# Patient Record
Sex: FEMALE | Race: BLACK OR AFRICAN AMERICAN | NOT HISPANIC OR LATINO | Employment: OTHER | URBAN - METROPOLITAN AREA
[De-identification: names, ages, dates, MRNs, and addresses within clinical notes are randomized per-mention and may not be internally consistent; named-entity substitution may affect disease eponyms.]

---

## 2017-05-01 ENCOUNTER — OFFICE VISIT (OUTPATIENT)
Dept: FAMILY MEDICINE | Facility: CLINIC | Age: 62
End: 2017-05-01
Payer: MEDICAID

## 2017-05-01 ENCOUNTER — LAB VISIT (OUTPATIENT)
Dept: LAB | Facility: HOSPITAL | Age: 62
End: 2017-05-01
Payer: MEDICAID

## 2017-05-01 VITALS
HEIGHT: 64 IN | DIASTOLIC BLOOD PRESSURE: 90 MMHG | TEMPERATURE: 98 F | BODY MASS INDEX: 36.84 KG/M2 | HEART RATE: 61 BPM | SYSTOLIC BLOOD PRESSURE: 130 MMHG | OXYGEN SATURATION: 98 % | WEIGHT: 215.81 LBS

## 2017-05-01 DIAGNOSIS — I10 ESSENTIAL HYPERTENSION: Chronic | ICD-10-CM

## 2017-05-01 DIAGNOSIS — D64.9 ANEMIA, UNSPECIFIED TYPE: Primary | ICD-10-CM

## 2017-05-01 DIAGNOSIS — F33.0 MILD RECURRENT MAJOR DEPRESSION: Chronic | ICD-10-CM

## 2017-05-01 DIAGNOSIS — D64.9 ANEMIA, UNSPECIFIED TYPE: ICD-10-CM

## 2017-05-01 DIAGNOSIS — G89.29 CHRONIC RIGHT SHOULDER PAIN: ICD-10-CM

## 2017-05-01 DIAGNOSIS — M25.511 CHRONIC RIGHT SHOULDER PAIN: ICD-10-CM

## 2017-05-01 DIAGNOSIS — M17.12 OSTEOARTHRITIS OF LEFT KNEE, UNSPECIFIED OSTEOARTHRITIS TYPE: ICD-10-CM

## 2017-05-01 LAB
ALBUMIN SERPL BCP-MCNC: 3.4 G/DL
ALP SERPL-CCNC: 83 U/L
ALT SERPL W/O P-5'-P-CCNC: 12 U/L
ANION GAP SERPL CALC-SCNC: 10 MMOL/L
AST SERPL-CCNC: 19 U/L
BASOPHILS # BLD AUTO: 0.04 K/UL
BASOPHILS NFR BLD: 0.4 %
BILIRUB SERPL-MCNC: 0.2 MG/DL
BUN SERPL-MCNC: 9 MG/DL
CALCIUM SERPL-MCNC: 9.3 MG/DL
CHLORIDE SERPL-SCNC: 108 MMOL/L
CHOLEST/HDLC SERPL: 3.3 {RATIO}
CO2 SERPL-SCNC: 23 MMOL/L
CREAT SERPL-MCNC: 0.7 MG/DL
DIFFERENTIAL METHOD: ABNORMAL
EOSINOPHIL # BLD AUTO: 0.3 K/UL
EOSINOPHIL NFR BLD: 3.6 %
ERYTHROCYTE [DISTWIDTH] IN BLOOD BY AUTOMATED COUNT: 18.9 %
EST. GFR  (AFRICAN AMERICAN): >60 ML/MIN/1.73 M^2
EST. GFR  (NON AFRICAN AMERICAN): >60 ML/MIN/1.73 M^2
GLUCOSE SERPL-MCNC: 89 MG/DL
HCT VFR BLD AUTO: 32 %
HDL/CHOLESTEROL RATIO: 30.7 %
HDLC SERPL-MCNC: 127 MG/DL
HDLC SERPL-MCNC: 39 MG/DL
HGB BLD-MCNC: 9.8 G/DL
LDLC SERPL CALC-MCNC: 73.6 MG/DL
LYMPHOCYTES # BLD AUTO: 3.1 K/UL
LYMPHOCYTES NFR BLD: 33.3 %
MCH RBC QN AUTO: 24.3 PG
MCHC RBC AUTO-ENTMCNC: 30.6 %
MCV RBC AUTO: 79 FL
MONOCYTES # BLD AUTO: 0.6 K/UL
MONOCYTES NFR BLD: 6.3 %
NEUTROPHILS # BLD AUTO: 5.2 K/UL
NEUTROPHILS NFR BLD: 56.4 %
NONHDLC SERPL-MCNC: 88 MG/DL
PLATELET # BLD AUTO: 312 K/UL
PMV BLD AUTO: ABNORMAL FL
POTASSIUM SERPL-SCNC: 3.7 MMOL/L
PROT SERPL-MCNC: 7.9 G/DL
RBC # BLD AUTO: 4.03 M/UL
SODIUM SERPL-SCNC: 141 MMOL/L
TRIGL SERPL-MCNC: 72 MG/DL
TSH SERPL DL<=0.005 MIU/L-ACNC: 0.75 UIU/ML
WBC # BLD AUTO: 9.19 K/UL

## 2017-05-01 PROCEDURE — 99999 PR PBB SHADOW E&M-EST. PATIENT-LVL III: CPT | Mod: PBBFAC,,, | Performed by: NURSE PRACTITIONER

## 2017-05-01 PROCEDURE — 36415 COLL VENOUS BLD VENIPUNCTURE: CPT | Mod: PO

## 2017-05-01 PROCEDURE — 85025 COMPLETE CBC W/AUTO DIFF WBC: CPT

## 2017-05-01 PROCEDURE — 84443 ASSAY THYROID STIM HORMONE: CPT

## 2017-05-01 PROCEDURE — 80061 LIPID PANEL: CPT

## 2017-05-01 PROCEDURE — 99214 OFFICE O/P EST MOD 30 MIN: CPT | Mod: S$PBB,,, | Performed by: NURSE PRACTITIONER

## 2017-05-01 PROCEDURE — 80053 COMPREHEN METABOLIC PANEL: CPT

## 2017-05-01 RX ORDER — LOSARTAN POTASSIUM AND HYDROCHLOROTHIAZIDE 25; 100 MG/1; MG/1
1 TABLET ORAL DAILY
Qty: 90 TABLET | Refills: 1 | Status: SHIPPED | OUTPATIENT
Start: 2017-05-01 | End: 2018-04-02 | Stop reason: SDUPTHER

## 2017-05-01 RX ORDER — METOPROLOL SUCCINATE 100 MG/1
100 TABLET, EXTENDED RELEASE ORAL DAILY
Qty: 90 TABLET | Refills: 1 | Status: SHIPPED | OUTPATIENT
Start: 2017-05-01 | End: 2018-04-02 | Stop reason: SDUPTHER

## 2017-05-01 RX ORDER — CLONIDINE HYDROCHLORIDE 0.1 MG/1
0.2 TABLET ORAL 2 TIMES DAILY
Qty: 360 TABLET | Refills: 1 | Status: SHIPPED | OUTPATIENT
Start: 2017-05-01 | End: 2018-04-02 | Stop reason: SDUPTHER

## 2017-05-01 RX ORDER — FLUOXETINE HYDROCHLORIDE 20 MG/1
20 CAPSULE ORAL EVERY MORNING
Qty: 90 CAPSULE | Refills: 1 | Status: SHIPPED | OUTPATIENT
Start: 2017-05-01 | End: 2017-07-13

## 2017-05-01 RX ORDER — HYDROCODONE BITARTRATE AND ACETAMINOPHEN 5; 325 MG/1; MG/1
1 TABLET ORAL EVERY 6 HOURS PRN
Qty: 30 TABLET | Refills: 0 | Status: CANCELLED | OUTPATIENT
Start: 2017-05-01

## 2017-05-01 RX ORDER — HYDROCODONE BITARTRATE AND ACETAMINOPHEN 5; 325 MG/1; MG/1
1 TABLET ORAL EVERY 6 HOURS PRN
Qty: 30 TABLET | Refills: 0 | Status: SHIPPED | OUTPATIENT
Start: 2017-05-01 | End: 2017-06-13 | Stop reason: ALTCHOICE

## 2017-05-01 RX ORDER — OMEPRAZOLE 40 MG/1
40 CAPSULE, DELAYED RELEASE ORAL 2 TIMES DAILY
Qty: 180 CAPSULE | Refills: 1 | Status: SHIPPED | OUTPATIENT
Start: 2017-05-01 | End: 2018-04-02 | Stop reason: SDUPTHER

## 2017-05-01 NOTE — MR AVS SNAPSHOT
[facility]  [address]  [place] [address]  Phone: [phone]  Fax: [phone]                  Bell Lynn   2017 9:20 AM   Office Visit    Description:  Female : 1955   Provider:  Jose Fernando NP   Department:  [facility] Medicine           Reason for Visit     Leg Pain     Hypertension           Diagnoses this Visit        Comments    Anemia, unspecified type    -  Primary     Essential hypertension         Mild recurrent major depression         Osteoarthritis of left knee, unspecified osteoarthritis type         Chronic right shoulder pain                To Do List           Goals (5 Years of Data)              Today    10/19/16    10/19/16    Blood Pressure < 140/90   130/90  130/90  130/90      Follow-Up and Disposition     Return in about 1 month (around 2017).       These Medications        Disp Refills Start End    losartan-hydrochlorothiazide 100-25 mg (HYZAAR) 100-25 mg per tablet 90 tablet 1 2017 10/28/2017    Take 1 tablet by mouth once daily. - Oral    Pharmacy: [facility] [address] [place] [address] Ph #: [phone]       fluoxetine (PROZAC) 20 MG capsule 90 capsule 1 2017 10/28/2017    Take 1 capsule (20 mg total) by mouth every morning. - Oral    Pharmacy: [address] [place] [address] Ph #: [phone]       metoprolol succinate (TOPROL-XL) 100 MG 24 hr tablet 90 tablet 1 2017 10/28/2017    Take 1 tablet (100 mg total) by mouth once daily. - Oral    Pharmacy: [address] [place] [address] Ph #: [phone]       cloNIDine (CATAPRES) 0.1 MG tablet 360 tablet 1 2017 10/28/2017    Take 2 tablets (0.2 mg total) by mouth 2 (two) times daily. - Oral    Pharmacy: [address] [place] [address] Ph #: [phone]       omeprazole (PRILOSEC) 40 MG capsule 180 capsule 1  5/1/2017 10/28/2017    Take 1 capsule (40 mg total) by mouth 2 (two) times daily. - Oral    Pharmacy: [facility] [place] [place] - [address] [facility] Ph #: [phone]       hydrocodone-acetaminophen 5-325mg (NORCO) 5-325 mg per tablet 30 tablet 0 5/1/2017     Take 1 tablet by mouth every 6 (six) hours as needed for Pain. - Oral    Pharmacy: [facility] - [place] [place] - [address] [facility] Ph #: [phone]         [facility]     [facility] Nurse Care Line - 24/7 Assistance  Unless otherwise directed by your provider, please contact [facility], our nurse care line that is available for 24/7 assistance.     Registered nurses in the [facility] provide: appointment scheduling, clinical advisement, health education, and other advisory services.  Call: [phone] (toll free)               Medications           Message regarding Medications     Verify the changes and/or additions to your medication regime listed below are the same as discussed with your clinician today.  If any of these changes or additions are incorrect, please notify your healthcare provider.             Verify that the below list of medications is an accurate representation of the medications you are currently taking.  If none reported, the list may be blank. If incorrect, please contact your healthcare provider. Carry this list with you in case of emergency.           Current Medications     albuterol 90 mcg/actuation inhaler Inhale 2 puffs into the lungs every 4 (four) hours as needed for Wheezing or Shortness of Breath.    cetirizine (ZYRTEC) 10 MG tablet Take 1 tablet (10 mg total) by mouth once daily.    cloNIDine (CATAPRES) 0.1 MG tablet Take 2 tablets (0.2 mg total) by mouth 2 (two) times daily.    fluoxetine (PROZAC) 20 MG capsule Take 1 capsule (20 mg total) by mouth every morning.    hydrocodone-acetaminophen 5-325mg (NORCO) 5-325 mg per tablet Take 1 tablet by mouth every  "6 (six) hours as needed for Pain.    losartan-hydrochlorothiazide 100-25 mg (HYZAAR) 100-25 mg per tablet Take 1 tablet by mouth once daily.    metoprolol succinate (TOPROL-XL) 100 MG 24 hr tablet Take 1 tablet (100 mg total) by mouth once daily.    omeprazole (PRILOSEC) 40 MG capsule Take 1 capsule (40 mg total) by mouth 2 (two) times daily.           Clinical Reference Information           Your Vitals Were     BP Pulse Temp Height Weight SpO2    130/90 (BP Location: Right arm, Patient Position: Sitting, BP Method: Manual) 61 98.4 °F (36.9 °C) (Oral) 5' 4" (1.626 m) 97.9 kg (215 lb 13.3 oz) 98%    BMI                37.05 kg/m2          Blood Pressure          Most Recent Value    BP  (!)  130/90      Allergies as of 5/1/2017     Nsaids (Non-steroidal Anti-inflammatory Drug)    Penicillins      Immunizations Administered on Date of Encounter - 5/1/2017     None      Orders Placed During Today's Visit     Future Labs/Procedures Expected by Expires    CBC auto differential  5/1/2017 7/30/2017    Comprehensive metabolic panel  5/1/2017 7/30/2017    Lipid panel  5/1/2017 7/30/2017    TSH  5/1/2017 7/30/2017    Urinalysis  5/1/2017 6/30/2018      Instructions    Follow up to establish care with PCP.   Follow up with [facility].  Follow up with [facility] stomach doctor.        Language Assistance Services     ATTENTION: Language assistance services are available, free of charge. Please call [phone].      ATENCIÓN: Si habla javier, tiene a baker disposición servicios gratuitos de asistencia lingüística. Llame al [phone].     CHÚ Ý: N?u b?n nói Ti?ng Vi?t, có các d?ch v? h? tr? ngôn ng? mi?n phí dành cho b?n. G?i s? [phone].         [facility] complies with applicable Federal civil rights laws and does not discriminate on the basis of race, color, national origin, age, disability, or sex.        "

## 2017-05-01 NOTE — PATIENT INSTRUCTIONS
Follow up to establish care with PCP.   Follow up with [facility].  Follow up with [facility] stomach doctor.

## 2017-05-01 NOTE — PROGRESS NOTES
"This dictation has been generated using Dragon Dictation some phonetic errors may occur.     Bell was seen today for leg pain and hypertension.    Diagnoses and all orders for this visit:    Anemia, unspecified type  -     CBC auto differential; Future    Essential hypertension  -     losartan-hydrochlorothiazide 100-25 mg (HYZAAR) 100-25 mg per tablet; Take 1 tablet by mouth once daily.  -     metoprolol succinate (TOPROL-XL) 100 MG 24 hr tablet; Take 1 tablet (100 mg total) by mouth once daily.  -     cloNIDine (CATAPRES) 0.1 MG tablet; Take 2 tablets (0.2 mg total) by mouth 2 (two) times daily.  -     Comprehensive metabolic panel; Future  -     Lipid panel; Future  -     Urinalysis; Future    Mild recurrent major depression  -     fluoxetine (PROZAC) 20 MG capsule; Take 1 capsule (20 mg total) by mouth every morning.  -     TSH; Future    Osteoarthritis of left knee, unspecified osteoarthritis type  -     hydrocodone-acetaminophen 5-325mg (NORCO) 5-325 mg per tablet; Take 1 tablet by mouth every 6 (six) hours as needed for Pain.    Chronic right shoulder pain  -     hydrocodone-acetaminophen 5-325mg (NORCO) 5-325 mg per tablet; Take 1 tablet by mouth every 6 (six) hours as needed for Pain.    Other orders  -     omeprazole (PRILOSEC) 40 MG capsule; Take 1 capsule (40 mg total) by mouth 2 (two) times daily.  -     Cancel: hydrocodone-acetaminophen 5-325mg (NORCO) 5-325 mg per tablet; Take 1 tablet by mouth every 6 (six) hours as needed for Pain.      Anemia. Update cbc. History of suspicious findings on EGD consider Gentile's.  Saw Dr. Ivy and due for appointment. Colonoscopy due.  Ortho in the past [facility] for Tricompartmental arthritic changes of knee.  Refill meds times one.  no recent refill. Needs "new" PCP or Ortho to write in the future.    Depression stable and controlled refill prozac.   HTN stable and controlled. Refill meds. Missed dose this AM.     Return in about 1 month (around " 6/1/2017).    Patient Instructions   Follow up to establish care with PCP.   Follow up with [facility].  Follow up with [facility] stomach doctor.    _______________________________________________________________  ________________________________________________________________    Chief Complaint   Patient presents with    Leg Pain    Hypertension     History of present illness  This 61 y.o. presents today for complaint of follow-up on blood pressure, knee pain, and anemia.  Patient has a history of hypertension.  She monitors her blood pressure at home but did not bring in a record.  She states her blood pressures have been up and down.  Take medication as directed however ran out of med 2 days ago.  Bilateral knee pain.  Symptoms are currently worse on the left than the right.  She has seen orthopedic bone and joint for this issue in the past.  She notes that she did get some shots.  She denies having any other procedures.  We discussed follow-up with orthopedics would be appropriate.  I given her one-time refill of pain medication.  Anemia.  Patient does have a history of GI issues.  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  She states she follows with Dr. Ivy.  She states she's a couple months overdue for follow-up.  She has not had a recent colonoscopy.  She states that that is also overdue.  Review of systems  No fever patient notes chills cold intolerance  Weight is stable.  No chest pain or shortness of breath  Occasional nausea.  No vomiting or diarrhea.  No blood or mucus in the stool.  No melena.  Past medical and social history reviewed.  Retired never smoked.    Past Medical History:   Diagnosis Date    Asthma     Depression     Gastric ulcer with hemorrhage 7/2012    GERD (gastroesophageal reflux disease)     History of blood transfusion     Hypertension     Iron deficiency anemia 3/14/2013    Osteoarthritis of both knees        Past Surgical History:    Procedure Laterality Date    BREAST SURGERY      Benign breast cyst    HYSTERECTOMY      Partial    TONSILLECTOMY         History reviewed. No pertinent family history.    Social History     Social History    Marital status:      Spouse name: N/A    Number of children: N/A    Years of education: N/A     Occupational History     [employer]     Social History Main Topics    Smoking status: Never Smoker    Smokeless tobacco: Never Used    Alcohol use Yes      Comment: ocaasional    Drug use: No    Sexual activity: Yes     Partners: Male     Birth control/ protection: Surgical     Other Topics Concern    None     Social History Narrative       Current Outpatient Prescriptions   Medication Sig Dispense Refill    albuterol 90 mcg/actuation inhaler Inhale 2 puffs into the lungs every 4 (four) hours as needed for Wheezing or Shortness of Breath. 1 each 5    cetirizine (ZYRTEC) 10 MG tablet Take 1 tablet (10 mg total) by mouth once daily. 90 tablet 1    cloNIDine (CATAPRES) 0.1 MG tablet Take 2 tablets (0.2 mg total) by mouth 2 (two) times daily. 360 tablet 1    fluoxetine (PROZAC) 20 MG capsule Take 1 capsule (20 mg total) by mouth every morning. 90 capsule 1    hydrocodone-acetaminophen 5-325mg (NORCO) 5-325 mg per tablet Take 1 tablet by mouth every 6 (six) hours as needed for Pain. 30 tablet 0    losartan-hydrochlorothiazide 100-25 mg (HYZAAR) 100-25 mg per tablet Take 1 tablet by mouth once daily. 90 tablet 1    metoprolol succinate (TOPROL-XL) 100 MG 24 hr tablet Take 1 tablet (100 mg total) by mouth once daily. 90 tablet 1    omeprazole (PRILOSEC) 40 MG capsule Take 1 capsule (40 mg total) by mouth 2 (two) times daily. 180 capsule 1     No current facility-administered medications for this visit.        Review of patient's allergies indicates:   Allergen Reactions    Nsaids (non-steroidal anti-inflammatory drug)      Gi bleed    Penicillins Itching and Swelling        Physical examination  Vitals Reviewed  Gen. Well-dressed well-nourished no apparent distress  Skin warm dry and intact.  No rashes noted.  Neck is supple without adenopathy  Chest.  Respirations are even unlabored.  Lungs are clear to auscultation.  Cardiac regular rate and rhythm.  No chest wall adenopathy noted.  Neuro. Awake alert oriented x4.  Normal judgment and cognition noted.  Extremities no clubbing cyanosis or edema noted.     Call or return to clinic prn if these symptoms worsen or fail to improve as anticipated.

## 2017-06-12 NOTE — PROGRESS NOTES
Assessment & Plan  Chest pain at rest - EKG without acute findings.  Check chest x-ray.  With risk factors, family history, age, hypertension, is at risk for cardiac event, urgent referral to cardiology for evaluation.  -     X-Ray Chest PA And Lateral; Future; Expected date: 06/13/2017  -     EKG 12-lead  -     Ambulatory referral to Cardiology    Blood in stool-last seen about 3 weeks ago per patient recollection.  Has a known history of recurrent peptic ulcer disease felt to be due to chronic NSAID use.  Check CBC.  We did discuss that if she has a significant drop she may need to be evaluated in a more acute setting.  -     CBC auto differential; Future; Expected date: 06/13/2017    Other chronic pain-recalls a history of diagnosis of fibromyalgia years ago.  Certainly has multiple touch points.  Last CBC and CMP were normal as well as TSH.  Drawn not that long ago.  Check B12 and folate.  She's currently on Prozac.  Would consider Cymbalta in the future.  For now trial of gabapentin for overall pain and also for her knee pain.  If her depression isn't controlled at Concerta may be a contributor.  -     Vitamin B12; Future; Expected date: 06/13/2017  -     Folate; Future; Expected date: 06/13/2017  -     gabapentin (NEURONTIN) 100 MG capsule; Take 1 capsule (100 mg total) by mouth every evening. Up to 300 mg at night  Dispense: 30 capsule; Refill: 5    Primary osteoarthritis of both knees  Osteoarthritis of left knee, unspecified osteoarthritis type-discussed with her that narcotics are not a good long-term solution, especially with family members with questionable narcotic dependency or abuse potential.  Patient herself with a history of depression without any known history or admitted history of narcotics abuse.  Refill hydrocodone 20 tablets, trial of gabapentin and referral to orthopedics for evaluation for possible end-stage knee arthritis.  -     Ambulatory referral to Orthopedics  -      hydrocodone-acetaminophen 5-325mg (NORCO) 5-325 mg per tablet; Take 1 tablet by mouth every 6 (six) hours as needed for Pain.  Dispense: 20 tablet; Refill: 0  -     gabapentin (NEURONTIN) 100 MG capsule; Take 1 capsule (100 mg total) by mouth every evening. Up to 300 mg at night  Dispense: 30 capsule; Refill: 5      Medications Discontinued During This Encounter   Medication Reason    hydrocodone-acetaminophen 5-325mg (NORCO) 5-325 mg per tablet Therapy completed       Follow-up: No Follow-up on file. follow-up in about a month.  In the interim, she should see orthopedics about her knee and, with a management plan for her knee; her blood pressure is improved on repeat but not quite to goal, does have room to increase on the Toprol XL for next visit.  Could consider adding on amlodipine as well.  Would see how she is doing on the new start gabapentin and consider changing over to Cymbalta.  She'll need PHQ9 at that time.      =================================================================      Chief Complaint   Patient presents with    Headache    Dizziness    Shortness of Breath       HPI  Bell is a 62 y.o. female, last appointment with this clinic was 5/1/2017.    No LMP recorded. Patient has had a hysterectomy.    Former patient of Dr. Sanchez  Hypertension  Depression, fluoxetine  Asthma  History of iron deficiency anemia  Osteoarthritis, knees; last OV with Javon Fernando - rx for hydrocodone.   Aware - had 2 rx's in the past for hydrocodone from Dr. Sanchez.  History of peptic ulcer disease, has been referred to GI in the past.  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  There was presence of gastric ulcers with clean base, pathology came back benign.  H. pylori was negative.  She's had several EGDs on record, each time with ulcers.  She had repeat EGD done in 12/2016 which again  showed ulcer.  I don't have the path report.    She is here accompanied by family member.  She's got multiple complaints.  Most pressing is chest pain in the past 2 weeks. Will occur at rest.  Sometimes sharp pain in the center of the chest and radiates into the left arm.  Can be severe.  Unrelated to food.  Will get dyspneic.  Frequency - every other day.  Now it's been for the past 2 days.  Thought about going to the ER but didn't want to be admitted.     BP is high she attributes to the pain.  Notes she takes her medicines regularly.  Does not check her blood pressures outside the office setting so unclear if she is under control.    Headache - feels like it's sinus headache in the front but also occipital headache x days.  No vision changes.  Sometimes feels like left arm weak.  Felt like she was weak this morning and caught herself on the sink.      Not using inhaler.  Has it at home but has not taken it.  She'll take if she wheezes.     For her osteoarthritis, Takes daily ASA for her pain.  Tylenol without relief.  Was dx'd a few years ago with fibromyalgia.  Worst pain is the knee. Did not see orthopedics as was mentioned in her last office visit note.  Was not aware that that was the plan.  She was given a prescription for hydrocodone filled at her last visit.  However she notes that soon after she got home it went missing.  She put it on her dresser at home and someone took it.  She lives with her  and her children and things one of her children may have taken it.  It doesn't sound like she knows that they are drug abusers but it's possible that one of her children to use or to sell.  If she were to get another prescriptive for narcotic she would like it up and assures me that it would be safe.  She thinks she saw blood in her stool, last seen about 3 weeks ago.    Initially felt like her depression was well controlled, on further discussion, she feels that her mood for the past several weeks has  "been "so-so".    Patient Care Team:  Cipriano Carrera MD as PCP - General (Internal Medicine)    Patient Active Problem List    Diagnosis Date Noted    Severe obesity (BMI 35.0-39.9) 06/15/2015    AR (allergic rhinitis) 02/19/2014    Iron deficiency anemia 03/14/2013    Asthma, chronic 02/14/2013    Gastric ulcer, chronic 02/14/2013    Menopausal hot flushes 02/14/2013    Mild recurrent major depression 02/14/2013    Osteoarthritis of knee 07/30/2012    Essential hypertension 07/30/2012       PAST MEDICAL HISTORY:  Past Medical History:   Diagnosis Date    Asthma     Depression     Gastric ulcer with hemorrhage 7/2012    GERD (gastroesophageal reflux disease)     History of blood transfusion     Hypertension     Iron deficiency anemia 3/14/2013    Osteoarthritis of both knees        PAST SURGICAL HISTORY:  Past Surgical History:   Procedure Laterality Date    BREAST SURGERY      Benign breast cyst    HYSTERECTOMY      Partial    TONSILLECTOMY         SOCIAL HISTORY:  Social History     Social History    Marital status:      Spouse name: N/A    Number of children: N/A    Years of education: N/A     Occupational History     [employer]     Social History Main Topics    Smoking status: Never Smoker    Smokeless tobacco: Never Used    Alcohol use Yes      Comment: ocaasional    Drug use: No    Sexual activity: Yes     Partners: Male     Birth control/ protection: Surgical     Other Topics Concern    Not on file     Social History Narrative    No narrative on file       ALLERGIES AND MEDICATIONS: updated and reviewed.  Review of patient's allergies indicates:   Allergen Reactions    Nsaids (non-steroidal anti-inflammatory drug)      Gi bleed    Penicillins Itching and Swelling     Current Outpatient Prescriptions   Medication Sig Dispense Refill    cloNIDine (CATAPRES) 0.1 MG tablet Take 2 tablets (0.2 mg total) by mouth 2 (two) times daily. 360 tablet 1    " "fluoxetine (PROZAC) 20 MG capsule Take 1 capsule (20 mg total) by mouth every morning. 90 capsule 1    losartan-hydrochlorothiazide 100-25 mg (HYZAAR) 100-25 mg per tablet Take 1 tablet by mouth once daily. 90 tablet 1    metoprolol succinate (TOPROL-XL) 100 MG 24 hr tablet Take 1 tablet (100 mg total) by mouth once daily. 90 tablet 1    omeprazole (PRILOSEC) 40 MG capsule Take 1 capsule (40 mg total) by mouth 2 (two) times daily. 180 capsule 1    albuterol 90 mcg/actuation inhaler Inhale 2 puffs into the lungs every 4 (four) hours as needed for Wheezing or Shortness of Breath. 1 each 5    cetirizine (ZYRTEC) 10 MG tablet Take 1 tablet (10 mg total) by mouth once daily. 90 tablet 1     No current facility-administered medications for this visit.        Review of Systems   Constitutional: Negative for chills and fever.   Respiratory: Positive for shortness of breath.    Cardiovascular: Positive for chest pain.   Musculoskeletal: Positive for joint pain. Negative for falls.   Neurological: Positive for headaches.   Psychiatric/Behavioral: Positive for depression.       Physical Exam   Vitals:    06/13/17 1350 06/13/17 1713   BP: (!) 160/100 (!) 150/90   BP Location:  Left arm   Patient Position:  Sitting   Pulse: 78    Temp: 98 °F (36.7 °C)    SpO2: 97%    Weight: 97.6 kg (215 lb 2.7 oz)    Height: 5' 4" (1.626 m)     Body mass index is 36.93 kg/m².  Weight: 97.6 kg (215 lb 2.7 oz)   Height: 5' 4" (162.6 cm)     Physical Exam   Constitutional: She is oriented to person, place, and time. She appears well-developed and well-nourished. No distress.   Eyes: EOM are normal.   Cardiovascular: Normal rate, regular rhythm and normal heart sounds.    No murmur heard.  Pulmonary/Chest: Effort normal and breath sounds normal.   Musculoskeletal: Normal range of motion.   She is tender diffusely, tender in the base of the neck posteriorly, tendon the trapezius muscles, tender in the occipital prominences, tender near the " acromioclavicular joints of the shoulders, tender in the epicondyles both lateral and medial, tender on the knee joints and as well as the soft tissue surrounding the knees superiorly in both sides, tender in the lumbar spine, lower lumbar spine and tender in the SI joints.  Tender in the anterior lower tibias   Neurological: She is alert and oriented to person, place, and time. Coordination normal.   Skin: Skin is warm and dry.   Psychiatric: She has a normal mood and affect. Her behavior is normal. Thought content normal.     EKG tracing personally reviewed, sinus rhythm, no acute findings, no ST/T-wave abnormalities.

## 2017-06-13 ENCOUNTER — HOSPITAL ENCOUNTER (OUTPATIENT)
Dept: RADIOLOGY | Facility: HOSPITAL | Age: 62
Discharge: HOME | End: 2017-06-13
Attending: INTERNAL MEDICINE
Payer: MEDICAID

## 2017-06-13 ENCOUNTER — OFFICE VISIT (OUTPATIENT)
Dept: FAMILY MEDICINE | Facility: CLINIC | Age: 62
End: 2017-06-13
Payer: MEDICAID

## 2017-06-13 VITALS
SYSTOLIC BLOOD PRESSURE: 150 MMHG | HEART RATE: 78 BPM | OXYGEN SATURATION: 97 % | WEIGHT: 215.19 LBS | BODY MASS INDEX: 36.74 KG/M2 | HEIGHT: 64 IN | TEMPERATURE: 98 F | DIASTOLIC BLOOD PRESSURE: 90 MMHG

## 2017-06-13 DIAGNOSIS — K92.1 BLOOD IN STOOL: ICD-10-CM

## 2017-06-13 DIAGNOSIS — M17.12 OSTEOARTHRITIS OF LEFT KNEE, UNSPECIFIED OSTEOARTHRITIS TYPE: ICD-10-CM

## 2017-06-13 DIAGNOSIS — G89.29 OTHER CHRONIC PAIN: ICD-10-CM

## 2017-06-13 DIAGNOSIS — G89.29 CHRONIC RIGHT SHOULDER PAIN: ICD-10-CM

## 2017-06-13 DIAGNOSIS — M25.511 CHRONIC RIGHT SHOULDER PAIN: ICD-10-CM

## 2017-06-13 DIAGNOSIS — R07.9 CHEST PAIN AT REST: Primary | ICD-10-CM

## 2017-06-13 DIAGNOSIS — R07.9 CHEST PAIN AT REST: ICD-10-CM

## 2017-06-13 DIAGNOSIS — M17.0 PRIMARY OSTEOARTHRITIS OF BOTH KNEES: ICD-10-CM

## 2017-06-13 PROCEDURE — 93010 ELECTROCARDIOGRAM REPORT: CPT | Mod: ,,, | Performed by: INTERNAL MEDICINE

## 2017-06-13 PROCEDURE — 99999 PR PBB SHADOW E&M-EST. PATIENT-LVL IV: CPT | Mod: PBBFAC,,, | Performed by: INTERNAL MEDICINE

## 2017-06-13 PROCEDURE — 93005 ELECTROCARDIOGRAM TRACING: CPT | Mod: S$GLB,,, | Performed by: INTERNAL MEDICINE

## 2017-06-13 PROCEDURE — 71020 XR CHEST PA AND LATERAL: CPT | Mod: 26,,, | Performed by: RADIOLOGY

## 2017-06-13 PROCEDURE — 99215 OFFICE O/P EST HI 40 MIN: CPT | Mod: S$PBB,,, | Performed by: INTERNAL MEDICINE

## 2017-06-13 PROCEDURE — 71020 XR CHEST PA AND LATERAL: CPT | Mod: TC,PO

## 2017-06-13 RX ORDER — GABAPENTIN 100 MG/1
100 CAPSULE ORAL NIGHTLY
Qty: 30 CAPSULE | Refills: 5 | Status: SHIPPED | OUTPATIENT
Start: 2017-06-13 | End: 2017-07-13 | Stop reason: SDUPTHER

## 2017-06-13 RX ORDER — HYDROCODONE BITARTRATE AND ACETAMINOPHEN 5; 325 MG/1; MG/1
1 TABLET ORAL EVERY 6 HOURS PRN
Qty: 20 TABLET | Refills: 0 | Status: SHIPPED | OUTPATIENT
Start: 2017-06-13 | End: 2017-08-16 | Stop reason: SDUPTHER

## 2017-06-14 ENCOUNTER — INITIAL CONSULT (OUTPATIENT)
Dept: CARDIOLOGY | Facility: CLINIC | Age: 62
End: 2017-06-14
Payer: MEDICAID

## 2017-06-14 VITALS
WEIGHT: 209 LBS | DIASTOLIC BLOOD PRESSURE: 77 MMHG | HEIGHT: 64 IN | OXYGEN SATURATION: 96 % | SYSTOLIC BLOOD PRESSURE: 144 MMHG | HEART RATE: 61 BPM | BODY MASS INDEX: 35.68 KG/M2

## 2017-06-14 DIAGNOSIS — I10 ESSENTIAL HYPERTENSION: ICD-10-CM

## 2017-06-14 DIAGNOSIS — E66.01 SEVERE OBESITY (BMI 35.0-35.9 WITH COMORBIDITY): ICD-10-CM

## 2017-06-14 DIAGNOSIS — F33.0 MILD RECURRENT MAJOR DEPRESSION: ICD-10-CM

## 2017-06-14 DIAGNOSIS — J45.20 ASTHMA, CHRONIC, MILD INTERMITTENT, UNCOMPLICATED: ICD-10-CM

## 2017-06-14 DIAGNOSIS — R07.9 CHEST PAIN, UNSPECIFIED TYPE: Primary | ICD-10-CM

## 2017-06-14 PROCEDURE — 99999 PR PBB SHADOW E&M-EST. PATIENT-LVL III: CPT | Mod: PBBFAC,,, | Performed by: INTERNAL MEDICINE

## 2017-06-14 PROCEDURE — 99213 OFFICE O/P EST LOW 20 MIN: CPT | Mod: PBBFAC,PO | Performed by: INTERNAL MEDICINE

## 2017-06-14 PROCEDURE — 99214 OFFICE O/P EST MOD 30 MIN: CPT | Mod: S$PBB,,, | Performed by: INTERNAL MEDICINE

## 2017-06-14 RX ORDER — NITROGLYCERIN 0.4 MG/1
0.4 TABLET SUBLINGUAL EVERY 5 MIN PRN
Qty: 60 TABLET | Refills: 12 | Status: SHIPPED | OUTPATIENT
Start: 2017-06-14

## 2017-06-14 NOTE — LETTER
June 14, 2017      Cipriano Carrera MD  [address]  [place] [address]           [facility]  [address]  [place] [address]  Phone: [phone]          Patient: Bell Lynn   MR Number: [id number]   YOB: 1955   Date of Visit: 6/14/2017       Dear Dr. Cipriano Carrera:    Thank you for referring Bell Lynn to me for evaluation. Attached you will find relevant portions of my assessment and plan of care.    If you have questions, please do not hesitate to call me. I look forward to following Bell Lynn along with you.    Sincerely,    Abdulaziz Willson MD    Enclosure  CC:  No Recipients    If you would like to receive this communication electronically, please contact [email or web address] or [phone] to request more information on [facility] access.    For providers and/or their staff who would like to refer a patient to [facility], please contact us through our one-stop-shop provider referral line, [facility], at [phone].    If you feel you have received this communication in error or would no longer like to receive these types of communications, please e-mail [email or web address]

## 2017-06-14 NOTE — PROGRESS NOTES
Subjective:    Patient ID:  Bell Lynn is a 62 y.o. female who presents for evaluation of Establish Care      HPI  Patient is here for follow-up of chest pain.  She was remotely seen in 2015 but didn't follow-up.  She did have her testing done at that time which included echocardiogram nuclear stresses which were within normal limits.  She still having substernal chest pressure and previously did take nitroglycerin with some response.  She's also taking Prilosec and doesn't have any significant relief.  She has some associated shortness of breath but no palpitations.  She denies any PND, orthopnea or lower edema.  She's not expressing dizziness, presyncope or syncope.    Review of Systems   Constitution: Negative.   HENT: Negative.    Eyes: Negative.    Cardiovascular: Positive for chest pain and dyspnea on exertion. Negative for irregular heartbeat, leg swelling, near-syncope, orthopnea, palpitations, paroxysmal nocturnal dyspnea and syncope.   Respiratory: Positive for shortness of breath.    Skin: Negative.    Musculoskeletal: Negative.    Gastrointestinal: Negative for abdominal pain, constipation and diarrhea.   Genitourinary: Negative for dysuria.   Neurological: Negative.    Psychiatric/Behavioral: Negative.         Objective:    Physical Exam   Constitutional: She is oriented to person, place, and time. She appears well-developed and well-nourished.   HENT:   Head: Normocephalic and atraumatic.   Eyes: Conjunctivae and EOM are normal. Pupils are equal, round, and reactive to light.   Neck: Normal range of motion. Neck supple. No thyromegaly present.   Cardiovascular: Normal rate and regular rhythm.    No murmur heard.  Pulmonary/Chest: Effort normal and breath sounds normal. No respiratory distress.   Abdominal: Soft. Bowel sounds are normal.   Musculoskeletal: She exhibits no edema.   Neurological: She is alert and oriented to person, place, and time.   Skin: Skin is warm and dry.    Psychiatric: She has a normal mood and affect. Her behavior is normal.       echo and NST within normal limits 2015    EKG normal sinus rhythm nonspecific ST-T changes  Assessment:       1. Chest pain, unspecified type    2. Essential hypertension    3. Asthma, chronic, mild intermittent, uncomplicated    4. Severe obesity (BMI 35.0-35.9 with comorbidity)    5. Mild recurrent major depression         Plan:       -Fairly typical symptoms with several risk factors, repeat baseline testing including echocardiogram and nuclear stress  -When necessary nitroglycerin  -ED for worsening symptoms    Return to clinic in one month with testing ASAP

## 2017-06-15 ENCOUNTER — TELEPHONE (OUTPATIENT)
Dept: FAMILY MEDICINE | Facility: CLINIC | Age: 62
End: 2017-06-15

## 2017-06-15 DIAGNOSIS — D50.9 IRON DEFICIENCY ANEMIA, UNSPECIFIED IRON DEFICIENCY ANEMIA TYPE: Primary | ICD-10-CM

## 2017-06-15 RX ORDER — FERROUS SULFATE 325(65) MG
325 TABLET ORAL 2 TIMES DAILY
Qty: 60 TABLET | Refills: 5 | Status: SHIPPED | OUTPATIENT
Start: 2017-06-15 | End: 2017-07-13 | Stop reason: SDUPTHER

## 2017-06-15 NOTE — TELEPHONE ENCOUNTER
Please call pt - she is low in iron and needs to take an iron supplement - rx sent to pharmacy - take twice a day.  Have her set up follow up in 2 months

## 2017-06-26 ENCOUNTER — HOSPITAL ENCOUNTER (OUTPATIENT)
Dept: RADIOLOGY | Facility: HOSPITAL | Age: 62
Discharge: HOME | End: 2017-06-26
Attending: INTERNAL MEDICINE
Payer: MEDICAID

## 2017-06-26 ENCOUNTER — HOSPITAL ENCOUNTER (OUTPATIENT)
Dept: CARDIOLOGY | Facility: HOSPITAL | Age: 62
Discharge: HOME | End: 2017-06-26
Attending: INTERNAL MEDICINE
Payer: MEDICAID

## 2017-06-26 DIAGNOSIS — R07.9 CHEST PAIN, UNSPECIFIED TYPE: ICD-10-CM

## 2017-06-26 LAB
DIASTOLIC DYSFUNCTION: NO
DIASTOLIC DYSFUNCTION: NO
ESTIMATED PA SYSTOLIC PRESSURE: 17.75
MITRAL VALVE REGURGITATION: NORMAL
RETIRED EF AND QEF - SEE NOTES: 55 (ref 55–65)
TRICUSPID VALVE REGURGITATION: NORMAL

## 2017-06-26 PROCEDURE — 78452 HT MUSCLE IMAGE SPECT MULT: CPT | Mod: TC

## 2017-06-26 PROCEDURE — 78452 HT MUSCLE IMAGE SPECT MULT: CPT | Mod: 26,,, | Performed by: INTERNAL MEDICINE

## 2017-06-26 PROCEDURE — 63600175 PHARM REV CODE 636 W HCPCS

## 2017-06-26 PROCEDURE — 93016 CV STRESS TEST SUPVJ ONLY: CPT | Mod: ,,, | Performed by: INTERNAL MEDICINE

## 2017-06-26 PROCEDURE — 93017 CV STRESS TEST TRACING ONLY: CPT

## 2017-06-26 PROCEDURE — 93306 TTE W/DOPPLER COMPLETE: CPT

## 2017-06-26 PROCEDURE — 93306 TTE W/DOPPLER COMPLETE: CPT | Mod: 26,,, | Performed by: INTERNAL MEDICINE

## 2017-06-26 PROCEDURE — 93018 CV STRESS TEST I&R ONLY: CPT | Mod: ,,, | Performed by: INTERNAL MEDICINE

## 2017-06-26 RX ORDER — REGADENOSON 0.08 MG/ML
INJECTION, SOLUTION INTRAVENOUS
Status: DISPENSED
Start: 2017-06-26 | End: 2017-06-26

## 2017-06-27 ENCOUNTER — TELEPHONE (OUTPATIENT)
Dept: FAMILY MEDICINE | Facility: CLINIC | Age: 62
End: 2017-06-27

## 2017-07-13 ENCOUNTER — OFFICE VISIT (OUTPATIENT)
Dept: FAMILY MEDICINE | Facility: CLINIC | Age: 62
End: 2017-07-13
Payer: MEDICAID

## 2017-07-13 VITALS
OXYGEN SATURATION: 99 % | BODY MASS INDEX: 36.79 KG/M2 | HEIGHT: 64 IN | WEIGHT: 215.5 LBS | DIASTOLIC BLOOD PRESSURE: 78 MMHG | HEART RATE: 70 BPM | SYSTOLIC BLOOD PRESSURE: 120 MMHG | TEMPERATURE: 98 F

## 2017-07-13 DIAGNOSIS — G89.29 OTHER CHRONIC PAIN: ICD-10-CM

## 2017-07-13 DIAGNOSIS — M79.7 FIBROMYALGIA: ICD-10-CM

## 2017-07-13 DIAGNOSIS — M17.12 OSTEOARTHRITIS OF LEFT KNEE, UNSPECIFIED OSTEOARTHRITIS TYPE: ICD-10-CM

## 2017-07-13 DIAGNOSIS — D50.9 IRON DEFICIENCY ANEMIA, UNSPECIFIED IRON DEFICIENCY ANEMIA TYPE: ICD-10-CM

## 2017-07-13 DIAGNOSIS — F33.0 MILD RECURRENT MAJOR DEPRESSION: Primary | Chronic | ICD-10-CM

## 2017-07-13 PROCEDURE — 99214 OFFICE O/P EST MOD 30 MIN: CPT | Mod: S$PBB,,, | Performed by: INTERNAL MEDICINE

## 2017-07-13 PROCEDURE — 99213 OFFICE O/P EST LOW 20 MIN: CPT | Mod: PBBFAC,PO | Performed by: INTERNAL MEDICINE

## 2017-07-13 PROCEDURE — 99999 PR PBB SHADOW E&M-EST. PATIENT-LVL III: CPT | Mod: PBBFAC,,, | Performed by: INTERNAL MEDICINE

## 2017-07-13 RX ORDER — FERROUS SULFATE 325(65) MG
325 TABLET ORAL 2 TIMES DAILY
Qty: 60 TABLET | Refills: 5 | Status: SHIPPED | OUTPATIENT
Start: 2017-07-13 | End: 2018-06-21 | Stop reason: SDUPTHER

## 2017-07-13 RX ORDER — GABAPENTIN 100 MG/1
CAPSULE ORAL
Qty: 90 CAPSULE | Refills: 5 | Status: SHIPPED | OUTPATIENT
Start: 2017-07-13 | End: 2018-04-02 | Stop reason: SDUPTHER

## 2017-07-13 RX ORDER — DULOXETIN HYDROCHLORIDE 20 MG/1
20 CAPSULE, DELAYED RELEASE ORAL DAILY
Qty: 30 CAPSULE | Refills: 11 | Status: SHIPPED | OUTPATIENT
Start: 2017-07-13 | End: 2018-04-02 | Stop reason: SDUPTHER

## 2017-07-13 RX ORDER — DOCUSATE SODIUM 100 MG/1
100 CAPSULE, LIQUID FILLED ORAL 2 TIMES DAILY PRN
Qty: 30 CAPSULE | Refills: 11 | Status: SHIPPED | OUTPATIENT
Start: 2017-07-13 | End: 2019-02-20

## 2017-07-13 NOTE — PROGRESS NOTES
This note was created by combination of typed  and Dragon dictation.  Transcription errors may be present.  If there are any questions, please contact me.    Assessment & Plan  Fibromyalgia  Mild recurrent major depression  Other chronic pain  -No significant improvement on gabapentin.  Will try to increase it to 200 mg at night and 100 in the morning.  Side effect of sedation.  I am going to change her from Prozac to Cymbalta.  20 mg.  If not covered, alternative would be Effexor.  She's been using her narcotics very infrequently and I applauded her efforts.  She is not requesting refill on it at this time.  -     duloxetine (CYMBALTA) 20 MG capsule; Take 1 capsule (20 mg total) by mouth once daily.  Dispense: 30 capsule; Refill: 11  -     gabapentin (NEURONTIN) 100 MG capsule; 2 tablets nighttime 1 tablet in AM  Dispense: 90 capsule; Refill: 5    Osteoarthritis of left knee, unspecified osteoarthritis type-referral has been submitted for [facility] orthopedics and I've given her the contact information for them.  -     gabapentin (NEURONTIN) 100 MG capsule; 2 tablets nighttime 1 tablet in AM  Dispense: 90 capsule; Refill: 5    Iron deficiency anemia, unspecified iron deficiency anemia type-with a known history of peptic ulcer disease from NSAIDs.  She continues to use NSAIDs for pain.  Start iron supplement twice daily, Colace for any ensuing constipation.  -     ferrous sulfate 325 mg (65 mg iron) Tab tablet; Take 1 tablet (325 mg total) by mouth 2 (two) times daily.  Dispense: 60 tablet; Refill: 5  -     docusate sodium (COLACE) 100 MG capsule; Take 1 capsule (100 mg total) by mouth 2 (two) times daily as needed for Constipation.  Dispense: 30 capsule; Refill: 11    There are no discontinued medications.    Follow-up: No Follow-up on file. follow-up 1 month.      =================================================================      Chief Complaint   Patient presents with    1 month followup        CHANDLER Luu is a 62 y.o. female, last appointment with this clinic was 6/13/2017.    No LMP recorded. Patient has had a hysterectomy.    Previously saw her last month  chronoic pain with hx of dx of fibromyalgia.  Consider changing prozac to cymbalta.  Last visit initiated gabapentin  Hypertension ,last OV, elevated BP, can increase toprol XL  Depression, fluoxetine  Asthma  History of iron deficiency anemia  Osteoarthritis, knees;  fam hx of substance dependency.  Referred to ortho  History of peptic ulcer disease, has been referred to GI in the past.  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  There was presence of gastric ulcers with clean base, pathology came back benign.  H. pylori was negative.  She's had several EGDs on record, each time with ulcers.  She had repeat EGD done in 12/2016 which again showed ulcer.  I don't have the path report.  Last visit, chest pain.  6/26/2017 nuclear medicine stress test negative for ischemia.  6/26/2017 echo LVEF 55%.  No diastolic dysfunction.  Last visit, hemoglobin was stable, had complained of blood in stool with a known history of peptic ulcer disease.  She should be on iron.  B12 and folate were normal.    Did not start taking an iron supplement.  She did not get our phone message.   Gabapentin 100 BID without much relief and SE of sedation.  Sleeping more. Taking BC powder still BID.     norco - takes PRN.  Still has some tablets left from her last prescription    She has not been contacted for her orthopedic referral from [facility].    Patient Care Team:  Cipriano Carrera MD as PCP - General (Internal Medicine)    Patient Active Problem List    Diagnosis Date Noted    Severe obesity (BMI 35.0-39.9) 06/15/2015    AR (allergic rhinitis) 02/19/2014    Iron deficiency anemia 03/14/2013    Asthma, chronic 02/14/2013    Gastric ulcer, chronic  02/14/2013    Menopausal hot flushes 02/14/2013    Mild recurrent major depression 02/14/2013    Osteoarthritis of knee 07/30/2012    Essential hypertension 07/30/2012       PAST MEDICAL HISTORY:  Past Medical History:   Diagnosis Date    Asthma     Depression     Gastric ulcer with hemorrhage 7/2012    GERD (gastroesophageal reflux disease)     History of blood transfusion     Hypertension     Iron deficiency anemia 3/14/2013    Osteoarthritis of both knees        PAST SURGICAL HISTORY:  Past Surgical History:   Procedure Laterality Date    BREAST SURGERY      Benign breast cyst    HYSTERECTOMY      Partial    TONSILLECTOMY         SOCIAL HISTORY:  Social History     Social History    Marital status:      Spouse name: N/A    Number of children: N/A    Years of education: N/A     Occupational History     [employer]     Social History Main Topics    Smoking status: Never Smoker    Smokeless tobacco: Never Used    Alcohol use Yes      Comment: ocaasional    Drug use: No    Sexual activity: Yes     Partners: Male     Birth control/ protection: Surgical     Other Topics Concern    Not on file     Social History Narrative    No narrative on file       ALLERGIES AND MEDICATIONS: updated and reviewed.  Review of patient's allergies indicates:   Allergen Reactions    Nsaids (non-steroidal anti-inflammatory drug)      Gi bleed    Penicillins Itching and Swelling     Current Outpatient Prescriptions   Medication Sig Dispense Refill    cloNIDine (CATAPRES) 0.1 MG tablet Take 2 tablets (0.2 mg total) by mouth 2 (two) times daily. 360 tablet 1    ferrous sulfate 325 mg (65 mg iron) Tab tablet Take 1 tablet (325 mg total) by mouth 2 (two) times daily. 60 tablet 5    fluoxetine (PROZAC) 20 MG capsule Take 1 capsule (20 mg total) by mouth every morning. 90 capsule 1    gabapentin (NEURONTIN) 100 MG capsule Take 1 capsule (100 mg total) by mouth every evening. Up to 300 mg  "at night 30 capsule 5    hydrocodone-acetaminophen 5-325mg (NORCO) 5-325 mg per tablet Take 1 tablet by mouth every 6 (six) hours as needed for Pain. 20 tablet 0    losartan-hydrochlorothiazide 100-25 mg (HYZAAR) 100-25 mg per tablet Take 1 tablet by mouth once daily. 90 tablet 1    metoprolol succinate (TOPROL-XL) 100 MG 24 hr tablet Take 1 tablet (100 mg total) by mouth once daily. 90 tablet 1    nitroGLYCERIN (NITROSTAT) 0.4 MG SL tablet Place 1 tablet (0.4 mg total) under the tongue every 5 (five) minutes as needed for Chest pain. 60 tablet 12    omeprazole (PRILOSEC) 40 MG capsule Take 1 capsule (40 mg total) by mouth 2 (two) times daily. 180 capsule 1    albuterol 90 mcg/actuation inhaler Inhale 2 puffs into the lungs every 4 (four) hours as needed for Wheezing or Shortness of Breath. 1 each 5    cetirizine (ZYRTEC) 10 MG tablet Take 1 tablet (10 mg total) by mouth once daily. 90 tablet 1     No current facility-administered medications for this visit.        Review of Systems   Constitutional: Negative for chills and fever.   Gastrointestinal: Negative for blood in stool.   Musculoskeletal: Positive for back pain, joint pain and myalgias.       Physical Exam   Vitals:    07/13/17 1415   BP: 120/78   Pulse: 70   Temp: 98.3 °F (36.8 °C)   SpO2: 99%   Weight: 97.7 kg (215 lb 8 oz)   Height: 5' 4" (1.626 m)    Body mass index is 36.99 kg/m².  Weight: 97.7 kg (215 lb 8 oz)   Height: 5' 4" (162.6 cm)     Physical Exam   Constitutional: She is oriented to person, place, and time. She appears well-developed and well-nourished. No distress.   Eyes: EOM are normal.   Cardiovascular: Normal rate, regular rhythm and normal heart sounds.    No murmur heard.  Pulmonary/Chest: Effort normal and breath sounds normal.   Musculoskeletal: She exhibits no edema.   She is tender at the posterior base of the neck, the trapezius muscles bilaterally, near the SI joints of the lower back, the intersection of the second rib at " the sternum bilaterally, the medial distal thighs   Neurological: She is alert and oriented to person, place, and time. Coordination normal.   Skin: Skin is warm and dry.   Psychiatric: She has a normal mood and affect. Her behavior is normal. Thought content normal.

## 2017-08-16 ENCOUNTER — HOSPITAL ENCOUNTER (OUTPATIENT)
Dept: RADIOLOGY | Facility: HOSPITAL | Age: 62
Discharge: HOME | End: 2017-08-16
Attending: INTERNAL MEDICINE
Payer: MEDICAID

## 2017-08-16 ENCOUNTER — OFFICE VISIT (OUTPATIENT)
Dept: CARDIOLOGY | Facility: CLINIC | Age: 62
End: 2017-08-16
Payer: MEDICAID

## 2017-08-16 ENCOUNTER — OFFICE VISIT (OUTPATIENT)
Dept: FAMILY MEDICINE | Facility: CLINIC | Age: 62
End: 2017-08-16
Payer: MEDICAID

## 2017-08-16 VITALS
WEIGHT: 219 LBS | SYSTOLIC BLOOD PRESSURE: 172 MMHG | HEART RATE: 61 BPM | SYSTOLIC BLOOD PRESSURE: 130 MMHG | WEIGHT: 219 LBS | DIASTOLIC BLOOD PRESSURE: 82 MMHG | DIASTOLIC BLOOD PRESSURE: 84 MMHG | OXYGEN SATURATION: 97 % | BODY MASS INDEX: 37.39 KG/M2 | OXYGEN SATURATION: 94 % | HEIGHT: 64 IN | TEMPERATURE: 98 F | HEIGHT: 64 IN | BODY MASS INDEX: 37.39 KG/M2

## 2017-08-16 DIAGNOSIS — M17.12 OSTEOARTHRITIS OF LEFT KNEE, UNSPECIFIED OSTEOARTHRITIS TYPE: ICD-10-CM

## 2017-08-16 DIAGNOSIS — I10 ESSENTIAL HYPERTENSION: Chronic | ICD-10-CM

## 2017-08-16 DIAGNOSIS — R07.9 CHEST PAIN, UNSPECIFIED TYPE: Primary | ICD-10-CM

## 2017-08-16 DIAGNOSIS — D50.9 IRON DEFICIENCY ANEMIA, UNSPECIFIED IRON DEFICIENCY ANEMIA TYPE: ICD-10-CM

## 2017-08-16 DIAGNOSIS — E66.01 SEVERE OBESITY (BMI 35.0-35.9 WITH COMORBIDITY): ICD-10-CM

## 2017-08-16 DIAGNOSIS — I10 ESSENTIAL HYPERTENSION: ICD-10-CM

## 2017-08-16 DIAGNOSIS — F33.0 MILD RECURRENT MAJOR DEPRESSION: ICD-10-CM

## 2017-08-16 DIAGNOSIS — M17.12 OSTEOARTHRITIS OF LEFT KNEE, UNSPECIFIED OSTEOARTHRITIS TYPE: Primary | ICD-10-CM

## 2017-08-16 DIAGNOSIS — R60.0 PERIPHERAL EDEMA: ICD-10-CM

## 2017-08-16 DIAGNOSIS — J45.20 ASTHMA, CHRONIC, MILD INTERMITTENT, UNCOMPLICATED: ICD-10-CM

## 2017-08-16 PROCEDURE — 3075F SYST BP GE 130 - 139MM HG: CPT | Mod: ,,, | Performed by: INTERNAL MEDICINE

## 2017-08-16 PROCEDURE — 73560 X-RAY EXAM OF KNEE 1 OR 2: CPT | Mod: 26,LT,, | Performed by: RADIOLOGY

## 2017-08-16 PROCEDURE — 3079F DIAST BP 80-89 MM HG: CPT | Mod: ,,, | Performed by: INTERNAL MEDICINE

## 2017-08-16 PROCEDURE — 99999 PR PBB SHADOW E&M-EST. PATIENT-LVL III: CPT | Mod: PBBFAC,,, | Performed by: INTERNAL MEDICINE

## 2017-08-16 PROCEDURE — 73560 X-RAY EXAM OF KNEE 1 OR 2: CPT | Mod: TC,PO,LT

## 2017-08-16 PROCEDURE — 3074F SYST BP LT 130 MM HG: CPT | Mod: ,,, | Performed by: INTERNAL MEDICINE

## 2017-08-16 PROCEDURE — 99214 OFFICE O/P EST MOD 30 MIN: CPT | Mod: S$PBB,,, | Performed by: INTERNAL MEDICINE

## 2017-08-16 PROCEDURE — 3008F BODY MASS INDEX DOCD: CPT | Mod: ,,, | Performed by: INTERNAL MEDICINE

## 2017-08-16 RX ORDER — FUROSEMIDE 20 MG/1
20 TABLET ORAL DAILY PRN
Qty: 30 TABLET | Refills: 2 | Status: SHIPPED | OUTPATIENT
Start: 2017-08-16 | End: 2018-03-12 | Stop reason: SDUPTHER

## 2017-08-16 RX ORDER — HYDROCODONE BITARTRATE AND ACETAMINOPHEN 5; 325 MG/1; MG/1
1 TABLET ORAL EVERY 6 HOURS PRN
Qty: 20 TABLET | Refills: 0 | Status: SHIPPED | OUTPATIENT
Start: 2017-08-16 | End: 2018-05-21 | Stop reason: ALTCHOICE

## 2017-08-16 NOTE — PROGRESS NOTES
This note was created by combination of typed  and Dragon dictation.  Transcription errors may be present.  If there are any questions, please contact me.    Assessment & Plan  Osteoarthritis of left knee, unspecified osteoarthritis type-referral already submitted to [facility].  She has not heard back from them and I have given her their contact information.  In the meantime, I've refilled hydrocodone for her to take sparingly, she is aware of the risks of this, she has made 20 pills last for the past 2 months.  Refilled #20.  Check x-ray of the knee again as it's been a long time and I would order MRI of the knee as well until she is able to see orthopedics for this.  It's a source of constant pain and a source of depressed mood for her.  -     X-Ray Knee 1 or 2 View Left; Future; Expected date: 08/16/2017  -     MRI Lower Extremity Joint WO Cont Left; Future; Expected date: 08/16/2017  -     hydrocodone-acetaminophen 5-325mg (NORCO) 5-325 mg per tablet; Take 1 tablet by mouth every 6 (six) hours as needed for Pain.  Dispense: 20 tablet; Refill: 0    Essential hypertension-better on repeat, stable, no change.  On clonidine, Toprol-XL, losartan HCT    Iron deficiency anemia, unspecified iron deficiency anemia type-has been taking iron, check CBC and ferritin.  Underlying alpha thalassemia.  If ferritin 50 or above I would stop the iron  -     CBC auto differential; Future; Expected date: 08/16/2017  -     Ferritin; Future; Expected date: 08/16/2017    Peripheral edema-causes persistent lower leg pain independent of her knee pain.  She used to take furosemide and I'm giving her prescription to take #30, to take as needed, hopefully not daily.  She already has diuretic on board with losartan HCT  -     furosemide (LASIX) 20 MG tablet; Take 1 tablet (20 mg total) by mouth daily as needed (leg swelling).  Dispense: 30 tablet; Refill: 2    Fibromyalgia/depression-Cymbalta not quite controlling it but she notes the  main factor is her constant knee pain.  She would like to just stay on her current dose of Cymbalta.  Similarly she finds that the gabapentin at her current dose is helping with the overall pain and does not feel it necessary to increase the dose.  She takes one in the morning, 2 at night    Medications Discontinued During This Encounter   Medication Reason    cetirizine (ZYRTEC) 10 MG tablet Therapy completed    hydrocodone-acetaminophen 5-325mg (NORCO) 5-325 mg per tablet Reorder       Follow-up: No Follow-up on file. follow-up 3 months      =================================================================      Chief Complaint   Patient presents with    Knee Pain       CHANDLER Luu is a 62 y.o. female, last appointment with this clinic was 7/13/2017.    No LMP recorded. Patient has had a hysterectomy.    Chronic pain with hx of dx of fibromyalgia.   gabapentin started 6/2017, SE of fatigue, not much relief. Last visit increased dose.  Changed prozac to cymbalta  Hypertension, toprol XL, clonidine 0.2, losartan/HCTZ  Depression, fluoxetine  Asthma  History of iron deficiency anemia  Osteoarthritis, knees;  fam hx of substance dependency. remote hx of injections, found it helpful. Referred to ortho, last visit 7/2017 had not been contacted by [facility]  History of peptic ulcer disease, has been referred to GI in the past.  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  There was presence of gastric ulcers with clean base, pathology came back benign.  H. pylori was negative.  She's had several EGDs on record, each time with ulcers.  She had repeat EGD done in 12/2016 which again showed ulcer.  I don't have the path report.  6/26/2017 nuclear medicine stress test negative for ischemia.  6/26/2017 echo LVEF 55%.  No diastolic dysfunction.  Last visit, hemoglobin was stable, had complained of blood in stool  with a known history of peptic ulcer disease.  She should be on iron.  B12 and folate were normal.    Is taking iron, SE of constipation  Hydrocodone - ran out of the rx on Sunday.  Was taking it every 2-3 days.  20 pills have lasted her 2 months.  fam hx of substance abuse and she is aware of risks of narcotic use.   Fibromyalgia - Reynaldo higher dose is helpful for the pain and not more sedating.  Pain 3-4/10.  Taking reynaldo 1 in AM 2 in PM.  She is finding this level acceptable and does not want to increase the dose.  Changed over to cymbalta- can't tell if it's helping b/c of outside factors.  Her knee and constant pain with that is causing her to be depressed.  BP normal on repeat.  Did not take her meds this AM. Forgot to take it.   Outside BP readings high 130s/80s.  Has not been contacted by orthopedics yet.  Has constant pain in her legs, hard for her to stand and wash dishes.  History of furosemide years ago.    Patient Care Team:  Cipriano Carrera MD as PCP - General (Internal Medicine)    Patient Active Problem List    Diagnosis Date Noted    Fibromyalgia 07/13/2017    Severe obesity (BMI 35.0-39.9) 06/15/2015    AR (allergic rhinitis) 02/19/2014    Iron deficiency anemia 03/14/2013    Asthma, chronic 02/14/2013    Gastric ulcer, chronic 02/14/2013    Menopausal hot flushes 02/14/2013    Mild recurrent major depression 02/14/2013    Osteoarthritis of knee 07/30/2012    Essential hypertension 07/30/2012       PAST MEDICAL HISTORY:  Past Medical History:   Diagnosis Date    Asthma     Depression     Gastric ulcer with hemorrhage 7/2012    GERD (gastroesophageal reflux disease)     History of blood transfusion     Hypertension     Iron deficiency anemia 3/14/2013    Osteoarthritis of both knees        PAST SURGICAL HISTORY:  Past Surgical History:   Procedure Laterality Date    BREAST SURGERY      Benign breast cyst    HYSTERECTOMY      Partial    TONSILLECTOMY         SOCIAL HISTORY:  Social  History     Social History    Marital status:      Spouse name: N/A    Number of children: N/A    Years of education: N/A     Occupational History     [employer]     Social History Main Topics    Smoking status: Never Smoker    Smokeless tobacco: Never Used    Alcohol use Yes      Comment: ocaasional    Drug use: No    Sexual activity: Yes     Partners: Male     Birth control/ protection: Surgical     Other Topics Concern    Not on file     Social History Narrative    No narrative on file       ALLERGIES AND MEDICATIONS: updated and reviewed.  Review of patient's allergies indicates:   Allergen Reactions    Nsaids (non-steroidal anti-inflammatory drug)      Gi bleed    Penicillins Itching and Swelling     Current Outpatient Prescriptions   Medication Sig Dispense Refill    albuterol 90 mcg/actuation inhaler Inhale 2 puffs into the lungs every 4 (four) hours as needed for Wheezing or Shortness of Breath. 1 each 5    cloNIDine (CATAPRES) 0.1 MG tablet Take 2 tablets (0.2 mg total) by mouth 2 (two) times daily. 360 tablet 1    docusate sodium (COLACE) 100 MG capsule Take 1 capsule (100 mg total) by mouth 2 (two) times daily as needed for Constipation. 30 capsule 11    duloxetine (CYMBALTA) 20 MG capsule Take 1 capsule (20 mg total) by mouth once daily. 30 capsule 11    ferrous sulfate 325 mg (65 mg iron) Tab tablet Take 1 tablet (325 mg total) by mouth 2 (two) times daily. 60 tablet 5    gabapentin (NEURONTIN) 100 MG capsule 2 tablets nighttime 1 tablet in AM 90 capsule 5    hydrocodone-acetaminophen 5-325mg (NORCO) 5-325 mg per tablet Take 1 tablet by mouth every 6 (six) hours as needed for Pain. 20 tablet 0    losartan-hydrochlorothiazide 100-25 mg (HYZAAR) 100-25 mg per tablet Take 1 tablet by mouth once daily. 90 tablet 1    metoprolol succinate (TOPROL-XL) 100 MG 24 hr tablet Take 1 tablet (100 mg total) by mouth once daily. 90 tablet 1    nitroGLYCERIN (NITROSTAT)  "0.4 MG SL tablet Place 1 tablet (0.4 mg total) under the tongue every 5 (five) minutes as needed for Chest pain. 60 tablet 12    omeprazole (PRILOSEC) 40 MG capsule Take 1 capsule (40 mg total) by mouth 2 (two) times daily. 180 capsule 1     No current facility-administered medications for this visit.        Review of Systems   Constitutional: Negative for chills and fever.   Respiratory: Negative for shortness of breath.    Cardiovascular: Negative for chest pain.   Musculoskeletal: Positive for joint pain.   Psychiatric/Behavioral: Positive for depression.       Physical Exam   Vitals:    08/16/17 1423 08/16/17 1443   BP: (!) 150/86 130/82   BP Location:  Left arm   Patient Position:  Sitting   BP Method:  Large (Manual)   Temp: 98 °F (36.7 °C)    SpO2: (!) 94%    Weight: 99.3 kg (219 lb)    Height: 5' 4" (1.626 m)     Body mass index is 37.59 kg/m².  Weight: 99.3 kg (219 lb)   Height: 5' 4" (162.6 cm)     Physical Exam   Constitutional: She is oriented to person, place, and time. She appears well-developed and well-nourished. No distress.   Eyes: EOM are normal.   Cardiovascular: Normal rate, regular rhythm and normal heart sounds.    No murmur heard.  Pulmonary/Chest: Effort normal and breath sounds normal.   Musculoskeletal: She exhibits edema and tenderness.   Ambulating with a limp favoring her left knee.  Very tender on moderate palpation and I did not perform a detailed range of motion exam.  She does have some mild pitting edema of her lower legs with significant tenderness on moderate pressure   Neurological: She is alert and oriented to person, place, and time. Coordination normal.   Skin: Skin is warm and dry.   Psychiatric: She has a normal mood and affect. Her behavior is normal. Thought content normal.     "

## 2017-08-16 NOTE — PROGRESS NOTES
Subjective:    Patient ID:  Bell Lynn is a 62 y.o. female who presents for evaluation of Follow-up and Results      HPI   previous history:  Patient is here for follow-up of chest pain.  She was remotely seen in 2015 but didn't follow-up.  She did have her testing done at that time which included echocardiogram nuclear stresses which were within normal limits.  She still having substernal chest pressure and previously did take nitroglycerin with some response.  She's also taking Prilosec and doesn't have any significant relief.  She has some associated shortness of breath but no palpitations.  She denies any PND, orthopnea or lower edema.  She's not expressing dizziness, presyncope or syncope.    Today:  Here for follow-up of chest pain.  She underwent diagnostic testing as below.  This was essentially within normal limits also in comparison to 2015.  She denies any continued complaints.  She's not expressing chest pain or palpitations.  She does get shortness of breath on heavier exertion but relieved with rest.  She's had no PND, orthopnea or lower edema.  She denies any dizziness, presyncope or syncope.    Review of Systems   Constitution: Negative.   HENT: Negative.    Eyes: Negative.    Cardiovascular: Positive for dyspnea on exertion. Negative for chest pain, irregular heartbeat, leg swelling, near-syncope, orthopnea, palpitations, paroxysmal nocturnal dyspnea and syncope.   Respiratory: Positive for shortness of breath.    Skin: Negative.    Musculoskeletal: Negative.    Gastrointestinal: Negative for abdominal pain, constipation and diarrhea.   Genitourinary: Negative for dysuria.   Neurological: Negative.    Psychiatric/Behavioral: Negative.         Objective:    Physical Exam   Constitutional: She is oriented to person, place, and time. She appears well-developed and well-nourished.   HENT:   Head: Normocephalic and atraumatic.   Eyes: Conjunctivae and EOM are normal. Pupils are equal, round,  and reactive to light.   Neck: Normal range of motion. Neck supple. No thyromegaly present.   Cardiovascular: Normal rate and regular rhythm.    No murmur heard.  Pulmonary/Chest: Effort normal and breath sounds normal. No respiratory distress.   Abdominal: Soft. Bowel sounds are normal.   Musculoskeletal: She exhibits no edema.   Neurological: She is alert and oriented to person, place, and time.   Skin: Skin is warm and dry.   Psychiatric: She has a normal mood and affect. Her behavior is normal.       echo and NST within normal limits 2015    Echo:  CONCLUSIONS     1 - Normal left ventricular systolic function (EF 55-60%).     2 - Mild left atrial enlargement.     3 - Trivial mitral regurgitation.     4 - Trivial tricuspid regurgitation.     NST:  Impression: NORMAL MYOCARDIAL PERFUSION  1. The perfusion scan is free of evidence for myocardial ischemia or injury.   2. There is a mild intensity fixed defect in the anterior wall of the left ventricle, secondary to breast attenuation.   3. Resting wall motion is physiologic.   4. Resting LV function is normal.   5. The ventricular volumes are normal at rest and stress.   6. The extracardiac distribution of radioactivity is normal.   7. When compared to the previous study from 07/21/2015, no change    Assessment:       1. Chest pain, unspecified type    2. Essential hypertension    3. Asthma, chronic, mild intermittent, uncomplicated    4. Severe obesity (BMI 35.0-35.9 with comorbidity)    5. Mild recurrent major depression         Plan:       -Mainly reassurance again in light of testing  -Continue risk factor modification i.e. diet and exercise    Return to clinic in one month with testing ASAP

## 2017-08-17 ENCOUNTER — TELEPHONE (OUTPATIENT)
Dept: FAMILY MEDICINE | Facility: CLINIC | Age: 62
End: 2017-08-17

## 2017-08-18 ENCOUNTER — TELEPHONE (OUTPATIENT)
Dept: FAMILY MEDICINE | Facility: CLINIC | Age: 62
End: 2017-08-18

## 2017-08-18 DIAGNOSIS — D50.9 IRON DEFICIENCY ANEMIA, UNSPECIFIED IRON DEFICIENCY ANEMIA TYPE: Primary | ICD-10-CM

## 2017-08-18 NOTE — TELEPHONE ENCOUNTER
Patient made aware of low iron. Instructed patient to continue taking her iron and that we would need to repeat the labs in 3 months. Patient verbalized understanding. No further questions or complaints at this time.

## 2017-08-24 ENCOUNTER — HOSPITAL ENCOUNTER (OUTPATIENT)
Dept: RADIOLOGY | Facility: HOSPITAL | Age: 62
Discharge: HOME | End: 2017-08-24
Attending: INTERNAL MEDICINE
Payer: MEDICAID

## 2017-08-24 ENCOUNTER — TELEPHONE (OUTPATIENT)
Dept: FAMILY MEDICINE | Facility: CLINIC | Age: 62
End: 2017-08-24

## 2017-08-24 DIAGNOSIS — M17.12 OSTEOARTHRITIS OF LEFT KNEE, UNSPECIFIED OSTEOARTHRITIS TYPE: ICD-10-CM

## 2017-08-24 PROCEDURE — 73721 MRI JNT OF LWR EXTRE W/O DYE: CPT | Mod: TC,LT

## 2017-08-24 PROCEDURE — 73721 MRI JNT OF LWR EXTRE W/O DYE: CPT | Mod: 26,LT,, | Performed by: RADIOLOGY

## 2017-08-24 NOTE — PROGRESS NOTES
MRI shows expected tricompartmental degeneration with areas of bone on bone.  Chronic appearing ACL and PCL injury; lateral and medial meniscal degenerative changes.  Results to pt.  Await referral to [facility] ortho

## 2017-10-11 ENCOUNTER — PATIENT MESSAGE (OUTPATIENT)
Dept: FAMILY MEDICINE | Facility: CLINIC | Age: 62
End: 2017-10-11

## 2017-10-11 NOTE — TELEPHONE ENCOUNTER
Itzel, can you please contact this patient and schedule her appt with tthe knee specialist please. Thank you.

## 2018-03-12 ENCOUNTER — PATIENT MESSAGE (OUTPATIENT)
Dept: FAMILY MEDICINE | Facility: CLINIC | Age: 63
End: 2018-03-12

## 2018-03-12 DIAGNOSIS — R60.0 PERIPHERAL EDEMA: ICD-10-CM

## 2018-03-12 RX ORDER — FUROSEMIDE 20 MG/1
20 TABLET ORAL DAILY PRN
Qty: 30 TABLET | Refills: 1 | Status: SHIPPED | OUTPATIENT
Start: 2018-03-12 | End: 2018-04-02 | Stop reason: SDUPTHER

## 2018-04-02 ENCOUNTER — TELEPHONE (OUTPATIENT)
Dept: FAMILY MEDICINE | Facility: CLINIC | Age: 63
End: 2018-04-02

## 2018-04-02 DIAGNOSIS — M17.12 OSTEOARTHRITIS OF LEFT KNEE, UNSPECIFIED OSTEOARTHRITIS TYPE: ICD-10-CM

## 2018-04-02 DIAGNOSIS — J30.1 CHRONIC ALLERGIC RHINITIS DUE TO POLLEN, UNSPECIFIED SEASONALITY: Primary | ICD-10-CM

## 2018-04-02 DIAGNOSIS — I10 ESSENTIAL HYPERTENSION: Chronic | ICD-10-CM

## 2018-04-02 DIAGNOSIS — F33.0 MILD RECURRENT MAJOR DEPRESSION: Chronic | ICD-10-CM

## 2018-04-02 DIAGNOSIS — G89.29 OTHER CHRONIC PAIN: ICD-10-CM

## 2018-04-02 DIAGNOSIS — K25.7 CHRONIC GASTRIC ULCER WITHOUT HEMORRHAGE AND WITHOUT PERFORATION: Primary | ICD-10-CM

## 2018-04-02 DIAGNOSIS — M79.7 FIBROMYALGIA: ICD-10-CM

## 2018-04-02 DIAGNOSIS — J45.909 ASTHMA, CHRONIC, UNSPECIFIED ASTHMA SEVERITY, UNCOMPLICATED: ICD-10-CM

## 2018-04-02 DIAGNOSIS — K25.7 CHRONIC GASTRIC ULCER WITHOUT HEMORRHAGE AND WITHOUT PERFORATION: ICD-10-CM

## 2018-04-02 DIAGNOSIS — R60.0 PERIPHERAL EDEMA: ICD-10-CM

## 2018-04-02 RX ORDER — FUROSEMIDE 20 MG/1
20 TABLET ORAL DAILY PRN
Qty: 30 TABLET | Refills: 1 | Status: SHIPPED | OUTPATIENT
Start: 2018-04-02 | End: 2018-09-11 | Stop reason: SDUPTHER

## 2018-04-02 RX ORDER — DULOXETIN HYDROCHLORIDE 20 MG/1
20 CAPSULE, DELAYED RELEASE ORAL DAILY
Qty: 30 CAPSULE | Refills: 10 | Status: SHIPPED | OUTPATIENT
Start: 2018-04-02 | End: 2018-05-21 | Stop reason: SDUPTHER

## 2018-04-02 RX ORDER — LOSARTAN POTASSIUM AND HYDROCHLOROTHIAZIDE 25; 100 MG/1; MG/1
1 TABLET ORAL DAILY
Qty: 90 TABLET | Refills: 1 | Status: SHIPPED | OUTPATIENT
Start: 2018-04-02 | End: 2018-05-21 | Stop reason: SDUPTHER

## 2018-04-02 RX ORDER — METOPROLOL SUCCINATE 100 MG/1
100 TABLET, EXTENDED RELEASE ORAL DAILY
Qty: 90 TABLET | Refills: 1 | Status: SHIPPED | OUTPATIENT
Start: 2018-04-02 | End: 2018-05-21 | Stop reason: SDUPTHER

## 2018-04-02 RX ORDER — GABAPENTIN 100 MG/1
CAPSULE ORAL
Qty: 90 CAPSULE | Refills: 4 | Status: SHIPPED | OUTPATIENT
Start: 2018-04-02 | End: 2018-05-21 | Stop reason: SDUPTHER

## 2018-04-02 RX ORDER — CLONIDINE HYDROCHLORIDE 0.1 MG/1
0.2 TABLET ORAL 2 TIMES DAILY
Qty: 360 TABLET | Refills: 1 | Status: SHIPPED | OUTPATIENT
Start: 2018-04-02 | End: 2018-05-21 | Stop reason: SDUPTHER

## 2018-04-02 RX ORDER — OMEPRAZOLE 40 MG/1
40 CAPSULE, DELAYED RELEASE ORAL 2 TIMES DAILY
Qty: 180 CAPSULE | Refills: 1 | Status: SHIPPED | OUTPATIENT
Start: 2018-04-02 | End: 2018-04-04 | Stop reason: SDUPTHER

## 2018-04-02 RX ORDER — OMEPRAZOLE 40 MG/1
CAPSULE, DELAYED RELEASE ORAL
Qty: 180 CAPSULE | Refills: 1 | Status: CANCELLED | OUTPATIENT
Start: 2018-04-02

## 2018-04-02 RX ORDER — CETIRIZINE HYDROCHLORIDE 10 MG/1
10 TABLET ORAL DAILY
Refills: 0 | COMMUNITY
Start: 2018-04-02 | End: 2019-02-20

## 2018-04-02 NOTE — TELEPHONE ENCOUNTER
----- Message from Maria Esther Davis sent at 4/2/2018  3:18 PM CDT -----  Contact: self  [phone]  Pt has the next available appt which is on 5-21-18, she needs refills on all her meds. Pls call [facility] [phone], Thanks.....Mary    PLS NOTE :  Pt said refill on all meds.... didn't put which meds because I did not if she needed the ones from [facility] too

## 2018-04-04 ENCOUNTER — PATIENT MESSAGE (OUTPATIENT)
Dept: FAMILY MEDICINE | Facility: CLINIC | Age: 63
End: 2018-04-04

## 2018-04-04 DIAGNOSIS — K25.7 CHRONIC GASTRIC ULCER WITHOUT HEMORRHAGE AND WITHOUT PERFORATION: ICD-10-CM

## 2018-04-04 RX ORDER — OMEPRAZOLE 40 MG/1
40 CAPSULE, DELAYED RELEASE ORAL EVERY MORNING
Qty: 90 CAPSULE | Refills: 1 | Status: SHIPPED | OUTPATIENT
Start: 2018-04-04 | End: 2018-05-21 | Stop reason: ALTCHOICE

## 2018-04-09 NOTE — TELEPHONE ENCOUNTER
Received message from [employer] stating omeprazole is only covered at 20mg dose 1 daily.

## 2018-04-10 RX ORDER — ESOMEPRAZOLE MAGNESIUM 40 MG/1
40 CAPSULE, DELAYED RELEASE ORAL
Qty: 30 CAPSULE | Refills: 11 | Status: SHIPPED | OUTPATIENT
Start: 2018-04-10 | End: 2019-02-11

## 2018-05-18 NOTE — PROGRESS NOTES
This note was created by combination of typed  and Dragon dictation.  Transcription errors may be present.  If there are any questions, please contact me.    Assessment / Plan:   Essential hypertension - borderline control for now no change, stay on clonidine 0.2, losartan HCTZ, metoprolol .  Could change the metoprolol to amlodipine.  She's c/o fatigue but multiple factors that could be causing that including BP meds, gabapentin, depression, occult RAJ.   -     cloNIDine (CATAPRES) 0.1 MG tablet; Take 2 tablets (0.2 mg total) by mouth 2 (two) times daily.  Dispense: 360 tablet; Refill: 1  -     losartan-hydrochlorothiazide 100-25 mg (HYZAAR) 100-25 mg per tablet; Take 1 tablet by mouth once daily.  Dispense: 90 tablet; Refill: 1  -     metoprolol succinate (TOPROL-XL) 100 MG 24 hr tablet; Take 1 tablet (100 mg total) by mouth once daily.  Dispense: 90 tablet; Refill: 1  -     Comprehensive metabolic panel; Future; Expected date: 05/21/2018    Severe obesity (BMI 35.0-39.9)  Snoring  -check sleep study.  She's agreeable to trial of CPAP if indicated.  -needs to really work on diet modification.  Talked about stopping snacking and avoiding sweet drinks.  Maybe improving mood might help with her food choices.  -     Home Sleep Studies; Future    Mild recurrent major depression - she notes not really controlled.  Increase the cymbalta to 40 mg.   -     DULoxetine 40 mg CpDR; Take 40 mg by mouth once daily.  Dispense: 30 capsule; Refill: 10    Other chronic pain  Osteoarthritis of left knee, unspecified osteoarthritis type  Primary osteoarthritis of both knees  Fibromyalgia - increase the cymbalta.  Stay on the reynaldo 1 TID.  She notes there is an orthopedist in [place] who will see her.  I will re-submit the referral for orthopedics, she'll try to get his contact information.  -     DULoxetine 40 mg CpDR; Take 40 mg by mouth once daily.  Dispense: 30 capsule; Refill: 10  -     gabapentin (NEURONTIN) 100  MG capsule; 2 tablets nighttime 1 tablet in AM  Dispense: 90 capsule; Refill: 4  -     Comprehensive metabolic panel; Future; Expected date: 05/21/2018    Iron deficiency anemia, unspecified iron deficiency anemia type  -not taking iron regularly b/c of SE of constipation.  It's not clear to me that she's taking the colace proactively.  This can contribute to her fatigue too.  Check CBC, iron level.  Hx of PUD.  She's been taking BC powder several a day for headaches.  Asked her to stop. Could the BC powder cause a rebound headache? She knows she needs to f/u with [facility] with GI about the PUD.   She couldn't tolerate bowel prep for the colonoscopy in the past.  Asked her to discuss with GI about alternatives.   -     CBC auto differential; Future; Expected date: 05/21/2018  -     Ferritin; Future; Expected date: 05/21/2018  -     Iron and TIBC; Future; Expected date: 05/21/2018    Medications Discontinued During This Encounter   Medication Reason    DULoxetine (CYMBALTA) 20 MG capsule Reorder    omeprazole (PRILOSEC) 40 MG capsule Therapy completed    hydrocodone-acetaminophen 5-325mg (NORCO) 5-325 mg per tablet Therapy completed    cloNIDine (CATAPRES) 0.1 MG tablet Reorder    gabapentin (NEURONTIN) 100 MG capsule Reorder    losartan-hydrochlorothiazide 100-25 mg (HYZAAR) 100-25 mg per tablet Reorder    metoprolol succinate (TOPROL-XL) 100 MG 24 hr tablet Reorder     Modified Medications    Modified Medication Previous Medication    CLONIDINE (CATAPRES) 0.1 MG TABLET cloNIDine (CATAPRES) 0.1 MG tablet       Take 2 tablets (0.2 mg total) by mouth 2 (two) times daily.    Take 2 tablets (0.2 mg total) by mouth 2 (two) times daily.    DULOXETINE 40 MG CPDR DULoxetine (CYMBALTA) 20 MG capsule       Take 40 mg by mouth once daily.    Take 1 capsule (20 mg total) by mouth once daily.    GABAPENTIN (NEURONTIN) 100 MG CAPSULE gabapentin (NEURONTIN) 100 MG capsule       2 tablets nighttime 1 tablet in AM    2 tablets  nighttime 1 tablet in AM    LOSARTAN-HYDROCHLOROTHIAZIDE 100-25 MG (HYZAAR) 100-25 MG PER TABLET losartan-hydrochlorothiazide 100-25 mg (HYZAAR) 100-25 mg per tablet       Take 1 tablet by mouth once daily.    Take 1 tablet by mouth once daily.    METOPROLOL SUCCINATE (TOPROL-XL) 100 MG 24 HR TABLET metoprolol succinate (TOPROL-XL) 100 MG 24 hr tablet       Take 1 tablet (100 mg total) by mouth once daily.    Take 1 tablet (100 mg total) by mouth once daily.     New Prescriptions    No medications on file         Follow Up: No Follow-up on file. Follow up 1 month or so, on higher dose cymbalta.      Subjective:     Chief Complaint   Patient presents with    Medication Refill       HPI  Bell is a 62 y.o. female, last appointment with this clinic was Visit date not found.    No LMP recorded. Patient has had a hysterectomy.    Chronic pain with hx of dx of fibromyalgia.   gabapentin started 6/2017, SE of fatigue, not much relief. Last visit increased dose.  Changed prozac to cymbalta  Hypertension, toprol XL, clonidine 0.2, losartan/HCTZ  6/2017 nuclear stress test negative. No change 7/2015 6/2017 TTE normal LVEF 55-60  Depression, fluoxetine  Asthma  History of iron deficiency anemia  Osteoarthritis, knees;  fam hx of substance dependency. remote hx of injections, found it helpful. Referred to ortho, last visit 7/2017 had not been contacted by [facility]  8/24/2017 MRI L knee: Tricompartmental degenerative changes. Lateral and medial meniscal degenerative tearing/changes. Tricompartmental cartilage loss including areas of full-thickness full-thickness loss. Abnormal appearance of the ACL and PCL suggestive of chronic injury.  History of peptic ulcer disease, has been referred to GI in the past.  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  There was presence of gastric ulcers with clean base, pathology  "came back benign.  H. pylori was negative.  She's had several EGDs on record, each time with ulcers.  She had repeat EGD done in 12/2016 which again showed ulcer.  I don't have the path report.  6/26/2017 nuclear medicine stress test negative for ischemia. 6/26/2017 echo LVEF 55%.  No diastolic dysfunction.    8/2017 last OV - knee pain, had been referred to [facility]; hydrocodone for PRN use.  8/2017 ferritin low stay on iron  Needs colonoscopy.    Notes general fatigue. Snoring, loud; never sleep study.   BP high she notes b/c of BP cuff painful. Has a home cuff, home readings - high 190s.     Did not take meds this AM.  Typically takes them around now.     Taking iron supplement, 2 times a week.  SE of constipation.  Does not take the colace proactively.     Lulu 1 pill TID.  Finds it effective.    Does not think the cymbalta is controlling her symptoms.  Still some depression.     On ROS - daily frontal headaches, tylenol without relief.  BC powder, "too many" - 4 in a day.    Tends to eat one meal a day. Chicken, or pork chop.  snacker - chips, cold drinks.     Was not able to tolerate the colonoscopy prep.  Sees [facility].  Hx of PUD.   Needs to f/u with him. She is aware of this.    Needs a referral to [facility] at [facility].    Diet - poor; eats one main meal a day otherwise snacking on snack foods, sugary foods, sweet drinks.    Physical activity - limited.     Patient Care Team:  Cipriano Carrera MD as PCP - General (Internal Medicine)    Patient Active Problem List    Diagnosis Date Noted    Fibromyalgia 07/13/2017    Severe obesity (BMI 35.0-39.9) 06/15/2015    AR (allergic rhinitis) 02/19/2014    Iron deficiency anemia 03/14/2013    Asthma, chronic 02/14/2013    Gastric ulcer, chronic 02/14/2013    Menopausal hot flushes 02/14/2013    Mild recurrent major depression 02/14/2013    Primary osteoarthritis of left knee 07/30/2012 8/24/2017 MRI L knee: Tricompartmental degenerative changes. Lateral and " medial meniscal degenerative tearing/changes. Tricompartmental cartilage loss including areas of full-thickness full-thickness loss. Abnormal appearance of the ACL and PCL suggestive of chronic injury.      Essential hypertension 07/30/2012       PAST MEDICAL HISTORY:  Past Medical History:   Diagnosis Date    Asthma     Depression     Gastric ulcer with hemorrhage 7/2012    GERD (gastroesophageal reflux disease)     History of blood transfusion     Hypertension     Iron deficiency anemia 3/14/2013    Osteoarthritis of both knees        PAST SURGICAL HISTORY:  Past Surgical History:   Procedure Laterality Date    BREAST SURGERY      Benign breast cyst    HYSTERECTOMY      Partial    TONSILLECTOMY         SOCIAL HISTORY:  Social History     Social History    Marital status:      Spouse name: N/A    Number of children: N/A    Years of education: N/A     Occupational History     [employer]     Social History Main Topics    Smoking status: Never Smoker    Smokeless tobacco: Never Used    Alcohol use Yes      Comment: ocaasional    Drug use: No    Sexual activity: Yes     Partners: Male     Birth control/ protection: Surgical     Other Topics Concern    Not on file     Social History Narrative    No narrative on file       ALLERGIES AND MEDICATIONS: updated and reviewed.  Review of patient's allergies indicates:   Allergen Reactions    Nsaids (non-steroidal anti-inflammatory drug)      Gi bleed    Penicillins Itching and Swelling     Current Outpatient Prescriptions   Medication Sig Dispense Refill    albuterol 90 mcg/actuation inhaler Inhale 2 puffs into the lungs every 4 (four) hours as needed for Wheezing or Shortness of Breath. 1 each 5    cetirizine (ZYRTEC) 10 MG tablet Take 1 tablet (10 mg total) by mouth once daily.  0    cloNIDine (CATAPRES) 0.1 MG tablet Take 2 tablets (0.2 mg total) by mouth 2 (two) times daily. 360 tablet 1    docusate sodium (COLACE)  "100 MG capsule Take 1 capsule (100 mg total) by mouth 2 (two) times daily as needed for Constipation. 30 capsule 11    DULoxetine (CYMBALTA) 20 MG capsule Take 1 capsule (20 mg total) by mouth once daily. 30 capsule 10    esomeprazole (NEXIUM) 40 MG capsule Take 1 capsule (40 mg total) by mouth before breakfast. Instead of omeprazole 30 capsule 11    ferrous sulfate 325 mg (65 mg iron) Tab tablet Take 1 tablet (325 mg total) by mouth 2 (two) times daily. 60 tablet 5    furosemide (LASIX) 20 MG tablet Take 1 tablet (20 mg total) by mouth daily as needed (leg swelling). 30 tablet 1    gabapentin (NEURONTIN) 100 MG capsule 2 tablets nighttime 1 tablet in AM 90 capsule 4    hydrocodone-acetaminophen 5-325mg (NORCO) 5-325 mg per tablet Take 1 tablet by mouth every 6 (six) hours as needed for Pain. 20 tablet 0    losartan-hydrochlorothiazide 100-25 mg (HYZAAR) 100-25 mg per tablet Take 1 tablet by mouth once daily. 90 tablet 1    metoprolol succinate (TOPROL-XL) 100 MG 24 hr tablet Take 1 tablet (100 mg total) by mouth once daily. 90 tablet 1    nitroGLYCERIN (NITROSTAT) 0.4 MG SL tablet Place 1 tablet (0.4 mg total) under the tongue every 5 (five) minutes as needed for Chest pain. 60 tablet 12    omeprazole (PRILOSEC) 40 MG capsule Take 1 capsule (40 mg total) by mouth every morning. With food 90 capsule 1     No current facility-administered medications for this visit.        Review of Systems   All other systems reviewed and are negative.      Objective:   Physical Exam   Vitals:    05/21/18 0909 05/21/18 1025   BP: (!) 174/96 (!) 140/82   BP Location:  Left arm   Patient Position:  Sitting   BP Method:  Large (Manual)   Pulse: 68    Temp: 98.1 °F (36.7 °C)    TempSrc: Oral    SpO2: 98%    Weight: 98.5 kg (217 lb 0.7 oz)    Height: 5' 4" (1.626 m)     Body mass index is 37.26 kg/m².            Physical Exam   Constitutional: She is oriented to person, place, and time. She appears well-developed and " well-nourished. No distress.   Eyes: EOM are normal.   Cardiovascular: Normal rate, regular rhythm and normal heart sounds.    No murmur heard.  Pulmonary/Chest: Effort normal and breath sounds normal.   Musculoskeletal: She exhibits tenderness. She exhibits no edema.   Tender on the right arm without soft tissue deformity; tender in the lower legs without deformity or edema or induration.   Neurological: She is alert and oriented to person, place, and time. Coordination normal.   Skin: Skin is warm and dry.   Psychiatric: She has a normal mood and affect. Her behavior is normal. Thought content normal.

## 2018-05-21 ENCOUNTER — LAB VISIT (OUTPATIENT)
Dept: LAB | Facility: HOSPITAL | Age: 63
End: 2018-05-21
Attending: INTERNAL MEDICINE
Payer: MEDICAID

## 2018-05-21 ENCOUNTER — TELEPHONE (OUTPATIENT)
Dept: FAMILY MEDICINE | Facility: CLINIC | Age: 63
End: 2018-05-21

## 2018-05-21 ENCOUNTER — OFFICE VISIT (OUTPATIENT)
Dept: FAMILY MEDICINE | Facility: CLINIC | Age: 63
End: 2018-05-21
Payer: MEDICAID

## 2018-05-21 VITALS
SYSTOLIC BLOOD PRESSURE: 140 MMHG | BODY MASS INDEX: 37.06 KG/M2 | HEIGHT: 64 IN | TEMPERATURE: 98 F | DIASTOLIC BLOOD PRESSURE: 82 MMHG | HEART RATE: 68 BPM | OXYGEN SATURATION: 98 % | WEIGHT: 217.06 LBS

## 2018-05-21 DIAGNOSIS — I10 ESSENTIAL HYPERTENSION: Chronic | ICD-10-CM

## 2018-05-21 DIAGNOSIS — E66.01 SEVERE OBESITY (BMI 35.0-39.9): ICD-10-CM

## 2018-05-21 DIAGNOSIS — D50.9 IRON DEFICIENCY ANEMIA, UNSPECIFIED IRON DEFICIENCY ANEMIA TYPE: ICD-10-CM

## 2018-05-21 DIAGNOSIS — R06.83 SNORING: ICD-10-CM

## 2018-05-21 DIAGNOSIS — M17.0 PRIMARY OSTEOARTHRITIS OF BOTH KNEES: ICD-10-CM

## 2018-05-21 DIAGNOSIS — M17.12 OSTEOARTHRITIS OF LEFT KNEE, UNSPECIFIED OSTEOARTHRITIS TYPE: ICD-10-CM

## 2018-05-21 DIAGNOSIS — M79.7 FIBROMYALGIA: ICD-10-CM

## 2018-05-21 DIAGNOSIS — F33.0 MILD RECURRENT MAJOR DEPRESSION: Chronic | ICD-10-CM

## 2018-05-21 DIAGNOSIS — G89.29 OTHER CHRONIC PAIN: ICD-10-CM

## 2018-05-21 DIAGNOSIS — I10 ESSENTIAL HYPERTENSION: Primary | Chronic | ICD-10-CM

## 2018-05-21 LAB
ALBUMIN SERPL BCP-MCNC: 3.5 G/DL
ALP SERPL-CCNC: 80 U/L
ALT SERPL W/O P-5'-P-CCNC: 14 U/L
ANION GAP SERPL CALC-SCNC: 8 MMOL/L
AST SERPL-CCNC: 21 U/L
BASOPHILS # BLD AUTO: 0.07 K/UL
BASOPHILS NFR BLD: 0.8 %
BILIRUB SERPL-MCNC: 0.2 MG/DL
BUN SERPL-MCNC: 12 MG/DL
CALCIUM SERPL-MCNC: 9.8 MG/DL
CHLORIDE SERPL-SCNC: 102 MMOL/L
CO2 SERPL-SCNC: 30 MMOL/L
CREAT SERPL-MCNC: 0.8 MG/DL
DIFFERENTIAL METHOD: ABNORMAL
EOSINOPHIL # BLD AUTO: 0.3 K/UL
EOSINOPHIL NFR BLD: 2.8 %
ERYTHROCYTE [DISTWIDTH] IN BLOOD BY AUTOMATED COUNT: 18.5 %
EST. GFR  (AFRICAN AMERICAN): >60 ML/MIN/1.73 M^2
EST. GFR  (NON AFRICAN AMERICAN): >60 ML/MIN/1.73 M^2
FERRITIN SERPL-MCNC: 4 NG/ML
GLUCOSE SERPL-MCNC: 79 MG/DL
HCT VFR BLD AUTO: 37.3 %
HGB BLD-MCNC: 11.2 G/DL
IMM GRANULOCYTES # BLD AUTO: 0.03 K/UL
IMM GRANULOCYTES NFR BLD AUTO: 0.3 %
IRON SERPL-MCNC: 37 UG/DL
LYMPHOCYTES # BLD AUTO: 3.1 K/UL
LYMPHOCYTES NFR BLD: 34.5 %
MCH RBC QN AUTO: 25.1 PG
MCHC RBC AUTO-ENTMCNC: 30 G/DL
MCV RBC AUTO: 83 FL
MONOCYTES # BLD AUTO: 0.7 K/UL
MONOCYTES NFR BLD: 7.8 %
NEUTROPHILS # BLD AUTO: 4.9 K/UL
NEUTROPHILS NFR BLD: 53.8 %
NRBC BLD-RTO: 0 /100 WBC
PLATELET # BLD AUTO: 273 K/UL
PMV BLD AUTO: 13.8 FL
POTASSIUM SERPL-SCNC: 3.5 MMOL/L
PROT SERPL-MCNC: 8 G/DL
RBC # BLD AUTO: 4.47 M/UL
SATURATED IRON: 9 %
SODIUM SERPL-SCNC: 140 MMOL/L
TOTAL IRON BINDING CAPACITY: 407 UG/DL
TRANSFERRIN SERPL-MCNC: 275 MG/DL
WBC # BLD AUTO: 9.07 K/UL

## 2018-05-21 PROCEDURE — 85025 COMPLETE CBC W/AUTO DIFF WBC: CPT

## 2018-05-21 PROCEDURE — 36415 COLL VENOUS BLD VENIPUNCTURE: CPT | Mod: PO

## 2018-05-21 PROCEDURE — 80053 COMPREHEN METABOLIC PANEL: CPT

## 2018-05-21 PROCEDURE — 99214 OFFICE O/P EST MOD 30 MIN: CPT | Mod: S$PBB,,, | Performed by: INTERNAL MEDICINE

## 2018-05-21 PROCEDURE — 99214 OFFICE O/P EST MOD 30 MIN: CPT | Mod: PBBFAC,PO | Performed by: INTERNAL MEDICINE

## 2018-05-21 PROCEDURE — 99999 PR PBB SHADOW E&M-EST. PATIENT-LVL IV: CPT | Mod: PBBFAC,,, | Performed by: INTERNAL MEDICINE

## 2018-05-21 PROCEDURE — 83540 ASSAY OF IRON: CPT

## 2018-05-21 PROCEDURE — 82728 ASSAY OF FERRITIN: CPT

## 2018-05-21 RX ORDER — DULOXETINE 40 MG/1
40 CAPSULE, DELAYED RELEASE ORAL DAILY
Qty: 30 CAPSULE | Refills: 10 | Status: SHIPPED | OUTPATIENT
Start: 2018-05-21 | End: 2018-05-21

## 2018-05-21 RX ORDER — CLONIDINE HYDROCHLORIDE 0.1 MG/1
0.2 TABLET ORAL 2 TIMES DAILY
Qty: 360 TABLET | Refills: 1 | Status: SHIPPED | OUTPATIENT
Start: 2018-05-21 | End: 2019-02-11 | Stop reason: SDUPTHER

## 2018-05-21 RX ORDER — DULOXETIN HYDROCHLORIDE 20 MG/1
40 CAPSULE, DELAYED RELEASE ORAL DAILY
Qty: 60 CAPSULE | Refills: 11 | Status: SHIPPED | OUTPATIENT
Start: 2018-05-21 | End: 2018-06-21 | Stop reason: SDUPTHER

## 2018-05-21 RX ORDER — LOSARTAN POTASSIUM AND HYDROCHLOROTHIAZIDE 25; 100 MG/1; MG/1
1 TABLET ORAL DAILY
Qty: 90 TABLET | Refills: 1 | Status: SHIPPED | OUTPATIENT
Start: 2018-05-21 | End: 2019-02-11 | Stop reason: SDUPTHER

## 2018-05-21 RX ORDER — METOPROLOL SUCCINATE 100 MG/1
100 TABLET, EXTENDED RELEASE ORAL DAILY
Qty: 90 TABLET | Refills: 1 | Status: SHIPPED | OUTPATIENT
Start: 2018-05-21 | End: 2019-02-11 | Stop reason: SDUPTHER

## 2018-05-21 RX ORDER — GABAPENTIN 100 MG/1
CAPSULE ORAL
Qty: 90 CAPSULE | Refills: 4 | Status: SHIPPED | OUTPATIENT
Start: 2018-05-21 | End: 2019-02-11 | Stop reason: SDUPTHER

## 2018-05-21 NOTE — TELEPHONE ENCOUNTER
Spoke w/pharmacist, request new duloxetine rx. States pt insurance will not cover 40 MG but will cover 20 MG, 30 MG and 60 MG. Please advise.

## 2018-05-21 NOTE — TELEPHONE ENCOUNTER
----- Message from Any Florian sent at 5/21/2018 12:13 PM CDT -----  Contact: [phone]/Kelby Baron /Lisa Mancuso script needs to be changed

## 2018-05-22 NOTE — PROGRESS NOTES
CBC Hb/Hct OK, but ferritin very low and iron sat very low  CMP WNL  Had c/o fatigue.  Ordered home sleep study.  F/u 1 month.

## 2018-05-23 ENCOUNTER — TELEPHONE (OUTPATIENT)
Dept: FAMILY MEDICINE | Facility: CLINIC | Age: 63
End: 2018-05-23

## 2018-05-23 ENCOUNTER — TELEPHONE (OUTPATIENT)
Dept: SLEEP MEDICINE | Facility: HOSPITAL | Age: 63
End: 2018-05-23

## 2018-05-24 ENCOUNTER — PATIENT MESSAGE (OUTPATIENT)
Dept: FAMILY MEDICINE | Facility: CLINIC | Age: 63
End: 2018-05-24

## 2018-05-24 NOTE — TELEPHONE ENCOUNTER
I spoke with the patient regarding a referral to Orthopedic and advised her that her information have been faxed to [facility] department @ [phone]. I advised the patient that someone will be contacting to schedule a appointment

## 2018-06-05 ENCOUNTER — PATIENT MESSAGE (OUTPATIENT)
Dept: FAMILY MEDICINE | Facility: CLINIC | Age: 63
End: 2018-06-05

## 2018-06-06 ENCOUNTER — TELEPHONE (OUTPATIENT)
Dept: SLEEP MEDICINE | Facility: HOSPITAL | Age: 63
End: 2018-06-06

## 2018-06-11 NOTE — TELEPHONE ENCOUNTER
Message sent to referral dept to check status of referral and ensure it was sent to [place]  Office as requested

## 2018-06-20 NOTE — PROGRESS NOTES
This note was created by combination of typed  and Dragon dictation.  Transcription errors may be present.  If there are any questions, please contact me.    Assessment / Plan:   Mild recurrent major depression  Fibromyalgia  -notes improvement in her overall mood on the higher dose Cymbalta but her pain remains intense.  It is more intense because she stop taking NSAIDs.  Trial of higher dose Cymbalta.  Could go as high as 120 mg until side effects.  Gabapentin does not seem to be helping or hindering, no change at this point in the gabapentin.  -     DULoxetine (CYMBALTA) 60 MG capsule; Take 1 capsule (60 mg total) by mouth once daily. Increased dose  Dispense: 30 capsule; Refill: 11    TheI mary deficiency anemia, unspecified iron deficiency anemia type - her iron levels are low although her blood count is really not that bad.  However I have encouraged her to take the iron supplement every day along with Dulcolax every day.  It is possible that this may make her feel somewhat better.  -     ferrous sulfate 325 mg (65 mg iron) Tab tablet; Take 1 tablet (325 mg total) by mouth once daily.  Dispense: 30 tablet; Refill: 5    She reports that she has an upcoming EGD and colonoscopy with [facility] next month.  She has been off of NSAIDs.    Osteoarthritis of the knees-she needs a referral faxed over to the satellite clinic rather than the main hospital. Dr. Hill fax     Medications Discontinued During This Encounter   Medication Reason    DULoxetine (CYMBALTA) 20 MG capsule Reorder    ferrous sulfate 325 mg (65 mg iron) Tab tablet Reorder     Modified Medications    Modified Medication Previous Medication    DULOXETINE (CYMBALTA) 60 MG CAPSULE DULoxetine (CYMBALTA) 20 MG capsule       Take 1 capsule (60 mg total) by mouth once daily. Increased dose    Take 2 capsules (40 mg total) by mouth once daily.    FERROUS SULFATE 325 MG (65 MG IRON) TAB TABLET ferrous sulfate 325 mg (65 mg  iron) Tab tablet       Take 1 tablet (325 mg total) by mouth once daily.    Take 1 tablet (325 mg total) by mouth 2 (two) times daily.     New Prescriptions    No medications on file         Follow Up: No Follow-up on file. OV 1 month f/u on higher dose cymbalta 60, iron daily.      Subjective:     Chief Complaint   Patient presents with    Generalized Body Aches       CHANDLER Luu is a 63 y.o. female, last appointment with this clinic was 5/21/2018.    No LMP recorded. Patient has had a hysterectomy.    Chronic pain with hx of dx of fibromyalgia.   gabapentin started 6/2017, SE of fatigue, not much relief. Last visit increased dose.  Changed prozac to cymbalta  Hypertension, toprol XL, clonidine 0.2, losartan/HCTZ  6/2017 nuclear stress test negative. No change 7/2015 6/2017 TTE normal LVEF 55-60  Depression, fluoxetine  Asthma  History of iron deficiency anemia  Osteoarthritis, knees;  fam hx of substance dependency. remote hx of injections, found it helpful. Referred to ortho, last visit 7/2017 had not been contacted by [facility]  8/24/2017 MRI L knee: Tricompartmental degenerative changes. Lateral and medial meniscal degenerative tearing/changes. Tricompartmental cartilage loss including areas of full-thickness full-thickness loss. Abnormal appearance of the ACL and PCL suggestive of chronic injury.  History of peptic ulcer disease, has been referred to GI in the past.  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  There was presence of gastric ulcers with clean base, pathology came back benign.  H. pylori was negative.  She's had several EGDs on record, each time with ulcers.  She had repeat EGD done in 12/2016 which again showed ulcer.  I don't have the path report.  6/26/2017 nuclear medicine stress test negative for ischemia. 6/26/2017 echo LVEF 55%.  No diastolic dysfunction.    Last visit HTN  borderline.   Depression not controlled, increased cymbalta  OA knee.  I referred her to orthopedics - one in [place]  Iron deficiency not taking Fe b/c of constipation.  Needs to take colace regularly. Stop the BC powder.   Headache - rebound from NSAIDS?   Couldn't tolerate colonoscopy prep - I wanted her to inquire with GI about alternative preps.    She notes that since increasing the Cymbalta her mood overall is a bit better.  However her pain remains elevated.  She had previously been taking NSAIDs and she stopped it with the result that her pain is higher.  The gabapentin isn't really helping but not really having any side effects either.  I did discuss with her that I can increase the Cymbalta.  Has never tried anything as an alternative.  She is taking gabapentin but it isn't really helping but she is not having any gross side effects of it.  The pain is in her arms, her back, her thighs as well.  Pain to light touch in the arms, pain to more deep touch on the thighs.    Blood pressure better today.    She continues to take the iron maybe 2 to 3 times a week.  Takes a Dulcolax.  Has not tried challenging herself daily and I have asked her to take it daily Dulcolax as well as daily iron supplement.    She has a known history of osteoarthritis of the knees and she found a clinic in [facility] that would see her, [facility].  We need to fax a referral to them there apparently rather than through the main hospital.    Patient Care Team:  Cipriano Carrera MD as PCP - General (Internal Medicine)  Gonsalo Ivy MD as Consulting Physician (Gastroenterology)  Abdulaziz Willson MD as Consulting Physician (INTERVENTIONAL CARDIOLOGY)    Patient Active Problem List    Diagnosis Date Noted    Fibromyalgia 07/13/2017    Severe obesity (BMI 35.0-39.9) 06/15/2015    AR (allergic rhinitis) 02/19/2014    Iron deficiency anemia 03/14/2013    Asthma, chronic 02/14/2013    Gastric ulcer, chronic 02/14/2013    Menopausal  hot flushes 02/14/2013    Mild recurrent major depression 02/14/2013    Primary osteoarthritis of left knee 07/30/2012 8/24/2017 MRI L knee: Tricompartmental degenerative changes. Lateral and medial meniscal degenerative tearing/changes. Tricompartmental cartilage loss including areas of full-thickness full-thickness loss. Abnormal appearance of the ACL and PCL suggestive of chronic injury.      Essential hypertension 07/30/2012       PAST MEDICAL HISTORY:  Past Medical History:   Diagnosis Date    Asthma     Depression     Gastric ulcer with hemorrhage 7/2012    GERD (gastroesophageal reflux disease)     History of blood transfusion     Hypertension     Iron deficiency anemia 3/14/2013    Osteoarthritis of both knees        PAST SURGICAL HISTORY:  Past Surgical History:   Procedure Laterality Date    BREAST SURGERY      Benign breast cyst    HYSTERECTOMY      Partial    TONSILLECTOMY         SOCIAL HISTORY:  Social History     Social History    Marital status:      Spouse name: N/A    Number of children: N/A    Years of education: N/A     Occupational History     [employer]     Social History Main Topics    Smoking status: Never Smoker    Smokeless tobacco: Never Used    Alcohol use Yes      Comment: ocaasional    Drug use: No    Sexual activity: Yes     Partners: Male     Birth control/ protection: Surgical     Other Topics Concern    Not on file     Social History Narrative    No narrative on file       ALLERGIES AND MEDICATIONS: updated and reviewed.  Review of patient's allergies indicates:   Allergen Reactions    Nsaids (non-steroidal anti-inflammatory drug)      Gi bleed    Penicillins Itching and Swelling     Current Outpatient Prescriptions   Medication Sig Dispense Refill    albuterol 90 mcg/actuation inhaler Inhale 2 puffs into the lungs every 4 (four) hours as needed for Wheezing or Shortness of Breath. 1 each 5    cetirizine (ZYRTEC) 10 MG  "tablet Take 1 tablet (10 mg total) by mouth once daily.  0    cloNIDine (CATAPRES) 0.1 MG tablet Take 2 tablets (0.2 mg total) by mouth 2 (two) times daily. 360 tablet 1    docusate sodium (COLACE) 100 MG capsule Take 1 capsule (100 mg total) by mouth 2 (two) times daily as needed for Constipation. 30 capsule 11    DULoxetine (CYMBALTA) 20 MG capsule Take 2 capsules (40 mg total) by mouth once daily. 60 capsule 11    esomeprazole (NEXIUM) 40 MG capsule Take 1 capsule (40 mg total) by mouth before breakfast. Instead of omeprazole 30 capsule 11    ferrous sulfate 325 mg (65 mg iron) Tab tablet Take 1 tablet (325 mg total) by mouth 2 (two) times daily. 60 tablet 5    furosemide (LASIX) 20 MG tablet Take 1 tablet (20 mg total) by mouth daily as needed (leg swelling). 30 tablet 1    gabapentin (NEURONTIN) 100 MG capsule 2 tablets nighttime 1 tablet in AM 90 capsule 4    losartan-hydrochlorothiazide 100-25 mg (HYZAAR) 100-25 mg per tablet Take 1 tablet by mouth once daily. 90 tablet 1    metoprolol succinate (TOPROL-XL) 100 MG 24 hr tablet Take 1 tablet (100 mg total) by mouth once daily. 90 tablet 1    nitroGLYCERIN (NITROSTAT) 0.4 MG SL tablet Place 1 tablet (0.4 mg total) under the tongue every 5 (five) minutes as needed for Chest pain. 60 tablet 12     No current facility-administered medications for this visit.        Review of Systems   Constitutional: Negative for chills and fever.   Respiratory: Negative for shortness of breath.    Cardiovascular: Negative for chest pain and palpitations.       Objective:   Physical Exam   Vitals:    06/21/18 0936   BP: 118/78   Pulse: 86   Temp: 98.2 °F (36.8 °C)   SpO2: 95%   Weight: 95.5 kg (210 lb 8.6 oz)   Height: 5' 4" (1.626 m)    Body mass index is 36.14 kg/m².  Weight: 95.5 kg (210 lb 8.6 oz)   Height: 5' 4" (162.6 cm)     Physical Exam   Constitutional: She is oriented to person, place, and time. She appears well-developed and well-nourished. No distress. "   Eyes: EOM are normal.   Cardiovascular: Normal rate, regular rhythm and normal heart sounds.    No murmur heard.  Pulmonary/Chest: Effort normal and breath sounds normal.   Musculoskeletal: Normal range of motion. She exhibits no edema.   She notes tenderness to light touch on the arms without induration, edema.  The elbow is unremarkable.  Thighs have some tenderness on moderate pressure palpation.  The back is without gross deformity.   Neurological: She is alert and oriented to person, place, and time. Coordination normal.   Skin: Skin is warm and dry.   Psychiatric: She has a normal mood and affect. Her behavior is normal. Thought content normal.

## 2018-06-21 ENCOUNTER — OFFICE VISIT (OUTPATIENT)
Dept: FAMILY MEDICINE | Facility: CLINIC | Age: 63
End: 2018-06-21
Payer: MEDICAID

## 2018-06-21 VITALS
BODY MASS INDEX: 35.95 KG/M2 | OXYGEN SATURATION: 95 % | DIASTOLIC BLOOD PRESSURE: 78 MMHG | WEIGHT: 210.56 LBS | HEART RATE: 86 BPM | TEMPERATURE: 98 F | SYSTOLIC BLOOD PRESSURE: 118 MMHG | HEIGHT: 64 IN

## 2018-06-21 DIAGNOSIS — M79.7 FIBROMYALGIA: ICD-10-CM

## 2018-06-21 DIAGNOSIS — D50.9 IRON DEFICIENCY ANEMIA, UNSPECIFIED IRON DEFICIENCY ANEMIA TYPE: ICD-10-CM

## 2018-06-21 DIAGNOSIS — F33.0 MILD RECURRENT MAJOR DEPRESSION: Chronic | ICD-10-CM

## 2018-06-21 PROCEDURE — 99214 OFFICE O/P EST MOD 30 MIN: CPT | Mod: S$PBB,,, | Performed by: INTERNAL MEDICINE

## 2018-06-21 PROCEDURE — 99213 OFFICE O/P EST LOW 20 MIN: CPT | Mod: PBBFAC,PO | Performed by: INTERNAL MEDICINE

## 2018-06-21 PROCEDURE — 99999 PR PBB SHADOW E&M-EST. PATIENT-LVL III: CPT | Mod: PBBFAC,,, | Performed by: INTERNAL MEDICINE

## 2018-06-21 RX ORDER — FERROUS SULFATE 325(65) MG
325 TABLET ORAL DAILY
Qty: 30 TABLET | Refills: 5 | Status: SHIPPED | OUTPATIENT
Start: 2018-06-21 | End: 2019-02-20

## 2018-06-21 RX ORDER — DULOXETIN HYDROCHLORIDE 60 MG/1
60 CAPSULE, DELAYED RELEASE ORAL DAILY
Qty: 30 CAPSULE | Refills: 11 | Status: SHIPPED | OUTPATIENT
Start: 2018-06-21 | End: 2019-02-11 | Stop reason: SDUPTHER

## 2018-07-06 ENCOUNTER — TELEPHONE (OUTPATIENT)
Dept: FAMILY MEDICINE | Facility: CLINIC | Age: 63
End: 2018-07-06

## 2018-07-06 NOTE — TELEPHONE ENCOUNTER
----- Message from Cipriano Carrera MD sent at 6/22/2018  7:47 AM CDT -----  Regarding: can we submit referral to [facility] in [place] for this pt?  Good morning,    Saw this pt yesterday, she needs a referral for ortho Dr. Juan J Hill at the [place] location not the [facility].     Fax # is [phone]    Thanks!  Cipriano

## 2018-08-27 ENCOUNTER — TELEPHONE (OUTPATIENT)
Dept: FAMILY MEDICINE | Facility: CLINIC | Age: 63
End: 2018-08-27

## 2018-09-11 DIAGNOSIS — R60.0 PERIPHERAL EDEMA: ICD-10-CM

## 2018-09-11 RX ORDER — FUROSEMIDE 20 MG/1
TABLET ORAL
Qty: 30 TABLET | Refills: 5 | Status: SHIPPED | OUTPATIENT
Start: 2018-09-11 | End: 2019-02-11 | Stop reason: SDUPTHER

## 2018-10-06 ENCOUNTER — TELEPHONE (OUTPATIENT)
Dept: ADMINISTRATIVE | Facility: HOSPITAL | Age: 63
End: 2018-10-06

## 2018-10-07 PROBLEM — K63.5 COLON POLYP: Status: ACTIVE | Noted: 2018-10-07

## 2018-12-04 DIAGNOSIS — K25.7 CHRONIC GASTRIC ULCER WITHOUT HEMORRHAGE AND WITHOUT PERFORATION: ICD-10-CM

## 2018-12-04 RX ORDER — OMEPRAZOLE 40 MG/1
CAPSULE, DELAYED RELEASE ORAL
Qty: 90 CAPSULE | Refills: 1 | Status: SHIPPED | OUTPATIENT
Start: 2018-12-04 | End: 2019-08-20 | Stop reason: SDUPTHER

## 2019-01-30 PROBLEM — Z86.012 HISTORY OF BENIGN CARCINOID TUMOR: Status: ACTIVE | Noted: 2019-01-30

## 2019-02-08 NOTE — PROGRESS NOTES
This note was created by combination of typed  and Dragon dictation.  Transcription errors may be present.  If there are any questions, please contact me.    Assessment / Plan:   Iron deficiency anemia, unspecified iron deficiency anemia type  Chronic gastric ulcer without hemorrhage and without perforation  -reportedly had f/u a month ago with GI, stable, reports no labs or procedures. Last EGD on file 2018 with recurrent ulcer. On PPI. Stay on high dose PPI. Check CBC and iron.  Intolerant of iron po. If still low will refer to heme for consideration of IV iron  She reports modest alcohol use in the past though more of late with the recent deaths in her family.  That may be a contributor to the ulcers.  -     CBC auto differential; Future; Expected date: 02/11/2019  -     Iron and TIBC; Future; Expected date: 02/11/2019  -     Ferritin; Future; Expected date: 02/11/2019    Essential hypertension  -stable refilled rx's. Check labs and lipid. Last lipid was good.  -     Comprehensive metabolic panel; Future; Expected date: 02/11/2019  -     Lipid panel; Future; Expected date: 02/11/2019  -     cloNIDine (CATAPRES) 0.1 MG tablet; Take 2 tablets (0.2 mg total) by mouth 2 (two) times daily.  Dispense: 360 tablet; Refill: 3  -     losartan-hydrochlorothiazide 100-25 mg (HYZAAR) 100-25 mg per tablet; Take 1 tablet by mouth once daily.  Dispense: 90 tablet; Refill: 3  -     metoprolol succinate (TOPROL-XL) 100 MG 24 hr tablet; Take 1 tablet (100 mg total) by mouth once daily.  Dispense: 90 tablet; Refill: 1    Mild recurrent major depression  Fibromyalgia  Other chronic pain  Osteoarthritis of left knee, unspecified osteoarthritis type  -mood not great due to family deaths. Overall though she feels stable. Refilled cymbalta and gabapentin.  -     DULoxetine (CYMBALTA) 60 MG capsule; Take 1 capsule (60 mg total) by mouth once daily. Increased dose  Dispense: 30 capsule; Refill: 11  -     gabapentin (NEURONTIN)  100 MG capsule; 2 tablets nighttime 1 tablet in AM  Dispense: 90 capsule; Refill: 4    Peripheral edema  -she's been taking lasix daily.  Checking CMP  -     furosemide (LASIX) 20 MG tablet; TAKE 1 TABLET BY MOUTH ONCE DAILY AS NEEDED FOR  LEG  SWELLING  Dispense: 30 tablet; Refill: 5    Screening for breast cancer  -     Cancel: Mammo Digital Screening Bilat; Future; Expected date: 02/11/2019    Acute viral sinusitis  -causing headache. Trial atrovent nasal spray.  -     ipratropium (ATROVENT) 42 mcg (0.06 %) nasal spray; 2 sprays by Nasal route 3 (three) times daily. AS NEEDED FOR NASAL CONGESTION AND DRIP for 7 days  Dispense: 30 mL; Refill: 0    Acute right flank pain  -check UA UCx. Some suprapubic discomfort. Low threshold to start abx for UTI pending culture.  -     Urinalysis; Future; Expected date: 02/11/2019  -     Urine culture; Future; Expected date: 02/11/2019    Medications Discontinued During This Encounter   Medication Reason    esomeprazole (NEXIUM) 40 MG capsule Cost of medication    cloNIDine (CATAPRES) 0.1 MG tablet Reorder    losartan-hydrochlorothiazide 100-25 mg (HYZAAR) 100-25 mg per tablet Reorder    metoprolol succinate (TOPROL-XL) 100 MG 24 hr tablet Reorder    DULoxetine (CYMBALTA) 60 MG capsule Reorder    gabapentin (NEURONTIN) 100 MG capsule Reorder    furosemide (LASIX) 20 MG tablet Reorder       meds sent this encounter:  Medications Ordered This Encounter   Medications    cloNIDine (CATAPRES) 0.1 MG tablet     Sig: Take 2 tablets (0.2 mg total) by mouth 2 (two) times daily.     Dispense:  360 tablet     Refill:  3    DULoxetine (CYMBALTA) 60 MG capsule     Sig: Take 1 capsule (60 mg total) by mouth once daily. Increased dose     Dispense:  30 capsule     Refill:  11    furosemide (LASIX) 20 MG tablet     Sig: TAKE 1 TABLET BY MOUTH ONCE DAILY AS NEEDED FOR  LEG  SWELLING     Dispense:  30 tablet     Refill:  5     Please consider 90 day supplies to promote better adherence     gabapentin (NEURONTIN) 100 MG capsule     Si tablets nighttime 1 tablet in AM     Dispense:  90 capsule     Refill:  4    ipratropium (ATROVENT) 42 mcg (0.06 %) nasal spray     Si sprays by Nasal route 3 (three) times daily. AS NEEDED FOR NASAL CONGESTION AND DRIP for 7 days     Dispense:  30 mL     Refill:  0    losartan-hydrochlorothiazide 100-25 mg (HYZAAR) 100-25 mg per tablet     Sig: Take 1 tablet by mouth once daily.     Dispense:  90 tablet     Refill:  3    metoprolol succinate (TOPROL-XL) 100 MG 24 hr tablet     Sig: Take 1 tablet (100 mg total) by mouth once daily.     Dispense:  90 tablet     Refill:  1       Follow Up: No Follow-up on file.      Subjective:   No chief complaint on file.      CHANDLER Luu is a 63 y.o. female, last appointment with this clinic was Visit date not found.    No LMP recorded. Patient has had a hysterectomy.    Last seen 2018.  2018 CBC mild anemia normocytic. Iron low sat with ferritin of 4.  2017 lipid good.  Had C scope 2018 no path report included.    Taking prilosec daily.  Overall controlled, only breakthrough symptoms if she misses a dose. Reports she went to GI a month ago - no changes.  No labs done no procedures.    Niece  of HIV. And cousin of cancer.  Drinking alcohol - knows she needs to cut down some - past 2 weeks - 2 drinks daily. Prior to that - maybe weekends.     Low back pain x 1 week.  Right side. Sharp pain. No radiation. No inciting event. Has been taking tylenol. Hx of low back pain.  And with this suprapubic discomfort.  And dysuria.     Not taking iron b/c of GI SE.    She's been having headaches.  Frontal. X 2 weeks. Subjective chills. Sinus congestion. Sometimes eyes hurt bilaterally. Sometimes ear congestion. Nothing OTC for this.      Patient Care Team:  Cipriano Carrera MD as PCP - General (Internal Medicine)  Gonsalo Ivy MD as Consulting Physician (Gastroenterology)  Abdulaziz Willson MD as Consulting Physician  (INTERVENTIONAL CARDIOLOGY)    Patient Active Problem List    Diagnosis Date Noted    History of benign carcinoid tumor rectum s/p resection per GI note 01/30/2019    Colon polyp 8/2018 no path report included repeat 5 years 10/07/2018    Fibromyalgia; diffuse arm, back pain, thigh pain; 2017 B12, TSH WNL; reynaldo without efficacy; Cymbalta. 07/13/2017    Severe obesity (BMI 35.0-39.9) 06/15/2015    AR (allergic rhinitis) 02/19/2014    Iron deficiency anemia; iron with GI SE 03/14/2013    Asthma, chronic 02/14/2013    Gastric ulcer, chronic on EGD 7/2018 02/14/2013    Menopausal hot flushes 02/14/2013    Mild recurrent major depression 02/14/2013    Primary osteoarthritis of left knee 07/30/2012 8/24/2017 MRI L knee: Tricompartmental degenerative changes. Lateral and medial meniscal degenerative tearing/changes. Tricompartmental cartilage loss including areas of full-thickness full-thickness loss. Abnormal appearance of the ACL and PCL suggestive of chronic injury.      Essential hypertension 6/2017 TTE normal LVEF; stress test neg 07/30/2012 6/2017 nuclear stress test negative. No change 7/2015 6/2017 TTE normal LVEF 55-60         PAST MEDICAL HISTORY:  Past Medical History:   Diagnosis Date    Asthma     Depression     Gastric ulcer with hemorrhage 7/2012    GERD (gastroesophageal reflux disease)     History of blood transfusion     Hypertension     Iron deficiency anemia 3/14/2013    Osteoarthritis of both knees        PAST SURGICAL HISTORY:  Past Surgical History:   Procedure Laterality Date    BREAST SURGERY      Benign breast cyst    EGD (ESOPHAGOGASTRODUODENOSCOPY) Left 12/6/2013    [facility]    EGD (ESOPHAGOGASTRODUODENOSCOPY) N/A 1/24/2013    [facility] ([facility] F[facility]    HYSTERECTOMY      Partial    TONSILLECTOMY         SOCIAL HISTORY:  Social History     Socioeconomic History    Marital status:       Spouse name: Not on file    Number of children: Not on file    Years of education: Not on file    Highest education level: Not on file   Social Needs    Financial resource strain: Not on file    Food insecurity - worry: Not on file    Food insecurity - inability: Not on file    Transportation needs - medical: Not on file    Transportation needs - non-medical: Not on file   Occupational History     Employer: [employer]   Tobacco Use    Smoking status: Never Smoker    Smokeless tobacco: Never Used   Substance and Sexual Activity    Alcohol use: Yes     Comment: ocaasional    Drug use: No    Sexual activity: Yes     Partners: Male     Birth control/protection: Surgical   Other Topics Concern    Not on file   Social History Narrative    Not on file       ALLERGIES AND MEDICATIONS: updated and reviewed.  Review of patient's allergies indicates:   Allergen Reactions    Nsaids (non-steroidal anti-inflammatory drug)      Gi bleed    Penicillins Itching and Swelling     Current Outpatient Medications   Medication Sig Dispense Refill    albuterol 90 mcg/actuation inhaler Inhale 2 puffs into the lungs every 4 (four) hours as needed for Wheezing or Shortness of Breath. 1 each 5    cetirizine (ZYRTEC) 10 MG tablet Take 1 tablet (10 mg total) by mouth once daily.  0    cloNIDine (CATAPRES) 0.1 MG tablet Take 2 tablets (0.2 mg total) by mouth 2 (two) times daily. 360 tablet 1    docusate sodium (COLACE) 100 MG capsule Take 1 capsule (100 mg total) by mouth 2 (two) times daily as needed for Constipation. 30 capsule 11    DULoxetine (CYMBALTA) 60 MG capsule Take 1 capsule (60 mg total) by mouth once daily. Increased dose 30 capsule 11    esomeprazole (NEXIUM) 40 MG capsule Take 1 capsule (40 mg total) by mouth before breakfast. Instead of omeprazole 30 capsule 11    ferrous sulfate 325 mg (65 mg iron) Tab tablet Take 1 tablet (325 mg total) by mouth once daily. 30 tablet 5    furosemide  "(LASIX) 20 MG tablet TAKE 1 TABLET BY MOUTH ONCE DAILY AS NEEDED FOR  LEG  SWELLING 30 tablet 5    gabapentin (NEURONTIN) 100 MG capsule 2 tablets nighttime 1 tablet in AM 90 capsule 4    nitroGLYCERIN (NITROSTAT) 0.4 MG SL tablet Place 1 tablet (0.4 mg total) under the tongue every 5 (five) minutes as needed for Chest pain. 60 tablet 12    omeprazole (PRILOSEC) 40 MG capsule TAKE 1 CAPSULE BY MOUTH IN THE MORNING WITH FOOD 90 capsule 1    losartan-hydrochlorothiazide 100-25 mg (HYZAAR) 100-25 mg per tablet Take 1 tablet by mouth once daily. 90 tablet 1    metoprolol succinate (TOPROL-XL) 100 MG 24 hr tablet Take 1 tablet (100 mg total) by mouth once daily. 90 tablet 1     No current facility-administered medications for this visit.        Review of Systems   All other systems reviewed and are negative.      Objective:   Physical Exam   Vitals:    02/11/19 0934   BP: 138/88   Pulse: 78   Temp: 98.3 °F (36.8 °C)   SpO2: 98%   Weight: 94 kg (207 lb 5.5 oz)   Height: 5' 4" (1.626 m)    Body mass index is 35.59 kg/m².  Weight: 94 kg (207 lb 5.5 oz)   Height: 5' 4" (162.6 cm)     Physical Exam   Constitutional: She is oriented to person, place, and time. She appears well-developed and well-nourished. No distress.   HENT:   Head: Normocephalic and atraumatic.   Right Ear: External ear normal.   Left Ear: External ear normal.   Mouth/Throat: Oropharynx is clear and moist.   Eyes: EOM are normal.   Cardiovascular: Normal rate, regular rhythm and normal heart sounds.   No murmur heard.  Pulmonary/Chest: Effort normal and breath sounds normal.   Abdominal:   Suprapubic discomfort without obvious mass or distention.  Right-sided flank area pain.  No deformity.   Musculoskeletal: Normal range of motion.   Lymphadenopathy:     She has no cervical adenopathy.   Neurological: She is alert and oriented to person, place, and time. Coordination normal.   Skin: Skin is warm and dry.   Psychiatric: She has a normal mood and " affect. Her behavior is normal. Thought content normal.

## 2019-02-11 ENCOUNTER — OFFICE VISIT (OUTPATIENT)
Dept: FAMILY MEDICINE | Facility: CLINIC | Age: 64
End: 2019-02-11
Payer: MEDICAID

## 2019-02-11 ENCOUNTER — HOSPITAL ENCOUNTER (OUTPATIENT)
Dept: RADIOLOGY | Facility: HOSPITAL | Age: 64
Discharge: HOME | End: 2019-02-11
Attending: INTERNAL MEDICINE
Payer: MEDICAID

## 2019-02-11 VITALS — WEIGHT: 207 LBS | BODY MASS INDEX: 35.34 KG/M2 | HEIGHT: 64 IN

## 2019-02-11 VITALS
HEIGHT: 64 IN | TEMPERATURE: 98 F | BODY MASS INDEX: 35.39 KG/M2 | SYSTOLIC BLOOD PRESSURE: 138 MMHG | OXYGEN SATURATION: 98 % | HEART RATE: 78 BPM | DIASTOLIC BLOOD PRESSURE: 88 MMHG | WEIGHT: 207.31 LBS

## 2019-02-11 DIAGNOSIS — R10.9 ACUTE RIGHT FLANK PAIN: ICD-10-CM

## 2019-02-11 DIAGNOSIS — J01.90 ACUTE VIRAL SINUSITIS: ICD-10-CM

## 2019-02-11 DIAGNOSIS — F33.0 MILD RECURRENT MAJOR DEPRESSION: Chronic | ICD-10-CM

## 2019-02-11 DIAGNOSIS — Z12.39 SCREENING FOR BREAST CANCER: ICD-10-CM

## 2019-02-11 DIAGNOSIS — I10 ESSENTIAL HYPERTENSION: Chronic | ICD-10-CM

## 2019-02-11 DIAGNOSIS — R60.0 PERIPHERAL EDEMA: ICD-10-CM

## 2019-02-11 DIAGNOSIS — G89.29 OTHER CHRONIC PAIN: ICD-10-CM

## 2019-02-11 DIAGNOSIS — B97.89 ACUTE VIRAL SINUSITIS: ICD-10-CM

## 2019-02-11 DIAGNOSIS — M79.7 FIBROMYALGIA: ICD-10-CM

## 2019-02-11 DIAGNOSIS — M17.12 OSTEOARTHRITIS OF LEFT KNEE, UNSPECIFIED OSTEOARTHRITIS TYPE: ICD-10-CM

## 2019-02-11 DIAGNOSIS — K25.7 CHRONIC GASTRIC ULCER WITHOUT HEMORRHAGE AND WITHOUT PERFORATION: ICD-10-CM

## 2019-02-11 DIAGNOSIS — D50.9 IRON DEFICIENCY ANEMIA, UNSPECIFIED IRON DEFICIENCY ANEMIA TYPE: Primary | ICD-10-CM

## 2019-02-11 PROCEDURE — 77063 BREAST TOMOSYNTHESIS BI: CPT | Mod: 26,,, | Performed by: RADIOLOGY

## 2019-02-11 PROCEDURE — 77067 MAMMO DIGITAL SCREENING BILAT WITH TOMOSYNTHESIS_CAD: ICD-10-PCS | Mod: 26,,, | Performed by: RADIOLOGY

## 2019-02-11 PROCEDURE — 99999 PR PBB SHADOW E&M-EST. PATIENT-LVL III: ICD-10-PCS | Mod: PBBFAC,,, | Performed by: INTERNAL MEDICINE

## 2019-02-11 PROCEDURE — 77067 SCR MAMMO BI INCL CAD: CPT | Mod: 26,,, | Performed by: RADIOLOGY

## 2019-02-11 PROCEDURE — 99999 PR PBB SHADOW E&M-EST. PATIENT-LVL III: CPT | Mod: PBBFAC,,, | Performed by: INTERNAL MEDICINE

## 2019-02-11 PROCEDURE — 99213 OFFICE O/P EST LOW 20 MIN: CPT | Mod: PBBFAC,PO | Performed by: INTERNAL MEDICINE

## 2019-02-11 PROCEDURE — 77063 MAMMO DIGITAL SCREENING BILAT WITH TOMOSYNTHESIS_CAD: ICD-10-PCS | Mod: 26,,, | Performed by: RADIOLOGY

## 2019-02-11 PROCEDURE — 77067 SCR MAMMO BI INCL CAD: CPT | Mod: TC,PO

## 2019-02-11 PROCEDURE — 99214 PR OFFICE/OUTPT VISIT, EST, LEVL IV, 30-39 MIN: ICD-10-PCS | Mod: S$PBB,,, | Performed by: INTERNAL MEDICINE

## 2019-02-11 PROCEDURE — 99214 OFFICE O/P EST MOD 30 MIN: CPT | Mod: S$PBB,,, | Performed by: INTERNAL MEDICINE

## 2019-02-11 RX ORDER — FUROSEMIDE 20 MG/1
TABLET ORAL
Qty: 30 TABLET | Refills: 5 | Status: SHIPPED | OUTPATIENT
Start: 2019-02-11 | End: 2019-08-20 | Stop reason: SDUPTHER

## 2019-02-11 RX ORDER — DULOXETIN HYDROCHLORIDE 60 MG/1
60 CAPSULE, DELAYED RELEASE ORAL DAILY
Qty: 30 CAPSULE | Refills: 11 | Status: SHIPPED | OUTPATIENT
Start: 2019-02-11 | End: 2019-08-20 | Stop reason: SDUPTHER

## 2019-02-11 RX ORDER — CLONIDINE HYDROCHLORIDE 0.1 MG/1
0.2 TABLET ORAL 2 TIMES DAILY
Qty: 360 TABLET | Refills: 3 | Status: SHIPPED | OUTPATIENT
Start: 2019-02-11 | End: 2019-08-20 | Stop reason: SDUPTHER

## 2019-02-11 RX ORDER — METOPROLOL SUCCINATE 100 MG/1
100 TABLET, EXTENDED RELEASE ORAL DAILY
Qty: 90 TABLET | Refills: 1 | Status: SHIPPED | OUTPATIENT
Start: 2019-02-11 | End: 2019-08-20 | Stop reason: SDUPTHER

## 2019-02-11 RX ORDER — IPRATROPIUM BROMIDE 42 UG/1
2 SPRAY, METERED NASAL 3 TIMES DAILY
Qty: 30 ML | Refills: 0 | Status: SHIPPED | OUTPATIENT
Start: 2019-02-11 | End: 2019-02-18

## 2019-02-11 RX ORDER — LOSARTAN POTASSIUM AND HYDROCHLOROTHIAZIDE 25; 100 MG/1; MG/1
1 TABLET ORAL DAILY
Qty: 90 TABLET | Refills: 3 | Status: SHIPPED | OUTPATIENT
Start: 2019-02-11 | End: 2019-02-24 | Stop reason: ALTCHOICE

## 2019-02-11 RX ORDER — GABAPENTIN 100 MG/1
CAPSULE ORAL
Qty: 90 CAPSULE | Refills: 4 | Status: SHIPPED | OUTPATIENT
Start: 2019-02-11 | End: 2019-08-20 | Stop reason: SDUPTHER

## 2019-02-12 ENCOUNTER — TELEPHONE (OUTPATIENT)
Dept: FAMILY MEDICINE | Facility: CLINIC | Age: 64
End: 2019-02-12

## 2019-02-12 DIAGNOSIS — E61.1 IRON DEFICIENCY: Primary | ICD-10-CM

## 2019-02-12 NOTE — TELEPHONE ENCOUNTER
Patient notified of charted test result with recommendation to referral to Hematology. Patient said that she did received a call this morning to schedule appointment.

## 2019-02-12 NOTE — TELEPHONE ENCOUNTER
----- Message from Cipriano Carrera MD sent at 2/12/2019  7:44 AM CST -----  CBC, Hb normal though the iron is low - ferritin low, iron sat is low. Intolerant of PO iron. Plan at OV was referral to hematology to consider IV iron  Lipid good not on statin LDL < 70, nonHDL < 100. No indication to start.     Please call pt - her blood count was normal but her iron was still low - I will refer her to hematology.  Otherwise labs were normal. Urine was normal so far. I will mail copy of labs.

## 2019-02-20 ENCOUNTER — INITIAL CONSULT (OUTPATIENT)
Dept: HEMATOLOGY/ONCOLOGY | Facility: CLINIC | Age: 64
End: 2019-02-20
Payer: MEDICAID

## 2019-02-20 VITALS
SYSTOLIC BLOOD PRESSURE: 134 MMHG | DIASTOLIC BLOOD PRESSURE: 61 MMHG | HEART RATE: 65 BPM | OXYGEN SATURATION: 94 % | HEIGHT: 64 IN | TEMPERATURE: 97 F | WEIGHT: 198 LBS | BODY MASS INDEX: 33.8 KG/M2

## 2019-02-20 DIAGNOSIS — E61.1 IRON DEFICIENCY: Primary | ICD-10-CM

## 2019-02-20 PROCEDURE — 99213 OFFICE O/P EST LOW 20 MIN: CPT | Mod: PBBFAC | Performed by: INTERNAL MEDICINE

## 2019-02-20 PROCEDURE — 99205 OFFICE O/P NEW HI 60 MIN: CPT | Mod: S$PBB,,, | Performed by: INTERNAL MEDICINE

## 2019-02-20 PROCEDURE — 99205 PR OFFICE/OUTPT VISIT, NEW, LEVL V, 60-74 MIN: ICD-10-PCS | Mod: S$PBB,,, | Performed by: INTERNAL MEDICINE

## 2019-02-20 PROCEDURE — 99999 PR PBB SHADOW E&M-EST. PATIENT-LVL III: CPT | Mod: PBBFAC,,, | Performed by: INTERNAL MEDICINE

## 2019-02-20 PROCEDURE — 99999 PR PBB SHADOW E&M-EST. PATIENT-LVL III: ICD-10-PCS | Mod: PBBFAC,,, | Performed by: INTERNAL MEDICINE

## 2019-02-20 NOTE — LETTER
February 20, 2019      Cipriano Carrera MD  [address]  [place] [address]           [facility]  [address] [facility] [address]  [place] [address]  Phone: [phone]          Patient: Bell Lynn   MR Number: [id number]   YOB: 1955   Date of Visit: 2/20/2019       Dear Dr. Cipriano Carrera:    Thank you for referring Bell Lynn to me for evaluation. Attached you will find relevant portions of my assessment and plan of care.    If you have questions, please do not hesitate to call me. I look forward to following Bell Lynn along with you.    Sincerely,    Facundo Cortez MD    Enclosure  CC:  No Recipients    If you would like to receive this communication electronically, please contact [email or web address] or [phone] to request more information on [employer] Link access.    For providers and/or their staff who would like to refer a patient to [facility], please contact us through our one-stop-shop provider referral line, [facility], at [phone].    If you feel you have received this communication in error or would no longer like to receive these types of communications, please e-mail [email or web address]

## 2019-02-20 NOTE — PROGRESS NOTES
Chief Complaint :   Iron deficiency.    Hx of Present illness :  Patient is a 63 y.o. year old female who presents to the clinic today for   Oncology evaluation.      Allergies :    Review of patient's allergies indicates:   Allergen Reactions    Nsaids (non-steroidal anti-inflammatory drug)      Gi bleed    Penicillins Itching and Swelling       Occupation :  Retired     Transfusion :  About 5 years ago    Menstrual & obstetric Hx :  3; para 3.  Age of menarche:  11  Age of first pregnancy: 18  Lactation history: No  Age of menopause:  33  HRT: No    Present Meds :   Medication List with Changes/Refills   Current Medications    ALBUTEROL 90 MCG/ACTUATION INHALER    Inhale 2 puffs into the lungs every 4 (four) hours as needed for Wheezing or Shortness of Breath.    CETIRIZINE (ZYRTEC) 10 MG TABLET    Take 1 tablet (10 mg total) by mouth once daily.    CLONIDINE (CATAPRES) 0.1 MG TABLET    Take 2 tablets (0.2 mg total) by mouth 2 (two) times daily.    DOCUSATE SODIUM (COLACE) 100 MG CAPSULE    Take 1 capsule (100 mg total) by mouth 2 (two) times daily as needed for Constipation.    DULOXETINE (CYMBALTA) 60 MG CAPSULE    Take 1 capsule (60 mg total) by mouth once daily. Increased dose    FERROUS SULFATE 325 MG (65 MG IRON) TAB TABLET    Take 1 tablet (325 mg total) by mouth once daily.    FUROSEMIDE (LASIX) 20 MG TABLET    TAKE 1 TABLET BY MOUTH ONCE DAILY AS NEEDED FOR  LEG  SWELLING    GABAPENTIN (NEURONTIN) 100 MG CAPSULE    2 tablets nighttime 1 tablet in AM    LOSARTAN-HYDROCHLOROTHIAZIDE 100-25 MG (HYZAAR) 100-25 MG PER TABLET    Take 1 tablet by mouth once daily.    METOPROLOL SUCCINATE (TOPROL-XL) 100 MG 24 HR TABLET    Take 1 tablet (100 mg total) by mouth once daily.    NITROGLYCERIN (NITROSTAT) 0.4 MG SL TABLET    Place 1 tablet (0.4 mg total) under the tongue every 5 (five) minutes as needed for Chest pain.    OMEPRAZOLE (PRILOSEC) 40 MG CAPSULE    TAKE 1 CAPSULE BY MOUTH IN THE  MORNING WITH FOOD       Past Medical Hx :  Hypertension; depression; asthma; DJD. GERD.  No Hx of hepatitis or liver disease. No Hx of DVT or PE    Past Medical Hx :  Past Medical History:   Diagnosis Date    Asthma     Depression     Gastric ulcer with hemorrhage 7/2012    GERD (gastroesophageal reflux disease)     History of blood transfusion     Hypertension     Iron deficiency anemia 3/14/2013    Osteoarthritis of both knees        Travel Hx :   N/A    Immunization :  Immunization History   Administered Date(s) Administered    Tdap 06/16/2016       Family Hx :  Family History   Problem Relation Age of Onset    Heart disease Mother     Hypertension Mother     Heart disease Father     Hypertension Father     Breast cancer Sister     Breast cancer Sister        Social Hx :  Social History     Socioeconomic History    Marital status:      Spouse name: Not on file    Number of children: Not on file    Years of education: Not on file    Highest education level: Not on file   Social Needs    Financial resource strain: Not on file    Food insecurity - worry: Not on file    Food insecurity - inability: Not on file    Transportation needs - medical: Not on file    Transportation needs - non-medical: Not on file   Occupational History     Employer: [employer]   Tobacco Use    Smoking status: Never Smoker    Smokeless tobacco: Never Used   Substance and Sexual Activity    Alcohol use: Yes     Comment: ocaasional    Drug use: No    Sexual activity: Yes     Partners: Male     Birth control/protection: Surgical   Other Topics Concern    Not on file   Social History Narrative    Not on file       Surgery :  Right Breast Bx. Partial hysterectomy. Tubal ligation. Cyst removed from forehead. Colonoscopy 10/18    Symptoms :    Review of Systems   Constitutional: Positive for malaise/fatigue. Negative for chills, fever and weight loss.   HENT: Negative for congestion, hearing  loss, nosebleeds, sore throat and tinnitus.    Eyes: Negative for blurred vision, double vision and photophobia.        Wears reading glasses   Respiratory: Negative for cough, hemoptysis, sputum production and shortness of breath.    Cardiovascular: Positive for palpitations. Negative for chest pain, orthopnea, claudication and leg swelling.   Gastrointestinal: Negative for abdominal pain, blood in stool, constipation, diarrhea, heartburn, nausea and vomiting.   Genitourinary: Negative for dysuria, frequency, hematuria and urgency.   Musculoskeletal: Positive for back pain and joint pain. Negative for falls, myalgias and neck pain.        Hx of fibromyalgia   Skin: Negative for itching and rash.   Neurological: Positive for dizziness and headaches. Negative for tingling, tremors and sensory change.   Endo/Heme/Allergies: Negative for environmental allergies. Does not bruise/bleed easily.   Psychiatric/Behavioral: Positive for depression. The patient has insomnia. The patient is not nervous/anxious.        Physical Exam :   Physical Exam   Constitutional: She is oriented to person, place, and time and well-developed, well-nourished, and in no distress. Vital signs are normal. No distress.   HENT:   Head: Normocephalic and atraumatic.   Right Ear: External ear normal.   Left Ear: External ear normal.   Nose: Nose normal.   Mouth/Throat: Oropharynx is clear and moist. No oropharyngeal exudate.   Eyes: Conjunctivae, EOM and lids are normal. Pupils are equal, round, and reactive to light. Lids are everted and swept, no foreign bodies found. Right eye exhibits no discharge. Left eye exhibits no discharge. No scleral icterus.   Neck: Trachea normal, normal range of motion, full passive range of motion without pain and phonation normal. Neck supple. Normal carotid pulses, no hepatojugular reflux and no JVD present. No tracheal tenderness present. Carotid bruit is not present. No tracheal deviation present. No thyroid mass  and no thyromegaly present.   Cardiovascular: Normal rate, regular rhythm, normal heart sounds and intact distal pulses. PMI is not displaced. Exam reveals no gallop and no friction rub.   No murmur heard.  Pulmonary/Chest: Effort normal and breath sounds normal. No stridor. No apnea. No respiratory distress. She has no wheezes. She has no rales. She exhibits no tenderness.   Abdominal: Soft. Normal appearance, normal aorta and bowel sounds are normal. She exhibits no distension, no ascites and no mass. There is no hepatosplenomegaly. There is no tenderness. There is no rebound, no guarding and no CVA tenderness. No hernia.   Genitourinary:   Genitourinary Comments: Not Examined   Musculoskeletal: Normal range of motion. She exhibits no edema, tenderness or deformity.   Lymphadenopathy:        Head (right side): No submental, no submandibular, no tonsillar, no preauricular, no posterior auricular and no occipital adenopathy present.        Head (left side): No submental, no submandibular, no tonsillar, no preauricular, no posterior auricular and no occipital adenopathy present.     She has no cervical adenopathy.     She has no axillary adenopathy.        Right: No inguinal, no supraclavicular and no epitrochlear adenopathy present.        Left: No inguinal, no supraclavicular and no epitrochlear adenopathy present.   Neurological: She is alert and oriented to person, place, and time. She has normal reflexes. She displays normal reflexes. No cranial nerve deficit. She exhibits normal muscle tone. Gait normal. Coordination normal. GCS score is 15.   Skin: Skin is warm, dry and intact. No rash noted. She is not diaphoretic. No cyanosis or erythema. No pallor. Nails show no clubbing.   Psychiatric: Mood, memory, affect and judgment normal.         Labs & Imaging :  02/11/19 : Serum ferritin 8; Serum iron 32; TIBC 374; Sat 9 %.  NFBS 86; Cr. 0.7 . Ca 9.5 bili 0.3 ALP 55 ;  Hgb 12.1; hct 39.5; MCV 90 Platelets 278,000  ANC 5,900        Dx : Iron deficiency      Assessment & Plan: reviewed with patient. Hgb/Hct normal.  Cannot tolerate Oral iron.  No indication for iron infusion at this time. Monitor labs. Patient understands and verbalised

## 2019-02-24 DIAGNOSIS — I10 ESSENTIAL HYPERTENSION: Primary | Chronic | ICD-10-CM

## 2019-02-24 RX ORDER — HYDROCHLOROTHIAZIDE 25 MG/1
25 TABLET ORAL DAILY
Qty: 90 TABLET | Refills: 3 | Status: SHIPPED | OUTPATIENT
Start: 2019-02-24 | End: 2019-08-20 | Stop reason: SDUPTHER

## 2019-02-24 RX ORDER — LOSARTAN POTASSIUM 100 MG/1
100 TABLET ORAL DAILY
Qty: 90 TABLET | Refills: 3 | Status: SHIPPED | OUTPATIENT
Start: 2019-02-24 | End: 2019-08-20 | Stop reason: SDUPTHER

## 2019-03-20 ENCOUNTER — OFFICE VISIT (OUTPATIENT)
Dept: CARDIOLOGY | Facility: CLINIC | Age: 64
End: 2019-03-20
Payer: MEDICAID

## 2019-03-20 VITALS
DIASTOLIC BLOOD PRESSURE: 76 MMHG | OXYGEN SATURATION: 99 % | BODY MASS INDEX: 35.33 KG/M2 | HEART RATE: 65 BPM | RESPIRATION RATE: 16 BRPM | WEIGHT: 202.63 LBS | SYSTOLIC BLOOD PRESSURE: 122 MMHG

## 2019-03-20 DIAGNOSIS — R06.02 SOB (SHORTNESS OF BREATH): ICD-10-CM

## 2019-03-20 DIAGNOSIS — F33.0 MILD RECURRENT MAJOR DEPRESSION: ICD-10-CM

## 2019-03-20 DIAGNOSIS — R07.9 CHEST PAIN, UNSPECIFIED TYPE: Primary | ICD-10-CM

## 2019-03-20 DIAGNOSIS — E66.01 SEVERE OBESITY (BMI 35.0-35.9 WITH COMORBIDITY): ICD-10-CM

## 2019-03-20 DIAGNOSIS — J45.20 ASTHMA, CHRONIC, MILD INTERMITTENT, UNCOMPLICATED: ICD-10-CM

## 2019-03-20 DIAGNOSIS — I10 ESSENTIAL HYPERTENSION: ICD-10-CM

## 2019-03-20 PROCEDURE — 93005 ELECTROCARDIOGRAM TRACING: CPT | Mod: PBBFAC,PO | Performed by: INTERNAL MEDICINE

## 2019-03-20 PROCEDURE — 93010 EKG 12-LEAD: ICD-10-PCS | Mod: S$PBB,,, | Performed by: INTERNAL MEDICINE

## 2019-03-20 PROCEDURE — 99214 OFFICE O/P EST MOD 30 MIN: CPT | Mod: S$PBB,,, | Performed by: INTERNAL MEDICINE

## 2019-03-20 PROCEDURE — 99213 OFFICE O/P EST LOW 20 MIN: CPT | Mod: PBBFAC,PO | Performed by: INTERNAL MEDICINE

## 2019-03-20 PROCEDURE — 93010 ELECTROCARDIOGRAM REPORT: CPT | Mod: S$PBB,,, | Performed by: INTERNAL MEDICINE

## 2019-03-20 PROCEDURE — 99214 PR OFFICE/OUTPT VISIT, EST, LEVL IV, 30-39 MIN: ICD-10-PCS | Mod: S$PBB,,, | Performed by: INTERNAL MEDICINE

## 2019-03-20 PROCEDURE — 99999 PR PBB SHADOW E&M-EST. PATIENT-LVL III: ICD-10-PCS | Mod: PBBFAC,,, | Performed by: INTERNAL MEDICINE

## 2019-03-20 PROCEDURE — 99999 PR PBB SHADOW E&M-EST. PATIENT-LVL III: CPT | Mod: PBBFAC,,, | Performed by: INTERNAL MEDICINE

## 2019-03-20 NOTE — PROGRESS NOTES
Subjective:    Patient ID:  Bell Lynn is a 63 y.o. female who presents for evaluation of Shortness of Breath and Gastroesophageal Reflux (pains go away after med s)      HPI   previous history:  Here for follow-up of chest pain.  She underwent diagnostic testing as below.  This was essentially within normal limits also in comparison to 2015.  She denies any continued complaints.  She's not expressing chest pain or palpitations.  She does get shortness of breath on heavier exertion but relieved with rest.  She's had no PND, orthopnea or lower edema.  She denies any dizziness, presyncope or syncope.    Today:  Her follow-up chest pain shortness of breath.  Again she has had progressive symptoms where she can't even walk down the hallway without exertional problems better relieved with rest.  She denies any sustained tachycardia or palpitations.  She has experienced no PND, orthopnea or lower extremity edema.  She has skin significant secondhand smoke exposure with her  smoking in the house.  She smells of tobacco on her clothing today.  She denies any dizziness, presyncope or syncope.  She has had multiple family members with deaths from MI in the past year and mainly wants a checkup.    Review of Systems   Constitution: Negative.   HENT: Negative.    Eyes: Negative.    Cardiovascular: Positive for dyspnea on exertion. Negative for chest pain, irregular heartbeat, leg swelling, near-syncope, orthopnea, palpitations, paroxysmal nocturnal dyspnea and syncope.   Respiratory: Positive for shortness of breath.    Skin: Negative.    Musculoskeletal: Negative.    Gastrointestinal: Negative for abdominal pain, constipation and diarrhea.   Genitourinary: Negative for dysuria.   Neurological: Negative.    Psychiatric/Behavioral: Negative.         Objective:    Physical Exam   Constitutional: She is oriented to person, place, and time. She appears well-developed and well-nourished.   HENT:   Head:  Normocephalic and atraumatic.   Eyes: Conjunctivae and EOM are normal. Pupils are equal, round, and reactive to light.   Neck: Normal range of motion. Neck supple. No thyromegaly present.   Cardiovascular: Normal rate and regular rhythm.   No murmur heard.  Pulmonary/Chest: Effort normal and breath sounds normal. No respiratory distress.   Abdominal: Soft. Bowel sounds are normal.   Musculoskeletal: She exhibits no edema.   Neurological: She is alert and oriented to person, place, and time.   Skin: Skin is warm and dry.   Psychiatric: She has a normal mood and affect. Her behavior is normal.           Echo:  6-17  CONCLUSIONS     1 - Normal left ventricular systolic function (EF 55-60%).     2 - Mild left atrial enlargement.     3 - Trivial mitral regurgitation.     4 - Trivial tricuspid regurgitation.     NST:  Impression: NORMAL MYOCARDIAL PERFUSION  1. The perfusion scan is free of evidence for myocardial ischemia or injury.   2. There is a mild intensity fixed defect in the anterior wall of the left ventricle, secondary to breast attenuation.   3. Resting wall motion is physiologic.   4. Resting LV function is normal.   5. The ventricular volumes are normal at rest and stress.   6. The extracardiac distribution of radioactivity is normal.   7. When compared to the previous study from 07/21/2015, no change    Assessment:       1. Chest pain, unspecified type    2. Essential hypertension    3. Asthma, chronic, mild intermittent, uncomplicated    4. Severe obesity (BMI 35.0-35.9 with comorbidity)    5. Mild recurrent major depression         Plan:       -plan for repeat baseline testing with exertional symptoms and risk factors  -Continue risk factor modification i.e. diet and exercise    Return to clinic in one month with testing ASAP

## 2019-04-24 ENCOUNTER — HOSPITAL ENCOUNTER (OUTPATIENT)
Dept: RADIOLOGY | Facility: HOSPITAL | Age: 64
Discharge: HOME | End: 2019-04-24
Attending: INTERNAL MEDICINE
Payer: MEDICAID

## 2019-04-24 ENCOUNTER — HOSPITAL ENCOUNTER (OUTPATIENT)
Dept: CARDIOLOGY | Facility: HOSPITAL | Age: 64
Discharge: HOME | End: 2019-04-24
Attending: INTERNAL MEDICINE
Payer: MEDICAID

## 2019-04-24 DIAGNOSIS — R07.9 CHEST PAIN, UNSPECIFIED TYPE: ICD-10-CM

## 2019-04-24 LAB
AORTIC ROOT ANNULUS: 2.83 CM
AORTIC VALVE CUSP SEPERATION: 2.18 CM
ASCENDING AORTA: 2.25 CM
AV PEAK GRADIENT: 7.08 MMHG
AV VELOCITY RATIO: 0.7
CV ECHO LV RWT: 0.37 CM
CV STRESS BASE HR: 65 BPM
DIASTOLIC BLOOD PRESSURE: 100 MMHG
DOP CALC AO PEAK VEL: 1.33 M/S
DOP CALC LVOT AREA: 3.8 CM2
DOP CALC LVOT DIAMETER: 2.2 CM
DOP CALC LVOT PEAK VEL: 0.93 M/S
DOP CALC LVOT STROKE VOLUME: 87.58 CM3
DOP CALCLVOT PEAK VEL VTI: 23.05 CM
E WAVE DECELERATION TIME: 309.68 MSEC
E/A RATIO: 0.97
E/E' RATIO: 16.55
ECHO LV POSTERIOR WALL: 0.88 CM (ref 0.6–1.1)
FRACTIONAL SHORTENING: 27 % (ref 28–44)
INTERVENTRICULAR SEPTUM: 1 CM (ref 0.6–1.1)
IVRT: 0.12 MSEC
LA MAJOR: 4.12 CM
LA MINOR: 4.61 CM
LA WIDTH: 3.54 CM
LEFT ATRIUM SIZE: 3.55 CM
LEFT ATRIUM VOLUME: 46.48 CM3
LEFT INTERNAL DIMENSION IN SYSTOLE: 3.51 CM (ref 2.1–4)
LEFT VENTRICLE DIASTOLIC VOLUME: 106.9 ML
LEFT VENTRICLE SYSTOLIC VOLUME: 51.16 ML
LEFT VENTRICULAR INTERNAL DIMENSION IN DIASTOLE: 4.79 CM (ref 3.5–6)
LEFT VENTRICULAR MASS: 156.05 G
LV LATERAL E/E' RATIO: 15.17
LV SEPTAL E/E' RATIO: 18.2
MV PEAK A VEL: 0.94 M/S
MV PEAK E VEL: 0.91 M/S
NUC REST EJECTION FRACTION: 61
OHS CV CPX 85 PERCENT MAX PREDICTED HEART RATE MALE: 129
OHS CV CPX MAX PREDICTED HEART RATE: 151
OHS CV CPX PATIENT IS FEMALE: 1
OHS CV CPX PATIENT IS MALE: 0
OHS CV CPX PEAK DIASTOLIC BLOOD PRESSURE: 89 MMHG
OHS CV CPX PEAK HEAR RATE: 92 BPM
OHS CV CPX PEAK RATE PRESSURE PRODUCT: NORMAL
OHS CV CPX PEAK SYSTOLIC BLOOD PRESSURE: 126 MMHG
OHS CV CPX PERCENT MAX PREDICTED HEART RATE ACHIEVED: 61
OHS CV CPX RATE PRESSURE PRODUCT PRESENTING: 9035
PISA TR MAX VEL: 1.99 M/S
PULM VEIN S/D RATIO: 1.39
PV PEAK D VEL: 0.33 M/S
PV PEAK S VEL: 0.46 M/S
PV PEAK VELOCITY: 0.79 CM/S
RA MAJOR: 4 CM
RA PRESSURE: 3 MMHG
RA WIDTH: 3.48 CM
RIGHT VENTRICULAR END-DIASTOLIC DIMENSION: 3.16 CM
SINUS: 2.83 CM
STJ: 2.17 CM
SYSTOLIC BLOOD PRESSURE: 139 MMHG
TDI LATERAL: 0.06
TDI SEPTAL: 0.05
TDI: 0.06
TR MAX PG: 15.84 MMHG
TRICUSPID ANNULAR PLANE SYSTOLIC EXCURSION: 2.29 CM
TV REST PULMONARY ARTERY PRESSURE: 19 MMHG

## 2019-04-24 PROCEDURE — 93306 TTE W/DOPPLER COMPLETE: CPT | Mod: 26,,, | Performed by: INTERNAL MEDICINE

## 2019-04-24 PROCEDURE — 78452 HT MUSCLE IMAGE SPECT MULT: CPT | Mod: 26,,, | Performed by: INTERNAL MEDICINE

## 2019-04-24 PROCEDURE — 93018 CV STRESS TEST I&R ONLY: CPT | Mod: ,,, | Performed by: INTERNAL MEDICINE

## 2019-04-24 PROCEDURE — 93016 STRESS TEST WITH MYOCARDIAL PERFUSION (CUPID ONLY): ICD-10-PCS | Mod: ,,, | Performed by: INTERNAL MEDICINE

## 2019-04-24 PROCEDURE — 78452 STRESS TEST WITH MYOCARDIAL PERFUSION (CUPID ONLY): ICD-10-PCS | Mod: 26,,, | Performed by: INTERNAL MEDICINE

## 2019-04-24 PROCEDURE — 93306 TRANSTHORACIC ECHO (TTE) COMPLETE (CUPID ONLY): ICD-10-PCS | Mod: 26,,, | Performed by: INTERNAL MEDICINE

## 2019-04-24 PROCEDURE — A9502 TC99M TETROFOSMIN: HCPCS

## 2019-04-24 PROCEDURE — 63600175 PHARM REV CODE 636 W HCPCS: Performed by: INTERNAL MEDICINE

## 2019-04-24 PROCEDURE — 93016 CV STRESS TEST SUPVJ ONLY: CPT | Mod: ,,, | Performed by: INTERNAL MEDICINE

## 2019-04-24 PROCEDURE — 93306 TTE W/DOPPLER COMPLETE: CPT

## 2019-04-24 PROCEDURE — 93018 STRESS TEST WITH MYOCARDIAL PERFUSION (CUPID ONLY): ICD-10-PCS | Mod: ,,, | Performed by: INTERNAL MEDICINE

## 2019-04-24 PROCEDURE — 93017 CV STRESS TEST TRACING ONLY: CPT

## 2019-04-24 RX ORDER — REGADENOSON 0.08 MG/ML
0.4 INJECTION, SOLUTION INTRAVENOUS ONCE
Status: COMPLETED | OUTPATIENT
Start: 2019-04-24 | End: 2019-04-24

## 2019-04-24 RX ADMIN — REGADENOSON 0.4 MG: 0.08 INJECTION, SOLUTION INTRAVENOUS at 09:04

## 2019-04-29 ENCOUNTER — OFFICE VISIT (OUTPATIENT)
Dept: CARDIOLOGY | Facility: CLINIC | Age: 64
End: 2019-04-29
Payer: MEDICAID

## 2019-04-29 VITALS
BODY MASS INDEX: 34.98 KG/M2 | RESPIRATION RATE: 16 BRPM | WEIGHT: 200.63 LBS | DIASTOLIC BLOOD PRESSURE: 84 MMHG | OXYGEN SATURATION: 95 % | SYSTOLIC BLOOD PRESSURE: 136 MMHG | HEART RATE: 77 BPM

## 2019-04-29 DIAGNOSIS — E66.01 SEVERE OBESITY (BMI 35.0-35.9 WITH COMORBIDITY): ICD-10-CM

## 2019-04-29 DIAGNOSIS — J45.20 ASTHMA, CHRONIC, MILD INTERMITTENT, UNCOMPLICATED: ICD-10-CM

## 2019-04-29 DIAGNOSIS — F33.0 MILD RECURRENT MAJOR DEPRESSION: ICD-10-CM

## 2019-04-29 DIAGNOSIS — R07.9 CHEST PAIN, UNSPECIFIED TYPE: Primary | ICD-10-CM

## 2019-04-29 DIAGNOSIS — I10 ESSENTIAL HYPERTENSION: ICD-10-CM

## 2019-04-29 PROCEDURE — 99214 OFFICE O/P EST MOD 30 MIN: CPT | Mod: S$PBB,,, | Performed by: INTERNAL MEDICINE

## 2019-04-29 PROCEDURE — 99999 PR PBB SHADOW E&M-EST. PATIENT-LVL III: CPT | Mod: PBBFAC,,, | Performed by: INTERNAL MEDICINE

## 2019-04-29 PROCEDURE — 99999 PR PBB SHADOW E&M-EST. PATIENT-LVL III: ICD-10-PCS | Mod: PBBFAC,,, | Performed by: INTERNAL MEDICINE

## 2019-04-29 PROCEDURE — 99214 PR OFFICE/OUTPT VISIT, EST, LEVL IV, 30-39 MIN: ICD-10-PCS | Mod: S$PBB,,, | Performed by: INTERNAL MEDICINE

## 2019-04-29 PROCEDURE — 99213 OFFICE O/P EST LOW 20 MIN: CPT | Mod: PBBFAC,PO | Performed by: INTERNAL MEDICINE

## 2019-04-29 NOTE — PROGRESS NOTES
Subjective:    Patient ID:  Bell Lynn is a 63 y.o. female who presents for evaluation of Peripheral Arterial Disease      HPI     previous history:  Her follow-up chest pain shortness of breath.  Again she has had progressive symptoms where she can't even walk down the hallway without exertional problems better relieved with rest.  She denies any sustained tachycardia or palpitations.  She has experienced no PND, orthopnea or lower extremity edema.  She has skin significant secondhand smoke exposure with her  smoking in the house.  She smells of tobacco on her clothing today.  She denies any dizziness, presyncope or syncope.  She has had multiple family members with deaths from MI in the past year and mainly wants a checkup.    Today:  Here for follow-up chest pain and shortness of breath.      Review of Systems   Constitution: Negative.   HENT: Negative.    Eyes: Negative.    Cardiovascular: Positive for dyspnea on exertion. Negative for chest pain, irregular heartbeat, leg swelling, near-syncope, orthopnea, palpitations, paroxysmal nocturnal dyspnea and syncope.   Respiratory: Positive for shortness of breath.    Skin: Negative.    Musculoskeletal: Negative.    Gastrointestinal: Negative for abdominal pain, constipation and diarrhea.   Genitourinary: Negative for dysuria.   Neurological: Negative.    Psychiatric/Behavioral: Negative.         Objective:    Physical Exam   Constitutional: She is oriented to person, place, and time. She appears well-developed and well-nourished.   HENT:   Head: Normocephalic and atraumatic.   Eyes: Pupils are equal, round, and reactive to light. Conjunctivae and EOM are normal.   Neck: Normal range of motion. Neck supple. No thyromegaly present.   Cardiovascular: Normal rate and regular rhythm.   No murmur heard.  Pulmonary/Chest: Effort normal and breath sounds normal. No respiratory distress.   Abdominal: Soft. Bowel sounds are normal.   Musculoskeletal: She  exhibits no edema.   Neurological: She is alert and oriented to person, place, and time.   Skin: Skin is warm and dry.   Psychiatric: She has a normal mood and affect. Her behavior is normal.           Echo:  4-19  · . Normal left ventricular systolic function. The estimated ejection fraction is 60%  · Indeterminate left ventricular diastolic function.  Normal right ventricular systolic function.    NST:  · The perfusion scan is free of evidence from myocardial ischemia or injury.  · An ejection fraction of 61 % at rest  · The EKG portion of this study is negative for myocardial ischemia.         Assessment:       1. Chest pain, unspecified type    2. Essential hypertension    3. Asthma, chronic, mild intermittent, uncomplicated    4. Severe obesity (BMI 35.0-35.9 with comorbidity)    5. Mild recurrent major depression         Plan:       -mainly reassurance in light of testing  -continue fall symptoms  -Continue risk factor modification i.e. diet and exercise    Return to clinic in 6 months

## 2019-06-06 DIAGNOSIS — K25.7 CHRONIC GASTRIC ULCER WITHOUT HEMORRHAGE AND WITHOUT PERFORATION: ICD-10-CM

## 2019-06-06 RX ORDER — ESOMEPRAZOLE MAGNESIUM 40 MG/1
CAPSULE, DELAYED RELEASE ORAL
Qty: 90 CAPSULE | Refills: 3 | Status: SHIPPED | OUTPATIENT
Start: 2019-06-06 | End: 2019-09-09 | Stop reason: CLARIF

## 2019-06-06 RX ORDER — OMEPRAZOLE 40 MG/1
CAPSULE, DELAYED RELEASE ORAL
Qty: 180 CAPSULE | Refills: 1 | OUTPATIENT
Start: 2019-06-06

## 2019-08-20 DIAGNOSIS — I10 ESSENTIAL HYPERTENSION: Chronic | ICD-10-CM

## 2019-08-20 DIAGNOSIS — G89.29 OTHER CHRONIC PAIN: ICD-10-CM

## 2019-08-20 DIAGNOSIS — R60.0 PERIPHERAL EDEMA: ICD-10-CM

## 2019-08-20 DIAGNOSIS — K25.7 CHRONIC GASTRIC ULCER WITHOUT HEMORRHAGE AND WITHOUT PERFORATION: ICD-10-CM

## 2019-08-20 DIAGNOSIS — M17.12 OSTEOARTHRITIS OF LEFT KNEE, UNSPECIFIED OSTEOARTHRITIS TYPE: ICD-10-CM

## 2019-08-20 DIAGNOSIS — F33.0 MILD RECURRENT MAJOR DEPRESSION: Chronic | ICD-10-CM

## 2019-08-20 DIAGNOSIS — M79.7 FIBROMYALGIA: ICD-10-CM

## 2019-08-20 RX ORDER — GABAPENTIN 100 MG/1
CAPSULE ORAL
Qty: 90 CAPSULE | Refills: 0 | Status: SHIPPED | OUTPATIENT
Start: 2019-08-20 | End: 2019-09-09 | Stop reason: SDUPTHER

## 2019-08-20 RX ORDER — HYDROCHLOROTHIAZIDE 25 MG/1
25 TABLET ORAL DAILY
Qty: 90 TABLET | Refills: 0 | Status: SHIPPED | OUTPATIENT
Start: 2019-08-20 | End: 2019-09-09 | Stop reason: SDUPTHER

## 2019-08-20 RX ORDER — FUROSEMIDE 20 MG/1
TABLET ORAL
Qty: 30 TABLET | Refills: 0 | Status: SHIPPED | OUTPATIENT
Start: 2019-08-20 | End: 2019-09-09 | Stop reason: SDUPTHER

## 2019-08-20 RX ORDER — DULOXETIN HYDROCHLORIDE 60 MG/1
60 CAPSULE, DELAYED RELEASE ORAL DAILY
Qty: 30 CAPSULE | Refills: 0 | Status: SHIPPED | OUTPATIENT
Start: 2019-08-20 | End: 2019-09-09 | Stop reason: SDUPTHER

## 2019-08-20 RX ORDER — LOSARTAN POTASSIUM 100 MG/1
100 TABLET ORAL DAILY
Qty: 90 TABLET | Refills: 0 | Status: SHIPPED | OUTPATIENT
Start: 2019-08-20 | End: 2019-09-09 | Stop reason: SDUPTHER

## 2019-08-20 RX ORDER — METOPROLOL SUCCINATE 100 MG/1
100 TABLET, EXTENDED RELEASE ORAL DAILY
Qty: 90 TABLET | Refills: 0 | Status: SHIPPED | OUTPATIENT
Start: 2019-08-20 | End: 2019-09-09 | Stop reason: SDUPTHER

## 2019-08-20 RX ORDER — CLONIDINE HYDROCHLORIDE 0.1 MG/1
0.2 TABLET ORAL 2 TIMES DAILY
Qty: 360 TABLET | Refills: 0 | Status: SHIPPED | OUTPATIENT
Start: 2019-08-20 | End: 2019-09-09 | Stop reason: DRUGHIGH

## 2019-08-20 RX ORDER — OMEPRAZOLE 40 MG/1
CAPSULE, DELAYED RELEASE ORAL
Qty: 90 CAPSULE | Refills: 0 | Status: SHIPPED | OUTPATIENT
Start: 2019-08-20 | End: 2019-09-09 | Stop reason: SDUPTHER

## 2019-08-20 NOTE — TELEPHONE ENCOUNTER
----- Message from Amanda Coles sent at 8/20/2019  3:39 PM CDT -----  Contact: Patient   Type: RX Refill Request    Who Called: Patient     Refill or New Rx:Refill     RX Name and Strength: Pt states that she needs all her medications (pt not sure the name)    How is the patient currently taking it? (ex. 1XDay):    Is this a 30 day or 90 day RX:    Preferred Pharmacy with phone number: [facility] [place] ([place], [address] [phone] (Phone)  [phone] (Fax)      Local or Mail Order: Local     Ordering Provider: Dr. Carrera    Would the patient rather a call back or a response via [facility]? Call back     Best Call Back Number: [phone]    Additional Information:

## 2019-09-06 ENCOUNTER — TELEPHONE (OUTPATIENT)
Dept: FAMILY MEDICINE | Facility: CLINIC | Age: 64
End: 2019-09-06

## 2019-09-06 NOTE — TELEPHONE ENCOUNTER
----- Message from Jazmín Chavira sent at 9/6/2019  8:55 AM CDT -----  Contact: Patient ph [phone]  Type:  Sooner Appointment Request    Patient is requesting a sooner appointment.  Patient declined first available appointment listed as well as another facility and provider .  Patient will not accept being placed on the waitlist and is requesting a message be sent to doctor.    Name of Caller: Patient    When is the first available appointment? 10-14-19    Symptoms: medication refill & headaches    Would the patient rather a call back or a response via My [facility]? Call back    Best Call Back Number: [phone]
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
## 2019-09-09 ENCOUNTER — OFFICE VISIT (OUTPATIENT)
Dept: FAMILY MEDICINE | Facility: CLINIC | Age: 64
End: 2019-09-09
Payer: MEDICAID

## 2019-09-09 ENCOUNTER — LAB VISIT (OUTPATIENT)
Dept: LAB | Facility: HOSPITAL | Age: 64
End: 2019-09-09
Attending: INTERNAL MEDICINE
Payer: MEDICAID

## 2019-09-09 VITALS
TEMPERATURE: 97 F | HEART RATE: 77 BPM | WEIGHT: 206 LBS | SYSTOLIC BLOOD PRESSURE: 126 MMHG | DIASTOLIC BLOOD PRESSURE: 88 MMHG | BODY MASS INDEX: 35.17 KG/M2 | HEIGHT: 64 IN | OXYGEN SATURATION: 97 %

## 2019-09-09 DIAGNOSIS — M79.7 FIBROMYALGIA: ICD-10-CM

## 2019-09-09 DIAGNOSIS — R51.9 INTRACTABLE HEADACHE, UNSPECIFIED CHRONICITY PATTERN, UNSPECIFIED HEADACHE TYPE: Primary | ICD-10-CM

## 2019-09-09 DIAGNOSIS — R20.0 NUMBNESS AND TINGLING OF RIGHT FACE: ICD-10-CM

## 2019-09-09 DIAGNOSIS — R60.0 PERIPHERAL EDEMA: ICD-10-CM

## 2019-09-09 DIAGNOSIS — F33.0 MILD RECURRENT MAJOR DEPRESSION: Chronic | ICD-10-CM

## 2019-09-09 DIAGNOSIS — J30.9 ALLERGIC RHINITIS, UNSPECIFIED SEASONALITY, UNSPECIFIED TRIGGER: ICD-10-CM

## 2019-09-09 DIAGNOSIS — E89.3 STATUS POST TRANSSPHENOIDAL PITUITARY RESECTION: ICD-10-CM

## 2019-09-09 DIAGNOSIS — I10 ESSENTIAL HYPERTENSION: Chronic | ICD-10-CM

## 2019-09-09 DIAGNOSIS — K25.7 CHRONIC GASTRIC ULCER WITHOUT HEMORRHAGE AND WITHOUT PERFORATION: ICD-10-CM

## 2019-09-09 DIAGNOSIS — Z23 NEEDS FLU SHOT: ICD-10-CM

## 2019-09-09 DIAGNOSIS — G89.29 OTHER CHRONIC PAIN: ICD-10-CM

## 2019-09-09 DIAGNOSIS — R51.9 INTRACTABLE HEADACHE, UNSPECIFIED CHRONICITY PATTERN, UNSPECIFIED HEADACHE TYPE: ICD-10-CM

## 2019-09-09 DIAGNOSIS — R20.2 NUMBNESS AND TINGLING OF RIGHT FACE: ICD-10-CM

## 2019-09-09 DIAGNOSIS — M17.12 OSTEOARTHRITIS OF LEFT KNEE, UNSPECIFIED OSTEOARTHRITIS TYPE: ICD-10-CM

## 2019-09-09 LAB
ALBUMIN SERPL BCP-MCNC: 3.7 G/DL (ref 3.5–5.2)
ALP SERPL-CCNC: 94 U/L (ref 55–135)
ALT SERPL W/O P-5'-P-CCNC: 18 U/L (ref 10–44)
ANION GAP SERPL CALC-SCNC: 12 MMOL/L (ref 8–16)
AST SERPL-CCNC: 26 U/L (ref 10–40)
BASOPHILS # BLD AUTO: 0.07 K/UL (ref 0–0.2)
BASOPHILS NFR BLD: 0.6 % (ref 0–1.9)
BILIRUB SERPL-MCNC: 0.2 MG/DL (ref 0.1–1)
BUN SERPL-MCNC: 13 MG/DL (ref 8–23)
CALCIUM SERPL-MCNC: 9.7 MG/DL (ref 8.7–10.5)
CHLORIDE SERPL-SCNC: 102 MMOL/L (ref 95–110)
CO2 SERPL-SCNC: 26 MMOL/L (ref 23–29)
CREAT SERPL-MCNC: 0.8 MG/DL (ref 0.5–1.4)
DIFFERENTIAL METHOD: ABNORMAL
EOSINOPHIL # BLD AUTO: 0.3 K/UL (ref 0–0.5)
EOSINOPHIL NFR BLD: 2.8 % (ref 0–8)
ERYTHROCYTE [DISTWIDTH] IN BLOOD BY AUTOMATED COUNT: 15.7 % (ref 11.5–14.5)
EST. GFR  (AFRICAN AMERICAN): >60 ML/MIN/1.73 M^2
EST. GFR  (NON AFRICAN AMERICAN): >60 ML/MIN/1.73 M^2
GLUCOSE SERPL-MCNC: 94 MG/DL (ref 70–110)
HCT VFR BLD AUTO: 41.2 % (ref 37–48.5)
HGB BLD-MCNC: 12.6 G/DL (ref 12–16)
IMM GRANULOCYTES # BLD AUTO: 0.03 K/UL (ref 0–0.04)
IMM GRANULOCYTES NFR BLD AUTO: 0.3 % (ref 0–0.5)
LYMPHOCYTES # BLD AUTO: 4 K/UL (ref 1–4.8)
LYMPHOCYTES NFR BLD: 35.9 % (ref 18–48)
MCH RBC QN AUTO: 27.9 PG (ref 27–31)
MCHC RBC AUTO-ENTMCNC: 30.6 G/DL (ref 32–36)
MCV RBC AUTO: 91 FL (ref 82–98)
MONOCYTES # BLD AUTO: 0.9 K/UL (ref 0.3–1)
MONOCYTES NFR BLD: 7.9 % (ref 4–15)
NEUTROPHILS # BLD AUTO: 5.8 K/UL (ref 1.8–7.7)
NEUTROPHILS NFR BLD: 52.5 % (ref 38–73)
NRBC BLD-RTO: 0 /100 WBC
PLATELET # BLD AUTO: 307 K/UL (ref 150–350)
PMV BLD AUTO: 13 FL (ref 9.2–12.9)
POTASSIUM SERPL-SCNC: 3.3 MMOL/L (ref 3.5–5.1)
PROT SERPL-MCNC: 7.9 G/DL (ref 6–8.4)
RBC # BLD AUTO: 4.52 M/UL (ref 4–5.4)
SODIUM SERPL-SCNC: 140 MMOL/L (ref 136–145)
WBC # BLD AUTO: 11.08 K/UL (ref 3.9–12.7)

## 2019-09-09 PROCEDURE — 36415 COLL VENOUS BLD VENIPUNCTURE: CPT | Mod: PO

## 2019-09-09 PROCEDURE — 99214 OFFICE O/P EST MOD 30 MIN: CPT | Mod: S$PBB,,, | Performed by: INTERNAL MEDICINE

## 2019-09-09 PROCEDURE — 85025 COMPLETE CBC W/AUTO DIFF WBC: CPT

## 2019-09-09 PROCEDURE — 99999 PR PBB SHADOW E&M-EST. PATIENT-LVL IV: ICD-10-PCS | Mod: PBBFAC,,, | Performed by: INTERNAL MEDICINE

## 2019-09-09 PROCEDURE — 99999 PR PBB SHADOW E&M-EST. PATIENT-LVL IV: CPT | Mod: PBBFAC,,, | Performed by: INTERNAL MEDICINE

## 2019-09-09 PROCEDURE — 99214 OFFICE O/P EST MOD 30 MIN: CPT | Mod: PBBFAC,PO | Performed by: INTERNAL MEDICINE

## 2019-09-09 PROCEDURE — 80053 COMPREHEN METABOLIC PANEL: CPT

## 2019-09-09 PROCEDURE — 90686 IIV4 VACC NO PRSV 0.5 ML IM: CPT | Mod: PBBFAC,PO

## 2019-09-09 PROCEDURE — 99214 PR OFFICE/OUTPT VISIT, EST, LEVL IV, 30-39 MIN: ICD-10-PCS | Mod: S$PBB,,, | Performed by: INTERNAL MEDICINE

## 2019-09-09 RX ORDER — CLONIDINE HYDROCHLORIDE 0.2 MG/1
0.2 TABLET ORAL 2 TIMES DAILY
Qty: 180 TABLET | Refills: 3 | Status: SHIPPED | OUTPATIENT
Start: 2019-09-09 | End: 2020-08-28 | Stop reason: ALTCHOICE

## 2019-09-09 RX ORDER — FUROSEMIDE 20 MG/1
TABLET ORAL
Qty: 90 TABLET | Refills: 3 | Status: SHIPPED | OUTPATIENT
Start: 2019-09-09 | End: 2020-08-28 | Stop reason: SDUPTHER

## 2019-09-09 RX ORDER — LOSARTAN POTASSIUM 100 MG/1
100 TABLET ORAL DAILY
Qty: 90 TABLET | Refills: 3 | Status: SHIPPED | OUTPATIENT
Start: 2019-09-09 | End: 2020-08-28 | Stop reason: SDUPTHER

## 2019-09-09 RX ORDER — GABAPENTIN 100 MG/1
CAPSULE ORAL
Qty: 270 CAPSULE | Refills: 3 | Status: SHIPPED | OUTPATIENT
Start: 2019-09-09 | End: 2020-08-28 | Stop reason: SDUPTHER

## 2019-09-09 RX ORDER — HYDROCHLOROTHIAZIDE 25 MG/1
25 TABLET ORAL DAILY
Qty: 90 TABLET | Refills: 3 | Status: SHIPPED | OUTPATIENT
Start: 2019-09-09 | End: 2020-01-28 | Stop reason: ALTCHOICE

## 2019-09-09 RX ORDER — METHYLPREDNISOLONE 4 MG/1
TABLET ORAL
Qty: 1 PACKAGE | Refills: 0 | Status: SHIPPED | OUTPATIENT
Start: 2019-09-09 | End: 2020-01-23 | Stop reason: ALTCHOICE

## 2019-09-09 RX ORDER — OMEPRAZOLE 40 MG/1
CAPSULE, DELAYED RELEASE ORAL
Qty: 90 CAPSULE | Refills: 3 | Status: SHIPPED | OUTPATIENT
Start: 2019-09-09 | End: 2020-08-21

## 2019-09-09 RX ORDER — CETIRIZINE HYDROCHLORIDE 10 MG/1
10 TABLET ORAL DAILY
Qty: 90 TABLET | Refills: 3 | Status: SHIPPED | OUTPATIENT
Start: 2019-09-09 | End: 2020-08-28 | Stop reason: SDUPTHER

## 2019-09-09 RX ORDER — METOPROLOL SUCCINATE 100 MG/1
100 TABLET, EXTENDED RELEASE ORAL DAILY
Qty: 90 TABLET | Refills: 3 | Status: SHIPPED | OUTPATIENT
Start: 2019-09-09 | End: 2020-08-28 | Stop reason: SDUPTHER

## 2019-09-09 RX ORDER — DULOXETIN HYDROCHLORIDE 60 MG/1
60 CAPSULE, DELAYED RELEASE ORAL DAILY
Qty: 90 CAPSULE | Refills: 3 | Status: SHIPPED | OUTPATIENT
Start: 2019-09-09 | End: 2020-08-28 | Stop reason: SDUPTHER

## 2019-09-10 ENCOUNTER — TELEPHONE (OUTPATIENT)
Dept: FAMILY MEDICINE | Facility: CLINIC | Age: 64
End: 2019-09-10

## 2019-09-10 DIAGNOSIS — E87.6 HYPOKALEMIA: Primary | ICD-10-CM

## 2019-09-10 NOTE — PROGRESS NOTES
K low. On HCTZ; lasix PRN.  Otherwise creatinine normal.  CMP WNL    Please call pt - does not check her My [facility] chart - her potassium was a little low - eat a banana every day.  Important.  Because alternative is for me to give her potassium pill.    Await the results of her MRI

## 2019-09-10 NOTE — TELEPHONE ENCOUNTER
----- Message from Cipriano Carrera MD sent at 9/10/2019  9:56 AM CDT -----  K low. On HCTZ; lasix PRN.  Otherwise creatinine normal.  CMP WNL    Please call pt - does not check her [facility] chart - her potassium was a little low - eat a banana every day.  Important.  Because alternative is for me to give her potassium pill.    Await the results of her MRI

## 2019-09-21 ENCOUNTER — TELEPHONE (OUTPATIENT)
Dept: RADIOLOGY | Facility: HOSPITAL | Age: 64
End: 2019-09-21

## 2019-09-23 ENCOUNTER — HOSPITAL ENCOUNTER (OUTPATIENT)
Dept: RADIOLOGY | Facility: HOSPITAL | Age: 64
Discharge: HOME | End: 2019-09-23
Attending: INTERNAL MEDICINE
Payer: MEDICAID

## 2019-09-23 DIAGNOSIS — R20.2 NUMBNESS AND TINGLING OF RIGHT FACE: ICD-10-CM

## 2019-09-23 DIAGNOSIS — R20.0 NUMBNESS AND TINGLING OF RIGHT FACE: ICD-10-CM

## 2019-09-23 DIAGNOSIS — E89.3 STATUS POST TRANSSPHENOIDAL PITUITARY RESECTION: ICD-10-CM

## 2019-09-23 PROCEDURE — 93880 EXTRACRANIAL BILAT STUDY: CPT | Mod: 26,,, | Performed by: RADIOLOGY

## 2019-09-23 PROCEDURE — 70553 MRI BRAIN STEM W/O & W/DYE: CPT | Mod: 26,,, | Performed by: RADIOLOGY

## 2019-09-23 PROCEDURE — 70553 MRI BRAIN STEM W/O & W/DYE: CPT | Mod: TC

## 2019-09-23 PROCEDURE — 25500020 PHARM REV CODE 255: Performed by: INTERNAL MEDICINE

## 2019-09-23 PROCEDURE — A9585 GADOBUTROL INJECTION: HCPCS | Performed by: INTERNAL MEDICINE

## 2019-09-23 PROCEDURE — 93880 EXTRACRANIAL BILAT STUDY: CPT | Mod: TC

## 2019-09-23 PROCEDURE — 70553 MRI PITUITARY W W/O CONTRAST: ICD-10-PCS | Mod: 26,,, | Performed by: RADIOLOGY

## 2019-09-23 PROCEDURE — 93880 US CAROTID BILATERAL: ICD-10-PCS | Mod: 26,,, | Performed by: RADIOLOGY

## 2019-09-23 RX ORDER — GADOBUTROL 604.72 MG/ML
5 INJECTION INTRAVENOUS
Status: COMPLETED | OUTPATIENT
Start: 2019-09-23 | End: 2019-09-23

## 2019-09-23 RX ADMIN — GADOBUTROL 5 ML: 604.72 INJECTION INTRAVENOUS at 01:09

## 2019-09-24 ENCOUNTER — TELEPHONE (OUTPATIENT)
Dept: FAMILY MEDICINE | Facility: CLINIC | Age: 64
End: 2019-09-24

## 2019-09-24 DIAGNOSIS — J01.30 ACUTE NON-RECURRENT SPHENOIDAL SINUSITIS: Primary | ICD-10-CM

## 2019-09-24 PROBLEM — Z86.018 HISTORY OF PITUITARY ADENOMA: Status: ACTIVE | Noted: 2019-09-24

## 2019-09-24 RX ORDER — AZITHROMYCIN 250 MG/1
TABLET, FILM COATED ORAL
Qty: 6 TABLET | Refills: 0 | Status: SHIPPED | OUTPATIENT
Start: 2019-09-24 | End: 2020-01-23 | Stop reason: ALTCHOICE

## 2019-09-24 NOTE — TELEPHONE ENCOUNTER
----- Message from Cipriano Carrera MD sent at 9/24/2019  7:43 AM CDT -----  MRI:1. Fluid level in the sphenoid sinus, likely from sphenoid sinusitis.  2. No significant abnormalities of the sella turcica to suggest any recurrent neoplasms.  3. Periventricular white matter changes likely from chronic microvascular ischemic disease.  4. No acute intracranial processes.  Carotid US: no stenosis    MRI showed sphenoid sinusitis which could explain her headaches    Please call pt - her MRI showed sinus infection but no tumors or growths or stroke.  Her carotid ultrasound was good - no blocks.    I will call in an antibiotic. Let's see if that helps her sinusitis and her headache.

## 2019-09-24 NOTE — TELEPHONE ENCOUNTER
I have attempted without success to contact this patient by phone and LVM for patient to return call.

## 2019-09-24 NOTE — PROGRESS NOTES
MRI:1. Fluid level in the sphenoid sinus, likely from sphenoid sinusitis.  2. No significant abnormalities of the sella turcica to suggest any recurrent neoplasms.  3. Periventricular white matter changes likely from chronic microvascular ischemic disease.  4. No acute intracranial processes.  Carotid US: no stenosis    MRI showed sphenoid sinusitis which could explain her headaches    Please call pt - her MRI showed sinus infection but no tumors or growths or stroke.  Her carotid ultrasound was good - no blocks.    I will call in an antibiotic. Let's see if that helps her sinusitis and her headache.

## 2020-01-24 ENCOUNTER — LAB VISIT (OUTPATIENT)
Dept: LAB | Facility: HOSPITAL | Age: 65
End: 2020-01-24
Attending: INTERNAL MEDICINE
Payer: MEDICAID

## 2020-01-24 ENCOUNTER — OFFICE VISIT (OUTPATIENT)
Dept: FAMILY MEDICINE | Facility: CLINIC | Age: 65
End: 2020-01-24
Payer: MEDICAID

## 2020-01-24 VITALS
SYSTOLIC BLOOD PRESSURE: 126 MMHG | TEMPERATURE: 98 F | WEIGHT: 211 LBS | HEART RATE: 96 BPM | BODY MASS INDEX: 36.02 KG/M2 | DIASTOLIC BLOOD PRESSURE: 94 MMHG | OXYGEN SATURATION: 96 % | HEIGHT: 64 IN

## 2020-01-24 DIAGNOSIS — E66.01 SEVERE OBESITY WITH BODY MASS INDEX (BMI) OF 35.0 TO 39.9 WITH SERIOUS COMORBIDITY: ICD-10-CM

## 2020-01-24 DIAGNOSIS — K59.09 CHRONIC CONSTIPATION: ICD-10-CM

## 2020-01-24 DIAGNOSIS — J45.40 MODERATE PERSISTENT CHRONIC ASTHMA WITHOUT COMPLICATION: ICD-10-CM

## 2020-01-24 DIAGNOSIS — D50.9 IRON DEFICIENCY ANEMIA, UNSPECIFIED IRON DEFICIENCY ANEMIA TYPE: ICD-10-CM

## 2020-01-24 DIAGNOSIS — I10 ESSENTIAL HYPERTENSION: Chronic | ICD-10-CM

## 2020-01-24 DIAGNOSIS — J45.909 ASTHMA, CHRONIC, UNSPECIFIED ASTHMA SEVERITY, UNCOMPLICATED: Chronic | ICD-10-CM

## 2020-01-24 DIAGNOSIS — M79.7 FIBROMYALGIA: ICD-10-CM

## 2020-01-24 DIAGNOSIS — E87.6 HYPOKALEMIA: ICD-10-CM

## 2020-01-24 DIAGNOSIS — F33.0 MILD RECURRENT MAJOR DEPRESSION: Chronic | ICD-10-CM

## 2020-01-24 DIAGNOSIS — K25.7 CHRONIC GASTRIC ULCER WITHOUT HEMORRHAGE AND WITHOUT PERFORATION: ICD-10-CM

## 2020-01-24 DIAGNOSIS — E89.3 STATUS POST TRANSSPHENOIDAL PITUITARY RESECTION: ICD-10-CM

## 2020-01-24 DIAGNOSIS — Z86.018 HISTORY OF PITUITARY ADENOMA: ICD-10-CM

## 2020-01-24 DIAGNOSIS — R30.0 DYSURIA: Primary | ICD-10-CM

## 2020-01-24 LAB
ALBUMIN SERPL BCP-MCNC: 3.6 G/DL (ref 3.5–5.2)
ALP SERPL-CCNC: 96 U/L (ref 55–135)
ALT SERPL W/O P-5'-P-CCNC: 16 U/L (ref 10–44)
ANION GAP SERPL CALC-SCNC: 10 MMOL/L (ref 8–16)
ANION GAP SERPL CALC-SCNC: 10 MMOL/L (ref 8–16)
AST SERPL-CCNC: 21 U/L (ref 10–40)
BASOPHILS # BLD AUTO: 0.05 K/UL (ref 0–0.2)
BASOPHILS NFR BLD: 0.5 % (ref 0–1.9)
BILIRUB SERPL-MCNC: 0.2 MG/DL (ref 0.1–1)
BILIRUB SERPL-MCNC: NORMAL MG/DL
BLOOD URINE, POC: NORMAL
BUN SERPL-MCNC: 9 MG/DL (ref 8–23)
BUN SERPL-MCNC: 9 MG/DL (ref 8–23)
CALCIUM SERPL-MCNC: 9.9 MG/DL (ref 8.7–10.5)
CALCIUM SERPL-MCNC: 9.9 MG/DL (ref 8.7–10.5)
CHLORIDE SERPL-SCNC: 98 MMOL/L (ref 95–110)
CHLORIDE SERPL-SCNC: 98 MMOL/L (ref 95–110)
CO2 SERPL-SCNC: 31 MMOL/L (ref 23–29)
CO2 SERPL-SCNC: 31 MMOL/L (ref 23–29)
COLOR, POC UA: YELLOW
CREAT SERPL-MCNC: 0.8 MG/DL (ref 0.5–1.4)
CREAT SERPL-MCNC: 0.8 MG/DL (ref 0.5–1.4)
DIFFERENTIAL METHOD: ABNORMAL
EOSINOPHIL # BLD AUTO: 0.2 K/UL (ref 0–0.5)
EOSINOPHIL NFR BLD: 2.2 % (ref 0–8)
ERYTHROCYTE [DISTWIDTH] IN BLOOD BY AUTOMATED COUNT: 15.9 % (ref 11.5–14.5)
EST. GFR  (AFRICAN AMERICAN): >60 ML/MIN/1.73 M^2
EST. GFR  (AFRICAN AMERICAN): >60 ML/MIN/1.73 M^2
EST. GFR  (NON AFRICAN AMERICAN): >60 ML/MIN/1.73 M^2
EST. GFR  (NON AFRICAN AMERICAN): >60 ML/MIN/1.73 M^2
FERRITIN SERPL-MCNC: 5 NG/ML (ref 20–300)
GLUCOSE SERPL-MCNC: 87 MG/DL (ref 70–110)
GLUCOSE SERPL-MCNC: 87 MG/DL (ref 70–110)
GLUCOSE UR QL STRIP: NORMAL
HCT VFR BLD AUTO: 39 % (ref 37–48.5)
HGB BLD-MCNC: 12 G/DL (ref 12–16)
IMM GRANULOCYTES # BLD AUTO: 0.05 K/UL (ref 0–0.04)
IMM GRANULOCYTES NFR BLD AUTO: 0.5 % (ref 0–0.5)
IRON SERPL-MCNC: 28 UG/DL (ref 30–160)
KETONES UR QL STRIP: NORMAL
LEUKOCYTE ESTERASE URINE, POC: NORMAL
LYMPHOCYTES # BLD AUTO: 3.9 K/UL (ref 1–4.8)
LYMPHOCYTES NFR BLD: 35.2 % (ref 18–48)
MAGNESIUM SERPL-MCNC: 2.2 MG/DL (ref 1.6–2.6)
MCH RBC QN AUTO: 27.3 PG (ref 27–31)
MCHC RBC AUTO-ENTMCNC: 30.8 G/DL (ref 32–36)
MCV RBC AUTO: 89 FL (ref 82–98)
MONOCYTES # BLD AUTO: 0.8 K/UL (ref 0.3–1)
MONOCYTES NFR BLD: 7.4 % (ref 4–15)
NEUTROPHILS # BLD AUTO: 6 K/UL (ref 1.8–7.7)
NEUTROPHILS NFR BLD: 54.2 % (ref 38–73)
NITRITE, POC UA: NORMAL
NRBC BLD-RTO: 0 /100 WBC
PH, POC UA: 5
PLATELET # BLD AUTO: 332 K/UL (ref 150–350)
PMV BLD AUTO: 12.7 FL (ref 9.2–12.9)
POTASSIUM SERPL-SCNC: 3.2 MMOL/L (ref 3.5–5.1)
POTASSIUM SERPL-SCNC: 3.2 MMOL/L (ref 3.5–5.1)
PROT SERPL-MCNC: 8.1 G/DL (ref 6–8.4)
PROTEIN, POC: NORMAL
RBC # BLD AUTO: 4.39 M/UL (ref 4–5.4)
SATURATED IRON: 7 % (ref 20–50)
SODIUM SERPL-SCNC: 139 MMOL/L (ref 136–145)
SODIUM SERPL-SCNC: 139 MMOL/L (ref 136–145)
SPECIFIC GRAVITY, POC UA: 1.01
TOTAL IRON BINDING CAPACITY: 410 UG/DL (ref 250–450)
TRANSFERRIN SERPL-MCNC: 277 MG/DL (ref 200–375)
UROBILINOGEN, POC UA: NORMAL
WBC # BLD AUTO: 11.01 K/UL (ref 3.9–12.7)

## 2020-01-24 PROCEDURE — 99999 PR PBB SHADOW E&M-EST. PATIENT-LVL IV: ICD-10-PCS | Mod: PBBFAC,,, | Performed by: INTERNAL MEDICINE

## 2020-01-24 PROCEDURE — 80053 COMPREHEN METABOLIC PANEL: CPT

## 2020-01-24 PROCEDURE — 83735 ASSAY OF MAGNESIUM: CPT

## 2020-01-24 PROCEDURE — 99215 PR OFFICE/OUTPT VISIT, EST, LEVL V, 40-54 MIN: ICD-10-PCS | Mod: S$PBB,,, | Performed by: INTERNAL MEDICINE

## 2020-01-24 PROCEDURE — 85025 COMPLETE CBC W/AUTO DIFF WBC: CPT

## 2020-01-24 PROCEDURE — 99214 OFFICE O/P EST MOD 30 MIN: CPT | Mod: PBBFAC,PO | Performed by: INTERNAL MEDICINE

## 2020-01-24 PROCEDURE — 81001 URINALYSIS AUTO W/SCOPE: CPT | Mod: PBBFAC,PO | Performed by: INTERNAL MEDICINE

## 2020-01-24 PROCEDURE — 83540 ASSAY OF IRON: CPT

## 2020-01-24 PROCEDURE — 36415 COLL VENOUS BLD VENIPUNCTURE: CPT | Mod: PO

## 2020-01-24 PROCEDURE — 82728 ASSAY OF FERRITIN: CPT

## 2020-01-24 PROCEDURE — 99999 PR PBB SHADOW E&M-EST. PATIENT-LVL IV: CPT | Mod: PBBFAC,,, | Performed by: INTERNAL MEDICINE

## 2020-01-24 PROCEDURE — 99215 OFFICE O/P EST HI 40 MIN: CPT | Mod: S$PBB,,, | Performed by: INTERNAL MEDICINE

## 2020-01-24 PROCEDURE — 87086 URINE CULTURE/COLONY COUNT: CPT

## 2020-01-24 RX ORDER — NITROFURANTOIN 25; 75 MG/1; MG/1
100 CAPSULE ORAL 2 TIMES DAILY
Qty: 10 CAPSULE | Refills: 0 | Status: SHIPPED | OUTPATIENT
Start: 2020-01-24 | End: 2020-01-29

## 2020-01-24 RX ORDER — BUDESONIDE AND FORMOTEROL FUMARATE DIHYDRATE 80; 4.5 UG/1; UG/1
2 AEROSOL RESPIRATORY (INHALATION) 2 TIMES DAILY
Qty: 10.2 G | Refills: 11 | Status: SHIPPED | OUTPATIENT
Start: 2020-01-24 | End: 2020-08-28

## 2020-01-24 RX ORDER — ALBUTEROL SULFATE 90 UG/1
2 AEROSOL, METERED RESPIRATORY (INHALATION) EVERY 4 HOURS PRN
Qty: 1 EACH | Refills: 5 | Status: SHIPPED | OUTPATIENT
Start: 2020-01-24 | End: 2021-03-19 | Stop reason: SDUPTHER

## 2020-01-24 NOTE — PATIENT INSTRUCTIONS
For asthma - take the symbicort twice a day every day.    For urine - antibiotic sent in    For constipation - I sent in linzess    I am checking for blood count    I put in referral for gastroenterology    Depending on blood count - may not need to follow up with hematology (blood doctor).  If iron is very low may need to see him.

## 2020-01-24 NOTE — PROGRESS NOTES
This note was created by combination of typed  and M-Modal dictation.  Transcription errors may be present.  If there are any questions, please contact me.    Assessment / Plan:   Dysuria  -with associated abdominal pain and back pain.  Urine culture pending.  Empiric treatment with Macrobid.  -     POCT urinalysis, dipstick or tablet reag  -     Urine culture  -     nitrofurantoin, macrocrystal-monohydrate, (MACROBID) 100 MG capsule; Take 1 capsule (100 mg total) by mouth 2 (two) times daily. for 5 days  Dispense: 10 capsule; Refill: 0    Essential hypertension 6/2017 TTE normal LVEF; stress test neg  -BP stable.  Potassium a bit low.  Check labs, if potassium still low, change HCTZ to triamterene/HCTZ.  -     Comprehensive metabolic panel; Future; Expected date: 01/24/2020    History of pituitary adenoma 9/2019 MRI no recurrence  Status post transsphenoidal pituitary resection for tumor, Dr Cardenas, about 1989  -Stable, asymptomatic chronic condition.  Will continue to maximize risk factor reduction and adjust medication as needed.    Fibromyalgia; diffuse arm, back pain, thigh pain; 2017 B12, TSH WNL; reynaldo without efficacy; Cymbalta.  -she feels like overall things are stable with gabapentin and Cymbalta on current dose.    Severe obesity (BMI 35.0-39.9)  -continue to work on therapeutic lifestyle modification.    Mild recurrent major depression  -feels like mood is stable on current dose Cymbalta.    Iron deficiency anemia, unspecified iron deficiency anemia type  Chronic gastric ulcer without hemorrhage and without perforation  -has been followed by GI for this.  But unfortunately it looks like with her insurance, her current gastroenterologist Dr. soto is no longer accepting it.  She is inquiring with her insurance as to participating providers.  I will submit a referral in case she requires a formal referral.  She previously seen Hematology for consideration of IV iron.  Did not feel it was  necessary at that time.  She did have an episode of dark stools maybe a month ago.  She is on high-dose PPI.  Check ferritin and iron and CBC.  If ferritin very low, given that she is intolerant of oral iron, may have her follow up with Hematology.  -     Ferritin; Future; Expected date: 01/24/2020  -     CBC auto differential; Future; Expected date: 01/24/2020  -     Iron and TIBC; Future; Expected date: 01/24/2020  -     Ambulatory referral to Gastroenterology    Moderate persistent chronic asthma without complication  Asthma, chronic, unspecified asthma severity, uncomplicated  -notes recently daily use of albuterol.  I will start her on maintenance medication with Symbicort.  Twice daily.  Albuterol rescue sent to pharmacy.  -     budesonide-formoterol 80-4.5 mcg (SYMBICORT) 80-4.5 mcg/actuation HFAA; Inhale 2 puffs into the lungs 2 (two) times daily. Controller  Dispense: 10.2 g; Refill: 11  -     albuterol (PROVENTIL/VENTOLIN HFA) 90 mcg/actuation inhaler; Inhale 2 puffs into the lungs every 4 (four) hours as needed for Wheezing or Shortness of Breath.  Dispense: 1 each; Refill: 5    Chronic constipation  -she has tried a number of different over-the-counter medications without relief.  Including Colace, Dulcolax, MiraLax.  Previously her GI had sent in Linzess but it was not covered by insurance.  I will send in a prescription of Linzess.  She plans to try and pay for it out of pocket though I did warn her that it may be extremely expensive.  -     linaCLOtide (LINZESS) 145 mcg Cap capsule; Take 1 capsule (145 mcg total) by mouth once daily.  Dispense: 30 capsule; Refill: 3    Headache-had not had headache since her last visit with me in September.  However she recently lost her sister-in-law and thinks that this is all due to stress.  Had previously responded to Medrol Dosepak.  However I would like to limit her exposure to steroids.  I have asked her to just monitor this.  If she continues to have symptoms  she can contact me for Medrol taper.    Medications Discontinued During This Encounter   Medication Reason    azithromycin (ZITHROMAX Z-KRISTIN) 250 MG tablet Therapy completed    methylPREDNISolone (MEDROL DOSEPACK) 4 mg tablet Therapy completed    albuterol 90 mcg/actuation inhaler Reorder       meds sent this encounter:  Medications Ordered This Encounter   Medications    albuterol (PROVENTIL/VENTOLIN HFA) 90 mcg/actuation inhaler     Sig: Inhale 2 puffs into the lungs every 4 (four) hours as needed for Wheezing or Shortness of Breath.     Dispense:  1 each     Refill:  5    budesonide-formoterol 80-4.5 mcg (SYMBICORT) 80-4.5 mcg/actuation HFAA     Sig: Inhale 2 puffs into the lungs 2 (two) times daily. Controller     Dispense:  10.2 g     Refill:  11    linaCLOtide (LINZESS) 145 mcg Cap capsule     Sig: Take 1 capsule (145 mcg total) by mouth once daily.     Dispense:  30 capsule     Refill:  3    nitrofurantoin, macrocrystal-monohydrate, (MACROBID) 100 MG capsule     Sig: Take 1 capsule (100 mg total) by mouth 2 (two) times daily. for 5 days     Dispense:  10 capsule     Refill:  0       Follow Up: No follow-ups on file.  Plan for follow-up 3-4 months.  Follow-up on asthma control.  BP.      Subjective:     Chief Complaint   Patient presents with    Back Pain     lower    Abdominal Pain    Headache     front and back of head. taking tylenol and BC powder    Stool Color Change       HPI  Bell is a 64 y.o. female, last appointment with this clinic was 9/9/2019.    No LMP recorded. Patient has had a hysterectomy.    Last seen in September  Headaches, she was having some right-sided facial numbness.  I checked MRI and MRA.  Negative for anything acute.  Fibromyalgia, Cymbalta.  Gabapentin.    Labs mild low K.  Increase dietary intake.    Never elavil.    C/o low back pain.  And more recent, lower abd pain.  The back pain x 1 month. She's worried it's urinary. Subjective chills. Normally does not have  "chronic back pain in that location. Lower right more on the SI joint area. Nonradiating. Sometimes doing stretching. Without relief. Takes tylenol for this - sometimes helps.   The abd pain - with urinary burning.     She notes that the stool was dark black for a week. Last month. This month it's "normal". Associated sx of fatigue and discomfort  Was seeing Dr. Ivy but no longer accepting her insurance.  She would see him every 3 months. For PUD.  Last EGD she thinks > 1 year.     Eating bananas "like crazy".     On ROS - gets chest pain.  She thinks this is related to reflux.  Occurs with eating. Not every time she eats. No occurrence with physical activity. Feels chronic dyspnea.     Asthma, albuterol, needs RF.  Using it daily. I will need to step up therapy.  Will send in rx for symbicort.    Headache she attributes to stress.  Family member passed.  Her sister-in-law.  Feels like sinus component.  In the maxillary area but also in the posterior/occiput area.  The headaches are not constant.  Last episode was last visit.     Requesting rx for linzess.  Not covered by insurance but she will pay out of pocket for this.  Her GI had sent this in. But no longer accepting her insurance.  It sounds like she has tried pain for this out-of-pocket before and has done so and found it very helpful.  Has tried colace, miralax, dulcolax, correctol without relief.    cymbalta helpful for mood.   cymbalta and reynaldo for fibromyalgia. Does think they are helpful.     Answers for HPI/ROS submitted by the patient on 1/23/2020   Back pain  Chronicity: new  Onset: more than 1 month ago  Frequency: constantly  Progression since onset: gradually worsening  Pain location: sacro-iliac  Pain quality: aching, burning  Pain - numeric: 9/10  Pain is: the same all the time  Aggravated by: lying down, sitting, standing  Stiffness is present: all day  bladder incontinence: Yes  bowel incontinence: Yes  leg pain: Yes  numbness: No  paresis: " No  paresthesias: Yes  pelvic pain: Yes  perianal numbness: No  genital pain: No  Pain severity: severe  Treatments tried: analgesics, bed rest, heat, ice, muscle relaxant  Improvement on treatment: no relief      Patient Care Team:  Cipriano Carrera MD as PCP - General (Internal Medicine)  Gonsalo Ivy MD as Consulting Physician (Gastroenterology)  Abdulaziz Willson MD as Consulting Physician (INTERVENTIONAL CARDIOLOGY)  Yari Diamond MA as Care Coordinator    Patient Active Problem List    Diagnosis Date Noted    History of pituitary adenoma 9/2019 MRI no recurrence 09/24/2019 9/23/2019 MRI brain: 1. Fluid level in the sphenoid sinus, likely from sphenoid sinusitis.  2. No significant abnormalities of the sella turcica to suggest any recurrent neoplasms.  3. Periventricular white matter changes likely from chronic microvascular ischemic disease.  4. No acute intracranial processes.  9/23/2019 carotid US normal      Status post transsphenoidal pituitary resection for tumor, Dr Cardenas, about 1989 09/09/2019    History of benign carcinoid tumor rectum s/p resection per GI note 01/30/2019    Colon polyp 8/2018 no path report included repeat 5 years 10/07/2018    Fibromyalgia; diffuse arm, back pain, thigh pain; 2017 B12, TSH WNL; reynaldo without efficacy; Cymbalta. 07/13/2017    Severe obesity (BMI 35.0-39.9) 06/15/2015     Dx updated per 2019 IMO Load      AR (allergic rhinitis) 02/19/2014    Iron deficiency anemia; iron with GI SE 03/14/2013    Asthma, chronic 02/14/2013    Gastric ulcer, chronic on EGD 7/2018 02/14/2013    Menopausal hot flushes 02/14/2013    Mild recurrent major depression 02/14/2013    Primary osteoarthritis of left knee 07/30/2012 8/24/2017 MRI L knee: Tricompartmental degenerative changes. Lateral and medial meniscal degenerative tearing/changes. Tricompartmental cartilage loss including areas of full-thickness full-thickness loss. Abnormal appearance of the ACL  and PCL suggestive of chronic injury.      Essential hypertension 6/2017 TTE normal LVEF; stress test neg 07/30/2012 6/2017 nuclear stress test negative. No change 7/2015 6/2017 TTE normal LVEF 55-60         PAST MEDICAL HISTORY:  Past Medical History:   Diagnosis Date    Asthma     Depression     Gastric ulcer with hemorrhage 7/2012    GERD (gastroesophageal reflux disease)     History of blood transfusion     Hypertension     Iron deficiency anemia 3/14/2013    Osteoarthritis of both knees        PAST SURGICAL HISTORY:  Past Surgical History:   Procedure Laterality Date    BREAST BIOPSY      BREAST SURGERY      Benign breast cyst    HYSTERECTOMY      Partial    TONSILLECTOMY         SOCIAL HISTORY:  Social History     Socioeconomic History    Marital status:      Spouse name: Not on file    Number of children: Not on file    Years of education: Not on file    Highest education level: Not on file   Occupational History     Employer: [employer]   Social Needs    Financial resource strain: Not hard at all    Food insecurity:     Worry: Never true     Inability: Never true    Transportation needs:     Medical: No     Non-medical: No   Tobacco Use    Smoking status: Never Smoker    Smokeless tobacco: Never Used   Substance and Sexual Activity    Alcohol use: Yes     Frequency: 2-4 times a month     Drinks per session: 1 or 2     Binge frequency: Never     Comment: ocaasional    Drug use: No    Sexual activity: Yes     Partners: Male     Birth control/protection: Surgical   Lifestyle    Physical activity:     Days per week: 0 days     Minutes per session: 0 min    Stress: Only a little   Relationships    Social connections:     Talks on phone: More than three times a week     Gets together: Twice a week     Attends Hindu service: Not on file     Active member of club or organization: No     Attends meetings of clubs or organizations: Never     Relationship  status:    Other Topics Concern    Not on file   Social History Narrative    Not on file        ALLERGIES AND MEDICATIONS: updated and reviewed.  Review of patient's allergies indicates:   Allergen Reactions    Nsaids (non-steroidal anti-inflammatory drug)      Gi bleed    Penicillins Itching and Swelling     Current Outpatient Medications   Medication Sig Dispense Refill    albuterol 90 mcg/actuation inhaler Inhale 2 puffs into the lungs every 4 (four) hours as needed for Wheezing or Shortness of Breath. 1 each 5    cetirizine (ZYRTEC) 10 MG tablet Take 1 tablet (10 mg total) by mouth once daily. 90 tablet 3    cloNIDine (CATAPRES) 0.2 MG tablet Take 1 tablet (0.2 mg total) by mouth 2 (two) times daily. 180 tablet 3    DULoxetine (CYMBALTA) 60 MG capsule Take 1 capsule (60 mg total) by mouth once daily. Increased dose 90 capsule 3    furosemide (LASIX) 20 MG tablet TAKE 1 TABLET BY MOUTH ONCE DAILY AS NEEDED FOR  LEG  SWELLING 90 tablet 3    gabapentin (NEURONTIN) 100 MG capsule 2 tablets nighttime 1 tablet in  capsule 3    hydroCHLOROthiazide (HYDRODIURIL) 25 MG tablet Take 1 tablet (25 mg total) by mouth once daily. 90 tablet 3    lidocaine (LIDODERM) 5 % Place 1 patch onto the skin once daily. Remove & Discard patch within 12 hours or as directed by MD 15 patch 0    losartan (COZAAR) 100 MG tablet Take 1 tablet (100 mg total) by mouth once daily. 90 tablet 3    metoprolol succinate (TOPROL-XL) 100 MG 24 hr tablet Take 1 tablet (100 mg total) by mouth once daily. 90 tablet 3    nitroGLYCERIN (NITROSTAT) 0.4 MG SL tablet Place 1 tablet (0.4 mg total) under the tongue every 5 (five) minutes as needed for Chest pain. 60 tablet 12    omeprazole (PRILOSEC) 40 MG capsule TAKE 1 CAPSULE BY MOUTH IN THE MORNING WITH FOOD 90 capsule 3     No current facility-administered medications for this visit.        Review of Systems   Constitutional: Negative for fever and weight loss.   Cardiovascular:  "Positive for chest pain.   Gastrointestinal: Positive for abdominal pain.   Genitourinary: Positive for dysuria. Negative for hematuria.   Neurological: Positive for tingling, weakness and headaches.       Objective:   Physical Exam   Vitals:    01/24/20 0901   BP: (!) 126/94   BP Location: Right arm   Patient Position: Sitting   BP Method: Large (Manual)   Pulse: 96   Temp: 98 °F (36.7 °C)   TempSrc: Oral   SpO2: 96%   Weight: 95.7 kg (211 lb)   Height: 5' 4" (1.626 m)    Body mass index is 36.22 kg/m².  Weight: 95.7 kg (211 lb)   Height: 5' 4" (162.6 cm)     Physical Exam   Constitutional: She is oriented to person, place, and time. She appears well-developed and well-nourished. No distress.   HENT:   TMs grey/clear bilaterally.  OP no erythema no exudates   Eyes: EOM are normal.   Neck: Neck supple.   Cardiovascular: Normal rate, regular rhythm and normal heart sounds.   No murmur heard.  Pulmonary/Chest: Effort normal and breath sounds normal. She has no wheezes.   Abdominal: Soft.   Notes some mild suprapubic tenderness without mass.  Obese habitus limits sensitivity of examination.  No flank tenderness   Musculoskeletal: Normal range of motion.   Lumbar spine nontender.  SI joints area nontender, has some mild tenderness of the right lateral lumbar musculature without deformity or swelling.  Seated straight leg raise without pain.   Lymphadenopathy:     She has no cervical adenopathy.   Neurological: She is alert and oriented to person, place, and time. Coordination normal.   Skin: Skin is warm and dry.   Psychiatric: She has a normal mood and affect. Her behavior is normal. Thought content normal.     Component      Latest Ref Rng & Units 1/24/2020   Color, UA       yellow   Spec Grav UA       1.010   pH, UA       5   WBC, UA       trace   Nitrite, UA       neg   Protein       trace   Glucose, UA       NL   Ketones, UA       eng   Urobilinogen, UA       NL   Bilirubin       neg   RBC, UA       trace         "

## 2020-01-25 LAB
BACTERIA UR CULT: NORMAL
BACTERIA UR CULT: NORMAL

## 2020-01-26 NOTE — PROGRESS NOTES
Urine culture negative.  Had complained of dysuria, abnormal point of care urinalysis, empiric treatment with Macrobid.  If no improvement, needs re-evaluation.  Results to patient via my [facility].

## 2020-01-28 DIAGNOSIS — D50.9 IRON DEFICIENCY ANEMIA, UNSPECIFIED IRON DEFICIENCY ANEMIA TYPE: Primary | ICD-10-CM

## 2020-01-28 DIAGNOSIS — I10 ESSENTIAL HYPERTENSION: Chronic | ICD-10-CM

## 2020-01-28 RX ORDER — FERROUS SULFATE 325(65) MG
325 TABLET ORAL EVERY OTHER DAY
Qty: 30 TABLET | Refills: 5 | Status: SHIPPED | OUTPATIENT
Start: 2020-01-28 | End: 2020-08-28 | Stop reason: SDUPTHER

## 2020-01-28 RX ORDER — TRIAMTERENE/HYDROCHLOROTHIAZID 37.5-25 MG
1 TABLET ORAL DAILY
Qty: 30 TABLET | Refills: 11 | Status: SHIPPED | OUTPATIENT
Start: 2020-01-28 | End: 2020-08-28 | Stop reason: SDUPTHER

## 2020-01-28 NOTE — PROGRESS NOTES
Ferritin low.  Iron panel low.  Blood count hemoglobin is okay.  Had previously been seen by Hematology with the same numbers and was felt not to be candidate for IV iron.  Potassium low.  I am going to change her HCTZ to triamterene/HCTZ.  Results to patient via my [facility].  I will send in iron.  And triamterene/HCT.  Follow-up 3 months with pre visit labs ordered.

## 2020-02-05 ENCOUNTER — TELEPHONE (OUTPATIENT)
Dept: FAMILY MEDICINE | Facility: CLINIC | Age: 65
End: 2020-02-05

## 2020-02-05 NOTE — LETTER
February 5, 2020    Bell Joaquin Shane  [address] [place] [address]             [facility]  [address] [address]  Phone: [phone]  Fax: [phone] Dear Mrs. Lynn:    Sorry we were unable to contact you to schedule your Gastroenterology appointment. Please give the referral department a call at [phone].        If you have any questions or concerns, please don't hesitate to call.    Sincerely,        Tho Jama MA

## 2020-05-29 ENCOUNTER — PATIENT MESSAGE (OUTPATIENT)
Dept: ADMINISTRATIVE | Facility: HOSPITAL | Age: 65
End: 2020-05-29

## 2020-08-14 ENCOUNTER — PATIENT OUTREACH (OUTPATIENT)
Dept: ADMINISTRATIVE | Facility: HOSPITAL | Age: 65
End: 2020-08-14

## 2020-08-21 DIAGNOSIS — K25.7 CHRONIC GASTRIC ULCER WITHOUT HEMORRHAGE AND WITHOUT PERFORATION: ICD-10-CM

## 2020-08-21 RX ORDER — OMEPRAZOLE 40 MG/1
CAPSULE, DELAYED RELEASE ORAL
Qty: 90 CAPSULE | Refills: 0 | Status: SHIPPED | OUTPATIENT
Start: 2020-08-21 | End: 2020-08-28 | Stop reason: SDUPTHER

## 2020-08-24 ENCOUNTER — LAB VISIT (OUTPATIENT)
Dept: LAB | Facility: HOSPITAL | Age: 65
End: 2020-08-24
Attending: INTERNAL MEDICINE
Payer: MEDICAID

## 2020-08-24 DIAGNOSIS — I10 ESSENTIAL HYPERTENSION: Chronic | ICD-10-CM

## 2020-08-24 DIAGNOSIS — D50.9 IRON DEFICIENCY ANEMIA, UNSPECIFIED IRON DEFICIENCY ANEMIA TYPE: ICD-10-CM

## 2020-08-24 LAB
ANION GAP SERPL CALC-SCNC: 13 MMOL/L (ref 8–16)
BASOPHILS # BLD AUTO: 0.05 K/UL (ref 0–0.2)
BASOPHILS NFR BLD: 0.5 % (ref 0–1.9)
BUN SERPL-MCNC: 11 MG/DL (ref 8–23)
CALCIUM SERPL-MCNC: 9.8 MG/DL (ref 8.7–10.5)
CHLORIDE SERPL-SCNC: 102 MMOL/L (ref 95–110)
CO2 SERPL-SCNC: 23 MMOL/L (ref 23–29)
CREAT SERPL-MCNC: 0.8 MG/DL (ref 0.5–1.4)
DIFFERENTIAL METHOD: ABNORMAL
EOSINOPHIL # BLD AUTO: 0.3 K/UL (ref 0–0.5)
EOSINOPHIL NFR BLD: 2.7 % (ref 0–8)
ERYTHROCYTE [DISTWIDTH] IN BLOOD BY AUTOMATED COUNT: 17 % (ref 11.5–14.5)
EST. GFR  (AFRICAN AMERICAN): >60 ML/MIN/1.73 M^2
EST. GFR  (NON AFRICAN AMERICAN): >60 ML/MIN/1.73 M^2
FERRITIN SERPL-MCNC: 14 NG/ML (ref 20–300)
GLUCOSE SERPL-MCNC: 69 MG/DL (ref 70–110)
HCT VFR BLD AUTO: 41.2 % (ref 37–48.5)
HGB BLD-MCNC: 12.4 G/DL (ref 12–16)
IMM GRANULOCYTES # BLD AUTO: 0.04 K/UL (ref 0–0.04)
IMM GRANULOCYTES NFR BLD AUTO: 0.4 % (ref 0–0.5)
LYMPHOCYTES # BLD AUTO: 3.3 K/UL (ref 1–4.8)
LYMPHOCYTES NFR BLD: 30 % (ref 18–48)
MCH RBC QN AUTO: 26.7 PG (ref 27–31)
MCHC RBC AUTO-ENTMCNC: 30.1 G/DL (ref 32–36)
MCV RBC AUTO: 89 FL (ref 82–98)
MONOCYTES # BLD AUTO: 0.7 K/UL (ref 0.3–1)
MONOCYTES NFR BLD: 6.7 % (ref 4–15)
NEUTROPHILS # BLD AUTO: 6.6 K/UL (ref 1.8–7.7)
NEUTROPHILS NFR BLD: 59.7 % (ref 38–73)
NRBC BLD-RTO: 0 /100 WBC
PLATELET # BLD AUTO: 284 K/UL (ref 150–350)
PMV BLD AUTO: 14.2 FL (ref 9.2–12.9)
POTASSIUM SERPL-SCNC: 4.5 MMOL/L (ref 3.5–5.1)
RBC # BLD AUTO: 4.65 M/UL (ref 4–5.4)
SODIUM SERPL-SCNC: 138 MMOL/L (ref 136–145)
WBC # BLD AUTO: 11.05 K/UL (ref 3.9–12.7)

## 2020-08-24 PROCEDURE — 85025 COMPLETE CBC W/AUTO DIFF WBC: CPT

## 2020-08-24 PROCEDURE — 80048 BASIC METABOLIC PNL TOTAL CA: CPT

## 2020-08-24 PROCEDURE — 82728 ASSAY OF FERRITIN: CPT

## 2020-08-24 PROCEDURE — 36415 COLL VENOUS BLD VENIPUNCTURE: CPT | Mod: PO
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
## 2020-08-28 ENCOUNTER — OFFICE VISIT (OUTPATIENT)
Dept: FAMILY MEDICINE | Facility: CLINIC | Age: 65
End: 2020-08-28
Payer: MEDICAID

## 2020-08-28 VITALS
SYSTOLIC BLOOD PRESSURE: 142 MMHG | TEMPERATURE: 98 F | HEIGHT: 64 IN | BODY MASS INDEX: 36.37 KG/M2 | DIASTOLIC BLOOD PRESSURE: 82 MMHG | OXYGEN SATURATION: 97 % | HEART RATE: 64 BPM | WEIGHT: 213 LBS

## 2020-08-28 DIAGNOSIS — R60.0 PERIPHERAL EDEMA: ICD-10-CM

## 2020-08-28 DIAGNOSIS — J45.909 ASTHMA, CHRONIC, UNSPECIFIED ASTHMA SEVERITY, UNCOMPLICATED: Chronic | ICD-10-CM

## 2020-08-28 DIAGNOSIS — K59.09 CHRONIC CONSTIPATION: ICD-10-CM

## 2020-08-28 DIAGNOSIS — M79.7 FIBROMYALGIA: ICD-10-CM

## 2020-08-28 DIAGNOSIS — J30.9 ALLERGIC RHINITIS, UNSPECIFIED SEASONALITY, UNSPECIFIED TRIGGER: ICD-10-CM

## 2020-08-28 DIAGNOSIS — R42 VERTIGO: ICD-10-CM

## 2020-08-28 DIAGNOSIS — Z23 NEED FOR VACCINATION FOR STREP PNEUMONIAE: ICD-10-CM

## 2020-08-28 DIAGNOSIS — J45.40 MODERATE PERSISTENT CHRONIC ASTHMA WITHOUT COMPLICATION: ICD-10-CM

## 2020-08-28 DIAGNOSIS — D50.9 IRON DEFICIENCY ANEMIA, UNSPECIFIED IRON DEFICIENCY ANEMIA TYPE: ICD-10-CM

## 2020-08-28 DIAGNOSIS — I10 ESSENTIAL HYPERTENSION: Primary | Chronic | ICD-10-CM

## 2020-08-28 DIAGNOSIS — F33.0 MILD RECURRENT MAJOR DEPRESSION: Chronic | ICD-10-CM

## 2020-08-28 DIAGNOSIS — K25.7 CHRONIC GASTRIC ULCER WITHOUT HEMORRHAGE AND WITHOUT PERFORATION: ICD-10-CM

## 2020-08-28 DIAGNOSIS — M17.12 OSTEOARTHRITIS OF LEFT KNEE, UNSPECIFIED OSTEOARTHRITIS TYPE: ICD-10-CM

## 2020-08-28 DIAGNOSIS — Z11.4 ENCOUNTER FOR SCREENING FOR HIV: ICD-10-CM

## 2020-08-28 PROCEDURE — 99214 OFFICE O/P EST MOD 30 MIN: CPT | Mod: PBBFAC,PO,25 | Performed by: INTERNAL MEDICINE

## 2020-08-28 PROCEDURE — 99999 PR PBB SHADOW E&M-EST. PATIENT-LVL IV: CPT | Mod: PBBFAC,,, | Performed by: INTERNAL MEDICINE

## 2020-08-28 PROCEDURE — 99214 OFFICE O/P EST MOD 30 MIN: CPT | Mod: S$PBB,,, | Performed by: INTERNAL MEDICINE

## 2020-08-28 PROCEDURE — 99214 PR OFFICE/OUTPT VISIT, EST, LEVL IV, 30-39 MIN: ICD-10-PCS | Mod: S$PBB,,, | Performed by: INTERNAL MEDICINE

## 2020-08-28 PROCEDURE — 90471 IMMUNIZATION ADMIN: CPT | Mod: PBBFAC,PO

## 2020-08-28 PROCEDURE — 99999 PR PBB SHADOW E&M-EST. PATIENT-LVL IV: ICD-10-PCS | Mod: PBBFAC,,, | Performed by: INTERNAL MEDICINE

## 2020-08-28 RX ORDER — CLONIDINE HYDROCHLORIDE 0.2 MG/1
0.2 TABLET ORAL 2 TIMES DAILY
Qty: 180 TABLET | Refills: 3 | Status: CANCELLED | OUTPATIENT
Start: 2020-08-28 | End: 2021-08-28

## 2020-08-28 RX ORDER — LOSARTAN POTASSIUM 100 MG/1
100 TABLET ORAL DAILY
Qty: 90 TABLET | Refills: 3 | Status: SHIPPED | OUTPATIENT
Start: 2020-08-28 | End: 2021-03-19 | Stop reason: SDUPTHER

## 2020-08-28 RX ORDER — OMEPRAZOLE 40 MG/1
40 CAPSULE, DELAYED RELEASE ORAL EVERY MORNING
Qty: 90 CAPSULE | Refills: 3 | Status: SHIPPED | OUTPATIENT
Start: 2020-08-28 | End: 2020-11-20 | Stop reason: SDUPTHER

## 2020-08-28 RX ORDER — FUROSEMIDE 20 MG/1
TABLET ORAL
Qty: 90 TABLET | Refills: 3 | Status: SHIPPED | OUTPATIENT
Start: 2020-08-28 | End: 2020-11-20 | Stop reason: SDUPTHER

## 2020-08-28 RX ORDER — FERROUS SULFATE 325(65) MG
325 TABLET ORAL EVERY OTHER DAY
Qty: 30 TABLET | Refills: 5 | Status: SHIPPED | OUTPATIENT
Start: 2020-08-28 | End: 2022-04-22 | Stop reason: SINTOL

## 2020-08-28 RX ORDER — METOPROLOL SUCCINATE 100 MG/1
100 TABLET, EXTENDED RELEASE ORAL DAILY
Qty: 90 TABLET | Refills: 3 | Status: SHIPPED | OUTPATIENT
Start: 2020-08-28 | End: 2021-03-19 | Stop reason: SDUPTHER

## 2020-08-28 RX ORDER — DULOXETIN HYDROCHLORIDE 60 MG/1
60 CAPSULE, DELAYED RELEASE ORAL DAILY
Qty: 90 CAPSULE | Refills: 3 | Status: SHIPPED | OUTPATIENT
Start: 2020-08-28 | End: 2021-03-19 | Stop reason: SDUPTHER

## 2020-08-28 RX ORDER — CETIRIZINE HYDROCHLORIDE 10 MG/1
10 TABLET ORAL DAILY
Qty: 90 TABLET | Refills: 3 | Status: SHIPPED | OUTPATIENT
Start: 2020-08-28 | End: 2021-05-26 | Stop reason: SDUPTHER

## 2020-08-28 RX ORDER — TRIAMTERENE/HYDROCHLOROTHIAZID 37.5-25 MG
1 TABLET ORAL DAILY
Qty: 90 TABLET | Refills: 3 | Status: SHIPPED | OUTPATIENT
Start: 2020-08-28 | End: 2021-03-19 | Stop reason: SDUPTHER

## 2020-08-28 RX ORDER — GABAPENTIN 100 MG/1
CAPSULE ORAL
Qty: 270 CAPSULE | Refills: 3 | Status: SHIPPED | OUTPATIENT
Start: 2020-08-28 | End: 2020-11-20 | Stop reason: SDUPTHER

## 2020-09-03 DIAGNOSIS — Z12.11 COLON CANCER SCREENING: ICD-10-CM

## 2020-09-08 ENCOUNTER — OFFICE VISIT (OUTPATIENT)
Dept: GASTROENTEROLOGY | Facility: CLINIC | Age: 65
End: 2020-09-08
Payer: MEDICAID

## 2020-09-08 VITALS
HEIGHT: 64 IN | BODY MASS INDEX: 37.08 KG/M2 | DIASTOLIC BLOOD PRESSURE: 90 MMHG | WEIGHT: 217.19 LBS | RESPIRATION RATE: 16 BRPM | HEART RATE: 88 BPM | SYSTOLIC BLOOD PRESSURE: 134 MMHG

## 2020-09-08 DIAGNOSIS — K21.9 GASTROESOPHAGEAL REFLUX DISEASE, ESOPHAGITIS PRESENCE NOT SPECIFIED: ICD-10-CM

## 2020-09-08 DIAGNOSIS — Z01.818 PREOPERATIVE EXAMINATION: ICD-10-CM

## 2020-09-08 DIAGNOSIS — R10.13 EPIGASTRIC PAIN: Primary | ICD-10-CM

## 2020-09-08 DIAGNOSIS — K25.7 CHRONIC GASTRIC ULCER WITHOUT HEMORRHAGE AND WITHOUT PERFORATION: ICD-10-CM

## 2020-09-08 PROCEDURE — 99203 PR OFFICE/OUTPT VISIT, NEW, LEVL III, 30-44 MIN: ICD-10-PCS | Mod: S$PBB,,, | Performed by: NURSE PRACTITIONER

## 2020-09-08 PROCEDURE — 99203 OFFICE O/P NEW LOW 30 MIN: CPT | Mod: S$PBB,,, | Performed by: NURSE PRACTITIONER

## 2020-09-08 PROCEDURE — 99215 OFFICE O/P EST HI 40 MIN: CPT | Mod: PBBFAC,PO | Performed by: NURSE PRACTITIONER

## 2020-09-08 PROCEDURE — 99999 PR PBB SHADOW E&M-EST. PATIENT-LVL V: ICD-10-PCS | Mod: PBBFAC,,, | Performed by: NURSE PRACTITIONER

## 2020-09-08 PROCEDURE — 99999 PR PBB SHADOW E&M-EST. PATIENT-LVL V: CPT | Mod: PBBFAC,,, | Performed by: NURSE PRACTITIONER

## 2020-09-08 NOTE — PROGRESS NOTES
Subjective:       Patient ID: Bell Lynn is a 65 y.o. female.    Chief Complaint: Peptic Ulcer Disease    64 y/o female with hypertension and hx of peptic ulcer disease and colon polyps referred by PCP for abdominal pain. Previous GI provider, Dr. Ivy no longer accepts patient's insurance. Per patient last EGD/colonoscopy in 2018 revealed non-bleeding gastric ulcer and colon polyps. States heartburn and reflux progressively worsening over past 2-3 months. States she does not take any NSAIDs, only Tylenol as needed for arthritis pain. She is taking omeprazole 40 mg daily and Mylanta prn with only minimal symptom relief. States symptoms do not seem related to any particular food. She has occasional nausea without vomiting. Patient also has chronic constipation controlled with current dose of Linzess. Reports normal BM daily. Denies dysphagia, hematemesis, blood in stool, or melena.       Past Medical History:   Diagnosis Date    Asthma     Depression     Gastric ulcer with hemorrhage 7/2012    GERD (gastroesophageal reflux disease)     History of blood transfusion     Hypertension     Iron deficiency anemia 3/14/2013    Osteoarthritis of both knees        Past Surgical History:   Procedure Laterality Date    BREAST BIOPSY      BREAST SURGERY      Benign breast cyst    HYSTERECTOMY      Partial    TONSILLECTOMY         Family History   Problem Relation Age of Onset    Heart disease Mother     Hypertension Mother     Heart disease Father     Hypertension Father     Breast cancer Sister     Breast cancer Sister        Social History     Socioeconomic History    Marital status:      Spouse name: Not on file    Number of children: Not on file    Years of education: Not on file    Highest education level: Not on file   Occupational History     Employer: [employer]   Social Needs    Financial resource strain: Not hard at all    Food insecurity     Worry:  "Never true     Inability: Never true    Transportation needs     Medical: No     Non-medical: No   Tobacco Use    Smoking status: Never Smoker    Smokeless tobacco: Never Used   Substance and Sexual Activity    Alcohol use: Yes     Frequency: 2-4 times a month     Drinks per session: 1 or 2     Binge frequency: Never     Comment: ocaasional    Drug use: No    Sexual activity: Yes     Partners: Male     Birth control/protection: Surgical   Lifestyle    Physical activity     Days per week: 0 days     Minutes per session: 0 min    Stress: Only a little   Relationships    Social connections     Talks on phone: More than three times a week     Gets together: Twice a week     Attends Zoroastrian service: Not on file     Active member of club or organization: No     Attends meetings of clubs or organizations: Never     Relationship status:    Other Topics Concern    Not on file   Social History Narrative    Not on file       Review of Systems   Constitutional: Negative for fatigue, fever and unexpected weight change.   HENT: Negative for sore throat and trouble swallowing.    Respiratory: Negative for cough and shortness of breath.    Cardiovascular: Negative for chest pain.   Gastrointestinal: Positive for abdominal pain.   Musculoskeletal: Negative for myalgias.   Neurological: Negative for dizziness and weakness.   Hematological: Negative for adenopathy.   Psychiatric/Behavioral: Negative for dysphoric mood.         Objective:     Vitals:    09/08/20 1400   BP: (!) 134/90   BP Location: Right arm   Patient Position: Sitting   BP Method: Large (Automatic)   Pulse: 88   Resp: 16   Weight: 98.5 kg (217 lb 3.2 oz)   Height: 5' 4" (1.626 m)          Physical Exam  Constitutional:       Appearance: She is obese.   HENT:      Head: Normocephalic.   Eyes:      Conjunctiva/sclera: Conjunctivae normal.      Pupils: Pupils are equal, round, and reactive to light.   Neck:      Musculoskeletal: Normal range of " motion.   Cardiovascular:      Rate and Rhythm: Normal rate and regular rhythm.   Pulmonary:      Effort: Pulmonary effort is normal.      Breath sounds: Normal breath sounds.   Abdominal:      General: Bowel sounds are normal.      Palpations: Abdomen is soft.      Tenderness: There is no abdominal tenderness.   Musculoskeletal: Normal range of motion.   Skin:     General: Skin is warm and dry.   Neurological:      Mental Status: She is alert. She is disoriented.   Psychiatric:         Mood and Affect: Mood normal.         Thought Content: Thought content normal.       Lab Results   Component Value Date    WBC 11.05 08/24/2020    HGB 12.4 08/24/2020    HCT 41.2 08/24/2020    MCV 89 08/24/2020     08/24/2020     BMP  Lab Results   Component Value Date     08/24/2020    K 4.5 08/24/2020     08/24/2020    CO2 23 08/24/2020    BUN 11 08/24/2020    CREATININE 0.8 08/24/2020    CALCIUM 9.8 08/24/2020    ANIONGAP 13 08/24/2020    ESTGFRAFRICA >60.0 08/24/2020    EGFRNONAA >60.0 08/24/2020     Old records from Dr. Ivy and chart reviewed and summarized, significant for:   - 8/13/2018 colonoscopy with Dr. Ivy: mild diverticulosis sigmoid colon; grade I internal hemorrhoids, and 4 mm polyp to sigmoid colon resected (pathology not available); repeat year 2023  - 7/20/2018 EGD with Dr. Ivy: normal esophagus, small hiatal hernia, and ulcer in pre-pyloric region and antrum; pathology benign ulcer and negative H pylori.        Assessment:         ICD-10-CM ICD-9-CM   1. Epigastric pain  R10.13 789.06   2. Chronic gastric ulcer without hemorrhage and without perforation  K25.7 531.70   3. Gastroesophageal reflux disease, esophagitis presence not specified  K21.9 530.81   4. Preoperative examination  Z01.818 V72.84       Plan:       Epigastric pain; Chronic gastric ulcer without hemorrhage and without perforation; Gastroesophageal reflux disease, esophagitis presence not specified  -     Continue  current dose omeprazole  -     Case request GI: EGD (ESOPHAGOGASTRODUODENOSCOPY)    Preoperative examination  -     COVID-19 Routine Screening; Future; Expected date: 09/08/2020      Follow up if symptoms worsen or fail to improve.     Patient's Medications   New Prescriptions    No medications on file   Previous Medications    ALBUTEROL (PROVENTIL/VENTOLIN HFA) 90 MCG/ACTUATION INHALER    Inhale 2 puffs into the lungs every 4 (four) hours as needed for Wheezing or Shortness of Breath.    CETIRIZINE (ZYRTEC) 10 MG TABLET    Take 1 tablet (10 mg total) by mouth once daily.    DULOXETINE (CYMBALTA) 60 MG CAPSULE    Take 1 capsule (60 mg total) by mouth once daily. Increased dose    FERROUS SULFATE (FEOSOL) 325 MG (65 MG IRON) TAB TABLET    Take 1 tablet (325 mg total) by mouth every other day.    FUROSEMIDE (LASIX) 20 MG TABLET    TAKE 1 TABLET BY MOUTH ONCE DAILY AS NEEDED FOR  LEG  SWELLING    GABAPENTIN (NEURONTIN) 100 MG CAPSULE    2 tablets nighttime 1 tablet in AM    LIDOCAINE (LIDODERM) 5 %    Place 1 patch onto the skin once daily. Remove & Discard patch within 12 hours or as directed by MD    LINACLOTIDE (LINZESS) 145 MCG CAP CAPSULE    Take 1 capsule (145 mcg total) by mouth once daily.    LOSARTAN (COZAAR) 100 MG TABLET    Take 1 tablet (100 mg total) by mouth once daily.    METOPROLOL SUCCINATE (TOPROL-XL) 100 MG 24 HR TABLET    Take 1 tablet (100 mg total) by mouth once daily.    NITROGLYCERIN (NITROSTAT) 0.4 MG SL TABLET    Place 1 tablet (0.4 mg total) under the tongue every 5 (five) minutes as needed for Chest pain.    OMEPRAZOLE (PRILOSEC) 40 MG CAPSULE    Take 1 capsule (40 mg total) by mouth every morning.    TRIAMTERENE-HYDROCHLOROTHIAZIDE 37.5-25 MG (MAXZIDE-25) 37.5-25 MG PER TABLET    Take 1 tablet by mouth once daily.   Modified Medications    No medications on file   Discontinued Medications    No medications on file

## 2020-09-08 NOTE — PATIENT INSTRUCTIONS
EGD Prep Instructions    [facility]  [address] in [facility] Office [phone]  Endoscopy Lab [phone]    You are scheduled for an EGD with Dr. Parish on 10/9/20 at [facility].  You will enter through the [facility] Entrance and check in at Same Day Surgery.    Nothing to eat or drink after midnight before the procedure.  You MAY brush your teeth.    You MAY take your blood pressure, heart, and seizure medication on the morning of the procedure, with a SIP of water.  Hold ALL other medications until after the procedure.    If you are on blood thinners THAT YOU HAVE BEEN INSTRUCTED TO HOLD BY YOUR DOCTOR FOR THIS PROCEDURE, then do NOT take this the morning of your EGD.  Do NOT stop these medications on your own, they must be approved to be held by your doctor.  Your EGD can NOT be done if you are on these medications.  Examples of blood thinners include: Coumadin, Aggrenox, Plavix, Pradaxa, Reapro, Pletal, Xarelto, Ticagrelor, Brilinta, Eliquis, and high dose aspirin (325 mg).  You do not have to stop baby aspirin 81 mg.    You will receive a call 2-3 days before your EGD to tell you the time to arrive.  If you have not received a call by the day before your procedure, call the Endoscopy Lab at [phone].

## 2020-09-08 NOTE — LETTER
September 8, 2020      Cipriano Carrera MD  [address]  [place] [address]           [facility]  [address] [place]   [place] [address]  Phone: [phone]  Fax: [phone]          Patient: Bell yLnn   MR Number: [id number]   YOB: 1955   Date of Visit: 9/8/2020       Dear Dr. Cipriano Carrera:    Thank you for referring Bell Lynn to me for evaluation. Attached you will find relevant portions of my assessment and plan of care.    If you have questions, please do not hesitate to call me. I look forward to following Bell Lynn along with you.    Sincerely,    EUGENIE Chou    Enclosure  CC:  No Recipients    If you would like to receive this communication electronically, please contact [email or web address] or [phone] to request more information on [employer] Link access.    For providers and/or their staff who would like to refer a patient to [facility], please contact us through our one-stop-shop provider referral line, [facility], at [phone].    If you feel you have received this communication in error or would no longer like to receive these types of communications, please e-mail [email or web address]

## 2020-10-06 ENCOUNTER — LAB VISIT (OUTPATIENT)
Dept: SURGERY | Facility: CLINIC | Age: 65
End: 2020-10-06
Payer: MEDICAID

## 2020-10-06 DIAGNOSIS — Z01.818 PREOPERATIVE EXAMINATION: ICD-10-CM

## 2020-10-06 LAB — SARS-COV-2 RNA RESP QL NAA+PROBE: NOT DETECTED

## 2020-10-06 PROCEDURE — U0003 INFECTIOUS AGENT DETECTION BY NUCLEIC ACID (DNA OR RNA); SEVERE ACUTE RESPIRATORY SYNDROME CORONAVIRUS 2 (SARS-COV-2) (CORONAVIRUS DISEASE [COVID-19]), AMPLIFIED PROBE TECHNIQUE, MAKING USE OF HIGH THROUGHPUT TECHNOLOGIES AS DESCRIBED BY CMS-2020-01-R: HCPCS

## 2020-10-08 ENCOUNTER — TELEPHONE (OUTPATIENT)
Dept: SURGERY | Facility: CLINIC | Age: 65
End: 2020-10-08

## 2020-10-08 DIAGNOSIS — Z12.31 BREAST CANCER SCREENING BY MAMMOGRAM: Primary | ICD-10-CM

## 2020-10-08 NOTE — TELEPHONE ENCOUNTER
----- Message from Maura Carson sent at 10/8/2020  1:49 PM CDT -----  Patient has referral to see Dr. Jean for Gastroesophageal reflux disease. Patient has been referred by Dr. Parish. Please call patient to schedule. Thanks

## 2020-10-08 NOTE — TELEPHONE ENCOUNTER
Returned call to the patient regarding referral. She declined appointment due to location of our office. She stated It would be a long drive for her. I suggested she reach out to her PCP for providers nearby. All questions answered and patient voiced understanding.

## 2020-10-09 ENCOUNTER — TELEPHONE (OUTPATIENT)
Dept: GASTROENTEROLOGY | Facility: CLINIC | Age: 65
End: 2020-10-09

## 2020-10-09 ENCOUNTER — TELEPHONE (OUTPATIENT)
Dept: FAMILY MEDICINE | Facility: CLINIC | Age: 65
End: 2020-10-09

## 2020-10-09 NOTE — TELEPHONE ENCOUNTER
----- Message from Nell Parish MD sent at 10/9/2020  2:52 PM CDT -----  Biopsies negative for malignancy. This is highly likely an NSAID induced ulcer. Stop NSAIDS, cont PPI, and repeat 3-6 months to check for healing.

## 2020-10-22 ENCOUNTER — HOSPITAL ENCOUNTER (OUTPATIENT)
Dept: RADIOLOGY | Facility: HOSPITAL | Age: 65
Discharge: HOME | End: 2020-10-22
Attending: INTERNAL MEDICINE
Payer: MEDICAID

## 2020-10-22 ENCOUNTER — IMMUNIZATION (OUTPATIENT)
Dept: FAMILY MEDICINE | Facility: CLINIC | Age: 65
End: 2020-10-22
Payer: MEDICAID

## 2020-10-22 DIAGNOSIS — Z12.31 BREAST CANCER SCREENING BY MAMMOGRAM: ICD-10-CM

## 2020-10-22 DIAGNOSIS — Z23 NEED FOR INFLUENZA VACCINATION: Primary | ICD-10-CM

## 2020-10-22 PROCEDURE — 90471 IMMUNIZATION ADMIN: CPT | Mod: PBBFAC,PO | Performed by: INTERNAL MEDICINE

## 2020-10-22 PROCEDURE — 77067 SCR MAMMO BI INCL CAD: CPT | Mod: 26,,, | Performed by: RADIOLOGY

## 2020-10-22 PROCEDURE — 99499 NO LOS: ICD-10-PCS | Mod: S$PBB,,, | Performed by: INTERNAL MEDICINE

## 2020-10-22 PROCEDURE — 77063 MAMMO DIGITAL SCREENING BILAT WITH TOMO: ICD-10-PCS | Mod: 26,,, | Performed by: RADIOLOGY

## 2020-10-22 PROCEDURE — 77067 MAMMO DIGITAL SCREENING BILAT WITH TOMO: ICD-10-PCS | Mod: 26,,, | Performed by: RADIOLOGY

## 2020-10-22 PROCEDURE — 99499 UNLISTED E&M SERVICE: CPT | Mod: S$PBB,,, | Performed by: INTERNAL MEDICINE

## 2020-10-22 PROCEDURE — 77067 SCR MAMMO BI INCL CAD: CPT | Mod: TC,PO

## 2020-10-22 PROCEDURE — 77063 BREAST TOMOSYNTHESIS BI: CPT | Mod: 26,,, | Performed by: RADIOLOGY

## 2020-10-26 ENCOUNTER — TELEPHONE (OUTPATIENT)
Dept: ORTHOPEDICS | Facility: CLINIC | Age: 65
End: 2020-10-26

## 2020-10-26 DIAGNOSIS — M25.561 RIGHT KNEE PAIN, UNSPECIFIED CHRONICITY: Primary | ICD-10-CM

## 2020-10-27 ENCOUNTER — OFFICE VISIT (OUTPATIENT)
Dept: ORTHOPEDICS | Facility: CLINIC | Age: 65
End: 2020-10-27
Payer: MEDICAID

## 2020-10-27 VITALS — WEIGHT: 212.06 LBS | HEIGHT: 64 IN | BODY MASS INDEX: 36.2 KG/M2

## 2020-10-27 DIAGNOSIS — M17.0 PRIMARY OSTEOARTHRITIS OF BOTH KNEES: Primary | ICD-10-CM

## 2020-10-27 DIAGNOSIS — M25.561 CHRONIC PAIN OF RIGHT KNEE: ICD-10-CM

## 2020-10-27 DIAGNOSIS — G89.29 CHRONIC PAIN OF RIGHT KNEE: ICD-10-CM

## 2020-10-27 PROCEDURE — 99999 PR PBB SHADOW E&M-EST. PATIENT-LVL IV: ICD-10-PCS | Mod: PBBFAC,,, | Performed by: ORTHOPAEDIC SURGERY

## 2020-10-27 PROCEDURE — 99202 OFFICE O/P NEW SF 15 MIN: CPT | Mod: S$PBB,,, | Performed by: ORTHOPAEDIC SURGERY

## 2020-10-27 PROCEDURE — 99999 PR PBB SHADOW E&M-EST. PATIENT-LVL IV: CPT | Mod: PBBFAC,,, | Performed by: ORTHOPAEDIC SURGERY

## 2020-10-27 PROCEDURE — 99202 PR OFFICE/OUTPT VISIT, NEW, LEVL II, 15-29 MIN: ICD-10-PCS | Mod: S$PBB,,, | Performed by: ORTHOPAEDIC SURGERY

## 2020-10-27 PROCEDURE — 99214 OFFICE O/P EST MOD 30 MIN: CPT | Mod: PBBFAC,PN | Performed by: ORTHOPAEDIC SURGERY

## 2020-10-27 NOTE — LETTER
October 27, 2020      Nell Parish MD  [address]  [address]   [facility] [address]           [facility]  [address] [place] [address]  [facility] [address]  Phone: [phone]  Fax: [phone]          Patient: Bell Lynn   MR Number: [id number]   YOB: 1955   Date of Visit: 10/27/2020       Dear Dr. Nell Parish:    Thank you for referring Bell Lynn to me for evaluation. Attached you will find relevant portions of my assessment and plan of care.    If you have questions, please do not hesitate to call me. I look forward to following Bell Lynn along with you.    Sincerely,    Jonnathan Tavera MD    Enclosure  CC:  No Recipients    If you would like to receive this communication electronically, please contact [email or web address] or [phone] to request more information on [employer] Link access.    For providers and/or their staff who would like to refer a patient to [facility], please contact us through our one-stop-shop provider referral line, [facility], at [phone].    If you feel you have received this communication in error or would no longer like to receive these types of communications, please e-mail [email or web address]

## 2020-10-27 NOTE — PROGRESS NOTES
Subjective:      Patient ID: Bell Lynn is a 65 y.o. female.    Chief Complaint: Consult and Knee Pain (left )    HPI     They have experienced problems with their left knee over the past Many years.  The patient complains of recent acute increasing medial pain without any specific injury.  Pain is located medially Associated symptoms include giving way, pseudolocking and gelling.  Symptoms are aggravated by walking. They have been treated with previous injection with some benefit.   Symptoms have recently stayed the same. Ambulation reportedly has been impaired. Self care ADLs are painful.     Review of Systems   Constitution: Negative for fever and weight loss.   HENT: Negative for congestion.    Eyes: Negative for visual disturbance.   Cardiovascular: Negative for chest pain.   Respiratory: Negative for shortness of breath.    Hematologic/Lymphatic: Negative for bleeding problem. Does not bruise/bleed easily.   Skin: Negative for poor wound healing.   Musculoskeletal: Positive for joint pain.   Gastrointestinal: Negative for abdominal pain.   Genitourinary: Negative for dysuria.   Neurological: Negative for seizures.   Psychiatric/Behavioral: Negative for altered mental status.   Allergic/Immunologic: Negative for persistent infections.         Objective:      Ortho/SPM Exam      Left knee    [unfilled]    The patient is not in acute distress.   Sclerae normal  Body habitus is overweight.  Respiratory distress:  none   The patient walks with a limp.  Hip irritability  negative.   The skin over the knee is intact.  Knee effusion trace  Tendernes is located medially  Range of motion- Flexion 120 deg, Extension 5 deg,   Ligament laxity exam:   MCL 2+   Lachman 0   Post sag  0    LCL 0  Patellar apprehension negative.  Popliteal cyst positive  Patellar crepitation present.  Meniscal irritability not applicable  Pulses DP present, PT present.  Motor normal 5/5 strength in all tested muscle groups.   Sensory  normal.    The right knee is identical      I reviewed the relevant imaging for the patient's condition:  Both knees have very advanced medial narrowing with osteophytes and severe patellofemoral DJD        Assessment:       Encounter Diagnoses   Name Primary?    Chronic pain of right knee     Primary osteoarthritis of both knees Yes        The condition is structurally advanced in both knees.  The left knee has a recent, acute flare which is the principal reason for this visit.      Plan:       Bell was seen today for consult and knee pain.    Diagnoses and all orders for this visit:    Primary osteoarthritis of both knees    Chronic pain of right knee  -     Ambulatory referral/consult to Orthopedics     I explained my diagnostic impression and the reasoning behind it in detail, using layman's terms.  Models and/or pictures were used to help in the explanation.    Hinged knee brace applied to the left knee with usage instructions    I explained the potential role of surgery in the treatment of this condition to the patient.  They understand that if nonsurgical measures do not adequately control symptoms, surgery will be considered in the future.    I explained the general process total knee replacement to the patient she will consider this early next year

## 2021-01-14 ENCOUNTER — OFFICE VISIT (OUTPATIENT)
Dept: CARDIOLOGY | Facility: CLINIC | Age: 66
End: 2021-01-14
Payer: MEDICAID

## 2021-01-14 VITALS
HEART RATE: 81 BPM | OXYGEN SATURATION: 98 % | SYSTOLIC BLOOD PRESSURE: 130 MMHG | WEIGHT: 216.94 LBS | BODY MASS INDEX: 37.04 KG/M2 | DIASTOLIC BLOOD PRESSURE: 80 MMHG | HEIGHT: 64 IN

## 2021-01-14 DIAGNOSIS — R06.09 DOE (DYSPNEA ON EXERTION): ICD-10-CM

## 2021-01-14 DIAGNOSIS — E66.01 SEVERE OBESITY WITH BODY MASS INDEX (BMI) OF 35.0 TO 39.9 WITH SERIOUS COMORBIDITY: ICD-10-CM

## 2021-01-14 DIAGNOSIS — R60.0 PERIPHERAL EDEMA: ICD-10-CM

## 2021-01-14 DIAGNOSIS — R07.89 CHEST PAIN, ATYPICAL: ICD-10-CM

## 2021-01-14 DIAGNOSIS — I10 ESSENTIAL HYPERTENSION: ICD-10-CM

## 2021-01-14 DIAGNOSIS — R00.2 PALPITATIONS: ICD-10-CM

## 2021-01-14 DIAGNOSIS — I10 ESSENTIAL HYPERTENSION: Primary | Chronic | ICD-10-CM

## 2021-01-14 DIAGNOSIS — R06.02 SOB (SHORTNESS OF BREATH): ICD-10-CM

## 2021-01-14 PROCEDURE — 93010 EKG 12-LEAD: ICD-10-PCS | Mod: S$PBB,,, | Performed by: INTERNAL MEDICINE

## 2021-01-14 PROCEDURE — 99214 OFFICE O/P EST MOD 30 MIN: CPT | Mod: S$PBB,,, | Performed by: INTERNAL MEDICINE

## 2021-01-14 PROCEDURE — 99214 PR OFFICE/OUTPT VISIT, EST, LEVL IV, 30-39 MIN: ICD-10-PCS | Mod: S$PBB,,, | Performed by: INTERNAL MEDICINE

## 2021-01-14 PROCEDURE — 93005 ELECTROCARDIOGRAM TRACING: CPT | Mod: PBBFAC | Performed by: INTERNAL MEDICINE

## 2021-01-14 PROCEDURE — 99999 PR PBB SHADOW E&M-EST. PATIENT-LVL IV: CPT | Mod: PBBFAC,,, | Performed by: INTERNAL MEDICINE

## 2021-01-14 PROCEDURE — 99214 OFFICE O/P EST MOD 30 MIN: CPT | Mod: PBBFAC | Performed by: INTERNAL MEDICINE

## 2021-01-14 PROCEDURE — 93010 ELECTROCARDIOGRAM REPORT: CPT | Mod: S$PBB,,, | Performed by: INTERNAL MEDICINE

## 2021-01-14 PROCEDURE — 99999 PR PBB SHADOW E&M-EST. PATIENT-LVL IV: ICD-10-PCS | Mod: PBBFAC,,, | Performed by: INTERNAL MEDICINE

## 2021-01-26 ENCOUNTER — HOSPITAL ENCOUNTER (OUTPATIENT)
Dept: CARDIOLOGY | Facility: HOSPITAL | Age: 66
Discharge: HOME | End: 2021-01-26
Attending: INTERNAL MEDICINE
Payer: MEDICAID

## 2021-01-26 ENCOUNTER — HOSPITAL ENCOUNTER (OUTPATIENT)
Dept: RADIOLOGY | Facility: HOSPITAL | Age: 66
Discharge: HOME | End: 2021-01-26
Attending: INTERNAL MEDICINE
Payer: MEDICAID

## 2021-01-26 DIAGNOSIS — R00.2 PALPITATIONS: ICD-10-CM

## 2021-01-26 DIAGNOSIS — R60.0 PERIPHERAL EDEMA: ICD-10-CM

## 2021-01-26 DIAGNOSIS — R07.89 CHEST PAIN, ATYPICAL: ICD-10-CM

## 2021-01-26 DIAGNOSIS — I10 ESSENTIAL HYPERTENSION: Chronic | ICD-10-CM

## 2021-01-26 DIAGNOSIS — E66.01 SEVERE OBESITY WITH BODY MASS INDEX (BMI) OF 35.0 TO 39.9 WITH SERIOUS COMORBIDITY: ICD-10-CM

## 2021-01-26 DIAGNOSIS — I10 ESSENTIAL HYPERTENSION: ICD-10-CM

## 2021-01-26 DIAGNOSIS — R06.09 DOE (DYSPNEA ON EXERTION): ICD-10-CM

## 2021-01-26 LAB
AORTIC ROOT ANNULUS: 3.2 CM
AORTIC VALVE CUSP SEPERATION: 2.05 CM
ASCENDING AORTA: 2.66 CM
AV INDEX (PROSTH): 0.8
AV MEAN GRADIENT: 3 MMHG
AV PEAK GRADIENT: 6 MMHG
AV VALVE AREA: 2.74 CM2
AV VELOCITY RATIO: 0.79
CV ECHO LV RWT: 0.42 CM
CV STRESS BASE HR: 77 BPM
DOP CALC AO PEAK VEL: 1.2 M/S
DOP CALC AO VTI: 23.15 CM
DOP CALC LVOT AREA: 3.4 CM2
DOP CALC LVOT DIAMETER: 2.09 CM
DOP CALC LVOT PEAK VEL: 0.95 M/S
DOP CALC LVOT STROKE VOLUME: 63.4 CM3
DOP CALCLVOT PEAK VEL VTI: 18.49 CM
E WAVE DECELERATION TIME: 177.84 MSEC
E/A RATIO: 0.84
ECHO LV POSTERIOR WALL: 1.01 CM (ref 0.6–1.1)
FRACTIONAL SHORTENING: 41 % (ref 28–44)
INTERVENTRICULAR SEPTUM: 1.01 CM (ref 0.6–1.1)
IVRT: 96.89 MSEC
LA MAJOR: 4.66 CM
LA MINOR: 4.68 CM
LA WIDTH: 4.68 CM
LEFT ATRIUM SIZE: 4.14 CM
LEFT ATRIUM VOLUME: 76.91 CM3
LEFT INTERNAL DIMENSION IN SYSTOLE: 2.84 CM (ref 2.1–4)
LEFT VENTRICLE DIASTOLIC VOLUME: 109.22 ML
LEFT VENTRICLE SYSTOLIC VOLUME: 30.56 ML
LEFT VENTRICULAR INTERNAL DIMENSION IN DIASTOLE: 4.83 CM (ref 3.5–6)
LEFT VENTRICULAR MASS: 174.27 G
MV PEAK A VEL: 0.91 M/S
MV PEAK E VEL: 0.76 M/S
NUC STRESS EJECTION FRACTION: 54 %
OHS CV CPX 85 PERCENT MAX PREDICTED HEART RATE MALE: 127
OHS CV CPX MAX PREDICTED HEART RATE: 150
OHS CV CPX PATIENT IS FEMALE: 1
OHS CV CPX PATIENT IS MALE: 0
OHS CV CPX PEAK HEAR RATE: 94 BPM
OHS CV CPX PERCENT MAX PREDICTED HEART RATE ACHIEVED: 63
PISA TR MAX VEL: 0.78 M/S
PV PEAK VELOCITY: 0.92 CM/S
RA MAJOR: 5.08 CM
RA PRESSURE: 3 MMHG
RA WIDTH: 3.35 CM
RIGHT VENTRICULAR END-DIASTOLIC DIMENSION: 3.59 CM
RV TISSUE DOPPLER FREE WALL SYSTOLIC VELOCITY 1 (APICAL 4 CHAMBER VIEW): 16.7 CM/S
SINUS: 2.67 CM
STJ: 2.33 CM
TR MAX PG: 2 MMHG
TRICUSPID ANNULAR PLANE SYSTOLIC EXCURSION: 2.34 CM
TV REST PULMONARY ARTERY PRESSURE: 5 MMHG

## 2021-01-26 PROCEDURE — 63600175 PHARM REV CODE 636 W HCPCS: Performed by: INTERNAL MEDICINE

## 2021-01-26 PROCEDURE — 78452 HT MUSCLE IMAGE SPECT MULT: CPT | Mod: 26,,, | Performed by: INTERNAL MEDICINE

## 2021-01-26 PROCEDURE — 93016 STRESS TEST WITH MYOCARDIAL PERFUSION (CUPID ONLY): ICD-10-PCS | Mod: ,,, | Performed by: INTERNAL MEDICINE

## 2021-01-26 PROCEDURE — 78452 STRESS TEST WITH MYOCARDIAL PERFUSION (CUPID ONLY): ICD-10-PCS | Mod: 26,,, | Performed by: INTERNAL MEDICINE

## 2021-01-26 PROCEDURE — 93227 HOLTER MONITOR - 24 HOUR (CUPID ONLY): ICD-10-PCS | Mod: ,,, | Performed by: INTERNAL MEDICINE

## 2021-01-26 PROCEDURE — 93227 XTRNL ECG REC<48 HR R&I: CPT | Mod: ,,, | Performed by: INTERNAL MEDICINE

## 2021-01-26 PROCEDURE — 93306 ECHO (CUPID ONLY): ICD-10-PCS | Mod: 26,,, | Performed by: INTERNAL MEDICINE

## 2021-01-26 PROCEDURE — 93306 TTE W/DOPPLER COMPLETE: CPT | Mod: 26,,, | Performed by: INTERNAL MEDICINE

## 2021-01-26 PROCEDURE — 93018 STRESS TEST WITH MYOCARDIAL PERFUSION (CUPID ONLY): ICD-10-PCS | Mod: ,,, | Performed by: INTERNAL MEDICINE

## 2021-01-26 PROCEDURE — 93225 XTRNL ECG REC<48 HRS REC: CPT

## 2021-01-26 PROCEDURE — 93016 CV STRESS TEST SUPVJ ONLY: CPT | Mod: ,,, | Performed by: INTERNAL MEDICINE

## 2021-01-26 PROCEDURE — A9502 TC99M TETROFOSMIN: HCPCS

## 2021-01-26 PROCEDURE — 93017 CV STRESS TEST TRACING ONLY: CPT

## 2021-01-26 PROCEDURE — 93018 CV STRESS TEST I&R ONLY: CPT | Mod: ,,, | Performed by: INTERNAL MEDICINE

## 2021-01-26 PROCEDURE — 78452 HT MUSCLE IMAGE SPECT MULT: CPT

## 2021-01-26 PROCEDURE — 93306 TTE W/DOPPLER COMPLETE: CPT

## 2021-01-26 RX ORDER — REGADENOSON 0.08 MG/ML
0.4 INJECTION, SOLUTION INTRAVENOUS ONCE
Status: COMPLETED | OUTPATIENT
Start: 2021-01-26 | End: 2021-01-26

## 2021-01-26 RX ADMIN — REGADENOSON 0.4 MG: 0.08 INJECTION, SOLUTION INTRAVENOUS at 08:01

## 2021-01-27 LAB
OHS CV EVENT MONITOR DAY: 0
OHS CV HOLTER LENGTH DECIMAL HOURS: 23.98
OHS CV HOLTER LENGTH HOURS: 23
OHS CV HOLTER LENGTH MINUTES: 59

## 2021-02-23 ENCOUNTER — TELEPHONE (OUTPATIENT)
Dept: FAMILY MEDICINE | Facility: CLINIC | Age: 66
End: 2021-02-23

## 2021-02-26 NOTE — TELEPHONE ENCOUNTER
Pt scheduled for labs and appt next week  
Rx refilled x 1 due for NON-fasting OV please have pt set up before this refill runs out.  I will order pre-visit labs. If there are any open labs ordered by other specialists, please also link those to lab visit.  
Price (Do Not Change): 0.00
Detail Level: Simple
Instructions: This plan will send the code FBSE to the PM system.  DO NOT or CHANGE the price.

## 2021-03-18 PROBLEM — R06.09 DOE (DYSPNEA ON EXERTION): Status: RESOLVED | Noted: 2021-01-14 | Resolved: 2021-03-18

## 2021-03-18 PROBLEM — R07.89 CHEST PAIN, ATYPICAL: Status: RESOLVED | Noted: 2021-01-14 | Resolved: 2021-03-18

## 2021-03-18 PROBLEM — R10.13 EPIGASTRIC PAIN: Status: RESOLVED | Noted: 2020-09-08 | Resolved: 2021-03-18

## 2021-03-19 ENCOUNTER — LAB VISIT (OUTPATIENT)
Dept: LAB | Facility: HOSPITAL | Age: 66
End: 2021-03-19
Attending: INTERNAL MEDICINE
Payer: MEDICAID

## 2021-03-19 ENCOUNTER — OFFICE VISIT (OUTPATIENT)
Dept: FAMILY MEDICINE | Facility: CLINIC | Age: 66
End: 2021-03-19
Payer: MEDICAID

## 2021-03-19 VITALS
OXYGEN SATURATION: 96 % | HEIGHT: 64 IN | SYSTOLIC BLOOD PRESSURE: 136 MMHG | HEART RATE: 80 BPM | WEIGHT: 217.06 LBS | TEMPERATURE: 99 F | BODY MASS INDEX: 37.06 KG/M2 | DIASTOLIC BLOOD PRESSURE: 86 MMHG

## 2021-03-19 DIAGNOSIS — R60.0 PERIPHERAL EDEMA: ICD-10-CM

## 2021-03-19 DIAGNOSIS — K25.7 CHRONIC GASTRIC ULCER WITHOUT HEMORRHAGE AND WITHOUT PERFORATION: ICD-10-CM

## 2021-03-19 DIAGNOSIS — F33.0 MILD RECURRENT MAJOR DEPRESSION: Chronic | ICD-10-CM

## 2021-03-19 DIAGNOSIS — M79.7 FIBROMYALGIA: ICD-10-CM

## 2021-03-19 DIAGNOSIS — D50.9 IRON DEFICIENCY ANEMIA, UNSPECIFIED IRON DEFICIENCY ANEMIA TYPE: ICD-10-CM

## 2021-03-19 DIAGNOSIS — M17.12 OSTEOARTHRITIS OF LEFT KNEE, UNSPECIFIED OSTEOARTHRITIS TYPE: ICD-10-CM

## 2021-03-19 DIAGNOSIS — K59.09 CHRONIC CONSTIPATION: ICD-10-CM

## 2021-03-19 DIAGNOSIS — I10 ESSENTIAL HYPERTENSION: Chronic | ICD-10-CM

## 2021-03-19 DIAGNOSIS — S39.012A STRAIN OF LUMBAR REGION, INITIAL ENCOUNTER: Primary | ICD-10-CM

## 2021-03-19 DIAGNOSIS — K63.5 POLYP OF COLON, UNSPECIFIED PART OF COLON, UNSPECIFIED TYPE: ICD-10-CM

## 2021-03-19 DIAGNOSIS — J45.40 MODERATE PERSISTENT CHRONIC ASTHMA WITHOUT COMPLICATION: ICD-10-CM

## 2021-03-19 DIAGNOSIS — K25.9 NSAID-INDUCED GASTRIC ULCER: ICD-10-CM

## 2021-03-19 DIAGNOSIS — T39.395A NSAID-INDUCED GASTRIC ULCER: ICD-10-CM

## 2021-03-19 DIAGNOSIS — Z11.4 ENCOUNTER FOR SCREENING FOR HIV: ICD-10-CM

## 2021-03-19 LAB
ALBUMIN SERPL BCP-MCNC: 3.6 G/DL (ref 3.5–5.2)
ALP SERPL-CCNC: 106 U/L (ref 55–135)
ALT SERPL W/O P-5'-P-CCNC: 17 U/L (ref 10–44)
ANION GAP SERPL CALC-SCNC: 13 MMOL/L (ref 8–16)
AST SERPL-CCNC: 23 U/L (ref 10–40)
BASOPHILS # BLD AUTO: 0.06 K/UL (ref 0–0.2)
BASOPHILS NFR BLD: 0.5 % (ref 0–1.9)
BILIRUB SERPL-MCNC: 0.2 MG/DL (ref 0.1–1)
BUN SERPL-MCNC: 8 MG/DL (ref 8–23)
CALCIUM SERPL-MCNC: 9.8 MG/DL (ref 8.7–10.5)
CHLORIDE SERPL-SCNC: 102 MMOL/L (ref 95–110)
CO2 SERPL-SCNC: 26 MMOL/L (ref 23–29)
CREAT SERPL-MCNC: 0.8 MG/DL (ref 0.5–1.4)
DIFFERENTIAL METHOD: ABNORMAL
EOSINOPHIL # BLD AUTO: 0.4 K/UL (ref 0–0.5)
EOSINOPHIL NFR BLD: 3.5 % (ref 0–8)
ERYTHROCYTE [DISTWIDTH] IN BLOOD BY AUTOMATED COUNT: 16.2 % (ref 11.5–14.5)
EST. GFR  (AFRICAN AMERICAN): >60 ML/MIN/1.73 M^2
EST. GFR  (NON AFRICAN AMERICAN): >60 ML/MIN/1.73 M^2
FERRITIN SERPL-MCNC: 13 NG/ML (ref 20–300)
GLUCOSE SERPL-MCNC: 88 MG/DL (ref 70–110)
HCT VFR BLD AUTO: 41.1 % (ref 37–48.5)
HGB BLD-MCNC: 12.7 G/DL (ref 12–16)
IMM GRANULOCYTES # BLD AUTO: 0.04 K/UL (ref 0–0.04)
IMM GRANULOCYTES NFR BLD AUTO: 0.4 % (ref 0–0.5)
IRON SERPL-MCNC: 40 UG/DL (ref 30–160)
LYMPHOCYTES # BLD AUTO: 3.7 K/UL (ref 1–4.8)
LYMPHOCYTES NFR BLD: 33.6 % (ref 18–48)
MCH RBC QN AUTO: 27.3 PG (ref 27–31)
MCHC RBC AUTO-ENTMCNC: 30.9 G/DL (ref 32–36)
MCV RBC AUTO: 88 FL (ref 82–98)
MONOCYTES # BLD AUTO: 0.7 K/UL (ref 0.3–1)
MONOCYTES NFR BLD: 6.7 % (ref 4–15)
NEUTROPHILS # BLD AUTO: 6.1 K/UL (ref 1.8–7.7)
NEUTROPHILS NFR BLD: 55.3 % (ref 38–73)
NRBC BLD-RTO: 0 /100 WBC
PLATELET # BLD AUTO: 276 K/UL (ref 150–350)
PMV BLD AUTO: 13.6 FL (ref 9.2–12.9)
POTASSIUM SERPL-SCNC: 3.6 MMOL/L (ref 3.5–5.1)
PROT SERPL-MCNC: 8.2 G/DL (ref 6–8.4)
RBC # BLD AUTO: 4.66 M/UL (ref 4–5.4)
SATURATED IRON: 11 % (ref 20–50)
SODIUM SERPL-SCNC: 141 MMOL/L (ref 136–145)
TOTAL IRON BINDING CAPACITY: 367 UG/DL (ref 250–450)
TRANSFERRIN SERPL-MCNC: 248 MG/DL (ref 200–375)
WBC # BLD AUTO: 11.01 K/UL (ref 3.9–12.7)

## 2021-03-19 PROCEDURE — 80053 COMPREHEN METABOLIC PANEL: CPT | Performed by: INTERNAL MEDICINE

## 2021-03-19 PROCEDURE — 82728 ASSAY OF FERRITIN: CPT | Performed by: INTERNAL MEDICINE

## 2021-03-19 PROCEDURE — 99999 PR PBB SHADOW E&M-EST. PATIENT-LVL IV: ICD-10-PCS | Mod: PBBFAC,,, | Performed by: INTERNAL MEDICINE

## 2021-03-19 PROCEDURE — 99999 PR PBB SHADOW E&M-EST. PATIENT-LVL IV: CPT | Mod: PBBFAC,,, | Performed by: INTERNAL MEDICINE

## 2021-03-19 PROCEDURE — 99214 OFFICE O/P EST MOD 30 MIN: CPT | Mod: S$PBB,,, | Performed by: INTERNAL MEDICINE

## 2021-03-19 PROCEDURE — 85025 COMPLETE CBC W/AUTO DIFF WBC: CPT | Performed by: INTERNAL MEDICINE

## 2021-03-19 PROCEDURE — 83540 ASSAY OF IRON: CPT | Performed by: INTERNAL MEDICINE

## 2021-03-19 PROCEDURE — 99214 PR OFFICE/OUTPT VISIT, EST, LEVL IV, 30-39 MIN: ICD-10-PCS | Mod: S$PBB,,, | Performed by: INTERNAL MEDICINE

## 2021-03-19 PROCEDURE — 99214 OFFICE O/P EST MOD 30 MIN: CPT | Mod: PBBFAC,PO | Performed by: INTERNAL MEDICINE

## 2021-03-19 PROCEDURE — 86703 HIV-1/HIV-2 1 RESULT ANTBDY: CPT | Performed by: INTERNAL MEDICINE

## 2021-03-19 PROCEDURE — 36415 COLL VENOUS BLD VENIPUNCTURE: CPT | Mod: PO | Performed by: INTERNAL MEDICINE

## 2021-03-19 RX ORDER — METOPROLOL SUCCINATE 100 MG/1
100 TABLET, EXTENDED RELEASE ORAL DAILY
Qty: 90 TABLET | Refills: 3 | Status: SHIPPED | OUTPATIENT
Start: 2021-03-19 | End: 2021-05-26 | Stop reason: SDUPTHER

## 2021-03-19 RX ORDER — CYCLOBENZAPRINE HCL 10 MG
10 TABLET ORAL NIGHTLY
Qty: 30 TABLET | Refills: 0 | Status: SHIPPED | OUTPATIENT
Start: 2021-03-19 | End: 2021-08-17 | Stop reason: SDUPTHER

## 2021-03-19 RX ORDER — ALBUTEROL SULFATE 90 UG/1
2 AEROSOL, METERED RESPIRATORY (INHALATION) EVERY 4 HOURS PRN
Qty: 18 G | Refills: 2 | Status: SHIPPED | OUTPATIENT
Start: 2021-03-19 | End: 2021-05-26 | Stop reason: SDUPTHER

## 2021-03-19 RX ORDER — FUROSEMIDE 20 MG/1
TABLET ORAL
Qty: 90 TABLET | Refills: 3 | Status: SHIPPED | OUTPATIENT
Start: 2021-03-19 | End: 2021-05-26 | Stop reason: SDUPTHER

## 2021-03-19 RX ORDER — TRIAMTERENE/HYDROCHLOROTHIAZID 37.5-25 MG
1 TABLET ORAL DAILY
Qty: 90 TABLET | Refills: 3 | Status: SHIPPED | OUTPATIENT
Start: 2021-03-19 | End: 2021-05-26 | Stop reason: SDUPTHER

## 2021-03-19 RX ORDER — LOSARTAN POTASSIUM 100 MG/1
100 TABLET ORAL DAILY
Qty: 90 TABLET | Refills: 3 | Status: SHIPPED | OUTPATIENT
Start: 2021-03-19 | End: 2021-05-26 | Stop reason: SDUPTHER

## 2021-03-19 RX ORDER — DULOXETIN HYDROCHLORIDE 60 MG/1
60 CAPSULE, DELAYED RELEASE ORAL DAILY
Qty: 90 CAPSULE | Refills: 3 | Status: SHIPPED | OUTPATIENT
Start: 2021-03-19 | End: 2021-05-26 | Stop reason: SDUPTHER

## 2021-03-19 RX ORDER — GABAPENTIN 100 MG/1
CAPSULE ORAL
Qty: 270 CAPSULE | Refills: 3 | Status: SHIPPED | OUTPATIENT
Start: 2021-03-19 | End: 2021-05-26 | Stop reason: SDUPTHER

## 2021-03-22 LAB — HIV 1+2 AB+HIV1 P24 AG SERPL QL IA: NEGATIVE

## 2021-06-11 ENCOUNTER — PATIENT MESSAGE (OUTPATIENT)
Dept: GASTROENTEROLOGY | Facility: CLINIC | Age: 66
End: 2021-06-11

## 2021-06-11 ENCOUNTER — TELEPHONE (OUTPATIENT)
Dept: GASTROENTEROLOGY | Facility: CLINIC | Age: 66
End: 2021-06-11

## 2021-06-30 PROBLEM — K25.9 GASTRIC ULCER: Status: ACTIVE | Noted: 2021-06-30

## 2021-07-12 ENCOUNTER — TELEPHONE (OUTPATIENT)
Dept: GASTROENTEROLOGY | Facility: CLINIC | Age: 66
End: 2021-07-12

## 2021-07-13 ENCOUNTER — OFFICE VISIT (OUTPATIENT)
Dept: ORTHOPEDICS | Facility: CLINIC | Age: 66
End: 2021-07-13
Payer: COMMERCIAL

## 2021-07-13 ENCOUNTER — PATIENT OUTREACH (OUTPATIENT)
Dept: ADMINISTRATIVE | Facility: OTHER | Age: 66
End: 2021-07-13

## 2021-07-13 VITALS — BODY MASS INDEX: 36.56 KG/M2 | WEIGHT: 213 LBS | RESPIRATION RATE: 18 BRPM

## 2021-07-13 DIAGNOSIS — M17.0 PRIMARY OSTEOARTHRITIS OF BOTH KNEES: Primary | ICD-10-CM

## 2021-07-13 PROCEDURE — 1101F PR PT FALLS ASSESS DOC 0-1 FALLS W/OUT INJ PAST YR: ICD-10-PCS | Mod: CPTII,S$GLB,, | Performed by: ORTHOPAEDIC SURGERY

## 2021-07-13 PROCEDURE — 3008F PR BODY MASS INDEX (BMI) DOCUMENTED: ICD-10-PCS | Mod: CPTII,S$GLB,, | Performed by: ORTHOPAEDIC SURGERY

## 2021-07-13 PROCEDURE — 3008F BODY MASS INDEX DOCD: CPT | Mod: CPTII,S$GLB,, | Performed by: ORTHOPAEDIC SURGERY

## 2021-07-13 PROCEDURE — 1101F PT FALLS ASSESS-DOCD LE1/YR: CPT | Mod: CPTII,S$GLB,, | Performed by: ORTHOPAEDIC SURGERY

## 2021-07-13 PROCEDURE — 1125F AMNT PAIN NOTED PAIN PRSNT: CPT | Mod: S$GLB,,, | Performed by: ORTHOPAEDIC SURGERY

## 2021-07-13 PROCEDURE — 3288F FALL RISK ASSESSMENT DOCD: CPT | Mod: CPTII,S$GLB,, | Performed by: ORTHOPAEDIC SURGERY

## 2021-07-13 PROCEDURE — 99999 PR PBB SHADOW E&M-EST. PATIENT-LVL III: CPT | Mod: PBBFAC,,, | Performed by: ORTHOPAEDIC SURGERY

## 2021-07-13 PROCEDURE — 99499 UNLISTED E&M SERVICE: CPT | Mod: S$GLB,,, | Performed by: ORTHOPAEDIC SURGERY

## 2021-07-13 PROCEDURE — 99499 NO LOS: ICD-10-PCS | Mod: S$GLB,,, | Performed by: ORTHOPAEDIC SURGERY

## 2021-07-13 PROCEDURE — 20610 DRAIN/INJ JOINT/BURSA W/O US: CPT | Mod: 50,S$GLB,, | Performed by: ORTHOPAEDIC SURGERY

## 2021-07-13 PROCEDURE — 3288F PR FALLS RISK ASSESSMENT DOCUMENTED: ICD-10-PCS | Mod: CPTII,S$GLB,, | Performed by: ORTHOPAEDIC SURGERY

## 2021-07-13 PROCEDURE — 99999 PR PBB SHADOW E&M-EST. PATIENT-LVL III: ICD-10-PCS | Mod: PBBFAC,,, | Performed by: ORTHOPAEDIC SURGERY

## 2021-07-13 PROCEDURE — 1125F PR PAIN SEVERITY QUANTIFIED, PAIN PRESENT: ICD-10-PCS | Mod: S$GLB,,, | Performed by: ORTHOPAEDIC SURGERY

## 2021-07-13 PROCEDURE — 20610 PR DRAIN/INJECT LARGE JOINT/BURSA: ICD-10-PCS | Mod: 50,S$GLB,, | Performed by: ORTHOPAEDIC SURGERY

## 2021-07-13 RX ORDER — TRIAMCINOLONE ACETONIDE 40 MG/ML
80 INJECTION, SUSPENSION INTRA-ARTICULAR; INTRAMUSCULAR
Status: DISCONTINUED | OUTPATIENT
Start: 2021-07-13 | End: 2021-07-13 | Stop reason: HOSPADM

## 2021-07-13 RX ADMIN — TRIAMCINOLONE ACETONIDE 80 MG: 40 INJECTION, SUSPENSION INTRA-ARTICULAR; INTRAMUSCULAR at 10:07

## 2021-07-16 ENCOUNTER — TELEPHONE (OUTPATIENT)
Dept: GASTROENTEROLOGY | Facility: CLINIC | Age: 66
End: 2021-07-16

## 2021-08-17 ENCOUNTER — OFFICE VISIT (OUTPATIENT)
Dept: FAMILY MEDICINE | Facility: CLINIC | Age: 66
End: 2021-08-17
Payer: MEDICARE

## 2021-08-17 VITALS
SYSTOLIC BLOOD PRESSURE: 134 MMHG | BODY MASS INDEX: 35.53 KG/M2 | DIASTOLIC BLOOD PRESSURE: 84 MMHG | TEMPERATURE: 98 F | OXYGEN SATURATION: 95 % | WEIGHT: 208.13 LBS | HEIGHT: 64 IN | HEART RATE: 66 BPM

## 2021-08-17 DIAGNOSIS — M54.50 CHRONIC MIDLINE LOW BACK PAIN WITHOUT SCIATICA: ICD-10-CM

## 2021-08-17 DIAGNOSIS — I10 ESSENTIAL HYPERTENSION: Chronic | ICD-10-CM

## 2021-08-17 DIAGNOSIS — G89.29 CHRONIC MIDLINE LOW BACK PAIN WITHOUT SCIATICA: ICD-10-CM

## 2021-08-17 DIAGNOSIS — S39.012A STRAIN OF LUMBAR REGION, INITIAL ENCOUNTER: ICD-10-CM

## 2021-08-17 DIAGNOSIS — K59.09 CHRONIC CONSTIPATION: ICD-10-CM

## 2021-08-17 DIAGNOSIS — M79.7 FIBROMYALGIA: ICD-10-CM

## 2021-08-17 DIAGNOSIS — H81.13 BENIGN PAROXYSMAL POSITIONAL VERTIGO DUE TO BILATERAL VESTIBULAR DISORDER: Primary | ICD-10-CM

## 2021-08-17 DIAGNOSIS — R60.0 PERIPHERAL EDEMA: ICD-10-CM

## 2021-08-17 DIAGNOSIS — D50.9 IRON DEFICIENCY ANEMIA, UNSPECIFIED IRON DEFICIENCY ANEMIA TYPE: ICD-10-CM

## 2021-08-17 DIAGNOSIS — J45.40 MODERATE PERSISTENT CHRONIC ASTHMA WITHOUT COMPLICATION: ICD-10-CM

## 2021-08-17 DIAGNOSIS — M17.12 OSTEOARTHRITIS OF LEFT KNEE, UNSPECIFIED OSTEOARTHRITIS TYPE: ICD-10-CM

## 2021-08-17 PROCEDURE — 3008F PR BODY MASS INDEX (BMI) DOCUMENTED: ICD-10-PCS | Mod: CPTII,S$GLB,, | Performed by: FAMILY MEDICINE

## 2021-08-17 PROCEDURE — 99999 PR PBB SHADOW E&M-EST. PATIENT-LVL IV: CPT | Mod: PBBFAC,,, | Performed by: FAMILY MEDICINE

## 2021-08-17 PROCEDURE — 3288F FALL RISK ASSESSMENT DOCD: CPT | Mod: CPTII,S$GLB,, | Performed by: FAMILY MEDICINE

## 2021-08-17 PROCEDURE — 99999 PR PBB SHADOW E&M-EST. PATIENT-LVL IV: ICD-10-PCS | Mod: PBBFAC,,, | Performed by: FAMILY MEDICINE

## 2021-08-17 PROCEDURE — 3079F DIAST BP 80-89 MM HG: CPT | Mod: CPTII,S$GLB,, | Performed by: FAMILY MEDICINE

## 2021-08-17 PROCEDURE — 3079F PR MOST RECENT DIASTOLIC BLOOD PRESSURE 80-89 MM HG: ICD-10-PCS | Mod: CPTII,S$GLB,, | Performed by: FAMILY MEDICINE

## 2021-08-17 PROCEDURE — 1160F RVW MEDS BY RX/DR IN RCRD: CPT | Mod: CPTII,S$GLB,, | Performed by: FAMILY MEDICINE

## 2021-08-17 PROCEDURE — 3008F BODY MASS INDEX DOCD: CPT | Mod: CPTII,S$GLB,, | Performed by: FAMILY MEDICINE

## 2021-08-17 PROCEDURE — 3075F SYST BP GE 130 - 139MM HG: CPT | Mod: CPTII,S$GLB,, | Performed by: FAMILY MEDICINE

## 2021-08-17 PROCEDURE — 99214 PR OFFICE/OUTPT VISIT, EST, LEVL IV, 30-39 MIN: ICD-10-PCS | Mod: S$GLB,,, | Performed by: FAMILY MEDICINE

## 2021-08-17 PROCEDURE — 1159F MED LIST DOCD IN RCRD: CPT | Mod: CPTII,S$GLB,, | Performed by: FAMILY MEDICINE

## 2021-08-17 PROCEDURE — 1159F PR MEDICATION LIST DOCUMENTED IN MEDICAL RECORD: ICD-10-PCS | Mod: CPTII,S$GLB,, | Performed by: FAMILY MEDICINE

## 2021-08-17 PROCEDURE — 1101F PT FALLS ASSESS-DOCD LE1/YR: CPT | Mod: CPTII,S$GLB,, | Performed by: FAMILY MEDICINE

## 2021-08-17 PROCEDURE — 1125F AMNT PAIN NOTED PAIN PRSNT: CPT | Mod: CPTII,S$GLB,, | Performed by: FAMILY MEDICINE

## 2021-08-17 PROCEDURE — 1101F PR PT FALLS ASSESS DOC 0-1 FALLS W/OUT INJ PAST YR: ICD-10-PCS | Mod: CPTII,S$GLB,, | Performed by: FAMILY MEDICINE

## 2021-08-17 PROCEDURE — 1160F PR REVIEW ALL MEDS BY PRESCRIBER/CLIN PHARMACIST DOCUMENTED: ICD-10-PCS | Mod: CPTII,S$GLB,, | Performed by: FAMILY MEDICINE

## 2021-08-17 PROCEDURE — 99214 OFFICE O/P EST MOD 30 MIN: CPT | Mod: S$GLB,,, | Performed by: FAMILY MEDICINE

## 2021-08-17 PROCEDURE — 3288F PR FALLS RISK ASSESSMENT DOCUMENTED: ICD-10-PCS | Mod: CPTII,S$GLB,, | Performed by: FAMILY MEDICINE

## 2021-08-17 PROCEDURE — 1125F PR PAIN SEVERITY QUANTIFIED, PAIN PRESENT: ICD-10-PCS | Mod: CPTII,S$GLB,, | Performed by: FAMILY MEDICINE

## 2021-08-17 PROCEDURE — 3075F PR MOST RECENT SYSTOLIC BLOOD PRESS GE 130-139MM HG: ICD-10-PCS | Mod: CPTII,S$GLB,, | Performed by: FAMILY MEDICINE

## 2021-08-17 RX ORDER — LIDOCAINE 50 MG/G
1 PATCH TOPICAL DAILY
Qty: 30 PATCH | Refills: 5 | Status: SHIPPED | OUTPATIENT
Start: 2021-08-17 | End: 2022-01-15 | Stop reason: SDUPTHER

## 2021-08-17 RX ORDER — FERROUS SULFATE 325(65) MG
325 TABLET ORAL EVERY OTHER DAY
Qty: 30 TABLET | Refills: 5 | Status: CANCELLED | OUTPATIENT
Start: 2021-08-17

## 2021-08-17 RX ORDER — CYCLOBENZAPRINE HCL 10 MG
10 TABLET ORAL NIGHTLY
Qty: 30 TABLET | Refills: 3 | Status: SHIPPED | OUTPATIENT
Start: 2021-08-17 | End: 2021-10-20

## 2021-08-17 RX ORDER — FUROSEMIDE 20 MG/1
TABLET ORAL
Qty: 90 TABLET | Refills: 3 | Status: CANCELLED | OUTPATIENT
Start: 2021-08-17

## 2021-08-17 RX ORDER — ALBUTEROL SULFATE 90 UG/1
2 AEROSOL, METERED RESPIRATORY (INHALATION) EVERY 4 HOURS PRN
Qty: 18 G | Refills: 2 | Status: CANCELLED | OUTPATIENT
Start: 2021-08-17 | End: 2022-02-13

## 2021-08-17 RX ORDER — LOSARTAN POTASSIUM 100 MG/1
100 TABLET ORAL DAILY
Qty: 90 TABLET | Refills: 3 | Status: CANCELLED | OUTPATIENT
Start: 2021-08-17 | End: 2022-08-17

## 2021-08-17 RX ORDER — METOPROLOL SUCCINATE 100 MG/1
100 TABLET, EXTENDED RELEASE ORAL DAILY
Qty: 90 TABLET | Refills: 3 | Status: CANCELLED | OUTPATIENT
Start: 2021-08-17 | End: 2022-02-13

## 2021-08-17 RX ORDER — GABAPENTIN 100 MG/1
CAPSULE ORAL
Qty: 270 CAPSULE | Refills: 3 | Status: CANCELLED | OUTPATIENT
Start: 2021-08-17

## 2021-08-17 RX ORDER — TRIAMTERENE/HYDROCHLOROTHIAZID 37.5-25 MG
1 TABLET ORAL DAILY
Qty: 90 TABLET | Refills: 3 | Status: CANCELLED | OUTPATIENT
Start: 2021-08-17 | End: 2022-08-17

## 2021-08-20 ENCOUNTER — PATIENT OUTREACH (OUTPATIENT)
Dept: ADMINISTRATIVE | Facility: OTHER | Age: 66
End: 2021-08-20

## 2021-08-24 ENCOUNTER — OFFICE VISIT (OUTPATIENT)
Dept: ORTHOPEDICS | Facility: CLINIC | Age: 66
End: 2021-08-24
Payer: MEDICARE

## 2021-08-24 ENCOUNTER — PATIENT MESSAGE (OUTPATIENT)
Dept: ORTHOPEDICS | Facility: CLINIC | Age: 66
End: 2021-08-24

## 2021-08-24 ENCOUNTER — TELEPHONE (OUTPATIENT)
Dept: ORTHOPEDICS | Facility: CLINIC | Age: 66
End: 2021-08-24

## 2021-08-24 VITALS — BODY MASS INDEX: 35.53 KG/M2 | HEIGHT: 64 IN | WEIGHT: 208.13 LBS

## 2021-08-24 DIAGNOSIS — M17.0 PRIMARY OSTEOARTHRITIS OF BOTH KNEES: Primary | ICD-10-CM

## 2021-08-24 DIAGNOSIS — Z41.9 SURGERY, ELECTIVE: ICD-10-CM

## 2021-08-24 DIAGNOSIS — Z96.652 S/P TOTAL KNEE ARTHROPLASTY, LEFT: Primary | ICD-10-CM

## 2021-08-24 PROCEDURE — 3008F BODY MASS INDEX DOCD: CPT | Mod: CPTII,S$GLB,, | Performed by: ORTHOPAEDIC SURGERY

## 2021-08-24 PROCEDURE — 1160F RVW MEDS BY RX/DR IN RCRD: CPT | Mod: CPTII,S$GLB,, | Performed by: ORTHOPAEDIC SURGERY

## 2021-08-24 PROCEDURE — 1159F MED LIST DOCD IN RCRD: CPT | Mod: CPTII,S$GLB,, | Performed by: ORTHOPAEDIC SURGERY

## 2021-08-24 PROCEDURE — 3008F PR BODY MASS INDEX (BMI) DOCUMENTED: ICD-10-PCS | Mod: CPTII,S$GLB,, | Performed by: ORTHOPAEDIC SURGERY

## 2021-08-24 PROCEDURE — 1126F AMNT PAIN NOTED NONE PRSNT: CPT | Mod: CPTII,S$GLB,, | Performed by: ORTHOPAEDIC SURGERY

## 2021-08-24 PROCEDURE — 1101F PT FALLS ASSESS-DOCD LE1/YR: CPT | Mod: CPTII,S$GLB,, | Performed by: ORTHOPAEDIC SURGERY

## 2021-08-24 PROCEDURE — 99999 PR PBB SHADOW E&M-EST. PATIENT-LVL III: CPT | Mod: PBBFAC,,, | Performed by: ORTHOPAEDIC SURGERY

## 2021-08-24 PROCEDURE — 1159F PR MEDICATION LIST DOCUMENTED IN MEDICAL RECORD: ICD-10-PCS | Mod: CPTII,S$GLB,, | Performed by: ORTHOPAEDIC SURGERY

## 2021-08-24 PROCEDURE — 1101F PR PT FALLS ASSESS DOC 0-1 FALLS W/OUT INJ PAST YR: ICD-10-PCS | Mod: CPTII,S$GLB,, | Performed by: ORTHOPAEDIC SURGERY

## 2021-08-24 PROCEDURE — 99214 PR OFFICE/OUTPT VISIT, EST, LEVL IV, 30-39 MIN: ICD-10-PCS | Mod: S$GLB,,, | Performed by: ORTHOPAEDIC SURGERY

## 2021-08-24 PROCEDURE — 99999 PR PBB SHADOW E&M-EST. PATIENT-LVL III: ICD-10-PCS | Mod: PBBFAC,,, | Performed by: ORTHOPAEDIC SURGERY

## 2021-08-24 PROCEDURE — 1126F PR PAIN SEVERITY QUANTIFIED, NO PAIN PRESENT: ICD-10-PCS | Mod: CPTII,S$GLB,, | Performed by: ORTHOPAEDIC SURGERY

## 2021-08-24 PROCEDURE — 3288F FALL RISK ASSESSMENT DOCD: CPT | Mod: CPTII,S$GLB,, | Performed by: ORTHOPAEDIC SURGERY

## 2021-08-24 PROCEDURE — 99214 OFFICE O/P EST MOD 30 MIN: CPT | Mod: S$GLB,,, | Performed by: ORTHOPAEDIC SURGERY

## 2021-08-24 PROCEDURE — 1160F PR REVIEW ALL MEDS BY PRESCRIBER/CLIN PHARMACIST DOCUMENTED: ICD-10-PCS | Mod: CPTII,S$GLB,, | Performed by: ORTHOPAEDIC SURGERY

## 2021-08-24 PROCEDURE — 3288F PR FALLS RISK ASSESSMENT DOCUMENTED: ICD-10-PCS | Mod: CPTII,S$GLB,, | Performed by: ORTHOPAEDIC SURGERY

## 2021-08-25 ENCOUNTER — TELEPHONE (OUTPATIENT)
Dept: ORTHOPEDICS | Facility: CLINIC | Age: 66
End: 2021-08-25

## 2021-08-26 DIAGNOSIS — M17.12 PRIMARY OSTEOARTHRITIS OF LEFT KNEE: Primary | ICD-10-CM

## 2021-08-26 RX ORDER — MUPIROCIN 20 MG/G
OINTMENT TOPICAL
Status: CANCELLED | OUTPATIENT
Start: 2021-08-26

## 2021-08-26 RX ORDER — SODIUM CHLORIDE 0.9 % (FLUSH) 0.9 %
3 SYRINGE (ML) INJECTION EVERY 8 HOURS
Status: CANCELLED | OUTPATIENT
Start: 2021-08-26

## 2021-08-26 RX ORDER — ACETAMINOPHEN 325 MG/1
1000 TABLET ORAL
Status: CANCELLED | OUTPATIENT
Start: 2021-08-26 | End: 2021-08-26

## 2021-08-26 RX ORDER — PREGABALIN 50 MG/1
150 CAPSULE ORAL
Status: CANCELLED | OUTPATIENT
Start: 2021-08-26 | End: 2021-08-26

## 2021-10-14 PROBLEM — K25.9 GASTRIC ULCER: Status: RESOLVED | Noted: 2021-06-30 | Resolved: 2021-10-14

## 2021-10-15 ENCOUNTER — OFFICE VISIT (OUTPATIENT)
Dept: FAMILY MEDICINE | Facility: CLINIC | Age: 66
End: 2021-10-15
Payer: MEDICARE

## 2021-10-15 ENCOUNTER — LAB VISIT (OUTPATIENT)
Dept: LAB | Facility: HOSPITAL | Age: 66
End: 2021-10-15
Attending: INTERNAL MEDICINE
Payer: MEDICARE

## 2021-10-15 VITALS
TEMPERATURE: 97 F | WEIGHT: 210.88 LBS | BODY MASS INDEX: 36 KG/M2 | DIASTOLIC BLOOD PRESSURE: 86 MMHG | HEART RATE: 79 BPM | HEIGHT: 64 IN | OXYGEN SATURATION: 96 % | SYSTOLIC BLOOD PRESSURE: 124 MMHG

## 2021-10-15 DIAGNOSIS — K25.9 NSAID-INDUCED GASTRIC ULCER: ICD-10-CM

## 2021-10-15 DIAGNOSIS — I10 ESSENTIAL HYPERTENSION: Chronic | ICD-10-CM

## 2021-10-15 DIAGNOSIS — E66.01 SEVERE OBESITY WITH BODY MASS INDEX (BMI) OF 35.0 TO 39.9 WITH SERIOUS COMORBIDITY: ICD-10-CM

## 2021-10-15 DIAGNOSIS — M17.12 OSTEOARTHRITIS OF LEFT KNEE, UNSPECIFIED OSTEOARTHRITIS TYPE: ICD-10-CM

## 2021-10-15 DIAGNOSIS — R60.0 PERIPHERAL EDEMA: ICD-10-CM

## 2021-10-15 DIAGNOSIS — Z23 NEEDS FLU SHOT: ICD-10-CM

## 2021-10-15 DIAGNOSIS — D50.9 IRON DEFICIENCY ANEMIA, UNSPECIFIED IRON DEFICIENCY ANEMIA TYPE: ICD-10-CM

## 2021-10-15 DIAGNOSIS — M79.7 FIBROMYALGIA: Primary | ICD-10-CM

## 2021-10-15 DIAGNOSIS — T39.395A NSAID-INDUCED GASTRIC ULCER: ICD-10-CM

## 2021-10-15 DIAGNOSIS — F33.0 MILD RECURRENT MAJOR DEPRESSION: Chronic | ICD-10-CM

## 2021-10-15 LAB
ALBUMIN SERPL BCP-MCNC: 3.5 G/DL (ref 3.5–5.2)
ALP SERPL-CCNC: 104 U/L (ref 55–135)
ALT SERPL W/O P-5'-P-CCNC: 15 U/L (ref 10–44)
ANION GAP SERPL CALC-SCNC: 15 MMOL/L (ref 8–16)
AST SERPL-CCNC: 20 U/L (ref 10–40)
BASOPHILS # BLD AUTO: 0.05 K/UL (ref 0–0.2)
BASOPHILS NFR BLD: 0.4 % (ref 0–1.9)
BILIRUB SERPL-MCNC: 0.3 MG/DL (ref 0.1–1)
BUN SERPL-MCNC: 11 MG/DL (ref 8–23)
CALCIUM SERPL-MCNC: 9.8 MG/DL (ref 8.7–10.5)
CHLORIDE SERPL-SCNC: 100 MMOL/L (ref 95–110)
CHOLEST SERPL-MCNC: 137 MG/DL (ref 120–199)
CHOLEST/HDLC SERPL: 3.1 {RATIO} (ref 2–5)
CO2 SERPL-SCNC: 23 MMOL/L (ref 23–29)
CREAT SERPL-MCNC: 0.8 MG/DL (ref 0.5–1.4)
DIFFERENTIAL METHOD: ABNORMAL
EOSINOPHIL # BLD AUTO: 0.3 K/UL (ref 0–0.5)
EOSINOPHIL NFR BLD: 2.4 % (ref 0–8)
ERYTHROCYTE [DISTWIDTH] IN BLOOD BY AUTOMATED COUNT: 15.4 % (ref 11.5–14.5)
EST. GFR  (AFRICAN AMERICAN): >60 ML/MIN/1.73 M^2
EST. GFR  (NON AFRICAN AMERICAN): >60 ML/MIN/1.73 M^2
FERRITIN SERPL-MCNC: 12 NG/ML (ref 20–300)
GLUCOSE SERPL-MCNC: 85 MG/DL (ref 70–110)
HCT VFR BLD AUTO: 40.3 % (ref 37–48.5)
HDLC SERPL-MCNC: 44 MG/DL (ref 40–75)
HDLC SERPL: 32.1 % (ref 20–50)
HGB BLD-MCNC: 12.6 G/DL (ref 12–16)
IMM GRANULOCYTES # BLD AUTO: 0.06 K/UL (ref 0–0.04)
IMM GRANULOCYTES NFR BLD AUTO: 0.5 % (ref 0–0.5)
LDLC SERPL CALC-MCNC: 73.8 MG/DL (ref 63–159)
LYMPHOCYTES # BLD AUTO: 3.7 K/UL (ref 1–4.8)
LYMPHOCYTES NFR BLD: 33.3 % (ref 18–48)
MCH RBC QN AUTO: 28 PG (ref 27–31)
MCHC RBC AUTO-ENTMCNC: 31.3 G/DL (ref 32–36)
MCV RBC AUTO: 90 FL (ref 82–98)
MONOCYTES # BLD AUTO: 0.8 K/UL (ref 0.3–1)
MONOCYTES NFR BLD: 7.4 % (ref 4–15)
NEUTROPHILS # BLD AUTO: 6.3 K/UL (ref 1.8–7.7)
NEUTROPHILS NFR BLD: 56 % (ref 38–73)
NONHDLC SERPL-MCNC: 93 MG/DL
NRBC BLD-RTO: 0 /100 WBC
PLATELET # BLD AUTO: 360 K/UL (ref 150–450)
PMV BLD AUTO: 12.2 FL (ref 9.2–12.9)
POTASSIUM SERPL-SCNC: 3.7 MMOL/L (ref 3.5–5.1)
PROT SERPL-MCNC: 7.6 G/DL (ref 6–8.4)
RBC # BLD AUTO: 4.5 M/UL (ref 4–5.4)
SODIUM SERPL-SCNC: 138 MMOL/L (ref 136–145)
TRIGL SERPL-MCNC: 96 MG/DL (ref 30–150)
WBC # BLD AUTO: 11.24 K/UL (ref 3.9–12.7)

## 2021-10-15 PROCEDURE — 1159F MED LIST DOCD IN RCRD: CPT | Mod: HCNC,CPTII,S$GLB, | Performed by: INTERNAL MEDICINE

## 2021-10-15 PROCEDURE — 1101F PT FALLS ASSESS-DOCD LE1/YR: CPT | Mod: HCNC,CPTII,S$GLB, | Performed by: INTERNAL MEDICINE

## 2021-10-15 PROCEDURE — 99214 PR OFFICE/OUTPT VISIT, EST, LEVL IV, 30-39 MIN: ICD-10-PCS | Mod: HCNC,S$GLB,, | Performed by: INTERNAL MEDICINE

## 2021-10-15 PROCEDURE — 99214 OFFICE O/P EST MOD 30 MIN: CPT | Mod: HCNC,S$GLB,, | Performed by: INTERNAL MEDICINE

## 2021-10-15 PROCEDURE — 3074F SYST BP LT 130 MM HG: CPT | Mod: HCNC,CPTII,S$GLB, | Performed by: INTERNAL MEDICINE

## 2021-10-15 PROCEDURE — G0008 ADMIN INFLUENZA VIRUS VAC: HCPCS | Mod: HCNC,S$GLB,, | Performed by: INTERNAL MEDICINE

## 2021-10-15 PROCEDURE — G0008 FLU VACCINE - QUADRIVALENT - ADJUVANTED: ICD-10-PCS | Mod: HCNC,S$GLB,, | Performed by: INTERNAL MEDICINE

## 2021-10-15 PROCEDURE — 99999 PR PBB SHADOW E&M-EST. PATIENT-LVL V: ICD-10-PCS | Mod: PBBFAC,HCNC,, | Performed by: INTERNAL MEDICINE

## 2021-10-15 PROCEDURE — 36415 COLL VENOUS BLD VENIPUNCTURE: CPT | Mod: HCNC,PO | Performed by: INTERNAL MEDICINE

## 2021-10-15 PROCEDURE — 85025 COMPLETE CBC W/AUTO DIFF WBC: CPT | Mod: HCNC | Performed by: INTERNAL MEDICINE

## 2021-10-15 PROCEDURE — 1159F PR MEDICATION LIST DOCUMENTED IN MEDICAL RECORD: ICD-10-PCS | Mod: HCNC,CPTII,S$GLB, | Performed by: INTERNAL MEDICINE

## 2021-10-15 PROCEDURE — 80061 LIPID PANEL: CPT | Mod: HCNC | Performed by: INTERNAL MEDICINE

## 2021-10-15 PROCEDURE — 4010F ACE/ARB THERAPY RXD/TAKEN: CPT | Mod: HCNC,CPTII,S$GLB, | Performed by: INTERNAL MEDICINE

## 2021-10-15 PROCEDURE — 90694 FLU VACCINE - QUADRIVALENT - ADJUVANTED: ICD-10-PCS | Mod: HCNC,S$GLB,, | Performed by: INTERNAL MEDICINE

## 2021-10-15 PROCEDURE — 3008F PR BODY MASS INDEX (BMI) DOCUMENTED: ICD-10-PCS | Mod: HCNC,CPTII,S$GLB, | Performed by: INTERNAL MEDICINE

## 2021-10-15 PROCEDURE — 1160F RVW MEDS BY RX/DR IN RCRD: CPT | Mod: HCNC,CPTII,S$GLB, | Performed by: INTERNAL MEDICINE

## 2021-10-15 PROCEDURE — 1160F PR REVIEW ALL MEDS BY PRESCRIBER/CLIN PHARMACIST DOCUMENTED: ICD-10-PCS | Mod: HCNC,CPTII,S$GLB, | Performed by: INTERNAL MEDICINE

## 2021-10-15 PROCEDURE — 3079F PR MOST RECENT DIASTOLIC BLOOD PRESSURE 80-89 MM HG: ICD-10-PCS | Mod: HCNC,CPTII,S$GLB, | Performed by: INTERNAL MEDICINE

## 2021-10-15 PROCEDURE — 99499 UNLISTED E&M SERVICE: CPT | Mod: HCNC,S$GLB,, | Performed by: INTERNAL MEDICINE

## 2021-10-15 PROCEDURE — 4010F PR ACE/ARB THEARPY RXD/TAKEN: ICD-10-PCS | Mod: HCNC,CPTII,S$GLB, | Performed by: INTERNAL MEDICINE

## 2021-10-15 PROCEDURE — 3079F DIAST BP 80-89 MM HG: CPT | Mod: HCNC,CPTII,S$GLB, | Performed by: INTERNAL MEDICINE

## 2021-10-15 PROCEDURE — 90694 VACC AIIV4 NO PRSRV 0.5ML IM: CPT | Mod: HCNC,S$GLB,, | Performed by: INTERNAL MEDICINE

## 2021-10-15 PROCEDURE — 82728 ASSAY OF FERRITIN: CPT | Mod: HCNC | Performed by: INTERNAL MEDICINE

## 2021-10-15 PROCEDURE — 3288F PR FALLS RISK ASSESSMENT DOCUMENTED: ICD-10-PCS | Mod: HCNC,CPTII,S$GLB, | Performed by: INTERNAL MEDICINE

## 2021-10-15 PROCEDURE — 3074F PR MOST RECENT SYSTOLIC BLOOD PRESSURE < 130 MM HG: ICD-10-PCS | Mod: HCNC,CPTII,S$GLB, | Performed by: INTERNAL MEDICINE

## 2021-10-15 PROCEDURE — 99499 RISK ADDL DX/OHS AUDIT: ICD-10-PCS | Mod: HCNC,S$GLB,, | Performed by: INTERNAL MEDICINE

## 2021-10-15 PROCEDURE — 99999 PR PBB SHADOW E&M-EST. PATIENT-LVL V: CPT | Mod: PBBFAC,HCNC,, | Performed by: INTERNAL MEDICINE

## 2021-10-15 PROCEDURE — 1101F PR PT FALLS ASSESS DOC 0-1 FALLS W/OUT INJ PAST YR: ICD-10-PCS | Mod: HCNC,CPTII,S$GLB, | Performed by: INTERNAL MEDICINE

## 2021-10-15 PROCEDURE — 3288F FALL RISK ASSESSMENT DOCD: CPT | Mod: HCNC,CPTII,S$GLB, | Performed by: INTERNAL MEDICINE

## 2021-10-15 PROCEDURE — 80053 COMPREHEN METABOLIC PANEL: CPT | Mod: HCNC | Performed by: INTERNAL MEDICINE

## 2021-10-15 PROCEDURE — 3008F BODY MASS INDEX DOCD: CPT | Mod: HCNC,CPTII,S$GLB, | Performed by: INTERNAL MEDICINE

## 2021-10-15 RX ORDER — GABAPENTIN 100 MG/1
CAPSULE ORAL
Qty: 270 CAPSULE | Refills: 3 | Status: SHIPPED | OUTPATIENT
Start: 2021-10-15 | End: 2022-01-15 | Stop reason: SDUPTHER

## 2021-12-04 ENCOUNTER — PATIENT MESSAGE (OUTPATIENT)
Dept: ADMINISTRATIVE | Facility: OTHER | Age: 66
End: 2021-12-04
Payer: MEDICARE

## 2021-12-17 ENCOUNTER — PATIENT OUTREACH (OUTPATIENT)
Dept: ADMINISTRATIVE | Facility: OTHER | Age: 66
End: 2021-12-17
Payer: MEDICARE

## 2021-12-17 DIAGNOSIS — Z01.818 PREOP TESTING: ICD-10-CM

## 2021-12-17 DIAGNOSIS — M17.12 PRIMARY OSTEOARTHRITIS OF LEFT KNEE: Primary | ICD-10-CM

## 2021-12-20 ENCOUNTER — OFFICE VISIT (OUTPATIENT)
Dept: ORTHOPEDICS | Facility: CLINIC | Age: 66
End: 2021-12-20
Payer: MEDICARE

## 2021-12-20 VITALS
OXYGEN SATURATION: 98 % | HEIGHT: 64 IN | HEART RATE: 84 BPM | SYSTOLIC BLOOD PRESSURE: 150 MMHG | WEIGHT: 209 LBS | DIASTOLIC BLOOD PRESSURE: 96 MMHG | BODY MASS INDEX: 35.68 KG/M2

## 2021-12-20 DIAGNOSIS — M17.12 PRIMARY OSTEOARTHRITIS OF LEFT KNEE: Primary | ICD-10-CM

## 2021-12-20 PROCEDURE — 4010F PR ACE/ARB THEARPY RXD/TAKEN: ICD-10-PCS | Mod: HCNC,CPTII,S$GLB, | Performed by: PHYSICIAN ASSISTANT

## 2021-12-20 PROCEDURE — 99999 PR PBB SHADOW E&M-EST. PATIENT-LVL IV: CPT | Mod: PBBFAC,HCNC,, | Performed by: PHYSICIAN ASSISTANT

## 2021-12-20 PROCEDURE — 99999 PR PBB SHADOW E&M-EST. PATIENT-LVL IV: ICD-10-PCS | Mod: PBBFAC,HCNC,, | Performed by: PHYSICIAN ASSISTANT

## 2021-12-20 PROCEDURE — 99499 UNLISTED E&M SERVICE: CPT | Mod: HCNC,S$GLB,, | Performed by: PHYSICIAN ASSISTANT

## 2021-12-20 PROCEDURE — 99499 NO LOS: ICD-10-PCS | Mod: HCNC,S$GLB,, | Performed by: PHYSICIAN ASSISTANT

## 2021-12-20 PROCEDURE — 4010F ACE/ARB THERAPY RXD/TAKEN: CPT | Mod: HCNC,CPTII,S$GLB, | Performed by: PHYSICIAN ASSISTANT

## 2021-12-28 ENCOUNTER — PATIENT MESSAGE (OUTPATIENT)
Dept: ADMINISTRATIVE | Facility: OTHER | Age: 66
End: 2021-12-28
Payer: MEDICARE

## 2021-12-28 DIAGNOSIS — K25.7 CHRONIC GASTRIC ULCER WITHOUT HEMORRHAGE AND WITHOUT PERFORATION: ICD-10-CM

## 2021-12-28 RX ORDER — OMEPRAZOLE 40 MG/1
40 CAPSULE, DELAYED RELEASE ORAL EVERY MORNING
Qty: 90 CAPSULE | Refills: 3 | Status: SHIPPED | OUTPATIENT
Start: 2021-12-28 | End: 2022-02-10 | Stop reason: SDUPTHER

## 2022-01-07 ENCOUNTER — LAB VISIT (OUTPATIENT)
Dept: PRIMARY CARE CLINIC | Facility: CLINIC | Age: 67
End: 2022-01-07
Payer: MEDICARE

## 2022-01-07 DIAGNOSIS — Z41.9 SURGERY, ELECTIVE: ICD-10-CM

## 2022-01-07 PROCEDURE — U0005 INFEC AGEN DETEC AMPLI PROBE: HCPCS | Performed by: ORTHOPAEDIC SURGERY

## 2022-01-07 PROCEDURE — U0003 INFECTIOUS AGENT DETECTION BY NUCLEIC ACID (DNA OR RNA); SEVERE ACUTE RESPIRATORY SYNDROME CORONAVIRUS 2 (SARS-COV-2) (CORONAVIRUS DISEASE [COVID-19]), AMPLIFIED PROBE TECHNIQUE, MAKING USE OF HIGH THROUGHPUT TECHNOLOGIES AS DESCRIBED BY CMS-2020-01-R: HCPCS | Performed by: ORTHOPAEDIC SURGERY

## 2022-01-08 LAB — SARS-COV-2 RNA RESP QL NAA+PROBE: NOT DETECTED

## 2022-01-10 DIAGNOSIS — Z96.652 S/P TOTAL KNEE ARTHROPLASTY, LEFT: Primary | ICD-10-CM

## 2022-01-10 DIAGNOSIS — M17.12 PRIMARY OSTEOARTHRITIS OF LEFT KNEE: ICD-10-CM

## 2022-01-11 ENCOUNTER — TELEPHONE (OUTPATIENT)
Dept: FAMILY MEDICINE | Facility: CLINIC | Age: 67
End: 2022-01-11
Payer: MEDICARE

## 2022-01-11 PROCEDURE — G0180 MD CERTIFICATION HHA PATIENT: HCPCS | Mod: ,,, | Performed by: ORTHOPAEDIC SURGERY

## 2022-01-11 PROCEDURE — G0180 PR HOME HEALTH MD CERTIFICATION: ICD-10-PCS | Mod: ,,, | Performed by: ORTHOPAEDIC SURGERY

## 2022-01-11 NOTE — TELEPHONE ENCOUNTER
Ok, unless she is having chest pain, or short of breath, or terrible headache, ok to monitor as long as she continues to improve after surgery

## 2022-01-11 NOTE — TELEPHONE ENCOUNTER
----- Message from Monika Morrison sent at 1/11/2022  3:45 PM CST -----  Type: Patient Call Back    Who called: Daniel from [facility]    What is the request in detail: Elevated blood pressure due to knee pain.    Can the clinic reply by [facility]? no    Would the patient rather a call back or a response via [facility]? Call back    Best call back number: [phone]

## 2022-01-11 NOTE — TELEPHONE ENCOUNTER
Spoke with Daniel from home health he states that pt had knee surgery on yesterday and is complaining of knee pain (10). He states that pt is in bed right now elevated and her bp was elevated (180/110) from the pain. He states that pt is asymptotic is moving around just fine. MARIAMA.

## 2022-01-12 ENCOUNTER — TELEPHONE (OUTPATIENT)
Dept: ORTHOPEDICS | Facility: CLINIC | Age: 67
End: 2022-01-12
Payer: MEDICARE

## 2022-01-12 NOTE — TELEPHONE ENCOUNTER
----- Message from Yolande Hi sent at 1/12/2022  2:31 PM CST -----  Needs advice from nurse:      Who Called:pt  Regarding:pain meds are not working and patient is nauseous  Would the patient rather a call back or VIA [facility]?  Best Call Back number:[phone]  Additional Info:[facility] [address] [place], [place] [address], [place] - [address] [place] [address] (Ph: [phone])

## 2022-01-13 ENCOUNTER — TELEPHONE (OUTPATIENT)
Dept: ORTHOPEDICS | Facility: CLINIC | Age: 67
End: 2022-01-13
Payer: MEDICARE

## 2022-01-13 NOTE — TELEPHONE ENCOUNTER
----- Message from Jackie Salazar sent at 1/13/2022 11:33 AM CST -----  Type:  Needs Medical Advice    Who Called: self  Reason:returning call  Would the patient rather a call back or a response via [facility]? #call  Best Call Back Number [phone]  Additional Information: none

## 2022-01-13 NOTE — TELEPHONE ENCOUNTER
Pt said she originally called to request a stronger dosage of pain medication, but she thinks her pain was due to fluid on her knee. She took a fluid pill, and the swelling has went down some, so she doesn't think she needs a stronger medication now.

## 2022-01-19 ENCOUNTER — TELEPHONE (OUTPATIENT)
Dept: FAMILY MEDICINE | Facility: CLINIC | Age: 67
End: 2022-01-19
Payer: MEDICARE

## 2022-01-19 ENCOUNTER — TELEPHONE (OUTPATIENT)
Dept: ORTHOPEDICS | Facility: CLINIC | Age: 67
End: 2022-01-19
Payer: MEDICARE

## 2022-01-19 NOTE — TELEPHONE ENCOUNTER
----- Message from Kelly Hodges sent at 1/19/2022  3:16 PM CST -----  Type: Patient Call Back       What is the request in detail:  mac calling to leaving pt bp reading 176-96 and pt is not having any symptoms and taking her meds      Can the clinic reply by [facility]? No       Would the patient rather a call back or a response via [facility]? Call back       Best call back number: [phone]      Thank you.

## 2022-01-19 NOTE — TELEPHONE ENCOUNTER
----- Message from Juana Murrieta MA sent at 1/19/2022  2:41 PM CST -----  Type:  Needs Medical Advice    Who Called: pt  Regarding: pt needing to speak with nurse  Would the patient rather a call back or a response via [facility]? Call back  Best Call Back Number: [phone]  Additional Information: n/a

## 2022-01-20 ENCOUNTER — PATIENT MESSAGE (OUTPATIENT)
Dept: ORTHOPEDICS | Facility: CLINIC | Age: 67
End: 2022-01-20
Payer: MEDICARE

## 2022-01-20 RX ORDER — OXYCODONE AND ACETAMINOPHEN 7.5; 325 MG/1; MG/1
1 TABLET ORAL EVERY 6 HOURS PRN
Qty: 28 TABLET | Refills: 0 | Status: SHIPPED | OUTPATIENT
Start: 2022-01-20 | End: 2022-01-27

## 2022-01-20 RX ORDER — ONDANSETRON 4 MG/1
4 TABLET, ORALLY DISINTEGRATING ORAL EVERY 6 HOURS PRN
Qty: 10 TABLET | Refills: 0 | Status: SHIPPED | OUTPATIENT
Start: 2022-01-20 | End: 2022-10-21

## 2022-01-20 NOTE — TELEPHONE ENCOUNTER
Spoke with Mrs Shane- pt informed of new medications and is aware the Aquacel needs to stay in place til p/o appointment on 01/25.

## 2022-01-25 ENCOUNTER — OFFICE VISIT (OUTPATIENT)
Dept: ORTHOPEDICS | Facility: CLINIC | Age: 67
End: 2022-01-25
Payer: MEDICARE

## 2022-01-25 VITALS — BODY MASS INDEX: 34.67 KG/M2 | HEIGHT: 64 IN | WEIGHT: 203.06 LBS

## 2022-01-25 DIAGNOSIS — M17.12 PRIMARY OSTEOARTHRITIS OF LEFT KNEE: Primary | ICD-10-CM

## 2022-01-25 DIAGNOSIS — Z96.652 S/P TOTAL KNEE ARTHROPLASTY, LEFT: ICD-10-CM

## 2022-01-25 PROCEDURE — 99999 PR PBB SHADOW E&M-EST. PATIENT-LVL III: CPT | Mod: PBBFAC,HCNC,, | Performed by: ORTHOPAEDIC SURGERY

## 2022-01-25 PROCEDURE — 4010F PR ACE/ARB THEARPY RXD/TAKEN: ICD-10-PCS | Mod: HCNC,CPTII,S$GLB, | Performed by: ORTHOPAEDIC SURGERY

## 2022-01-25 PROCEDURE — 3008F BODY MASS INDEX DOCD: CPT | Mod: HCNC,CPTII,S$GLB, | Performed by: ORTHOPAEDIC SURGERY

## 2022-01-25 PROCEDURE — 99999 PR PBB SHADOW E&M-EST. PATIENT-LVL III: ICD-10-PCS | Mod: PBBFAC,HCNC,, | Performed by: ORTHOPAEDIC SURGERY

## 2022-01-25 PROCEDURE — 1160F PR REVIEW ALL MEDS BY PRESCRIBER/CLIN PHARMACIST DOCUMENTED: ICD-10-PCS | Mod: HCNC,CPTII,S$GLB, | Performed by: ORTHOPAEDIC SURGERY

## 2022-01-25 PROCEDURE — 1101F PT FALLS ASSESS-DOCD LE1/YR: CPT | Mod: HCNC,CPTII,S$GLB, | Performed by: ORTHOPAEDIC SURGERY

## 2022-01-25 PROCEDURE — 1160F RVW MEDS BY RX/DR IN RCRD: CPT | Mod: HCNC,CPTII,S$GLB, | Performed by: ORTHOPAEDIC SURGERY

## 2022-01-25 PROCEDURE — 4010F ACE/ARB THERAPY RXD/TAKEN: CPT | Mod: HCNC,CPTII,S$GLB, | Performed by: ORTHOPAEDIC SURGERY

## 2022-01-25 PROCEDURE — 1101F PR PT FALLS ASSESS DOC 0-1 FALLS W/OUT INJ PAST YR: ICD-10-PCS | Mod: HCNC,CPTII,S$GLB, | Performed by: ORTHOPAEDIC SURGERY

## 2022-01-25 PROCEDURE — 3288F PR FALLS RISK ASSESSMENT DOCUMENTED: ICD-10-PCS | Mod: HCNC,CPTII,S$GLB, | Performed by: ORTHOPAEDIC SURGERY

## 2022-01-25 PROCEDURE — 1125F PR PAIN SEVERITY QUANTIFIED, PAIN PRESENT: ICD-10-PCS | Mod: HCNC,CPTII,S$GLB, | Performed by: ORTHOPAEDIC SURGERY

## 2022-01-25 PROCEDURE — 1159F MED LIST DOCD IN RCRD: CPT | Mod: HCNC,CPTII,S$GLB, | Performed by: ORTHOPAEDIC SURGERY

## 2022-01-25 PROCEDURE — 3288F FALL RISK ASSESSMENT DOCD: CPT | Mod: HCNC,CPTII,S$GLB, | Performed by: ORTHOPAEDIC SURGERY

## 2022-01-25 PROCEDURE — 3008F PR BODY MASS INDEX (BMI) DOCUMENTED: ICD-10-PCS | Mod: HCNC,CPTII,S$GLB, | Performed by: ORTHOPAEDIC SURGERY

## 2022-01-25 PROCEDURE — 1159F PR MEDICATION LIST DOCUMENTED IN MEDICAL RECORD: ICD-10-PCS | Mod: HCNC,CPTII,S$GLB, | Performed by: ORTHOPAEDIC SURGERY

## 2022-01-25 PROCEDURE — 1125F AMNT PAIN NOTED PAIN PRSNT: CPT | Mod: HCNC,CPTII,S$GLB, | Performed by: ORTHOPAEDIC SURGERY

## 2022-01-25 PROCEDURE — 99024 POSTOP FOLLOW-UP VISIT: CPT | Mod: HCNC,S$GLB,, | Performed by: ORTHOPAEDIC SURGERY

## 2022-01-25 PROCEDURE — 99024 PR POST-OP FOLLOW-UP VISIT: ICD-10-PCS | Mod: HCNC,S$GLB,, | Performed by: ORTHOPAEDIC SURGERY

## 2022-01-25 RX ORDER — INFLUENZA VACCINE, ADJUVANTED 15; 15; 15; 15 UG/.5ML; UG/.5ML; UG/.5ML; UG/.5ML
INJECTION, SUSPENSION INTRAMUSCULAR
COMMUNITY
Start: 2021-10-15 | End: 2022-01-25 | Stop reason: ALTCHOICE

## 2022-01-25 NOTE — PROGRESS NOTES
Subjective:      Patient ID: Bell Lynn is a 66 y.o. female.    Chief Complaint: Post-op Evaluation (2 wk p/o left TKA )      HPI:  Two weeks postop  The patient is seen for postop follow-up of left  TKA.  Pain control has been satisfactory  They feel that they are ambulating easily  Postoperative complaints include:  None at this time      Current Outpatient Medications:     cetirizine (ZYRTEC) 10 MG tablet, Take 1 tablet (10 mg total) by mouth once daily., Disp: 30 tablet, Rfl: 0    cyclobenzaprine (FLEXERIL) 10 MG tablet, Take 1 tablet (10 mg total) by mouth every evening., Disp: 30 tablet, Rfl: 0    DULoxetine (CYMBALTA) 60 MG capsule, Take 1 capsule (60 mg total) by mouth once daily. Increased dose, Disp: 90 capsule, Rfl: 3    ferrous sulfate (FEOSOL) 325 mg (65 mg iron) Tab tablet, Take 1 tablet (325 mg total) by mouth every other day., Disp: 30 tablet, Rfl: 5    furosemide (LASIX) 20 MG tablet, TAKE 1 TABLET BY MOUTH ONCE DAILY AS NEEDED FOR  LEG  SWELLING, Disp: 90 tablet, Rfl: 3    gabapentin (NEURONTIN) 100 MG capsule, 3 tablets nighttime 1 tablet in AM; increased dose, Disp: 270 capsule, Rfl: 3    linaCLOtide (LINZESS) 145 mcg Cap capsule, Take 1 capsule (145 mcg total) by mouth once daily., Disp: 90 capsule, Rfl: 3    losartan (COZAAR) 100 MG tablet, Take 1 tablet (100 mg total) by mouth once daily., Disp: 90 tablet, Rfl: 3    metoprolol succinate (TOPROL-XL) 100 MG 24 hr tablet, Take 1 tablet (100 mg total) by mouth once daily., Disp: 90 tablet, Rfl: 0    omeprazole (PRILOSEC) 40 MG capsule, Take 1 capsule (40 mg total) by mouth every morning., Disp: 90 capsule, Rfl: 3    triamterene-hydrochlorothiazide 37.5-25 mg (MAXZIDE-25) 37.5-25 mg per tablet, Take 1 tablet by mouth once daily., Disp: 90 tablet, Rfl: 3    albuterol (PROVENTIL/VENTOLIN HFA) 90 mcg/actuation inhaler, Inhale 2 puffs into the lungs every 4 (four) hours as needed for Wheezing or Shortness of Breath. (Patient  "not taking: Reported on 1/25/2022), Disp: 18 g, Rfl: 2    FLUAD QUAD 2021-22,65Y UP,,PF, 60 mcg (15 mcg x 4)/0.5 mL Syrg, , Disp: , Rfl:     LIDOcaine (LIDODERM) 5 %, Place 1 patch onto the skin once daily. Remove & Discard patch within 12 hours or as directed by MD (Patient not taking: Reported on 1/25/2022), Disp: 30 patch, Rfl: 5    nitroGLYCERIN (NITROSTAT) 0.4 MG SL tablet, Place 1 tablet (0.4 mg total) under the tongue every 5 (five) minutes as needed for Chest pain. (Patient not taking: Reported on 1/25/2022), Disp: 60 tablet, Rfl: 12    ondansetron (ZOFRAN-ODT) 4 MG TbDL, Take 1 tablet (4 mg total) by mouth every 6 (six) hours as needed (nausea). (Patient not taking: Reported on 1/25/2022), Disp: 10 tablet, Rfl: 0    oxyCODONE-acetaminophen (PERCOCET) 7.5-325 mg per tablet, Take 1 tablet by mouth every 6 (six) hours as needed for Pain. (Patient not taking: Reported on 1/25/2022), Disp: 28 tablet, Rfl: 0  Review of patient's allergies indicates:   Allergen Reactions    Nsaids (non-steroidal anti-inflammatory drug)      Gi bleed    Penicillins Itching and Swelling    Penicillin     Symbicort [budesonide-formoterol]      Recurrent oral ulcers       Ht 5' 4" (1.626 m)   Wt 92.1 kg (203 lb 0.7 oz)   BMI 34.85 kg/m²     ROS        Objective:    Ortho Exam          Alert, oriented, no acute distress  Sclera-Normal  Respiratory distress-none  Gait mild limp  Incision:  Healing cleanly  Range of motion:  5-90  Valgus/varus stability- stable  Swelling-mild  Distal perfusion-intact  Distal neurologic-intact    Imaging:  Postop radiographs show well-positioned left total knee replaced without complicating process    Assessment:             1. Primary osteoarthritis of left knee    2. S/P total knee arthroplasty, left        Patient is progressing normally a for the procedure.  There is no evidence of complications the surgical site.        Plan:          No follow-ups on file.    Explained my assessment and " reviewed the natural history of recovery from the procedure    Appropriate progression of therapy and activity discussed    We also discussed the option of continued outpatient physical therapy.  The patient and I agree that she will do her home program for the next few weeks and that we can refer her to further outpatient therapy if clinically indicated at the next visit.

## 2022-01-28 ENCOUNTER — PATIENT MESSAGE (OUTPATIENT)
Dept: ORTHOPEDICS | Facility: CLINIC | Age: 67
End: 2022-01-28
Payer: MEDICARE

## 2022-01-28 DIAGNOSIS — M17.12 PRIMARY OSTEOARTHRITIS OF LEFT KNEE: Primary | ICD-10-CM

## 2022-01-28 DIAGNOSIS — Z96.652 S/P TOTAL KNEE ARTHROPLASTY, LEFT: ICD-10-CM

## 2022-02-01 ENCOUNTER — PATIENT MESSAGE (OUTPATIENT)
Dept: ORTHOPEDICS | Facility: CLINIC | Age: 67
End: 2022-02-01
Payer: MEDICARE

## 2022-02-01 DIAGNOSIS — M17.12 PRIMARY OSTEOARTHRITIS OF LEFT KNEE: ICD-10-CM

## 2022-02-01 DIAGNOSIS — Z96.652 S/P TOTAL KNEE ARTHROPLASTY, LEFT: Primary | ICD-10-CM

## 2022-02-03 ENCOUNTER — EXTERNAL HOME HEALTH (OUTPATIENT)
Dept: HOME HEALTH SERVICES | Facility: HOSPITAL | Age: 67
End: 2022-02-03
Payer: MEDICARE

## 2022-02-03 ENCOUNTER — CLINICAL SUPPORT (OUTPATIENT)
Dept: REHABILITATION | Facility: HOSPITAL | Age: 67
End: 2022-02-03
Payer: MEDICARE

## 2022-02-03 DIAGNOSIS — Z96.652 S/P TOTAL KNEE ARTHROPLASTY, LEFT: ICD-10-CM

## 2022-02-03 DIAGNOSIS — M17.12 PRIMARY OSTEOARTHRITIS OF LEFT KNEE: ICD-10-CM

## 2022-02-03 DIAGNOSIS — R29.898 DECREASED STRENGTH OF LOWER EXTREMITY: ICD-10-CM

## 2022-02-03 DIAGNOSIS — M25.662 DECREASED RANGE OF MOTION OF LEFT KNEE: ICD-10-CM

## 2022-02-03 DIAGNOSIS — R26.9 GAIT ABNORMALITY: ICD-10-CM

## 2022-02-03 PROCEDURE — 97161 PT EVAL LOW COMPLEX 20 MIN: CPT

## 2022-02-03 PROCEDURE — 97140 MANUAL THERAPY 1/> REGIONS: CPT

## 2022-02-03 PROCEDURE — 97110 THERAPEUTIC EXERCISES: CPT

## 2022-02-04 ENCOUNTER — PATIENT MESSAGE (OUTPATIENT)
Dept: ORTHOPEDICS | Facility: CLINIC | Age: 67
End: 2022-02-04
Payer: MEDICARE

## 2022-02-04 DIAGNOSIS — M17.12 PRIMARY OSTEOARTHRITIS OF LEFT KNEE: ICD-10-CM

## 2022-02-04 DIAGNOSIS — Z96.652 S/P TOTAL KNEE ARTHROPLASTY, LEFT: Primary | ICD-10-CM

## 2022-02-04 RX ORDER — OXYCODONE AND ACETAMINOPHEN 7.5; 325 MG/1; MG/1
1 TABLET ORAL EVERY 8 HOURS PRN
Qty: 21 TABLET | Refills: 0 | Status: SHIPPED | OUTPATIENT
Start: 2022-02-04 | End: 2022-02-11

## 2022-02-04 NOTE — PROGRESS NOTES
"[facility]   Physical Therapy Treatment Note     Name: Bell Lynn  Clinic Number: [id number]    Therapy Diagnosis:   Encounter Diagnoses   Name Primary?    Gait abnormality     Decreased strength of lower extremity     Decreased range of motion of left knee      Physician: Patricia Andrews PA-C    Visit Date: 2022    Evaluation Date: 2/3/2022  Authorization Period Expiration: 2022  Plan of Care Expiration: 2022  Visit # / Visits authorized:  + eval    PTA Visit #:      Time In: 957  Time Out: 1000  Total Treatment Time: 53 minutes + 10 minutes of ice (unsupervised)  Total Billable Time: 30 minutes (1 TA, 1 TE)    SUBJECTIVE     Patient reports: "I feel ok today." She reports taking a pain pill prior to therapy session.  She was compliant with home exercise program. Performs 1x/day.  Response to previous treatment: good, no adverse reaction; appropriate muscle fatigue  Functional change: ambulated into clinic with RW; needs to purchase a cane    Pain: 4/10, currently  Location: Left knee      OBJECTIVE     Objective Measures updated at progress report unless specified.     DOS 1/10/2022  6 week follow up 2022 Lacking 2 degrees of extension at rest; 0 degrees of active extension with heel prop   30 second STS: 7x with no UE assist   TU seconds with no AD    Treatment     Bell received the treatments listed below:      THERAPEUTIC EXERCISES to develop strength, endurance, ROM, flexibility, posture and core stabilization for 38 minutes including:     +Aerobic Activity: NuStep x 5 minutes, Level 3 - seat 17 for AAROM and cardiovascular endurance    Heel Prop; 4 min w/ strap around toes  +Short arc quads with medium bolster: 2 x 10, 5 second hold; 3lb cuff weight  +Short arc quads with small bolster: 2 x 10, 5 second hold; 3lb cuff weight  +Long arc quads: 2 x 10, 5 second hold; 3lb cuff weight    Clamshells; 3 x 10 w/ 5" " "hold  SLR flexion: 5 sets of 5 reps w/ cues for total relax each rep     MANUAL THERAPY TECHNIQUES including Joint mobilizations and Soft tissue Mobilization were applied to Left knee for 5 minutes.   - Patellar mobs Grade 3-4     NEUROMUSCULAR RE-EDUCATION ACTIVITIES to improve Balance, Coordination, Kinesthetic, Sense, Proprioception and Posture for 10 minutes.  The following were included:   - Quad set; 45 deg, 20 deg, and 0 deg flex; 20x w/ 5" hold each  cold pack for 10 minutes to Left knee post session.    Patient Education and Home Exercises     Home Exercises Provided and Patient Education Provided     Education provided:   - Importance of compliance with her HEP; emphasis on quad sets and heel cord stretch    Written Home Exercises Provided: Patient instructed to cont prior HEP. Exercises were reviewed and Bell was able to demonstrate them prior to the end of the session.  Bell demonstrated good  understanding of the education provided. See EMR under Patient Instructions for exercises provided during therapy sessions    ASSESSMENT     Ms. Luu reports to clinic for her first follow up visit after initial evaluation. She demonstrates fair quadriceps activation requiring verbal cueing for recruitment and neuromuscular control. Increased fatigue reported with straight leg raises; cues required for emphasis on quad set prior to lifting.   Bell Is progressing well towards her goals.   Pt prognosis is Good.     Pt will continue to benefit from skilled outpatient physical therapy to address the deficits listed in the problem list box on initial evaluation, provide pt/family education and to maximize pt's level of independence in the home and community environment.     Pt's spiritual, cultural and educational needs considered and pt agreeable to plan of care and goals.     Anticipated barriers to physical therapy: none    Goals:   Short Term Goals:  1. Pt will be independent with HEP supplement PT in " improving functional mobility.  2. Pt will improve L LE strength to at least 75% of R LE strength as measured via MicroFet handheld dynamometer in order to improve functional mobility  3. Pt will improve L knee AROM to at least 0-110 in order to improve gait     Long Term Goals:  1. Pt will be independent with updated HEP supplement PT in improving functional mobility.  2. Pt will improve L LE strength to at least 90% of R LE strength as measured via MicroFet handheld dynamometer in order to improve functional mobility  3. Pt will improve L knee AROM to at least 0-115 in order to improve gait and ability to perform ADLs  4. Pt will improve FOTO knee survey score to </= 27% limited in order to demo improved functional mobility  5. Pt will perform TUG in < 10 seconds without AD in order to demo improved gait speed  6. Pt will perform at least 15 sit to stands without UE support on 30 second sit to stand test in order to demo improved ability to perform transfers    PLAN     Emphasize quad strength.    Yolande Salgado, PTA

## 2022-02-07 ENCOUNTER — CLINICAL SUPPORT (OUTPATIENT)
Dept: REHABILITATION | Facility: HOSPITAL | Age: 67
End: 2022-02-07
Payer: MEDICARE

## 2022-02-07 DIAGNOSIS — R29.898 DECREASED STRENGTH OF LOWER EXTREMITY: ICD-10-CM

## 2022-02-07 DIAGNOSIS — R26.9 GAIT ABNORMALITY: ICD-10-CM

## 2022-02-07 DIAGNOSIS — M25.662 DECREASED RANGE OF MOTION OF LEFT KNEE: ICD-10-CM

## 2022-02-07 PROCEDURE — 97530 THERAPEUTIC ACTIVITIES: CPT | Mod: CQ

## 2022-02-07 PROCEDURE — 97110 THERAPEUTIC EXERCISES: CPT | Mod: CQ

## 2022-02-07 NOTE — PLAN OF CARE
"[facility]  Physical Therapy Initial Evaluation    Name: Bell Lynn  Clinic Number: [id number]    Therapy Diagnosis:   Encounter Diagnoses   Name Primary?    Primary osteoarthritis of left knee     S/P total knee arthroplasty, left     Gait abnormality     Decreased strength of lower extremity     Decreased range of motion of left knee      Physician: Patricia Andrews PA-C    Physician Orders: PT Eval and Treat   Medical Diagnosis from Referral:   M17.12 (ICD-10-CM) - Primary osteoarthritis of left knee   Z96.652 (ICD-10-CM) - S/P total knee arthroplasty, left     Evaluation Date: 2/3/2022  Authorization Period Expiration: 1/31/2023  Plan of Care Expiration: 03/17/2022  Visit # / Visits authorized: 1/ 1  FOTO: 1/10    Time In: 0200 pm  Time Out: 0300 pm  Total Billable Time: 60 minutes (1 LCE, 1 TE, 1 MT)    Precautions: Standard, L TKA DOS: 01/10/2022    Subjective     Date of onset: DOS: 1/10/2022    History of current condition - Bell reports: she had a left TKA on 01/10/2022, after which she had HH PT. She reports moderate pain since surgery but states that overall it's "manageablec" compared to pre-operative pain. She states that her HH PT focused on range of motion, sit to stands, walking, and UE exercises. She reports that the incision is healing well with no issues of infecton or excess swelling. She reports that preoperatively she had difficutly with weight bearing activities, but by the end had pain with non weight bearing as well.        Past Medical History:   Diagnosis Date    Asthma     Depression     Gastric ulcer with hemorrhage 7/2012    GERD (gastroesophageal reflux disease)     History of blood transfusion     Hypertension     Iron deficiency anemia 3/14/2013    Osteoarthritis of both knees      Bell Lynn  has a past surgical history that includes Hysterectomy; Tonsillectomy; Breast surgery; Breast biopsy; " "Esophagogastroduodenoscopy (N/A, 10/7/2020); Colonoscopy; Esophagogastroduodenoscopy (N/A, 6/30/2021); and Knee Arthroplasty (Left, 1/10/2022).    Bell has a current medication list which includes the following prescription(s): albuterol, cetirizine, cyclobenzaprine, duloxetine, ferrous sulfate, furosemide, gabapentin, lidocaine, linaclotide, losartan, metoprolol succinate, nitroglycerin, omeprazole, ondansetron, and triamterene-hydrochlorothiazide 37.5-25 mg.    Review of patient's allergies indicates:   Allergen Reactions    Nsaids (non-steroidal anti-inflammatory drug)      Gi bleed    Penicillins Itching and Swelling    Penicillin     Symbicort [budesonide-formoterol]      Recurrent oral ulcers        Imaging, X-Ray: "There is a knee prosthesis in place with no evidence of hardware failure.  There is postop change within the overlying soft tissues."    Prior Therapy: none  Social History:  lives with their spouse, and grandchildren. Single story home with no steps to enter  Occupation: spends most of the day sitting  Prior Level of Function: increased pain with all ADLs  Current Level of Function: able to perform all ADLs independently, just post-op soreness    Pain:  Current 5/10, worst 10/10, best 3/10   Location: left knee   Description: Aching, Burning and Sharp  Aggravating Factors: Walking  Easing Factors: pain medication and ice    Pts goals: to walk independently    Objective     Posture: slumped posture, B rounded shoulder, moderate FHP  Palpation: TTP at anterior tibia, most focal at tibial tuberosity, adductor insertion TTP    Range of Motion:   Knee Right Active Right Passive Left Active Left Passive   Flexion 100 110 90 96   Extension -2 from 0 0 -3 from 0 0       L/E Strength w/ MicroFET Muscle Andra Dynamometer Right Left Pain/Dysfunction with Movement   (approx 4 sec hold w/ max contraction)   Hip Flexion 10.6 kg  8.7 kg     Hip Abduction 7.2 kg  4.5 kg     Quadriceps 21.7 kg  11.2 kg   " "  Hamstrings 11.9 kg  7.6 kg       TU seconds (w/ RW)    30 second sit-to-stand test (without U/E support): 0 (8 w/ UE support)    Gait Analysis: with RW: forward trunk lean 2/2 RW, flat foot contact, decreased hip extension    Limitation/Impairment for FOTO Knee Survey    Therapist reviewed FOTO scores for Bell Lynn on 2/3/2022.   FOTO documents entered into EPIC - see Media section    Limitation Score: 35%  Predicted Score: 27%       TREATMENT     Treatment Time In: 0225 pm  Treatment Time Out: 0300 pm  Total Treatment time separate from Evaluation: 35 minutes    Bell received the treatments listed below:      THERAPEUTIC EXERCISES to develop strength, endurance, ROM, flexibility, posture and core stabilization for 25 minutes including:  - Heel Prop; 4 min w/ strap around toes  - Clamshells; 3 x 10 w/ 5" hold  - SLR flex 5 x 5 w/ cues for total relax each rep    MANUAL THERAPY TECHNIQUES including Joint mobilizations and Soft tissue Mobilization were applied to L knee for 10 minutes.  - patellar mobs Gr 3-4, all directions at 0, 20 and 60 deg of flex  - fat pad mobs at 0, 20 and 60 deg of flex  - knee ext mobs tibia on femur    NEUROMUSCULAR RE-EDUCATION ACTIVITIES to improve Balance, Coordination, Kinesthetic, Sense, Proprioception and Posture for 00 minutes.  The following were included:  - Quad set; 45 deg, 20 deg, and 0 deg flex; 20x w/ 5" hold ea- Next session    Home Exercises and Patient Education Provided    Education provided:   - gait training w/ RW  - importance of quad activation and HEP    Written Home Exercises Provided: yes.  Exercises were reviewed and Bell was able to demonstrate them prior to the end of the session.  Bell demonstrated good  understanding of the education provided.     See EMR under Media for exercises provided 2/3/2022.    Assessment     Bell is a 66 y.o. female referred to outpatient Physical Therapy with a medical diagnosis of primary " osteoarthritis . Pt presents to PT with pain, decreased left  knee ROM, decreased strength, poor posture, impaired balance and gait, and functional deficits with walking. Pt would benefit from skilled PT consisting of manual therapy including STM/MFR left  knee, patellar mobs, knee flex/ext manual therapy interventions; therapeutic exercise including LE strengthening/stretching, postural education, balance and gait training, and modalities prn to address limitations and increase functional mobility.    Pt prognosis is Good.   Pt will benefit from skilled outpatient Physical Therapy to address the deficits stated above and in the chart below, provide pt/family education, and to maximize pt's level of independence.     Plan of care discussed with patient: Yes  Pt's spiritual, cultural and educational needs considered and patient is agreeable to the plan of care and goals as stated below:     Anticipated Barriers for therapy: none    Medical Necessity is demonstrated by the following  History  Co-morbidities and personal factors that may impact the plan of care Co-morbidities:   depression and HTN    Personal Factors:   no deficits     low   Examination  Body Structures and Functions, activity limitations and participation restrictions that may impact the plan of care Body Regions:   lower extremities    Body Systems:    gross symmetry  ROM  strength  gross coordinated movement  balance  gait  transfers  transitions  motor control  motor learning    Participation Restrictions:   Decreased ability to perform community ambulation, and navigate stairs    Activity limitations:   Learning and applying knowledge  no deficits    General Tasks and Commands  no deficits    Communication  no deficits    Mobility  lifting and carrying objects  walking  driving (bike, car, motorcycle)    Self care  no deficits    Domestic Life  shopping  cooking  doing house work (cleaning house, washing dishes,  laundry)    Interactions/Relationships  no deficits    Life Areas  no deficits    Community and Social Life  no deficits         high   Clinical Presentation stable and uncomplicated low   Decision Making/ Complexity Score: low     Goals:  Short Term Goals:  1. Pt will be independent with HEP supplement PT in improving functional mobility.  2. Pt will improve L LE strength to at least 75% of R LE strength as measured via MicroFet handheld dynamometer in order to improve functional mobility  3. Pt will improve L knee AROM to at least 0-110 in order to improve gait    Long Term Goals:  1. Pt will be independent with updated HEP supplement PT in improving functional mobility.  2. Pt will improve L LE strength to at least 90% of R LE strength as measured via MicroFet handheld dynamometer in order to improve functional mobility  3. Pt will improve L knee AROM to at least 0-115 in order to improve gait and ability to perform ADLs  4. Pt will improve FOTO knee survey score to </= 27% limited in order to demo improved functional mobility  5. Pt will perform TUG in < 10 seconds without AD in order to demo improved gait speed  6. Pt will perform at least 15 sit to stands without UE support on 30 second sit to stand test in order to demo improved ability to perform transfers      Plan     Plan of care Certification: 2/3/2022 to 03/17/2022.    Outpatient Physical Therapy 4 times weekly for 6 weeks to include the following interventions: Gait Training, Manual Therapy, Moist Heat/ Ice, Neuromuscular Re-ed, Orthotic Management and Training, Patient Education, Therapeutic Activites and Therapeutic Exercise, ASTYM, Kinesiotape    Alma Delia Hagen, PT, DPT

## 2022-02-08 ENCOUNTER — PATIENT OUTREACH (OUTPATIENT)
Dept: ADMINISTRATIVE | Facility: OTHER | Age: 67
End: 2022-02-08
Payer: MEDICARE

## 2022-02-08 NOTE — PROGRESS NOTES
Health Maintenance Due   Topic Date Due    DEXA SCAN  Never done    Shingles Vaccine (1 of 2) Never done     Updates were requested from care everywhere.  Chart was reviewed for overdue Proactive [facility] Encounters ([facility]) topics (CRS, Breast Cancer Screening, Eye exam)  Health Maintenance has been updated.  LINKS immunization registry triggered.  Immunizations were reconciled.

## 2022-02-09 ENCOUNTER — DOCUMENT SCAN (OUTPATIENT)
Dept: HOME HEALTH SERVICES | Facility: HOSPITAL | Age: 67
End: 2022-02-09
Payer: MEDICARE

## 2022-02-09 ENCOUNTER — CLINICAL SUPPORT (OUTPATIENT)
Dept: REHABILITATION | Facility: HOSPITAL | Age: 67
End: 2022-02-09
Payer: MEDICARE

## 2022-02-09 DIAGNOSIS — R26.9 GAIT ABNORMALITY: ICD-10-CM

## 2022-02-09 DIAGNOSIS — R29.898 DECREASED STRENGTH OF LOWER EXTREMITY: ICD-10-CM

## 2022-02-09 DIAGNOSIS — M25.662 DECREASED RANGE OF MOTION OF LEFT KNEE: ICD-10-CM

## 2022-02-09 PROCEDURE — 97530 THERAPEUTIC ACTIVITIES: CPT

## 2022-02-09 PROCEDURE — 97110 THERAPEUTIC EXERCISES: CPT

## 2022-02-09 NOTE — PROGRESS NOTES
[facility]   Physical Therapy Treatment Note     Name: Bell Lynn  Clinic Number: [id number]    Therapy Diagnosis:   Encounter Diagnoses   Name Primary?    Gait abnormality     Decreased strength of lower extremity     Decreased range of motion of left knee      Physician: Patricia Andrews PA-C    Visit Date: 2022  Physician Orders: PT Eval and Treat   Medical Diagnosis from Referral:   M17.12 (ICD-10-CM) - Primary osteoarthritis of left knee   Z96.652 (ICD-10-CM) - S/P total knee arthroplasty, left   Evaluation Date: 2/3/2022  Authorization Period Expiration: 2022  Plan of Care Expiration: 2022  Visit # / Visits authorized:  (+eval)  PTA: 0/5     Time In: 1155 am  Time Out: 1250 pm  Total Treatment Time: 55 minutes   Total Billable Time: 55 minutes (2 TE)    Precautions: Standard, L TKA DOS: 01/10/2022  SUBJECTIVE     Patient reports: she feels stiff today but the pain isn't too bad  She was compliant with home exercise program. Performs 1x/day.  Response to previous treatment: good, no adverse reaction; appropriate muscle fatigue  Functional change: ambulated into clinic with RW; needs to purchase a cane    Pain: 3/10, currently  Location: Left knee      OBJECTIVE     Objective Measures updated at progress report unless specified.     DOS 1/10/2022  6 week follow up 2022 Lacking 2 degrees of extension at rest; 0 degrees of active extension with heel prop   30 second STS: 7x with no UE assist   TU seconds with no AD    Treatment     Bell received the treatments listed below:      THERAPEUTIC EXERCISES to develop strength, endurance, ROM, flexibility, posture and core stabilization for 25 minutes of 1:1 including:     +Aerobic Activity: NuStep x 6 minutes, Level 3 - seat 17 for AAROM and cardiovascular endurance    Heel Prop; 5 min w/ strap around toes  Short arc quads with medium bolster: 20x, 5 second hold; 3lb cuff weight (no  "weight today)  Short arc quads with small bolster: 20x, 5 second hold; 3lb cuff weight (no weight today)  Long arc quads: 20x, 5 second hold; 4lb cuff weight    Clamshells; 3 x 10 w/ 5" hold  SLR flexion: 5 sets of 8 reps w/ cues for total relax each rep     MANUAL THERAPY TECHNIQUES including Joint mobilizations and Soft tissue Mobilization were applied to Left knee for 5 minutes of 1:1 treatment.   - Patellar mobs Grade 3-4   - Tibial Internal Rotation mobs w/ knee ext    NEUROMUSCULAR RE-EDUCATION ACTIVITIES to improve Balance, Coordination, Kinesthetic, Sense, Proprioception and Posture for 15 minutes under supervision.  The following were included:   - Quad set; 45 deg, 20 deg, and 0 deg flex; 20x w/ 5" hold each  cold pack for 00 minutes to Left knee post session.- not today    Patient Education and Home Exercises     Home Exercises Provided and Patient Education Provided     Education provided:   - Importance of compliance with her HEP; emphasis on quad sets and heel cord stretch    Written Home Exercises Provided: Patient instructed to cont prior HEP. Exercises were reviewed and Bell was able to demonstrate them prior to the end of the session.  Bell demonstrated good  understanding of the education provided. See EMR under Patient Instructions for exercises provided during therapy sessions    ASSESSMENT   Bell presents 4 wk s/p L TKA. She responded well to quad control exercises but did show decreased tolerance to higher repetition without break indicating poor local endurance.She will benefit increased strengthening and increased endurance training for knee extensor mechanism. She has a difficult time avoiding ER of L LE when resting and requires cues to avoid this in exercise. She will benefit increased load in most exercises and addition of closed chain exercises next session.      Bell Is progressing well towards her goals.   Pt prognosis is Good.     Pt will continue to benefit from skilled " outpatient physical therapy to address the deficits listed in the problem list box on initial evaluation, provide pt/family education and to maximize pt's level of independence in the home and community environment.     Pt's spiritual, cultural and educational needs considered and pt agreeable to plan of care and goals.     Anticipated barriers to physical therapy: none    Goals:   Short Term Goals:  1. Pt will be independent with HEP supplement PT in improving functional mobility.  2. Pt will improve L LE strength to at least 75% of R LE strength as measured via MicroFet handheld dynamometer in order to improve functional mobility  3. Pt will improve L knee AROM to at least 0-110 in order to improve gait     Long Term Goals:  1. Pt will be independent with updated HEP supplement PT in improving functional mobility.  2. Pt will improve L LE strength to at least 90% of R LE strength as measured via MicroFet handheld dynamometer in order to improve functional mobility  3. Pt will improve L knee AROM to at least 0-115 in order to improve gait and ability to perform ADLs  4. Pt will improve FOTO knee survey score to </= 27% limited in order to demo improved functional mobility  5. Pt will perform TUG in < 10 seconds without AD in order to demo improved gait speed  6. Pt will perform at least 15 sit to stands without UE support on 30 second sit to stand test in order to demo improved ability to perform transfers    PLAN     Emphasize quad strength. Add closed chain exercises next session.     Alma Delia Hagen, PT, DPT

## 2022-02-10 DIAGNOSIS — R60.0 PERIPHERAL EDEMA: ICD-10-CM

## 2022-02-10 DIAGNOSIS — K25.7 CHRONIC GASTRIC ULCER WITHOUT HEMORRHAGE AND WITHOUT PERFORATION: ICD-10-CM

## 2022-02-10 DIAGNOSIS — F33.0 MILD RECURRENT MAJOR DEPRESSION: Chronic | ICD-10-CM

## 2022-02-10 DIAGNOSIS — K59.09 CHRONIC CONSTIPATION: ICD-10-CM

## 2022-02-10 DIAGNOSIS — M79.7 FIBROMYALGIA: ICD-10-CM

## 2022-02-10 DIAGNOSIS — S39.012A STRAIN OF LUMBAR REGION, INITIAL ENCOUNTER: ICD-10-CM

## 2022-02-10 DIAGNOSIS — J30.9 ALLERGIC RHINITIS, UNSPECIFIED SEASONALITY, UNSPECIFIED TRIGGER: ICD-10-CM

## 2022-02-10 DIAGNOSIS — M17.12 OSTEOARTHRITIS OF LEFT KNEE, UNSPECIFIED OSTEOARTHRITIS TYPE: ICD-10-CM

## 2022-02-10 DIAGNOSIS — I10 ESSENTIAL HYPERTENSION: Chronic | ICD-10-CM

## 2022-02-10 RX ORDER — DULOXETIN HYDROCHLORIDE 60 MG/1
60 CAPSULE, DELAYED RELEASE ORAL DAILY
Qty: 90 CAPSULE | Refills: 1 | Status: SHIPPED | OUTPATIENT
Start: 2022-02-10 | End: 2022-09-14 | Stop reason: SDUPTHER

## 2022-02-10 RX ORDER — TRIAMTERENE/HYDROCHLOROTHIAZID 37.5-25 MG
1 TABLET ORAL DAILY
Qty: 90 TABLET | Refills: 1 | Status: SHIPPED | OUTPATIENT
Start: 2022-02-10 | End: 2022-04-22 | Stop reason: ALTCHOICE

## 2022-02-10 RX ORDER — CYCLOBENZAPRINE HCL 10 MG
10 TABLET ORAL NIGHTLY
Qty: 30 TABLET | Refills: 0 | Status: SHIPPED | OUTPATIENT
Start: 2022-02-10 | End: 2022-03-09

## 2022-02-10 RX ORDER — CETIRIZINE HYDROCHLORIDE 10 MG/1
10 TABLET ORAL DAILY
Qty: 90 TABLET | Refills: 1 | Status: SHIPPED | OUTPATIENT
Start: 2022-02-10 | End: 2022-09-19

## 2022-02-10 RX ORDER — GABAPENTIN 100 MG/1
CAPSULE ORAL
Qty: 270 CAPSULE | Refills: 1 | Status: SHIPPED | OUTPATIENT
Start: 2022-02-10 | End: 2022-12-28 | Stop reason: SDUPTHER

## 2022-02-10 RX ORDER — LOSARTAN POTASSIUM 100 MG/1
100 TABLET ORAL DAILY
Qty: 90 TABLET | Refills: 1 | Status: SHIPPED | OUTPATIENT
Start: 2022-02-10 | End: 2022-06-24

## 2022-02-10 RX ORDER — METOPROLOL SUCCINATE 100 MG/1
100 TABLET, EXTENDED RELEASE ORAL DAILY
Qty: 90 TABLET | Refills: 1 | Status: SHIPPED | OUTPATIENT
Start: 2022-02-10 | End: 2022-09-19 | Stop reason: SDUPTHER

## 2022-02-10 RX ORDER — FUROSEMIDE 20 MG/1
TABLET ORAL
Qty: 90 TABLET | Refills: 1 | Status: SHIPPED | OUTPATIENT
Start: 2022-02-10 | End: 2022-09-14 | Stop reason: SDUPTHER

## 2022-02-10 RX ORDER — OMEPRAZOLE 40 MG/1
40 CAPSULE, DELAYED RELEASE ORAL EVERY MORNING
Qty: 90 CAPSULE | Refills: 1 | Status: SHIPPED | OUTPATIENT
Start: 2022-02-10 | End: 2023-05-16 | Stop reason: SDUPTHER

## 2022-02-10 NOTE — TELEPHONE ENCOUNTER
No new care gaps identified.  Powered by [employer]. Reference number: [id number].   2/10/2022 2:28:07 PM CST

## 2022-02-14 ENCOUNTER — CLINICAL SUPPORT (OUTPATIENT)
Dept: REHABILITATION | Facility: HOSPITAL | Age: 67
End: 2022-02-14
Payer: MEDICARE

## 2022-02-14 DIAGNOSIS — R29.898 DECREASED STRENGTH OF LOWER EXTREMITY: ICD-10-CM

## 2022-02-14 DIAGNOSIS — G89.29 CHRONIC MIDLINE LOW BACK PAIN WITHOUT SCIATICA: ICD-10-CM

## 2022-02-14 DIAGNOSIS — R26.9 GAIT ABNORMALITY: ICD-10-CM

## 2022-02-14 DIAGNOSIS — M54.50 CHRONIC MIDLINE LOW BACK PAIN WITHOUT SCIATICA: ICD-10-CM

## 2022-02-14 DIAGNOSIS — M25.662 DECREASED RANGE OF MOTION OF LEFT KNEE: ICD-10-CM

## 2022-02-14 DIAGNOSIS — J45.40 MODERATE PERSISTENT CHRONIC ASTHMA WITHOUT COMPLICATION: ICD-10-CM

## 2022-02-14 PROCEDURE — 97110 THERAPEUTIC EXERCISES: CPT | Mod: CQ

## 2022-02-14 PROCEDURE — 97112 NEUROMUSCULAR REEDUCATION: CPT | Mod: CQ

## 2022-02-14 RX ORDER — LIDOCAINE 50 MG/G
1 PATCH TOPICAL DAILY
Qty: 30 PATCH | Refills: 5 | Status: CANCELLED | OUTPATIENT
Start: 2022-02-14

## 2022-02-14 NOTE — PROGRESS NOTES
"  [facility]   Physical Therapy Treatment Note     Name: Bell Lynn  Clinic Number: [id number]    Therapy Diagnosis:   Encounter Diagnoses   Name Primary?    Gait abnormality     Decreased strength of lower extremity     Decreased range of motion of left knee      Physician: Patricia Andrews PA-C    Visit Date: 2022  Physician Orders: PT Eval and Treat   Medical Diagnosis from Referral:   M17.12 (ICD-10-CM) - Primary osteoarthritis of left knee   Z96.652 (ICD-10-CM) - S/P total knee arthroplasty, left     Evaluation Date: 2/3/2022  Authorization Period Expiration: 2022  Plan of Care Expiration: 2022  Visit # / Visits authorized: 3 / 20 + eval    PTA Visit #:       Time In: 0953  Time Out: 1100  Total Treatment Time: 67 minutes   Total Billable Time: 67 minutes (3 TE, 1 NMR)    Precautions: Standard, L TKA DOS: 01/10/2022    SUBJECTIVE     Patient reports: "I was not feeling well this weekend." She reports missing Friday's appointment secondary to feeling unwell.  She was compliant with home exercise program. Performs 1x/day.  Response to previous treatment: good, no adverse reaction; appropriate muscle fatigue  Functional change: ambulated into clinic with RW; needs to purchase a cane    Pain: 3/10, currently  Location: Left knee      OBJECTIVE     Objective Measures updated at progress report unless specified.     DOS 1/10/2022  6 week follow up 2022 Lacking 2 degrees of extension at rest; 0 degrees of active extension with heel prop   30 second STS: 7x with no UE assist   TU seconds with no AD  2022 101 degrees   30 second STS: 9x with no UE assist   TUG: 15 seconds with no AD    Treatment     Bell received the treatments listed below:      THERAPEUTIC EXERCISES to develop strength, endurance, ROM, flexibility, posture and core stabilization for 57 minutes including:     Aerobic Activity: NuStep x 8 minutes, Level 3 - seat " 16 for AAROM and cardiovascular endurance    Heel Prop on small bolster x 5 minutes with gait belt around toes  Short arc quads with medium bolster: 30x, 5 second hold; 3lb cuff wteight   Short arc quads with small bolster: 20x, 5 second hold; 3lb cuff weight   Long arc quads: 30x, 5 second hold; 4lb cuff weight  +Quad set with heel prop: 10 second hold, 10x  +Heel slides with strap and board: 10 second hold, 10x    Clamshells; 3 x 10 with 5 second hold  SLR flexion: 5 sets of 8 reps with cues for total relax each rep  +Standing heel raises: 2 x 10  +Side steps along half wall: 3 laps  +Standing hip abduction: 2 x 10 each LE  +Step ups on 6-inch step: 2 x 10 - leading with LLE    MANUAL THERAPY TECHNIQUES including Joint mobilizations and Soft tissue Mobilization were applied to Left knee for 00 minutes.   - Patellar mobs Grade 3-4   - Tibial Internal Rotation mobs w/ knee ext    NEUROMUSCULAR RE-EDUCATION ACTIVITIES to improve Balance, Coordination, Kinesthetic, Sense, Proprioception and Posture for 10 minutes. The following were included:   - Quad set; 45 deg, 20 deg, and 0 deg flex; 20x with 5 second hold each  cold pack for 00 minutes to Left knee post session. - not today; patient declined. States she will ice at home.     Patient Education and Home Exercises     Home Exercises Provided and Patient Education Provided     Education provided:   - Importance of compliance with her HEP; emphasis on quad sets and heel cord stretch    Written Home Exercises Provided: Patient instructed to cont prior HEP. Exercises were reviewed and Bell was able to demonstrate them prior to the end of the session.  Bell demonstrated good  understanding of the education provided. See EMR under Patient Instructions for exercises provided during therapy sessions    ASSESSMENT     Good tolerance to closed chain exercises performed this session reporting appropriate muscle response. She demonstrates improvement in quadriceps  control, however requires verbal cueing to maintain hold time of contraction.   Bell Is progressing well towards her goals.   Pt prognosis is Good.     Pt will continue to benefit from skilled outpatient physical therapy to address the deficits listed in the problem list box on initial evaluation, provide pt/family education and to maximize pt's level of independence in the home and community environment.     Pt's spiritual, cultural and educational needs considered and pt agreeable to plan of care and goals.     Anticipated barriers to physical therapy: none    Goals:   Short Term Goals:  1. Pt will be independent with HEP supplement PT in improving functional mobility.  2. Pt will improve L LE strength to at least 75% of R LE strength as measured via MicroFet handheld dynamometer in order to improve functional mobility  3. Pt will improve L knee AROM to at least 0-110 in order to improve gait     Long Term Goals:  1. Pt will be independent with updated HEP supplement PT in improving functional mobility.  2. Pt will improve L LE strength to at least 90% of R LE strength as measured via MicroFet handheld dynamometer in order to improve functional mobility  3. Pt will improve L knee AROM to at least 0-115 in order to improve gait and ability to perform ADLs  4. Pt will improve FOTO knee survey score to </= 27% limited in order to demo improved functional mobility  5. Pt will perform TUG in < 10 seconds without AD in order to demo improved gait speed  6. Pt will perform at least 15 sit to stands without UE support on 30 second sit to stand test in order to demo improved ability to perform transfers    PLAN     Emphasize quad strength.     Yolande Salgado, PTA

## 2022-02-16 ENCOUNTER — CLINICAL SUPPORT (OUTPATIENT)
Dept: REHABILITATION | Facility: HOSPITAL | Age: 67
End: 2022-02-16
Payer: MEDICARE

## 2022-02-16 DIAGNOSIS — M25.662 DECREASED RANGE OF MOTION OF LEFT KNEE: ICD-10-CM

## 2022-02-16 DIAGNOSIS — R26.9 GAIT ABNORMALITY: ICD-10-CM

## 2022-02-16 DIAGNOSIS — R29.898 DECREASED STRENGTH OF LOWER EXTREMITY: ICD-10-CM

## 2022-02-16 PROCEDURE — 97110 THERAPEUTIC EXERCISES: CPT

## 2022-02-16 PROCEDURE — 97140 MANUAL THERAPY 1/> REGIONS: CPT

## 2022-02-16 NOTE — PROGRESS NOTES
[facility]   Physical Therapy Treatment Note     Name: Bell Lynn  Clinic Number: [id number]    Therapy Diagnosis:   Encounter Diagnoses   Name Primary?    Gait abnormality     Decreased strength of lower extremity     Decreased range of motion of left knee      Physician: Patricia Andrews PA-C    Visit Date: 2022  Physician Orders: PT Eval and Treat   Medical Diagnosis from Referral:   M17.12 (ICD-10-CM) - Primary osteoarthritis of left knee   Z96.652 (ICD-10-CM) - S/P total knee arthroplasty, left     Evaluation Date: 2/3/2022  Authorization Period Expiration: 2022  Plan of Care Expiration: 2022  Visit # / Visits authorized:  + eval    PTA Visit #: 0 / 5      Time In: 1000 am  Time Out: 1100 am  Total Treatment Time: 60 minutes   Total Billable Time: 30 minutes (1 MT, 1 TE)    Precautions: Standard, L TKA DOS: 01/10/2022    SUBJECTIVE     Patient reports: she feels like she's doing well, but does have some achyness in her knee today  She was compliant with home exercise program. Performs 1x/day.  Response to previous treatment: good, no adverse reaction; appropriate muscle fatigue  Functional change: ambulated into clinic with RW; needs to purchase a cane    Pain: 5/10, currently  Location: Left knee      OBJECTIVE     Objective Measures updated at progress report unless specified.     DOS 1/10/2022  6 week follow up 2022 Lacking 2 degrees of extension at rest; 0 degrees of active extension with heel prop   30 second STS: 7x with no UE assist   TU seconds with no AD  2022 101 degrees   30 second STS: 9x with no UE assist   TUG: 15 seconds with no AD  2022  Knee L pre-treatment L post-treatment   Flexion 94 106   Extension -5 from 0 0        Treatment     Bell received the treatments listed below:      THERAPEUTIC EXERCISES to develop strength, endurance, ROM, flexibility, posture and core stabilization for 15  minutes of 1:1 and 30 min under supervision including:     Aerobic Activity: NuStep x 8 minutes, Level 4.0 - seat 15 for AAROM and cardiovascular endurance    Heel Prop on small bolster x 6 minutes with gait belt around toes and 4 lbs at knee  Short arc quads with medium bolster: 30x, 5 second hold; 3lb cuff wteight   Long arc quads: 30x, 5 second hold; 5lb cuff weight  Heel slides with strap and board: 15 second hold, 10x    Clamshells; 3 x 10 with 5 second hold  SLR flexion: 4 sets of 8 reps with cues for total relax each rep      Not Today:   Standing heel raises: 2 x 10  Side steps along half wall: 3 laps  Standing hip abduction: 2 x 10 each LE  Step ups on 6-inch step: 2 x 10 - leading with LLE      MANUAL THERAPY TECHNIQUES including Joint mobilizations and Soft tissue Mobilization were applied to Left knee for 15 minutes of 1:1.   - Patellar mobs Grade 3-4   - Patellar fat pad mobs Gr 3-4  - Tibial Internal Rotation mobs w/ knee ext  - Seated tibial distraction and rotation    NEUROMUSCULAR RE-EDUCATION ACTIVITIES to improve Balance, Coordination, Kinesthetic, Sense, Proprioception and Posture for 00 minutes. The following were included:   - Quad set; 45 deg, 20 deg, and 0 deg flex; 20x with 5 second hold each  cold pack for 00 minutes to Left knee post session. - not today; patient declined. States she will ice at home.     Patient Education and Home Exercises     Home Exercises Provided and Patient Education Provided     Education provided:   - Importance of compliance with her HEP; emphasis on quad sets and heel cord stretch    Written Home Exercises Provided: Patient instructed to cont prior HEP. Exercises were reviewed and Bell was able to demonstrate them prior to the end of the session.  Bell demonstrated good  understanding of the education provided. See EMR under Patient Instructions for exercises provided during therapy sessions    ASSESSMENT   Bell reports 5 wks s/p L TKA. She demonstrated  moderate quad control that improved greatly after manual interventions. She is progressing well but will benefit increased focus on range of motion interventions. She is doing well in quad activation but does fatigue quickly with isometrics or resistance is added. She will benefit increased quad strength and endurance as well as continued progression of range of motion.      Bell Is progressing well towards her goals.   Pt prognosis is Good.     Pt will continue to benefit from skilled outpatient physical therapy to address the deficits listed in the problem list box on initial evaluation, provide pt/family education and to maximize pt's level of independence in the home and community environment.     Pt's spiritual, cultural and educational needs considered and pt agreeable to plan of care and goals.     Anticipated barriers to physical therapy: none    Goals:   Short Term Goals:  1. Pt will be independent with HEP supplement PT in improving functional mobility. (Progressing, not met)  2. Pt will improve L LE strength to at least 75% of R LE strength as measured via MicroFet handheld dynamometer in order to improve functional mobility (Progressing, not met)  3. Pt will improve L knee AROM to at least 0-110 in order to improve gait (Progressing, not met)     Long Term Goals:  1. Pt will be independent with updated HEP supplement PT in improving functional mobility. (Progressing, not met)  2. Pt will improve L LE strength to at least 90% of R LE strength as measured via MicroFet handheld dynamometer in order to improve functional mobility (Progressing, not met)  3. Pt will improve L knee AROM to at least 0-115 in order to improve gait and ability to perform ADLs (Progressing, not met)  4. Pt will improve FOTO knee survey score to </= 27% limited in order to demo improved functional mobility (Progressing, not met)  5. Pt will perform TUG in < 10 seconds without AD in order to demo improved gait speed (Progressing,  not met)  6. Pt will perform at least 15 sit to stands without UE support on 30 second sit to stand test in order to demo improved ability to perform transfers (Progressing, not met)    PLAN     Maintain full extension, progress flexion, improve quad strength/endurance    Alma Delia Hagen, PT, DPT

## 2022-02-18 ENCOUNTER — DOCUMENTATION ONLY (OUTPATIENT)
Dept: REHABILITATION | Facility: HOSPITAL | Age: 67
End: 2022-02-18

## 2022-02-18 ENCOUNTER — CLINICAL SUPPORT (OUTPATIENT)
Dept: REHABILITATION | Facility: HOSPITAL | Age: 67
End: 2022-02-18
Payer: MEDICARE

## 2022-02-18 DIAGNOSIS — M54.50 CHRONIC MIDLINE LOW BACK PAIN WITHOUT SCIATICA: ICD-10-CM

## 2022-02-18 DIAGNOSIS — R26.9 GAIT ABNORMALITY: ICD-10-CM

## 2022-02-18 DIAGNOSIS — M25.662 DECREASED RANGE OF MOTION OF LEFT KNEE: ICD-10-CM

## 2022-02-18 DIAGNOSIS — R29.898 DECREASED STRENGTH OF LOWER EXTREMITY: ICD-10-CM

## 2022-02-18 DIAGNOSIS — G89.29 CHRONIC MIDLINE LOW BACK PAIN WITHOUT SCIATICA: ICD-10-CM

## 2022-02-18 PROCEDURE — 97530 THERAPEUTIC ACTIVITIES: CPT | Mod: CQ

## 2022-02-18 PROCEDURE — 97110 THERAPEUTIC EXERCISES: CPT | Mod: CQ

## 2022-02-18 RX ORDER — LIDOCAINE 50 MG/G
1 PATCH TOPICAL DAILY
Qty: 30 PATCH | Refills: 5 | Status: CANCELLED | OUTPATIENT
Start: 2022-02-18

## 2022-02-18 NOTE — PROGRESS NOTES
[facility]   Physical Therapy Treatment Note     Name: Bell Lynn  Clinic Number: [id number]    Therapy Diagnosis:   Encounter Diagnoses   Name Primary?    Gait abnormality     Decreased strength of lower extremity     Decreased range of motion of left knee      Physician: Patricia Andrews PA-C    Visit Date: 2022  Physician Orders: PT Eval and Treat   Medical Diagnosis from Referral:   M17.12 (ICD-10-CM) - Primary osteoarthritis of left knee   Z96.652 (ICD-10-CM) - S/P total knee arthroplasty, left     Evaluation Date: 2/3/2022  Authorization Period Expiration: 2022  Plan of Care Expiration: 2022  Visit # / Visits authorized:  + eval    PTA Visit #:       Time In: 948  Time Out:   Total Treatment Time: 57 minutes   Total Billable Time: 30 minutes (1 TE, 1 NMR)    Precautions: Standard, L TKA DOS: 01/10/2022    SUBJECTIVE     Patient reports: pain along the front of her Left leg, she describes it as a shooting pain into her ankle.  She was compliant with home exercise program. Performs 1x/day.  Response to previous treatment: good, no adverse reaction; appropriate muscle fatigue  Functional change: ambulated into clinic with SPC    Pain: 6/10, currently  Location: Left knee      OBJECTIVE     Objective Measures updated at progress report unless specified.     DOS 1/10/2022  6 week follow up 2022 Lacking 2 degrees of extension at rest; 0 degrees of active extension with heel prop   30 second STS: 7x with no UE assist   TU seconds with no AD  2022 101 degrees   30 second STS: 9x with no UE assist   TUG: 15 seconds with no AD  2022  Knee L pre-treatment L post-treatment   Flexion 94 106   Extension -5 from 0 0      2022: ROM lacking 2-3 degrees at rest; 0 degrees of active extension    Treatment     Bell received the treatments listed below:      THERAPEUTIC EXERCISES to develop strength, endurance, ROM,  "flexibility, posture and core stabilization for 49 minutes including:     Aerobic Activity: NuStep x 8 minutes, Level 4.0 - seat 15 for AAROM and cardiovascular endurance     Short arc quads with medium bolster: 30x, 5 second hold; deferred weight today secondary to patient presentation   Long arc quads: 30x, 5 second hold; deferred weight secondary to patient presentation  Heel slides with strap and board: 10 second hold, 10x  +Tibial nerve glides in long sitting: 2 x 10; short arc quad with DF, medium bolster under knee     Clamshells; 3 x 10 with 5 second hold  SLR flexion: 4 sets of 8 reps with cues for total relax each rep    MANUAL THERAPY TECHNIQUES including Joint mobilizations and Soft tissue Mobilization were applied to Left knee for 00 minutes.   - Patellar mobs Grade 3-4   - Patellar fat pad mobs Gr 3-4  - Tibial Internal Rotation mobs w/ knee ext    NEUROMUSCULAR RE-EDUCATION ACTIVITIES to improve Balance, Coordination, Kinesthetic, Sense, Proprioception and Posture for 8 minutes. The following were included:   - Quad set; 45 deg, 20 deg, and 0 deg flex; 20x with 5 second hold each  cold pack for 00 minutes to Left knee post session. - not today; patient declined. States she will ice at home.     Patient Education and Home Exercises     Home Exercises Provided and Patient Education Provided     Education provided:   - Importance of compliance with her HEP; emphasis on quad sets and heel cord stretch    Written Home Exercises Provided: Patient instructed to cont prior HEP. Exercises were reviewed and Bell was able to demonstrate them prior to the end of the session.  Bell demonstrated good  understanding of the education provided. See EMR under Patient Instructions for exercises provided during therapy sessions    ASSESSMENT     Patient presented to clinic reporting increased Left knee and anterior lower leg pain. Pain was described as "shooting from knee to ankle." Patient reported that it " improved with exercises. Deferred use of weights this session secondary to patient presentation and to avoid exacerbation of pain. She demonstrates increased difficulty with maintaining quad contraction with SLR requiring cueing to avoid extensor lag with exercise.   Bell Is progressing well towards her goals.   Pt prognosis is Good.     Pt will continue to benefit from skilled outpatient physical therapy to address the deficits listed in the problem list box on initial evaluation, provide pt/family education and to maximize pt's level of independence in the home and community environment.     Pt's spiritual, cultural and educational needs considered and pt agreeable to plan of care and goals.     Anticipated barriers to physical therapy: none    Goals:   Short Term Goals:  1. Pt will be independent with HEP supplement PT in improving functional mobility. (Progressing, not met)  2. Pt will improve L LE strength to at least 75% of R LE strength as measured via MicroFet handheld dynamometer in order to improve functional mobility (Progressing, not met)  3. Pt will improve L knee AROM to at least 0-110 in order to improve gait (Progressing, not met)     Long Term Goals:  1. Pt will be independent with updated HEP supplement PT in improving functional mobility. (Progressing, not met)  2. Pt will improve L LE strength to at least 90% of R LE strength as measured via MicroFet handheld dynamometer in order to improve functional mobility (Progressing, not met)  3. Pt will improve L knee AROM to at least 0-115 in order to improve gait and ability to perform ADLs (Progressing, not met)  4. Pt will improve FOTO knee survey score to </= 27% limited in order to demo improved functional mobility (Progressing, not met)  5. Pt will perform TUG in < 10 seconds without AD in order to demo improved gait speed (Progressing, not met)  6. Pt will perform at least 15 sit to stands without UE support on 30 second sit to stand test in  order to demo improved ability to perform transfers (Progressing, not met)    PLAN     Emphasize quad strength.     Yolande Salgado, PTA

## 2022-02-21 ENCOUNTER — CLINICAL SUPPORT (OUTPATIENT)
Dept: REHABILITATION | Facility: HOSPITAL | Age: 67
End: 2022-02-21
Payer: MEDICARE

## 2022-02-21 DIAGNOSIS — R26.9 GAIT ABNORMALITY: Primary | ICD-10-CM

## 2022-02-21 DIAGNOSIS — M25.662 DECREASED RANGE OF MOTION OF LEFT KNEE: ICD-10-CM

## 2022-02-21 DIAGNOSIS — R29.898 DECREASED STRENGTH OF LOWER EXTREMITY: ICD-10-CM

## 2022-02-21 PROCEDURE — 97110 THERAPEUTIC EXERCISES: CPT

## 2022-02-21 PROCEDURE — 97112 NEUROMUSCULAR REEDUCATION: CPT

## 2022-02-21 NOTE — PROGRESS NOTES
" [facility]   Physical Therapy Treatment Note     Name: Bell Lynn  Clinic Number: [id number]    Therapy Diagnosis:   Encounter Diagnoses   Name Primary?    Gait abnormality Yes    Decreased strength of lower extremity     Decreased range of motion of left knee      Physician: Patricia Andrews PA-C    Visit Date: 2022  Physician Orders: PT Eval and Treat   Medical Diagnosis from Referral:   M17.12 (ICD-10-CM) - Primary osteoarthritis of left knee   Z96.652 (ICD-10-CM) - S/P total knee arthroplasty, left     Evaluation Date: 2/3/2022  Authorization Period Expiration: 2022  Plan of Care Expiration: 2022  Visit # / Visits authorized:  + eval    PTA Visit #: 0/5      Time In: 1100 am  Time Out: 1158 am  Total Treatment Time: 58 minutes   Total Billable Time: 58 minutes (3 TE, 1 NMR)    Precautions: Standard, L TKA DOS: 01/10/2022    SUBJECTIVE     Patient reports: she is still experiencing a "stinging pain" from her anterior knee to her medial ankle  She was compliant with home exercise program. Performs 1x/day.  Response to previous treatment: good, no adverse reaction; appropriate muscle fatigue  Functional change: ambulated into clinic with SPC    Pain: 3/10, currently  Location: Left knee      OBJECTIVE     Objective Measures updated at progress report unless specified.     DOS 1/10/2022  6 week follow up 2022 Lacking 2 degrees of extension at rest; 0 degrees of active extension with heel prop   30 second STS: 7x with no UE assist   TU seconds with no AD  2022 101 degrees   30 second STS: 9x with no UE assist   TUG: 15 seconds with no AD  2022  Knee L pre-treatment L post-treatment   Flexion 94 106   Extension -5 from 0 0      2022: ROM lacking 2-3 degrees at rest; 0 degrees of active extension    2022  Knee L post-treatment   Flexion 109   Extension 0 (increased neural tension causing pain)    30" STS: 8 reps " "without UE support   TU.5 sec w/ AD      Treatment     Bell received the treatments listed below:      THERAPEUTIC EXERCISES to develop strength, endurance, ROM, flexibility, posture and core stabilization for 47 minutes including updated measures and     Aerobic Activity: recumbent bike; seat 8; Lv 1.0 for 7 min for AAROM and cardiovascular endurance     Long arc quads: 20x, 5" hold; 3# --> increase resistance next session  Short arc quads with medium bolster: 3x10, 5" hold; 5#  Heel slides with strap and board: 10 second hold, 10x     Clamshells; 3 x 10 with 5 second hold  SLR flexion: 3 sets of 10 reps with cues for total relax each rep    Step ups; 2 x 10 w/ 6in step and 1 UE support    MANUAL THERAPY TECHNIQUES including Joint mobilizations and Soft tissue Mobilization were applied to Left knee for 00 minutes.   - Patellar mobs Grade 3-4   - Patellar fat pad mobs Gr 3-4  - Tibial Internal Rotation mobs w/ knee ext    NEUROMUSCULAR RE-EDUCATION ACTIVITIES to improve Balance, Coordination, Kinesthetic, Sense, Proprioception and Posture for 11 minutes. The following were included:   - Quad set; w/ 1/2 foam under knee; 20x with 5 second hold each  Slump nerve slides; DF w/ cervical motion; 20x x 2       Patient Education and Home Exercises     Home Exercises Provided and Patient Education Provided     Education provided:   - updated HEP to add slump nerve slides    Written Home Exercises Provided: Patient instructed to cont prior HEP. Exercises were reviewed and Bell was able to demonstrate them prior to the end of the session.  Bell demonstrated good  understanding of the education provided. See EMR under Patient Instructions for exercises provided during therapy sessions; Updated 22    ASSESSMENT   Bell presents 6 wks s/p L TKA. She is improving in her ROM but is still lacking notable flexion at this point. She came in today without full knee extension 2/2 increased neural tension in " posterior knee. This was improved with seated nerve slides, but was still sensitive. She as advised to relax ankle and neck during SLR and was able to achieve good form on this. She will benefit continued progression per TKA strengthening/range of motion protocol.     Bell Is progressing well towards her goals.   Pt prognosis is Good.     Pt will continue to benefit from skilled outpatient physical therapy to address the deficits listed in the problem list box on initial evaluation, provide pt/family education and to maximize pt's level of independence in the home and community environment.     Pt's spiritual, cultural and educational needs considered and pt agreeable to plan of care and goals.     Anticipated barriers to physical therapy: none    Goals:   Short Term Goals:  1. Pt will be independent with HEP supplement PT in improving functional mobility. (Progressing, not met)  2. Pt will improve L LE strength to at least 75% of R LE strength as measured via MicroFet handheld dynamometer in order to improve functional mobility (Progressing, not met)  3. Pt will improve L knee AROM to at least 0-110 in order to improve gait (Progressing, not met)     Long Term Goals:  1. Pt will be independent with updated HEP supplement PT in improving functional mobility. (Progressing, not met)  2. Pt will improve L LE strength to at least 90% of R LE strength as measured via MicroFet handheld dynamometer in order to improve functional mobility (Progressing, not met)  3. Pt will improve L knee AROM to at least 0-115 in order to improve gait and ability to perform ADLs (Progressing, not met)  4. Pt will improve FOTO knee survey score to </= 27% limited in order to demo improved functional mobility (Progressing, not met)  5. Pt will perform TUG in < 10 seconds without AD in order to demo improved gait speed (Progressing, not met)  6. Pt will perform at least 15 sit to stands without UE support on 30 second sit to stand test in  order to demo improved ability to perform transfers (Progressing, not met)    PLAN     Progress per protocol as able without increasing neural symptoms.    Alma Delia Hagen, PT, DPT

## 2022-02-22 NOTE — PROGRESS NOTES
POSTOP VISIT FOR LEFT TKA:  (Liang)    Bell Lynn is here today for a 6 week follow up of left TKA by Dr. Tavera on 1/10/22.  She is taking OTC tylenol for expected postop pain. Patient ambulates with a cane when outside the house. Patient is starting to return to regular activities.     She is in PT for her knee.     EXAM:  A well developed female in no distress.  Alert and oriented. Breathing in unlabored. Mood and affect are appropriate.     Incision:  Normally healed  Range of motion: extension- 0 degrees; flexion- 100 degrees  Valgus/varus stability- stable  Swelling- mild  Distal perfusion-intact  Distal neurologic-intact  Calf is supple and non tender.     Impression: doing well 6 weeks s/p TKA    Plan:  - Continue with exercises/PT.   - Can return back to regular activities as tolerated.   - Continue prn OTC tylenol as directed on bottle. Take with food.     F/u with Dr. Tavera at 3 months postop and prn.

## 2022-02-23 ENCOUNTER — CLINICAL SUPPORT (OUTPATIENT)
Dept: REHABILITATION | Facility: HOSPITAL | Age: 67
End: 2022-02-23
Payer: MEDICARE

## 2022-02-23 ENCOUNTER — OFFICE VISIT (OUTPATIENT)
Dept: ORTHOPEDICS | Facility: CLINIC | Age: 67
End: 2022-02-23
Payer: MEDICARE

## 2022-02-23 VITALS
BODY MASS INDEX: 35.08 KG/M2 | SYSTOLIC BLOOD PRESSURE: 124 MMHG | DIASTOLIC BLOOD PRESSURE: 76 MMHG | HEIGHT: 64 IN | WEIGHT: 205.5 LBS

## 2022-02-23 DIAGNOSIS — R26.9 GAIT ABNORMALITY: Primary | ICD-10-CM

## 2022-02-23 DIAGNOSIS — Z96.652 S/P TOTAL KNEE ARTHROPLASTY, LEFT: Primary | ICD-10-CM

## 2022-02-23 DIAGNOSIS — M17.12 PRIMARY OSTEOARTHRITIS OF LEFT KNEE: ICD-10-CM

## 2022-02-23 DIAGNOSIS — R29.898 DECREASED STRENGTH OF LOWER EXTREMITY: ICD-10-CM

## 2022-02-23 DIAGNOSIS — M25.662 DECREASED RANGE OF MOTION OF LEFT KNEE: ICD-10-CM

## 2022-02-23 PROCEDURE — 3008F BODY MASS INDEX DOCD: CPT | Mod: CPTII,S$GLB,, | Performed by: PHYSICIAN ASSISTANT

## 2022-02-23 PROCEDURE — 99024 POSTOP FOLLOW-UP VISIT: CPT | Mod: S$GLB,,, | Performed by: PHYSICIAN ASSISTANT

## 2022-02-23 PROCEDURE — 97140 MANUAL THERAPY 1/> REGIONS: CPT

## 2022-02-23 PROCEDURE — 4010F PR ACE/ARB THEARPY RXD/TAKEN: ICD-10-PCS | Mod: CPTII,S$GLB,, | Performed by: PHYSICIAN ASSISTANT

## 2022-02-23 PROCEDURE — 1101F PT FALLS ASSESS-DOCD LE1/YR: CPT | Mod: CPTII,S$GLB,, | Performed by: PHYSICIAN ASSISTANT

## 2022-02-23 PROCEDURE — 1101F PR PT FALLS ASSESS DOC 0-1 FALLS W/OUT INJ PAST YR: ICD-10-PCS | Mod: CPTII,S$GLB,, | Performed by: PHYSICIAN ASSISTANT

## 2022-02-23 PROCEDURE — 3074F PR MOST RECENT SYSTOLIC BLOOD PRESSURE < 130 MM HG: ICD-10-PCS | Mod: CPTII,S$GLB,, | Performed by: PHYSICIAN ASSISTANT

## 2022-02-23 PROCEDURE — 1159F PR MEDICATION LIST DOCUMENTED IN MEDICAL RECORD: ICD-10-PCS | Mod: CPTII,S$GLB,, | Performed by: PHYSICIAN ASSISTANT

## 2022-02-23 PROCEDURE — 4010F ACE/ARB THERAPY RXD/TAKEN: CPT | Mod: CPTII,S$GLB,, | Performed by: PHYSICIAN ASSISTANT

## 2022-02-23 PROCEDURE — 3288F FALL RISK ASSESSMENT DOCD: CPT | Mod: CPTII,S$GLB,, | Performed by: PHYSICIAN ASSISTANT

## 2022-02-23 PROCEDURE — 3074F SYST BP LT 130 MM HG: CPT | Mod: CPTII,S$GLB,, | Performed by: PHYSICIAN ASSISTANT

## 2022-02-23 PROCEDURE — 3078F DIAST BP <80 MM HG: CPT | Mod: CPTII,S$GLB,, | Performed by: PHYSICIAN ASSISTANT

## 2022-02-23 PROCEDURE — 1126F PR PAIN SEVERITY QUANTIFIED, NO PAIN PRESENT: ICD-10-PCS | Mod: CPTII,S$GLB,, | Performed by: PHYSICIAN ASSISTANT

## 2022-02-23 PROCEDURE — 3288F PR FALLS RISK ASSESSMENT DOCUMENTED: ICD-10-PCS | Mod: CPTII,S$GLB,, | Performed by: PHYSICIAN ASSISTANT

## 2022-02-23 PROCEDURE — 1160F PR REVIEW ALL MEDS BY PRESCRIBER/CLIN PHARMACIST DOCUMENTED: ICD-10-PCS | Mod: CPTII,S$GLB,, | Performed by: PHYSICIAN ASSISTANT

## 2022-02-23 PROCEDURE — 97110 THERAPEUTIC EXERCISES: CPT

## 2022-02-23 PROCEDURE — 3078F PR MOST RECENT DIASTOLIC BLOOD PRESSURE < 80 MM HG: ICD-10-PCS | Mod: CPTII,S$GLB,, | Performed by: PHYSICIAN ASSISTANT

## 2022-02-23 PROCEDURE — 1160F RVW MEDS BY RX/DR IN RCRD: CPT | Mod: CPTII,S$GLB,, | Performed by: PHYSICIAN ASSISTANT

## 2022-02-23 PROCEDURE — 99024 PR POST-OP FOLLOW-UP VISIT: ICD-10-PCS | Mod: S$GLB,,, | Performed by: PHYSICIAN ASSISTANT

## 2022-02-23 PROCEDURE — 1126F AMNT PAIN NOTED NONE PRSNT: CPT | Mod: CPTII,S$GLB,, | Performed by: PHYSICIAN ASSISTANT

## 2022-02-23 PROCEDURE — 99999 PR PBB SHADOW E&M-EST. PATIENT-LVL IV: ICD-10-PCS | Mod: PBBFAC,,, | Performed by: PHYSICIAN ASSISTANT

## 2022-02-23 PROCEDURE — 1159F MED LIST DOCD IN RCRD: CPT | Mod: CPTII,S$GLB,, | Performed by: PHYSICIAN ASSISTANT

## 2022-02-23 PROCEDURE — 99999 PR PBB SHADOW E&M-EST. PATIENT-LVL IV: CPT | Mod: PBBFAC,,, | Performed by: PHYSICIAN ASSISTANT

## 2022-02-23 PROCEDURE — 3008F PR BODY MASS INDEX (BMI) DOCUMENTED: ICD-10-PCS | Mod: CPTII,S$GLB,, | Performed by: PHYSICIAN ASSISTANT

## 2022-02-23 NOTE — PROGRESS NOTES
" [facility]   Physical Therapy Treatment Note     Name: Bell Lynn  Clinic Number: [id number]    Therapy Diagnosis:   Encounter Diagnoses   Name Primary?    Gait abnormality Yes    Decreased strength of lower extremity     Decreased range of motion of left knee      Physician: Patricia Andrews PA-C    Visit Date: 2022  Physician Orders: PT Eval and Treat   Medical Diagnosis from Referral:   M17.12 (ICD-10-CM) - Primary osteoarthritis of left knee   Z96.652 (ICD-10-CM) - S/P total knee arthroplasty, left     Evaluation Date: 2/3/2022  Authorization Period Expiration: 2022  Plan of Care Expiration: 2022  Visit # / Visits authorized:  + eval    PTA Visit #: 0/5      Time In: 1100 am  Time Out: 1158 am  Total Treatment Time: 58 minutes   Total Billable Time: 30 minutes (1 TE, 1 MT)    Precautions: Standard, L TKA DOS: 01/10/2022    SUBJECTIVE     Patient reports: saw MD today and said "everything is exactly as it should be at this point"  She was compliant with home exercise program. Performs 1x/day.  Response to previous treatment: good, no adverse reaction; appropriate muscle fatigue  Functional change: ambulated into clinic with SPC    Pain: 3/10, currently  Location: Left knee      OBJECTIVE     Objective Measures updated at progress report unless specified.     DOS 1/10/2022  6 week follow up 2022 Lacking 2 degrees of extension at rest; 0 degrees of active extension with heel prop   30 second STS: 7x with no UE assist   TU seconds with no AD  2022 101 degrees   30 second STS: 9x with no UE assist   TUG: 15 seconds with no AD  2022  Knee L pre-treatment L post-treatment   Flexion 94 106   Extension -5 from 0 0      2022: ROM lacking 2-3 degrees at rest; 0 degrees of active extension    2022  Knee L post-treatment   Flexion 109   Extension 0     30" STS: 8 reps without UE support   TU.5 sec w/ " "AD      Treatment     Bell received the treatments listed below:      THERAPEUTIC EXERCISES to develop strength, endurance, ROM, flexibility, posture and core stabilization for 18 minutes of 1:1 and 30 minutes under supervisoon including:    Aerobic Activity: recumbent bike; seat 8; Lv 3. for 6 min for AAROM and cardiovascular endurance  Heel prop w/ strap; 5# above and below; 5 min   Long arc quads: 2x 15, 5" hold; 4#     Fwd/bkwd walking in //; 8 laps w/ cues for quad set during SLS  STS w/ staggered stance; 3 x10  Step ups; 2 x 10 w/ 6in step and 1 UE support    Not today:  Short arc quads with medium bolster: 3x10, 5" hold; 5#  Heel slides with strap and board: 10 second hold, 10x  Clamshells; 3 x 10 with 5 second hold  SLR flexion: 3 sets of 10 reps with cues for total relax each rep      MANUAL THERAPY TECHNIQUES including Joint mobilizations and Soft tissue Mobilization were applied to Left knee for 12 minutes of 1:1.   - Patellar mobs Grade 3-4   - Patellar fat pad mobs Gr 3-4  - patellar fat pad stretch  - Tibial Internal Rotation mobs w/ knee ext      NEUROMUSCULAR RE-EDUCATION ACTIVITIES to improve Balance, Coordination, Kinesthetic, Sense, Proprioception and Posture for 10 minutes. The following were included:   - Quad set; w/ 1/2 foam under knee; 20x with 5" hold each  - Quad set w/ heel prop; 20x w/ 5" hold  Not today:  Slump nerve slides; DF w/ cervical motion; 20x x 2       Patient Education and Home Exercises     Home Exercises Provided and Patient Education Provided     Education provided:   - updated HEP to add slump nerve slides    Written Home Exercises Provided: Patient instructed to cont prior HEP. Exercises were reviewed and Bell was able to demonstrate them prior to the end of the session.  Bell demonstrated good  understanding of the education provided. See EMR under Patient Instructions for exercises provided during therapy sessions; Updated 2/21/22    ASSESSMENT   Bell " presents 6 wks s/p L TKA. She reports improvement in burning pain previously limiting her 2/2 compliance with nerve slides added to HEP last session. She was hesitant with exercise today citing fatigue and generalized soreness of LE. She showed improved glute activation in step ups and will benefit continued progression of all exercises as per protocol.     Bell Is progressing well towards her goals.   Pt prognosis is Good.     Pt will continue to benefit from skilled outpatient physical therapy to address the deficits listed in the problem list box on initial evaluation, provide pt/family education and to maximize pt's level of independence in the home and community environment.     Pt's spiritual, cultural and educational needs considered and pt agreeable to plan of care and goals.     Anticipated barriers to physical therapy: none    Goals:   Short Term Goals:  1. Pt will be independent with HEP supplement PT in improving functional mobility. (Progressing, not met)  2. Pt will improve L LE strength to at least 75% of R LE strength as measured via MicroFet handheld dynamometer in order to improve functional mobility (Progressing, not met)  3. Pt will improve L knee AROM to at least 0-110 in order to improve gait (Progressing, not met)     Long Term Goals:  1. Pt will be independent with updated HEP supplement PT in improving functional mobility. (Progressing, not met)  2. Pt will improve L LE strength to at least 90% of R LE strength as measured via MicroFet handheld dynamometer in order to improve functional mobility (Progressing, not met)  3. Pt will improve L knee AROM to at least 0-115 in order to improve gait and ability to perform ADLs (Progressing, not met)  4. Pt will improve FOTO knee survey score to </= 27% limited in order to demo improved functional mobility (Progressing, not met)  5. Pt will perform TUG in < 10 seconds without AD in order to demo improved gait speed (Progressing, not met)  6. Pt  will perform at least 15 sit to stands without UE support on 30 second sit to stand test in order to demo improved ability to perform transfers (Progressing, not met)    PLAN     Progress per protocol as able without increasing neural symptoms.    Alma Delia Hagen, PT, DPT

## 2022-02-24 DIAGNOSIS — G89.29 CHRONIC MIDLINE LOW BACK PAIN WITHOUT SCIATICA: ICD-10-CM

## 2022-02-24 DIAGNOSIS — M54.50 CHRONIC MIDLINE LOW BACK PAIN WITHOUT SCIATICA: ICD-10-CM

## 2022-02-24 RX ORDER — LIDOCAINE 50 MG/G
1 PATCH TOPICAL DAILY
Qty: 30 PATCH | Refills: 5 | Status: SHIPPED | OUTPATIENT
Start: 2022-02-24 | End: 2022-07-21

## 2022-02-25 ENCOUNTER — CLINICAL SUPPORT (OUTPATIENT)
Dept: REHABILITATION | Facility: HOSPITAL | Age: 67
End: 2022-02-25
Payer: MEDICARE

## 2022-02-25 DIAGNOSIS — R29.898 DECREASED STRENGTH OF LOWER EXTREMITY: ICD-10-CM

## 2022-02-25 DIAGNOSIS — R26.9 GAIT ABNORMALITY: Primary | ICD-10-CM

## 2022-02-25 DIAGNOSIS — M25.662 DECREASED RANGE OF MOTION OF LEFT KNEE: ICD-10-CM

## 2022-02-25 PROCEDURE — 97110 THERAPEUTIC EXERCISES: CPT | Mod: CQ

## 2022-02-25 PROCEDURE — 97112 NEUROMUSCULAR REEDUCATION: CPT | Mod: CQ

## 2022-02-25 NOTE — PROGRESS NOTES
"  [facility]   Physical Therapy Treatment Note     Name: Bell Lynn  Clinic Number: [id number]    Therapy Diagnosis:   Encounter Diagnoses   Name Primary?    Gait abnormality Yes    Decreased strength of lower extremity     Decreased range of motion of left knee      Physician: Patricia Andrews PA-C    Visit Date: 2022  Physician Orders: PT Eval and Treat   Medical Diagnosis from Referral:   M17.12 (ICD-10-CM) - Primary osteoarthritis of left knee   Z96.652 (ICD-10-CM) - S/P total knee arthroplasty, left     Evaluation Date: 2/3/2022  Authorization Period Expiration: 2022  Plan of Care Expiration: 2022  Visit # / Visits authorized:  + eval    PTA Visit #:       Time In: 1000  Time Out: 1100  Total Treatment Time: 60 minutes   Total Billable Time: 30 minutes (1 TE, 1 NMR)    Precautions: Standard, L TKA DOS: 01/10/2022    SUBJECTIVE     Patient reports: no new complaints today. She presents to therapy with no AD stating that she forgot it at home.  She was compliant with home exercise program. Performs 1x/day.  Response to previous treatment: good, no adverse reaction; appropriate muscle fatigue  Functional change: ambulated into clinic with no AD    Pain: 0/10, currently  Location: Left knee      OBJECTIVE     Objective Measures updated at progress report unless specified.     DOS 1/10/2022    2022 Lacking 2 degrees of extension at rest; 0 degrees of active extension with heel prop   30 second STS: 7x with no UE assist   TU seconds with no AD  2022 101 degrees   30 second STS: 9x with no UE assist   TUG: 15 seconds with no AD  2022  Knee L pre-treatment L post-treatment   Flexion 94 106   Extension -5 from 0 0      2022: ROM lacking 2-3 degrees at rest; 0 degrees of active extension    2022  Knee L post-treatment   Flexion 109   Extension 0     30" STS: 8 reps without UE support   TU.5 sec w/ AD    Treatment " "    Bell received the treatments listed below:      NEUROMUSCULAR RE-EDUCATION ACTIVITIES to improve Balance, Coordination, Kinesthetic, Sense, Proprioception and Posture for 10 minutes. The following were included:   - Quad set; with 1/2 foam under knee; 20x with 5 second hold each LE  - Quad set with heel prop; 20x with 10 second hold  THERAPEUTIC EXERCISES to develop strength, endurance, ROM, flexibility, posture and core stabilization for 50 minutes including:    Aerobic Activity: recumbent bike; seat 8; Lv 3. for 6 min for AAROM and cardiovascular endurance  Heel prop wwith strap; 5# above and below; 5 min   Long arc quads: 2x 15, 5" hold; 4#      Fwd/bkwd walking in //; 8 laps w/ cues for quad set during SLS  Sit to stands with staggered stance in standard chair; 3 x10  Step ups; 2 x 10 w/ 6in step and 1 UE support    Short arc quads with medium bolster: 3 x 10, 5" hold; 5#  Heel slides with strap and board: 10 second hold, 10x  Clamshells; 2 x 10 with 5 second hold with [place] TB  SLR flexion: 3 sets of 10 reps with cues for total relax each rep    MANUAL THERAPY TECHNIQUES including Joint mobilizations and Soft tissue Mobilization were applied to Left knee for 00 minutes.  - Patellar mobs Grade 3-4   - Patellar fat pad mobs Gr 3-4  - patellar fat pad stretch  - Tibial Internal Rotation mobs w/ knee ext    Patient Education and Home Exercises     Home Exercises Provided and Patient Education Provided     Education provided:   - updated HEP to add slump nerve slides    Written Home Exercises Provided: Patient instructed to cont prior HEP. Exercises were reviewed and Bell was able to demonstrate them prior to the end of the session.  Bell demonstrated good  understanding of the education provided. See EMR under Patient Instructions for exercises provided during therapy sessions; Updated 2/21/22    ASSESSMENT     Good tolerance to progression of 10 second hold with quadriceps contraction during quad " sets. She demonstrates appropriate muscle response and fatigue post session. No exacerbation of neural tension pain throughout.   Bell Is progressing well towards her goals.   Pt prognosis is Good.     Pt will continue to benefit from skilled outpatient physical therapy to address the deficits listed in the problem list box on initial evaluation, provide pt/family education and to maximize pt's level of independence in the home and community environment.     Pt's spiritual, cultural and educational needs considered and pt agreeable to plan of care and goals.     Anticipated barriers to physical therapy: none    Goals:   Short Term Goals:  1. Pt will be independent with HEP supplement PT in improving functional mobility. (Progressing, not met)  2. Pt will improve L LE strength to at least 75% of R LE strength as measured via MicroFet handheld dynamometer in order to improve functional mobility (Progressing, not met)  3. Pt will improve L knee AROM to at least 0-110 in order to improve gait (Progressing, not met)     Long Term Goals:  1. Pt will be independent with updated HEP supplement PT in improving functional mobility. (Progressing, not met)  2. Pt will improve L LE strength to at least 90% of R LE strength as measured via MicroFet handheld dynamometer in order to improve functional mobility (Progressing, not met)  3. Pt will improve L knee AROM to at least 0-115 in order to improve gait and ability to perform ADLs (Progressing, not met)  4. Pt will improve FOTO knee survey score to </= 27% limited in order to demo improved functional mobility (Progressing, not met)  5. Pt will perform TUG in < 10 seconds without AD in order to demo improved gait speed (Progressing, not met)  6. Pt will perform at least 15 sit to stands without UE support on 30 second sit to stand test in order to demo improved ability to perform transfers (Progressing, not met)    PLAN     Progress per protocol as able without increasing  neural symptoms.    Yolande Salgado, PTA

## 2022-02-28 ENCOUNTER — CLINICAL SUPPORT (OUTPATIENT)
Dept: REHABILITATION | Facility: HOSPITAL | Age: 67
End: 2022-02-28
Payer: MEDICARE

## 2022-02-28 DIAGNOSIS — M25.662 DECREASED RANGE OF MOTION OF LEFT KNEE: ICD-10-CM

## 2022-02-28 DIAGNOSIS — R26.9 GAIT ABNORMALITY: Primary | ICD-10-CM

## 2022-02-28 DIAGNOSIS — R29.898 DECREASED STRENGTH OF LOWER EXTREMITY: ICD-10-CM

## 2022-02-28 PROCEDURE — 97112 NEUROMUSCULAR REEDUCATION: CPT | Mod: CQ

## 2022-02-28 PROCEDURE — 97110 THERAPEUTIC EXERCISES: CPT | Mod: CQ

## 2022-02-28 NOTE — PROGRESS NOTES
" [facility]   Physical Therapy Treatment Note     Name: Bell Lynn  Clinic Number: [id number]    Therapy Diagnosis:   Encounter Diagnoses   Name Primary?    Gait abnormality Yes    Decreased strength of lower extremity     Decreased range of motion of left knee      Physician: Patricia Andrews PA-C    Visit Date: 2022  Physician Orders: PT Eval and Treat   Medical Diagnosis from Referral:   M17.12 (ICD-10-CM) - Primary osteoarthritis of left knee   Z96.652 (ICD-10-CM) - S/P total knee arthroplasty, left     Evaluation Date: 2/3/2022  Authorization Period Expiration: 2022  Plan of Care Expiration: 2022  Visit # / Visits authorized:  + eval    PTA Visit #:       Time In: 10:00 am  Time Out: 11:00 am  Total Treatment Time: 60 minutes   Total Billable Time:  minutes (1 TE, 1 NMR)    Precautions: Standard, L TKA DOS: 01/10/2022    SUBJECTIVE     Patient reports: doing well and feels her knee is doing better. Pt stated she feels a little nauseated , thinks its from the medicine.   She was compliant with home exercise program. Performs 1x/day.  Response to previous treatment: good, no adverse reaction; appropriate muscle fatigue  Functional change: ambulated into clinic with no AD    Pain: 0/10, currently  Location: Left knee      OBJECTIVE     Objective Measures updated at progress report unless specified.     DOS 1/10/2022    2022 Lacking 2 degrees of extension at rest; 0 degrees of active extension with heel prop   30 second STS: 7x with no UE assist   TU seconds with no AD  2022 101 degrees   30 second STS: 9x with no UE assist   TUG: 15 seconds with no AD  2022  Knee L pre-treatment L post-treatment   Flexion 94 106   Extension -5 from 0 0      2022: ROM lacking 2-3 degrees at rest; 0 degrees of active extension    2022  Knee L post-treatment   Flexion 109   Extension 0     30" STS: 8 reps without UE support   TU.5 " "sec w/ AD    2/28/2022:   Knee L post-treatment   Flexion 110   Extension 0                          30'' STS: 9 c/ no UEsupport                TUG : 13'' c/ no AD     Treatment     Bell received the treatments listed below:      NEUROMUSCULAR RE-EDUCATION ACTIVITIES to improve Balance, Coordination, Kinesthetic, Sense, Proprioception and Posture for 10 minutes. The following were included:   - Quad set; with 1/2 foam under knee; 20x with 5 second hold each LE  - Quad set with heel prop; 20x with 10 second hold  THERAPEUTIC EXERCISES to develop strength, endurance, ROM, flexibility, posture and core stabilization for 50 minutes including:    Aerobic Activity: recumbent bike; seat 8; Lv 3. for 6 min for AAROM and cardiovascular endurance  Heel prop wwith strap; 5# above and below; 5 min   Long arc quads: 2x 15, 5" hold; 4#      Fwd/bkwd walking in //; 8 laps w/ cues for quad set during SLS  Sit to stands with staggered stance in standard chair; 3 x10  Step ups; 2 x 10 w/ 6in step and 1 UE support    Short arc quads with medium bolster: 3 x 10, 5" hold; 5#  Heel slides with strap and board: 10 second hold, 10x  Clamshells; 2 x 10 with 5 second hold with [place] TB  SLR flexion: 3 sets of 10 reps with cues for total relax each rep    MANUAL THERAPY TECHNIQUES including Joint mobilizations and Soft tissue Mobilization were applied to Left knee for 00 minutes.  - Patellar mobs Grade 3-4   - Patellar fat pad mobs Gr 3-4  - patellar fat pad stretch  - Tibial Internal Rotation mobs w/ knee ext    Patient Education and Home Exercises     Home Exercises Provided and Patient Education Provided     Education provided:   - updated HEP to add slump nerve slides    Written Home Exercises Provided: Patient instructed to cont prior HEP. Exercises were reviewed and Bell was able to demonstrate them prior to the end of the session.  Bell demonstrated good  understanding of the education provided. See EMR under Patient " Instructions for exercises provided during therapy sessions; Updated 2/21/22    ASSESSMENT     Pt exhibits discomfort with end range extension stretching although is able to achieve 0 degrees of TKE. Pt remains limited with flexion ROM unable to achieve more then 110 degrees today due to pain. She exhibits good quadriceps activation and with muscular fatigue reported post session.  No exacerbation of neural tension pain throughout.     Bell Is progressing well towards her goals.   Pt prognosis is Good.     Pt will continue to benefit from skilled outpatient physical therapy to address the deficits listed in the problem list box on initial evaluation, provide pt/family education and to maximize pt's level of independence in the home and community environment.     Pt's spiritual, cultural and educational needs considered and pt agreeable to plan of care and goals.     Anticipated barriers to physical therapy: none    Goals:   Short Term Goals:  1. Pt will be independent with HEP supplement PT in improving functional mobility. (Progressing, not met)  2. Pt will improve L LE strength to at least 75% of R LE strength as measured via MicroFet handheld dynamometer in order to improve functional mobility (Progressing, not met)  3. Pt will improve L knee AROM to at least 0-110 in order to improve gait (Progressing, not met)     Long Term Goals:  1. Pt will be independent with updated HEP supplement PT in improving functional mobility. (Progressing, not met)  2. Pt will improve L LE strength to at least 90% of R LE strength as measured via MicroFet handheld dynamometer in order to improve functional mobility (Progressing, not met)  3. Pt will improve L knee AROM to at least 0-115 in order to improve gait and ability to perform ADLs (Progressing, not met)  4. Pt will improve FOTO knee survey score to </= 27% limited in order to demo improved functional mobility (Progressing, not met)  5. Pt will perform TUG in < 10 seconds  without AD in order to demo improved gait speed (Progressing, not met)  6. Pt will perform at least 15 sit to stands without UE support on 30 second sit to stand test in order to demo improved ability to perform transfers (Progressing, not met)    PLAN     Progress per protocol as able without increasing neural symptoms.    Amy Lanier, PTA

## 2022-03-02 ENCOUNTER — CLINICAL SUPPORT (OUTPATIENT)
Dept: REHABILITATION | Facility: HOSPITAL | Age: 67
End: 2022-03-02
Payer: MEDICARE

## 2022-03-02 DIAGNOSIS — M25.662 DECREASED RANGE OF MOTION OF LEFT KNEE: ICD-10-CM

## 2022-03-02 DIAGNOSIS — R29.898 DECREASED STRENGTH OF LOWER EXTREMITY: ICD-10-CM

## 2022-03-02 DIAGNOSIS — R26.9 GAIT ABNORMALITY: Primary | ICD-10-CM

## 2022-03-02 PROCEDURE — 97112 NEUROMUSCULAR REEDUCATION: CPT

## 2022-03-02 PROCEDURE — 97140 MANUAL THERAPY 1/> REGIONS: CPT

## 2022-03-02 PROCEDURE — 97110 THERAPEUTIC EXERCISES: CPT

## 2022-03-02 NOTE — PROGRESS NOTES
[facility]   Physical Therapy Treatment Note     Name: Bell Lynn  Clinic Number: [id number]    Therapy Diagnosis:   Encounter Diagnoses   Name Primary?    Gait abnormality Yes    Decreased strength of lower extremity     Decreased range of motion of left knee      Physician: Patricia Andrews PA-C    Visit Date: 3/2/2022  Physician Orders: PT Eval and Treat   Medical Diagnosis from Referral:   M17.12 (ICD-10-CM) - Primary osteoarthritis of left knee   Z96.652 (ICD-10-CM) - S/P total knee arthroplasty, left     Evaluation Date: 2/3/2022  Authorization Period Expiration: 2022  Plan of Care Expiration: 2022  Visit # / Visits authorized: 10 / 20 + eval    PTA Visit #: 0 / 5      Time In: 1100 am  Time Out: 1205 pm  Total Treatment Time: 65 minutes   Total Billable Time: 60 minutes     Precautions: Standard, L TKA DOS: 01/10/2022    SUBJECTIVE     Patient reports: states she is feeling okay, nausea has improved since she ate before taking her medication today. Pt states her knee was hurting yesterday which prevented her from performing her HEP. Pt states she thinks the fluid in her knee has decreased today.  She was compliant with home exercise program day before yesterday.   Response to previous treatment: good, no adverse reaction; appropriate muscle fatigue  Functional change: ambulated into clinic with no AD    Pain: 3/10, currently (mostly posterior knee)  Location: Left knee      OBJECTIVE     Objective Measures updated at progress report unless specified.     DOS 1/10/2022    2022 Lacking 2 degrees of extension at rest; 0 degrees of active extension with heel prop   30 second STS: 7x with no UE assist   TU seconds with no AD  2022 101 degrees   30 second STS: 9x with no UE assist   TUG: 15 seconds with no AD  2022  Knee L pre-treatment L post-treatment   Flexion 94 106   Extension -5 from 0 0      2022: ROM lacking 2-3 degrees at rest; 0  "degrees of active extension    2022  Knee L post-treatment   Flexion 109   Extension 0     30" STS: 8 reps without UE support   TU.5 sec w/ AD    2022:   Knee L post-treatment   Flexion 110   Extension 0                          30'' STS: 9 c/ no UEsupport                TUG : 13'' c/ no AD   3/2/2022  Knee L post-treatment   Flexion 111   Extension 0                          Treatment     Bell received the treatments listed below:      NEUROMUSCULAR RE-EDUCATION ACTIVITIES to improve Balance, Coordination, Kinesthetic, Sense, Proprioception and Posture for 13 minutes. The following were included:   Quad set w/ flat towel under knee; 5" x 30x  SL hip abduction 1 x 15 reps - emphasis on anterior hip rotation and quad set before hip abduction  Standing hamstring curl 2 x 10 reps      THERAPEUTIC EXERCISES to develop strength, endurance, ROM, flexibility, posture and core stabilization for 34 minutes including:    Aerobic Activity: recumbent bike; seat 8; Lv 3. for 6 min for AAROM and cardiovascular endurance  Seated Hamstring w/ rolled towel under ankle; 30" x 3x  Standing gastroc stretch (against mat) 30" x 3x  Long arc quads: 2 x 15, 5" hold; 5#      Fwd/bkwd walking in //; 8 laps w/ cues for quad set during SLS - NP  Mini-squats; 2 x10 - emphasis on hip hinge     Short arc quads with medium bolster: 3 x 10, 5" hold; 5#  Heel slides with strap and board: 10 second hold, 10x - NP  Clamshells; 2 x 10 with 5 second hold with [place] TB - NP  SLR flexion: 3 sets of 10 reps with cues for total relax each rep    MANUAL THERAPY TECHNIQUES including Joint mobilizations and Soft tissue Mobilization were applied to Left knee for 18 minutes.  - Patellar mobs Grade 3-4   - Posterior tibial mobs Grade 2-3   - Passive flexion with patient laying inclined     Patient Education and Home Exercises     Home Exercises Provided and Patient Education Provided     Education provided:   - updated HEP to add slump nerve " slides    Written Home Exercises Provided: Patient instructed to cont prior HEP. Exercises were reviewed and Blel was able to demonstrate them prior to the end of the session.  Bell demonstrated good  understanding of the education provided. See EMR under Patient Instructions for exercises provided during therapy sessions; Updated 2/21/22    ASSESSMENT     Pt exhibits discomfort with end range extension stretching although is able to achieve 0 degrees of TKE. Pt remains limited with flexion ROM unable to achieve more then 111 degrees today due to pain with overpressure. She exhibits good quadriceps activation and with muscular fatigue reported post session. Pt requiring PT assisted passive range of motion in between exercises due to increased muscle soreness. Pt also requiring B UE's when performing mini-squats due to inability to bear at least 50% of weight on affected LE. Pt will continue POC with emphasis on functional strengthening and quad strengthening.     Bell Is progressing well towards her goals.   Pt prognosis is Good.     Pt will continue to benefit from skilled outpatient physical therapy to address the deficits listed in the problem list box on initial evaluation, provide pt/family education and to maximize pt's level of independence in the home and community environment.     Pt's spiritual, cultural and educational needs considered and pt agreeable to plan of care and goals.     Anticipated barriers to physical therapy: none    Goals:   Short Term Goals:  1. Pt will be independent with HEP supplement PT in improving functional mobility. (Progressing, not met)  2. Pt will improve L LE strength to at least 75% of R LE strength as measured via MicroFet handheld dynamometer in order to improve functional mobility (Progressing, not met)  3. Pt will improve L knee AROM to at least 0-110 in order to improve gait (Progressing, not met)     Long Term Goals:  1. Pt will be independent with updated HEP  supplement PT in improving functional mobility. (Progressing, not met)  2. Pt will improve L LE strength to at least 90% of R LE strength as measured via MicroFet handheld dynamometer in order to improve functional mobility (Progressing, not met)  3. Pt will improve L knee AROM to at least 0-115 in order to improve gait and ability to perform ADLs (Progressing, not met)  4. Pt will improve FOTO knee survey score to </= 27% limited in order to demo improved functional mobility (Progressing, not met)  5. Pt will perform TUG in < 10 seconds without AD in order to demo improved gait speed (Progressing, not met)  6. Pt will perform at least 15 sit to stands without UE support on 30 second sit to stand test in order to demo improved ability to perform transfers (Progressing, not met)    PLAN     Progress per protocol as able without increasing neural symptoms.    Deana Wilkes, PT, DPT

## 2022-03-02 NOTE — PROGRESS NOTES
" [facility]   Physical Therapy Treatment Note     Name: Bell Lynn  Clinic Number: [id number]    Therapy Diagnosis:   No diagnosis found.  Physician: Patricia Andrews PA-C    Visit Date: 3/2/2022  Physician Orders: PT Eval and Treat   Medical Diagnosis from Referral:   M17.12 (ICD-10-CM) - Primary osteoarthritis of left knee   Z96.652 (ICD-10-CM) - S/P total knee arthroplasty, left     Evaluation Date: 2/3/2022  Authorization Period Expiration: 2022  Plan of Care Expiration: 2022  Visit # / Visits authorized: 10 / 20 + eval    PTA Visit #: 0 / 5      Time In: 1100 am  Time Out: 1200 pm  Total Treatment Time: 60 minutes   Total Billable Time: *** minutes (***)    Precautions: Standard, L TKA DOS: 01/10/2022    SUBJECTIVE     Patient reports: doing well and feels her knee is doing better. Pt stated she feels a little nauseated , thinks its from the medicine.   She was compliant with home exercise program. Performs 1x/day.  Response to previous treatment: good, no adverse reaction; appropriate muscle fatigue  Functional change: ambulated into clinic with no AD    Pain: 0/10, currently  Location: Left knee      OBJECTIVE     Objective Measures updated at progress report unless specified.     DOS 1/10/2022    2022 Lacking 2 degrees of extension at rest; 0 degrees of active extension with heel prop   30 second STS: 7x with no UE assist   TU seconds with no AD  2022 101 degrees   30 second STS: 9x with no UE assist   TUG: 15 seconds with no AD  2022  Knee L pre-treatment L post-treatment   Flexion 94 106   Extension -5 from 0 0      2022: ROM lacking 2-3 degrees at rest; 0 degrees of active extension    2022  Knee L post-treatment   Flexion 109   Extension 0     30" STS: 8 reps without UE support   TU.5 sec w/ AD    2022:   Knee L post-treatment   Flexion 110   Extension 0                          30'' STS: 9 c/ no UEsupport            " "    TUG : 13'' c/ no AD     Treatment     Bell received the treatments listed below:      NEUROMUSCULAR RE-EDUCATION ACTIVITIES to improve Balance, Coordination, Kinesthetic, Sense, Proprioception and Posture for 10 minutes. The following were included:   - Quad set; with 1/2 foam under knee; 20x with 5 second hold each LE  - Quad set with heel prop; 20x with 10 second hold  THERAPEUTIC EXERCISES to develop strength, endurance, ROM, flexibility, posture and core stabilization for 50 minutes including:    Aerobic Activity: recumbent bike; seat 8; Lv 3. for 6 min for AAROM and cardiovascular endurance  Heel prop wwith strap; 5# above and below; 5 min   Long arc quads: 2x 15, 5" hold; 4#      Fwd/bkwd walking in //; 8 laps w/ cues for quad set during SLS  Sit to stands with staggered stance in standard chair; 3 x10  Step ups; 2 x 10 w/ 6in step and 1 UE support    Short arc quads with medium bolster: 3 x 10, 5" hold; 5#  Heel slides with strap and board: 10 second hold, 10x  Clamshells; 2 x 10 with 5 second hold with [place] TB  SLR flexion: 3 sets of 10 reps with cues for total relax each rep    MANUAL THERAPY TECHNIQUES including Joint mobilizations and Soft tissue Mobilization were applied to Left knee for 00 minutes.  - Patellar mobs Grade 3-4   - Patellar fat pad mobs Gr 3-4  - patellar fat pad stretch  - Tibial Internal Rotation mobs w/ knee ext    Patient Education and Home Exercises     Home Exercises Provided and Patient Education Provided     Education provided:   - updated HEP to add slump nerve slides    Written Home Exercises Provided: Patient instructed to cont prior HEP. Exercises were reviewed and Bell was able to demonstrate them prior to the end of the session.  Bell demonstrated good  understanding of the education provided. See EMR under Patient Instructions for exercises provided during therapy sessions; Updated 2/21/22    ASSESSMENT     Pt exhibits discomfort with end range extension " stretching although is able to achieve 0 degrees of TKE. Pt remains limited with flexion ROM unable to achieve more then 110 degrees today due to pain. She exhibits good quadriceps activation and with muscular fatigue reported post session.  No exacerbation of neural tension pain throughout.     Bell Is progressing well towards her goals.   Pt prognosis is Good.     Pt will continue to benefit from skilled outpatient physical therapy to address the deficits listed in the problem list box on initial evaluation, provide pt/family education and to maximize pt's level of independence in the home and community environment.     Pt's spiritual, cultural and educational needs considered and pt agreeable to plan of care and goals.     Anticipated barriers to physical therapy: none    Goals:   Short Term Goals:  1. Pt will be independent with HEP supplement PT in improving functional mobility. (Progressing, not met)  2. Pt will improve L LE strength to at least 75% of R LE strength as measured via MicroFet handheld dynamometer in order to improve functional mobility (Progressing, not met)  3. Pt will improve L knee AROM to at least 0-110 in order to improve gait (Progressing, not met)     Long Term Goals:  1. Pt will be independent with updated HEP supplement PT in improving functional mobility. (Progressing, not met)  2. Pt will improve L LE strength to at least 90% of R LE strength as measured via MicroFet handheld dynamometer in order to improve functional mobility (Progressing, not met)  3. Pt will improve L knee AROM to at least 0-115 in order to improve gait and ability to perform ADLs (Progressing, not met)  4. Pt will improve FOTO knee survey score to </= 27% limited in order to demo improved functional mobility (Progressing, not met)  5. Pt will perform TUG in < 10 seconds without AD in order to demo improved gait speed (Progressing, not met)  6. Pt will perform at least 15 sit to stands without UE support on 30  second sit to stand test in order to demo improved ability to perform transfers (Progressing, not met)    PLAN     Progress per protocol as able without increasing neural symptoms.    Alma Delia Hagen, PT, DPT

## 2022-03-07 ENCOUNTER — CLINICAL SUPPORT (OUTPATIENT)
Dept: REHABILITATION | Facility: HOSPITAL | Age: 67
End: 2022-03-07
Payer: MEDICARE

## 2022-03-07 DIAGNOSIS — M25.662 DECREASED RANGE OF MOTION OF LEFT KNEE: ICD-10-CM

## 2022-03-07 DIAGNOSIS — R26.9 GAIT ABNORMALITY: Primary | ICD-10-CM

## 2022-03-07 DIAGNOSIS — R29.898 DECREASED STRENGTH OF LOWER EXTREMITY: ICD-10-CM

## 2022-03-07 PROCEDURE — 97140 MANUAL THERAPY 1/> REGIONS: CPT

## 2022-03-07 PROCEDURE — 97110 THERAPEUTIC EXERCISES: CPT

## 2022-03-09 ENCOUNTER — CLINICAL SUPPORT (OUTPATIENT)
Dept: REHABILITATION | Facility: HOSPITAL | Age: 67
End: 2022-03-09
Payer: MEDICARE

## 2022-03-09 DIAGNOSIS — R26.9 GAIT ABNORMALITY: Primary | ICD-10-CM

## 2022-03-09 DIAGNOSIS — R29.898 DECREASED STRENGTH OF LOWER EXTREMITY: ICD-10-CM

## 2022-03-09 DIAGNOSIS — M25.662 DECREASED RANGE OF MOTION OF LEFT KNEE: ICD-10-CM

## 2022-03-09 PROCEDURE — 97140 MANUAL THERAPY 1/> REGIONS: CPT

## 2022-03-09 PROCEDURE — 97110 THERAPEUTIC EXERCISES: CPT

## 2022-03-09 NOTE — PROGRESS NOTES
[facility]   Physical Therapy Treatment Note     Name: Bell Lynn  Clinic Number: [id number]    Therapy Diagnosis:   Encounter Diagnoses   Name Primary?    Gait abnormality Yes    Decreased strength of lower extremity     Decreased range of motion of left knee      Physician: Patricia Andrews PA-C    Visit Date: 3/9/2022  Physician Orders: PT Eval and Treat   Medical Diagnosis from Referral:   M17.12 (ICD-10-CM) - Primary osteoarthritis of left knee   Z96.652 (ICD-10-CM) - S/P total knee arthroplasty, left     Evaluation Date: 2/3/2022  Authorization Period Expiration: 2022  Plan of Care Expiration: 2022  Visit # / Visits authorized:  + eval    PTA Visit #: 0 / 5      Time In: 1000 am  Time Out: 1058 pm  Total Treatment Time: 58 minutes   Total Billable Time: 58 minutes     Precautions: Standard, L TKA DOS: 01/10/2022    SUBJECTIVE     Patient reports: Pt states she had a bad night last night and was unable to get comfortable. Pt states she is experiencing moderate soreness today. Pt states she experiences most of her pain in her lower back that does not resolve until she able to take her medication.   She was compliant with home exercise program day before yesterday.   Response to previous treatment: good, no adverse reaction; appropriate muscle fatigue  Functional change: ambulated into clinic with no AD    Pain: 4/10  Location: Left knee      OBJECTIVE     Objective Measures updated at progress report unless specified.     DOS 1/10/2022    2022 Lacking 2 degrees of extension at rest; 0 degrees of active extension with heel prop   30 second STS: 7x with no UE assist   TU seconds with no AD  2022 101 degrees   30 second STS: 9x with no UE assist   TUG: 15 seconds with no AD  2022: 0-5-95 pre treatment    0-0- 106 post- treatment    2022: ROM lacking 2-3 degrees at rest; 0 degrees of active extension    2022: PROM :  "0-0-109   30" STS: 8 reps without UE support   TU.5 sec w/ AD    2022: PROM: 0-0-110 (post-treatment)   30'' STS: 9 c/ no UEsupport                TUG : 13'' c/ no AD     3/2/2022: L Passive Range of Motion 0-0-111 (post treatment)      3/7/2022 PROM: 0-0-113(post-treatment)   30'' STS: 10 c/ no UEsupport    TUG : 10'' c/ no AD                Treatment     Bell received the treatments listed below:      NEUROMUSCULAR RE-EDUCATION ACTIVITIES to improve Balance, Coordination, Kinesthetic, Sense, Proprioception and Posture for 00 minutes. The following were included:   - Quad set w/ 1/2 foam under knee; 5" hold; 30x  - SL hip Abduction; 2 x 15- emphasis on anterior hip rotation and quad set at start  - Standing HS curl; 2 x 10    THERAPEUTIC EXERCISES to develop strength, endurance, ROM, flexibility, posture and core stabilization for 43 minutes including:  - Aerobic activity: Nustep; seat 6; Lv 4.0; 8 min for increased tissue extensibility and CV endurance  - Standing gastroc stretch; 3 x 30"  - Long arc quads: 3 x 15, 5" hold; 7.5#   - Standing knee flexion at stairs; 10 x 10"     - Fwd/bkwd walking in //; 8 laps w/ cues for quad set during SLS - not today   - Front step up onto 1st stair w/ B UE; 3 x 10  - Mini-squats at chair with Airex; 2 x10 - emphasis on hip hinge   - SLR flexion: 3 sets of 10 reps with cues for total relax each rep      Not today:   - Aerobic Activity: recumbent bike; seat 8; Lv 3. for 6 min for AAROM and cardiovascular endurance  - Seated Hamstring w/ rolled towel under ankle; 30" x 3x  - Heel slides with strap and board: 10 second hold, 10x   - Clamshells; 2 x 10 with 5 second hold with [place] TB  - Short arc quads with medium bolster: 3 x 10, 5" hold; 5#    MANUAL THERAPY TECHNIQUES including Joint mobilizations and Soft tissue Mobilization were applied to Left knee for 15 minutes.  - Patellar mobs Grade 3-4   - Patellar fat pad mobs/ stretch (pre and post treatment)  - " Posterior tibial mobs Grade 2-3   - Passive flexion with patient laying inclined     Patient Education and Home Exercises     Home Exercises Provided and Patient Education Provided     Education provided:   - updated HEP to add slump nerve slides    Written Home Exercises Provided: Patient instructed to cont prior HEP. Exercises were reviewed and Blel was able to demonstrate them prior to the end of the session.  Bell demonstrated good  understanding of the education provided. See EMR under Patient Instructions for exercises provided during therapy sessions; Updated 2/21/22    ASSESSMENT   Bell presents 7 wks s/p L TKA. She is continuing to improve in range of motion and functional mobility. Today she was able to begin step-ups onto 1st stair with use of B UE's. Pt limited in performing more exercises due to increased soreness and lack of sleep the night before. Pt's ROM progressing well w/ no pain complaints after manual therapy + hot pack for 10 minutes.     Bell Is progressing well towards her goals.   Pt prognosis is Good.     Pt will continue to benefit from skilled outpatient physical therapy to address the deficits listed in the problem list box on initial evaluation, provide pt/family education and to maximize pt's level of independence in the home and community environment.     Pt's spiritual, cultural and educational needs considered and pt agreeable to plan of care and goals.     Anticipated barriers to physical therapy: none    Goals:   Short Term Goals:  1. Pt will be independent with HEP supplement PT in improving functional mobility. MET 3/7/22  2. Pt will improve L LE strength to at least 75% of R LE strength as measured via MicroFet handheld dynamometer in order to improve functional mobility (Progressing, not met)  3. Pt will improve L knee AROM to at least 0-110 in order to improve gait MET 3/7/22     Long Term Goals:  1. Pt will be independent with updated HEP supplement PT in  improving functional mobility. (MET 3/7/22  2. Pt will improve L LE strength to at least 90% of R LE strength as measured via MicroFet handheld dynamometer in order to improve functional mobility (Progressing, not met)  3. Pt will improve L knee AROM to at least 0-115 in order to improve gait and ability to perform ADLs (Progressing, not met)  4. Pt will improve FOTO knee survey score to </= 27% limited in order to demo improved functional mobility (Progressing, not met)  5. Pt will perform TUG in < 10 seconds without AD in order to demo improved gait speed (Progressing, not met)  6. Pt will perform at least 15 sit to stands without UE support on 30 second sit to stand test in order to demo improved ability to perform transfers (Progressing, not met)    PLAN     Progress per protocol as able without increasing neural symptoms.    Deana Wilkes, PT, DPT

## 2022-03-13 ENCOUNTER — PATIENT MESSAGE (OUTPATIENT)
Dept: FAMILY MEDICINE | Facility: CLINIC | Age: 67
End: 2022-03-13
Payer: MEDICARE

## 2022-03-14 ENCOUNTER — CLINICAL SUPPORT (OUTPATIENT)
Dept: REHABILITATION | Facility: HOSPITAL | Age: 67
End: 2022-03-14
Payer: MEDICARE

## 2022-03-14 DIAGNOSIS — R26.9 GAIT ABNORMALITY: Primary | ICD-10-CM

## 2022-03-14 DIAGNOSIS — R29.898 DECREASED STRENGTH OF LOWER EXTREMITY: ICD-10-CM

## 2022-03-14 DIAGNOSIS — M25.662 DECREASED RANGE OF MOTION OF LEFT KNEE: ICD-10-CM

## 2022-03-14 PROCEDURE — 97110 THERAPEUTIC EXERCISES: CPT | Mod: CQ

## 2022-03-14 PROCEDURE — 97140 MANUAL THERAPY 1/> REGIONS: CPT | Mod: CQ

## 2022-03-14 NOTE — PROGRESS NOTES
"  [facility]   Physical Therapy Treatment Note     Name: Bell Lynn  Clinic Number: [id number]    Therapy Diagnosis:   Encounter Diagnoses   Name Primary?    Gait abnormality Yes    Decreased strength of lower extremity     Decreased range of motion of left knee      Physician: Patricia Andrews PA-C    Visit Date: 3/14/2022  Physician Orders: PT Eval and Treat   Medical Diagnosis from Referral:   M17.12 (ICD-10-CM) - Primary osteoarthritis of left knee   Z96.652 (ICD-10-CM) - S/P total knee arthroplasty, left     Evaluation Date: 2/3/2022  Authorization Period Expiration: 2022  Plan of Care Expiration: 2022  Visit # / Visits authorized:  + eval    PTA Visit #:       Time In: 9:53 am  Time Out: 10:50 am  Total Treatment Time: 57 minutes   Total Billable Time: 57 minutes     Precautions: Standard, L TKA DOS: 01/10/2022    SUBJECTIVE     Patient reports: doing well today and her pain isnt to bad this morning.   She was compliant with home exercise program day before yesterday.   Response to previous treatment: good, no adverse reaction; appropriate muscle fatigue  Functional change: ambulated into clinic with no AD    Pain: 2/10  Location: Left knee      OBJECTIVE     Objective Measures updated at progress report unless specified.     DOS 1/10/2022    2022 Lacking 2 degrees of extension at rest; 0 degrees of active extension with heel prop   30 second STS: 7x with no UE assist   TU seconds with no AD  2022 101 degrees   30 second STS: 9x with no UE assist   TUG: 15 seconds with no AD  2022: 0-5-95 pre treatment    0-0- 106 post- treatment    2022: ROM lacking 2-3 degrees at rest; 0 degrees of active extension    2022: PROM : 0-0-109   30" STS: 8 reps without UE support   TU.5 sec w/ AD    2022: PROM: 0-0-110 (post-treatment)   30'' STS: 9 c/ no UEsupport                TUG : 13'' c/ no AD     3/2/2022: L Passive Range " "of Motion 0-0-111 (post treatment)      3/7/2022 PROM: 0-0-113(post-treatment)   30'' STS: 10 c/ no UEsupport    TUG : 10'' c/ no AD     3/14/2022: TU'' no AD                     STS: 10 c/ no UE support                     ROM: 0 - 113 AAROM                Treatment     Bell received the treatments listed below:      NEUROMUSCULAR RE-EDUCATION ACTIVITIES to improve Balance, Coordination, Kinesthetic, Sense, Proprioception and Posture for 00 minutes. The following were included:   - Quad set w/ 1/2 foam under knee; 5" hold; 30x  - SL hip Abduction; 2 x 15- emphasis on anterior hip rotation and quad set at start      THERAPEUTIC EXERCISES to develop strength, endurance, ROM, flexibility, posture and core stabilization for 43 minutes including:  - Aerobic activity: Nustep; seat 6; Lv 4.0; 8 min for increased tissue extensibility and CV endurance  - Standing gastroc stretch; 3 x 30"  - Long arc quads: 3 x 15, 5" hold; 7.5#   - Standing knee flexion at stairs; 10 x 10"  - Standing HS curl; 2 x 10     - Fwd/bkwd walking in //; 8 laps w/ cues for quad set during SLS - not today   - Front step up onto 1st stair w/ B UE; 3 x 10  - Mini-squats at chair with Airex; 2 x10 - emphasis on hip hinge   - SLR flexion: 3 sets of 10 reps with cues for total relax each rep      Not today:   - Aerobic Activity: recumbent bike; seat 8; Lv 3. for 6 min for AAROM and cardiovascular endurance  - Seated Hamstring w/ rolled towel under ankle; 30" x 3x  - Heel slides with strap and board: 10 second hold, 10x   - Clamshells; 2 x 10 with 5 second hold with [place]  - Short arc quads with medium bolster: 3 x 10, 5" hold; 5#    MANUAL THERAPY TECHNIQUES including Joint mobilizations and Soft tissue Mobilization were applied to Left knee for 14 minutes.  - Patellar mobs Grade 3-4   - Patellar fat pad mobs/ stretch (pre and post treatment)  - Posterior tibial mobs Grade 2-3   - Passive flexion with patient laying inclined     Patient " Education and Home Exercises     Home Exercises Provided and Patient Education Provided     Education provided:   - updated HEP to add slump nerve slides    Written Home Exercises Provided: Patient instructed to cont prior HEP. Exercises were reviewed and Bell was able to demonstrate them prior to the end of the session.  Bell demonstrated good  understanding of the education provided. See EMR under Patient Instructions for exercises provided during therapy sessions; Updated 2/21/22    ASSESSMENT     Pt presents with improved pain levels today and a good tolerance to there-ex . She continues to exhibit improving strength and ROM measurements. She exhibits pain with end range flexion stretching although no pain noted with any exercises today.  Will continue to progress next .      Bell Is progressing well towards her goals.   Pt prognosis is Good.     Pt will continue to benefit from skilled outpatient physical therapy to address the deficits listed in the problem list box on initial evaluation, provide pt/family education and to maximize pt's level of independence in the home and community environment.   Pt's spiritual, cultural and educational needs considered and pt agreeable to plan of care and goals.     Anticipated barriers to physical therapy: none    Goals:   Short Term Goals:  1. Pt will be independent with HEP supplement PT in improving functional mobility. MET 3/7/22  2. Pt will improve L LE strength to at least 75% of R LE strength as measured via MicroFet handheld dynamometer in order to improve functional mobility (Progressing, not met)  3. Pt will improve L knee AROM to at least 0-110 in order to improve gait MET 3/7/22     Long Term Goals:  1. Pt will be independent with updated HEP supplement PT in improving functional mobility. (MET 3/7/22  2. Pt will improve L LE strength to at least 90% of R LE strength as measured via MicroFet handheld dynamometer in order to improve functional mobility  (Progressing, not met)  3. Pt will improve L knee AROM to at least 0-115 in order to improve gait and ability to perform ADLs (Progressing, not met)  4. Pt will improve FOTO knee survey score to </= 27% limited in order to demo improved functional mobility (Progressing, not met)  5. Pt will perform TUG in < 10 seconds without AD in order to demo improved gait speed (Progressing, not met)  6. Pt will perform at least 15 sit to stands without UE support on 30 second sit to stand test in order to demo improved ability to perform transfers (Progressing, not met)    PLAN     Progress per protocol as able without increasing neural symptoms.    Amy Lanier, PTA

## 2022-03-15 NOTE — PROGRESS NOTES
"  [facility]   Physical Therapy Treatment Note     Name: Bell Lynn  Clinic Number: [id number]    Therapy Diagnosis:   Encounter Diagnoses   Name Primary?    Gait abnormality Yes    Decreased strength of lower extremity     Decreased range of motion of left knee      Physician: Patricia Andrews PA-C    Visit Date: 3/16/2022  Physician Orders: PT Eval and Treat   Medical Diagnosis from Referral:   M17.12 (ICD-10-CM) - Primary osteoarthritis of left knee   Z96.652 (ICD-10-CM) - S/P total knee arthroplasty, left     Evaluation Date: 2/3/2022  Authorization Period Expiration: 2022  Plan of Care Expiration: 2022  Visit # / Visits authorized:  + eval    PTA Visit #:       Time In: 1100  Time Out: 1154  Total Treatment Time: 54 minutes   Total Billable Time: 54 minutes (3 TE, 1 NMR)    Precautions: Standard, L TKA DOS: 01/10/2022    SUBJECTIVE     Patient reports: "I feel better than I did yesterday."  She was compliant with home exercise program day before yesterday.   Response to previous treatment: good, no adverse reaction; appropriate muscle fatigue  Functional change: ambulated into clinic with no AD    Pain: 2/10, currently   Location: Left knee      OBJECTIVE     Objective Measures updated at progress report unless specified.     DOS 1/10/2022    2022 Lacking 2 degrees of extension at rest; 0 degrees of active extension with heel prop   30 second STS: 7x with no UE assist   TU seconds with no AD  2022 101 degrees   30 second STS: 9x with no UE assist   TUG: 15 seconds with no AD  2022: 0-5-95 pre treatment    0-0- 106 post- treatment  2022: ROM lacking 2-3 degrees at rest; 0 degrees of active extension  2022: PROM : 0-0-109   30" STS: 8 reps without UE support   TU.5 sec w/ AD  2022: PROM: 0-0-110 (post-treatment)   30'' STS: 9 c/ no UEsupport                TUG : 13'' c/ no AD   3/2/2022: L Passive Range of " "Motion 0-0-111 (post treatment)  3/7/2022 PROM: 0-0-113(post-treatment)   30'' STS: 10 c/ no UEsupport    TUG : 10'' c/ no AD   3/14/2022: TU'' no AD                     STS: 10 c/ no UE support                     ROM: 0 - 113 AAROM                Treatment     Bell received the treatments listed below:      NEUROMUSCULAR RE-EDUCATION ACTIVITIES to improve Balance, Coordination, Kinesthetic, Sense, Proprioception and Posture for 10 minutes. The following were included:   - Quad set w/ 1/2 foam under knee; 5" hold; 30 reps  - SL hip Abduction; 2 x 15 - emphasis on anterior hip rotation and quad set at start    THERAPEUTIC EXERCISES to develop strength, endurance, ROM, flexibility, posture and core stabilization for 39 minutes including:  - Aerobic activity: Nustep; seat 6; Lv 4.0; 8 min for increased tissue extensibility and CV endurance  - Standing gastroc stretch on wedge; 3 x 30 seconds  - Long arc quads: 3 x 15, 5" hold; 7.5#   - Standing knee flexion at stairs; 10 x 10 second  - Standing HS curl; 2 x 10     - Front step up onto 1st stair w/ B UE; 3 x 10  - Mini-squats at chair with Airex; 2 x10 - emphasis on hip hinge   - SLR flexion: 3 sets of 10 reps with cues for total relax each rep    MANUAL THERAPY TECHNIQUES including Joint mobilizations and Soft tissue Mobilization were applied to Left knee for 5 minutes.  - Patellar mobs Grade 3-4   - Patellar fat pad mobs/stretch    Patient Education and Home Exercises     Home Exercises Provided and Patient Education Provided     Education provided:   - updated HEP to add slump nerve slides    Written Home Exercises Provided: Patient instructed to cont prior HEP. Exercises were reviewed and Bell was able to demonstrate them prior to the end of the session.  Bell demonstrated good  understanding of the education provided. See EMR under Patient Instructions for exercises provided during therapy sessions; Updated 22    ASSESSMENT     Increased muscle " fatigue reported throughout session. Verbal cueing required to maintain proper technique and muscle activation with straight leg raises and side lying hip abduction.   Bell Is progressing well towards her goals.   Pt prognosis is Good.     Pt will continue to benefit from skilled outpatient physical therapy to address the deficits listed in the problem list box on initial evaluation, provide pt/family education and to maximize pt's level of independence in the home and community environment.   Pt's spiritual, cultural and educational needs considered and pt agreeable to plan of care and goals.     Anticipated barriers to physical therapy: none    Goals:   Short Term Goals:  1. Pt will be independent with HEP supplement PT in improving functional mobility. MET 3/7/22  2. Pt will improve L LE strength to at least 75% of R LE strength as measured via MicroFet handheld dynamometer in order to improve functional mobility (Progressing, not met)  3. Pt will improve L knee AROM to at least 0-110 in order to improve gait MET 3/7/22     Long Term Goals:  1. Pt will be independent with updated HEP supplement PT in improving functional mobility. (MET 3/7/22  2. Pt will improve L LE strength to at least 90% of R LE strength as measured via MicroFet handheld dynamometer in order to improve functional mobility (Progressing, not met)  3. Pt will improve L knee AROM to at least 0-115 in order to improve gait and ability to perform ADLs (Progressing, not met)  4. Pt will improve FOTO knee survey score to </= 27% limited in order to demo improved functional mobility (Progressing, not met)  5. Pt will perform TUG in < 10 seconds without AD in order to demo improved gait speed (Progressing, not met)  6. Pt will perform at least 15 sit to stands without UE support on 30 second sit to stand test in order to demo improved ability to perform transfers (Progressing, not met)    PLAN     Progress per protocol as able without increasing  neural symptoms.    Yolande Salgado, PTA

## 2022-03-16 ENCOUNTER — CLINICAL SUPPORT (OUTPATIENT)
Dept: REHABILITATION | Facility: HOSPITAL | Age: 67
End: 2022-03-16
Payer: MEDICARE

## 2022-03-16 DIAGNOSIS — R26.9 GAIT ABNORMALITY: Primary | ICD-10-CM

## 2022-03-16 DIAGNOSIS — R29.898 DECREASED STRENGTH OF LOWER EXTREMITY: ICD-10-CM

## 2022-03-16 DIAGNOSIS — M25.662 DECREASED RANGE OF MOTION OF LEFT KNEE: ICD-10-CM

## 2022-03-16 PROCEDURE — 97110 THERAPEUTIC EXERCISES: CPT | Mod: CQ

## 2022-03-16 PROCEDURE — 97112 NEUROMUSCULAR REEDUCATION: CPT | Mod: CQ

## 2022-03-17 NOTE — PROGRESS NOTES
"  Outpatient Therapy Updated Plan of Care       Name: Bell Lynn  Clinic Number: [id number]    Therapy Diagnosis:   No diagnosis found.  Physician: Patricia Andrews PA-C    Visit Date: 3/21/2022  Physician Orders: PT Eval and Treat   Medical Diagnosis from Referral:   M17.12 (ICD-10-CM) - Primary osteoarthritis of left knee   Z96.652 (ICD-10-CM) - S/P total knee arthroplasty, left     Evaluation Date: 2/3/2022  Authorization Period Expiration: 2022  Plan of Care Expiration: 2022  Visit # / Visits authorized: 15 / 20 + eval    PTA Visit #: 0 / 5      Time In: 0900  Time Out: 1000  Total Treatment Time: 54 minutes   Total Billable Time: 54 minutes (3 TE, 1 NMR)    Precautions: Standard, L TKA DOS: 01/10/2022    SUBJECTIVE   UPDATE:   Patient reports: "I feel better than I did yesterday."  She was compliant with home exercise program day before yesterday.   Response to previous treatment: good, no adverse reaction; appropriate muscle fatigue  Functional change: ambulated into clinic with no AD    Pain: 2/10, currently   Location: Left knee      OBJECTIVE     Objective Measures updated at progress report unless specified.     DOS 1/10/2022    2022 Lacking 2 degrees of extension at rest; 0 degrees of active extension with heel prop   30 second STS: 7x with no UE assist   TU seconds with no AD  2022 101 degrees   30 second STS: 9x with no UE assist   TUG: 15 seconds with no AD  2022: 0-5-95 pre treatment    0-0- 106 post- treatment  2022: ROM lacking 2-3 degrees at rest; 0 degrees of active extension  2022: PROM : 0-0-109   30" STS: 8 reps without UE support   TU.5 sec w/ AD  2022: PROM: 0-0-110 (post-treatment)   30'' STS: 9 c/ no UEsupport                TUG : 13'' c/ no AD   3/2/2022: L Passive Range of Motion 0-0-111 (post treatment)  3/7/2022 PROM: 0-0-113(post-treatment)   30'' STS: 10 c/ no UEsupport    TUG : 10'' c/ no AD   3/14/2022: TU'' no AD       " "              STS: 10 c/ no UE support                     ROM: 0 - 113 AAROM   UPDATE POC:   3/17/2022  TUG:   STS:   range of motion:   Mmt; KNEE FLEXION, EXTENSION, HIP ABD, HIP FLEXION               Treatment     Bell received the treatments listed below:      NEUROMUSCULAR RE-EDUCATION ACTIVITIES to improve Balance, Coordination, Kinesthetic, Sense, Proprioception and Posture for 10 minutes. The following were included:   - Quad set w/ 1/2 foam under knee; 5" hold; 30 reps  - SL hip Abduction; 2 x 15 - emphasis on anterior hip rotation and quad set at start    THERAPEUTIC EXERCISES to develop strength, endurance, ROM, flexibility, posture and core stabilization for 39 minutes including:  - Aerobic activity: Nustep; seat 6; Lv 4.0; 8 min for increased tissue extensibility and CV endurance  - Standing gastroc stretch on wedge; 3 x 30 seconds  - Long arc quads: 3 x 15, 5" hold; 7.5#   - Standing knee flexion at stairs; 10 x 10 second  - Standing HS curl; 2 x 10     - Front step up onto 1st stair w/ B UE; 3 x 10  - Mini-squats at chair with Airex; 2 x10 - emphasis on hip hinge   - SLR flexion: 3 sets of 10 reps with cues for total relax each rep    MANUAL THERAPY TECHNIQUES including Joint mobilizations and Soft tissue Mobilization were applied to Left knee for 5 minutes.  - Patellar mobs Grade 3-4   - Patellar fat pad mobs/stretch    Patient Education and Home Exercises     Home Exercises Provided and Patient Education Provided     Education provided:   - updated HEP to add slump nerve slides    Written Home Exercises Provided: Patient instructed to cont prior HEP. Exercises were reviewed and Bell was able to demonstrate them prior to the end of the session.  Bell demonstrated good  understanding of the education provided. See EMR under Patient Instructions for exercises provided during therapy sessions; Updated 2/21/22    ASSESSMENT   UPDATE:  Increased muscle fatigue reported throughout session. Verbal " cueing required to maintain proper technique and muscle activation with straight leg raises and side lying hip abduction.   Bell Is progressing well towards her goals.   Pt prognosis is Good.     Pt will continue to benefit from skilled outpatient physical therapy to address the deficits listed in the problem list box on initial evaluation, provide pt/family education and to maximize pt's level of independence in the home and community environment.   Pt's spiritual, cultural and educational needs considered and pt agreeable to plan of care and goals.     Anticipated barriers to physical therapy: none      Previous Short Term Goals Status:   ***  New Short Term Goals Status:   ***  Long Term Goal Status:   {DESC LONG TERM GOAL:24850}  Reasons for Recertification of Therapy:   ***  Goals:   Short Term Goals:  1. Pt will be independent with HEP supplement PT in improving functional mobility. MET 3/7/22  2. Pt will improve L LE strength to at least 75% of R LE strength as measured via MicroFet handheld dynamometer in order to improve functional mobility (Progressing, not met)  3. Pt will improve L knee AROM to at least 0-110 in order to improve gait MET 3/7/22     Long Term Goals:  1. Pt will be independent with updated HEP supplement PT in improving functional mobility. (MET 3/7/22  2. Pt will improve L LE strength to at least 90% of R LE strength as measured via MicroFet handheld dynamometer in order to improve functional mobility (Progressing, not met)  3. Pt will improve L knee AROM to at least 0-115 in order to improve gait and ability to perform ADLs (Progressing, not met)  4. Pt will improve FOTO knee survey score to </= 27% limited in order to demo improved functional mobility (Progressing, not met)  5. Pt will perform TUG in < 10 seconds without AD in order to demo improved gait speed (Progressing, not met)  6. Pt will perform at least 15 sit to stands without UE support on 30 second sit to stand test in  order to demo improved ability to perform transfers (Progressing, not met)    PLAN     Updated Certification Period: 3/21/2022 to ***  Recommended Treatment Plan: *** times per week for *** weeks: {[place] PLAN:21911}  Other Recommendations: ***    Deana Wilkes, PT  3/21/2022      I CERTIFY THE NEED FOR THESE SERVICES FURNISHED UNDER THIS PLAN OF TREATMENT AND WHILE UNDER MY CARE    Physician's comments:        Physician's Signature: ___________________________________________________

## 2022-03-21 ENCOUNTER — CLINICAL SUPPORT (OUTPATIENT)
Dept: REHABILITATION | Facility: HOSPITAL | Age: 67
End: 2022-03-21
Payer: MEDICARE

## 2022-03-21 DIAGNOSIS — R26.9 GAIT ABNORMALITY: Primary | ICD-10-CM

## 2022-03-21 DIAGNOSIS — M25.662 DECREASED RANGE OF MOTION OF LEFT KNEE: ICD-10-CM

## 2022-03-21 DIAGNOSIS — R29.898 DECREASED STRENGTH OF LOWER EXTREMITY: ICD-10-CM

## 2022-03-21 PROCEDURE — 97110 THERAPEUTIC EXERCISES: CPT

## 2022-03-21 NOTE — PROGRESS NOTES
" [facility]   Physical Therapy Treatment Note/ Discharge Summary    Name: Bell Lynn  Clinic Number: [id number]    Therapy Diagnosis:   Encounter Diagnoses   Name Primary?    Gait abnormality Yes    Decreased strength of lower extremity     Decreased range of motion of left knee      Physician: Patricia Andrews PA-C    Visit Date: 3/21/2022  Physician Orders: PT Eval and Treat   Medical Diagnosis from Referral:   M17.12 (ICD-10-CM) - Primary osteoarthritis of left knee   Z96.652 (ICD-10-CM) - S/P total knee arthroplasty, left     Evaluation Date: 2/3/2022  Authorization Period Expiration: 2022  Plan of Care Expiration: 2022  Visit # / Visits authorized: 15 / 20 + eval  Date of Last visit: 15  Total Visits Received: 3/21/2022      PTA Visit #: 0 / 5      Time In: 9:00 am  Time Out: 9:40 am  Total Treatment Time: 40 minutes   Total Billable Time: 30 minutes     Precautions: Standard, L TKA DOS: 01/10/2022    SUBJECTIVE     Patient reports:she feels confident and ready for discharge.  She was compliant with home exercise program day before yesterday.   Response to previous treatment: good, no adverse reaction; appropriate muscle fatigue  Functional change: ambulated into clinic with no AD    Pain: 210, currently   Location: Left knee      OBJECTIVE     Objective Measures updated at progress report unless specified.     DOS 1/10/2022    2022 Lacking 2 degrees of extension at rest; 0 degrees of active extension with heel prop   30 second STS: 7x with no UE assist   TU seconds with no AD  2022 101 degrees   30 second STS: 9x with no UE assist   TUG: 15 seconds with no AD  2022: 0-5-95 pre treatment    0-0- 106 post- treatment  2022: ROM lacking 2-3 degrees at rest; 0 degrees of active extension  2022: PROM : 0-0-109   30" STS: 8 reps without UE support   TU.5 sec w/ AD  2022: PROM: 0-0-110 (post-treatment)   30'' STS: 9 c/ no " "UEsupport                TUG : 13'' c/ no AD   3/2/2022: L Passive Range of Motion 0-0-111 (post treatment)  3/7/2022 PROM: 0-0-113(post-treatment)   30'' STS: 10 c/ no UEsupport    TUG : 10'' c/ no AD   3/14/2022: TU'' no AD                     STS: 10 c/ no UE support                     ROM: 0 - 113 AAROM   3/21/2022: TU seconds    30 second STS: 12x    0-117 AROM                 Treatment     Bell received the treatments listed below:      THERAPEUTIC EXERCISES to develop strength, endurance, ROM, flexibility, posture and core stabilization for 40 minutes including: reassessment    - Aerobic activity: Recumbent Bike; seat 6; Lv 4.0; 8 min for increased tissue extensibility and CV endurance  - Standing gastroc stretch on wedge; 3 x 30 seconds  - Long arc quads: 3 x 15, 5" hold; 7.5#   - Standing knee flexion at stairs; 10 x 10 second  - Standing HS curl; 2 x 10  - Front step up onto 1st stair w/ B UE; 3 x 10  - Mini-squats at chair with Airex; 2 x10 - emphasis on hip hinge   - SLR flexion: 3 sets of 10 reps with cues for total relax each rep    Patient Education and Home Exercises     Home Exercises Provided and Patient Education Provided     Education provided:   - updated HEP to add slump nerve slides    Written Home Exercises Provided: Patient instructed to cont prior HEP. Exercises were reviewed and Bell was able to demonstrate them prior to the end of the session.  Bell demonstrated good  understanding of the education provided. See EMR under Patient Instructions for exercises provided during therapy sessions; Updated 22    ASSESSMENT     Patient is 10 weeks status post L total knee replacement. Patient has met all short term and long term physical therapy goals at this time. Patient demonstrates 0-117 degrees of active L knee range of motion. Patient has returned to full independence with all household and personal ADL's at this time. Patient will be discharged with an updated HEP. "     Discharge reason: Patient has reached the maximum rehab potential for the present time      Bell Is progressing well towards her goals.   Pt prognosis is Good.     Pt will continue to benefit from skilled outpatient physical therapy to address the deficits listed in the problem list box on initial evaluation, provide pt/family education and to maximize pt's level of independence in the home and community environment.   Pt's spiritual, cultural and educational needs considered and pt agreeable to plan of care and goals.     Anticipated barriers to physical therapy: none    Goals:   Short Term Goals:  1. Pt will be independent with HEP supplement PT in improving functional mobility. MET 3/7/22  2. Pt will improve L LE strength to at least 75% of R LE strength as measured via MicroFet handheld dynamometer in order to improve functional mobility (Progressing, not met)  3. Pt will improve L knee AROM to at least 0-110 in order to improve gait MET 3/7/22     Long Term Goals:  1. Pt will be independent with updated HEP supplement PT in improving functional mobility. (MET 3/7/22  2. Pt will improve L LE strength to at least 90% of R LE strength as measured via MicroFet handheld dynamometer in order to improve functional mobility (Progressing, not met)  3. Pt will improve L knee AROM to at least 0-115 in order to improve gait and ability to perform ADLs MET  4. Pt will improve FOTO knee survey score to </= 27% limited in order to demo improved functional mobility NA  5. Pt will perform TUG in < 10 seconds without AD in order to demo improved gait speed MET  6. Pt will perform at least 15 sit to stands without UE support on 30 second sit to stand test in order to demo improved ability to perform transfers NOT met    PLAN     This patient is discharged from Physical Therapy    [place], PT

## 2022-03-28 DIAGNOSIS — J45.40 MODERATE PERSISTENT CHRONIC ASTHMA WITHOUT COMPLICATION: ICD-10-CM

## 2022-03-28 NOTE — TELEPHONE ENCOUNTER
No new care gaps identified.  Powered by [employer]. Reference number: [id number].   3/28/2022 5:32:51 AM CDT

## 2022-03-29 RX ORDER — ALBUTEROL SULFATE 90 UG/1
AEROSOL, METERED RESPIRATORY (INHALATION)
Qty: 18 G | Refills: 5 | Status: SHIPPED | OUTPATIENT
Start: 2022-03-29 | End: 2023-05-16 | Stop reason: SDUPTHER

## 2022-03-29 NOTE — TELEPHONE ENCOUNTER
This Rx Request does not qualify for assessment with the ORC   Please review protocol details and the Care Due Message for extra clinical information    Reasons Rx Request may be deferred:   Patient has been seen in the ED/Hospital since the last PCP visit    Note composed:11:58 AM 03/29/2022

## 2022-03-30 DIAGNOSIS — S39.012A STRAIN OF LUMBAR REGION, INITIAL ENCOUNTER: ICD-10-CM

## 2022-03-30 RX ORDER — CYCLOBENZAPRINE HCL 10 MG
TABLET ORAL
Qty: 30 TABLET | Refills: 2 | Status: SHIPPED | OUTPATIENT
Start: 2022-03-30 | End: 2022-06-13

## 2022-03-30 NOTE — TELEPHONE ENCOUNTER
No new care gaps identified.  Powered by [employer] by [email or web address]. Reference number: [id number].   3/30/2022 7:45:11 AM CDT

## 2022-04-03 ENCOUNTER — PATIENT MESSAGE (OUTPATIENT)
Dept: ADMINISTRATIVE | Facility: OTHER | Age: 67
End: 2022-04-03
Payer: MEDICARE

## 2022-04-05 ENCOUNTER — OFFICE VISIT (OUTPATIENT)
Dept: ORTHOPEDICS | Facility: CLINIC | Age: 67
End: 2022-04-05
Payer: MEDICARE

## 2022-04-05 VITALS — HEIGHT: 64 IN | WEIGHT: 205.5 LBS | BODY MASS INDEX: 35.08 KG/M2

## 2022-04-05 DIAGNOSIS — Z96.652 S/P TOTAL KNEE ARTHROPLASTY, LEFT: ICD-10-CM

## 2022-04-05 DIAGNOSIS — M17.0 PRIMARY OSTEOARTHRITIS OF BOTH KNEES: Primary | ICD-10-CM

## 2022-04-05 PROCEDURE — 4010F PR ACE/ARB THEARPY RXD/TAKEN: ICD-10-PCS | Mod: CPTII,S$GLB,, | Performed by: ORTHOPAEDIC SURGERY

## 2022-04-05 PROCEDURE — 99999 PR PBB SHADOW E&M-EST. PATIENT-LVL III: ICD-10-PCS | Mod: PBBFAC,,, | Performed by: ORTHOPAEDIC SURGERY

## 2022-04-05 PROCEDURE — 1160F PR REVIEW ALL MEDS BY PRESCRIBER/CLIN PHARMACIST DOCUMENTED: ICD-10-PCS | Mod: CPTII,S$GLB,, | Performed by: ORTHOPAEDIC SURGERY

## 2022-04-05 PROCEDURE — 1125F PR PAIN SEVERITY QUANTIFIED, PAIN PRESENT: ICD-10-PCS | Mod: CPTII,S$GLB,, | Performed by: ORTHOPAEDIC SURGERY

## 2022-04-05 PROCEDURE — 1125F AMNT PAIN NOTED PAIN PRSNT: CPT | Mod: CPTII,S$GLB,, | Performed by: ORTHOPAEDIC SURGERY

## 2022-04-05 PROCEDURE — 99024 PR POST-OP FOLLOW-UP VISIT: ICD-10-PCS | Mod: S$GLB,,, | Performed by: ORTHOPAEDIC SURGERY

## 2022-04-05 PROCEDURE — 4010F ACE/ARB THERAPY RXD/TAKEN: CPT | Mod: CPTII,S$GLB,, | Performed by: ORTHOPAEDIC SURGERY

## 2022-04-05 PROCEDURE — 3008F PR BODY MASS INDEX (BMI) DOCUMENTED: ICD-10-PCS | Mod: CPTII,S$GLB,, | Performed by: ORTHOPAEDIC SURGERY

## 2022-04-05 PROCEDURE — 1101F PT FALLS ASSESS-DOCD LE1/YR: CPT | Mod: CPTII,S$GLB,, | Performed by: ORTHOPAEDIC SURGERY

## 2022-04-05 PROCEDURE — 3008F BODY MASS INDEX DOCD: CPT | Mod: CPTII,S$GLB,, | Performed by: ORTHOPAEDIC SURGERY

## 2022-04-05 PROCEDURE — 1160F RVW MEDS BY RX/DR IN RCRD: CPT | Mod: CPTII,S$GLB,, | Performed by: ORTHOPAEDIC SURGERY

## 2022-04-05 PROCEDURE — 3288F PR FALLS RISK ASSESSMENT DOCUMENTED: ICD-10-PCS | Mod: CPTII,S$GLB,, | Performed by: ORTHOPAEDIC SURGERY

## 2022-04-05 PROCEDURE — 1101F PR PT FALLS ASSESS DOC 0-1 FALLS W/OUT INJ PAST YR: ICD-10-PCS | Mod: CPTII,S$GLB,, | Performed by: ORTHOPAEDIC SURGERY

## 2022-04-05 PROCEDURE — 99999 PR PBB SHADOW E&M-EST. PATIENT-LVL III: CPT | Mod: PBBFAC,,, | Performed by: ORTHOPAEDIC SURGERY

## 2022-04-05 PROCEDURE — 1159F MED LIST DOCD IN RCRD: CPT | Mod: CPTII,S$GLB,, | Performed by: ORTHOPAEDIC SURGERY

## 2022-04-05 PROCEDURE — 1159F PR MEDICATION LIST DOCUMENTED IN MEDICAL RECORD: ICD-10-PCS | Mod: CPTII,S$GLB,, | Performed by: ORTHOPAEDIC SURGERY

## 2022-04-05 PROCEDURE — 3288F FALL RISK ASSESSMENT DOCD: CPT | Mod: CPTII,S$GLB,, | Performed by: ORTHOPAEDIC SURGERY

## 2022-04-05 PROCEDURE — 99024 POSTOP FOLLOW-UP VISIT: CPT | Mod: S$GLB,,, | Performed by: ORTHOPAEDIC SURGERY

## 2022-04-05 NOTE — PROGRESS NOTES
Subjective:      Patient ID: Bell Lynn is a 66 y.o. female.    Chief Complaint: Post-op Evaluation (3 mth p/o- left TKA )      HPI:  Three months postop  The patient is seen for postop follow-up of left  TKA.  Pain control has been satisfactory  They feel that they are ambulating easily  Postoperative complaints include:  None at this time      Current Outpatient Medications:     cetirizine (ZYRTEC) 10 MG tablet, Take 1 tablet (10 mg total) by mouth once daily., Disp: 90 tablet, Rfl: 1    cyclobenzaprine (FLEXERIL) 10 MG tablet, TAKE 1 TABLET EVERY EVENING, Disp: 30 tablet, Rfl: 2    DULoxetine (CYMBALTA) 60 MG capsule, Take 1 capsule (60 mg total) by mouth once daily. Increased dose, Disp: 90 capsule, Rfl: 1    ferrous sulfate (FEOSOL) 325 mg (65 mg iron) Tab tablet, Take 1 tablet (325 mg total) by mouth every other day., Disp: 30 tablet, Rfl: 5    furosemide (LASIX) 20 MG tablet, TAKE 1 TABLET BY MOUTH ONCE DAILY AS NEEDED FOR  LEG  SWELLING, Disp: 90 tablet, Rfl: 1    gabapentin (NEURONTIN) 100 MG capsule, 3 tablets nighttime 1 tablet in AM; increased dose, Disp: 270 capsule, Rfl: 1    LIDOcaine (LIDODERM) 5 %, Place 1 patch onto the skin once daily. Remove & Discard patch within 12 hours or as directed by MD, Disp: 30 patch, Rfl: 5    linaCLOtide (LINZESS) 145 mcg Cap capsule, Take 1 capsule (145 mcg total) by mouth once daily., Disp: 90 capsule, Rfl: 1    losartan (COZAAR) 100 MG tablet, Take 1 tablet (100 mg total) by mouth once daily., Disp: 90 tablet, Rfl: 1    metoprolol succinate (TOPROL-XL) 100 MG 24 hr tablet, Take 1 tablet (100 mg total) by mouth once daily., Disp: 90 tablet, Rfl: 1    omeprazole (PRILOSEC) 40 MG capsule, Take 1 capsule (40 mg total) by mouth every morning., Disp: 90 capsule, Rfl: 1    triamterene-hydrochlorothiazide 37.5-25 mg (MAXZIDE-25) 37.5-25 mg per tablet, Take 1 tablet by mouth once daily., Disp: 90 tablet, Rfl: 1    albuterol (PROVENTIL/VENTOLIN HFA)  "90 mcg/actuation inhaler, INHALE 2 PUFFS EVERY 4 HOURS AS NEEDED FOR WHEEZING (Patient not taking: Reported on 4/5/2022), Disp: 18 g, Rfl: 5    nitroGLYCERIN (NITROSTAT) 0.4 MG SL tablet, Place 1 tablet (0.4 mg total) under the tongue every 5 (five) minutes as needed for Chest pain. (Patient not taking: Reported on 4/5/2022), Disp: 60 tablet, Rfl: 12    ondansetron (ZOFRAN-ODT) 4 MG TbDL, Take 1 tablet (4 mg total) by mouth every 6 (six) hours as needed (nausea). (Patient not taking: Reported on 4/5/2022), Disp: 10 tablet, Rfl: 0  Review of patient's allergies indicates:   Allergen Reactions    Nsaids (non-steroidal anti-inflammatory drug)      Gi bleed    Penicillins Itching and Swelling    Penicillin     Symbicort [budesonide-formoterol]      Recurrent oral ulcers       Ht 5' 4" (1.626 m)   Wt 93.2 kg (205 lb 7.5 oz)   BMI 35.27 kg/m²     Review of Systems   Constitutional: Negative for fever and weight loss.   HENT: Negative for congestion.    Eyes: Negative for visual disturbance.   Cardiovascular: Negative for chest pain.   Respiratory: Negative for shortness of breath.    Hematologic/Lymphatic: Negative for bleeding problem. Does not bruise/bleed easily.   Skin: Negative for poor wound healing.   Gastrointestinal: Negative for abdominal pain.   Genitourinary: Negative for dysuria.   Neurological: Negative for seizures.   Psychiatric/Behavioral: Negative for altered mental status.   Allergic/Immunologic: Negative for persistent infections.           Objective:    Ortho Exam          Alert, oriented, no acute distress  Sclera-Normal  Respiratory distress-none  Gait no definite limp  Incision:  Normally healed  Range of motion:  0-140  Valgus/varus stability- stable  Swelling-none  Distal perfusion-intact  Distal neurologic-mild numbness lateral to incision    Imaging:  None today    Assessment:             1. Primary osteoarthritis of both knees    2. S/P total knee arthroplasty, left        Patient is " recovering normally from the procedure.  There is no evidence of complication at the surgical site.  The numbness near the incision Is typical for this procedure.      Plan:          Follow up in about 3 months (around 7/5/2022).    Explained my assessment and reviewed the natural history of recovery from the procedure.    Consideration of right knee replacement made be reasonable in the future    X-ray next visit

## 2022-04-19 ENCOUNTER — LAB VISIT (OUTPATIENT)
Dept: LAB | Facility: HOSPITAL | Age: 67
End: 2022-04-19
Attending: INTERNAL MEDICINE
Payer: MEDICARE

## 2022-04-19 DIAGNOSIS — D50.9 IRON DEFICIENCY ANEMIA, UNSPECIFIED IRON DEFICIENCY ANEMIA TYPE: ICD-10-CM

## 2022-04-19 DIAGNOSIS — I10 ESSENTIAL HYPERTENSION: Chronic | ICD-10-CM

## 2022-04-19 LAB
ALBUMIN SERPL BCP-MCNC: 3.4 G/DL (ref 3.5–5.2)
ALP SERPL-CCNC: 92 U/L (ref 55–135)
ALT SERPL W/O P-5'-P-CCNC: 18 U/L (ref 10–44)
ANION GAP SERPL CALC-SCNC: 11 MMOL/L (ref 8–16)
AST SERPL-CCNC: 21 U/L (ref 10–40)
BASOPHILS # BLD AUTO: 0.06 K/UL (ref 0–0.2)
BASOPHILS NFR BLD: 0.6 % (ref 0–1.9)
BILIRUB SERPL-MCNC: 0.3 MG/DL (ref 0.1–1)
BUN SERPL-MCNC: 10 MG/DL (ref 8–23)
CALCIUM SERPL-MCNC: 9.7 MG/DL (ref 8.7–10.5)
CHLORIDE SERPL-SCNC: 102 MMOL/L (ref 95–110)
CO2 SERPL-SCNC: 25 MMOL/L (ref 23–29)
CREAT SERPL-MCNC: 0.8 MG/DL (ref 0.5–1.4)
DIFFERENTIAL METHOD: ABNORMAL
EOSINOPHIL # BLD AUTO: 0.3 K/UL (ref 0–0.5)
EOSINOPHIL NFR BLD: 3.1 % (ref 0–8)
ERYTHROCYTE [DISTWIDTH] IN BLOOD BY AUTOMATED COUNT: 16.2 % (ref 11.5–14.5)
EST. GFR  (AFRICAN AMERICAN): >60 ML/MIN/1.73 M^2
EST. GFR  (NON AFRICAN AMERICAN): >60 ML/MIN/1.73 M^2
FERRITIN SERPL-MCNC: 19 NG/ML (ref 20–300)
GLUCOSE SERPL-MCNC: 84 MG/DL (ref 70–110)
HCT VFR BLD AUTO: 41.1 % (ref 37–48.5)
HGB BLD-MCNC: 12.4 G/DL (ref 12–16)
IMM GRANULOCYTES # BLD AUTO: 0.04 K/UL (ref 0–0.04)
IMM GRANULOCYTES NFR BLD AUTO: 0.4 % (ref 0–0.5)
LYMPHOCYTES # BLD AUTO: 3.3 K/UL (ref 1–4.8)
LYMPHOCYTES NFR BLD: 35.3 % (ref 18–48)
MCH RBC QN AUTO: 26.8 PG (ref 27–31)
MCHC RBC AUTO-ENTMCNC: 30.2 G/DL (ref 32–36)
MCV RBC AUTO: 89 FL (ref 82–98)
MONOCYTES # BLD AUTO: 0.8 K/UL (ref 0.3–1)
MONOCYTES NFR BLD: 8.3 % (ref 4–15)
NEUTROPHILS # BLD AUTO: 4.9 K/UL (ref 1.8–7.7)
NEUTROPHILS NFR BLD: 52.3 % (ref 38–73)
NRBC BLD-RTO: 0 /100 WBC
PLATELET # BLD AUTO: 374 K/UL (ref 150–450)
PMV BLD AUTO: 12.7 FL (ref 9.2–12.9)
POTASSIUM SERPL-SCNC: 3.3 MMOL/L (ref 3.5–5.1)
PROT SERPL-MCNC: 8.1 G/DL (ref 6–8.4)
RBC # BLD AUTO: 4.62 M/UL (ref 4–5.4)
SODIUM SERPL-SCNC: 138 MMOL/L (ref 136–145)
WBC # BLD AUTO: 9.44 K/UL (ref 3.9–12.7)

## 2022-04-19 PROCEDURE — 85025 COMPLETE CBC W/AUTO DIFF WBC: CPT | Performed by: INTERNAL MEDICINE

## 2022-04-19 PROCEDURE — 36415 COLL VENOUS BLD VENIPUNCTURE: CPT | Mod: PO | Performed by: INTERNAL MEDICINE

## 2022-04-19 PROCEDURE — 80053 COMPREHEN METABOLIC PANEL: CPT | Performed by: INTERNAL MEDICINE

## 2022-04-19 PROCEDURE — 82728 ASSAY OF FERRITIN: CPT | Performed by: INTERNAL MEDICINE

## 2022-04-21 NOTE — PROGRESS NOTES
This note was created by combination of typed  and M-Modal dictation.  Transcription errors may be present.  If there are any questions, please contact me.    Assessment and Plan:   Normal physical exam  Encounter for screening mammogram for malignant neoplasm of breast  Polyp of colon, unspecified part of colon, unspecified type  Asymptomatic menopausal state  Severe obesity (BMI 35.0-39.9)  -colonoscopy 2023  mammo  DEXA  Working on TLC with diet modification  Pre visit labs reviewed  -     Mammo Digital Screening Bilat; Future; Expected date: 04/22/2022  -     DXA Bone Density Spine And Hip; Future; Expected date: 04/22/2022    Essential hypertension 1/2021 TTE normal, stress test negative  Peripheral edema  -pre visit labs hypoK.  Eats potassium rich foods (bananas).  Takes dyazide daily, lasix 3 times a week for knee swelling; finds this effective for the knee swelling. Does not get ankle edema.  Change dyazide to amlodipine. SE profile discussed  Nurse visit 2 weeks for BP check and repeat BMP  Enroll in digital monitoring  -     Hypertension Digital Medicine (HDMP) Enrollment Order  -     Hypertension Digital Medicine (HDMP): Assign Onboarding Questionnaires  -     amLODIPine (NORVASC) 5 MG tablet; Take 1 tablet (5 mg total) by mouth once daily.  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  Mild recurrent major depression  -stable on cymbalta    Status post transsphenoidal pituitary resection for tumor, Dr Cardenas, about 1989  -stable.    Medications Discontinued During This Encounter   Medication Reason    ferrous sulfate (FEOSOL) 325 mg (65 mg iron) Tab tablet Side effects    triamterene-hydrochlorothiazide 37.5-25 mg (MAXZIDE-25) 37.5-25 mg per tablet Alternate therapy       meds sent this encounter:  Medications Ordered This Encounter   Medications    amLODIPine (NORVASC) 5 MG tablet     Sig: Take 1 tablet (5 mg total) by mouth once daily.     Dispense:  30 tablet     Refill:  11     Stop the triamterene hctz       Follow Up: No follow-ups on file. nurse visit 2 weeks for BP check and labs  If she enrolls in monitoring, then PEx 1 year; else 6 months.    Subjective:     Chief Complaint   Patient presents with    Hypertension     6 months follow up       CHANDLER Luu is a 66 y.o. female, last appointment with this clinic was Visit date not found.  Social History     Tobacco Use    Smoking status: Never Smoker    Smokeless tobacco: Never Used   Substance Use Topics    Alcohol use: Yes     Comment: ocaasional      Social History     Occupational History     Employer: [employer]      Social History     Social History Narrative    Not on file       No LMP recorded. Patient has had a hysterectomy.    Last visit me in October  Fibromyalgia.  Do not think this is polymyalgia rheumatica.  On Cymbalta.  Increased gabapentin.  Check iron.  MDD, Cymbalta  History of NSAID induced PUD.  Iron deficiency anemia.  Most recent EGD 06/2021 showing ulcer, biopsy negative.  Intolerant of ferrous sulfate more than a few times a week.  Recommended trial of over-the-counter ferrous gluconate.  Followed by GI.  On PPI.  Hypertension, peripheral  edema, Lasix  01/2021 echo LVEF 60%.  Normal diastolic function.  Stress test negative  Osteoarthritis of the knee.  Severe obesity.  Referred to bariatrics.    1/10/22 L TKR    Pre visit labs  CBC normal  Ferritin 19 from previous of 12  CMP hypokalemia    Home readings are good.  Is agreeable for enrollment in digital monitoring.      Knee replacement.  Finished the PT.  Doing well.    no abd pain no obvious GI bleeding.    Not taking iron. GI upset.  Previously evaluated by heme, did not require IV iron at the time.  No cold intolarance.    No pedal edema  Lasix not daily.  Takes 3 times a week. When knees get swollen and tight.  The lasix helps that. No ankle swelling.     Flexeril - uses that prn.  Back flares.  1-2 times a week she estimates.      Modifying diet.  Weight remains stable recently      Patient Care Team:  Cipriano Carrera MD as PCP - General (Internal Medicine)  Gonsalo Ivy MD as Consulting Physician (Gastroenterology)  Yari Diamond MA as Care Coordinator    Patient Active Problem List    Diagnosis Date Noted    Gait abnormality 02/03/2022    Decreased strength of lower extremity 02/03/2022    Decreased range of motion of left knee 02/03/2022    History of left knee replacement 01/10/2022    Palpitations 01/14/2021    GERD (gastroesophageal reflux disease) 09/08/2020    Peripheral edema 08/28/2020    Chronic constipation 08/28/2020    History of pituitary adenoma 9/2019 MRI no recurrence 09/24/2019 9/23/2019 MRI brain: 1. Fluid level in the sphenoid sinus, likely from sphenoid sinusitis.  2. No significant abnormalities of the sella turcica to suggest any recurrent neoplasms.  3. Periventricular white matter changes likely from chronic microvascular ischemic disease.  4. No acute intracranial processes.  9/23/2019 carotid US normal      Status post transsphenoidal pituitary resection for tumor, Dr Cardenas, about 1989 09/09/2019    History of benign carcinoid tumor  rectum s/p resection per GI note 01/30/2019    Colon polyp 8/2018 no path report included repeat 5 years 10/07/2018    Fibromyalgia; diffuse arm, back pain, thigh pain; 2017 B12, TSH WNL; reynaldo without efficacy; Cymbalta. 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 - Small hiatal hernia. - Non-bleeding gastric ulcers with no stigmata of bleeding. Biopsied. This has been present for more than 2 years according to previous EGD report 7/2018. - Normal examined duodenum. bx negative; neg for H pylori. Felt to be NSAID induced  06/30/2021 EGD normal esophagus.  Gastric ulcer with no stigmata of bleeding.  Improved from previous but not gone.  bx reactive/chemical gastropathy; H pylori neg.  Normal duodenum.      Menopausal hot flushes 02/14/2013    Mild recurrent major depression 02/14/2013    Primary osteoarthritis of left knee 07/30/2012 8/24/2017 MRI L knee: Tricompartmental degenerative changes. Lateral and medial meniscal degenerative tearing/changes. Tricompartmental cartilage loss including areas of full-thickness full-thickness loss. Abnormal appearance of the ACL and PCL suggestive of chronic injury.      Essential hypertension 1/2021 TTE normal, stress test negative 07/30/2012 6/2017 nuclear stress test negative. No change 7/2015 6/2017 TTE normal LVEF 55-60  01/26/2021 nuclear medicine stress test normal perfusion scan no evidence of ischemia.  01/26/2021 TTE LV normal size with concentric LVH and normal systolic function LVEF 60%.  Normal diastolic function.  Normal RV size and systolic function.  CVP 3.  PA pressure 5.         PAST  MEDICAL PROBLEMS, PAST SURGICAL HISTORY: please see relevant portions of the electronic medical record    ALLERGIES AND MEDICATIONS: updated and reviewed.  Review of patient's allergies indicates:   Allergen Reactions    Nsaids (non-steroidal anti-inflammatory drug)      Gi bleed    Penicillins Itching and Swelling    Penicillin     Symbicort [budesonide-formoterol]      Recurrent oral ulcers       Medication List with Changes/Refills   Current Medications    ALBUTEROL (PROVENTIL/VENTOLIN HFA) 90 MCG/ACTUATION INHALER    INHALE 2 PUFFS EVERY 4 HOURS AS NEEDED FOR WHEEZING    CETIRIZINE (ZYRTEC) 10 MG TABLET    Take 1 tablet (10 mg total) by mouth once daily.    CYCLOBENZAPRINE (FLEXERIL) 10 MG TABLET    TAKE 1 TABLET EVERY EVENING    DULOXETINE (CYMBALTA) 60 MG CAPSULE    Take 1 capsule (60 mg total) by mouth once daily. Increased dose    FERROUS SULFATE (FEOSOL) 325 MG (65 MG IRON) TAB TABLET    Take 1 tablet (325 mg total) by mouth every other day.    FUROSEMIDE (LASIX) 20 MG TABLET    TAKE 1 TABLET BY MOUTH ONCE DAILY AS NEEDED FOR  LEG  SWELLING    GABAPENTIN (NEURONTIN) 100 MG CAPSULE    3 tablets nighttime 1 tablet in AM; increased dose    LIDOCAINE (LIDODERM) 5 %    Place 1 patch onto the skin once daily. Remove & Discard patch within 12 hours or as directed by MD    LINACLOTIDE (LINZESS) 145 MCG CAP CAPSULE    Take 1 capsule (145 mcg total) by mouth once daily.    LOSARTAN (COZAAR) 100 MG TABLET    Take 1 tablet (100 mg total) by mouth once daily.    METOPROLOL SUCCINATE (TOPROL-XL) 100 MG 24 HR TABLET    Take 1 tablet (100 mg total) by mouth once daily.    NITROGLYCERIN (NITROSTAT) 0.4 MG SL TABLET    Place 1 tablet (0.4 mg total) under the tongue every 5 (five) minutes as needed for Chest pain.    OMEPRAZOLE (PRILOSEC) 40 MG CAPSULE    Take 1 capsule (40 mg total) by mouth every morning.    ONDANSETRON (ZOFRAN-ODT) 4 MG TBDL    Take 1 tablet (4 mg total) by mouth every 6 (six) hours as needed (nausea).  "   TRIAMTERENE-HYDROCHLOROTHIAZIDE 37.5-25 MG (MAXZIDE-25) 37.5-25 MG PER TABLET    Take 1 tablet by mouth once daily.        Objective:   Physical Exam   Vitals:    04/22/22 0959   BP: 134/86   BP Location: Right arm   Patient Position: Sitting   BP Method: Large (Automatic)   Pulse: 71   Temp: 98.5 °F (36.9 °C)   TempSrc: Oral   SpO2: 96%   Weight: 92.7 kg (204 lb 5.9 oz)   Height: 5' 4" (1.626 m)    Body mass index is 35.08 kg/m².  Weight: 92.7 kg (204 lb 5.9 oz)   Height: 5' 4" (162.6 cm)     Physical Exam  Constitutional:       General: She is not in acute distress.     Appearance: She is well-developed.   Cardiovascular:      Rate and Rhythm: Normal rate and regular rhythm.      Heart sounds: Normal heart sounds. No murmur heard.  Pulmonary:      Effort: Pulmonary effort is normal.      Breath sounds: Normal breath sounds.   Musculoskeletal:         General: Normal range of motion.      Right lower leg: No edema.      Left lower leg: No edema.   Skin:     General: Skin is warm and dry.   Neurological:      Mental Status: She is alert and oriented to person, place, and time.      Coordination: Coordination normal.   Psychiatric:         Behavior: Behavior normal.         Thought Content: Thought content normal.         Component      Latest Ref Rng & Units 4/19/2022 10/15/2021   WBC      3.90 - 12.70 K/uL 9.44 11.24   RBC      4.00 - 5.40 M/uL 4.62 4.50   Hemoglobin      12.0 - 16.0 g/dL 12.4 12.6   Hematocrit      37.0 - 48.5 % 41.1 40.3   MCV      82 - 98 fL 89 90   MCH      27.0 - 31.0 pg 26.8 (L) 28.0   MCHC      32.0 - 36.0 g/dL 30.2 (L) 31.3 (L)   RDW      11.5 - 14.5 % 16.2 (H) 15.4 (H)   Platelets      150 - 450 K/uL 374 360   MPV      9.2 - 12.9 fL 12.7 12.2   Immature Granulocytes      0.0 - 0.5 % 0.4 0.5   Gran # (ANC)      1.8 - 7.7 K/uL 4.9 6.3   Immature Grans (Abs)      0.00 - 0.04 K/uL 0.04 0.06 (H)   Lymph #      1.0 - 4.8 K/uL 3.3 3.7   Mono #      0.3 - 1.0 K/uL 0.8 0.8   Eos #      0.0 - 0.5 " K/uL 0.3 0.3   Baso #      0.00 - 0.20 K/uL 0.06 0.05   nRBC      0 /100 WBC 0 0   Gran %      38.0 - 73.0 % 52.3 56.0   Lymph %      18.0 - 48.0 % 35.3 33.3   Mono %      4.0 - 15.0 % 8.3 7.4   Eosinophil %      0.0 - 8.0 % 3.1 2.4   Basophil %      0.0 - 1.9 % 0.6 0.4   Differential Method       Automated Automated   Sodium      136 - 145 mmol/L 138 138   Potassium      3.5 - 5.1 mmol/L 3.3 (L) 3.7   Chloride      95 - 110 mmol/L 102 100   CO2      23 - 29 mmol/L 25 23   Glucose      70 - 110 mg/dL 84 85   BUN      8 - 23 mg/dL 10 11   Creatinine      0.5 - 1.4 mg/dL 0.8 0.8   Calcium      8.7 - 10.5 mg/dL 9.7 9.8   PROTEIN TOTAL      6.0 - 8.4 g/dL 8.1 7.6   Albumin      3.5 - 5.2 g/dL 3.4 (L) 3.5   BILIRUBIN TOTAL      0.1 - 1.0 mg/dL 0.3 0.3   Alkaline Phosphatase      55 - 135 U/L 92 104   AST      10 - 40 U/L 21 20   ALT      10 - 44 U/L 18 15   Anion Gap      8 - 16 mmol/L 11 15   eGFR if African American      >60 mL/min/1.73 m:2 >60.0 >60.0   eGFR if non African American      >60 mL/min/1.73 m:2 >60.0 >60.0   Cholesterol      120 - 199 mg/dL  137   Triglycerides      30 - 150 mg/dL  96   HDL      40 - 75 mg/dL  44   LDL Cholesterol External      63.0 - 159.0 mg/dL  73.8   HDL/Cholesterol Ratio      20.0 - 50.0 %  32.1   Total Cholesterol/HDL Ratio      2.0 - 5.0  3.1   Non-HDL Cholesterol      mg/dL  93   Ferritin      20.0 - 300.0 ng/mL 19 (L) 12 (L)

## 2022-04-22 ENCOUNTER — OFFICE VISIT (OUTPATIENT)
Dept: FAMILY MEDICINE | Facility: CLINIC | Age: 67
End: 2022-04-22
Payer: MEDICARE

## 2022-04-22 ENCOUNTER — PATIENT MESSAGE (OUTPATIENT)
Dept: ADMINISTRATIVE | Facility: OTHER | Age: 67
End: 2022-04-22
Payer: MEDICARE

## 2022-04-22 VITALS
SYSTOLIC BLOOD PRESSURE: 134 MMHG | TEMPERATURE: 99 F | WEIGHT: 204.38 LBS | HEIGHT: 64 IN | OXYGEN SATURATION: 96 % | HEART RATE: 71 BPM | BODY MASS INDEX: 34.89 KG/M2 | DIASTOLIC BLOOD PRESSURE: 86 MMHG

## 2022-04-22 DIAGNOSIS — Z00.00 NORMAL PHYSICAL EXAM: Primary | ICD-10-CM

## 2022-04-22 DIAGNOSIS — E66.01 SEVERE OBESITY WITH BODY MASS INDEX (BMI) OF 35.0 TO 39.9 WITH SERIOUS COMORBIDITY: ICD-10-CM

## 2022-04-22 DIAGNOSIS — R60.0 PERIPHERAL EDEMA: ICD-10-CM

## 2022-04-22 DIAGNOSIS — D50.9 IRON DEFICIENCY ANEMIA, UNSPECIFIED IRON DEFICIENCY ANEMIA TYPE: ICD-10-CM

## 2022-04-22 DIAGNOSIS — E89.3 STATUS POST TRANSSPHENOIDAL PITUITARY RESECTION: ICD-10-CM

## 2022-04-22 DIAGNOSIS — K63.5 POLYP OF COLON, UNSPECIFIED PART OF COLON, UNSPECIFIED TYPE: ICD-10-CM

## 2022-04-22 DIAGNOSIS — M79.7 FIBROMYALGIA: ICD-10-CM

## 2022-04-22 DIAGNOSIS — K25.9 NSAID-INDUCED GASTRIC ULCER: ICD-10-CM

## 2022-04-22 DIAGNOSIS — I10 ESSENTIAL HYPERTENSION: ICD-10-CM

## 2022-04-22 DIAGNOSIS — Z12.31 ENCOUNTER FOR SCREENING MAMMOGRAM FOR MALIGNANT NEOPLASM OF BREAST: ICD-10-CM

## 2022-04-22 DIAGNOSIS — T39.395A NSAID-INDUCED GASTRIC ULCER: ICD-10-CM

## 2022-04-22 DIAGNOSIS — F33.0 MILD RECURRENT MAJOR DEPRESSION: ICD-10-CM

## 2022-04-22 DIAGNOSIS — Z78.0 ASYMPTOMATIC MENOPAUSAL STATE: ICD-10-CM

## 2022-04-22 PROCEDURE — 99499 UNLISTED E&M SERVICE: CPT | Mod: HCNC,S$GLB,, | Performed by: INTERNAL MEDICINE

## 2022-04-22 PROCEDURE — 99214 OFFICE O/P EST MOD 30 MIN: CPT | Mod: S$GLB,,, | Performed by: INTERNAL MEDICINE

## 2022-04-22 PROCEDURE — 99999 PR PBB SHADOW E&M-EST. PATIENT-LVL V: CPT | Mod: PBBFAC,,, | Performed by: INTERNAL MEDICINE

## 2022-04-22 PROCEDURE — 99999 PR PBB SHADOW E&M-EST. PATIENT-LVL V: ICD-10-PCS | Mod: PBBFAC,,, | Performed by: INTERNAL MEDICINE

## 2022-04-22 PROCEDURE — 99499 RISK ADDL DX/OHS AUDIT: ICD-10-PCS | Mod: HCNC,S$GLB,, | Performed by: INTERNAL MEDICINE

## 2022-04-22 PROCEDURE — 99214 PR OFFICE/OUTPT VISIT, EST, LEVL IV, 30-39 MIN: ICD-10-PCS | Mod: S$GLB,,, | Performed by: INTERNAL MEDICINE

## 2022-04-22 RX ORDER — AMLODIPINE BESYLATE 5 MG/1
5 TABLET ORAL DAILY
Qty: 30 TABLET | Refills: 11 | Status: SHIPPED | OUTPATIENT
Start: 2022-04-22 | End: 2022-09-13 | Stop reason: SDUPTHER

## 2022-04-27 ENCOUNTER — PATIENT MESSAGE (OUTPATIENT)
Dept: ADMINISTRATIVE | Facility: OTHER | Age: 67
End: 2022-04-27
Payer: MEDICARE

## 2022-05-05 DIAGNOSIS — K25.7 CHRONIC GASTRIC ULCER WITHOUT HEMORRHAGE AND WITHOUT PERFORATION: ICD-10-CM

## 2022-05-05 NOTE — TELEPHONE ENCOUNTER
No new care gaps identified.  [facility] Embedded Care Gaps. Reference number: [id number]. 5/05/2022   2:48:29 PM CDT

## 2022-05-06 RX ORDER — OMEPRAZOLE 40 MG/1
40 CAPSULE, DELAYED RELEASE ORAL EVERY MORNING
Qty: 90 CAPSULE | Refills: 3 | Status: SHIPPED | OUTPATIENT
Start: 2022-05-06 | End: 2022-10-21 | Stop reason: SDUPTHER

## 2022-05-09 ENCOUNTER — OFFICE VISIT (OUTPATIENT)
Dept: OPTOMETRY | Facility: CLINIC | Age: 67
End: 2022-05-09
Payer: COMMERCIAL

## 2022-05-09 DIAGNOSIS — H47.393 OPTIC NERVE CUPPING OF BOTH EYES: ICD-10-CM

## 2022-05-09 DIAGNOSIS — H52.4 PRESBYOPIA: ICD-10-CM

## 2022-05-09 DIAGNOSIS — H25.13 NS (NUCLEAR SCLEROSIS), BILATERAL: Primary | ICD-10-CM

## 2022-05-09 DIAGNOSIS — Z86.018 HISTORY OF PITUITARY ADENOMA: ICD-10-CM

## 2022-05-09 DIAGNOSIS — H25.013 CORTICAL AGE-RELATED CATARACT OF BOTH EYES: ICD-10-CM

## 2022-05-09 PROCEDURE — 1126F AMNT PAIN NOTED NONE PRSNT: CPT | Mod: HCNC,CPTII,S$GLB, | Performed by: OPTOMETRIST

## 2022-05-09 PROCEDURE — 1159F MED LIST DOCD IN RCRD: CPT | Mod: HCNC,CPTII,S$GLB, | Performed by: OPTOMETRIST

## 2022-05-09 PROCEDURE — 99999 PR PBB SHADOW E&M-EST. PATIENT-LVL III: CPT | Mod: PBBFAC,HCNC,, | Performed by: OPTOMETRIST

## 2022-05-09 PROCEDURE — 1101F PT FALLS ASSESS-DOCD LE1/YR: CPT | Mod: HCNC,CPTII,S$GLB, | Performed by: OPTOMETRIST

## 2022-05-09 PROCEDURE — 1101F PR PT FALLS ASSESS DOC 0-1 FALLS W/OUT INJ PAST YR: ICD-10-PCS | Mod: HCNC,CPTII,S$GLB, | Performed by: OPTOMETRIST

## 2022-05-09 PROCEDURE — 4010F ACE/ARB THERAPY RXD/TAKEN: CPT | Mod: HCNC,CPTII,S$GLB, | Performed by: OPTOMETRIST

## 2022-05-09 PROCEDURE — 1126F PR PAIN SEVERITY QUANTIFIED, NO PAIN PRESENT: ICD-10-PCS | Mod: HCNC,CPTII,S$GLB, | Performed by: OPTOMETRIST

## 2022-05-09 PROCEDURE — 3288F FALL RISK ASSESSMENT DOCD: CPT | Mod: HCNC,CPTII,S$GLB, | Performed by: OPTOMETRIST

## 2022-05-09 PROCEDURE — 99999 PR PBB SHADOW E&M-EST. PATIENT-LVL III: ICD-10-PCS | Mod: PBBFAC,HCNC,, | Performed by: OPTOMETRIST

## 2022-05-09 PROCEDURE — 92015 DETERMINE REFRACTIVE STATE: CPT | Mod: HCNC,S$GLB,, | Performed by: OPTOMETRIST

## 2022-05-09 PROCEDURE — 3288F PR FALLS RISK ASSESSMENT DOCUMENTED: ICD-10-PCS | Mod: HCNC,CPTII,S$GLB, | Performed by: OPTOMETRIST

## 2022-05-09 PROCEDURE — 92250 COLOR FUNDUS PHOTOGRAPHY - OU - BOTH EYES: ICD-10-PCS | Mod: HCNC,S$GLB,, | Performed by: OPTOMETRIST

## 2022-05-09 PROCEDURE — 1159F PR MEDICATION LIST DOCUMENTED IN MEDICAL RECORD: ICD-10-PCS | Mod: HCNC,CPTII,S$GLB, | Performed by: OPTOMETRIST

## 2022-05-09 PROCEDURE — 92004 COMPRE OPH EXAM NEW PT 1/>: CPT | Mod: HCNC,S$GLB,, | Performed by: OPTOMETRIST

## 2022-05-09 PROCEDURE — 92004 PR EYE EXAM, NEW PATIENT,COMPREHESV: ICD-10-PCS | Mod: HCNC,S$GLB,, | Performed by: OPTOMETRIST

## 2022-05-09 PROCEDURE — 4010F PR ACE/ARB THEARPY RXD/TAKEN: ICD-10-PCS | Mod: HCNC,CPTII,S$GLB, | Performed by: OPTOMETRIST

## 2022-05-09 PROCEDURE — 92250 FUNDUS PHOTOGRAPHY W/I&R: CPT | Mod: HCNC,S$GLB,, | Performed by: OPTOMETRIST

## 2022-05-09 PROCEDURE — 92015 PR REFRACTION: ICD-10-PCS | Mod: HCNC,S$GLB,, | Performed by: OPTOMETRIST

## 2022-05-09 NOTE — PROGRESS NOTES
HPI     DLE: 3 years ago with Dr Dowell on [facility]    No eye surgery   No eyedrops    Pt here for check of ocular health.  Pt states she forgot her eyeglasses   today. Pt states occasional blurry VA OU. Pt states occasional floaters.    Pt states headaches from sinus pressure. Pt states itching and burning OU.   Pt denies flashes or eye pain OU.  Pt denies tearing OU.      Last edited by January Dailey MA on 5/9/2022  1:53 PM. (History)              Assessment /Plan     For exam results, see Encounter Report.    NS (nuclear sclerosis), bilateral  Cortical age-related cataract of both eyes   Borderline, monitor    Optic nerve cupping of both eyes   Disc photos today   RTC for HVF OCT gonio pachy IOP    Presbyopia   Rx specs    History of pituitary adenoma 9/2019 MRI no recurrence      RTC 1 year, sooner PRN

## 2022-05-13 ENCOUNTER — TELEPHONE (OUTPATIENT)
Dept: OPHTHALMOLOGY | Facility: CLINIC | Age: 67
End: 2022-05-13
Payer: MEDICARE

## 2022-05-13 NOTE — TELEPHONE ENCOUNTER
Glaucoma follow up appt scheduled 06/16/2022 with Dr. Hernandez. Glaucoma testing scheduled at 9:45 am at [facility], followed by appt with Dr. Hernandez at 11:00 am at [facility] optical shop. Appt confirmed with pt.

## 2022-05-29 ENCOUNTER — PATIENT MESSAGE (OUTPATIENT)
Dept: ORTHOPEDICS | Facility: CLINIC | Age: 67
End: 2022-05-29
Payer: MEDICARE

## 2022-05-30 ENCOUNTER — PATIENT MESSAGE (OUTPATIENT)
Dept: ORTHOPEDICS | Facility: CLINIC | Age: 67
End: 2022-05-30
Payer: MEDICARE

## 2022-06-01 ENCOUNTER — HOSPITAL ENCOUNTER (OUTPATIENT)
Dept: RADIOLOGY | Facility: CLINIC | Age: 67
Discharge: HOME | End: 2022-06-01
Attending: INTERNAL MEDICINE
Payer: COMMERCIAL

## 2022-06-01 DIAGNOSIS — Z78.0 ASYMPTOMATIC MENOPAUSAL STATE: ICD-10-CM

## 2022-06-01 PROCEDURE — 77080 DEXA BONE DENSITY SPINE HIP: ICD-10-PCS | Mod: 26,HCNC,, | Performed by: INTERNAL MEDICINE

## 2022-06-01 PROCEDURE — 77080 DXA BONE DENSITY AXIAL: CPT | Mod: 26,HCNC,, | Performed by: INTERNAL MEDICINE

## 2022-06-01 PROCEDURE — 77080 DXA BONE DENSITY AXIAL: CPT | Mod: TC,HCNC,PO

## 2022-06-13 DIAGNOSIS — S39.012A STRAIN OF LUMBAR REGION, INITIAL ENCOUNTER: ICD-10-CM

## 2022-06-13 RX ORDER — CYCLOBENZAPRINE HCL 10 MG
TABLET ORAL
Qty: 90 TABLET | Refills: 1 | Status: SHIPPED | OUTPATIENT
Start: 2022-06-13 | End: 2023-01-15

## 2022-06-13 NOTE — TELEPHONE ENCOUNTER
No new care gaps identified.  [facility] Embedded Care Gaps. Reference number: [id number]. 6/13/2022   2:11:47 AM NERIST

## 2022-06-15 ENCOUNTER — OFFICE VISIT (OUTPATIENT)
Dept: ORTHOPEDICS | Facility: CLINIC | Age: 67
End: 2022-06-15
Payer: MEDICARE

## 2022-06-15 VITALS — BODY MASS INDEX: 35.03 KG/M2 | HEIGHT: 64 IN | WEIGHT: 205.19 LBS
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
## 2022-06-15 DIAGNOSIS — M25.562 LEFT KNEE PAIN, UNSPECIFIED CHRONICITY: Primary | ICD-10-CM

## 2022-06-15 DIAGNOSIS — Z96.652 S/P TOTAL KNEE ARTHROPLASTY, LEFT: ICD-10-CM

## 2022-06-15 DIAGNOSIS — M17.0 PRIMARY OSTEOARTHRITIS OF BOTH KNEES: Primary | ICD-10-CM

## 2022-06-15 PROCEDURE — 4010F ACE/ARB THERAPY RXD/TAKEN: CPT | Mod: CPTII,S$GLB,, | Performed by: PHYSICIAN ASSISTANT

## 2022-06-15 PROCEDURE — 1160F PR REVIEW ALL MEDS BY PRESCRIBER/CLIN PHARMACIST DOCUMENTED: ICD-10-PCS | Mod: CPTII,S$GLB,, | Performed by: PHYSICIAN ASSISTANT

## 2022-06-15 PROCEDURE — 3288F FALL RISK ASSESSMENT DOCD: CPT | Mod: CPTII,S$GLB,, | Performed by: PHYSICIAN ASSISTANT

## 2022-06-15 PROCEDURE — 1100F PR PT FALLS ASSESS DOC 2+ FALLS/FALL W/INJURY/YR: ICD-10-PCS | Mod: CPTII,S$GLB,, | Performed by: PHYSICIAN ASSISTANT

## 2022-06-15 PROCEDURE — 1100F PTFALLS ASSESS-DOCD GE2>/YR: CPT | Mod: CPTII,S$GLB,, | Performed by: PHYSICIAN ASSISTANT

## 2022-06-15 PROCEDURE — 1125F AMNT PAIN NOTED PAIN PRSNT: CPT | Mod: CPTII,S$GLB,, | Performed by: PHYSICIAN ASSISTANT

## 2022-06-15 PROCEDURE — 1125F PR PAIN SEVERITY QUANTIFIED, PAIN PRESENT: ICD-10-PCS | Mod: CPTII,S$GLB,, | Performed by: PHYSICIAN ASSISTANT

## 2022-06-15 PROCEDURE — 99999 PR PBB SHADOW E&M-EST. PATIENT-LVL IV: CPT | Mod: PBBFAC,,, | Performed by: PHYSICIAN ASSISTANT

## 2022-06-15 PROCEDURE — 1159F MED LIST DOCD IN RCRD: CPT | Mod: CPTII,S$GLB,, | Performed by: PHYSICIAN ASSISTANT

## 2022-06-15 PROCEDURE — 3008F BODY MASS INDEX DOCD: CPT | Mod: CPTII,S$GLB,, | Performed by: PHYSICIAN ASSISTANT

## 2022-06-15 PROCEDURE — 1160F RVW MEDS BY RX/DR IN RCRD: CPT | Mod: CPTII,S$GLB,, | Performed by: PHYSICIAN ASSISTANT

## 2022-06-15 PROCEDURE — 99999 PR PBB SHADOW E&M-EST. PATIENT-LVL IV: ICD-10-PCS | Mod: PBBFAC,,, | Performed by: PHYSICIAN ASSISTANT

## 2022-06-15 PROCEDURE — 1159F PR MEDICATION LIST DOCUMENTED IN MEDICAL RECORD: ICD-10-PCS | Mod: CPTII,S$GLB,, | Performed by: PHYSICIAN ASSISTANT

## 2022-06-15 PROCEDURE — 99213 OFFICE O/P EST LOW 20 MIN: CPT | Mod: S$GLB,,, | Performed by: PHYSICIAN ASSISTANT

## 2022-06-15 PROCEDURE — 3288F PR FALLS RISK ASSESSMENT DOCUMENTED: ICD-10-PCS | Mod: CPTII,S$GLB,, | Performed by: PHYSICIAN ASSISTANT

## 2022-06-15 PROCEDURE — 3008F PR BODY MASS INDEX (BMI) DOCUMENTED: ICD-10-PCS | Mod: CPTII,S$GLB,, | Performed by: PHYSICIAN ASSISTANT

## 2022-06-15 PROCEDURE — 4010F PR ACE/ARB THEARPY RXD/TAKEN: ICD-10-PCS | Mod: CPTII,S$GLB,, | Performed by: PHYSICIAN ASSISTANT

## 2022-06-15 PROCEDURE — 99213 PR OFFICE/OUTPT VISIT, EST, LEVL III, 20-29 MIN: ICD-10-PCS | Mod: S$GLB,,, | Performed by: PHYSICIAN ASSISTANT

## 2022-06-15 NOTE — PATIENT INSTRUCTIONS
It is always good to see you!    Knee xrays show replacement looks good.     Wear knee brace as needed for prolonged standing/walking.     Continue over the counter tylenol as directed on the bottle.     I will email you in 6 weeks to check on you.     Call or message me if you need anything.     Patricia  [phone]

## 2022-06-15 NOTE — PROGRESS NOTES
Subjective:      Patient ID: Bell Lynn is a 67 y.o. female.    Chief Complaint: Pain and Injury of the Left Knee      HPI  (Liang)    Last seen by Dr. Tavera on 4/5/22. She is s/p left TKA by Dr. Tavera on 1/10/22. She was doing well at last visit. Known advanced degenerative changes in right knee.     She had a fall on both knees on 5/27/22 and is here with increased pain in left knee. No right knee pain. Left knee pain is worse with prolonged standing/walking. She rates her pain as an 8 on a scale of 1-10. Pain is aching in nature. History of GI bleed- she cannot take NSAIDs. She is taking prn tylenol and using OTC muscle rub.       Past Medical History:   Diagnosis Date    Asthma     Depression     Gastric ulcer with hemorrhage 7/2012    GERD (gastroesophageal reflux disease)     History of blood transfusion     Hypertension     Iron deficiency anemia 3/14/2013    Osteoarthritis of both knees          Current Outpatient Medications:     albuterol (PROVENTIL/VENTOLIN HFA) 90 mcg/actuation inhaler, INHALE 2 PUFFS EVERY 4 HOURS AS NEEDED FOR WHEEZING, Disp: 18 g, Rfl: 5    amLODIPine (NORVASC) 5 MG tablet, Take 1 tablet (5 mg total) by mouth once daily., Disp: 30 tablet, Rfl: 11    cetirizine (ZYRTEC) 10 MG tablet, Take 1 tablet (10 mg total) by mouth once daily., Disp: 90 tablet, Rfl: 1    cyclobenzaprine (FLEXERIL) 10 MG tablet, TAKE 1 TABLET EVERY EVENING, Disp: 90 tablet, Rfl: 1    DULoxetine (CYMBALTA) 60 MG capsule, Take 1 capsule (60 mg total) by mouth once daily. Increased dose, Disp: 90 capsule, Rfl: 1    furosemide (LASIX) 20 MG tablet, TAKE 1 TABLET BY MOUTH ONCE DAILY AS NEEDED FOR  LEG  SWELLING, Disp: 90 tablet, Rfl: 1    gabapentin (NEURONTIN) 100 MG capsule, 3 tablets nighttime 1 tablet in AM; increased dose, Disp: 270 capsule, Rfl: 1    LIDOcaine (LIDODERM) 5 %, Place 1 patch onto the skin once daily. Remove & Discard patch within 12 hours or as directed by MD, Disp:  "30 patch, Rfl: 5    linaCLOtide (LINZESS) 145 mcg Cap capsule, Take 1 capsule (145 mcg total) by mouth once daily., Disp: 90 capsule, Rfl: 1    losartan (COZAAR) 100 MG tablet, Take 1 tablet (100 mg total) by mouth once daily., Disp: 90 tablet, Rfl: 1    metoprolol succinate (TOPROL-XL) 100 MG 24 hr tablet, Take 1 tablet (100 mg total) by mouth once daily., Disp: 90 tablet, Rfl: 1    nitroGLYCERIN (NITROSTAT) 0.4 MG SL tablet, Place 1 tablet (0.4 mg total) under the tongue every 5 (five) minutes as needed for Chest pain., Disp: 60 tablet, Rfl: 12    omeprazole (PRILOSEC) 40 MG capsule, Take 1 capsule (40 mg total) by mouth every morning., Disp: 90 capsule, Rfl: 1    omeprazole (PRILOSEC) 40 MG capsule, Take 1 capsule (40 mg total) by mouth every morning., Disp: 90 capsule, Rfl: 3    ondansetron (ZOFRAN-ODT) 4 MG TbDL, Take 1 tablet (4 mg total) by mouth every 6 (six) hours as needed (nausea). (Patient not taking: Reported on 6/15/2022), Disp: 10 tablet, Rfl: 0    Review of patient's allergies indicates:   Allergen Reactions    Nsaids (non-steroidal anti-inflammatory drug)      Gi bleed    Penicillins Itching and Swelling    Penicillin     Symbicort [budesonide-formoterol]      Recurrent oral ulcers       Review of Systems   Constitutional: Negative for chills, fever, night sweats and weight gain.   Gastrointestinal: Negative for bowel incontinence, nausea and vomiting.   Genitourinary: Negative for bladder incontinence.   Neurological: Negative for disturbances in coordination and loss of balance.         Objective:        Ht 5' 4" (1.626 m)   Wt 93.1 kg (205 lb 3.2 oz)   BMI 35.22 kg/m²     Ortho/SPM Exam    Body habitus is normal.   The patient walks without a limp.      RIGHT KNEE EXAM:  Hip irritability  negative.   The skin over the knee is intact.  Knee effusion trace  No tenderness.   Range of motion- Flexion 120 deg, Extension 5 deg,     Ligament laxity exam:   MCL 2+   Lachman 0   Post sag "  0    LCL 0    Patellar apprehension negative.  Popliteal cyst positive  Patellar crepitation present.  Meniscal irritability not applicable    Motor normal 5/5 strength in all tested muscle groups.   Sensory normal.      LEFT KNEE EXAM:  Incision:  Normally healed  Range of motion:  0-130  Valgus/varus stability- stable  Swelling-none  Distal perfusion-intact  She has mild medial joint line tenderness.     She is NVI distally with supple calf.       XRAY INTERPRETATION:  X-rays of bilateral knees dated 6/15/22 are personally reviewed and show severe medial joint space narrowing right knee with advanced degenerative changes. Left total knee prosthesis in good position.        Assessment:       Encounter Diagnoses   Name Primary?    Left knee pain, unspecified chronicity Yes    S/P total knee arthroplasty, left     Fall, initial encounter           Plan:       Bell was seen today for pain and injury.    Diagnoses and all orders for this visit:    Left knee pain, unspecified chronicity    S/P total knee arthroplasty, left    Fall, initial encounter      She is s/p left TKA by Dr. Tavera on 1/10/22.     She had a fall on both knees on 5/27/22 and is here with increased pain in left knee that is worse with prolonged standing/walking.    XRs show left knee prosthesis looks good with severe medial joint space narrowing right knee with advanced degenerative changes.     Treatment options reviewed with patient along with above bilateral knee xrays. Following plan made:     - Left knee pain should continue to improve. Continue to ice and elevate.   - Medications limited due to history of GI bleed. Continue OTC tylenol as directed on the bottle.   - Given hinged knee brace for left knee to use prn. .   - Will email her in 6 weeks to check on her progress.     Follow up if symptoms worsen or fail to improve.

## 2022-06-16 ENCOUNTER — TELEPHONE (OUTPATIENT)
Dept: OPTOMETRY | Facility: CLINIC | Age: 67
End: 2022-06-16
Payer: MEDICARE

## 2022-06-16 ENCOUNTER — OFFICE VISIT (OUTPATIENT)
Dept: OPTOMETRY | Facility: CLINIC | Age: 67
End: 2022-06-16
Payer: COMMERCIAL

## 2022-06-16 ENCOUNTER — CLINICAL SUPPORT (OUTPATIENT)
Dept: OPHTHALMOLOGY | Facility: CLINIC | Age: 67
End: 2022-06-16
Payer: COMMERCIAL

## 2022-06-16 DIAGNOSIS — H47.393 OPTIC NERVE CUPPING OF BOTH EYES: ICD-10-CM

## 2022-06-16 DIAGNOSIS — Z83.511 FAMILY HISTORY OF GLAUCOMA IN SISTER: ICD-10-CM

## 2022-06-16 DIAGNOSIS — H47.393 OPTIC NERVE CUPPING OF BOTH EYES: Primary | ICD-10-CM

## 2022-06-16 DIAGNOSIS — Z86.018 HISTORY OF PITUITARY ADENOMA: ICD-10-CM

## 2022-06-16 DIAGNOSIS — E89.3 STATUS POST TRANSSPHENOIDAL PITUITARY RESECTION: Primary | ICD-10-CM

## 2022-06-16 PROCEDURE — 92083 HUMPHREY VISUAL FIELD - OU - BOTH EYES: ICD-10-PCS | Mod: S$GLB,,, | Performed by: OPTOMETRIST

## 2022-06-16 PROCEDURE — 92020 GONIOSCOPY: CPT | Mod: HCNC,S$GLB,, | Performed by: OPTOMETRIST

## 2022-06-16 PROCEDURE — 1126F PR PAIN SEVERITY QUANTIFIED, NO PAIN PRESENT: ICD-10-PCS | Mod: HCNC,CPTII,S$GLB, | Performed by: OPTOMETRIST

## 2022-06-16 PROCEDURE — 99999 PR PBB SHADOW E&M-EST. PATIENT-LVL I: CPT | Mod: PBBFAC,HCNC,, | Performed by: OPTOMETRIST

## 2022-06-16 PROCEDURE — 92012 INTRM OPH EXAM EST PATIENT: CPT | Mod: HCNC,S$GLB,, | Performed by: OPTOMETRIST

## 2022-06-16 PROCEDURE — 1126F AMNT PAIN NOTED NONE PRSNT: CPT | Mod: HCNC,CPTII,S$GLB, | Performed by: OPTOMETRIST

## 2022-06-16 PROCEDURE — 4010F PR ACE/ARB THEARPY RXD/TAKEN: ICD-10-PCS | Mod: HCNC,CPTII,S$GLB, | Performed by: OPTOMETRIST

## 2022-06-16 PROCEDURE — 1101F PR PT FALLS ASSESS DOC 0-1 FALLS W/OUT INJ PAST YR: ICD-10-PCS | Mod: HCNC,CPTII,S$GLB, | Performed by: OPTOMETRIST

## 2022-06-16 PROCEDURE — 92133 POSTERIOR SEGMENT OCT OPTIC NERVE(OCULAR COHERENCE TOMOGRAPHY) - OU - BOTH EYES: ICD-10-PCS | Mod: S$GLB,,, | Performed by: OPTOMETRIST

## 2022-06-16 PROCEDURE — 1101F PT FALLS ASSESS-DOCD LE1/YR: CPT | Mod: HCNC,CPTII,S$GLB, | Performed by: OPTOMETRIST

## 2022-06-16 PROCEDURE — 76514 ECHO EXAM OF EYE THICKNESS: CPT | Mod: HCNC,S$GLB,, | Performed by: OPTOMETRIST

## 2022-06-16 PROCEDURE — 92133 CPTRZD OPH DX IMG PST SGM ON: CPT | Mod: S$GLB,,, | Performed by: OPTOMETRIST

## 2022-06-16 PROCEDURE — 3288F PR FALLS RISK ASSESSMENT DOCUMENTED: ICD-10-PCS | Mod: HCNC,CPTII,S$GLB, | Performed by: OPTOMETRIST

## 2022-06-16 PROCEDURE — 76514 PR  US, EYE, FOR CORNEAL THICKNESS: ICD-10-PCS | Mod: HCNC,S$GLB,, | Performed by: OPTOMETRIST

## 2022-06-16 PROCEDURE — 3288F FALL RISK ASSESSMENT DOCD: CPT | Mod: HCNC,CPTII,S$GLB, | Performed by: OPTOMETRIST

## 2022-06-16 PROCEDURE — 4010F ACE/ARB THERAPY RXD/TAKEN: CPT | Mod: HCNC,CPTII,S$GLB, | Performed by: OPTOMETRIST

## 2022-06-16 PROCEDURE — 92012 PR EYE EXAM, EST PATIENT,INTERMED: ICD-10-PCS | Mod: HCNC,S$GLB,, | Performed by: OPTOMETRIST

## 2022-06-16 PROCEDURE — 99999 PR PBB SHADOW E&M-EST. PATIENT-LVL I: ICD-10-PCS | Mod: PBBFAC,HCNC,, | Performed by: OPTOMETRIST

## 2022-06-16 PROCEDURE — 92020 PR SPECIAL EYE EVAL,GONIOSCOPY: ICD-10-PCS | Mod: HCNC,S$GLB,, | Performed by: OPTOMETRIST

## 2022-06-16 PROCEDURE — 92083 EXTENDED VISUAL FIELD XM: CPT | Mod: S$GLB,,, | Performed by: OPTOMETRIST

## 2022-06-16 RX ORDER — LATANOPROST 50 UG/ML
1 SOLUTION/ DROPS OPHTHALMIC NIGHTLY
Qty: 7.5 ML | Refills: 4 | Status: SHIPPED | OUTPATIENT
Start: 2022-06-16 | End: 2023-06-27

## 2022-06-16 NOTE — PROGRESS NOTES
Visual field test done.  Patient stated no latex allergies used coverlet      Glasses Prescription     Sphere Cylinder Axis Dist VA Add   Right +0.75 +0.75 100 20/25 +2.50   Left +0.50 +1.00 090 20/30 +2.50   Expiration Date: 5/9/2023

## 2022-06-16 NOTE — TELEPHONE ENCOUNTER
----- Message from Florinda Mortensen sent at 6/16/2022  7:06 AM CDT -----  Need orders for patient visual field and oct         Thank you.

## 2022-06-16 NOTE — PROGRESS NOTES
HPI     DLE: No eye surgery   No eyedrops    Pt here for 1 month fu   Pt states no changes     Last edited by Renata Hernandez, OD on 6/16/2022 11:21 AM. (History)            Assessment /Plan     For exam results, see Encounter Report.    Status post transsphenoidal pituitary resection for tumor, Dr Cardenas, about 1989  History of pituitary adenoma 9/2019 MRI no recurrence    NS (nuclear sclerosis), bilateral  Cortical age-related cataract of both eyes              Borderline, monitor     Optic nerve cupping of both eyes             May 2022 Disc photos today         Today HVF OD superior and inferior defects, OS WNL   OCT OD thin central N and T    gonio Open OU    pachy 524/539   IOP 16/18   (+) sister with glaucoma: on drops     Start today  -     latanoprost 0.005 % ophthalmic solution; Place 1 drop into the right eye every evening.  Dispense: 7.5 mL; Refill: 4    RTC 1 month for IOP check

## 2022-06-22 DIAGNOSIS — I10 ESSENTIAL HYPERTENSION: ICD-10-CM

## 2022-06-22 DIAGNOSIS — M81.0 AGE-RELATED OSTEOPOROSIS WITHOUT CURRENT PATHOLOGICAL FRACTURE: ICD-10-CM

## 2022-06-22 DIAGNOSIS — Z00.00 NORMAL PHYSICAL EXAM: Primary | ICD-10-CM

## 2022-06-22 DIAGNOSIS — M81.0 AGE-RELATED OSTEOPOROSIS WITHOUT CURRENT PATHOLOGICAL FRACTURE: Primary | ICD-10-CM

## 2022-06-22 RX ORDER — ALENDRONATE SODIUM 70 MG/1
70 TABLET ORAL
Qty: 4 TABLET | Refills: 11 | Status: SHIPPED | OUTPATIENT
Start: 2022-06-22 | End: 2023-03-26

## 2022-06-22 NOTE — PROGRESS NOTES
06/21/2022 DEXA L-spine -1.9, total hip-2.8, femoral neck-3.5.  FRAX score 2.7/8.3%.  Osteoporosis.  Results to patient via [facility].  Start weekly alendronate.  Supplement calcium vitamin-D over-the-counter.  Repeat 2 years.

## 2022-07-20 ENCOUNTER — PATIENT MESSAGE (OUTPATIENT)
Dept: GASTROENTEROLOGY | Facility: CLINIC | Age: 67
End: 2022-07-20
Payer: MEDICARE

## 2022-07-21 DIAGNOSIS — G89.29 CHRONIC MIDLINE LOW BACK PAIN WITHOUT SCIATICA: ICD-10-CM

## 2022-07-21 DIAGNOSIS — M54.50 CHRONIC MIDLINE LOW BACK PAIN WITHOUT SCIATICA: ICD-10-CM

## 2022-07-21 RX ORDER — LIDOCAINE 50 MG/G
PATCH TOPICAL
Qty: 90 PATCH | Refills: 0 | Status: SHIPPED | OUTPATIENT
Start: 2022-07-21 | End: 2022-12-14

## 2022-07-25 ENCOUNTER — OFFICE VISIT (OUTPATIENT)
Dept: GASTROENTEROLOGY | Facility: CLINIC | Age: 67
End: 2022-07-25
Payer: MEDICARE

## 2022-07-25 VITALS
WEIGHT: 205.69 LBS | HEART RATE: 96 BPM | DIASTOLIC BLOOD PRESSURE: 97 MMHG | SYSTOLIC BLOOD PRESSURE: 159 MMHG | BODY MASS INDEX: 35.12 KG/M2 | HEIGHT: 64 IN

## 2022-07-25 DIAGNOSIS — K21.9 GASTROESOPHAGEAL REFLUX DISEASE, UNSPECIFIED WHETHER ESOPHAGITIS PRESENT: ICD-10-CM

## 2022-07-25 DIAGNOSIS — R10.13 EPIGASTRIC PAIN: Primary | ICD-10-CM

## 2022-07-25 PROCEDURE — 3008F BODY MASS INDEX DOCD: CPT | Mod: CPTII,S$GLB,, | Performed by: NURSE PRACTITIONER

## 2022-07-25 PROCEDURE — 4010F ACE/ARB THERAPY RXD/TAKEN: CPT | Mod: CPTII,S$GLB,, | Performed by: NURSE PRACTITIONER

## 2022-07-25 PROCEDURE — 99214 OFFICE O/P EST MOD 30 MIN: CPT | Mod: S$GLB,,, | Performed by: NURSE PRACTITIONER

## 2022-07-25 PROCEDURE — 99214 PR OFFICE/OUTPT VISIT, EST, LEVL IV, 30-39 MIN: ICD-10-PCS | Mod: S$GLB,,, | Performed by: NURSE PRACTITIONER

## 2022-07-25 PROCEDURE — 1126F AMNT PAIN NOTED NONE PRSNT: CPT | Mod: CPTII,S$GLB,, | Performed by: NURSE PRACTITIONER

## 2022-07-25 PROCEDURE — 3288F PR FALLS RISK ASSESSMENT DOCUMENTED: ICD-10-PCS | Mod: CPTII,S$GLB,, | Performed by: NURSE PRACTITIONER

## 2022-07-25 PROCEDURE — 1159F PR MEDICATION LIST DOCUMENTED IN MEDICAL RECORD: ICD-10-PCS | Mod: CPTII,S$GLB,, | Performed by: NURSE PRACTITIONER

## 2022-07-25 PROCEDURE — 1160F PR REVIEW ALL MEDS BY PRESCRIBER/CLIN PHARMACIST DOCUMENTED: ICD-10-PCS | Mod: CPTII,S$GLB,, | Performed by: NURSE PRACTITIONER

## 2022-07-25 PROCEDURE — 1126F PR PAIN SEVERITY QUANTIFIED, NO PAIN PRESENT: ICD-10-PCS | Mod: CPTII,S$GLB,, | Performed by: NURSE PRACTITIONER

## 2022-07-25 PROCEDURE — 99999 PR PBB SHADOW E&M-EST. PATIENT-LVL V: CPT | Mod: PBBFAC,,, | Performed by: NURSE PRACTITIONER

## 2022-07-25 PROCEDURE — 3008F PR BODY MASS INDEX (BMI) DOCUMENTED: ICD-10-PCS | Mod: CPTII,S$GLB,, | Performed by: NURSE PRACTITIONER

## 2022-07-25 PROCEDURE — 1159F MED LIST DOCD IN RCRD: CPT | Mod: CPTII,S$GLB,, | Performed by: NURSE PRACTITIONER

## 2022-07-25 PROCEDURE — 3077F SYST BP >= 140 MM HG: CPT | Mod: CPTII,S$GLB,, | Performed by: NURSE PRACTITIONER

## 2022-07-25 PROCEDURE — 3077F PR MOST RECENT SYSTOLIC BLOOD PRESSURE >= 140 MM HG: ICD-10-PCS | Mod: CPTII,S$GLB,, | Performed by: NURSE PRACTITIONER

## 2022-07-25 PROCEDURE — 1160F RVW MEDS BY RX/DR IN RCRD: CPT | Mod: CPTII,S$GLB,, | Performed by: NURSE PRACTITIONER

## 2022-07-25 PROCEDURE — 3080F DIAST BP >= 90 MM HG: CPT | Mod: CPTII,S$GLB,, | Performed by: NURSE PRACTITIONER

## 2022-07-25 PROCEDURE — 3080F PR MOST RECENT DIASTOLIC BLOOD PRESSURE >= 90 MM HG: ICD-10-PCS | Mod: CPTII,S$GLB,, | Performed by: NURSE PRACTITIONER

## 2022-07-25 PROCEDURE — 99999 PR PBB SHADOW E&M-EST. PATIENT-LVL V: ICD-10-PCS | Mod: PBBFAC,,, | Performed by: NURSE PRACTITIONER

## 2022-07-25 PROCEDURE — 1101F PR PT FALLS ASSESS DOC 0-1 FALLS W/OUT INJ PAST YR: ICD-10-PCS | Mod: CPTII,S$GLB,, | Performed by: NURSE PRACTITIONER

## 2022-07-25 PROCEDURE — 3288F FALL RISK ASSESSMENT DOCD: CPT | Mod: CPTII,S$GLB,, | Performed by: NURSE PRACTITIONER

## 2022-07-25 PROCEDURE — 1101F PT FALLS ASSESS-DOCD LE1/YR: CPT | Mod: CPTII,S$GLB,, | Performed by: NURSE PRACTITIONER

## 2022-07-25 PROCEDURE — 4010F PR ACE/ARB THEARPY RXD/TAKEN: ICD-10-PCS | Mod: CPTII,S$GLB,, | Performed by: NURSE PRACTITIONER

## 2022-07-25 RX ORDER — TRIAMTERENE/HYDROCHLOROTHIAZID 37.5-25 MG
TABLET ORAL
COMMUNITY
Start: 2022-06-09 | End: 2022-09-13

## 2022-07-25 RX ORDER — SUCRALFATE 1 G/10ML
1 SUSPENSION ORAL
Qty: 600 ML | Refills: 0 | Status: SHIPPED | OUTPATIENT
Start: 2022-07-25 | End: 2022-08-08

## 2022-07-25 NOTE — PROGRESS NOTES
Subjective:       Patient ID: Bell Lynn is a 67 y.o. female.    Chief Complaint: Abdominal Pain    68 y/o female with hypertension and hx of peptic ulcer disease and colon polyps presents to clinic with c/o abdominal pain. Symptoms started after missing daily omeprazole for several days. Slight decrease in abdominal burning pain since resuming medication. Denies chronic NSAID use or alcohol abuse. EGD 6/30/2021 showed normal esophagus; gastric ulcer with no stigmata of bleeding; and normal examined duodenum.        Past Medical History:   Diagnosis Date    Asthma     Depression     Gastric ulcer with hemorrhage 7/2012    GERD (gastroesophageal reflux disease)     History of blood transfusion     Hypertension     Iron deficiency anemia 3/14/2013    Osteoarthritis of both knees        Past Surgical History:   Procedure Laterality Date    BREAST BIOPSY      BREAST SURGERY      Benign breast cyst    COLONOSCOPY      ESOPHAGOGASTRODUODENOSCOPY N/A 10/7/2020    Procedure: EGD (ESOPHAGOGASTRODUODENOSCOPY);  Surgeon: Nell Parish MD;  Location: [facility];  Service: Endoscopy;  Laterality: N/A;    ESOPHAGOGASTRODUODENOSCOPY N/A 6/30/2021    Procedure: EGD (ESOPHAGOGASTRODUODENOSCOPY);  Surgeon: Nell Parish MD;  Location: [facility];  Service: Endoscopy;  Laterality: N/A;    HYSTERECTOMY      Partial    KNEE ARTHROPLASTY Left 1/10/2022    Procedure: ARTHROPLASTY, KNEE;  Surgeon: Jonnathan Tavera MD;  Location: [facility];  Service: Orthopedics;  Laterality: Left;    TONSILLECTOMY         Family History   Problem Relation Age of Onset    Heart disease Mother     Hypertension Mother     Heart disease Father     Hypertension Father     Breast cancer Sister     Breast cancer Sister        Social History     Socioeconomic History    Marital status:    Occupational History     Employer: [employer]   Tobacco Use    Smoking status: Never Smoker    Smokeless  "tobacco: Never Used   Substance and Sexual Activity    Alcohol use: Yes     Comment: ocaasional    Drug use: No    Sexual activity: Not Currently     Partners: Male     Birth control/protection: Surgical     Social Determinants of Health     Financial Resource Strain: Low Risk     Difficulty of Paying Living Expenses: Not hard at all   Food Insecurity: No Food Insecurity    Worried About Running Out of Food in the Last Year: Never true    Ran Out of Food in the Last Year: Never true   Transportation Needs: No Transportation Needs    Lack of Transportation (Medical): No    Lack of Transportation (Non-Medical): No   Physical Activity: Inactive    Days of Exercise per Week: 0 days    Minutes of Exercise per Session: 0 min   Stress: No Stress Concern Present    Feeling of Stress : Only a little   Social Connections: Unknown    Frequency of Communication with Friends and Family: More than three times a week    Frequency of Social Gatherings with Friends and Family: Once a week    Active Member of Clubs or Organizations: No    Attends Club or Organization Meetings: Never    Marital Status:    Housing Stability: Low Risk     Unable to Pay for Housing in the Last Year: No    Number of Places Lived in the Last Year: 1    Unstable Housing in the Last Year: No       Review of Systems   Constitutional: Negative for fatigue, fever and unexpected weight change.   HENT: Negative for sore throat and trouble swallowing.    Respiratory: Negative for cough and shortness of breath.    Cardiovascular: Negative for chest pain.   Gastrointestinal: Positive for abdominal pain.   Musculoskeletal: Negative for myalgias.   Neurological: Negative for dizziness and weakness.   Hematological: Negative for adenopathy.   Psychiatric/Behavioral: Negative for dysphoric mood.         Objective:     Vitals:    07/25/22 1119   BP: (!) 159/97   BP Location: Left arm   Pulse: 96   Weight: 93.3 kg (205 lb 11.2 oz)   Height: 5' 4" " (1.626 m)          Physical Exam  Constitutional:       General: She is not in acute distress.     Appearance: Normal appearance. She is well-developed. She is not ill-appearing.   HENT:      Head: Normocephalic.   Eyes:      Conjunctiva/sclera: Conjunctivae normal.      Pupils: Pupils are equal, round, and reactive to light.   Pulmonary:      Effort: Pulmonary effort is normal. No respiratory distress.   Abdominal:      General: Bowel sounds are normal.      Palpations: Abdomen is soft.      Tenderness: There is no abdominal tenderness.   Musculoskeletal:         General: Normal range of motion.      Cervical back: Normal range of motion.   Skin:     General: Skin is warm and dry.   Neurological:      Mental Status: She is alert and oriented to person, place, and time.               Assessment:         ICD-10-CM ICD-9-CM   1. Epigastric pain  R10.13 789.06   2. Gastroesophageal reflux disease, unspecified whether esophagitis present  K21.9 530.81       Plan:       Epigastric pain  -     sucralfate (CARAFATE) 100 mg/mL suspension; Take 10 mLs (1 g total) by mouth 4 (four) times daily with meals and nightly. for 14 days  Dispense: 600 mL; Refill: 0    Gastroesophageal reflux disease, unspecified whether esophagitis present    - Short term course of sucralfate. Resume PPI daily.   Follow up if symptoms worsen or fail to improve.     Patient's Medications   New Prescriptions    SUCRALFATE (CARAFATE) 100 MG/ML SUSPENSION    Take 10 mLs (1 g total) by mouth 4 (four) times daily with meals and nightly. for 14 days   Previous Medications    ALBUTEROL (PROVENTIL/VENTOLIN HFA) 90 MCG/ACTUATION INHALER    INHALE 2 PUFFS EVERY 4 HOURS AS NEEDED FOR WHEEZING    ALENDRONATE (FOSAMAX) 70 MG TABLET    Take 1 tablet (70 mg total) by mouth every 7 days.    AMLODIPINE (NORVASC) 5 MG TABLET    Take 1 tablet (5 mg total) by mouth once daily.    CETIRIZINE (ZYRTEC) 10 MG TABLET    Take 1 tablet (10 mg total) by mouth once daily.     CYCLOBENZAPRINE (FLEXERIL) 10 MG TABLET    TAKE 1 TABLET EVERY EVENING    DULOXETINE (CYMBALTA) 60 MG CAPSULE    Take 1 capsule (60 mg total) by mouth once daily. Increased dose    FUROSEMIDE (LASIX) 20 MG TABLET    TAKE 1 TABLET BY MOUTH ONCE DAILY AS NEEDED FOR  LEG  SWELLING    GABAPENTIN (NEURONTIN) 100 MG CAPSULE    3 tablets nighttime 1 tablet in AM; increased dose    LATANOPROST 0.005 % OPHTHALMIC SOLUTION    Place 1 drop into the right eye every evening.    LIDOCAINE (LIDODERM) 5 %    PLACE 1 PATCH ONTO THE SKIN ONCE DAILY. REMOVE AND DISCARD PATCH WITHIN 12 HOURS OR AS DIRECTED BY MD    LINACLOTIDE (LINZESS) 145 MCG CAP CAPSULE    Take 1 capsule (145 mcg total) by mouth once daily.    METOPROLOL SUCCINATE (TOPROL-XL) 100 MG 24 HR TABLET    Take 1 tablet (100 mg total) by mouth once daily.    NITROGLYCERIN (NITROSTAT) 0.4 MG SL TABLET    Place 1 tablet (0.4 mg total) under the tongue every 5 (five) minutes as needed for Chest pain.    OMEPRAZOLE (PRILOSEC) 40 MG CAPSULE    Take 1 capsule (40 mg total) by mouth every morning.    OMEPRAZOLE (PRILOSEC) 40 MG CAPSULE    Take 1 capsule (40 mg total) by mouth every morning.    ONDANSETRON (ZOFRAN-ODT) 4 MG TBDL    Take 1 tablet (4 mg total) by mouth every 6 (six) hours as needed (nausea).    TRIAMTERENE-HYDROCHLOROTHIAZIDE 37.5-25 MG (MAXZIDE-25) 37.5-25 MG PER TABLET        VALSARTAN (DIOVAN) 320 MG TABLET    Take 1 tablet (320 mg total) by mouth once daily. Stop losartan   Modified Medications    No medications on file   Discontinued Medications    No medications on file

## 2022-07-25 NOTE — PATIENT INSTRUCTIONS
- Do not take sucralfate within 60 minutes of any other medication as it could potentially block the absorption of other medication.

## 2022-07-30 NOTE — TELEPHONE ENCOUNTER
Refill Routing Note   Medication(s) are not appropriate for processing by [facility] for the following reason(s):      - Drug-Disease Interaction (Osteoporosis)    [facility] action(s):  Defer Medication-related problems identified: Drug-disease interaction        Medication reconciliation completed: No     Appointments  past 12m or future 3m with PCP    Date Provider   Last Visit   4/22/2022 Cipriano Carrera MD   Next Visit   Visit date not found Cipriano Carrera MD   ED visits in past 90 days: 0        Note composed:1:52 PM 07/30/2022

## 2022-07-30 NOTE — TELEPHONE ENCOUNTER
No new care gaps identified.  [facility] Embedded Care Gaps. Reference number: [id number]. 7/30/2022   2:16:17 AM CDT

## 2022-07-31 RX ORDER — OMEPRAZOLE 40 MG/1
CAPSULE, DELAYED RELEASE ORAL
Qty: 90 CAPSULE | Refills: 3 | Status: SHIPPED | OUTPATIENT
Start: 2022-07-31 | End: 2022-10-21 | Stop reason: SDUPTHER

## 2022-09-14 DIAGNOSIS — M79.7 FIBROMYALGIA: ICD-10-CM

## 2022-09-14 DIAGNOSIS — R60.0 PERIPHERAL EDEMA: ICD-10-CM

## 2022-09-14 DIAGNOSIS — F33.0 MILD RECURRENT MAJOR DEPRESSION: Chronic | ICD-10-CM

## 2022-09-14 DIAGNOSIS — K59.09 CHRONIC CONSTIPATION: ICD-10-CM

## 2022-09-14 RX ORDER — DULOXETIN HYDROCHLORIDE 60 MG/1
60 CAPSULE, DELAYED RELEASE ORAL DAILY
Qty: 90 CAPSULE | Refills: 1 | Status: SHIPPED | OUTPATIENT
Start: 2022-09-14 | End: 2023-02-02

## 2022-09-14 RX ORDER — FUROSEMIDE 20 MG/1
TABLET ORAL
Qty: 90 TABLET | Refills: 1 | Status: SHIPPED | OUTPATIENT
Start: 2022-09-14 | End: 2023-02-02

## 2022-09-14 NOTE — TELEPHONE ENCOUNTER
No new care gaps identified.  [facility] Embedded Care Gaps. Reference number: [id number]. 9/14/2022   3:12:43 PM CDT

## 2022-10-04 ENCOUNTER — HOSPITAL ENCOUNTER (EMERGENCY)
Facility: HOSPITAL | Age: 67
Discharge: HOME | End: 2022-10-05
Attending: EMERGENCY MEDICINE
Payer: MEDICARE

## 2022-10-04 VITALS
HEIGHT: 64 IN | RESPIRATION RATE: 16 BRPM | SYSTOLIC BLOOD PRESSURE: 150 MMHG | HEART RATE: 74 BPM | TEMPERATURE: 98 F | OXYGEN SATURATION: 98 % | WEIGHT: 205 LBS | BODY MASS INDEX: 35 KG/M2 | DIASTOLIC BLOOD PRESSURE: 73 MMHG

## 2022-10-04 DIAGNOSIS — R68.89 MULTIPLE COMPLAINTS: ICD-10-CM

## 2022-10-04 DIAGNOSIS — R06.02 SOB (SHORTNESS OF BREATH): ICD-10-CM

## 2022-10-04 DIAGNOSIS — G44.86 CERVICOGENIC HEADACHE: Primary | ICD-10-CM

## 2022-10-04 LAB
ALBUMIN SERPL BCP-MCNC: 3.6 G/DL (ref 3.5–5.2)
ALP SERPL-CCNC: 105 U/L (ref 55–135)
ALT SERPL W/O P-5'-P-CCNC: 14 U/L (ref 10–44)
ANION GAP SERPL CALC-SCNC: 12 MMOL/L (ref 8–16)
AST SERPL-CCNC: 19 U/L (ref 10–40)
BASOPHILS # BLD AUTO: 0.07 K/UL (ref 0–0.2)
BASOPHILS NFR BLD: 0.6 % (ref 0–1.9)
BILIRUB SERPL-MCNC: 0.3 MG/DL (ref 0.1–1)
BNP SERPL-MCNC: <10 PG/ML (ref 0–99)
BUN SERPL-MCNC: 7 MG/DL (ref 8–23)
CALCIUM SERPL-MCNC: 9.4 MG/DL (ref 8.7–10.5)
CHLORIDE SERPL-SCNC: 101 MMOL/L (ref 95–110)
CO2 SERPL-SCNC: 23 MMOL/L (ref 23–29)
CREAT SERPL-MCNC: 0.7 MG/DL (ref 0.5–1.4)
DIFFERENTIAL METHOD: ABNORMAL
EOSINOPHIL # BLD AUTO: 0.4 K/UL (ref 0–0.5)
EOSINOPHIL NFR BLD: 3.5 % (ref 0–8)
ERYTHROCYTE [DISTWIDTH] IN BLOOD BY AUTOMATED COUNT: 15.9 % (ref 11.5–14.5)
EST. GFR  (NO RACE VARIABLE): >60 ML/MIN/1.73 M^2
GLUCOSE SERPL-MCNC: 100 MG/DL (ref 70–110)
HCT VFR BLD AUTO: 41.3 % (ref 37–48.5)
HCV AB SERPL QL IA: NORMAL
HGB BLD-MCNC: 13.4 G/DL (ref 12–16)
IMM GRANULOCYTES # BLD AUTO: 0.02 K/UL (ref 0–0.04)
IMM GRANULOCYTES NFR BLD AUTO: 0.2 % (ref 0–0.5)
INFLUENZA A, MOLECULAR: NEGATIVE
INFLUENZA B, MOLECULAR: NEGATIVE
LYMPHOCYTES # BLD AUTO: 3.8 K/UL (ref 1–4.8)
LYMPHOCYTES NFR BLD: 31.9 % (ref 18–48)
MCH RBC QN AUTO: 27.9 PG (ref 27–31)
MCHC RBC AUTO-ENTMCNC: 32.4 G/DL (ref 32–36)
MCV RBC AUTO: 86 FL (ref 82–98)
MONOCYTES # BLD AUTO: 0.8 K/UL (ref 0.3–1)
MONOCYTES NFR BLD: 6.7 % (ref 4–15)
NEUTROPHILS # BLD AUTO: 6.7 K/UL (ref 1.8–7.7)
NEUTROPHILS NFR BLD: 57.1 % (ref 38–73)
NRBC BLD-RTO: 0 /100 WBC
PLATELET # BLD AUTO: 324 K/UL (ref 150–450)
PMV BLD AUTO: 11.2 FL (ref 9.2–12.9)
POTASSIUM SERPL-SCNC: 3.1 MMOL/L (ref 3.5–5.1)
PROT SERPL-MCNC: 8.3 G/DL (ref 6–8.4)
RBC # BLD AUTO: 4.81 M/UL (ref 4–5.4)
SARS-COV-2 RDRP RESP QL NAA+PROBE: NEGATIVE
SODIUM SERPL-SCNC: 136 MMOL/L (ref 136–145)
SPECIMEN SOURCE: NORMAL
TROPONIN I SERPL DL<=0.01 NG/ML-MCNC: <0.006 NG/ML (ref 0–0.03)
WBC # BLD AUTO: 11.76 K/UL (ref 3.9–12.7)

## 2022-10-04 PROCEDURE — 99285 EMERGENCY DEPT VISIT HI MDM: CPT | Mod: 25

## 2022-10-04 PROCEDURE — 99285 PR EMERGENCY DEPT VISIT,LEVEL V: ICD-10-PCS | Mod: CS,,, | Performed by: PHYSICIAN ASSISTANT

## 2022-10-04 PROCEDURE — 99285 EMERGENCY DEPT VISIT HI MDM: CPT | Mod: CS,,, | Performed by: PHYSICIAN ASSISTANT

## 2022-10-04 PROCEDURE — 83880 ASSAY OF NATRIURETIC PEPTIDE: CPT | Performed by: EMERGENCY MEDICINE

## 2022-10-04 PROCEDURE — 85025 COMPLETE CBC W/AUTO DIFF WBC: CPT | Performed by: EMERGENCY MEDICINE

## 2022-10-04 PROCEDURE — 87389 HIV-1 AG W/HIV-1&-2 AB AG IA: CPT | Performed by: PHYSICIAN ASSISTANT

## 2022-10-04 PROCEDURE — 93010 EKG 12-LEAD: ICD-10-PCS | Mod: ,,, | Performed by: INTERNAL MEDICINE

## 2022-10-04 PROCEDURE — 80053 COMPREHEN METABOLIC PANEL: CPT | Performed by: EMERGENCY MEDICINE

## 2022-10-04 PROCEDURE — 84484 ASSAY OF TROPONIN QUANT: CPT | Performed by: EMERGENCY MEDICINE

## 2022-10-04 PROCEDURE — 86803 HEPATITIS C AB TEST: CPT | Performed by: PHYSICIAN ASSISTANT

## 2022-10-04 PROCEDURE — 96361 HYDRATE IV INFUSION ADD-ON: CPT

## 2022-10-04 PROCEDURE — 87502 INFLUENZA DNA AMP PROBE: CPT | Performed by: PHYSICIAN ASSISTANT

## 2022-10-04 PROCEDURE — 93005 ELECTROCARDIOGRAM TRACING: CPT

## 2022-10-04 PROCEDURE — 96374 THER/PROPH/DIAG INJ IV PUSH: CPT

## 2022-10-04 PROCEDURE — U0002 COVID-19 LAB TEST NON-CDC: HCPCS | Performed by: PHYSICIAN ASSISTANT

## 2022-10-04 PROCEDURE — 63600175 PHARM REV CODE 636 W HCPCS: Performed by: PHYSICIAN ASSISTANT

## 2022-10-04 PROCEDURE — 93010 ELECTROCARDIOGRAM REPORT: CPT | Mod: ,,, | Performed by: INTERNAL MEDICINE

## 2022-10-04 PROCEDURE — 25000003 PHARM REV CODE 250: Performed by: PHYSICIAN ASSISTANT

## 2022-10-04 RX ORDER — DIPHENHYDRAMINE HCL 50 MG
50 CAPSULE ORAL
Status: COMPLETED | OUTPATIENT
Start: 2022-10-04 | End: 2022-10-04

## 2022-10-04 RX ORDER — ACETAMINOPHEN 325 MG/1
650 TABLET ORAL
Status: COMPLETED | OUTPATIENT
Start: 2022-10-04 | End: 2022-10-04

## 2022-10-04 RX ORDER — BUTALBITAL, ACETAMINOPHEN AND CAFFEINE 50; 325; 40 MG/1; MG/1; MG/1
1 TABLET ORAL EVERY 8 HOURS PRN
Qty: 8 TABLET | Refills: 0 | Status: SHIPPED | OUTPATIENT
Start: 2022-10-04 | End: 2022-10-21

## 2022-10-04 RX ORDER — PROCHLORPERAZINE EDISYLATE 5 MG/ML
10 INJECTION INTRAMUSCULAR; INTRAVENOUS
Status: COMPLETED | OUTPATIENT
Start: 2022-10-04 | End: 2022-10-04

## 2022-10-04 RX ADMIN — PROCHLORPERAZINE EDISYLATE 10 MG: 5 INJECTION INTRAMUSCULAR; INTRAVENOUS at 09:10

## 2022-10-04 RX ADMIN — SODIUM CHLORIDE 1000 ML: 0.9 INJECTION, SOLUTION INTRAVENOUS at 10:10

## 2022-10-04 RX ADMIN — DIPHENHYDRAMINE HYDROCHLORIDE 50 MG: 50 CAPSULE ORAL at 09:10

## 2022-10-04 RX ADMIN — ACETAMINOPHEN 650 MG: 325 TABLET ORAL at 09:10

## 2022-10-05 LAB — HIV 1+2 AB+HIV1 P24 AG SERPL QL IA: NORMAL

## 2022-10-05 NOTE — PROVIDER PROGRESS NOTES - EMERGENCY DEPT.
Encounter Date: 10/4/2022    ED Physician Progress Notes          ED Physician Hand-off Note:    ED Course: I assumed care of patient from off-going ED physician team. Briefly, Patient is a 68 yo F presenting with HA and nasal congestion. Endorses shortness of breath on exertion that began today.     At the time of signout plan was pending labs and swabs.    Medications given in the ED:    Medications   sodium chloride 0.9% bolus 1,000 mL (0 mLs Intravenous Stopped 10/5/22 0017)   acetaminophen tablet 650 mg (650 mg Oral Given 10/4/22 2123)   prochlorperazine injection Soln 10 mg (10 mg Intravenous Given 10/4/22 2124)   diphenhydrAMINE capsule 50 mg (50 mg Oral Given 10/4/22 2123)       Imaging Results              X-Ray Chest PA And Lateral (Final result)  Result time 10/04/22 22:03:55      Final result by Kyler Chadwick MD (10/04/22 22:03:55)                   Impression:      No acute cardiopulmonary process.      Electronically signed by: Kyler Chadwick MD  Date:    10/04/2022  Time:    22:03               Narrative:    EXAMINATION:  XR CHEST PA AND LATERAL    CLINICAL HISTORY:  Shortness of breath    TECHNIQUE:  PA and lateral views of the chest were performed.    COMPARISON:  06/13/2017.    FINDINGS:  There is no consolidation, effusion, or pneumothorax.    Cardiomediastinal silhouette is unremarkable.    Regional osseous structures are unremarkable.                                      Disposition:  Discharged    No significant metabolic or hematologic derangement.  Troponin and BNP negative.  COVID influenza test negative.  Chest x-ray without acute abnormality.  Patient reports complete resolution of her symptoms.  Prescription for Fioricet written by previous provider.  Recommend close outpatient follow-up.  All questions answered.  The patient was instructed to follow up with a primary care provider or to return to the emergency department for worsening symptoms. The treatment plan was discussed with  the patient who demonstrated understanding and comfort with plan.     Impression:     Final diagnoses:  [R68.89] Multiple complaints  [R06.02] SOB (shortness of breath)  [G44.86] Cervicogenic headache (Primary)

## 2022-10-05 NOTE — ED TRIAGE NOTES
Bell Lynn, a 67 y.o. female presents to the ED w/ complaint of HTN. Pt reports HA for a week. Denies SOB and CP.     Triage note:  Chief Complaint   Patient presents with    Multiple complaints     Headache for week, feeling sob, sinus     Review of patient's allergies indicates:   Allergen Reactions    Nsaids (non-steroidal anti-inflammatory drug)      Gi bleed    Penicillins Itching and Swelling    Penicillin     Symbicort [budesonide-formoterol]      Recurrent oral ulcers     Past Medical History:   Diagnosis Date    Asthma     Depression     Gastric ulcer with hemorrhage 7/2012    GERD (gastroesophageal reflux disease)     History of blood transfusion     Hypertension     Iron deficiency anemia 3/14/2013    Osteoarthritis of both knees      Adult Physical Assessment  LOC: Bell Lynn, 67 y.o. female verified via two identifiers.  The patient is awake, alert, oriented and speaking appropriately at this time.  APPEARANCE: Patient resting comfortably and appears to be in no acute distress at this time. Patient is clean and well groomed, patient's clothing is properly fastened.  SKIN:The skin is warm and dry, color consistent with ethnicity, patient has normal skin turgor and moist mucus membranes, skin intact, no breakdown or brusing noted.  MUSCULOSKELETAL: Patient moving all extremities well, no obvious swelling or deformities noted. Patient c/o headache.  RESPIRATORY: Airway is open and patent, respirations are spontaneous, patient has a normal effort and rate, no accessory muscle use noted.  CARDIAC: Patient has a normal rate and rhythm, no periphreal edema noted in any extremity, capillary refill < 3 seconds in all extremities  ABDOMEN: Soft and non tender to palpation, no abdominal distention noted. Bowel sounds present in all four quadrants.  NEUROLOGIC: Eyes open spontaneously, behavior appropriate to situation, follows commands, facial expression symmetrical, bilateral hand grasp  equal and even, purposeful motor response noted, normal sensation in all extremities when touched with a finger.

## 2022-10-05 NOTE — ED PROVIDER NOTES
"Encounter Date: 10/4/2022       History     Chief Complaint   Patient presents with    Multiple complaints     Headache for week, feeling sob, sinus     8:28 PM  Patient is a 67-year-old female with a history of HTN, GERD, gastric ulcer, osteoarthritis, anemia, who [place] the [facility] [place] with neck pain, headache, congestion for 7 days and shortness of breath onset today.    Patient states that 7 days ago her pain came on "slow".  States that she was doing nothing.  States that she feels most pain to her posterior neck and frontal head.  Describes as pounding.  Has been constant for 7 days.  She has noted congestion for the past few days.  Denies any eye pain, blurred vision, diplopia, sore throat, cough.  She states sometimes she has light sensitivity, but none now.  No fever or chills.  She has had no difficulty ambulating or talking.  She took extra-strength Tylenol at noon today and BC Powder without relief.  In addition, she has had onset of shortness of breath today with exertion.  She has had inhalers in the past for this but did not use any today.  She does not feel short of breath right now.  She denies any chest pain or chest congestion.    Review of patient's allergies indicates:   Allergen Reactions    Nsaids (non-steroidal anti-inflammatory drug)      Gi bleed    Penicillins Itching and Swelling    Penicillin     Symbicort [budesonide-formoterol]      Recurrent oral ulcers     Past Medical History:   Diagnosis Date    Asthma     Depression     Gastric ulcer with hemorrhage 7/2012    GERD (gastroesophageal reflux disease)     History of blood transfusion     Hypertension     Iron deficiency anemia 3/14/2013    Osteoarthritis of both knees      Past Surgical History:   Procedure Laterality Date    BREAST BIOPSY      BREAST SURGERY      Benign breast cyst    COLONOSCOPY      ESOPHAGOGASTRODUODENOSCOPY N/A 10/7/2020    Procedure: EGD (ESOPHAGOGASTRODUODENOSCOPY);  Surgeon: Nell Parish MD;  Location: " [facility] END[facility];  Service: Endoscopy;  Laterality: N/A;    ESOPHAGOGASTRODUODENOSCOPY N/A 6/30/2021    Procedure: EGD (ESOPHAGOGASTRODUODENOSCOPY);  Surgeon: Nell Parish MD;  Location: [facility] END[facility];  Service: Endoscopy;  Laterality: N/A;    HYSTERECTOMY      Partial    KNEE ARTHROPLASTY Left 1/10/2022    Procedure: ARTHROPLASTY, KNEE;  Surgeon: Jonnathan Tavera MD;  Location: [facility] OR;  Service: Orthopedics;  Laterality: Left;    TONSILLECTOMY       Family History   Problem Relation Age of Onset    Heart disease Mother     Hypertension Mother     Heart disease Father     Hypertension Father     Breast cancer Sister     Breast cancer Sister      Social History     Tobacco Use    Smoking status: Never    Smokeless tobacco: Never   Substance Use Topics    Alcohol use: Yes     Comment: ocaasional    Drug use: No     Review of Systems   Constitutional:  Negative for chills, diaphoresis and fever.   HENT:  Positive for congestion. Negative for rhinorrhea, sinus pain and sore throat.    Eyes:  Negative for photophobia (none now).   Respiratory:  Negative for shortness of breath.    Cardiovascular:  Negative for chest pain.   Gastrointestinal:  Negative for abdominal pain, diarrhea, nausea and vomiting.   Genitourinary:  Negative for dysuria and hematuria.   Musculoskeletal:  Positive for neck pain. Negative for back pain.   Skin:  Negative for rash and wound.   Neurological:  Positive for headaches. Negative for weakness and numbness.   Hematological:  Does not bruise/bleed easily.     Physical Exam     Initial Vitals [10/04/22 1829]   BP Pulse Resp Temp SpO2   133/86 77 20 97.9 °F (36.6 °C) 98 %      MAP       --         Physical Exam    Vitals reviewed.  Constitutional: She appears well-developed and well-nourished. She is not diaphoretic. She is cooperative.  Non-toxic appearance. She does not have a sickly appearance. She does not appear ill. No distress. Face mask in place.   HENT:   Head: Normocephalic and atraumatic.    Right Ear: Hearing, tympanic membrane, external ear and ear canal normal.   Left Ear: Hearing, tympanic membrane, external ear and ear canal normal.   Nose: Nose normal.   Mouth/Throat: Oropharynx is clear and moist. No trismus in the jaw. No oropharyngeal exudate, posterior oropharyngeal edema, posterior oropharyngeal erythema or tonsillar abscesses.   Eyes: EOM are normal. Pupils are equal, round, and reactive to light. Right conjunctiva is not injected. Left conjunctiva is not injected. No scleral icterus.   Neck:       Normal range of motion.   Full passive range of motion without pain.     Pulmonary/Chest: No accessory muscle usage. No tachypnea. No respiratory distress.   Abdominal: She exhibits no distension.   Musculoskeletal:         General: Normal range of motion.      Cervical back: Full passive range of motion without pain and normal range of motion. No rigidity. Muscular tenderness present. No spinous process tenderness. Normal range of motion.     Neurological: She is alert. She has normal strength. Coordination and gait normal.   No facial asymmetry.  Clear speech.  Normal finger-to-nose and rapid alternating hand movements.  No difficulty bearing weight or ambulating.   Skin: Skin is warm and dry. No erythema. No pallor.       ED Course   Procedures  Labs Reviewed   COMPREHENSIVE METABOLIC PANEL - Abnormal; Notable for the following components:       Result Value    Potassium 3.1 (*)     BUN 7 (*)     All other components within normal limits   CBC W/ AUTO DIFFERENTIAL - Abnormal; Notable for the following components:    RDW 15.9 (*)     All other components within normal limits   INFLUENZA A & B BY MOLECULAR   SARS-COV-2 RNA AMPLIFICATION, QUAL   TROPONIN I   B-TYPE NATRIURETIC PEPTIDE   HIV 1 / 2 ANTIBODY   HEPATITIS C ANTIBODY          Imaging Results              X-Ray Chest PA And Lateral (Final result)  Result time 10/04/22 22:03:55      Final result by Kyler Chadwick MD (10/04/22  22:03:55)                   Impression:      No acute cardiopulmonary process.      Electronically signed by: Kyler Chadwick MD  Date:    10/04/2022  Time:    22:03               Narrative:    EXAMINATION:  XR CHEST PA AND LATERAL    CLINICAL HISTORY:  Shortness of breath    TECHNIQUE:  PA and lateral views of the chest were performed.    COMPARISON:  06/13/2017.    FINDINGS:  There is no consolidation, effusion, or pneumothorax.    Cardiomediastinal silhouette is unremarkable.    Regional osseous structures are unremarkable.                                       Medications   sodium chloride 0.9% bolus 1,000 mL (1,000 mLs Intravenous New Bag 10/4/22 2212)   acetaminophen tablet 650 mg (650 mg Oral Given 10/4/22 2123)   prochlorperazine injection Soln 10 mg (10 mg Intravenous Given 10/4/22 2124)   diphenhydrAMINE capsule 50 mg (50 mg Oral Given 10/4/22 2123)     Medical Decision Making:   History:   Old Medical Records: I decided to obtain old medical records.  Initial Assessment:   Patient is a 67-year-old female with a history of HTN, GERD, gastric ulcer, osteoarthritis, anemia, who presents the [facility] [place] ED with neck pain, headache, congestion for 7 days and shortness of breath onset today.  Differential Diagnosis:   Includes but is not limited to cervicogenic headache, tension headache, migraine headache, cluster headache, DDD, DJD, covid, influeza, other viral syndrome, less likely CHF given no signs of hypervolemia or ACS. Vitals stable. Doesn't sound like PE. No nuchal rigidity. Afebile. None toxic appearing. Doubt meningitis.   Independently Interpreted Test(s):   I have ordered and independently interpreted EKG Reading(s) - see summary below  Clinical Tests:   Lab Tests: Reviewed and Ordered  Radiological Study: Ordered and Reviewed  Medical Tests: Reviewed and Ordered           ED Course as of 10/04/22 2341   Tue Oct 04, 2022   2024 BP: 133/86 [CL]   2024 Temp: 97.9 °F (36.6 °C) [CL]   2024 Pulse: 77 [CL]    2024 Resp: 20 [CL]   2024 SpO2: 98 % [CL]   2038 EKG on my independent interpretation shows NSR at 73 beats per minute.  No ischemic changes.  Normal intervals.  No STEMI. [CL]   2225 WBC: 11.76 [JM]   2225 Hemoglobin: 13.4 [JM]   2225 Hematocrit: 41.3 [JM]   2225 Platelets: 324 [JM]      ED Course User Index  [CL] Lanie Hinson PA-C  [JM] Bebe Villanueva PA-C          10:00 PM  1st set of labs hemolyzed. Will need to send new set. Patient made aware. She is still willing to stay. Her pain decrease from 9 to 6/10. Will sign patient out to remaining ED team pending labs, CXR, and clinical improvement. Refer to their progress note.          Clinical Impression:   Final diagnoses:  [R68.89] Multiple complaints  [R06.02] SOB (shortness of breath)  [G44.86] Cervicogenic headache (Primary)         Future Appointments   Date Time Provider Department Center   10/21/2022  3:00 PM Cipriano Carrera MD [facility]   11/9/2022  9:20 AM Renata Hernandez, OD [facility]             Lanie Hinson PA-C  10/04/22 2224

## 2022-10-05 NOTE — DISCHARGE INSTRUCTIONS
Your labs do not show any acute process this.  Chest x-ray is clear.  You do not have COVID or influenza.  You can take acetaminophen/tylenol 650 mg every 6 hours or 1000 mg every 8 hours for added relief.  If Tylenol does not help, he can take Fioricet every 8 hours.  Please be aware that Fioricet has acetaminophen and you should only take 3000 mg of acetaminophen in a 24 hour period.  Apply ice to the area for 10-20 minutes every 4 hours. You can apply heat 2 days after for the same duration and frequency.  Follow up with PCP if your symptoms do not improve.   Return to the ER for new or worsening symptoms.  Future Appointments   Date Time Provider Department Center   10/21/2022  3:00 PM Cipriano Carrera MD [facility]   11/9/2022  9:20 AM Renata Hernandez OD [facility]avon Solitario

## 2022-10-20 NOTE — PROGRESS NOTES
This note was created by combination of typed  and M-Modal dictation.  Transcription errors may be present.  If there are any questions, please contact me.    Assessment and Plan:   Right flank pain  -I suspect this is more musculoskeletal as it has been present for couple of weeks and seems have gotten better spontaneously with increased hydration.  Denies dysuria.  Check urinalysis though.  If normal, no further testing.  -     Urinalysis, Reflex to Urine Culture Urine, Clean Catch; Future; Expected date: 10/21/2022    Essential hypertension 1/2021 TTE normal, stress test negative  Peripheral edema  Diuretic-induced hypokalemia  -home BP readings are always high but BP readings in the ER and in the office today are good.  She thinks that the home cuff is over reading.  But we have never verified its accuracy.  Home cuff overread vs masked hypertension.  Potassium low in the spring and low at the ED. On Lasix.  Takes it maybe every other day by her estimation.  Off of Dyazide.  But start potassium replacement therapy.  Potassium 20 twice daily.  Will check future labs and magnesium  -     Lipid Panel; Future; Expected date: 11/04/2022  -     Comprehensive Metabolic Panel; Future; Expected date: 11/04/2022  -     potassium chloride SA (K-DUR,KLOR-CON) 20 MEQ tablet; Take 1 tablet (20 mEq total) by mouth 2 (two) times daily.  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  -stable, Cymbalta and gabapentin    Age-related osteoporosis without current pathological fracture; 6/2022 alendronate  -tolerating Fosamax without issues.  Check future vitamin-D.  Not taking vitamin-D or calcium supplement  -     Vitamin D; Future; Expected date: 11/04/2022    Needs flu shot  -     Influenza (FLUAD) - Quadrivalent (Adjuvanted) *Preferred* (65+) (PF)    Need for vaccination for Strep pneumoniae  -     (In Office Administered) Pneumococcal Conjugate Vaccine (20 Valent) (IM)        Medications Discontinued During This Encounter   Medication Reason    omeprazole (PRILOSEC) 40 MG capsule Duplicate Order    omeprazole (PRILOSEC) 40 MG capsule Duplicate Order    butalbital-acetaminophen-caffeine -40 mg (FIORICET, ESGIC) -40 mg per tablet     ondansetron (ZOFRAN-ODT) 4 MG TbDL        meds sent this encounter:  Medications Ordered This Encounter   Medications    potassium chloride SA (K-DUR,KLOR-CON) 20 MEQ tablet     Sig: Take 1 tablet (20 mEq total) by mouth 2 (two) times daily.     Dispense:  180 tablet     Refill:  1       Follow Up: No follow-ups on file.  Labs in 2 weeks on new start potassium.  Otherwise follow-up 6 months.    Subjective:     Chief Complaint   Patient presents with    Follow-up       HPI  Bell is a 67 y.o. female, last appointment with this clinic was Visit date not found.  Social History     Tobacco Use    Smoking status: Never    Smokeless tobacco: Never   Substance Use Topics    Alcohol use: Yes     Comment: ocaasional      Social History     Occupational History     Employer: [employer]      Social History     Social History Narrative    Not on file       No LMP recorded. Patient has had a hysterectomy.    Last visit with me in April for a physical  hypertension, peripheral edema.  Hypokalemia.  Eat potassium rich foods.  Takes Dyazide daily and Lasix 3 times a week for knee swelling. changed Dyazide to amlodipine.  01/2021 echo LVEF  60%.  Normal diastolic function.  Stress test negative  Iron deficiency anemia due for follow-up with GI.  History of NSAID induced peptic ulcer disease.   Most recent EGD 06/2021 showing ulcer, biopsy negative.  Intolerant of ferrous sulfate more than a few times a week.  On PPI.  Previous evaluation by Hematology no IV iron indicated.    Saw GI in follow-up late July.  Abdominal pain.  Had missed her daily PPI for several days.  Short-term sucralfate.  Restart PPI daily.  fibromyalgia, depression, stable on Cymbalta  history of transsphenoidal pituitary resection stable  06/21/2022 DEXA L-spine -1.9, total hip-2.8, femoral neck-3.5.  FRAX score 2.7/8.3%.  Osteoporosis.  Results to patient via [employer].  Start weekly alendronate.  Supplement calcium vitamin-D over-the-counter.  Repeat 2 years.    ED 10/4 with headache, neck pain, congestion and dyspnea.  Flu and COVID negative.  Labs hypokalemia.  Chest x-ray normal.    10/04/2022 CBC normal  CMP potassium 3.1 creatinine 0.7    04/19/2022 ferritin 19    10/21 lipid profile good    Start potassium   check lipid profile    2 weeks of R lower back pain.  After this started up she started drinking more water.  Better compared to inception.  No pain or burning with urination. No fevers.  BMs are normal. With linzess.  Hx of LBP.  That is usually more midline, same level as the current pain  Nonradiating.      Home BP cuff reads high.    BP good today on intake.  Was good in the ER.    She notes that in the past when she gets high blood pressure her head hurts.  And she has not been getting headaches.  Discussed that it could either be that her home cuff is undersized or she has masked hypertension.    Recent Readings 10/19/2022 10/14/2022 10/14/2022 10/12/2022   SBP (mmHg) 162 159 162 160   DBP (mmHg) 81 95 110 75   Pulse 76 77 82 74     Recent Readings 10/12/2022 10/8/2022 10/6/2022 10/4/2022 10/4/2022   SBP (mmHg) 157 150 159 181 164   DBP (mmHg) 94 84 90 108 101    Pulse 82 77 79 85 77     Recent Readings 10/4/2022 10/4/2022 10/2/2022 10/1/2022   SBP (mmHg) 164 86 136 160   DBP (mmHg) 98 76 91 96   Pulse 76 72 75 74       High stress - nephew very ill from sickle cell crisis.    Answers submitted by the patient for this visit:  High Blood Pressure Questionnaire (Submitted on 10/19/2022)  Chief Complaint: Hypertension  Chronicity: recurrent  Onset: more than 1 year ago  Progression since onset: waxing and waning  Condition status: resistant  anxiety: Yes  blurred vision: No  chest pain: No  headaches: Yes  malaise/fatigue: Yes  neck pain: Yes  orthopnea: No  peripheral edema: No  PND: No  shortness of breath: Yes  sweats: Yes  Agents associated with hypertension: NSAIDs  CAD risks: family history  Compliance problems: diet, exercise  Past treatments: diuretics  Improvement on treatment: mild      Patient Care Team:  Cipriano Carrera MD as PCP - General (Internal Medicine)  Gonsalo Ivy MD as Consulting Physician (Gastroenterology)  Yari Diamond MA as Care Coordinator  Alma Delia Morales as Digital Medicine Health   Yvonne Mota PharmD as Hypertension Digital Medicine Clinician  Cipriano Carrera MD as Hypertension Digital Medicine Responsible Provider (Internal Medicine)  [employer] as Hypertension Digital Medicine Contract    Patient Active Problem List    Diagnosis Date Noted    Age-related osteoporosis without current pathological fracture; 6/2022 alendronate 06/22/2022 06/21/2022 DEXA L-spine -1.9, total hip-2.8, femoral neck-3.5.  FRAX score 2.7/8.3%.  Osteoporosis.      Gait abnormality 02/03/2022    Decreased strength of lower extremity 02/03/2022    Decreased range of motion of left knee 02/03/2022    History of left knee replacement 01/10/2022    Palpitations 01/14/2021    GERD (gastroesophageal reflux disease) 09/08/2020    Peripheral edema 08/28/2020    Chronic constipation 08/28/2020    History of pituitary adenoma 9/2019 MRI no  recurrence 09/24/2019 9/23/2019 MRI brain: 1. Fluid level in the sphenoid sinus, likely from sphenoid sinusitis.  2. No significant abnormalities of the sella turcica to suggest any recurrent neoplasms.  3. Periventricular white matter changes likely from chronic microvascular ischemic disease.  4. No acute intracranial processes.  9/23/2019 carotid US normal      Status post transsphenoidal pituitary resection for tumor, Dr Cardenas, about 1989 09/09/2019    History of benign carcinoid tumor rectum s/p resection per GI note 01/30/2019    Colon polyp 8/2018 no path report included repeat 5 years 10/07/2018    Fibromyalgia; diffuse arm, back pain, thigh pain; 2017 B12, TSH WNL; reynaldo without efficacy; Cymbalta. 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 - Small hiatal hernia. - Non-bleeding gastric ulcers with no stigmata of bleeding. Biopsied. This has been present for more than 2 years according to previous EGD report 7/2018. - Normal examined duodenum. bx negative; neg for H pylori. Felt to be NSAID induced  06/30/2021 EGD normal esophagus.  Gastric ulcer with no stigmata of bleeding.  Improved from previous but not gone.  bx reactive/chemical gastropathy; H pylori neg.  Normal duodenum.      Menopausal hot flushes 02/14/2013    Mild recurrent major depression 02/14/2013    Primary osteoarthritis of left knee 07/30/2012 8/24/2017 MRI L knee: Tricompartmental degenerative changes. Lateral and medial meniscal degenerative tearing/changes. Tricompartmental cartilage loss including areas of full-thickness full-thickness loss. Abnormal appearance of the ACL  and PCL suggestive of chronic injury.      Essential hypertension 1/2021 TTE normal, stress test negative 07/30/2012 6/2017 nuclear stress test negative. No change 7/2015 6/2017 TTE normal LVEF 55-60    01/26/2021 nuclear medicine stress test normal perfusion scan no evidence of ischemia.  01/26/2021 TTE LV normal size with concentric LVH and normal systolic function LVEF 60%.  Normal diastolic function.  Normal RV size and systolic function.  CVP 3.  PA pressure 5.         PAST MEDICAL PROBLEMS, PAST SURGICAL HISTORY: please see relevant portions of the electronic medical record    ALLERGIES AND MEDICATIONS: updated and reviewed.  Review of patient's allergies indicates:   Allergen Reactions    Nsaids (non-steroidal anti-inflammatory drug)      Gi bleed    Penicillins Itching and Swelling    Penicillin     Symbicort [budesonide-formoterol]      Recurrent oral ulcers       Medication List with Changes/Refills   Current Medications    ALBUTEROL (PROVENTIL/VENTOLIN HFA) 90 MCG/ACTUATION INHALER    INHALE 2 PUFFS EVERY 4 HOURS AS NEEDED FOR WHEEZING    ALENDRONATE (FOSAMAX) 70 MG TABLET    Take 1 tablet (70 mg total) by mouth every 7 days.    AMLODIPINE (NORVASC) 10 MG TABLET    Take 1 tablet (10 mg total) by mouth once daily.    BUTALBITAL-ACETAMINOPHEN-CAFFEINE -40 MG (FIORICET, ESGIC) -40 MG PER TABLET    Take 1 tablet by mouth every 8 (eight) hours as needed for Headaches.    CETIRIZINE (ZYRTEC) 10 MG TABLET    TAKE 1 TABLET EVERY DAY    CYCLOBENZAPRINE (FLEXERIL) 10 MG TABLET    TAKE 1 TABLET EVERY EVENING    DULOXETINE (CYMBALTA) 60 MG CAPSULE    Take 1 capsule (60 mg total) by mouth once daily. Increased dose    FUROSEMIDE (LASIX) 20 MG TABLET    TAKE 1 TABLET BY MOUTH ONCE DAILY AS NEEDED FOR  LEG  SWELLING    GABAPENTIN (NEURONTIN) 100 MG CAPSULE    3 tablets nighttime 1 tablet in AM; increased dose    LATANOPROST 0.005 % OPHTHALMIC SOLUTION    Place 1 drop into the right eye every evening.     "LIDOCAINE (LIDODERM) 5 %    PLACE 1 PATCH ONTO THE SKIN ONCE DAILY. REMOVE AND DISCARD PATCH WITHIN 12 HOURS OR AS DIRECTED BY MD    LINACLOTIDE (LINZESS) 145 MCG CAP CAPSULE    Take 1 capsule (145 mcg total) by mouth once daily.    METOPROLOL SUCCINATE (TOPROL-XL) 100 MG 24 HR TABLET    Take 1 tablet (100 mg total) by mouth once daily.    NITROGLYCERIN (NITROSTAT) 0.4 MG SL TABLET    Place 1 tablet (0.4 mg total) under the tongue every 5 (five) minutes as needed for Chest pain.    OMEPRAZOLE (PRILOSEC) 40 MG CAPSULE    Take 1 capsule (40 mg total) by mouth every morning.    OMEPRAZOLE (PRILOSEC) 40 MG CAPSULE    Take 1 capsule (40 mg total) by mouth every morning.    OMEPRAZOLE (PRILOSEC) 40 MG CAPSULE    TAKE 1 CAPSULE EVERY MORNING    ONDANSETRON (ZOFRAN-ODT) 4 MG TBDL    Take 1 tablet (4 mg total) by mouth every 6 (six) hours as needed (nausea).    VALSARTAN (DIOVAN) 320 MG TABLET    Take 1 tablet (320 mg total) by mouth once daily. Stop losartan      Review of Systems   Constitutional:  Positive for malaise/fatigue.   Eyes:  Negative for blurred vision.   Respiratory:  Positive for shortness of breath.    Cardiovascular:  Negative for chest pain, orthopnea and PND.   Musculoskeletal:  Positive for neck pain.   Neurological:  Positive for headaches.     Objective:   Physical Exam   Vitals:    10/21/22 1454   BP: 132/80   BP Location: Right arm   Patient Position: Sitting   BP Method: Large (Manual)   Pulse: 82   Temp: 98.6 °F (37 °C)   TempSrc: Oral   SpO2: 96%   Weight: 91.7 kg (202 lb 4.4 oz)   Height: 5' 4" (1.626 m)    Body mass index is 34.72 kg/m².  Weight: 91.7 kg (202 lb 4.4 oz)   Height: 5' 4" (162.6 cm)     Physical Exam  Constitutional:       General: She is not in acute distress.     Appearance: She is well-developed.   Eyes:      Extraocular Movements: Extraocular movements intact.   Cardiovascular:      Rate and Rhythm: Normal rate and regular rhythm.      Heart sounds: Normal heart sounds. No " murmur heard.  Pulmonary:      Effort: Pulmonary effort is normal.      Breath sounds: Normal breath sounds.   Musculoskeletal:         General: Normal range of motion.        Back:       Comments: Tender on palpation of the right paraspinal lumbar muscles, inferior to the classic flank area   Skin:     General: Skin is warm and dry.   Neurological:      Mental Status: She is alert and oriented to person, place, and time.      Coordination: Coordination normal.   Psychiatric:         Behavior: Behavior normal.         Thought Content: Thought content normal.

## 2022-10-21 ENCOUNTER — LAB VISIT (OUTPATIENT)
Dept: LAB | Facility: HOSPITAL | Age: 67
End: 2022-10-21
Attending: INTERNAL MEDICINE
Payer: MEDICARE

## 2022-10-21 ENCOUNTER — OFFICE VISIT (OUTPATIENT)
Dept: FAMILY MEDICINE | Facility: CLINIC | Age: 67
End: 2022-10-21
Payer: MEDICARE

## 2022-10-21 VITALS
TEMPERATURE: 99 F | OXYGEN SATURATION: 96 % | SYSTOLIC BLOOD PRESSURE: 132 MMHG | HEART RATE: 82 BPM | HEIGHT: 64 IN | DIASTOLIC BLOOD PRESSURE: 80 MMHG | BODY MASS INDEX: 34.53 KG/M2 | WEIGHT: 202.25 LBS

## 2022-10-21 DIAGNOSIS — D50.9 IRON DEFICIENCY ANEMIA, UNSPECIFIED IRON DEFICIENCY ANEMIA TYPE: ICD-10-CM

## 2022-10-21 DIAGNOSIS — R60.0 PERIPHERAL EDEMA: ICD-10-CM

## 2022-10-21 DIAGNOSIS — M81.0 AGE-RELATED OSTEOPOROSIS WITHOUT CURRENT PATHOLOGICAL FRACTURE: ICD-10-CM

## 2022-10-21 DIAGNOSIS — M79.7 FIBROMYALGIA: ICD-10-CM

## 2022-10-21 DIAGNOSIS — I10 ESSENTIAL HYPERTENSION: Chronic | ICD-10-CM

## 2022-10-21 DIAGNOSIS — K25.9 NSAID-INDUCED GASTRIC ULCER: ICD-10-CM

## 2022-10-21 DIAGNOSIS — Z23 NEED FOR VACCINATION FOR STREP PNEUMONIAE: ICD-10-CM

## 2022-10-21 DIAGNOSIS — E87.6 DIURETIC-INDUCED HYPOKALEMIA: ICD-10-CM

## 2022-10-21 DIAGNOSIS — T39.395A NSAID-INDUCED GASTRIC ULCER: ICD-10-CM

## 2022-10-21 DIAGNOSIS — T50.2X5A DIURETIC-INDUCED HYPOKALEMIA: ICD-10-CM

## 2022-10-21 DIAGNOSIS — K21.9 GASTROESOPHAGEAL REFLUX DISEASE, UNSPECIFIED WHETHER ESOPHAGITIS PRESENT: ICD-10-CM

## 2022-10-21 DIAGNOSIS — Z86.018 HISTORY OF PITUITARY ADENOMA: ICD-10-CM

## 2022-10-21 DIAGNOSIS — R10.9 RIGHT FLANK PAIN: Primary | ICD-10-CM

## 2022-10-21 DIAGNOSIS — Z23 NEEDS FLU SHOT: ICD-10-CM

## 2022-10-21 DIAGNOSIS — E89.3 STATUS POST TRANSSPHENOIDAL PITUITARY RESECTION: ICD-10-CM

## 2022-10-21 LAB
25(OH)D3+25(OH)D2 SERPL-MCNC: 6 NG/ML (ref 30–96)
ALBUMIN SERPL BCP-MCNC: 3.7 G/DL (ref 3.5–5.2)
ALP SERPL-CCNC: 105 U/L (ref 55–135)
ALT SERPL W/O P-5'-P-CCNC: 24 U/L (ref 10–44)
ANION GAP SERPL CALC-SCNC: 12 MMOL/L (ref 8–16)
AST SERPL-CCNC: 25 U/L (ref 10–40)
BILIRUB SERPL-MCNC: 0.5 MG/DL (ref 0.1–1)
BILIRUB UR QL STRIP: NEGATIVE
BUN SERPL-MCNC: 6 MG/DL (ref 8–23)
CALCIUM SERPL-MCNC: 9.8 MG/DL (ref 8.7–10.5)
CHLORIDE SERPL-SCNC: 103 MMOL/L (ref 95–110)
CHOLEST SERPL-MCNC: 126 MG/DL (ref 120–199)
CHOLEST/HDLC SERPL: 3.1 {RATIO} (ref 2–5)
CLARITY UR REFRACT.AUTO: CLEAR
CO2 SERPL-SCNC: 23 MMOL/L (ref 23–29)
COLOR UR AUTO: YELLOW
CREAT SERPL-MCNC: 0.7 MG/DL (ref 0.5–1.4)
EST. GFR  (NO RACE VARIABLE): >60 ML/MIN/1.73 M^2
GLUCOSE SERPL-MCNC: 95 MG/DL (ref 70–110)
GLUCOSE UR QL STRIP: NEGATIVE
HDLC SERPL-MCNC: 41 MG/DL (ref 40–75)
HDLC SERPL: 32.5 % (ref 20–50)
HGB UR QL STRIP: ABNORMAL
KETONES UR QL STRIP: NEGATIVE
LDLC SERPL CALC-MCNC: 60.6 MG/DL (ref 63–159)
LEUKOCYTE ESTERASE UR QL STRIP: NEGATIVE
MAGNESIUM SERPL-MCNC: 2.1 MG/DL (ref 1.6–2.6)
MICROSCOPIC COMMENT: NORMAL
NITRITE UR QL STRIP: NEGATIVE
NONHDLC SERPL-MCNC: 85 MG/DL
PH UR STRIP: 5 [PH] (ref 5–8)
POTASSIUM SERPL-SCNC: 3.3 MMOL/L (ref 3.5–5.1)
PROT SERPL-MCNC: 8.6 G/DL (ref 6–8.4)
PROT UR QL STRIP: NEGATIVE
RBC #/AREA URNS AUTO: 3 /HPF (ref 0–4)
SODIUM SERPL-SCNC: 138 MMOL/L (ref 136–145)
SP GR UR STRIP: 1.02 (ref 1–1.03)
SQUAMOUS #/AREA URNS AUTO: 0 /HPF
TRIGL SERPL-MCNC: 122 MG/DL (ref 30–150)
URN SPEC COLLECT METH UR: ABNORMAL
WBC #/AREA URNS AUTO: 3 /HPF (ref 0–5)

## 2022-10-21 PROCEDURE — 99999 PR PBB SHADOW E&M-EST. PATIENT-LVL IV: CPT | Mod: PBBFAC,,, | Performed by: INTERNAL MEDICINE

## 2022-10-21 PROCEDURE — G0009 PNEUMOCOCCAL CONJUGATE VACCINE 20-VALENT: ICD-10-PCS | Mod: S$GLB,,, | Performed by: INTERNAL MEDICINE

## 2022-10-21 PROCEDURE — 36415 COLL VENOUS BLD VENIPUNCTURE: CPT | Mod: PO | Performed by: INTERNAL MEDICINE

## 2022-10-21 PROCEDURE — 80061 LIPID PANEL: CPT | Performed by: INTERNAL MEDICINE

## 2022-10-21 PROCEDURE — 4010F ACE/ARB THERAPY RXD/TAKEN: CPT | Mod: CPTII,S$GLB,, | Performed by: INTERNAL MEDICINE

## 2022-10-21 PROCEDURE — 4010F PR ACE/ARB THEARPY RXD/TAKEN: ICD-10-PCS | Mod: CPTII,S$GLB,, | Performed by: INTERNAL MEDICINE

## 2022-10-21 PROCEDURE — 1159F PR MEDICATION LIST DOCUMENTED IN MEDICAL RECORD: ICD-10-PCS | Mod: CPTII,S$GLB,, | Performed by: INTERNAL MEDICINE

## 2022-10-21 PROCEDURE — 99214 PR OFFICE/OUTPT VISIT, EST, LEVL IV, 30-39 MIN: ICD-10-PCS | Mod: S$GLB,,, | Performed by: INTERNAL MEDICINE

## 2022-10-21 PROCEDURE — 99999 PR PBB SHADOW E&M-EST. PATIENT-LVL IV: ICD-10-PCS | Mod: PBBFAC,,, | Performed by: INTERNAL MEDICINE

## 2022-10-21 PROCEDURE — 90694 FLU VACCINE - QUADRIVALENT - ADJUVANTED: ICD-10-PCS | Mod: S$GLB,,, | Performed by: INTERNAL MEDICINE

## 2022-10-21 PROCEDURE — G0008 FLU VACCINE - QUADRIVALENT - ADJUVANTED: ICD-10-PCS | Mod: S$GLB,,, | Performed by: INTERNAL MEDICINE

## 2022-10-21 PROCEDURE — 1160F PR REVIEW ALL MEDS BY PRESCRIBER/CLIN PHARMACIST DOCUMENTED: ICD-10-PCS | Mod: CPTII,S$GLB,, | Performed by: INTERNAL MEDICINE

## 2022-10-21 PROCEDURE — 3075F SYST BP GE 130 - 139MM HG: CPT | Mod: CPTII,S$GLB,, | Performed by: INTERNAL MEDICINE

## 2022-10-21 PROCEDURE — 1101F PR PT FALLS ASSESS DOC 0-1 FALLS W/OUT INJ PAST YR: ICD-10-PCS | Mod: CPTII,S$GLB,, | Performed by: INTERNAL MEDICINE

## 2022-10-21 PROCEDURE — 3288F PR FALLS RISK ASSESSMENT DOCUMENTED: ICD-10-PCS | Mod: CPTII,S$GLB,, | Performed by: INTERNAL MEDICINE

## 2022-10-21 PROCEDURE — 3079F DIAST BP 80-89 MM HG: CPT | Mod: CPTII,S$GLB,, | Performed by: INTERNAL MEDICINE

## 2022-10-21 PROCEDURE — 1125F AMNT PAIN NOTED PAIN PRSNT: CPT | Mod: CPTII,S$GLB,, | Performed by: INTERNAL MEDICINE

## 2022-10-21 PROCEDURE — 99214 OFFICE O/P EST MOD 30 MIN: CPT | Mod: S$GLB,,, | Performed by: INTERNAL MEDICINE

## 2022-10-21 PROCEDURE — G0009 ADMIN PNEUMOCOCCAL VACCINE: HCPCS | Mod: S$GLB,,, | Performed by: INTERNAL MEDICINE

## 2022-10-21 PROCEDURE — 81001 URINALYSIS AUTO W/SCOPE: CPT | Performed by: INTERNAL MEDICINE

## 2022-10-21 PROCEDURE — 80053 COMPREHEN METABOLIC PANEL: CPT | Performed by: INTERNAL MEDICINE

## 2022-10-21 PROCEDURE — 3075F PR MOST RECENT SYSTOLIC BLOOD PRESS GE 130-139MM HG: ICD-10-PCS | Mod: CPTII,S$GLB,, | Performed by: INTERNAL MEDICINE

## 2022-10-21 PROCEDURE — 82306 VITAMIN D 25 HYDROXY: CPT | Performed by: INTERNAL MEDICINE

## 2022-10-21 PROCEDURE — 3288F FALL RISK ASSESSMENT DOCD: CPT | Mod: CPTII,S$GLB,, | Performed by: INTERNAL MEDICINE

## 2022-10-21 PROCEDURE — 1125F PR PAIN SEVERITY QUANTIFIED, PAIN PRESENT: ICD-10-PCS | Mod: CPTII,S$GLB,, | Performed by: INTERNAL MEDICINE

## 2022-10-21 PROCEDURE — 90677 PNEUMOCOCCAL CONJUGATE VACCINE 20-VALENT: ICD-10-PCS | Mod: S$GLB,,, | Performed by: INTERNAL MEDICINE

## 2022-10-21 PROCEDURE — 83735 ASSAY OF MAGNESIUM: CPT | Performed by: INTERNAL MEDICINE

## 2022-10-21 PROCEDURE — 1160F RVW MEDS BY RX/DR IN RCRD: CPT | Mod: CPTII,S$GLB,, | Performed by: INTERNAL MEDICINE

## 2022-10-21 PROCEDURE — G0008 ADMIN INFLUENZA VIRUS VAC: HCPCS | Mod: S$GLB,,, | Performed by: INTERNAL MEDICINE

## 2022-10-21 PROCEDURE — 1159F MED LIST DOCD IN RCRD: CPT | Mod: CPTII,S$GLB,, | Performed by: INTERNAL MEDICINE

## 2022-10-21 PROCEDURE — 90677 PCV20 VACCINE IM: CPT | Mod: S$GLB,,, | Performed by: INTERNAL MEDICINE

## 2022-10-21 PROCEDURE — 1101F PT FALLS ASSESS-DOCD LE1/YR: CPT | Mod: CPTII,S$GLB,, | Performed by: INTERNAL MEDICINE

## 2022-10-21 PROCEDURE — 3079F PR MOST RECENT DIASTOLIC BLOOD PRESSURE 80-89 MM HG: ICD-10-PCS | Mod: CPTII,S$GLB,, | Performed by: INTERNAL MEDICINE

## 2022-10-21 PROCEDURE — 90694 VACC AIIV4 NO PRSRV 0.5ML IM: CPT | Mod: S$GLB,,, | Performed by: INTERNAL MEDICINE

## 2022-10-21 RX ORDER — POTASSIUM CHLORIDE 20 MEQ/1
20 TABLET, EXTENDED RELEASE ORAL 2 TIMES DAILY
Qty: 180 TABLET | Refills: 1 | Status: SHIPPED | OUTPATIENT
Start: 2022-10-21 | End: 2023-11-17 | Stop reason: ALTCHOICE

## 2022-10-24 NOTE — PROGRESS NOTES
UA normal done for flank pain, exam c/w musculoskeletal.  Occult blood pos, but micro RBC 3; no further testing.  Results to pt via "[employer]t.

## 2022-10-26 ENCOUNTER — PATIENT MESSAGE (OUTPATIENT)
Dept: ADMINISTRATIVE | Facility: OTHER | Age: 67
End: 2022-10-26
Payer: MEDICARE

## 2022-10-26 ENCOUNTER — PATIENT MESSAGE (OUTPATIENT)
Dept: FAMILY MEDICINE | Facility: CLINIC | Age: 67
End: 2022-10-26
Payer: MEDICARE

## 2022-10-26 PROBLEM — E55.9 VITAMIN D DEFICIENCY: Status: ACTIVE | Noted: 2022-10-26

## 2022-10-26 NOTE — PROGRESS NOTES
Vit D very low  K low.  Mg WNL  Lipid WNL  Started on K Dur at OV  Results to pt.  Schedule nonfasting labs 2 weeks on new K Dur  Vit D start OTC 2000 IU daily  OV 6 months with labs.

## 2023-01-09 ENCOUNTER — PATIENT MESSAGE (OUTPATIENT)
Dept: ADMINISTRATIVE | Facility: OTHER | Age: 68
End: 2023-01-09
Payer: MEDICARE

## 2023-01-10 ENCOUNTER — TELEPHONE (OUTPATIENT)
Dept: FAMILY MEDICINE | Facility: CLINIC | Age: 68
End: 2023-01-10
Payer: MEDICARE

## 2023-01-10 NOTE — TELEPHONE ENCOUNTER
----- Message from Yvonne Mota PharmD sent at 1/10/2023 10:20 AM CST -----  Regarding: Nurse visit BP check  [place]ullen    Ms. Lynn is a mutual patient in HTN digital medicine    Could you please contact her to schedule a nurse visit/BP check? I want to be sure her iHealth cuff is reading accurately. She is aware to bring her device with her    Thank you!  Yvonne Mota PharmD

## 2023-01-12 ENCOUNTER — PATIENT MESSAGE (OUTPATIENT)
Dept: ADMINISTRATIVE | Facility: OTHER | Age: 68
End: 2023-01-12
Payer: MEDICARE

## 2023-02-07 DIAGNOSIS — Z00.00 ENCOUNTER FOR MEDICARE ANNUAL WELLNESS EXAM: ICD-10-CM

## 2023-02-08 ENCOUNTER — PATIENT OUTREACH (OUTPATIENT)
Dept: ADMINISTRATIVE | Facility: HOSPITAL | Age: 68
End: 2023-02-08
Payer: MEDICARE

## 2023-02-08 ENCOUNTER — PATIENT MESSAGE (OUTPATIENT)
Dept: ADMINISTRATIVE | Facility: OTHER | Age: 68
End: 2023-02-08
Payer: MEDICARE

## 2023-02-08 ENCOUNTER — PATIENT MESSAGE (OUTPATIENT)
Dept: ADMINISTRATIVE | Facility: HOSPITAL | Age: 68
End: 2023-02-08
Payer: MEDICARE

## 2023-02-09 DIAGNOSIS — Z00.00 ENCOUNTER FOR MEDICARE ANNUAL WELLNESS EXAM: ICD-10-CM

## 2023-02-23 ENCOUNTER — TELEPHONE (OUTPATIENT)
Dept: FAMILY MEDICINE | Facility: CLINIC | Age: 68
End: 2023-02-23
Payer: MEDICARE

## 2023-02-23 NOTE — TELEPHONE ENCOUNTER
----- Message from Yvonne Mota PharmD sent at 2/21/2023  8:36 AM CST -----  Regarding: BP check  Cullen,    Ms. Lynn is a mutual patient in digital medicine. I am concerned her ihealth cuff is not reading correctly. Could you please contact her to schedule a nurse visit for BP check to compare the readings? She will need to bring her home device with her to the appointment     Thank you!  Yvonne Mota PharmD

## 2023-02-23 NOTE — TELEPHONE ENCOUNTER
Spoke with patient   Scheduled BP check with the nurse and informed patient to bring machine with her to the appointment  Verbal understanding

## 2023-02-27 ENCOUNTER — CLINICAL SUPPORT (OUTPATIENT)
Dept: FAMILY MEDICINE | Facility: CLINIC | Age: 68
End: 2023-02-27
Payer: MEDICARE

## 2023-02-27 VITALS — OXYGEN SATURATION: 97 % | SYSTOLIC BLOOD PRESSURE: 146 MMHG | DIASTOLIC BLOOD PRESSURE: 84 MMHG | HEART RATE: 64 BPM

## 2023-02-27 DIAGNOSIS — I10 ESSENTIAL HYPERTENSION: Primary | ICD-10-CM

## 2023-02-27 PROCEDURE — 99999 PR PBB SHADOW E&M-EST. PATIENT-LVL III: ICD-10-PCS | Mod: PBBFAC,HCNC,,

## 2023-02-27 PROCEDURE — 99999 PR PBB SHADOW E&M-EST. PATIENT-LVL III: CPT | Mod: PBBFAC,HCNC,,

## 2023-02-27 RX ORDER — SPIRONOLACTONE 25 MG/1
25 TABLET ORAL DAILY
Qty: 30 TABLET | Refills: 1 | Status: SHIPPED | OUTPATIENT
Start: 2023-02-27 | End: 2023-04-24

## 2023-02-27 NOTE — PROGRESS NOTES
Bell Lynn 67 y.o. female is here today for Blood Pressure check.   History of HTN yes.    Review of patient's allergies indicates:   Allergen Reactions    Nsaids (non-steroidal anti-inflammatory drug)      Gi bleed    Penicillins Itching and Swelling    Penicillin     Symbicort [budesonide-formoterol]      Recurrent oral ulcers     Creatinine   Date Value Ref Range Status   10/21/2022 0.7 0.5 - 1.4 mg/dL Final     Sodium   Date Value Ref Range Status   10/21/2022 138 136 - 145 mmol/L Final     Potassium   Date Value Ref Range Status   10/21/2022 3.3 (L) 3.5 - 5.1 mmol/L Final   ]  Patient verifies taking blood pressure medications on a regular basis at the same time of the day.     Current Outpatient Medications:     valsartan (DIOVAN) 320 MG tablet, TAKE 1 TABLET (320 MG TOTAL) BY MOUTH ONCE DAILY. STOP LOSARTAN, Disp: 90 tablet, Rfl: 1    albuterol (PROVENTIL/VENTOLIN HFA) 90 mcg/actuation inhaler, INHALE 2 PUFFS EVERY 4 HOURS AS NEEDED FOR WHEEZING, Disp: 18 g, Rfl: 5    alendronate (FOSAMAX) 70 MG tablet, Take 1 tablet (70 mg total) by mouth every 7 days., Disp: 4 tablet, Rfl: 11    amLODIPine (NORVASC) 10 MG tablet, TAKE 1 TABLET ONE TIME DAILY (DOSE INCREASE), Disp: 90 tablet, Rfl: 1    cetirizine (ZYRTEC) 10 MG tablet, TAKE 1 TABLET EVERY DAY, Disp: 90 tablet, Rfl: 1    cyclobenzaprine (FLEXERIL) 10 MG tablet, TAKE 1 TABLET EVERY EVENING, Disp: 90 tablet, Rfl: 1    DULoxetine (CYMBALTA) 60 MG capsule, TAKE 1 CAPSULE ONE TIME DAILY (INCREASED DOSE), Disp: 90 capsule, Rfl: 1    furosemide (LASIX) 20 MG tablet, TAKE 1 TABLET ONE TIME DAILY FOR LEG SWELLING AS NEEDED, Disp: 90 tablet, Rfl: 1    gabapentin (NEURONTIN) 100 MG capsule, 3 tablets nighttime 1 tablet in AM; increased dose, Disp: 270 capsule, Rfl: 1    latanoprost 0.005 % ophthalmic solution, Place 1 drop into the right eye every evening., Disp: 7.5 mL, Rfl: 4    LIDOcaine (LIDODERM) 5 %, PLACE 1 PATCH ONTO THE SKIN ONCE DAILY. REMOVE AND  DISCARD PATCH WITHIN 12 HOURS OR AS DIRECTED BY MD, Disp: 90 patch, Rfl: 2    LINZESS 145 mcg Cap capsule, TAKE 1 CAPSULE ONE TIME DAILY, Disp: 90 capsule, Rfl: 1    metoprolol succinate (TOPROL-XL) 100 MG 24 hr tablet, TAKE 1 TABLET ONE TIME DAILY, Disp: 90 tablet, Rfl: 3    nitroGLYCERIN (NITROSTAT) 0.4 MG SL tablet, Place 1 tablet (0.4 mg total) under the tongue every 5 (five) minutes as needed for Chest pain., Disp: 60 tablet, Rfl: 12    omeprazole (PRILOSEC) 40 MG capsule, Take 1 capsule (40 mg total) by mouth every morning., Disp: 90 capsule, Rfl: 1    potassium chloride SA (K-DUR,KLOR-CON) 20 MEQ tablet, Take 1 tablet (20 mEq total) by mouth 2 (two) times daily., Disp: 180 tablet, Rfl: 1    spironolactone (ALDACTONE) 25 MG tablet, Take 1 tablet (25 mg total) by mouth once daily., Disp: 30 tablet, Rfl: 1  Does patient have record of home blood pressure readings no. Patient is on digital medicine   Last dose of blood pressure medication was taken at 7am  Patient is asymptomatic. Dr HAILEY Tse notified  of blood pressure reading new med added spironolactone 25 mg daily 2 week blood pressure check scheduled understanding verbalized  Complains of back pain Patient came in with blood pressure machine to check accuracy first check 155/86  P72   second check 155/90 P74    Vitals:    02/27/23 1322 02/27/23 1337   BP: 138/88 (!) 146/84   BP Location: Left arm Left arm   Patient Position: Sitting Sitting   BP Method: Large (Manual) Thigh Cuff (Manual)   Pulse: 74 64   SpO2: 98% 97%         Dr. Carrera informed of nurse visit.

## 2023-03-14 ENCOUNTER — CLINICAL SUPPORT (OUTPATIENT)
Dept: FAMILY MEDICINE | Facility: CLINIC | Age: 68
End: 2023-03-14
Payer: MEDICARE

## 2023-03-14 VITALS — HEART RATE: 65 BPM | DIASTOLIC BLOOD PRESSURE: 82 MMHG | SYSTOLIC BLOOD PRESSURE: 136 MMHG

## 2023-03-14 DIAGNOSIS — I10 ESSENTIAL HYPERTENSION: Primary | ICD-10-CM

## 2023-03-14 NOTE — PROGRESS NOTES
Bell Lynn 67 y.o. female is here today for Blood Pressure check.   History of HTN yes.    Review of patient's allergies indicates:   Allergen Reactions    Nsaids (non-steroidal anti-inflammatory drug)      Gi bleed    Penicillins Itching and Swelling    Penicillin     Symbicort [budesonide-formoterol]      Recurrent oral ulcers     Creatinine   Date Value Ref Range Status   10/21/2022 0.7 0.5 - 1.4 mg/dL Final     Sodium   Date Value Ref Range Status   10/21/2022 138 136 - 145 mmol/L Final     Potassium   Date Value Ref Range Status   10/21/2022 3.3 (L) 3.5 - 5.1 mmol/L Final   ]  Patient verifies taking blood pressure medications on a regular basis at the same time of the day.     Current Outpatient Medications:     valsartan (DIOVAN) 320 MG tablet, TAKE 1 TABLET (320 MG TOTAL) BY MOUTH ONCE DAILY. STOP LOSARTAN, Disp: 90 tablet, Rfl: 1    albuterol (PROVENTIL/VENTOLIN HFA) 90 mcg/actuation inhaler, INHALE 2 PUFFS EVERY 4 HOURS AS NEEDED FOR WHEEZING, Disp: 18 g, Rfl: 5    alendronate (FOSAMAX) 70 MG tablet, Take 1 tablet (70 mg total) by mouth every 7 days., Disp: 4 tablet, Rfl: 11    amLODIPine (NORVASC) 10 MG tablet, TAKE 1 TABLET ONE TIME DAILY (DOSE INCREASE), Disp: 90 tablet, Rfl: 1    cetirizine (ZYRTEC) 10 MG tablet, TAKE 1 TABLET EVERY DAY, Disp: 90 tablet, Rfl: 1    cyclobenzaprine (FLEXERIL) 10 MG tablet, TAKE 1 TABLET EVERY EVENING, Disp: 90 tablet, Rfl: 1    DULoxetine (CYMBALTA) 60 MG capsule, TAKE 1 CAPSULE ONE TIME DAILY (INCREASED DOSE), Disp: 90 capsule, Rfl: 1    furosemide (LASIX) 20 MG tablet, TAKE 1 TABLET ONE TIME DAILY FOR LEG SWELLING AS NEEDED, Disp: 90 tablet, Rfl: 1    gabapentin (NEURONTIN) 100 MG capsule, 3 tablets nighttime 1 tablet in AM; increased dose, Disp: 270 capsule, Rfl: 1    latanoprost 0.005 % ophthalmic solution, Place 1 drop into the right eye every evening., Disp: 7.5 mL, Rfl: 4    LIDOcaine (LIDODERM) 5 %, PLACE 1 PATCH ONTO THE SKIN ONCE DAILY. REMOVE AND  DISCARD PATCH WITHIN 12 HOURS OR AS DIRECTED BY MD, Disp: 90 patch, Rfl: 2    LINZESS 145 mcg Cap capsule, TAKE 1 CAPSULE ONE TIME DAILY, Disp: 90 capsule, Rfl: 1    metoprolol succinate (TOPROL-XL) 100 MG 24 hr tablet, TAKE 1 TABLET ONE TIME DAILY, Disp: 90 tablet, Rfl: 3    nitroGLYCERIN (NITROSTAT) 0.4 MG SL tablet, Place 1 tablet (0.4 mg total) under the tongue every 5 (five) minutes as needed for Chest pain., Disp: 60 tablet, Rfl: 12    omeprazole (PRILOSEC) 40 MG capsule, Take 1 capsule (40 mg total) by mouth every morning., Disp: 90 capsule, Rfl: 1    potassium chloride SA (K-DUR,KLOR-CON) 20 MEQ tablet, Take 1 tablet (20 mEq total) by mouth 2 (two) times daily., Disp: 180 tablet, Rfl: 1    spironolactone (ALDACTONE) 25 MG tablet, Take 1 tablet (25 mg total) by mouth once daily., Disp: 30 tablet, Rfl: 1  Does patient have record of home blood pressure readings yes. Readings have been averaging: today 161/96, 74; 3/13 138/94, 71; 3/8 142/84, 71; 3/6 160/83, 75.   Last dose of blood pressure medication was taken at 0745 on 3/13/2023.  Patient is asymptomatic.   Complains of nothing.        136/82, 65  o2 sat 97%..    Dr. Carrera notified.

## 2023-03-30 ENCOUNTER — HOSPITAL ENCOUNTER (EMERGENCY)
Facility: HOSPITAL | Age: 68
Discharge: HOME | End: 2023-03-30
Attending: EMERGENCY MEDICINE
Payer: MEDICARE

## 2023-03-30 VITALS
SYSTOLIC BLOOD PRESSURE: 169 MMHG | DIASTOLIC BLOOD PRESSURE: 90 MMHG | TEMPERATURE: 98 F | HEART RATE: 67 BPM | RESPIRATION RATE: 14 BRPM | OXYGEN SATURATION: 96 %

## 2023-03-30 DIAGNOSIS — M62.838 MUSCLE SPASM: Primary | ICD-10-CM

## 2023-03-30 PROCEDURE — 99283 PR EMERGENCY DEPT VISIT,LEVEL III: ICD-10-PCS | Mod: HCNC,,, | Performed by: PHYSICIAN ASSISTANT

## 2023-03-30 PROCEDURE — 99282 EMERGENCY DEPT VISIT SF MDM: CPT | Mod: HCNC

## 2023-03-30 PROCEDURE — 99283 EMERGENCY DEPT VISIT LOW MDM: CPT | Mod: HCNC,,, | Performed by: PHYSICIAN ASSISTANT

## 2023-03-31 NOTE — ED TRIAGE NOTES
Bell Lynn, an 67 y.o. female presents to the ED for intermittent L sided headaches and photophobia that began yesterday. Hx of migraines.       Review of patient's allergies indicates:   Allergen Reactions    Nsaids (non-steroidal anti-inflammatory drug)      Gi bleed    Penicillins Itching and Swelling    Penicillin     Symbicort [budesonide-formoterol]      Recurrent oral ulcers     Chief Complaint   Patient presents with    Headache     Pt arrives c/o intermittent migraines on left side of head accompanied by light sensitivity that began yesterday.     Past Medical History:   Diagnosis Date    Asthma     Depression     Gastric ulcer with hemorrhage 7/2012    GERD (gastroesophageal reflux disease)     History of blood transfusion     Hypertension     Iron deficiency anemia 3/14/2013    Osteoarthritis of both knees

## 2023-03-31 NOTE — ED PROVIDER NOTES
Encounter Date: 3/30/2023       History     Chief Complaint   Patient presents with    Headache     Pt arrives c/o intermittent migraines on left side of head accompanied by light sensitivity that began yesterday.     67-year-old female with past medical history of GERD, depression, gastritis, asthma, hypertension, iron deficiency anemia and migraines presents emergency department for headache.  Patient reports sharp sudden pain behind her left ear which last for a few seconds.  States during the episodes she has some sensitivity to light and in between episodes has no pain.  Denies any aggravating or alleviating factors.  States episode happened about 3 times since yesterday.  Denies any blurred vision, nausea vomiting, fevers/chills, neck pain, neck stiffness.  She has been taking Goody's intermittently for this.    The history is provided by the patient.   Review of patient's allergies indicates:   Allergen Reactions    Nsaids (non-steroidal anti-inflammatory drug)      Gi bleed    Penicillins Itching and Swelling    Penicillin     Symbicort [budesonide-formoterol]      Recurrent oral ulcers     Past Medical History:   Diagnosis Date    Asthma     Depression     Gastric ulcer with hemorrhage 7/2012    GERD (gastroesophageal reflux disease)     History of blood transfusion     Hypertension     Iron deficiency anemia 3/14/2013    Osteoarthritis of both knees      Past Surgical History:   Procedure Laterality Date    BREAST BIOPSY      BREAST SURGERY      Benign breast cyst    COLONOSCOPY      ESOPHAGOGASTRODUODENOSCOPY N/A 10/7/2020    Procedure: EGD (ESOPHAGOGASTRODUODENOSCOPY);  Surgeon: Nell Parish MD;  Location: [facility];  Service: Endoscopy;  Laterality: N/A;    ESOPHAGOGASTRODUODENOSCOPY N/A 6/30/2021    Procedure: EGD (ESOPHAGOGASTRODUODENOSCOPY);  Surgeon: Nell Parish MD;  Location: [facility];  Service: Endoscopy;  Laterality: N/A;    HYSTERECTOMY      Partial    KNEE ARTHROPLASTY Left  1/10/2022    Procedure: ARTHROPLASTY, KNEE;  Surgeon: Jonnathan Tavera MD;  Location: [facility];  Service: Orthopedics;  Laterality: Left;    TONSILLECTOMY       Family History   Problem Relation Age of Onset    Heart disease Mother     Hypertension Mother     Heart disease Father     Hypertension Father     Breast cancer Sister     Breast cancer Sister      Social History     Tobacco Use    Smoking status: Never    Smokeless tobacco: Never   Substance Use Topics    Alcohol use: Yes     Comment: ocaasional    Drug use: No     Review of Systems   Constitutional:  Negative for chills and fever.   HENT:  Negative for sore throat.    Eyes:  Positive for photophobia.   Respiratory:  Negative for shortness of breath.    Cardiovascular:  Negative for chest pain.   Gastrointestinal:  Negative for abdominal pain.   Genitourinary:  Negative for difficulty urinating and dysuria.   Musculoskeletal: Negative.    Neurological:  Positive for headaches.   Psychiatric/Behavioral:  Negative for confusion.      Physical Exam     Initial Vitals [03/30/23 2132]   BP Pulse Resp Temp SpO2   (!) 169/90 67 14 97.8 °F (36.6 °C) 96 %      MAP       --         Physical Exam    Nursing note and vitals reviewed.  Constitutional: She appears well-developed and well-nourished.   HENT:   Head: Normocephalic and atraumatic.   Tenderness to palpation behind the left ear on her scalp.  No palpable posterior chain lymphadenopathy.  No overlying skin changes.  No tenderness over the mastoid process.  TM within normal limits bilaterally   Eyes: Conjunctivae are normal. Pupils are equal, round, and reactive to light.   Neck: Neck supple.   Normal range of motion.  Cardiovascular:  Normal rate, regular rhythm, normal heart sounds and intact distal pulses.     Exam reveals no gallop and no friction rub.       No murmur heard.  Pulmonary/Chest: Breath sounds normal.   Abdominal: Abdomen is soft. Bowel sounds are normal.   Musculoskeletal:         General:  Normal range of motion.      Cervical back: Normal range of motion and neck supple.     Neurological: She is alert and oriented to person, place, and time. GCS score is 15. GCS eye subscore is 4. GCS verbal subscore is 5. GCS motor subscore is 6.   Skin: Skin is warm and dry. Capillary refill takes less than 2 seconds.   Psychiatric: She has a normal mood and affect.       ED Course   Procedures  Labs Reviewed - No data to display       Imaging Results    None          Medications - No data to display  Medical Decision Making:   History:   Old Medical Records: I decided to obtain old medical records.  Initial Assessment:   67-year-old female with intermittent episodes of sharp pain behind her left here since yesterday.  States these episodes are brief lasting a few seconds.  Differential Diagnosis:   CVA, lymphadenopathy, cellulitis, mastoiditis, muscle spasm  ED Management:  No focal neurological deficits on exam.  Low suspicion for CVA after clinical exam.  Area of tenderness with no erythema, warmth or fluctuance doubt infectious process.  Potential lymphadenopathy versus trigger point muscle spasms.  She is a history of NSAID induced gastritis will avoid NSAIDs and cautioned on goodies powder.  Discussed conservative management with heating pads, OTC Tylenol and follow-up with PCP as needed.  Discussed return to ED precautions for any new or worsening symptoms.                        Clinical Impression:   Final diagnoses:  [M62.838] Muscle spasm (Primary)        ED Disposition Condition    Discharge Stable          ED Prescriptions    None       Follow-up Information       Follow up With Specialties Details Why Contact Info    Cipriano Carrera MD Internal Medicine Schedule an appointment as soon as possible for a visit   [address] [facility]  [place] [id number]  [phone]               Adiel Briones PA-C  03/30/23 2231

## 2023-03-31 NOTE — FIRST PROVIDER EVALUATION
Medical screening examination initiated.  I have conducted a focused provider triage encounter, findings are as follows:    Brief history of present illness:  66yo F with Hx HTN, fibromyalgia, pituitary adenoma with no recurrence on MRI 2019 presenting with intermittent pain on L side of head since yesterday, +photophobia.     Vitals:    03/30/23 2132   BP: (!) 169/90   Pulse: 67   Resp: 14   Temp: 97.8 °F (36.6 °C)   TempSrc: Oral   SpO2: 96%       Pertinent physical exam:  gait steady, well appearing, no meningismus     Preliminary workup initiated; this workup will be continued and followed by the physician or advanced practice provider that is assigned to the patient when roomed.

## 2023-03-31 NOTE — DISCHARGE INSTRUCTIONS
Were seen in the emergency department for pain to the left side of her head.  Your symptoms are not consistent with migraines or acute stroke.  I suspect muscle spasm.  Please use OTC Tylenol for pain relief, rest, hydration and heating pads as needed.  If continued to have symptoms please follow-up with your PCP.  Return to the ED sooner if you develop any new or worsening symptoms.

## 2023-03-31 NOTE — ED NOTES
Adult Physical Assessment  LOC: Bell Lynn, 67 y.o. female verified via two identifiers.  The patient is awake, alert, oriented and speaking appropriately at this time.  APPEARANCE: Patient resting comfortably and appears to be in no acute distress at this time. Patient is clean and well groomed, patient's clothing is properly fastened.  SKIN:The skin is warm and dry, color consistent with ethnicity, patient has normal skin turgor and moist mucus membranes, skin intact, no breakdown or brusing noted.  MUSCULOSKELETAL: Patient moving all extremities well, no obvious swelling or deformities noted.  RESPIRATORY: Airway is open and patent, respirations are spontaneous, patient has a normal effort and rate, no accessory muscle use noted.  CARDIAC: Patient has a normal rate and rhythm, no periphreal edema noted in any extremity, capillary refill < 3 seconds in all extremities  ABDOMEN: Soft and non tender to palpation, no abdominal distention noted. Bowel sounds present in all four quadrants.  NEUROLOGIC: Eyes open spontaneously, behavior appropriate to situation, follows commands, facial expression symmetrical, bilateral hand grasp equal and even, purposeful motor response noted, normal sensation in all extremities when touched with a finger.   L sided migraines and photophobia noted.

## 2023-04-26 ENCOUNTER — PES CALL (OUTPATIENT)
Dept: ADMINISTRATIVE | Facility: CLINIC | Age: 68
End: 2023-04-26
Payer: MEDICARE

## 2023-05-02 ENCOUNTER — PATIENT MESSAGE (OUTPATIENT)
Dept: ADMINISTRATIVE | Facility: HOSPITAL | Age: 68
End: 2023-05-02
Payer: MEDICARE

## 2023-05-03 ENCOUNTER — TELEPHONE (OUTPATIENT)
Dept: FAMILY MEDICINE | Facility: CLINIC | Age: 68
End: 2023-05-03
Payer: MEDICARE

## 2023-05-03 NOTE — TELEPHONE ENCOUNTER
----- Message from Rosio Mahoney sent at 5/3/2023  9:44 AM CDT -----  Regarding: Pharmacy Call Back  Type:  Pharmacy Calling to Clarify an RX    Name of Caller: Yo     Pharmacy Name: [facility]     Prescription Name: spironolactone (ALDACTONE) 25 MG tablet    What do they need to clarify? Has a question on directions. It states unknown.     Can you be contacted via [facility]? Call     Best Call Back Number:  [phone]

## 2023-05-09 ENCOUNTER — PATIENT OUTREACH (OUTPATIENT)
Dept: ADMINISTRATIVE | Facility: HOSPITAL | Age: 68
End: 2023-05-09
Payer: MEDICARE

## 2023-05-09 DIAGNOSIS — R73.03 PREDIABETES: Primary | ICD-10-CM

## 2023-05-09 DIAGNOSIS — Z12.31 SCREENING MAMMOGRAM, ENCOUNTER FOR: ICD-10-CM

## 2023-05-10 ENCOUNTER — LAB VISIT (OUTPATIENT)
Dept: LAB | Facility: HOSPITAL | Age: 68
End: 2023-05-10
Attending: INTERNAL MEDICINE
Payer: MEDICARE

## 2023-05-10 DIAGNOSIS — M81.0 AGE-RELATED OSTEOPOROSIS WITHOUT CURRENT PATHOLOGICAL FRACTURE: ICD-10-CM

## 2023-05-10 DIAGNOSIS — D50.9 IRON DEFICIENCY ANEMIA, UNSPECIFIED IRON DEFICIENCY ANEMIA TYPE: ICD-10-CM

## 2023-05-10 DIAGNOSIS — I10 ESSENTIAL HYPERTENSION: ICD-10-CM

## 2023-05-10 DIAGNOSIS — R73.03 PREDIABETES: ICD-10-CM

## 2023-05-10 DIAGNOSIS — Z00.00 NORMAL PHYSICAL EXAM: ICD-10-CM

## 2023-05-10 LAB
25(OH)D3+25(OH)D2 SERPL-MCNC: 19 NG/ML (ref 30–96)
ALBUMIN SERPL BCP-MCNC: 3.5 G/DL (ref 3.5–5.2)
ALP SERPL-CCNC: 89 U/L (ref 55–135)
ALT SERPL W/O P-5'-P-CCNC: 18 U/L (ref 10–44)
ANION GAP SERPL CALC-SCNC: 9 MMOL/L (ref 8–16)
ANION GAP SERPL CALC-SCNC: 9 MMOL/L (ref 8–16)
AST SERPL-CCNC: 23 U/L (ref 10–40)
BASOPHILS # BLD AUTO: 0.05 K/UL (ref 0–0.2)
BASOPHILS NFR BLD: 0.5 % (ref 0–1.9)
BILIRUB SERPL-MCNC: 0.2 MG/DL (ref 0.1–1)
BUN SERPL-MCNC: 10 MG/DL (ref 8–23)
BUN SERPL-MCNC: 10 MG/DL (ref 8–23)
CALCIUM SERPL-MCNC: 10.6 MG/DL (ref 8.7–10.5)
CALCIUM SERPL-MCNC: 10.6 MG/DL (ref 8.7–10.5)
CHLORIDE SERPL-SCNC: 103 MMOL/L (ref 95–110)
CHLORIDE SERPL-SCNC: 103 MMOL/L (ref 95–110)
CHOLEST SERPL-MCNC: 134 MG/DL (ref 120–199)
CHOLEST/HDLC SERPL: 3.5 {RATIO} (ref 2–5)
CO2 SERPL-SCNC: 26 MMOL/L (ref 23–29)
CO2 SERPL-SCNC: 26 MMOL/L (ref 23–29)
CREAT SERPL-MCNC: 0.8 MG/DL (ref 0.5–1.4)
CREAT SERPL-MCNC: 0.8 MG/DL (ref 0.5–1.4)
DIFFERENTIAL METHOD: ABNORMAL
EOSINOPHIL # BLD AUTO: 0.2 K/UL (ref 0–0.5)
EOSINOPHIL NFR BLD: 2.3 % (ref 0–8)
ERYTHROCYTE [DISTWIDTH] IN BLOOD BY AUTOMATED COUNT: 15.9 % (ref 11.5–14.5)
EST. GFR  (NO RACE VARIABLE): >60 ML/MIN/1.73 M^2
EST. GFR  (NO RACE VARIABLE): >60 ML/MIN/1.73 M^2
ESTIMATED AVG GLUCOSE: 114 MG/DL (ref 68–131)
FERRITIN SERPL-MCNC: 22 NG/ML (ref 20–300)
GLUCOSE SERPL-MCNC: 107 MG/DL (ref 70–110)
GLUCOSE SERPL-MCNC: 107 MG/DL (ref 70–110)
HBA1C MFR BLD: 5.6 % (ref 4–5.6)
HCT VFR BLD AUTO: 42.9 % (ref 37–48.5)
HDLC SERPL-MCNC: 38 MG/DL (ref 40–75)
HDLC SERPL: 28.4 % (ref 20–50)
HGB BLD-MCNC: 13.7 G/DL (ref 12–16)
IMM GRANULOCYTES # BLD AUTO: 0.04 K/UL (ref 0–0.04)
IMM GRANULOCYTES NFR BLD AUTO: 0.4 % (ref 0–0.5)
LDLC SERPL CALC-MCNC: 77 MG/DL (ref 63–159)
LYMPHOCYTES # BLD AUTO: 3 K/UL (ref 1–4.8)
LYMPHOCYTES NFR BLD: 29.8 % (ref 18–48)
MCH RBC QN AUTO: 28.7 PG (ref 27–31)
MCHC RBC AUTO-ENTMCNC: 31.9 G/DL (ref 32–36)
MCV RBC AUTO: 90 FL (ref 82–98)
MONOCYTES # BLD AUTO: 0.8 K/UL (ref 0.3–1)
MONOCYTES NFR BLD: 8.3 % (ref 4–15)
NEUTROPHILS # BLD AUTO: 5.8 K/UL (ref 1.8–7.7)
NEUTROPHILS NFR BLD: 58.7 % (ref 38–73)
NONHDLC SERPL-MCNC: 96 MG/DL
NRBC BLD-RTO: 0 /100 WBC
PLATELET # BLD AUTO: 351 K/UL (ref 150–450)
PMV BLD AUTO: 12.1 FL (ref 9.2–12.9)
POTASSIUM SERPL-SCNC: 3.5 MMOL/L (ref 3.5–5.1)
POTASSIUM SERPL-SCNC: 3.5 MMOL/L (ref 3.5–5.1)
PROT SERPL-MCNC: 8 G/DL (ref 6–8.4)
RBC # BLD AUTO: 4.78 M/UL (ref 4–5.4)
SODIUM SERPL-SCNC: 138 MMOL/L (ref 136–145)
SODIUM SERPL-SCNC: 138 MMOL/L (ref 136–145)
TRIGL SERPL-MCNC: 95 MG/DL (ref 30–150)
WBC # BLD AUTO: 9.9 K/UL (ref 3.9–12.7)

## 2023-05-10 PROCEDURE — 82306 VITAMIN D 25 HYDROXY: CPT | Performed by: INTERNAL MEDICINE

## 2023-05-10 PROCEDURE — 36415 COLL VENOUS BLD VENIPUNCTURE: CPT | Mod: PO | Performed by: INTERNAL MEDICINE

## 2023-05-10 PROCEDURE — 80061 LIPID PANEL: CPT | Performed by: INTERNAL MEDICINE

## 2023-05-10 PROCEDURE — 80053 COMPREHEN METABOLIC PANEL: CPT | Performed by: INTERNAL MEDICINE

## 2023-05-10 PROCEDURE — 83036 HEMOGLOBIN GLYCOSYLATED A1C: CPT | Performed by: INTERNAL MEDICINE

## 2023-05-10 PROCEDURE — 85025 COMPLETE CBC W/AUTO DIFF WBC: CPT | Performed by: INTERNAL MEDICINE

## 2023-05-10 PROCEDURE — 82728 ASSAY OF FERRITIN: CPT | Performed by: INTERNAL MEDICINE

## 2023-05-11 ENCOUNTER — HOSPITAL ENCOUNTER (OUTPATIENT)
Dept: RADIOLOGY | Facility: HOSPITAL | Age: 68
Discharge: HOME | End: 2023-05-11
Attending: INTERNAL MEDICINE
Payer: MEDICARE

## 2023-05-11 DIAGNOSIS — Z12.31 SCREENING MAMMOGRAM, ENCOUNTER FOR: ICD-10-CM

## 2023-05-11 PROCEDURE — 77067 SCR MAMMO BI INCL CAD: CPT | Mod: TC,PO

## 2023-05-11 PROCEDURE — 77067 SCR MAMMO BI INCL CAD: CPT | Mod: 26,,, | Performed by: RADIOLOGY

## 2023-05-11 PROCEDURE — 77063 BREAST TOMOSYNTHESIS BI: CPT | Mod: 26,,, | Performed by: RADIOLOGY

## 2023-05-11 PROCEDURE — 77067 MAMMO DIGITAL SCREENING BILAT WITH TOMO: ICD-10-PCS | Mod: 26,,, | Performed by: RADIOLOGY

## 2023-05-11 PROCEDURE — 77063 MAMMO DIGITAL SCREENING BILAT WITH TOMO: ICD-10-PCS | Mod: 26,,, | Performed by: RADIOLOGY
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
## 2023-05-16 ENCOUNTER — OFFICE VISIT (OUTPATIENT)
Dept: FAMILY MEDICINE | Facility: CLINIC | Age: 68
End: 2023-05-16
Payer: MEDICARE

## 2023-05-16 VITALS
OXYGEN SATURATION: 95 % | WEIGHT: 200.63 LBS | TEMPERATURE: 98 F | HEIGHT: 64 IN | BODY MASS INDEX: 34.25 KG/M2 | SYSTOLIC BLOOD PRESSURE: 150 MMHG | DIASTOLIC BLOOD PRESSURE: 86 MMHG | HEART RATE: 75 BPM

## 2023-05-16 DIAGNOSIS — M54.50 CHRONIC RIGHT-SIDED LOW BACK PAIN WITHOUT SCIATICA: ICD-10-CM

## 2023-05-16 DIAGNOSIS — D50.9 IRON DEFICIENCY ANEMIA, UNSPECIFIED IRON DEFICIENCY ANEMIA TYPE: ICD-10-CM

## 2023-05-16 DIAGNOSIS — J30.9 ALLERGIC RHINITIS, UNSPECIFIED SEASONALITY, UNSPECIFIED TRIGGER: ICD-10-CM

## 2023-05-16 DIAGNOSIS — K59.09 CHRONIC CONSTIPATION: ICD-10-CM

## 2023-05-16 DIAGNOSIS — K25.7 CHRONIC GASTRIC ULCER WITHOUT HEMORRHAGE AND WITHOUT PERFORATION: ICD-10-CM

## 2023-05-16 DIAGNOSIS — R19.00 ABDOMINAL MASS, UNSPECIFIED ABDOMINAL LOCATION: ICD-10-CM

## 2023-05-16 DIAGNOSIS — R19.00 INTRA-ABDOMINAL AND PELVIC SWELLING, MASS AND LUMP, UNSPECIFIED SITE: ICD-10-CM

## 2023-05-16 DIAGNOSIS — J45.40 MODERATE PERSISTENT CHRONIC ASTHMA WITHOUT COMPLICATION: ICD-10-CM

## 2023-05-16 DIAGNOSIS — M81.0 AGE-RELATED OSTEOPOROSIS WITHOUT CURRENT PATHOLOGICAL FRACTURE: ICD-10-CM

## 2023-05-16 DIAGNOSIS — M79.7 FIBROMYALGIA: ICD-10-CM

## 2023-05-16 DIAGNOSIS — R60.0 PERIPHERAL EDEMA: ICD-10-CM

## 2023-05-16 DIAGNOSIS — Z00.00 NORMAL PHYSICAL EXAM: Primary | ICD-10-CM

## 2023-05-16 DIAGNOSIS — G89.29 CHRONIC RIGHT-SIDED LOW BACK PAIN WITHOUT SCIATICA: ICD-10-CM

## 2023-05-16 DIAGNOSIS — E89.3 STATUS POST TRANSSPHENOIDAL PITUITARY RESECTION: ICD-10-CM

## 2023-05-16 DIAGNOSIS — I10 ESSENTIAL HYPERTENSION: Chronic | ICD-10-CM

## 2023-05-16 DIAGNOSIS — E55.9 VITAMIN D DEFICIENCY: ICD-10-CM

## 2023-05-16 DIAGNOSIS — M17.12 OSTEOARTHRITIS OF LEFT KNEE, UNSPECIFIED OSTEOARTHRITIS TYPE: ICD-10-CM

## 2023-05-16 DIAGNOSIS — F33.0 MILD RECURRENT MAJOR DEPRESSION: ICD-10-CM

## 2023-05-16 DIAGNOSIS — E66.01 SEVERE OBESITY WITH BODY MASS INDEX (BMI) OF 35.0 TO 39.9 WITH SERIOUS COMORBIDITY: ICD-10-CM

## 2023-05-16 PROCEDURE — 3079F DIAST BP 80-89 MM HG: CPT | Mod: CPTII,,, | Performed by: INTERNAL MEDICINE

## 2023-05-16 PROCEDURE — 1159F PR MEDICATION LIST DOCUMENTED IN MEDICAL RECORD: ICD-10-PCS | Mod: CPTII,,, | Performed by: INTERNAL MEDICINE

## 2023-05-16 PROCEDURE — 4010F ACE/ARB THERAPY RXD/TAKEN: CPT | Mod: CPTII,,, | Performed by: INTERNAL MEDICINE

## 2023-05-16 PROCEDURE — 3079F PR MOST RECENT DIASTOLIC BLOOD PRESSURE 80-89 MM HG: ICD-10-PCS | Mod: CPTII,,, | Performed by: INTERNAL MEDICINE

## 2023-05-16 PROCEDURE — 99999 PR PBB SHADOW E&M-EST. PATIENT-LVL IV: CPT | Mod: PBBFAC,,, | Performed by: INTERNAL MEDICINE

## 2023-05-16 PROCEDURE — 3288F PR FALLS RISK ASSESSMENT DOCUMENTED: ICD-10-PCS | Mod: CPTII,,, | Performed by: INTERNAL MEDICINE

## 2023-05-16 PROCEDURE — 3288F FALL RISK ASSESSMENT DOCD: CPT | Mod: CPTII,,, | Performed by: INTERNAL MEDICINE

## 2023-05-16 PROCEDURE — 1125F PR PAIN SEVERITY QUANTIFIED, PAIN PRESENT: ICD-10-PCS | Mod: CPTII,,, | Performed by: INTERNAL MEDICINE

## 2023-05-16 PROCEDURE — 3044F HG A1C LEVEL LT 7.0%: CPT | Mod: CPTII,,, | Performed by: INTERNAL MEDICINE

## 2023-05-16 PROCEDURE — 1101F PR PT FALLS ASSESS DOC 0-1 FALLS W/OUT INJ PAST YR: ICD-10-PCS | Mod: CPTII,,, | Performed by: INTERNAL MEDICINE

## 2023-05-16 PROCEDURE — 1159F MED LIST DOCD IN RCRD: CPT | Mod: CPTII,,, | Performed by: INTERNAL MEDICINE

## 2023-05-16 PROCEDURE — 3077F SYST BP >= 140 MM HG: CPT | Mod: CPTII,,, | Performed by: INTERNAL MEDICINE

## 2023-05-16 PROCEDURE — 1160F PR REVIEW ALL MEDS BY PRESCRIBER/CLIN PHARMACIST DOCUMENTED: ICD-10-PCS | Mod: CPTII,,, | Performed by: INTERNAL MEDICINE

## 2023-05-16 PROCEDURE — 3044F PR MOST RECENT HEMOGLOBIN A1C LEVEL <7.0%: ICD-10-PCS | Mod: CPTII,,, | Performed by: INTERNAL MEDICINE

## 2023-05-16 PROCEDURE — 99999 PR PBB SHADOW E&M-EST. PATIENT-LVL IV: ICD-10-PCS | Mod: PBBFAC,,, | Performed by: INTERNAL MEDICINE

## 2023-05-16 PROCEDURE — 3077F PR MOST RECENT SYSTOLIC BLOOD PRESSURE >= 140 MM HG: ICD-10-PCS | Mod: CPTII,,, | Performed by: INTERNAL MEDICINE

## 2023-05-16 PROCEDURE — 99397 PER PM REEVAL EST PAT 65+ YR: CPT | Mod: ,,, | Performed by: INTERNAL MEDICINE

## 2023-05-16 PROCEDURE — 3008F PR BODY MASS INDEX (BMI) DOCUMENTED: ICD-10-PCS | Mod: CPTII,,, | Performed by: INTERNAL MEDICINE

## 2023-05-16 PROCEDURE — 99397 PR PREVENTIVE VISIT,EST,65 & OVER: ICD-10-PCS | Mod: ,,, | Performed by: INTERNAL MEDICINE

## 2023-05-16 PROCEDURE — 3008F BODY MASS INDEX DOCD: CPT | Mod: CPTII,,, | Performed by: INTERNAL MEDICINE

## 2023-05-16 PROCEDURE — 4010F PR ACE/ARB THEARPY RXD/TAKEN: ICD-10-PCS | Mod: CPTII,,, | Performed by: INTERNAL MEDICINE

## 2023-05-16 PROCEDURE — 1160F RVW MEDS BY RX/DR IN RCRD: CPT | Mod: CPTII,,, | Performed by: INTERNAL MEDICINE

## 2023-05-16 PROCEDURE — 1125F AMNT PAIN NOTED PAIN PRSNT: CPT | Mod: CPTII,,, | Performed by: INTERNAL MEDICINE

## 2023-05-16 PROCEDURE — 1101F PT FALLS ASSESS-DOCD LE1/YR: CPT | Mod: CPTII,,, | Performed by: INTERNAL MEDICINE

## 2023-05-16 RX ORDER — SPIRONOLACTONE 50 MG/1
50 TABLET, FILM COATED ORAL DAILY
Qty: 90 TABLET | Refills: 3 | Status: SHIPPED | OUTPATIENT
Start: 2023-05-16 | End: 2024-02-28

## 2023-05-16 RX ORDER — VALSARTAN 320 MG/1
320 TABLET ORAL DAILY
Qty: 90 TABLET | Refills: 1 | Status: SHIPPED | OUTPATIENT
Start: 2023-05-16 | End: 2023-06-28

## 2023-05-16 RX ORDER — AMLODIPINE BESYLATE 10 MG/1
TABLET ORAL
Qty: 90 TABLET | Refills: 3 | Status: SHIPPED | OUTPATIENT
Start: 2023-05-16

## 2023-05-16 RX ORDER — ALBUTEROL SULFATE 90 UG/1
2 AEROSOL, METERED RESPIRATORY (INHALATION) EVERY 6 HOURS PRN
Qty: 18 G | Refills: 5 | Status: SHIPPED | OUTPATIENT
Start: 2023-05-16

## 2023-05-16 RX ORDER — OMEPRAZOLE 40 MG/1
40 CAPSULE, DELAYED RELEASE ORAL EVERY MORNING
Qty: 90 CAPSULE | Refills: 1 | Status: ON HOLD | OUTPATIENT
Start: 2023-05-16 | End: 2023-06-27

## 2023-05-16 RX ORDER — CETIRIZINE HYDROCHLORIDE 10 MG/1
10 TABLET ORAL DAILY
Qty: 90 TABLET | Refills: 3 | Status: SHIPPED | OUTPATIENT
Start: 2023-05-16

## 2023-05-16 RX ORDER — GABAPENTIN 100 MG/1
CAPSULE ORAL
Qty: 270 CAPSULE | Refills: 3 | Status: SHIPPED | OUTPATIENT
Start: 2023-05-16

## 2023-05-16 RX ORDER — DULOXETIN HYDROCHLORIDE 60 MG/1
CAPSULE, DELAYED RELEASE ORAL
Qty: 90 CAPSULE | Refills: 3 | Status: SHIPPED | OUTPATIENT
Start: 2023-05-16

## 2023-05-16 RX ORDER — METOPROLOL SUCCINATE 100 MG/1
100 TABLET, EXTENDED RELEASE ORAL DAILY
Qty: 90 TABLET | Refills: 3 | Status: SHIPPED | OUTPATIENT
Start: 2023-05-16

## 2023-05-16 RX ORDER — ALENDRONATE SODIUM 70 MG/1
TABLET ORAL
Qty: 12 TABLET | Refills: 3 | Status: SHIPPED | OUTPATIENT
Start: 2023-05-16 | End: 2024-02-26

## 2023-05-18 ENCOUNTER — PATIENT MESSAGE (OUTPATIENT)
Dept: ADMINISTRATIVE | Facility: HOSPITAL | Age: 68
End: 2023-05-18
Payer: MEDICARE

## 2023-05-22 ENCOUNTER — OFFICE VISIT (OUTPATIENT)
Dept: OPTOMETRY | Facility: CLINIC | Age: 68
End: 2023-05-22
Payer: COMMERCIAL

## 2023-05-22 DIAGNOSIS — E89.3 STATUS POST TRANSSPHENOIDAL PITUITARY RESECTION: ICD-10-CM

## 2023-05-22 DIAGNOSIS — H25.13 NS (NUCLEAR SCLEROSIS), BILATERAL: ICD-10-CM

## 2023-05-22 DIAGNOSIS — Z83.511 FAMILY HISTORY OF GLAUCOMA IN SISTER: ICD-10-CM

## 2023-05-22 DIAGNOSIS — Z86.018 HISTORY OF PITUITARY ADENOMA: ICD-10-CM

## 2023-05-22 DIAGNOSIS — H47.393 OPTIC NERVE CUPPING OF BOTH EYES: ICD-10-CM

## 2023-05-22 DIAGNOSIS — H52.4 PRESBYOPIA: Primary | ICD-10-CM

## 2023-05-22 DIAGNOSIS — H25.013 CORTICAL AGE-RELATED CATARACT OF BOTH EYES: ICD-10-CM

## 2023-05-22 PROCEDURE — 99999 PR PBB SHADOW E&M-EST. PATIENT-LVL III: CPT | Mod: PBBFAC,,, | Performed by: OPTOMETRIST

## 2023-05-22 PROCEDURE — 92015 PR REFRACTION: ICD-10-PCS | Mod: S$GLB,,, | Performed by: OPTOMETRIST

## 2023-05-22 PROCEDURE — 99999 PR PBB SHADOW E&M-EST. PATIENT-LVL III: ICD-10-PCS | Mod: PBBFAC,,, | Performed by: OPTOMETRIST

## 2023-05-22 PROCEDURE — 92014 PR EYE EXAM, EST PATIENT,COMPREHESV: ICD-10-PCS | Mod: S$GLB,,, | Performed by: OPTOMETRIST

## 2023-05-22 PROCEDURE — 92015 DETERMINE REFRACTIVE STATE: CPT | Mod: S$GLB,,, | Performed by: OPTOMETRIST

## 2023-05-22 PROCEDURE — 92014 COMPRE OPH EXAM EST PT 1/>: CPT | Mod: S$GLB,,, | Performed by: OPTOMETRIST

## 2023-05-22 NOTE — PROGRESS NOTES
HPI    Last eye exam was 6/16/22 with Dr. Hernandez.  Patient states decrease in near vision since last exam. Doesn't use drops   every night-forgets to put them in.   Patient denies diplopia, headaches, flashes/floaters, and pain.    Latanoprost QHS OD (2-3 times a week)  Last edited by Florinda Montoya MA on 5/22/2023 10:44 AM.            Assessment /Plan     For exam results, see Encounter Report.       Presbyopia              Rx specs    NS (nuclear sclerosis), bilateral  Cortical age-related cataract of both eyes              Borderline, monitor       Status post transsphenoidal pituitary resection for tumor, Dr Cardenas, about 1989  History of pituitary adenoma 9/2019 MRI no recurrence     NS (nuclear sclerosis), bilateral  Cortical age-related cataract of both eyes              Borderline, monitor     Optic nerve cupping of both eyes              2022 HVF OD superior and inferior defects, OS WNL, stable 2023 2022 OCT OD thin central N and T, OS WNL, stable 2023               gonio Open OU               pachy 524/539              IOP 16/18   (+) sister with glaucoma: on drops  Family history of glaucoma in sister              2022 Disc photos                 Continue with  -     latanoprost 0.005 % ophthalmic solution; Place 1 drop into the right eye every evening.  Dispense: 7.5 mL; Refill: 4        RTC 6 mo IOP check

## 2023-05-29 ENCOUNTER — PATIENT MESSAGE (OUTPATIENT)
Dept: ADMINISTRATIVE | Facility: HOSPITAL | Age: 68
End: 2023-05-29
Payer: MEDICARE

## 2023-05-31 ENCOUNTER — CLINICAL SUPPORT (OUTPATIENT)
Dept: OPHTHALMOLOGY | Facility: CLINIC | Age: 68
End: 2023-05-31
Payer: MEDICARE

## 2023-05-31 DIAGNOSIS — H47.393 OPTIC NERVE CUPPING OF BOTH EYES: ICD-10-CM

## 2023-05-31 NOTE — PROGRESS NOTES
Visual field test done.  Patient stated no latex allergies used coverlet      Glasses Prescription     Sphere Cylinder Axis Dist [facility] Add   Right +0.75 +0.75 090 20/20-2 +2.75   Left +1.50 +0.50 015 20/20 +2.75   Expiration Date: 11/22/2023

## 2023-06-05 ENCOUNTER — TELEPHONE (OUTPATIENT)
Dept: FAMILY MEDICINE | Facility: CLINIC | Age: 68
End: 2023-06-05

## 2023-06-05 ENCOUNTER — OFFICE VISIT (OUTPATIENT)
Dept: FAMILY MEDICINE | Facility: CLINIC | Age: 68
End: 2023-06-05
Payer: MEDICARE

## 2023-06-05 VITALS
OXYGEN SATURATION: 98 % | WEIGHT: 197.06 LBS | DIASTOLIC BLOOD PRESSURE: 72 MMHG | SYSTOLIC BLOOD PRESSURE: 138 MMHG | HEIGHT: 64 IN | HEART RATE: 72 BPM | BODY MASS INDEX: 33.64 KG/M2 | TEMPERATURE: 98 F

## 2023-06-05 DIAGNOSIS — I10 ESSENTIAL HYPERTENSION: Chronic | ICD-10-CM

## 2023-06-05 DIAGNOSIS — Z86.012 HISTORY OF BENIGN CARCINOID TUMOR: ICD-10-CM

## 2023-06-05 DIAGNOSIS — F33.0 MILD RECURRENT MAJOR DEPRESSION: Chronic | ICD-10-CM

## 2023-06-05 DIAGNOSIS — M81.0 AGE-RELATED OSTEOPOROSIS WITHOUT CURRENT PATHOLOGICAL FRACTURE: ICD-10-CM

## 2023-06-05 DIAGNOSIS — E89.3 STATUS POST TRANSSPHENOIDAL PITUITARY RESECTION: ICD-10-CM

## 2023-06-05 DIAGNOSIS — R19.00 ABDOMINAL MASS, UNSPECIFIED ABDOMINAL LOCATION: Primary | ICD-10-CM

## 2023-06-05 DIAGNOSIS — M79.7 FIBROMYALGIA: ICD-10-CM

## 2023-06-05 DIAGNOSIS — E55.9 VITAMIN D DEFICIENCY: ICD-10-CM

## 2023-06-05 DIAGNOSIS — Z00.00 ENCOUNTER FOR PREVENTIVE HEALTH EXAMINATION: Primary | ICD-10-CM

## 2023-06-05 DIAGNOSIS — E66.09 CLASS 1 OBESITY DUE TO EXCESS CALORIES WITH SERIOUS COMORBIDITY AND BODY MASS INDEX (BMI) OF 33.0 TO 33.9 IN ADULT: ICD-10-CM

## 2023-06-05 DIAGNOSIS — Z86.018 HISTORY OF PITUITARY ADENOMA: ICD-10-CM

## 2023-06-05 DIAGNOSIS — K21.9 GASTROESOPHAGEAL REFLUX DISEASE, UNSPECIFIED WHETHER ESOPHAGITIS PRESENT: ICD-10-CM

## 2023-06-05 DIAGNOSIS — J45.909 CHRONIC ASTHMA WITHOUT COMPLICATION, UNSPECIFIED ASTHMA SEVERITY, UNSPECIFIED WHETHER PERSISTENT: ICD-10-CM

## 2023-06-05 PROCEDURE — 1159F PR MEDICATION LIST DOCUMENTED IN MEDICAL RECORD: ICD-10-PCS | Mod: CPTII,S$GLB,, | Performed by: NURSE PRACTITIONER

## 2023-06-05 PROCEDURE — 3288F PR FALLS RISK ASSESSMENT DOCUMENTED: ICD-10-PCS | Mod: CPTII,S$GLB,, | Performed by: NURSE PRACTITIONER

## 2023-06-05 PROCEDURE — 99999 PR PBB SHADOW E&M-EST. PATIENT-LVL V: ICD-10-PCS | Mod: PBBFAC,,, | Performed by: NURSE PRACTITIONER

## 2023-06-05 PROCEDURE — G9919 PR SCREENING AND POSITIVE: ICD-10-PCS | Mod: CPTII,S$GLB,, | Performed by: NURSE PRACTITIONER

## 2023-06-05 PROCEDURE — 3078F DIAST BP <80 MM HG: CPT | Mod: CPTII,S$GLB,, | Performed by: NURSE PRACTITIONER

## 2023-06-05 PROCEDURE — 1101F PR PT FALLS ASSESS DOC 0-1 FALLS W/OUT INJ PAST YR: ICD-10-PCS | Mod: CPTII,S$GLB,, | Performed by: NURSE PRACTITIONER

## 2023-06-05 PROCEDURE — 3078F PR MOST RECENT DIASTOLIC BLOOD PRESSURE < 80 MM HG: ICD-10-PCS | Mod: CPTII,S$GLB,, | Performed by: NURSE PRACTITIONER

## 2023-06-05 PROCEDURE — 1160F PR REVIEW ALL MEDS BY PRESCRIBER/CLIN PHARMACIST DOCUMENTED: ICD-10-PCS | Mod: CPTII,S$GLB,, | Performed by: NURSE PRACTITIONER

## 2023-06-05 PROCEDURE — 3008F BODY MASS INDEX DOCD: CPT | Mod: CPTII,S$GLB,, | Performed by: NURSE PRACTITIONER

## 2023-06-05 PROCEDURE — 3044F HG A1C LEVEL LT 7.0%: CPT | Mod: CPTII,S$GLB,, | Performed by: NURSE PRACTITIONER

## 2023-06-05 PROCEDURE — 4010F ACE/ARB THERAPY RXD/TAKEN: CPT | Mod: CPTII,S$GLB,, | Performed by: NURSE PRACTITIONER

## 2023-06-05 PROCEDURE — 3075F PR MOST RECENT SYSTOLIC BLOOD PRESS GE 130-139MM HG: ICD-10-PCS | Mod: CPTII,S$GLB,, | Performed by: NURSE PRACTITIONER

## 2023-06-05 PROCEDURE — 4010F PR ACE/ARB THEARPY RXD/TAKEN: ICD-10-PCS | Mod: CPTII,S$GLB,, | Performed by: NURSE PRACTITIONER

## 2023-06-05 PROCEDURE — G9919 SCRN ND POS ND PROV OF REC: HCPCS | Mod: CPTII,S$GLB,, | Performed by: NURSE PRACTITIONER

## 2023-06-05 PROCEDURE — 99397 PER PM REEVAL EST PAT 65+ YR: CPT | Mod: S$GLB,,, | Performed by: NURSE PRACTITIONER

## 2023-06-05 PROCEDURE — 3075F SYST BP GE 130 - 139MM HG: CPT | Mod: CPTII,S$GLB,, | Performed by: NURSE PRACTITIONER

## 2023-06-05 PROCEDURE — 99999 PR PBB SHADOW E&M-EST. PATIENT-LVL V: CPT | Mod: PBBFAC,,, | Performed by: NURSE PRACTITIONER

## 2023-06-05 PROCEDURE — 1160F RVW MEDS BY RX/DR IN RCRD: CPT | Mod: CPTII,S$GLB,, | Performed by: NURSE PRACTITIONER

## 2023-06-05 PROCEDURE — 1170F PR FUNCTIONAL STATUS ASSESSED: ICD-10-PCS | Mod: CPTII,S$GLB,, | Performed by: NURSE PRACTITIONER

## 2023-06-05 PROCEDURE — 1159F MED LIST DOCD IN RCRD: CPT | Mod: CPTII,S$GLB,, | Performed by: NURSE PRACTITIONER

## 2023-06-05 PROCEDURE — 3044F PR MOST RECENT HEMOGLOBIN A1C LEVEL <7.0%: ICD-10-PCS | Mod: CPTII,S$GLB,, | Performed by: NURSE PRACTITIONER

## 2023-06-05 PROCEDURE — 1170F FXNL STATUS ASSESSED: CPT | Mod: CPTII,S$GLB,, | Performed by: NURSE PRACTITIONER

## 2023-06-05 PROCEDURE — 3288F FALL RISK ASSESSMENT DOCD: CPT | Mod: CPTII,S$GLB,, | Performed by: NURSE PRACTITIONER

## 2023-06-05 PROCEDURE — 99397 PR PREVENTIVE VISIT,EST,65 & OVER: ICD-10-PCS | Mod: S$GLB,,, | Performed by: NURSE PRACTITIONER

## 2023-06-05 PROCEDURE — 3008F PR BODY MASS INDEX (BMI) DOCUMENTED: ICD-10-PCS | Mod: CPTII,S$GLB,, | Performed by: NURSE PRACTITIONER

## 2023-06-05 PROCEDURE — 1101F PT FALLS ASSESS-DOCD LE1/YR: CPT | Mod: CPTII,S$GLB,, | Performed by: NURSE PRACTITIONER

## 2023-06-05 RX ORDER — CHOLECALCIFEROL (VITAMIN D3) 25 MCG
1000 TABLET ORAL DAILY
COMMUNITY

## 2023-06-05 NOTE — PATIENT INSTRUCTIONS
Counseling and Referral of Other Preventative  (Italic type indicates deductible and co-insurance are waived)    Patient Name: Bell Lynn  Today's Date: 6/5/2023    Health Maintenance       Date Due Completion Date    Shingles Vaccine (1 of 2) Never done ---    COVID-19 Vaccine (4 - Moderna series) 01/25/2022 11/30/2021    Colorectal Cancer Screening 08/13/2023 8/13/2018    Hemoglobin A1c (Prediabetes) 05/10/2024 5/10/2023    DEXA Scan 06/01/2024 6/1/2022    Mammogram 05/11/2025 5/11/2023    TETANUS VACCINE 06/16/2026 6/16/2016    Lipid Panel 05/10/2028 5/10/2023        No orders of the defined types were placed in this encounter.    The following information is provided to all patients.  This information is to help you find resources for any of the problems found today that may be affecting your health:                Living healthy guide: www.[facility].[place].[facility]      Understanding Diabetes: www.diabetes.org      Eating healthy: www.cdc.gov/healthyweight      CDC home safety checklist: www.cdc.gov/steadi/patient.html      Agency on Aging: www.goea.[place].gov      Alcoholics anonymous (AA): www.aa.org      Physical Activity: www.ta.nih.gov/dy3just      Tobacco use: www.quitwithusla.org

## 2023-06-05 NOTE — TELEPHONE ENCOUNTER
----- Message from Soumya Torres sent at 6/5/2023  2:50 PM CDT -----  Regarding: labs for CT   Good day,      Patient will need a CMP lab for the CT , please have provider add to system

## 2023-06-05 NOTE — PROGRESS NOTES
"  Bell Lynn presented for a  Medicare AWV and comprehensive Health Risk Assessment today. The following components were reviewed and updated:    Medical history  Family History  Social history  Allergies and Current Medications  Health Risk Assessment  Health Maintenance  Care Team     Patient screened moderate and/or high risk for one or more social determinants of health (SDOH). Patient connected to community resources through the ED Navigator.      ** See Completed Assessments for Annual Wellness Visit within the encounter summary.**       The following assessments were completed:  Living Situation  CAGE  Depression Screening  Timed Get Up and Go  Whisper Test  Cognitive Function Screening  Nutrition Screening  ADL Screening  PAQ Screening          Vitals:    06/05/23 0737   BP: 138/72   BP Location: Left arm   Patient Position: Sitting   BP Method: Large (Manual)   Pulse: 72   Temp: 98.3 °F (36.8 °C)   TempSrc: Oral   SpO2: 98%   Weight: 89.4 kg (197 lb 1.5 oz)   Height: 5' 4" (1.626 m)     Body mass index is 33.83 kg/m².  Physical Exam  Vitals and nursing note reviewed.   Constitutional:       Appearance: Normal appearance. She is obese.   Cardiovascular:      Rate and Rhythm: Normal rate.      Pulses: Normal pulses.      Heart sounds: Normal heart sounds.   Pulmonary:      Effort: Pulmonary effort is normal.      Breath sounds: Normal breath sounds.   Musculoskeletal:         General: Normal range of motion.   Neurological:      Mental Status: She is alert and oriented to person, place, and time.   Psychiatric:         Mood and Affect: Mood normal.         Behavior: Behavior normal.             Diagnoses and health risks identified today and associated recommendations/orders:    1. Encounter for preventive health examination  Pt was seen today for an Annual Wellness visit. Healthcare maintenance and screening recommendations were discussed and updated as indicated. Return in one year for AWV.    Review " current opioid prescriptions:n/a  Screen for potential Substance Use Disorders:n/a    2. Mild recurrent major depression  The current medical regimen is effective;  continue present plan and medications.    3. Class 1 obesity due to excess calories with serious comorbidity and body mass index (BMI) of 33.0 to 33.9 in adult  The patient is asked to make an attempt to improve diet and exercise patterns to aid in medical management of this problem.    4. Essential hypertension 1/2021 TTE normal, stress test negative  The current medical regimen is effective;  continue present plan and medications.    5. Vitamin D deficiency  The current medical regimen is effective;  continue present plan and medications.    6. Gastroesophageal reflux disease, unspecified whether esophagitis present  The current medical regimen is effective;  continue present plan and medications.    7. Chronic asthma without complication, unspecified asthma severity, unspecified whether persistent  The current medical regimen is effective;  continue present plan and medications.    8. Fibromyalgia; diffuse arm, back pain, thigh pain; 2017 B12, TSH WNL; reynaldo without efficacy; Cymbalta.  The current medical regimen is effective;  continue present plan and medications.    9. Age-related osteoporosis without current pathological fracture; 6/2022 alendronate  The current medical regimen is effective;  continue present plan and medications.    10. History of benign carcinoid tumor rectum s/p resection per GI note  The current medical regimen is effective;  continue present plan and medications.    11. History of pituitary adenoma 9/2019 MRI no recurrence  The current medical regimen is effective;  continue present plan and medications.    12. Status post transsphenoidal pituitary resection for tumor, Dr Cardenas, about 1989  The current medical regimen is effective;  continue present plan and medications.        Provided Bell with a 5-10 year written  screening schedule and personal prevention plan. Recommendations were developed using the USPSTF age appropriate recommendations. Education, counseling, and referrals were provided as needed. After Visit Summary printed and given to patient which includes a list of additional screenings\tests needed.    Follow up in about 1 year (around 6/5/2024).    EUGENIE Alexis  I offered to discuss advanced care planning, including how to pick a person who would make decisions for you if you were unable to make them for yourself, called a health care power of , and what kind of decisions you might make such as use of life sustaining treatments such as ventilators and tube feeding when faced with a life limiting illness recorded on a living will that they will need to know. (How you want to be cared for as you near the end of your natural life)     X Patient is interested in learning more about how to make advanced directives.  I provided them paperwork and offered to discuss this with them.

## 2023-06-05 NOTE — TELEPHONE ENCOUNTER
----- Message from Deb Salazar sent at 6/5/2023  8:38 AM CDT -----  Regarding: Lab order   Good morning,       This patient is scheduled for a ct scan on 05/12/23. The patient will need a cmp, bmp, or creatinine lab prior to receiving contrast. Please put in the lab order as soon as possible to avoid any delay in patient care.       Thank you,  Deb Salazar

## 2023-06-07 ENCOUNTER — OFFICE VISIT (OUTPATIENT)
Dept: CARDIOLOGY | Facility: CLINIC | Age: 68
End: 2023-06-07
Payer: MEDICARE

## 2023-06-07 VITALS
BODY MASS INDEX: 33.76 KG/M2 | HEART RATE: 80 BPM | OXYGEN SATURATION: 97 % | WEIGHT: 197.75 LBS | SYSTOLIC BLOOD PRESSURE: 130 MMHG | RESPIRATION RATE: 15 BRPM | HEIGHT: 64 IN | DIASTOLIC BLOOD PRESSURE: 80 MMHG

## 2023-06-07 DIAGNOSIS — R00.2 PALPITATIONS: ICD-10-CM

## 2023-06-07 DIAGNOSIS — R07.89 CHEST PAIN, ATYPICAL: ICD-10-CM

## 2023-06-07 DIAGNOSIS — R06.09 DOE (DYSPNEA ON EXERTION): ICD-10-CM

## 2023-06-07 DIAGNOSIS — I10 ESSENTIAL HYPERTENSION: Primary | Chronic | ICD-10-CM

## 2023-06-07 PROCEDURE — 3288F FALL RISK ASSESSMENT DOCD: CPT | Mod: CPTII,S$GLB,, | Performed by: INTERNAL MEDICINE

## 2023-06-07 PROCEDURE — 93000 EKG 12-LEAD: ICD-10-PCS | Mod: S$GLB,,, | Performed by: INTERNAL MEDICINE

## 2023-06-07 PROCEDURE — 93000 ELECTROCARDIOGRAM COMPLETE: CPT | Mod: S$GLB,,, | Performed by: INTERNAL MEDICINE

## 2023-06-07 PROCEDURE — 1159F PR MEDICATION LIST DOCUMENTED IN MEDICAL RECORD: ICD-10-PCS | Mod: CPTII,S$GLB,, | Performed by: INTERNAL MEDICINE

## 2023-06-07 PROCEDURE — 4010F ACE/ARB THERAPY RXD/TAKEN: CPT | Mod: CPTII,S$GLB,, | Performed by: INTERNAL MEDICINE

## 2023-06-07 PROCEDURE — 4010F PR ACE/ARB THEARPY RXD/TAKEN: ICD-10-PCS | Mod: CPTII,S$GLB,, | Performed by: INTERNAL MEDICINE

## 2023-06-07 PROCEDURE — 99999 PR PBB SHADOW E&M-EST. PATIENT-LVL IV: CPT | Mod: PBBFAC,,, | Performed by: INTERNAL MEDICINE

## 2023-06-07 PROCEDURE — 99214 OFFICE O/P EST MOD 30 MIN: CPT | Mod: S$GLB,,, | Performed by: INTERNAL MEDICINE

## 2023-06-07 PROCEDURE — 3008F BODY MASS INDEX DOCD: CPT | Mod: CPTII,S$GLB,, | Performed by: INTERNAL MEDICINE

## 2023-06-07 PROCEDURE — 1101F PR PT FALLS ASSESS DOC 0-1 FALLS W/OUT INJ PAST YR: ICD-10-PCS | Mod: CPTII,S$GLB,, | Performed by: INTERNAL MEDICINE

## 2023-06-07 PROCEDURE — 1126F PR PAIN SEVERITY QUANTIFIED, NO PAIN PRESENT: ICD-10-PCS | Mod: CPTII,S$GLB,, | Performed by: INTERNAL MEDICINE

## 2023-06-07 PROCEDURE — 99214 PR OFFICE/OUTPT VISIT, EST, LEVL IV, 30-39 MIN: ICD-10-PCS | Mod: S$GLB,,, | Performed by: INTERNAL MEDICINE

## 2023-06-07 PROCEDURE — 3288F PR FALLS RISK ASSESSMENT DOCUMENTED: ICD-10-PCS | Mod: CPTII,S$GLB,, | Performed by: INTERNAL MEDICINE

## 2023-06-07 PROCEDURE — 1126F AMNT PAIN NOTED NONE PRSNT: CPT | Mod: CPTII,S$GLB,, | Performed by: INTERNAL MEDICINE

## 2023-06-07 PROCEDURE — 3079F PR MOST RECENT DIASTOLIC BLOOD PRESSURE 80-89 MM HG: ICD-10-PCS | Mod: CPTII,S$GLB,, | Performed by: INTERNAL MEDICINE

## 2023-06-07 PROCEDURE — 3044F HG A1C LEVEL LT 7.0%: CPT | Mod: CPTII,S$GLB,, | Performed by: INTERNAL MEDICINE

## 2023-06-07 PROCEDURE — 3008F PR BODY MASS INDEX (BMI) DOCUMENTED: ICD-10-PCS | Mod: CPTII,S$GLB,, | Performed by: INTERNAL MEDICINE

## 2023-06-07 PROCEDURE — 3079F DIAST BP 80-89 MM HG: CPT | Mod: CPTII,S$GLB,, | Performed by: INTERNAL MEDICINE

## 2023-06-07 PROCEDURE — 1101F PT FALLS ASSESS-DOCD LE1/YR: CPT | Mod: CPTII,S$GLB,, | Performed by: INTERNAL MEDICINE

## 2023-06-07 PROCEDURE — 3075F PR MOST RECENT SYSTOLIC BLOOD PRESS GE 130-139MM HG: ICD-10-PCS | Mod: CPTII,S$GLB,, | Performed by: INTERNAL MEDICINE

## 2023-06-07 PROCEDURE — 3075F SYST BP GE 130 - 139MM HG: CPT | Mod: CPTII,S$GLB,, | Performed by: INTERNAL MEDICINE

## 2023-06-07 PROCEDURE — 99999 PR PBB SHADOW E&M-EST. PATIENT-LVL IV: ICD-10-PCS | Mod: PBBFAC,,, | Performed by: INTERNAL MEDICINE

## 2023-06-07 PROCEDURE — 3044F PR MOST RECENT HEMOGLOBIN A1C LEVEL <7.0%: ICD-10-PCS | Mod: CPTII,S$GLB,, | Performed by: INTERNAL MEDICINE

## 2023-06-07 PROCEDURE — 1159F MED LIST DOCD IN RCRD: CPT | Mod: CPTII,S$GLB,, | Performed by: INTERNAL MEDICINE

## 2023-06-07 NOTE — PROGRESS NOTES
Subjective:   Patient ID:  Bell Lynn is a 68 y.o. female who presents for follow-up of No chief complaint on file.      HPI    HTN, LANGE, obesity     Previously saw Dr Willson  Her follow-up chest pain shortness of breath.  Again she has had progressive symptoms where she can't even walk down the hallway without exertional problems better relieved with rest.  She denies any sustained tachycardia or palpitations.  She has experienced no PND, orthopnea or lower extremity edema.  She has skin significant secondhand smoke exposure with her  smoking in the house.  She smells of tobacco on her clothing today.  She denies any dizziness, presyncope or syncope.  She has had multiple family members with deaths from MI in the past year and mainly wants a checkup.     Echo 1/26/21  The left ventricle is normal in size with concentric hypertrophy and normal systolic function. The estimated ejection fraction is 60%  Normal left ventricular diastolic function.  Normal right ventricular size with normal right ventricular systolic function.  Normal central venous pressure (3 mmHg).  The estimated PA systolic pressure is 5 mmHg.     Stress test 1/26/21    Normal myocardial perfusion scan. There is no evidence of myocardial ischemia or infarction.    The gated perfusion images showed an ejection fraction of 54% post stress. .    The EKG portion of this study is negative for ischemia.    The patient reported no chest pain during the stress test.    There were no arrhythmias during stress     Holter 1/26/21  Sinus rhythm with heart rates varying between 58 and 118 bpm with an average of 81 bpm.  There were very rare PVCs totalling 19 and averaging 0.79 per hour.  There were very rare PACs totalling 2 and averaging 0.08 per hour.    1/14/21 for the last 2 weeks reports episodic CP - tightness with radiation to left arm both with exertion and at rest - can last several hours. Occasional palpitations with dizziness.  Worsening LANGE  EKG NSR - ok   Echo and lexiscan myoview for CP and SOB - says she cannot walk treadmill  Holter for palpitations    6/7/23 Recently having issues with palpitations and sticking left sided CP. Since daughter graduated High School all of the symptoms and BP have improved  EKG NSR NSSTT changes      Review of Systems   Constitutional: Negative for decreased appetite.   HENT:  Negative for ear discharge.    Eyes:  Negative for blurred vision.   Respiratory:  Negative for hemoptysis.    Endocrine: Negative for polyphagia.   Hematologic/Lymphatic: Negative for adenopathy.   Skin:  Negative for color change.   Musculoskeletal:  Negative for joint swelling.   Genitourinary:  Negative for bladder incontinence.   Neurological:  Negative for brief paralysis.   Psychiatric/Behavioral:  Negative for hallucinations.    Allergic/Immunologic: Negative for hives.     Objective:   Physical Exam  Constitutional:       Appearance: She is well-developed.   HENT:      Head: Normocephalic and atraumatic.   Eyes:      Conjunctiva/sclera: Conjunctivae normal.      Pupils: Pupils are equal, round, and reactive to light.   Cardiovascular:      Rate and Rhythm: Normal rate.      Pulses: Intact distal pulses.      Heart sounds: Normal heart sounds.   Pulmonary:      Effort: Pulmonary effort is normal.      Breath sounds: Normal breath sounds.   Abdominal:      General: Bowel sounds are normal.      Palpations: Abdomen is soft.   Musculoskeletal:         General: Normal range of motion.      Cervical back: Normal range of motion and neck supple.   Skin:     General: Skin is warm and dry.   Neurological:      Mental Status: She is alert and oriented to person, place, and time.       Assessment:      1. Essential hypertension 1/2021 TTE normal, stress test negative    2. Palpitations    3. LANGE (dyspnea on exertion)    4. Chest pain, atypical        Plan:     Given improvement in symptoms after stress level improved we are both in  agreement to observe CP and palpitations  Continue Rx for HTN  Needs more diet and exercise  OV 6 months

## 2023-06-12 ENCOUNTER — HOSPITAL ENCOUNTER (OUTPATIENT)
Dept: RADIOLOGY | Facility: HOSPITAL | Age: 68
Discharge: HOME | End: 2023-06-12
Attending: INTERNAL MEDICINE
Payer: MEDICARE

## 2023-06-12 DIAGNOSIS — R19.00 INTRA-ABDOMINAL AND PELVIC SWELLING, MASS AND LUMP, UNSPECIFIED SITE: ICD-10-CM

## 2023-06-12 PROCEDURE — 74177 CT ABD & PELVIS W/CONTRAST: CPT | Mod: 26,,, | Performed by: RADIOLOGY

## 2023-06-12 PROCEDURE — 25500020 PHARM REV CODE 255: Performed by: INTERNAL MEDICINE

## 2023-06-12 PROCEDURE — 74177 CT ABDOMEN PELVIS WITH CONTRAST: ICD-10-PCS | Mod: 26,,, | Performed by: RADIOLOGY

## 2023-06-12 PROCEDURE — 74177 CT ABD & PELVIS W/CONTRAST: CPT | Mod: TC

## 2023-06-12 RX ADMIN — IOHEXOL 100 ML: 350 INJECTION, SOLUTION INTRAVENOUS at 12:06

## 2023-06-12 RX ADMIN — IOHEXOL 15 ML: 300 INJECTION, SOLUTION INTRAVENOUS at 12:06

## 2023-06-13 ENCOUNTER — PATIENT MESSAGE (OUTPATIENT)
Dept: FAMILY MEDICINE | Facility: CLINIC | Age: 68
End: 2023-06-13
Payer: MEDICARE

## 2023-06-14 ENCOUNTER — TELEPHONE (OUTPATIENT)
Dept: ENDOSCOPY | Facility: HOSPITAL | Age: 68
End: 2023-06-14
Payer: MEDICARE

## 2023-06-15 NOTE — TELEPHONE ENCOUNTER
----- Message from Giovani Abernathy MD sent at 6/13/2023  8:03 AM CDT -----  Regarding: RE: abd mass - can this be evaluated by EUS?  Mumtaz Cota- Yes, this is something that should be accessible via EUS guided biopsy. I'll arrange for this soon.    Sheryl- Please arrange for EUS with biopsy of mesenteric mass within a week. Can be at [facility] or [place]. She is not on any blood thinners, so this should be easy to arrange. 45min case.       ----- Message -----  From: Cipriano Carrera MD  Sent: 6/13/2023   7:17 AM CDT  To: Giovani Abernathy MD  Subject: abd mass - can this be evaluated by EUS?         Hello,    Really sorry to bother you, but i'm trying to figure out who to refer this pt to for further workup for GI mass seen on CT.  Is this someone you can see, or is this more appropriate for surgery?  Wasn't sure if she should see GI and if this spot was amenable to EUS with biopsy vs surgery.     Report states: Below the pancreas there is a soft tissue mass measuring 6.9 cm.  This appears to be small bowel origin.  Adjacent to this mass are multiple mesenteric lymph nodes.    Thanks,  Cipriano

## 2023-06-16 ENCOUNTER — PATIENT MESSAGE (OUTPATIENT)
Dept: PODIATRY | Facility: CLINIC | Age: 68
End: 2023-06-16
Payer: MEDICARE

## 2023-06-17 DIAGNOSIS — R93.89 ABNORMAL FINDING ON IMAGING: Primary | ICD-10-CM

## 2023-06-20 ENCOUNTER — PATIENT MESSAGE (OUTPATIENT)
Dept: ENDOSCOPY | Facility: HOSPITAL | Age: 68
End: 2023-06-20
Payer: MEDICARE

## 2023-06-20 ENCOUNTER — OFFICE VISIT (OUTPATIENT)
Dept: GASTROENTEROLOGY | Facility: CLINIC | Age: 68
End: 2023-06-20
Payer: MEDICARE

## 2023-06-20 ENCOUNTER — TELEPHONE (OUTPATIENT)
Dept: ENDOSCOPY | Facility: HOSPITAL | Age: 68
End: 2023-06-20
Payer: MEDICARE

## 2023-06-20 VITALS
DIASTOLIC BLOOD PRESSURE: 88 MMHG | BODY MASS INDEX: 33.75 KG/M2 | HEART RATE: 92 BPM | HEIGHT: 64 IN | OXYGEN SATURATION: 99 % | WEIGHT: 197.69 LBS | SYSTOLIC BLOOD PRESSURE: 150 MMHG

## 2023-06-20 DIAGNOSIS — R10.30 LOWER ABDOMINAL PAIN: Primary | ICD-10-CM

## 2023-06-20 DIAGNOSIS — R93.3 ABNORMAL FINDING ON GI TRACT IMAGING: ICD-10-CM

## 2023-06-20 PROCEDURE — 99214 PR OFFICE/OUTPT VISIT, EST, LEVL IV, 30-39 MIN: ICD-10-PCS | Mod: S$GLB,,, | Performed by: NURSE PRACTITIONER

## 2023-06-20 PROCEDURE — 4010F ACE/ARB THERAPY RXD/TAKEN: CPT | Mod: CPTII,S$GLB,, | Performed by: NURSE PRACTITIONER

## 2023-06-20 PROCEDURE — 3044F HG A1C LEVEL LT 7.0%: CPT | Mod: CPTII,S$GLB,, | Performed by: NURSE PRACTITIONER

## 2023-06-20 PROCEDURE — 3077F PR MOST RECENT SYSTOLIC BLOOD PRESSURE >= 140 MM HG: ICD-10-PCS | Mod: CPTII,S$GLB,, | Performed by: NURSE PRACTITIONER

## 2023-06-20 PROCEDURE — 1101F PR PT FALLS ASSESS DOC 0-1 FALLS W/OUT INJ PAST YR: ICD-10-PCS | Mod: CPTII,S$GLB,, | Performed by: NURSE PRACTITIONER

## 2023-06-20 PROCEDURE — 3008F PR BODY MASS INDEX (BMI) DOCUMENTED: ICD-10-PCS | Mod: CPTII,S$GLB,, | Performed by: NURSE PRACTITIONER

## 2023-06-20 PROCEDURE — 3288F FALL RISK ASSESSMENT DOCD: CPT | Mod: CPTII,S$GLB,, | Performed by: NURSE PRACTITIONER

## 2023-06-20 PROCEDURE — 1159F MED LIST DOCD IN RCRD: CPT | Mod: CPTII,S$GLB,, | Performed by: NURSE PRACTITIONER

## 2023-06-20 PROCEDURE — 3079F DIAST BP 80-89 MM HG: CPT | Mod: CPTII,S$GLB,, | Performed by: NURSE PRACTITIONER

## 2023-06-20 PROCEDURE — 1101F PT FALLS ASSESS-DOCD LE1/YR: CPT | Mod: CPTII,S$GLB,, | Performed by: NURSE PRACTITIONER

## 2023-06-20 PROCEDURE — 3008F BODY MASS INDEX DOCD: CPT | Mod: CPTII,S$GLB,, | Performed by: NURSE PRACTITIONER

## 2023-06-20 PROCEDURE — 1125F AMNT PAIN NOTED PAIN PRSNT: CPT | Mod: CPTII,S$GLB,, | Performed by: NURSE PRACTITIONER

## 2023-06-20 PROCEDURE — 3044F PR MOST RECENT HEMOGLOBIN A1C LEVEL <7.0%: ICD-10-PCS | Mod: CPTII,S$GLB,, | Performed by: NURSE PRACTITIONER

## 2023-06-20 PROCEDURE — 1125F PR PAIN SEVERITY QUANTIFIED, PAIN PRESENT: ICD-10-PCS | Mod: CPTII,S$GLB,, | Performed by: NURSE PRACTITIONER

## 2023-06-20 PROCEDURE — 1159F PR MEDICATION LIST DOCUMENTED IN MEDICAL RECORD: ICD-10-PCS | Mod: CPTII,S$GLB,, | Performed by: NURSE PRACTITIONER

## 2023-06-20 PROCEDURE — 1160F PR REVIEW ALL MEDS BY PRESCRIBER/CLIN PHARMACIST DOCUMENTED: ICD-10-PCS | Mod: CPTII,S$GLB,, | Performed by: NURSE PRACTITIONER

## 2023-06-20 PROCEDURE — 4010F PR ACE/ARB THEARPY RXD/TAKEN: ICD-10-PCS | Mod: CPTII,S$GLB,, | Performed by: NURSE PRACTITIONER

## 2023-06-20 PROCEDURE — 99214 OFFICE O/P EST MOD 30 MIN: CPT | Mod: S$GLB,,, | Performed by: NURSE PRACTITIONER

## 2023-06-20 PROCEDURE — 3288F PR FALLS RISK ASSESSMENT DOCUMENTED: ICD-10-PCS | Mod: CPTII,S$GLB,, | Performed by: NURSE PRACTITIONER

## 2023-06-20 PROCEDURE — 3079F PR MOST RECENT DIASTOLIC BLOOD PRESSURE 80-89 MM HG: ICD-10-PCS | Mod: CPTII,S$GLB,, | Performed by: NURSE PRACTITIONER

## 2023-06-20 PROCEDURE — 99999 PR PBB SHADOW E&M-EST. PATIENT-LVL V: CPT | Mod: PBBFAC,,, | Performed by: NURSE PRACTITIONER

## 2023-06-20 PROCEDURE — 1160F RVW MEDS BY RX/DR IN RCRD: CPT | Mod: CPTII,S$GLB,, | Performed by: NURSE PRACTITIONER

## 2023-06-20 PROCEDURE — 99999 PR PBB SHADOW E&M-EST. PATIENT-LVL V: ICD-10-PCS | Mod: PBBFAC,,, | Performed by: NURSE PRACTITIONER

## 2023-06-20 PROCEDURE — 3077F SYST BP >= 140 MM HG: CPT | Mod: CPTII,S$GLB,, | Performed by: NURSE PRACTITIONER

## 2023-06-20 RX ORDER — DICYCLOMINE HYDROCHLORIDE 20 MG/1
20 TABLET ORAL 3 TIMES DAILY PRN
Qty: 60 TABLET | Refills: 2 | Status: SHIPPED | OUTPATIENT
Start: 2023-06-20

## 2023-06-20 NOTE — PROGRESS NOTES
Subjective:       Patient ID: Bell Lynn is a 68 y.o. female.    Chief Complaint: Abdominal Pain (Lower)    69 y/o female with pmhx of hypertension, chronic back pain, PUD, and constipation presents to clinic for follow up with c/o lower abdominal pain. Per chart review abdominal mass incidentally found on MRI of L spine. Follow up CT of abd revealed a soft tissue mass within the abdomen below the pancreas with small adjacent lymph nodes; concerns for carcinoid neuroendocrine tumor or lymphoma.  Today she reports intermittent lower abdominal cramping pain often relieved by BM. She is taking Linzess daily and having BMs daily or EOD. No hematochezia or melena. Denies any nausea, vomiting, fever, night sweats or unintentional weight loss.     Endoscopy history  - 8/13/2018 Colonoscopy: Mild diverticulosis of the sigmoid colon; Grade 1 internal hemorrhoids; Polyp ( 4 mm ) in the sigmoid colon; External hemorrhoids   -10/07/2020 EGD: Normal esophagus. Small hiatal hernia. Non-bleeding gastric ulcers with no stigmata of   bleeding. Biopsied. This has been present for more than 2 years according to previous EGD report 7/2018. Normal examined duodenum. Tonsils are touching.   - 6/30/2021 EGD: Normal esophagus. Gastric ulcer with no stigmata of bleeding. Biopsied. This is improved from previous but not gone. Normal examined duodenum.     Past Medical History:   Diagnosis Date    Asthma     Depression     Gastric ulcer with hemorrhage 7/2012    GERD (gastroesophageal reflux disease)     History of blood transfusion     Hypertension     Iron deficiency anemia 3/14/2013    Osteoarthritis of both knees        Past Surgical History:   Procedure Laterality Date    BREAST BIOPSY      BREAST SURGERY      Benign breast cyst    COLONOSCOPY      ESOPHAGOGASTRODUODENOSCOPY N/A 10/7/2020    Procedure: EGD (ESOPHAGOGASTRODUODENOSCOPY);  Surgeon: Nell Parish MD;  Location: [facility];  Service: Endoscopy;  Laterality:  N/A;    ESOPHAGOGASTRODUODENOSCOPY N/A 6/30/2021    Procedure: EGD (ESOPHAGOGASTRODUODENOSCOPY);  Surgeon: Nell Parish MD;  [facility];  Service: Endoscopy;  Laterality: N/A;    HYSTERECTOMY      Partial    KNEE ARTHROPLASTY Left 1/10/2022    Procedure: ARTHROPLASTY, KNEE;  Surgeon: Jonnathan Tavera MD;  [facility];  Service: Orthopedics;  Laterality: Left;    TONSILLECTOMY         Family History   Problem Relation Age of Onset    Heart disease Mother     Hypertension Mother     Heart disease Father     Hypertension Father     Breast cancer Sister     Breast cancer Sister        Social History     Socioeconomic History    Marital status:    Occupational History     Employer: [employer]   Tobacco Use    Smoking status: Never     Passive exposure: Never    Smokeless tobacco: Never   Substance and Sexual Activity    Alcohol use: Yes     Alcohol/week: 1.0 standard drink     Types: 1 Cans of beer per week     Comment: on ocassion    Drug use: No    Sexual activity: Not Currently     Partners: Male     Birth control/protection: Surgical     Social Determinants of Health     Financial Resource Strain: Low Risk     Difficulty of Paying Living Expenses: Not hard at all   Food Insecurity: No Food Insecurity    Worried About Running Out of Food in the Last Year: Never true    Ran Out of Food in the Last Year: Never true   Transportation Needs: No Transportation Needs    Lack of Transportation (Medical): No    Lack of Transportation (Non-Medical): No   Physical Activity: Inactive    Days of Exercise per Week: 0 days    Minutes of Exercise per Session: 0 min   Stress: No Stress Concern Present    Feeling of Stress : Only a little   Social Connections: Unknown    Frequency of Communication with Friends and Family: More than three times a week    Frequency of Social Gatherings with Friends and Family: Once a week    Active Member of Clubs or Organizations: No    Attends Club or  "Organization Meetings: Never    Marital Status:    Housing Stability: Low Risk     Unable to Pay for Housing in the Last Year: No    Number of Places Lived in the Last Year: 1    Unstable Housing in the Last Year: No       Review of Systems   Constitutional:  Negative for appetite change.   HENT:  Negative for trouble swallowing.    Respiratory:  Negative for shortness of breath.    Cardiovascular:  Negative for chest pain.   Gastrointestinal:  Positive for abdominal pain.   Genitourinary:  Negative for dysuria.   Psychiatric/Behavioral:  Negative for dysphoric mood.        Objective:     Vitals:    06/20/23 1119   BP: (!) 150/88   BP Location: Right arm   Patient Position: Sitting   BP Method: Large (Manual)   Pulse: 92   SpO2: 99%   Weight: 89.7 kg (197 lb 11.2 oz)   Height: 5' 4" (1.626 m)          Physical Exam  Constitutional:       General: She is not in acute distress.  HENT:      Head: Normocephalic.   Eyes:      Conjunctiva/sclera: Conjunctivae normal.   Pulmonary:      Effort: Pulmonary effort is normal. No respiratory distress.   Musculoskeletal:         General: Normal range of motion.      Cervical back: Normal range of motion.   Skin:     General: Skin is warm and dry.   Neurological:      Mental Status: She is alert and oriented to person, place, and time.   Psychiatric:         Mood and Affect: Mood normal.         Behavior: Behavior normal.             Assessment:         ICD-10-CM ICD-9-CM   1. Lower abdominal pain  R10.30 789.09   2. Abnormal finding on GI tract imaging  R93.3 793.4       Plan:       Lower abdominal pain  -     dicyclomine (BENTYL) 20 mg tablet; Take 1 tablet (20 mg total) by mouth 3 (three) times daily as needed (abdominal pain).  Dispense: 60 tablet; Refill: 2    Abnormal finding on GI tract imaging        -    Will contact [facility] staff to facilitate scheduling EUS.    Follow up if symptoms worsen or fail to improve.     Patient's Medications   New Prescriptions    " DICYCLOMINE (BENTYL) 20 MG TABLET    Take 1 tablet (20 mg total) by mouth 3 (three) times daily as needed (abdominal pain).   Previous Medications    ALBUTEROL (PROVENTIL/VENTOLIN HFA) 90 MCG/ACTUATION INHALER    Inhale 2 puffs into the lungs every 6 (six) hours as needed for Wheezing. Rescue    ALENDRONATE (FOSAMAX) 70 MG TABLET    TAKE 1 TABLET EVERY 7 DAYS    AMLODIPINE (NORVASC) 10 MG TABLET    TAKE 1 TABLET ONE TIME DAILY (DOSE INCREASE)    CETIRIZINE (ZYRTEC) 10 MG TABLET    Take 1 tablet (10 mg total) by mouth once daily.    CYCLOBENZAPRINE (FLEXERIL) 10 MG TABLET    TAKE 1 TABLET EVERY EVENING    DULOXETINE (CYMBALTA) 60 MG CAPSULE    TAKE 1 CAPSULE ONE TIME DAILY (INCREASED DOSE)    FUROSEMIDE (LASIX) 20 MG TABLET    TAKE 1 TABLET ONE TIME DAILY FOR LEG SWELLING AS NEEDED    GABAPENTIN (NEURONTIN) 100 MG CAPSULE    3 tablets nighttime 1 tablet in AM; increased dose    LATANOPROST 0.005 % OPHTHALMIC SOLUTION    Place 1 drop into the right eye every evening.    LIDOCAINE (LIDODERM) 5 %    PLACE 1 PATCH ONTO THE SKIN ONCE DAILY. REMOVE AND DISCARD PATCH WITHIN 12 HOURS OR AS DIRECTED BY MD    LINACLOTIDE (LINZESS) 145 MCG CAP CAPSULE    Take 1 capsule (145 mcg total) by mouth once daily.    METOPROLOL SUCCINATE (TOPROL-XL) 100 MG 24 HR TABLET    Take 1 tablet (100 mg total) by mouth once daily.    NITROGLYCERIN (NITROSTAT) 0.4 MG SL TABLET    Place 1 tablet (0.4 mg total) under the tongue every 5 (five) minutes as needed for Chest pain.    OMEPRAZOLE (PRILOSEC) 40 MG CAPSULE    Take 1 capsule (40 mg total) by mouth every morning.    POTASSIUM CHLORIDE SA (K-DUR,KLOR-CON) 20 MEQ TABLET    Take 1 tablet (20 mEq total) by mouth 2 (two) times daily.    SPIRONOLACTONE (ALDACTONE) 50 MG TABLET    Take 1 tablet (50 mg total) by mouth once daily.    VALSARTAN (DIOVAN) 320 MG TABLET    Take 1 tablet (320 mg total) by mouth once daily. Stop losartan    VITAMIN D (VITAMIN D3) 1000 UNITS TAB    Take 1,000 Units by mouth  once daily.   Modified Medications    No medications on file   Discontinued Medications    No medications on file

## 2023-06-20 NOTE — TELEPHONE ENCOUNTER
----- Message from Sujata Ferreira sent at 6/20/2023  2:02 PM CDT -----  Contact: self  Type:  Needs Medical Advice    Who Called: self    Would the patient rather a call back or a response via [facility]? call  Best Call Back Number: [phone] (home)     Additional Information: pt wants to have an US of her hand because if her hand swelling the same day as her mammogram. Pt sts she send a message to the portal about it to. Please advise and thank you.         10/9/2020 Spoke with patient, informed her of EGD Bx. And Dr. Parish report. Repeat EGD 3-6 months to recheck for healing. Vf/ma

## 2023-06-21 NOTE — TELEPHONE ENCOUNTER
----- Message from Giovani Abernathy MD sent at 6/13/2023  8:03 AM CDT -----  Regarding: RE: abd mass - can this be evaluated by EUS?  Mumtaz Cota- Yes, this is something that should be accessible via EUS guided biopsy. I'll arrange for this soon.    Sheryl- Please arrange for EUS with biopsy of mesenteric mass within a week. Can be at [facility] [place] or [place]. She is not on any blood thinners, so this should be easy to arrange. 45min case.       ----- Message -----  From: Cipriano Carrera MD  Sent: 6/13/2023   7:17 AM CDT  To: Giovani Abernathy MD  Subject: abd mass - can this be evaluated by EUS?         Hello,    Really sorry to bother you, but i'm trying to figure out who to refer this pt to for further workup for GI mass seen on CT.  Is this someone you can see, or is this more appropriate for surgery?  Wasn't sure if she should see GI and if this spot was amenable to EUS with biopsy vs surgery.     Report states: Below the pancreas there is a soft tissue mass measuring 6.9 cm.  This appears to be small bowel origin.  Adjacent to this mass are multiple mesenteric lymph nodes.    Thanks,  Cipriano

## 2023-06-22 ENCOUNTER — TELEPHONE (OUTPATIENT)
Dept: ENDOSCOPY | Facility: HOSPITAL | Age: 68
End: 2023-06-22
Payer: MEDICARE

## 2023-06-23 ENCOUNTER — TELEPHONE (OUTPATIENT)
Dept: ENDOSCOPY | Facility: HOSPITAL | Age: 68
End: 2023-06-23
Payer: MEDICARE

## 2023-06-23 NOTE — TELEPHONE ENCOUNTER
Spoke with patient about arrival time @ 0930.   Upper EUS     NPO status reviewed:  Day Before Procedure 6/26/2023      You may have a light evening meal.   No solid food after 7:00 pm.   Continue drinking clear liquids.        Day of the Procedure 6/27/2023      You may have water/clear liquids until 4 hours before your procedure or as directed by the scheduling nurse 6:00 AM.   See below for list.     What You CANNOT do:   Do not drink milk or anything colored red.  Do not drink alcohol.  No gum chewing or candy morning of procedure     Liquids That Are OK to Drink:   Water  Sports drinks (Gatorade, Power-Aid)  Coffee or tea (no cream or nondairy creamer)  Clear juices without pulp (apple, white grape)  Gelatin desserts (no fruit or toppings)  Clear soda (sprite, coke, ginger ale)  Chicken broth (until 12 midnight the night before procedure)          Medications: Do not take Insulin or oral diabetic medications the day of the procedure.    Take as prescribed: heart, seizure and blood pressure medication in the morning with a sip of water (less than an ounce).  Take any breathing medications and bring inhalers to hospital with you.     Leave all valuables and jewelry at home. Wear comfortable clothes to procedure to change into hospital gown.   You cannot drive for 24 hours after your procedure because you will receive sedation for your procedure to make you comfortable.    A ride must be provided at discharge.

## 2023-06-25 ENCOUNTER — PATIENT MESSAGE (OUTPATIENT)
Dept: ENDOSCOPY | Facility: HOSPITAL | Age: 68
End: 2023-06-25
Payer: MEDICARE

## 2023-06-25 ENCOUNTER — PATIENT MESSAGE (OUTPATIENT)
Dept: ADMINISTRATIVE | Facility: OTHER | Age: 68
End: 2023-06-25
Payer: MEDICARE

## 2023-06-27 ENCOUNTER — HOSPITAL ENCOUNTER (OUTPATIENT)
Facility: HOSPITAL | Age: 68
Discharge: HOME | End: 2023-06-27
Attending: INTERNAL MEDICINE | Admitting: INTERNAL MEDICINE
Payer: MEDICARE

## 2023-06-27 ENCOUNTER — ANESTHESIA EVENT (OUTPATIENT)
Dept: ENDOSCOPY | Facility: HOSPITAL | Age: 68
End: 2023-06-27
Payer: MEDICARE

## 2023-06-27 ENCOUNTER — ANESTHESIA (OUTPATIENT)
Dept: ENDOSCOPY | Facility: HOSPITAL | Age: 68
End: 2023-06-27
Payer: MEDICARE

## 2023-06-27 VITALS
HEART RATE: 76 BPM | RESPIRATION RATE: 17 BRPM | BODY MASS INDEX: 33.63 KG/M2 | WEIGHT: 197 LBS | OXYGEN SATURATION: 100 % | TEMPERATURE: 98 F | SYSTOLIC BLOOD PRESSURE: 127 MMHG | HEIGHT: 64 IN | DIASTOLIC BLOOD PRESSURE: 81 MMHG

## 2023-06-27 DIAGNOSIS — R19.00 ABDOMINAL MASS: ICD-10-CM

## 2023-06-27 PROCEDURE — 43242 EGD US FINE NEEDLE BX/ASPIR: CPT | Mod: ,,, | Performed by: INTERNAL MEDICINE

## 2023-06-27 PROCEDURE — 43242 PR UPGI ENDOSCOPY,FN NEEDLE BX,GUIDED: ICD-10-PCS | Mod: ,,, | Performed by: INTERNAL MEDICINE

## 2023-06-27 PROCEDURE — 27201012 HC FORCEPS, HOT/COLD, DISP: Performed by: INTERNAL MEDICINE

## 2023-06-27 PROCEDURE — 37000008 HC ANESTHESIA 1ST 15 MINUTES: Performed by: INTERNAL MEDICINE

## 2023-06-27 PROCEDURE — 27201238 HC BALLOON, OVERTUBE (ANY): Performed by: INTERNAL MEDICINE

## 2023-06-27 PROCEDURE — 43239 EGD BIOPSY SINGLE/MULTIPLE: CPT | Mod: 59 | Performed by: INTERNAL MEDICINE

## 2023-06-27 PROCEDURE — 43239 PR EGD, FLEX, W/BIOPSY, SGL/MULTI: ICD-10-PCS | Mod: 59,,, | Performed by: INTERNAL MEDICINE

## 2023-06-27 PROCEDURE — D9220A PRA ANESTHESIA: Mod: ANES,,, | Performed by: ANESTHESIOLOGY

## 2023-06-27 PROCEDURE — 37000009 HC ANESTHESIA EA ADD 15 MINS: Performed by: INTERNAL MEDICINE

## 2023-06-27 PROCEDURE — 25000003 PHARM REV CODE 250: Performed by: NURSE ANESTHETIST, CERTIFIED REGISTERED

## 2023-06-27 PROCEDURE — D9220A PRA ANESTHESIA: ICD-10-PCS | Mod: CRNA,,, | Performed by: NURSE ANESTHETIST, CERTIFIED REGISTERED

## 2023-06-27 PROCEDURE — 25000003 PHARM REV CODE 250: Performed by: INTERNAL MEDICINE

## 2023-06-27 PROCEDURE — D9220A PRA ANESTHESIA: Mod: CRNA,,, | Performed by: NURSE ANESTHETIST, CERTIFIED REGISTERED

## 2023-06-27 PROCEDURE — 43242 EGD US FINE NEEDLE BX/ASPIR: CPT | Performed by: INTERNAL MEDICINE

## 2023-06-27 PROCEDURE — 27202131 HC NEEDLE, FNB SINGLE (ANY): Performed by: INTERNAL MEDICINE

## 2023-06-27 PROCEDURE — D9220A PRA ANESTHESIA: ICD-10-PCS | Mod: ANES,,, | Performed by: ANESTHESIOLOGY

## 2023-06-27 PROCEDURE — 43239 EGD BIOPSY SINGLE/MULTIPLE: CPT | Mod: 59,,, | Performed by: INTERNAL MEDICINE

## 2023-06-27 PROCEDURE — 63600175 PHARM REV CODE 636 W HCPCS: Performed by: NURSE ANESTHETIST, CERTIFIED REGISTERED

## 2023-06-27 RX ORDER — TRAMADOL HYDROCHLORIDE 50 MG/1
50 TABLET ORAL EVERY 6 HOURS PRN
Qty: 28 TABLET | Refills: 0 | Status: SHIPPED | OUTPATIENT
Start: 2023-06-27 | End: 2023-06-27 | Stop reason: SDUPTHER

## 2023-06-27 RX ORDER — TRAMADOL HYDROCHLORIDE 50 MG/1
50 TABLET ORAL EVERY 6 HOURS PRN
Qty: 28 TABLET | Refills: 0 | Status: SHIPPED | OUTPATIENT
Start: 2023-06-27 | End: 2023-07-04

## 2023-06-27 RX ORDER — PROPOFOL 10 MG/ML
VIAL (ML) INTRAVENOUS CONTINUOUS PRN
Status: DISCONTINUED | OUTPATIENT
Start: 2023-06-27 | End: 2023-06-27

## 2023-06-27 RX ORDER — PROPOFOL 10 MG/ML
VIAL (ML) INTRAVENOUS
Status: DISCONTINUED | OUTPATIENT
Start: 2023-06-27 | End: 2023-06-27

## 2023-06-27 RX ORDER — PANTOPRAZOLE SODIUM 40 MG/1
40 TABLET, DELAYED RELEASE ORAL
Qty: 60 TABLET | Refills: 11 | Status: SHIPPED | OUTPATIENT
Start: 2023-06-27 | End: 2023-09-12

## 2023-06-27 RX ORDER — CIPROFLOXACIN 500 MG/1
500 TABLET ORAL EVERY 12 HOURS
Qty: 6 TABLET | Refills: 0 | Status: SHIPPED | OUTPATIENT
Start: 2023-06-27 | End: 2023-06-30

## 2023-06-27 RX ORDER — LIDOCAINE HYDROCHLORIDE 20 MG/ML
INJECTION, SOLUTION EPIDURAL; INFILTRATION; INTRACAUDAL; PERINEURAL
Status: DISCONTINUED | OUTPATIENT
Start: 2023-06-27 | End: 2023-06-27

## 2023-06-27 RX ORDER — SODIUM CHLORIDE 9 MG/ML
INJECTION, SOLUTION INTRAVENOUS CONTINUOUS
Status: DISCONTINUED | OUTPATIENT
Start: 2023-06-27 | End: 2023-06-27 | Stop reason: HOSPADM

## 2023-06-27 RX ORDER — SODIUM CHLORIDE 0.9 % (FLUSH) 0.9 %
10 SYRINGE (ML) INJECTION
Status: DISCONTINUED | OUTPATIENT
Start: 2023-06-27 | End: 2023-06-27 | Stop reason: HOSPADM

## 2023-06-27 RX ADMIN — GLYCOPYRROLATE 0.2 MG: 0.2 INJECTION, SOLUTION INTRAMUSCULAR; INTRAVITREAL at 10:06

## 2023-06-27 RX ADMIN — SODIUM CHLORIDE: 9 INJECTION, SOLUTION INTRAVENOUS at 09:06

## 2023-06-27 RX ADMIN — SODIUM CHLORIDE: 0.9 INJECTION, SOLUTION INTRAVENOUS at 10:06

## 2023-06-27 RX ADMIN — PROPOFOL 125 MCG/KG/MIN: 10 INJECTION, EMULSION INTRAVENOUS at 11:06

## 2023-06-27 RX ADMIN — PROPOFOL 80 MG: 10 INJECTION, EMULSION INTRAVENOUS at 11:06

## 2023-06-27 RX ADMIN — LIDOCAINE HYDROCHLORIDE 60 MG: 20 INJECTION, SOLUTION EPIDURAL; INFILTRATION; INTRACAUDAL; PERINEURAL at 11:06

## 2023-06-27 NOTE — PLAN OF CARE
Patient experiencing ongoing same abdo pain that she has been experiencing since before procedure. Given large mass sampled with likely pain related to this, will prescribe a 7 day course of Tramadol Q6h as needed. Patient needs to stay on bowel regimen with daily miralax during this. Also to suspend her Flexeril while taking this due to drug interaction. Will update her PCP and GI provider accordingly. She will need very close follow up as outlined in procedure note from today.

## 2023-06-27 NOTE — ANESTHESIA POSTPROCEDURE EVALUATION
Anesthesia Post Evaluation    Patient: Bell Lynn    Procedure(s) Performed: Procedure(s) (LRB):  ULTRASOUND, UPPER GI TRACT, ENDOSCOPIC (N/A)    Final Anesthesia Type: general      Patient location during evaluation: PACU  Patient participation: Yes- Able to Participate  Level of consciousness: awake and alert  Post-procedure vital signs: reviewed and stable  Pain management: adequate  Airway patency: patent    PONV status at discharge: No PONV  Anesthetic complications: no      Cardiovascular status: stable  Respiratory status: spontaneous ventilation  Hydration status: euvolemic  Follow-up not needed.          Vitals Value Taken Time   /78 06/27/23 1218   Temp ** 06/27/23 1219   Pulse 75 06/27/23 1218   Resp 12 06/27/23 1218   SpO2 97 % 06/27/23 1218         No case tracking events are documented in the log.      Pain/Rylee Score: Rylee Score: 10 (6/27/2023 12:18 PM)

## 2023-06-27 NOTE — ANESTHESIA PREPROCEDURE EVALUATION
06/27/2023  Bell Lynn is a 68 y.o., female.      Pre-op Assessment    I have reviewed the Patient Summary Reports.     I have reviewed the Nursing Notes.    I have reviewed the Medications.     Review of Systems  Anesthesia Hx:  Denies Family Hx of Anesthesia complications.   Denies Personal Hx of Anesthesia complications.        Patient Active Problem List   Diagnosis    Primary osteoarthritis of left knee    Essential hypertension 1/2021 TTE normal, stress test negative    Chronic asthma without complication intolerant of symbicort - recurrent mouth sores    NSAID-induced gastric ulcer chronic on EGD 7/2018 and 10/2020 and 6/2021    Menopausal hot flushes    Mild recurrent major depression    Iron deficiency anemia; iron with GI SE    AR (allergic rhinitis)    Class 1 obesity due to excess calories with serious comorbidity in adult    Fibromyalgia; diffuse arm, back pain, thigh pain; 2017 B12, TSH WNL; reynaldo without efficacy; Cymbalta.    Colon polyp 8/2018 no path report included repeat 5 years    History of benign carcinoid tumor rectum s/p resection per GI note    Status post transsphenoidal pituitary resection for tumor, Dr Cardenas, about 1989    History of pituitary adenoma 9/2019 MRI no recurrence    Peripheral edema    Chronic constipation    GERD (gastroesophageal reflux disease)    Chest pain, atypical    Palpitations    LANGE (dyspnea on exertion)    History of left knee replacement    Gait abnormality    Decreased strength of lower extremity    Decreased range of motion of left knee    Age-related osteoporosis without current pathological fracture; 6/2022 alendronate    Vitamin D deficiency    Abdominal mass    Chronic right-sided low back pain without sciatica       Past Medical History:   Diagnosis Date    Asthma     Depression     Gastric ulcer with  hemorrhage 7/2012    GERD (gastroesophageal reflux disease)     History of blood transfusion     Hypertension     Iron deficiency anemia 3/14/2013    Osteoarthritis of both knees        ECHO: Results for orders placed in visit on 01/14/21    Echo Color Flow Doppler? Yes    Interpretation Summary  · The left ventricle is normal in size with concentric hypertrophy and normal systolic function. The estimated ejection fraction is 60%  · Normal left ventricular diastolic function.  · Normal right ventricular size with normal right ventricular systolic function.  · Normal central venous pressure (3 mmHg).  · The estimated PA systolic pressure is 5 mmHg.      There is no height or weight on file to calculate BMI.    Tobacco Use: Low Risk     Smoking Tobacco Use: Never    Smokeless Tobacco Use: Never    Passive Exposure: Never       Social History     Substance and Sexual Activity   Drug Use No        Alcohol Use: Not At Risk    Frequency of Alcohol Consumption: 2-4 times a month    Average Number of Drinks: 1 or 2    Frequency of Binge Drinking: Never       Review of patient's allergies indicates:   Allergen Reactions    Nsaids (non-steroidal anti-inflammatory drug)      Gi bleed    Penicillins Itching and Swelling    Penicillin     Symbicort [budesonide-formoterol]      Recurrent oral ulcers         Airway:  No value filed.     Physical Exam    Airway:  Mouth Opening: Normal  TM Distance: Normal          Anesthesia Plan  Type of Anesthesia, risks & benefits discussed:    Anesthesia Type: Gen Natural Airway  Intra-op Monitoring Plan: Standard ASA Monitors  Post Op Pain Control Plan: multimodal analgesia  Induction:  IV  Informed Consent: Informed consent signed with the Patient and all parties understand the risks and agree with anesthesia plan.  All questions answered.   ASA Score: 3  Day of Surgery Review of History & Physical: H&P Update referred to the surgeon/provider.    Ready For Surgery From  Anesthesia Perspective.     .

## 2023-06-27 NOTE — [employer] PATIENT INSTRUCTIONS
Discharge Summary/Instructions after an Endoscopic Procedure  Patient Name: Bell Lynn  Patient MRN: [id number]  Patient YOB: 1955 Tuesday, June 27, 2023  Johnathan Cantu MD  Dear patient,  As a result of recent federal legislation (The Federal Cures Act), you may   receive lab or pathology results from your procedure in your [facility]   account before your physician is able to contact you. Your physician or   their representative will relay the results to you with their   recommendations at their soonest availability.  Thank you,  Your health is very important to us during the Covid Crisis. Following your   procedure today, you will receive a daily text for 2 weeks asking about   signs or symptoms of Covid 19.  Please respond to this text when you   receive it so we can follow up and keep you as safe as possible.   RESTRICTIONS:  During your procedure today, you received medications for sedation.  These   medications may affect your judgment, balance and coordination.  Therefore,   for 24 hours, you have the following restrictions:   - DO NOT drive a car, operate machinery, make legal/financial decisions,   sign important papers or drink alcohol.    ACTIVITY:  Today: no heavy lifting, straining or running due to procedural   sedation/anesthesia.  The following day: return to full activity including work.  DIET:  Eat and drink normally unless instructed otherwise.     TREATMENT FOR COMMON SIDE EFFECTS:  - Mild abdominal pain, nausea, belching, bloating or excessive gas:  rest,   eat lightly and use a heating pad.  - Sore Throat: treat with throat lozenges and/or gargle with warm salt   water.  - Because air was used during the procedure, expelling large amounts of air   from your rectum or belching is normal.  - If a bowel prep was taken, you may not have a bowel movement for 1-3 days.    This is normal.  SYMPTOMS TO WATCH FOR AND REPORT TO YOUR PHYSICIAN:  1. Abdominal pain or bloating, other than gas  cramps.  2. Chest pain.  3. Back pain.  4. Signs of infection such as: chills or fever occurring within 24 hours   after the procedure.  5. Rectal bleeding, which would show as bright red, maroon, or black stools.   (A tablespoon of blood from the rectum is not serious, especially if   hemorrhoids are present.)  6. Vomiting.  7. Weakness or dizziness.  GO DIRECTLY TO THE NEAREST EMERGENCY ROOM IF YOU HAVE ANY OF THE FOLLOWING:      Difficulty breathing              Chills and/or fever over 101 F   Persistent vomiting and/or vomiting blood   Severe abdominal pain   Severe chest pain   Black, tarry stools   Bleeding- more than one tablespoon   Any other symptom or condition that you feel may need urgent attention  Your doctor recommends these additional instructions:  If any biopsies were taken, your doctors clinic will contact you in 1 to 2   weeks with any results.  - Discharge patient to home (ambulatory).   - Patient has a contact number available for emergencies.  The signs and   symptoms of potential delayed complications were discussed with the   patient.  Return to normal activities tomorrow.  Written discharge   instructions were provided to the patient.   - Resume previous diet.   - Cipro (ciprofloxacin) 500 mg PO BID for 3 days.   - Await path results.   - Return to referring physician.   - Refer to Oncology and Surgical Oncology.  - Recommend updated contrast enhanced cross sectional imaging given findings   in left lobe of liver and to better highlight extent of disease process.  - Use Protonix (pantoprazole) 40 mg PO BID for 3 months taken 45min before   breakfast and dinner.   - May require repeat EGD in 3 months to confirm healing of gastric ulcer.  For questions, problems or results please call your physician - Johnathan Cantu MD.  EMERGENCY PHONE NUMBER: [phone],  LAB RESULTS: [phone]  IF A COMPLICATION OR EMERGENCY SITUATION ARISES AND YOU ARE UNABLE TO REACH   YOUR PHYSICIAN - GO  DIRECTLY TO THE EMERGENCY ROOM.  Johnathan Cantu MD  6/27/2023 12:28:11 PM  This report has been verified and signed electronically.  Dear patient,  As a result of recent federal legislation (The Federal Cures Act), you may   receive lab or pathology results from your procedure in your [facility]   account before your physician is able to contact you. Your physician or   their representative will relay the results to you with their   recommendations at their soonest availability.  Thank you,  [employer]ATION

## 2023-06-27 NOTE — TRANSFER OF CARE
"Anesthesia Transfer of Care Note    Patient: Bell Lynn    Procedure(s) Performed: Procedure(s) (LRB):  ULTRASOUND, UPPER GI TRACT, ENDOSCOPIC (N/A)    Patient location: GI    Anesthesia Type: general    Transport from OR: Transported from OR on room air with adequate spontaneous ventilation    Post pain: adequate analgesia    Post assessment: no apparent anesthetic complications    Post vital signs: stable    Level of consciousness: awake and alert    Nausea/Vomiting: no nausea/vomiting    Complications: none    Transfer of care protocol was followed      Last vitals:   Visit Vitals  BP (!) 141/87 (BP Location: Left arm, Patient Position: Lying)   Pulse 79   Temp 36.4 °C (97.5 °F) (Temporal)   Resp 18   Ht 5' 4" (1.626 m)   Wt 89.4 kg (197 lb)   SpO2 98%   Breastfeeding No   BMI 33.81 kg/m²     "

## 2023-06-27 NOTE — H&P
Short Stay Endoscopy History and Physical    PCP - Cipriano Carrera MD  Referring Physician - Cipriano Carrera MD  [address] [place] [address]    Procedure - EUS  ASA - per anesthesia  Mallampati - per anesthesia  History of Anesthesia problems - per anesthesia  Family history Anesthesia problems -  per anesthesia   Plan of anesthesia - per anesthesia    HPI:  This is a 68 y.o. female here for evaluation of: EUS for abdominal mass possible mesenteric mass seen on recent imaging with some associated abdo pains and weight loss; probable FNB if able to locate    Reflux - no  Dysphagia - no  Abdominal pain - yes  Diarrhea - no    ROS:  Constitutional: No fevers, chills, No weight loss  CV: No chest pain  Pulm: No cough, No shortness of breath  Ophtho: No vision changes  GI: see HPI  Derm: No rash    Medical History:  has a past medical history of Asthma, Depression, Gastric ulcer with hemorrhage (7/2012), GERD (gastroesophageal reflux disease), History of blood transfusion, Hypertension, Iron deficiency anemia (3/14/2013), and Osteoarthritis of both knees.    Surgical History:  has a past surgical history that includes Hysterectomy; Tonsillectomy; Breast surgery; Breast biopsy; Esophagogastroduodenoscopy (N/A, 10/7/2020); Colonoscopy; Esophagogastroduodenoscopy (N/A, 6/30/2021); and Knee Arthroplasty (Left, 1/10/2022).    Family History: family history includes Breast cancer in her sister and sister; Heart disease in her father and mother; Hypertension in her father and mother..    Social History:  reports that she has never smoked. She has never been exposed to tobacco smoke. She has never used smokeless tobacco. She reports current alcohol use of about 1.0 standard drink per week. She reports that she does not use drugs.    Review of patient's allergies indicates:   Allergen Reactions    Nsaids (non-steroidal anti-inflammatory drug)      Gi bleed    Penicillins Itching and Swelling    Penicillin      Symbicort [budesonide-formoterol]      Recurrent oral ulcers       Medications:   Medications Prior to Admission   Medication Sig Dispense Refill Last Dose    albuterol (PROVENTIL/VENTOLIN HFA) 90 mcg/actuation inhaler Inhale 2 puffs into the lungs every 6 (six) hours as needed for Wheezing. Rescue 18 g 5 Past Week    alendronate (FOSAMAX) 70 MG tablet TAKE 1 TABLET EVERY 7 DAYS 12 tablet 3 6/26/2023    amLODIPine (NORVASC) 10 MG tablet TAKE 1 TABLET ONE TIME DAILY (DOSE INCREASE) 90 tablet 3 6/26/2023    cetirizine (ZYRTEC) 10 MG tablet Take 1 tablet (10 mg total) by mouth once daily. 90 tablet 3 6/26/2023    cyclobenzaprine (FLEXERIL) 10 MG tablet TAKE 1 TABLET EVERY EVENING 90 tablet 1 Past Week    dicyclomine (BENTYL) 20 mg tablet Take 1 tablet (20 mg total) by mouth 3 (three) times daily as needed (abdominal pain). 60 tablet 2 6/26/2023    DULoxetine (CYMBALTA) 60 MG capsule TAKE 1 CAPSULE ONE TIME DAILY (INCREASED DOSE) 90 capsule 3 6/26/2023    furosemide (LASIX) 20 MG tablet TAKE 1 TABLET ONE TIME DAILY FOR LEG SWELLING AS NEEDED 90 tablet 1 6/26/2023    gabapentin (NEURONTIN) 100 MG capsule 3 tablets nighttime 1 tablet in AM; increased dose 270 capsule 3 6/26/2023    latanoprost 0.005 % ophthalmic solution Place 1 drop into the right eye every evening. 7.5 mL 4 6/26/2023    LIDOcaine (LIDODERM) 5 % PLACE 1 PATCH ONTO THE SKIN ONCE DAILY. REMOVE AND DISCARD PATCH WITHIN 12 HOURS OR AS DIRECTED BY MD Johnson patch 2 Past Week    linaCLOtide (LINZESS) 145 mcg Cap capsule Take 1 capsule (145 mcg total) by mouth once daily. 90 capsule 3 6/26/2023    metoprolol succinate (TOPROL-XL) 100 MG 24 hr tablet Take 1 tablet (100 mg total) by mouth once daily. 90 tablet 3 6/26/2023    nitroGLYCERIN (NITROSTAT) 0.4 MG SL tablet Place 1 tablet (0.4 mg total) under the tongue every 5 (five) minutes as needed for Chest pain. 60 tablet 12 6/26/2023    omeprazole (PRILOSEC) 40 MG capsule Take 1 capsule (40 mg total) by mouth every  morning. 90 capsule 1 6/26/2023    potassium chloride SA (K-DUR,KLOR-CON) 20 MEQ tablet Take 1 tablet (20 mEq total) by mouth 2 (two) times daily. 180 tablet 1 6/26/2023    spironolactone (ALDACTONE) 50 MG tablet Take 1 tablet (50 mg total) by mouth once daily. 90 tablet 3 6/26/2023    valsartan (DIOVAN) 320 MG tablet Take 1 tablet (320 mg total) by mouth once daily. Stop losartan 90 tablet 1 6/26/2023    vitamin D (VITAMIN D3) 1000 units Tab Take 1,000 Units by mouth once daily.   Past Week       Physical Exam:    Vital Signs:   Vitals:    06/27/23 0938   BP: (!) 141/87   Pulse: 79   Resp: 18   Temp: 97.5 °F (36.4 °C)       General Appearance: Well appearing in no acute distress    Labs:  Lab Results   Component Value Date    WBC 9.90 05/10/2023    HGB 13.7 05/10/2023    HCT 42.9 05/10/2023     05/10/2023    CHOL 134 05/10/2023    TRIG 95 05/10/2023    HDL 38 (L) 05/10/2023    ALT 18 05/10/2023    AST 23 05/10/2023     06/12/2023    K 3.9 06/12/2023     06/12/2023    CREATININE 0.8 06/12/2023    BUN 9 06/12/2023    CO2 22 (L) 06/12/2023    TSH 0.750 05/01/2017    INR 1.0 12/05/2013    HGBA1C 5.6 05/10/2023       I have explained the risks and benefits of this endoscopic procedure to the patient including but not limited to bleeding, inflammation, infection, perforation, missing a lesion and death.      Johnathan Cantu MD

## 2023-06-28 DIAGNOSIS — I10 ESSENTIAL HYPERTENSION: Chronic | ICD-10-CM

## 2023-06-28 RX ORDER — VALSARTAN 320 MG/1
320 TABLET ORAL DAILY
Qty: 90 TABLET | Refills: 3 | Status: SHIPPED | OUTPATIENT
Start: 2023-06-28

## 2023-06-28 NOTE — TELEPHONE ENCOUNTER
No care due was identified.  [facility] Embedded Care Due Messages. Reference number: [id number].   6/28/2023 3:20:25 AM CDT

## 2023-06-28 NOTE — TELEPHONE ENCOUNTER
Refill Decision Note   Bell Lynn  is requesting a refill authorization.  Brief Assessment and Rationale for Refill:  Approve     Medication Therapy Plan:         Comments:     Note composed:11:08 AM 06/28/2023

## 2023-06-30 ENCOUNTER — TELEPHONE (OUTPATIENT)
Dept: SURGERY | Facility: CLINIC | Age: 68
End: 2023-06-30
Payer: MEDICARE

## 2023-06-30 ENCOUNTER — PATIENT MESSAGE (OUTPATIENT)
Dept: HEMATOLOGY/ONCOLOGY | Facility: CLINIC | Age: 68
End: 2023-06-30
Payer: MEDICARE

## 2023-06-30 DIAGNOSIS — R19.00 ABDOMINAL MASS, UNSPECIFIED ABDOMINAL LOCATION: Primary | ICD-10-CM

## 2023-07-07 LAB
ADEQUACY: ABNORMAL
COMMENT: ABNORMAL
FINAL PATHOLOGIC DIAGNOSIS: ABNORMAL
Lab: ABNORMAL
SUPPLEMENTAL DIAGNOSIS: ABNORMAL

## 2023-07-10 ENCOUNTER — TELEPHONE (OUTPATIENT)
Dept: HEMATOLOGY/ONCOLOGY | Facility: CLINIC | Age: 68
End: 2023-07-10
Payer: MEDICARE

## 2023-07-10 DIAGNOSIS — C7A.8 NEUROENDOCRINE CARCINOMA OF SMALL BOWEL: Primary | ICD-10-CM

## 2023-07-11 ENCOUNTER — LAB VISIT (OUTPATIENT)
Dept: LAB | Facility: HOSPITAL | Age: 68
End: 2023-07-11
Attending: SURGERY
Payer: MEDICARE

## 2023-07-11 DIAGNOSIS — C7A.8 NEUROENDOCRINE CARCINOMA OF SMALL BOWEL: ICD-10-CM

## 2023-07-11 DIAGNOSIS — R19.00 ABDOMINAL MASS, UNSPECIFIED ABDOMINAL LOCATION: ICD-10-CM

## 2023-07-11 LAB
ALBUMIN SERPL BCP-MCNC: 3.5 G/DL (ref 3.5–5.2)
ALP SERPL-CCNC: 93 U/L (ref 55–135)
ALT SERPL W/O P-5'-P-CCNC: 16 U/L (ref 10–44)
ANION GAP SERPL CALC-SCNC: 9 MMOL/L (ref 8–16)
AST SERPL-CCNC: 19 U/L (ref 10–40)
BASOPHILS # BLD AUTO: 0.07 K/UL (ref 0–0.2)
BASOPHILS NFR BLD: 0.8 % (ref 0–1.9)
BILIRUB SERPL-MCNC: 0.3 MG/DL (ref 0.1–1)
BUN SERPL-MCNC: 6 MG/DL (ref 8–23)
CALCIUM SERPL-MCNC: 9.5 MG/DL (ref 8.7–10.5)
CHLORIDE SERPL-SCNC: 105 MMOL/L (ref 95–110)
CO2 SERPL-SCNC: 23 MMOL/L (ref 23–29)
CREAT SERPL-MCNC: 0.8 MG/DL (ref 0.5–1.4)
DIFFERENTIAL METHOD: ABNORMAL
EOSINOPHIL # BLD AUTO: 0.3 K/UL (ref 0–0.5)
EOSINOPHIL NFR BLD: 2.7 % (ref 0–8)
ERYTHROCYTE [DISTWIDTH] IN BLOOD BY AUTOMATED COUNT: 14.6 % (ref 11.5–14.5)
EST. GFR  (NO RACE VARIABLE): >60 ML/MIN/1.73 M^2
GLUCOSE SERPL-MCNC: 114 MG/DL (ref 70–110)
HCT VFR BLD AUTO: 42.8 % (ref 37–48.5)
HGB BLD-MCNC: 13.6 G/DL (ref 12–16)
IMM GRANULOCYTES # BLD AUTO: 0.03 K/UL (ref 0–0.04)
IMM GRANULOCYTES NFR BLD AUTO: 0.3 % (ref 0–0.5)
LYMPHOCYTES # BLD AUTO: 3.4 K/UL (ref 1–4.8)
LYMPHOCYTES NFR BLD: 36.2 % (ref 18–48)
MCH RBC QN AUTO: 28.3 PG (ref 27–31)
MCHC RBC AUTO-ENTMCNC: 31.8 G/DL (ref 32–36)
MCV RBC AUTO: 89 FL (ref 82–98)
MONOCYTES # BLD AUTO: 0.6 K/UL (ref 0.3–1)
MONOCYTES NFR BLD: 6.1 % (ref 4–15)
NEUTROPHILS # BLD AUTO: 5 K/UL (ref 1.8–7.7)
NEUTROPHILS NFR BLD: 53.9 % (ref 38–73)
NRBC BLD-RTO: 0 /100 WBC
PLATELET # BLD AUTO: 323 K/UL (ref 150–450)
PMV BLD AUTO: 12.1 FL (ref 9.2–12.9)
POTASSIUM SERPL-SCNC: 3.7 MMOL/L (ref 3.5–5.1)
PROT SERPL-MCNC: 8 G/DL (ref 6–8.4)
RBC # BLD AUTO: 4.8 M/UL (ref 4–5.4)
SODIUM SERPL-SCNC: 137 MMOL/L (ref 136–145)
WBC # BLD AUTO: 9.33 K/UL (ref 3.9–12.7)

## 2023-07-11 PROCEDURE — 83497 ASSAY OF 5-HIAA: CPT | Performed by: INTERNAL MEDICINE

## 2023-07-11 PROCEDURE — 80053 COMPREHEN METABOLIC PANEL: CPT | Performed by: SURGERY

## 2023-07-11 PROCEDURE — 83519 RIA NONANTIBODY: CPT | Performed by: INTERNAL MEDICINE

## 2023-07-11 PROCEDURE — 85025 COMPLETE CBC W/AUTO DIFF WBC: CPT | Performed by: SURGERY

## 2023-07-11 PROCEDURE — 84260 ASSAY OF SEROTONIN: CPT | Performed by: INTERNAL MEDICINE
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
## 2023-07-14 LAB — 5OH-INDOLEACETATE SERPL-MCNC: 54 NG/ML

## 2023-07-17 ENCOUNTER — HOSPITAL ENCOUNTER (OUTPATIENT)
Dept: RADIOLOGY | Facility: HOSPITAL | Age: 68
Discharge: HOME | End: 2023-07-17
Attending: INTERNAL MEDICINE
Payer: MEDICARE

## 2023-07-17 DIAGNOSIS — C7A.8 NEUROENDOCRINE CARCINOMA OF SMALL BOWEL: ICD-10-CM

## 2023-07-17 PROCEDURE — 78815 NM PET CU64 DOTATATE, SKULL TO MID THIGH: ICD-10-PCS | Mod: 26,PI,, | Performed by: STUDENT IN AN ORGANIZED HEALTH CARE EDUCATION/TRAINING PROGRAM

## 2023-07-17 PROCEDURE — 78815 PET IMAGE W/CT SKULL-THIGH: CPT | Mod: TC,PI

## 2023-07-17 PROCEDURE — 78815 PET IMAGE W/CT SKULL-THIGH: CPT | Mod: 26,PI,, | Performed by: STUDENT IN AN ORGANIZED HEALTH CARE EDUCATION/TRAINING PROGRAM

## 2023-07-18 ENCOUNTER — OFFICE VISIT (OUTPATIENT)
Dept: HEMATOLOGY/ONCOLOGY | Facility: CLINIC | Age: 68
End: 2023-07-18
Payer: MEDICARE

## 2023-07-18 VITALS
BODY MASS INDEX: 33.81 KG/M2 | OXYGEN SATURATION: 97 % | RESPIRATION RATE: 18 BRPM | SYSTOLIC BLOOD PRESSURE: 138 MMHG | DIASTOLIC BLOOD PRESSURE: 74 MMHG | HEART RATE: 84 BPM | HEIGHT: 64 IN | WEIGHT: 198.06 LBS | TEMPERATURE: 99 F

## 2023-07-18 DIAGNOSIS — I10 ESSENTIAL HYPERTENSION: ICD-10-CM

## 2023-07-18 DIAGNOSIS — C7A.8 PRIMARY MALIGNANT NEUROENDOCRINE TUMOR OF SMALL INTESTINE: Primary | ICD-10-CM

## 2023-07-18 DIAGNOSIS — Z80.9 FAMILY HISTORY OF CANCER: ICD-10-CM

## 2023-07-18 DIAGNOSIS — D49.9 IMMUNODEFICIENCY SECONDARY TO NEOPLASM: ICD-10-CM

## 2023-07-18 DIAGNOSIS — C78.7 SECONDARY MALIGNANT NEOPLASM OF LIVER: ICD-10-CM

## 2023-07-18 DIAGNOSIS — D84.81 IMMUNODEFICIENCY SECONDARY TO NEOPLASM: ICD-10-CM

## 2023-07-18 DIAGNOSIS — C77.8 SECONDARY MALIGNANT NEOPLASM OF LYMPH NODES OF MULTIPLE SITES: ICD-10-CM

## 2023-07-18 LAB — SEROTONIN: 285 NG/ML

## 2023-07-18 PROCEDURE — 1125F AMNT PAIN NOTED PAIN PRSNT: CPT | Mod: CPTII,S$GLB,, | Performed by: INTERNAL MEDICINE

## 2023-07-18 PROCEDURE — 3044F HG A1C LEVEL LT 7.0%: CPT | Mod: CPTII,S$GLB,, | Performed by: INTERNAL MEDICINE

## 2023-07-18 PROCEDURE — 3008F PR BODY MASS INDEX (BMI) DOCUMENTED: ICD-10-PCS | Mod: CPTII,S$GLB,, | Performed by: INTERNAL MEDICINE

## 2023-07-18 PROCEDURE — 1160F PR REVIEW ALL MEDS BY PRESCRIBER/CLIN PHARMACIST DOCUMENTED: ICD-10-PCS | Mod: CPTII,S$GLB,, | Performed by: INTERNAL MEDICINE

## 2023-07-18 PROCEDURE — 1159F MED LIST DOCD IN RCRD: CPT | Mod: CPTII,S$GLB,, | Performed by: INTERNAL MEDICINE

## 2023-07-18 PROCEDURE — 4010F PR ACE/ARB THEARPY RXD/TAKEN: ICD-10-PCS | Mod: CPTII,S$GLB,, | Performed by: INTERNAL MEDICINE

## 2023-07-18 PROCEDURE — 99205 OFFICE O/P NEW HI 60 MIN: CPT | Mod: S$GLB,,, | Performed by: INTERNAL MEDICINE

## 2023-07-18 PROCEDURE — 3008F BODY MASS INDEX DOCD: CPT | Mod: CPTII,S$GLB,, | Performed by: INTERNAL MEDICINE

## 2023-07-18 PROCEDURE — 1159F PR MEDICATION LIST DOCUMENTED IN MEDICAL RECORD: ICD-10-PCS | Mod: CPTII,S$GLB,, | Performed by: INTERNAL MEDICINE

## 2023-07-18 PROCEDURE — 3078F PR MOST RECENT DIASTOLIC BLOOD PRESSURE < 80 MM HG: ICD-10-PCS | Mod: CPTII,S$GLB,, | Performed by: INTERNAL MEDICINE

## 2023-07-18 PROCEDURE — 99999 PR PBB SHADOW E&M-EST. PATIENT-LVL V: CPT | Mod: PBBFAC,,, | Performed by: INTERNAL MEDICINE

## 2023-07-18 PROCEDURE — 99999 PR PBB SHADOW E&M-EST. PATIENT-LVL V: ICD-10-PCS | Mod: PBBFAC,,, | Performed by: INTERNAL MEDICINE

## 2023-07-18 PROCEDURE — 1125F PR PAIN SEVERITY QUANTIFIED, PAIN PRESENT: ICD-10-PCS | Mod: CPTII,S$GLB,, | Performed by: INTERNAL MEDICINE

## 2023-07-18 PROCEDURE — 3075F PR MOST RECENT SYSTOLIC BLOOD PRESS GE 130-139MM HG: ICD-10-PCS | Mod: CPTII,S$GLB,, | Performed by: INTERNAL MEDICINE

## 2023-07-18 PROCEDURE — 1101F PR PT FALLS ASSESS DOC 0-1 FALLS W/OUT INJ PAST YR: ICD-10-PCS | Mod: CPTII,S$GLB,, | Performed by: INTERNAL MEDICINE

## 2023-07-18 PROCEDURE — 1101F PT FALLS ASSESS-DOCD LE1/YR: CPT | Mod: CPTII,S$GLB,, | Performed by: INTERNAL MEDICINE

## 2023-07-18 PROCEDURE — 99205 PR OFFICE/OUTPT VISIT, NEW, LEVL V, 60-74 MIN: ICD-10-PCS | Mod: S$GLB,,, | Performed by: INTERNAL MEDICINE

## 2023-07-18 PROCEDURE — 3044F PR MOST RECENT HEMOGLOBIN A1C LEVEL <7.0%: ICD-10-PCS | Mod: CPTII,S$GLB,, | Performed by: INTERNAL MEDICINE

## 2023-07-18 PROCEDURE — 4010F ACE/ARB THERAPY RXD/TAKEN: CPT | Mod: CPTII,S$GLB,, | Performed by: INTERNAL MEDICINE

## 2023-07-18 PROCEDURE — 3075F SYST BP GE 130 - 139MM HG: CPT | Mod: CPTII,S$GLB,, | Performed by: INTERNAL MEDICINE

## 2023-07-18 PROCEDURE — 3288F FALL RISK ASSESSMENT DOCD: CPT | Mod: CPTII,S$GLB,, | Performed by: INTERNAL MEDICINE

## 2023-07-18 PROCEDURE — 3078F DIAST BP <80 MM HG: CPT | Mod: CPTII,S$GLB,, | Performed by: INTERNAL MEDICINE

## 2023-07-18 PROCEDURE — 1160F RVW MEDS BY RX/DR IN RCRD: CPT | Mod: CPTII,S$GLB,, | Performed by: INTERNAL MEDICINE

## 2023-07-18 PROCEDURE — 3288F PR FALLS RISK ASSESSMENT DOCUMENTED: ICD-10-PCS | Mod: CPTII,S$GLB,, | Performed by: INTERNAL MEDICINE

## 2023-07-18 RX ORDER — LANREOTIDE ACETATE 120 MG/.5ML
120 INJECTION SUBCUTANEOUS ONCE
Status: CANCELLED | OUTPATIENT
Start: 2023-08-01 | End: 2023-08-01
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
## 2023-07-19 ENCOUNTER — PATIENT MESSAGE (OUTPATIENT)
Dept: HEMATOLOGY/ONCOLOGY | Facility: CLINIC | Age: 68
End: 2023-07-19
Payer: MEDICARE

## 2023-07-19 ENCOUNTER — PATIENT MESSAGE (OUTPATIENT)
Dept: ADMINISTRATIVE | Facility: HOSPITAL | Age: 68
End: 2023-07-19
Payer: MEDICARE

## 2023-07-19 ENCOUNTER — PATIENT OUTREACH (OUTPATIENT)
Dept: ADMINISTRATIVE | Facility: HOSPITAL | Age: 68
End: 2023-07-19
Payer: MEDICARE

## 2023-07-20 ENCOUNTER — OFFICE VISIT (OUTPATIENT)
Dept: SURGERY | Facility: CLINIC | Age: 68
End: 2023-07-20
Payer: MEDICARE

## 2023-07-20 ENCOUNTER — PATIENT OUTREACH (OUTPATIENT)
Dept: ADMINISTRATIVE | Facility: HOSPITAL | Age: 68
End: 2023-07-20
Payer: MEDICARE

## 2023-07-20 VITALS
DIASTOLIC BLOOD PRESSURE: 85 MMHG | WEIGHT: 197.63 LBS | SYSTOLIC BLOOD PRESSURE: 150 MMHG | HEART RATE: 75 BPM | HEIGHT: 64 IN | OXYGEN SATURATION: 96 % | BODY MASS INDEX: 33.74 KG/M2

## 2023-07-20 DIAGNOSIS — C7B.8 METASTATIC MALIGNANT NEUROENDOCRINE TUMOR TO LYMPH NODE: Primary | ICD-10-CM

## 2023-07-20 LAB — PANCREASTATIN SERPL-MCNC: 404 PG/ML (ref 10–135)

## 2023-07-20 PROCEDURE — 3044F HG A1C LEVEL LT 7.0%: CPT | Mod: CPTII,S$GLB,, | Performed by: SURGERY

## 2023-07-20 PROCEDURE — 99204 OFFICE O/P NEW MOD 45 MIN: CPT | Mod: S$GLB,,, | Performed by: SURGERY

## 2023-07-20 PROCEDURE — 99204 PR OFFICE/OUTPT VISIT, NEW, LEVL IV, 45-59 MIN: ICD-10-PCS | Mod: S$GLB,,, | Performed by: SURGERY

## 2023-07-20 PROCEDURE — 1101F PR PT FALLS ASSESS DOC 0-1 FALLS W/OUT INJ PAST YR: ICD-10-PCS | Mod: CPTII,S$GLB,, | Performed by: SURGERY

## 2023-07-20 PROCEDURE — 4010F PR ACE/ARB THEARPY RXD/TAKEN: ICD-10-PCS | Mod: CPTII,S$GLB,, | Performed by: SURGERY

## 2023-07-20 PROCEDURE — 3079F DIAST BP 80-89 MM HG: CPT | Mod: CPTII,S$GLB,, | Performed by: SURGERY

## 2023-07-20 PROCEDURE — 1126F AMNT PAIN NOTED NONE PRSNT: CPT | Mod: CPTII,S$GLB,, | Performed by: SURGERY

## 2023-07-20 PROCEDURE — 3044F PR MOST RECENT HEMOGLOBIN A1C LEVEL <7.0%: ICD-10-PCS | Mod: CPTII,S$GLB,, | Performed by: SURGERY

## 2023-07-20 PROCEDURE — 3008F PR BODY MASS INDEX (BMI) DOCUMENTED: ICD-10-PCS | Mod: CPTII,S$GLB,, | Performed by: SURGERY

## 2023-07-20 PROCEDURE — 1159F MED LIST DOCD IN RCRD: CPT | Mod: CPTII,S$GLB,, | Performed by: SURGERY

## 2023-07-20 PROCEDURE — 1101F PT FALLS ASSESS-DOCD LE1/YR: CPT | Mod: CPTII,S$GLB,, | Performed by: SURGERY

## 2023-07-20 PROCEDURE — 3008F BODY MASS INDEX DOCD: CPT | Mod: CPTII,S$GLB,, | Performed by: SURGERY

## 2023-07-20 PROCEDURE — 3077F PR MOST RECENT SYSTOLIC BLOOD PRESSURE >= 140 MM HG: ICD-10-PCS | Mod: CPTII,S$GLB,, | Performed by: SURGERY

## 2023-07-20 PROCEDURE — 99999 PR PBB SHADOW E&M-EST. PATIENT-LVL III: ICD-10-PCS | Mod: PBBFAC,,, | Performed by: SURGERY

## 2023-07-20 PROCEDURE — 3288F FALL RISK ASSESSMENT DOCD: CPT | Mod: CPTII,S$GLB,, | Performed by: SURGERY

## 2023-07-20 PROCEDURE — 3079F PR MOST RECENT DIASTOLIC BLOOD PRESSURE 80-89 MM HG: ICD-10-PCS | Mod: CPTII,S$GLB,, | Performed by: SURGERY

## 2023-07-20 PROCEDURE — 3077F SYST BP >= 140 MM HG: CPT | Mod: CPTII,S$GLB,, | Performed by: SURGERY

## 2023-07-20 PROCEDURE — 1159F PR MEDICATION LIST DOCUMENTED IN MEDICAL RECORD: ICD-10-PCS | Mod: CPTII,S$GLB,, | Performed by: SURGERY

## 2023-07-20 PROCEDURE — 3288F PR FALLS RISK ASSESSMENT DOCUMENTED: ICD-10-PCS | Mod: CPTII,S$GLB,, | Performed by: SURGERY

## 2023-07-20 PROCEDURE — 4010F ACE/ARB THERAPY RXD/TAKEN: CPT | Mod: CPTII,S$GLB,, | Performed by: SURGERY

## 2023-07-20 PROCEDURE — 99999 PR PBB SHADOW E&M-EST. PATIENT-LVL III: CPT | Mod: PBBFAC,,, | Performed by: SURGERY

## 2023-07-20 PROCEDURE — 1126F PR PAIN SEVERITY QUANTIFIED, NO PAIN PRESENT: ICD-10-PCS | Mod: CPTII,S$GLB,, | Performed by: SURGERY

## 2023-07-20 NOTE — PROGRESS NOTES
Health Maintenance and immunizations reviewed/ updated.  CALLED PT PERTAINING TO [facility] REFERRAL. (LVM) [facility] LETTER MAILED OUT.  Health Maintenance Due   Topic Date Due    Shingles Vaccine (1 of 2) Never done    COVID-19 Vaccine (4 - Moderna series) 01/25/2022    Colorectal Cancer Screening  08/13/2023
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
## 2023-07-20 NOTE — PROGRESS NOTES
[facility]      NEUROENDOCRINE MULTIDISCIPLINARY TUMOR BOARD  _____________________________________________________________________    PRESENTER:   Aaron Keita MD    REASON FOR PRESENTATION:  Scan Review    ATTENDEES:   Gastroenterology - Not Present  Interventional Radiology - Sathya Nieves MD and MELO Nino  Nuclear Medicine - Lauro Dailey MD  Nursing - [facility] Nursing: Lyudmila Yuen, RN, Tamar Carreno, RN, and Jud Elder, RN, Armida Fernandez, RN, Brigette Landeros, RN  Oncology - Aaron Keita MD and Isma Jama MD, Lilian Stevens MD, Ashkan Gomez MD, Fidencio Gilman MD  Palliative Medicine - Not Present  Pathology -  Maricruz Teran MD and Js Miller MD  Research - Not Present  Surgery - MD Eliu Chaves MD    PATIENT STATUS:  Established Patient    PATIENT SUMMARY:  Past Medical History:   Diagnosis Date    Asthma     Depression     Gastric ulcer with hemorrhage 7/2012    GERD (gastroesophageal reflux disease)     History of blood transfusion     Hypertension     Iron deficiency anemia 3/14/2013    Osteoarthritis of both knees        Past Surgical History:   Procedure Laterality Date    BREAST BIOPSY      BREAST SURGERY      Benign breast cyst    COLONOSCOPY      ENDOSCOPIC ULTRASOUND OF UPPER GASTROINTESTINAL TRACT N/A 6/27/2023    Procedure: ULTRASOUND, UPPER GI TRACT, ENDOSCOPIC;  Surgeon: Johnathan Cantu MD;  Location: [facility];  Service: Endoscopy;  Laterality: N/A;    ESOPHAGOGASTRODUODENOSCOPY N/A 10/7/2020    Procedure: EGD (ESOPHAGOGASTRODUODENOSCOPY);  Surgeon: Nell Parish MD;  Location: [facility];  Service: Endoscopy;  Laterality: N/A;    ESOPHAGOGASTRODUODENOSCOPY N/A 6/30/2021    Procedure: EGD (ESOPHAGOGASTRODUODENOSCOPY);  Surgeon: Nell Parish MD;  Location: [facility];  Service: Endoscopy;  Laterality: N/A;    HYSTERECTOMY      Partial    KNEE ARTHROPLASTY Left 1/10/2022    Procedure: ARTHROPLASTY, KNEE;  Surgeon: Jonnathan Tavera  MD;  Location: [facility] OR;  Service: Orthopedics;  Laterality: Left;    TONSILLECTOMY         TUMOR MARKERS:  5-HIAA, Plasma Ref Range: up to 22 ng/mL  Recent Labs   Lab 07/11/23 0928   5-HIAA, Plasma (Neuroend) 54 H     Chromogranin A Ref Range: <93 ng/mL      Gastrin Ref Range: <100 pg/mL      Neurokinin A Ref Range: 0 - 40 pg/mL      Pancreastatin Ref Range: 10 - 135 pg/mL  Recent Labs   Lab 07/11/23 0928   Pancreastatin 404     Pancreatic Polypeptide Ref Range: >314 pg/mL      Serotonin Ref Range: <=230 ng/mL  Recent Labs   Lab 07/11/23 0928   Serotonin 285 H       Neuroendocrine Labs Latest Ref Rng & Units 7/20/2023 7/18/2023 7/11/2023   5 HIAA BLOOD <=30 ng/mL - - 54(H)   SEROTONIN <=230 ng/mL - - 285(H)   PANCREASTATIN 10 - 135 pg/mL - - 404   FOLATE 4.0 - 24.0 ng/mL - - -   VITAMIN B12 210 - 950 pg/mL - - -   TSH 0.400 - 4.000 uIU/mL - - -   WBC 3.90 - 12.70 K/uL - - 9.33   RBC 4.00 - 5.40 M/uL - - 4.80   HGB 12.0 - 16.0 g/dL - - 13.6   HCT 37.0 - 48.5 % - - 42.8   MCV 82 - 98 fL - - 89   MCH 27.0 - 31.0 pg - - 28.3   MCHC 32.0 - 36.0 g/dL - - 31.8(L)   RDW 11.5 - 14.5 % - - 14.6(H)   PLATLETS 150 - 450 K/uL - - 323   MPV 9.2 - 12.9 fL - - 12.1   GRAN # 1.8 - 7.7 K/uL - - 5.0   LYMPH # 1.0 - 4.8 K/uL - - 3.4   MONO # 0.3 - 1.0 K/uL - - 0.6   EOS # 0.0 - 0.5 K/uL - - 0.3   BASO # 0.00 - 0.20 K/uL - - 0.07   GRAN % 38.0 - 73.0 % - - 53.9   LYMPH % 18.0 - 48.0 % - - 36.2   MONO % 4.0 - 15.0 % - - 6.1   EOS % 0.0 - 8.0 % - - 2.7   BASO % 0.0 - 1.9 % - - 0.8   DIFF METHOD - - - Automated   PT 9.0 - 12.5 sec - - -   PTT 21.0 - 32.0 sec - - -   INR 0.8 - 1.2 - - -   GLUCOSE 70 - 110 mg/dL - - 114(H)   BUN 8 - 23 mg/dL - - 6(L)   CREATININE 0.5 - 1.4 mg/dL - - 0.8    - 145 mmol/L - - 137   K 3.5 - 5.1 mmol/L - - 3.7   CHLORIDE 95 - 110 mmol/L - - 105   CO2 23 - 29 mmol/L - - 23   CALCIUM 8.7 - 10.5 mg/dL - - 9.5   PROTEIN, TOTAL 6.0 - 8.4 g/dL - - 8.0   ALBUMIN 3.5 - 5.2 g/dL - - 3.5   TOTAL BILIRUBIN 0.1 -  1.0 mg/dL - - 0.3   ALK PHOSPHATASE 55 - 135 U/L - - 93   SGOT (AST) 10 - 40 U/L - - 19   SGPT (ALT) 10 - 44 U/L - - 16   LIPASE 4 - 60 U/L - - -   SERUM AMYLASE 20 - 110 U/L - - -   TRIGLYCERIDES 30 - 150 mg/dL - - -   CHOLERSTEROL 120 - 199 mg/dL - - -   HDL 40 - 75 mg/dL - - -   LDL 63.0 - 159.0 mg/dL - - -   MG 1.6 - 2.6 mg/dL - - -   HEMOGLOBIN A1C 4.0 - 5.6 % - - -   Weight - 197 lbs 10 oz 198 lbs 1 oz -   Sedimentation Rate, Manual 0 - 20 mm/hr - - -     ____________________________________________________________________    DISCUSSION: 69 yo woman with newly diagnosed well differentiated low grade NET, 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 Ki 67 2%. Copper PET showed liver, lymph nodes, small bowel lesions. Review PET and plan of care.    INT RAD: Defer to Dr. Dailey.    NUC MED: Tracer avid disease including mesenteric mass, small bowel lesions, liver lesions, and abdominal lymph nodes compatible with NET/mets.    BOARD RECOMMENDATIONS: start lanreotide, restage, surgery not recommended

## 2023-07-21 ENCOUNTER — TELEPHONE (OUTPATIENT)
Dept: HEMATOLOGY/ONCOLOGY | Facility: CLINIC | Age: 68
End: 2023-07-21
Payer: MEDICARE

## 2023-07-21 NOTE — TELEPHONE ENCOUNTER
Called and scheduled consult for Tuesday 7/25 for 12pm with Pattie Brooks PA-C. She voiced thanks and understanding. No questions at this time.    ----- Message from Pattie Brooks PA-C sent at 7/18/2023  1:58 PM CDT -----  Regarding: Needs urgent consult  Please schedule for an urgent consult with one of us. Thanks!      ----- Message -----  From: Aaron Keita MD  Sent: 7/18/2023   1:17 PM CDT  To: Andres Escudero DNP, Pattie Brooks PA-C    HI Andres and pattie,     This patient has stage IV NET, but very strong family history of cancer. Can you see her? thanks

## 2023-07-25 ENCOUNTER — LAB VISIT (OUTPATIENT)
Dept: LAB | Facility: HOSPITAL | Age: 68
End: 2023-07-25
Payer: MEDICARE

## 2023-07-25 ENCOUNTER — OFFICE VISIT (OUTPATIENT)
Dept: HEMATOLOGY/ONCOLOGY | Facility: CLINIC | Age: 68
End: 2023-07-25
Payer: MEDICARE

## 2023-07-25 DIAGNOSIS — C7A.8 PRIMARY MALIGNANT NEUROENDOCRINE TUMOR OF SMALL INTESTINE: ICD-10-CM

## 2023-07-25 DIAGNOSIS — D35.2 PITUITARY ADENOMA: ICD-10-CM

## 2023-07-25 DIAGNOSIS — C7A.8 PRIMARY MALIGNANT NEUROENDOCRINE TUMOR OF SMALL INTESTINE: Primary | ICD-10-CM

## 2023-07-25 DIAGNOSIS — Z71.83 ENCOUNTER FOR NONPROCREATIVE GENETIC COUNSELING: ICD-10-CM

## 2023-07-25 PROCEDURE — 99999 PR PBB SHADOW E&M-EST. PATIENT-LVL II: ICD-10-PCS | Mod: PBBFAC,,, | Performed by: PHYSICIAN ASSISTANT

## 2023-07-25 PROCEDURE — 36415 COLL VENOUS BLD VENIPUNCTURE: CPT | Performed by: PHYSICIAN ASSISTANT

## 2023-07-25 PROCEDURE — 1101F PT FALLS ASSESS-DOCD LE1/YR: CPT | Mod: CPTII,S$GLB,, | Performed by: PHYSICIAN ASSISTANT

## 2023-07-25 PROCEDURE — 1125F AMNT PAIN NOTED PAIN PRSNT: CPT | Mod: CPTII,S$GLB,, | Performed by: PHYSICIAN ASSISTANT

## 2023-07-25 PROCEDURE — 3288F PR FALLS RISK ASSESSMENT DOCUMENTED: ICD-10-PCS | Mod: CPTII,S$GLB,, | Performed by: PHYSICIAN ASSISTANT

## 2023-07-25 PROCEDURE — 99999 PR PBB SHADOW E&M-EST. PATIENT-LVL II: CPT | Mod: PBBFAC,,, | Performed by: PHYSICIAN ASSISTANT

## 2023-07-25 PROCEDURE — 4010F ACE/ARB THERAPY RXD/TAKEN: CPT | Mod: CPTII,S$GLB,, | Performed by: PHYSICIAN ASSISTANT

## 2023-07-25 PROCEDURE — 99215 OFFICE O/P EST HI 40 MIN: CPT | Mod: S$GLB,,, | Performed by: PHYSICIAN ASSISTANT

## 2023-07-25 PROCEDURE — 3288F FALL RISK ASSESSMENT DOCD: CPT | Mod: CPTII,S$GLB,, | Performed by: PHYSICIAN ASSISTANT

## 2023-07-25 PROCEDURE — 99215 PR OFFICE/OUTPT VISIT, EST, LEVL V, 40-54 MIN: ICD-10-PCS | Mod: S$GLB,,, | Performed by: PHYSICIAN ASSISTANT

## 2023-07-25 PROCEDURE — 1101F PR PT FALLS ASSESS DOC 0-1 FALLS W/OUT INJ PAST YR: ICD-10-PCS | Mod: CPTII,S$GLB,, | Performed by: PHYSICIAN ASSISTANT

## 2023-07-25 PROCEDURE — 4010F PR ACE/ARB THEARPY RXD/TAKEN: ICD-10-PCS | Mod: CPTII,S$GLB,, | Performed by: PHYSICIAN ASSISTANT

## 2023-07-25 PROCEDURE — 3044F HG A1C LEVEL LT 7.0%: CPT | Mod: CPTII,S$GLB,, | Performed by: PHYSICIAN ASSISTANT

## 2023-07-25 PROCEDURE — 1125F PR PAIN SEVERITY QUANTIFIED, PAIN PRESENT: ICD-10-PCS | Mod: CPTII,S$GLB,, | Performed by: PHYSICIAN ASSISTANT

## 2023-07-25 PROCEDURE — 3044F PR MOST RECENT HEMOGLOBIN A1C LEVEL <7.0%: ICD-10-PCS | Mod: CPTII,S$GLB,, | Performed by: PHYSICIAN ASSISTANT

## 2023-07-25 NOTE — PROGRESS NOTES
"Cancer Genetics  Hereditary/High Risk Clinic  Department of Hematology and Oncology  [facility]    Initial Consult    Date of Service:  23  Visit Provider:  Lynette Brooks PA-C  Collaborating Physician:  Nery Looney MD    Patient:  Bell Lynn  :  1955  MRN:   [id number]     Referring Provider    Aaron Keita MD  [address] [place] [address]    SUBJECTIVE   Chief Complaint: Genetic evaluation  History of Present Illness (HPI):  Bell Lynn ("Bell"), 68 y.o., assigned female sex at birth, is established with the [facility] Department of Hematology and Oncology but new to me.  She presents for genetic cancer risk assessment given her recent diagnosis of a NET thought to have originated in the small intestine and history of a pituitary adenoma, which raises concern for a hereditary syndrome. She also has a possible history of a carcinoid tumor of the rectum, which has not been confirmed.     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   Uterine fibroids  Colon polyps: Yes  Pancreatitis:  no  Chemoprevention: Never used  Uterus and ovaries intact:  no, s/p hysterectomy due to fibroids    Past Medical History:   Diagnosis Date    Asthma     Depression     Gastric ulcer with hemorrhage 2012    GERD (gastroesophageal reflux disease)     History of blood transfusion     Hypertension     Iron deficiency anemia 2013    Osteoarthritis of both knees     Pituitary adenoma     s/p transphenoidal resection    Primary malignant neuroendocrine tumor of small intestine 2023     Patient Active Problem List    " Diagnosis Date Noted    Primary malignant neuroendocrine tumor of small intestine 07/18/2023    Secondary malignant neoplasm of liver 07/18/2023    Abdominal mass 05/16/2023    Chronic right-sided low back pain without sciatica 05/16/2023    Vitamin D deficiency 10/26/2022    Age-related osteoporosis without current pathological fracture; 6/2022 alendronate 06/22/2022    Gait abnormality 02/03/2022    Decreased strength of lower extremity 02/03/2022    Decreased range of motion of left knee 02/03/2022    History of left knee replacement 01/10/2022    Chest pain, atypical 01/14/2021    Palpitations 01/14/2021    LANGE (dyspnea on exertion) 01/14/2021    GERD (gastroesophageal reflux disease) 09/08/2020    Peripheral edema 08/28/2020    Chronic constipation 08/28/2020    History of pituitary adenoma 9/2019 MRI no recurrence 09/24/2019    Status post transsphenoidal pituitary resection for tumor, Dr Cardenas, about 1989 09/09/2019    History of benign carcinoid tumor rectum s/p resection per GI note 01/30/2019    Colon polyp 8/2018 no path report included repeat 5 years 10/07/2018    Fibromyalgia; diffuse arm, back pain, thigh pain; 2017 B12, TSH WNL; reynaldo without efficacy; Cymbalta. 07/13/2017    Class 1 obesity due to excess calories with serious comorbidity in adult 06/15/2015    AR (allergic rhinitis) 02/19/2014    Iron deficiency anemia; iron with GI SE 03/14/2013    Chronic asthma without complication intolerant of symbicort - recurrent mouth sores 02/14/2013    NSAID-induced gastric ulcer chronic on EGD 7/2018 and 10/2020 and 6/2021 02/14/2013    Menopausal hot flushes 02/14/2013    Mild recurrent major depression 02/14/2013    Primary osteoarthritis of left knee 07/30/2012    Essential hypertension 1/2021 TTE normal, stress test negative 07/30/2012      Family History  Germline cancer-genetic testing in blood relatives:  No  Ashkenazi Roman Catholic ancestry: No  Consanguinity in ancestors:  No  No known history of  "cancer of colorectal polyps in extended family other than noted below.     ** If the pedigree is small/illegible, expand this note window horizontally to view the pedigree in a larger format. **      Family History   Problem Relation Age of Onset    Cancer Father         unknown type, between his kidneys and liver, cause of death    Heart disease Father     Hypertension Father     Breast cancer Other 48    Breast cancer Other 45    Uterine cancer Other     Uterine cancer Other 45    Rectal cancer Maternal Aunt       Review of Systems  See HPI.    Pain 4/10  Recent falls: None   Patient's Distress Score today was 2/10 (with 10 being the worst).      OBJECTIVE   Physical Exam  Very pleasant patient.  Unaccompanied  Vitals signs:  There were no vitals filed for this visit.   Constitutional: No apparent distress.   Pulmonary: Normal effort  Neurological: Alert and oriented. No obvious neurological deficits.   Psychiatric: Normal mood, affect, thought content, speech, behavior, judgment.  Genetics-specific: It is my assessment that the patient is ready to proceed with cancer-genetic testing from a psychosocial perspective.  COUNSELING     What is cancer?  Cancer cells have gene mutations that turned the cell from normal to abnormal. Normal cells grow and divide in an orderly fashion, work in harmony with other cells in the body, and die at a specific time. Abnormal cells grow and divide uncontrollably, invade nearby tissues, interfere with the function of normal cells, and trick the immune system so they won't be killed.     How does this happen?  Your DNA is like a book of instructions that directs what traits you have and how your body functions. Each gene contains DNA "letters" that spell out the instructions to make a specific protein. A mutation can insert, delete, rearrange, or substitute letters, which alters the instructions. In some cases, the mutation has no effect and the gene still produces the correct " protein. In other cases, the gene produces a dysfunctional protein or no protein at all, both of which can lead to a cascade of problems.     Tumor suppressor genes:   Specialized genes called tumor suppressor genes control how cells grow and divide. They act like the brakes in a car, slowing or stopping the cell cycle when needed. If tumor suppressor genes are inactivated by mutations, the brakes are disabled, resulting in uncontrolled growth (cancer). Because genes come in pairs (one inherited from each parent), inactivation of one copy will not lead to cancer because the other normal copy is still functional. But if the second copy undergoes mutation, the person may then develop cancer because there is no longer a functional copy of the gene.     How are genes inactivated?  Inherited mutations are passed directly from parent to child. These mutations are found in every cell of the body, including the individual's blood and germ cells (sperm and eggs).  Acquired mutations occur during a person's lifetime. They are only present in the cancer cells and cannot be passed to the individual's children.   Epigenetic silencing: A rare event in which the gene structure is completely normal, but something in another part of the system inactivates the gene.     Sporadic Cancer:   Approximately 80% of all cancers are sporadic or random. These cancers usually occur after age 60 and are caused by an accumulation of genetic mutations acquired over the course of an individual's lifetime. The risk for acquired mutations increases with age and can be due to environmental factors (chemical and radiation exposures), lifestyle factors (smoking, alcohol, obesity, lack of exercise, poor diet), and certain medical conditions (diabetes, liver disease, viral infections like HPV). Sporadic cancer can also occur as a result of random DNA errors that occur when cells are dividing. This is suspected in young individuals who have no risk  factors for cancer, no family history of cancer, and no inherited genetic mutations.     Hereditary cancer  Approximately 5-10% of all cancers are hereditary. These cancers are caused by a mutation the individual inherited from a parent. In some cases, that inherited mutation is enough to cause cancer. In most cases, however, cancer occurs when the individual has the inherited mutation plus multiple acquired mutations. Families with a germline mutation often have multiple members with early-onset cancer (usually before age 50), rare cancers (such as male breast cancer), and/or multiple blood relatives in successive generations with the same or related cancers (cancers associated with the mutated gene).      Familial cancer  Familial cancer refers to a clustering of a particular cancer in a family that is more than you would expect to see by chance, but it is not a hereditary cancer. While hereditary cancer is caused by an inherited mutation in a single gene, familial cancers are caused by a combination of multiple factors, some of which may be inherited (like diabetes or fatty liver disease) and some related to shared lifestyle and environmental factors. Like sporadic cancer, familial cancers are usually diagnosed at older ages and don't follow the same inheritance patterns seen in hereditary cancers.     Germline testing:   Germline testing analyzes the genes in a blood, saliva, or skin specimen to detected inherited mutations. Possible results are:   Positive: A mutation is present that is known to result in an increased risk for cancer or other disease.      Negative: No cancer-causing mutations are present.   Uncertain: A mutation is present that is of unknown significance. Researchers have been unable to determine if it results in an increased risk for cancer.     Genetic testing logistics:  Standard method: A blood sample is collected at an [facility] lab and sent to an outside genetics lab for testing. Blood  specimens can be utilized for DNA and RNA analysis.   Alternate method: A saliva sample is collected by the patient at home and mailed to the genetics lab. Saliva specimens can only be used for DNA analysis.   Alternate method: A skin punch biopsy is collected at [facility] and sent to an outside lab that extracts DNA from the cultured skin fibroblasts that is sent for genetic testing.   Why is this done? Individuals with certain active hematological conditions may have circulating leukocytes containing abnormal somatic variants. The presence of these somatic variants can result in a complete test failure, partial test failure, false negatives, or false positives. Since leukocytes are found in the blood, saliva and buccal specimens, a skin punch is the only way to ensure a specimen that is free of abnormal somatic variants.   Additionally, individuals who are status post allogenic bone marrow transplant, stem cell transplant, or had a bood transfusion <2 weeks prior must also be tested using a skin specimen.     Cost of testing:   Genetic testing is expensive, but is covered by most health insurance policies with a low out of pocket cost. Additionally, some genetics labs offer a reduced cost for individuals who do not have health insurance or have policies that do not cover genetic testing.     Genetic information discrimination:  Genetic information discrimination occurs when an employer, insurance company, or other entity uses genetic information to make decisions or otherwise discriminate against an individual. Examples would include an insurance refusing to issue a policy because the individual has a genetic mutation or an employer refusing to hire or promote someone because they discovered they have a mutation or family history of cancer. A federal law called LAWRENCE provide some protections for health insurance and employment. Other laws and policies offer additional protections against genetic  discrimination.    The Genetic Information Nondiscrimination Act of 2008 (LAWRENCE) is a federal law that expands the protections included in the Health Insurance Portability and Accountability Act of 1996 (HIPAA).  Under Title I of [place], group health plans cannot base premiums for a plan or a group of similarly situated individuals on genetic information. LAWRENCE generally prohibits plans from requesting or requiring an individual to undergo genetic tests, and prohibits a plan from collecting genetic information (including family medical history) prior to or in connection with enrollment, or for underwriting purposes.  This rule does not apply to individuals who receive their insurance through the federal government or . Those entities have their own set of rules regarding genetic information.   This rule only applies to health insurance. Other types of insurance (life, disability, long-term care, cancer, etc) are not protected. An individual can be denied coverage or charged a higher premium based on their genetic information.   Under Title II of LAWRENCE, it is illegal to discriminate against employees or applicants because of genetic information. Title II of [place]WRENCE prohibits the use of genetic information in making employment decisions, restricts employers and other entities covered by Title II (employment agencies, labor organizations and joint labor-management training and apprenticeship  LA[place]ENCE has a small business exemption for employers with less than 15 employees.    ASSESSMENT/PLAN   A cancer-genetic evaluation and pre-test genetic counseling were conducted. Based on the information provided by Bell, her personal and/or family history is suggestive of a potential hereditary predisposition to cancer. The recommendation is to proceed with germline testing to include the genes commonly associated with hereditary neuroendocrine tumors (MEN1, MEN4/CDKN1B, NF1, MEN2/RET, TSC1, TSC2, VHL)..     Bell was given the  option of proceeding with testing now, deferring testing to a later date, or declining testing and opted to proceed.     Genetics lab: [facility]   Genetic test: [facility] BRCA1/2 core panel + Multi-Cancer +RNA, 84 genes (402650.1)   Verbal informed consent: Obtained   Written informed consent: Obtained   Specimen type: Blood   Specimen collection by: [facility]    Specimen collection date: 7/25/23   Results expected by: Approximately 2-3 weeks after the genetic testing lab receives the specimen   Results disclosure plan: Notification by genetics team and virtual post-test        1. Encounter for nonprocreative genetic counseling    2. Primary malignant neuroendocrine tumor of small intestine  - Genetic Misc Sendout Test, Blood; Future    3. Pituitary adenoma  - Genetic Misc Sendout Test, Blood; Future      Follow-up:  Follow up for results review.     Questions were encouraged and answered to the patient's satisfaction, and she verbalized understanding of information and agreement with the plan.       Approximately 45 minutes were spent face-to-face with the patient.  Approximately 65 minutes in total were spent on this encounter, which includes face-to-face time and non-face-to-face time preparing to see the patient (e.g., review of tests), obtaining and/or reviewing separately obtained history, documenting clinical information in the electronic or other health record, independently interpreting results (not separately reported) and communicating results to the patient/family/caregiver, or care coordination (not separately reported).     Lynette Brooks PA-C, MPAS, PA-C  Physician Assistant, Hereditary/High Risk Clinic  Hematology/Oncology, [facility]

## 2023-07-27 ENCOUNTER — TUMOR BOARD CONFERENCE (OUTPATIENT)
Dept: UROLOGY | Facility: CLINIC | Age: 68
End: 2023-07-27
Payer: MEDICARE

## 2023-07-28 DIAGNOSIS — G89.29 CHRONIC MIDLINE LOW BACK PAIN WITHOUT SCIATICA: ICD-10-CM

## 2023-07-28 DIAGNOSIS — M54.50 CHRONIC MIDLINE LOW BACK PAIN WITHOUT SCIATICA: ICD-10-CM

## 2023-07-28 PROBLEM — C7A.8 PRIMARY MALIGNANT NEUROENDOCRINE TUMOR OF SMALL INTESTINE: Chronic | Status: ACTIVE | Noted: 2023-07-18

## 2023-07-28 PROBLEM — Z86.018 HISTORY OF PITUITARY ADENOMA: Chronic | Status: ACTIVE | Noted: 2019-09-24

## 2023-07-28 PROBLEM — C78.7 SECONDARY MALIGNANT NEOPLASM OF LIVER: Chronic | Status: ACTIVE | Noted: 2023-07-18

## 2023-07-28 RX ORDER — OMEPRAZOLE 40 MG/1
CAPSULE, DELAYED RELEASE ORAL
Qty: 90 CAPSULE | OUTPATIENT
Start: 2023-07-28

## 2023-07-28 RX ORDER — LIDOCAINE 50 MG/G
PATCH TOPICAL
Qty: 90 PATCH | Refills: 2 | Status: SHIPPED | OUTPATIENT
Start: 2023-07-28

## 2023-07-28 NOTE — TELEPHONE ENCOUNTER
No care due was identified.  [facility] Embedded Care Due Messages. Reference number: [id number].   7/28/2023 7:02:07 AM CDT

## 2023-07-28 NOTE — TELEPHONE ENCOUNTER
Refill Routing Note   Medication(s) are not appropriate for processing by [facility] for the following reason(s):      Medication outside of protocol    [facility] action(s):  Route  Quick Discontinue Care Due:  None identified     Medication Therapy Plan: PATEINT WAS SWITCHED TO PROTONIX 40MG ON 06/27/23      Appointments  past 12m or future 3m with PCP    Date Provider   Last Visit   5/16/2023 Cipriano Carrera MD   Next Visit   11/17/2023 Cipriano Carrera MD   ED visits in past 90 days: 0        Note composed:7:59 AM 07/28/2023

## 2023-07-31 ENCOUNTER — OFFICE VISIT (OUTPATIENT)
Dept: HEMATOLOGY/ONCOLOGY | Facility: CLINIC | Age: 68
End: 2023-07-31
Payer: MEDICARE

## 2023-07-31 ENCOUNTER — INFUSION (OUTPATIENT)
Dept: INFUSION THERAPY | Facility: HOSPITAL | Age: 68
End: 2023-07-31
Payer: MEDICARE

## 2023-07-31 VITALS
RESPIRATION RATE: 18 BRPM | DIASTOLIC BLOOD PRESSURE: 78 MMHG | WEIGHT: 197.19 LBS | TEMPERATURE: 98 F | SYSTOLIC BLOOD PRESSURE: 137 MMHG | HEIGHT: 64 IN | HEART RATE: 90 BPM | BODY MASS INDEX: 33.66 KG/M2 | OXYGEN SATURATION: 99 %

## 2023-07-31 DIAGNOSIS — Z80.9 FAMILY HISTORY OF CANCER: ICD-10-CM

## 2023-07-31 DIAGNOSIS — I10 ESSENTIAL HYPERTENSION: ICD-10-CM

## 2023-07-31 DIAGNOSIS — D49.9 IMMUNODEFICIENCY SECONDARY TO NEOPLASM: ICD-10-CM

## 2023-07-31 DIAGNOSIS — C78.7 SECONDARY MALIGNANT NEOPLASM OF LIVER: ICD-10-CM

## 2023-07-31 DIAGNOSIS — D84.821 IMMUNODEFICIENCY DUE TO DRUGS: ICD-10-CM

## 2023-07-31 DIAGNOSIS — Z79.899 IMMUNODEFICIENCY DUE TO DRUGS: ICD-10-CM

## 2023-07-31 DIAGNOSIS — C7A.8 PRIMARY MALIGNANT NEUROENDOCRINE TUMOR OF SMALL INTESTINE: Primary | ICD-10-CM

## 2023-07-31 DIAGNOSIS — C77.8 SECONDARY MALIGNANT NEOPLASM OF LYMPH NODES OF MULTIPLE SITES: ICD-10-CM

## 2023-07-31 DIAGNOSIS — D84.81 IMMUNODEFICIENCY SECONDARY TO NEOPLASM: ICD-10-CM

## 2023-07-31 PROCEDURE — 3078F PR MOST RECENT DIASTOLIC BLOOD PRESSURE < 80 MM HG: ICD-10-PCS | Mod: CPTII,S$GLB,, | Performed by: INTERNAL MEDICINE

## 2023-07-31 PROCEDURE — 3078F DIAST BP <80 MM HG: CPT | Mod: CPTII,S$GLB,, | Performed by: INTERNAL MEDICINE

## 2023-07-31 PROCEDURE — 4010F PR ACE/ARB THEARPY RXD/TAKEN: ICD-10-PCS | Mod: CPTII,S$GLB,, | Performed by: INTERNAL MEDICINE

## 2023-07-31 PROCEDURE — 3075F PR MOST RECENT SYSTOLIC BLOOD PRESS GE 130-139MM HG: ICD-10-PCS | Mod: CPTII,S$GLB,, | Performed by: INTERNAL MEDICINE

## 2023-07-31 PROCEDURE — 3288F FALL RISK ASSESSMENT DOCD: CPT | Mod: CPTII,S$GLB,, | Performed by: INTERNAL MEDICINE

## 2023-07-31 PROCEDURE — 1125F PR PAIN SEVERITY QUANTIFIED, PAIN PRESENT: ICD-10-PCS | Mod: CPTII,S$GLB,, | Performed by: INTERNAL MEDICINE

## 2023-07-31 PROCEDURE — 3008F PR BODY MASS INDEX (BMI) DOCUMENTED: ICD-10-PCS | Mod: CPTII,S$GLB,, | Performed by: INTERNAL MEDICINE

## 2023-07-31 PROCEDURE — 3288F PR FALLS RISK ASSESSMENT DOCUMENTED: ICD-10-PCS | Mod: CPTII,S$GLB,, | Performed by: INTERNAL MEDICINE

## 2023-07-31 PROCEDURE — 3044F PR MOST RECENT HEMOGLOBIN A1C LEVEL <7.0%: ICD-10-PCS | Mod: CPTII,S$GLB,, | Performed by: INTERNAL MEDICINE

## 2023-07-31 PROCEDURE — 96372 THER/PROPH/DIAG INJ SC/IM: CPT

## 2023-07-31 PROCEDURE — 1159F PR MEDICATION LIST DOCUMENTED IN MEDICAL RECORD: ICD-10-PCS | Mod: CPTII,S$GLB,, | Performed by: INTERNAL MEDICINE

## 2023-07-31 PROCEDURE — 99215 PR OFFICE/OUTPT VISIT, EST, LEVL V, 40-54 MIN: ICD-10-PCS | Mod: S$GLB,,, | Performed by: INTERNAL MEDICINE

## 2023-07-31 PROCEDURE — 99999 PR PBB SHADOW E&M-EST. PATIENT-LVL IV: CPT | Mod: PBBFAC,,, | Performed by: INTERNAL MEDICINE

## 2023-07-31 PROCEDURE — 3044F HG A1C LEVEL LT 7.0%: CPT | Mod: CPTII,S$GLB,, | Performed by: INTERNAL MEDICINE

## 2023-07-31 PROCEDURE — 3075F SYST BP GE 130 - 139MM HG: CPT | Mod: CPTII,S$GLB,, | Performed by: INTERNAL MEDICINE

## 2023-07-31 PROCEDURE — 1125F AMNT PAIN NOTED PAIN PRSNT: CPT | Mod: CPTII,S$GLB,, | Performed by: INTERNAL MEDICINE

## 2023-07-31 PROCEDURE — 99215 OFFICE O/P EST HI 40 MIN: CPT | Mod: S$GLB,,, | Performed by: INTERNAL MEDICINE

## 2023-07-31 PROCEDURE — 1160F RVW MEDS BY RX/DR IN RCRD: CPT | Mod: CPTII,S$GLB,, | Performed by: INTERNAL MEDICINE

## 2023-07-31 PROCEDURE — 1160F PR REVIEW ALL MEDS BY PRESCRIBER/CLIN PHARMACIST DOCUMENTED: ICD-10-PCS | Mod: CPTII,S$GLB,, | Performed by: INTERNAL MEDICINE

## 2023-07-31 PROCEDURE — 99999 PR PBB SHADOW E&M-EST. PATIENT-LVL IV: ICD-10-PCS | Mod: PBBFAC,,, | Performed by: INTERNAL MEDICINE

## 2023-07-31 PROCEDURE — 3008F BODY MASS INDEX DOCD: CPT | Mod: CPTII,S$GLB,, | Performed by: INTERNAL MEDICINE

## 2023-07-31 PROCEDURE — 63600175 PHARM REV CODE 636 W HCPCS: Mod: JZ,JG | Performed by: INTERNAL MEDICINE

## 2023-07-31 PROCEDURE — 1101F PR PT FALLS ASSESS DOC 0-1 FALLS W/OUT INJ PAST YR: ICD-10-PCS | Mod: CPTII,S$GLB,, | Performed by: INTERNAL MEDICINE

## 2023-07-31 PROCEDURE — 1159F MED LIST DOCD IN RCRD: CPT | Mod: CPTII,S$GLB,, | Performed by: INTERNAL MEDICINE

## 2023-07-31 PROCEDURE — 1101F PT FALLS ASSESS-DOCD LE1/YR: CPT | Mod: CPTII,S$GLB,, | Performed by: INTERNAL MEDICINE

## 2023-07-31 PROCEDURE — 4010F ACE/ARB THERAPY RXD/TAKEN: CPT | Mod: CPTII,S$GLB,, | Performed by: INTERNAL MEDICINE

## 2023-07-31 RX ORDER — LANREOTIDE ACETATE 120 MG/.5ML
120 INJECTION SUBCUTANEOUS ONCE
Status: CANCELLED | OUTPATIENT
Start: 2023-07-31 | End: 2023-07-31

## 2023-07-31 RX ORDER — LANREOTIDE ACETATE 120 MG/.5ML
120 INJECTION SUBCUTANEOUS ONCE
Status: COMPLETED | OUTPATIENT
Start: 2023-07-31 | End: 2023-07-31

## 2023-07-31 RX ADMIN — LANREOTIDE ACETATE 120 MG: 120 INJECTION SUBCUTANEOUS at 02:07
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
## 2023-08-08 LAB
GENETIC COUNSELING?: YES
GENSO SPECIMEN TYPE: NORMAL
MISCELLANEOUS GENETIC TEST NAME: NORMAL
PARTENTAL OR SIBLING TESTING?: NO
REFERENCE LAB: NORMAL
TEST RESULT: NORMAL

## 2023-08-19 NOTE — TELEPHONE ENCOUNTER
No care due was identified.  [facility] Embedded Care Due Messages. Reference number: [id number].   8/19/2023 8:23:34 AM CDT

## 2023-08-21 NOTE — PROGRESS NOTES
Cancer Genetics  Hereditary/High Risk Clinic  Department of Hematology and Oncology  [facility]    Results Disclosure & Post-Test Counseling    Date of Service:  23  Visit Provider:  Lynette Brooks PA-C  Collaborating Physician:  Nery Looney MD    Patient:  Bell Lynn  :  1955  MRN:  [id number]     Televisit Information  Patient location: [place], [place].    Visit type:  audiovisual  Face-to-face time with patient: approximately 10 minutes.  Approximately 30 minutes in total were spent on this encounter, which includes face-to-face time and non-face-to-face time preparing to see the patient (e.g., review of tests), obtaining and/or reviewing separately obtained history, documenting clinical information in the electronic or other health record, independently interpreting results (not separately reported) and communicating results to the patient/family/caregiver, or care coordination (not separately reported).  Each patient to whom he or she provides medical services by telemedicine is:  (1) informed of the relationship between the physician and patient and the respective role of any other health care provider with respect to management of the patient; and (2) notified that he or she may decline to receive medical services by telemedicine and may withdraw from such care at any time.    ASSESSMENT   Test name:  [facility] Multi[facility]Cancer +RNA (84 genes)  Testing laboratory:  [facility]  Specimen type:  Blood  Genes analyzed:  (84) AIP, ALK, APC, PAGE, AXIN2, BAP1, BARD1, BLM, BMPR1A, BRCA1, BRCA2, BRIP1, CASR, CDC73, CDH1, CDK4, CDKN1B, CDKN1C, CDKN2A, CEBPA, CHEK2, CTNNA1, DICER1, DIS3L2, EGFR, EPCAM, FH, FLCN, GATA2, GPC3, GREM1, HOXB13, HRAS, KIT, MAX, MEN1, MET, MITF, MLH1, MSH2, MSH3, MSH6, MUTYH, NBN, NF1, NF2, NTHL1, PALB2, PDGFRA, PHOX2B, PMS2, POLD1, POLE, POT1, VFLFA7N, PTCH1, PTEN, RAD50, RAD51C, RAD51D, RB1, RECQL4, RET, RUNX1, SDHA, SDHAF2, SDHB, SDHC, SDHD, SMAD4, SMARCA4,  SMARCB1, SMARCE1, STK11, SUFU, TERC, TERT, YQDZ609, TP53, TSC1, TSC2, VHL, WRN, WT1.  Collection date:  23  Results date:  23  Results:  Negative    A copy of the results report is available in Media.     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     Bell's family history is significant for early-onset breast and uterine cancer and a unspecified cancer in her father in his 70s that was reportedly located between his liver and kidneys.     Bell underwent comprehensive germline genetic testing was negative for pathogenic (cancer-causing) mutations in the genes associated with hereditary neuroendocrine tumors, uterine cancer, breast cancer, and other cancers.    In regards to her personal history of cancer, this suggests that she had sporadic cancers/tumors. Her history raised some concern for MEN1 syndrome, but her MEN1 gene is normal and she does not meet clinical criteria for MEN1 syndrome. Colorectal carcinoid tumors are not associated with MEN1, only certain pituitary tumors are associated with MEN1, and most people with mutations associated with hereditary neuroendocrine syndromes (MEN1, RET, CDKN1B, NF1, TSC1, TSC2, and VHL) rarely have small intestine neuroendocrine tumors. Additionally, no one else in the family is known to have any endocrine or non-endocrine manifestations of a neuroendocrine syndrome. She's unsure what type of cancer her father  from in his 70s, but he had six siblings and no cancers/tumors are known in them or his parents.   In regards to her family history of cancer, this is considered an uninformative negative, as it is unknown if Bell's relatives with cancer have/had a mutation she did not  inherit.     PLAN   No further genetic testing is indicated for Bell.    Because Bell's personal history remains mildly suspicious, I am going to send her information about the manifestations of neuroendocrine syndromes so she can ask relatives if they are aware of any in the family and if anyone knows what kind of cancer her father had.      Visit diagnoses:   1. Encounter for nonprocreative genetic counseling    2. Primary malignant neuroendocrine tumor of small intestine    3. Pituitary adenoma    4. Family history of breast cancer    5. Family history of uterine cancer     Questions were encouraged and answered to the patient's satisfaction, and she verbalized understanding of information and agreement with the plan.       SUBJECTIVE   Chief Complaint: Post-test genetic counseling  History of Present Illness (HPI):  Bell Lynn was previously seen by me and underwent germline genetic testing. She returns today to review the results and recommendations.   Medical, surgical, family history: Reviewed. No pertinent changes.     OBJECTIVE   Physical Exam  Limited secondary to the inherent nature of a virtual visit.  Very pleasant patient.  Constitutional: No apparent distress.   Neurological: Alert and oriented. No obvious neurological deficits.   Psychiatric: Normal mood, affect, thought content, speech, behavior, judgment.    REFERENCES   National Comprehensive Cancer Network (NCCN). (2021). Genetic/familial high-risk assessment: Breast, ovarian, and pancreatic. NCCN Clinical Practice Guidelines in Oncology (NCCN Guidelines), Version 1.2022.  Andrei Y, Sree X, John J, et al. A Hereditary Form of Small Intestinal Carcinoid Associated With a Germline Mutation in Inositol Polyphosphate Multikinase. Gastroenterology. 2015;149(1):67-78. doi:10.1053/[email or web address].2015.04.008  National Comprehensive Cancer Network (NCCN). (2023). Neuroendocrine and adrenal tumors. NCCN Clinical Practice Guidelines in Oncology  (NCCN Guidelines), Version 2.2022.  National Comprehensive Cancer Network (NCCN). (2021). Genetic/familial high-risk assessment: Colorectal. NCCN Clinical Practice Guidelines in Oncology (NCCN Guidelines), Version 1.2021.    Lynette Brooks PA-C, MPAS, PA-C  Physician Assistant, Hereditary/High Risk Clinic  Hematology/Oncology, [facility]    Cc: Aaron Keita MD

## 2023-08-22 ENCOUNTER — PATIENT MESSAGE (OUTPATIENT)
Dept: HEMATOLOGY/ONCOLOGY | Facility: CLINIC | Age: 68
End: 2023-08-22

## 2023-08-22 ENCOUNTER — OFFICE VISIT (OUTPATIENT)
Dept: HEMATOLOGY/ONCOLOGY | Facility: CLINIC | Age: 68
End: 2023-08-22
Payer: MEDICARE

## 2023-08-22 DIAGNOSIS — Z80.49 FAMILY HISTORY OF UTERINE CANCER: ICD-10-CM

## 2023-08-22 DIAGNOSIS — Z80.3 FAMILY HISTORY OF BREAST CANCER: ICD-10-CM

## 2023-08-22 DIAGNOSIS — C7A.8 PRIMARY MALIGNANT NEUROENDOCRINE TUMOR OF SMALL INTESTINE: ICD-10-CM

## 2023-08-22 DIAGNOSIS — Z71.83 ENCOUNTER FOR NONPROCREATIVE GENETIC COUNSELING: Primary | ICD-10-CM

## 2023-08-22 DIAGNOSIS — D35.2 PITUITARY ADENOMA: ICD-10-CM

## 2023-08-22 PROCEDURE — 99214 OFFICE O/P EST MOD 30 MIN: CPT | Mod: 95,,, | Performed by: PHYSICIAN ASSISTANT

## 2023-08-22 PROCEDURE — 4010F PR ACE/ARB THEARPY RXD/TAKEN: ICD-10-PCS | Mod: CPTII,95,, | Performed by: PHYSICIAN ASSISTANT

## 2023-08-22 PROCEDURE — 99214 PR OFFICE/OUTPT VISIT, EST, LEVL IV, 30-39 MIN: ICD-10-PCS | Mod: 95,,, | Performed by: PHYSICIAN ASSISTANT

## 2023-08-22 PROCEDURE — 3044F PR MOST RECENT HEMOGLOBIN A1C LEVEL <7.0%: ICD-10-PCS | Mod: CPTII,95,, | Performed by: PHYSICIAN ASSISTANT

## 2023-08-22 PROCEDURE — 4010F ACE/ARB THERAPY RXD/TAKEN: CPT | Mod: CPTII,95,, | Performed by: PHYSICIAN ASSISTANT

## 2023-08-22 PROCEDURE — 3044F HG A1C LEVEL LT 7.0%: CPT | Mod: CPTII,95,, | Performed by: PHYSICIAN ASSISTANT

## 2023-08-22 RX ORDER — CYCLOBENZAPRINE HCL 10 MG
10 TABLET ORAL NIGHTLY
Qty: 90 TABLET | Refills: 0 | Status: SHIPPED | OUTPATIENT
Start: 2023-08-22

## 2023-08-28 ENCOUNTER — OFFICE VISIT (OUTPATIENT)
Dept: HEMATOLOGY/ONCOLOGY | Facility: CLINIC | Age: 68
End: 2023-08-28
Payer: MEDICARE

## 2023-08-28 ENCOUNTER — INFUSION (OUTPATIENT)
Dept: INFUSION THERAPY | Facility: HOSPITAL | Age: 68
End: 2023-08-28
Payer: MEDICARE

## 2023-08-28 VITALS
RESPIRATION RATE: 18 BRPM | HEART RATE: 61 BPM | SYSTOLIC BLOOD PRESSURE: 170 MMHG | HEIGHT: 64 IN | BODY MASS INDEX: 34.45 KG/M2 | DIASTOLIC BLOOD PRESSURE: 79 MMHG | OXYGEN SATURATION: 98 % | WEIGHT: 201.81 LBS

## 2023-08-28 DIAGNOSIS — C78.7 SECONDARY MALIGNANT NEOPLASM OF LIVER: ICD-10-CM

## 2023-08-28 DIAGNOSIS — C7A.8 PRIMARY MALIGNANT NEUROENDOCRINE TUMOR OF SMALL INTESTINE: Primary | ICD-10-CM

## 2023-08-28 DIAGNOSIS — D84.821 IMMUNODEFICIENCY DUE TO DRUGS: ICD-10-CM

## 2023-08-28 DIAGNOSIS — I10 ESSENTIAL HYPERTENSION: ICD-10-CM

## 2023-08-28 DIAGNOSIS — Z79.899 IMMUNODEFICIENCY DUE TO DRUGS: ICD-10-CM

## 2023-08-28 DIAGNOSIS — G89.3 CANCER-RELATED PAIN: ICD-10-CM

## 2023-08-28 DIAGNOSIS — D84.81 IMMUNODEFICIENCY SECONDARY TO NEOPLASM: ICD-10-CM

## 2023-08-28 DIAGNOSIS — D49.9 IMMUNODEFICIENCY SECONDARY TO NEOPLASM: ICD-10-CM

## 2023-08-28 DIAGNOSIS — C77.8 SECONDARY MALIGNANT NEOPLASM OF LYMPH NODES OF MULTIPLE SITES: ICD-10-CM

## 2023-08-28 PROCEDURE — 96372 THER/PROPH/DIAG INJ SC/IM: CPT

## 2023-08-28 PROCEDURE — 4010F ACE/ARB THERAPY RXD/TAKEN: CPT | Mod: CPTII,S$GLB,, | Performed by: INTERNAL MEDICINE

## 2023-08-28 PROCEDURE — 1159F PR MEDICATION LIST DOCUMENTED IN MEDICAL RECORD: ICD-10-PCS | Mod: CPTII,S$GLB,, | Performed by: INTERNAL MEDICINE

## 2023-08-28 PROCEDURE — 3044F HG A1C LEVEL LT 7.0%: CPT | Mod: CPTII,S$GLB,, | Performed by: INTERNAL MEDICINE

## 2023-08-28 PROCEDURE — 3077F SYST BP >= 140 MM HG: CPT | Mod: CPTII,S$GLB,, | Performed by: INTERNAL MEDICINE

## 2023-08-28 PROCEDURE — 3078F DIAST BP <80 MM HG: CPT | Mod: CPTII,S$GLB,, | Performed by: INTERNAL MEDICINE

## 2023-08-28 PROCEDURE — 3044F PR MOST RECENT HEMOGLOBIN A1C LEVEL <7.0%: ICD-10-PCS | Mod: CPTII,S$GLB,, | Performed by: INTERNAL MEDICINE

## 2023-08-28 PROCEDURE — 1159F MED LIST DOCD IN RCRD: CPT | Mod: CPTII,S$GLB,, | Performed by: INTERNAL MEDICINE

## 2023-08-28 PROCEDURE — 63600175 PHARM REV CODE 636 W HCPCS: Mod: JZ,JG | Performed by: INTERNAL MEDICINE

## 2023-08-28 PROCEDURE — 3008F BODY MASS INDEX DOCD: CPT | Mod: CPTII,S$GLB,, | Performed by: INTERNAL MEDICINE

## 2023-08-28 PROCEDURE — 99215 PR OFFICE/OUTPT VISIT, EST, LEVL V, 40-54 MIN: ICD-10-PCS | Mod: S$GLB,,, | Performed by: INTERNAL MEDICINE

## 2023-08-28 PROCEDURE — 99215 OFFICE O/P EST HI 40 MIN: CPT | Mod: S$GLB,,, | Performed by: INTERNAL MEDICINE

## 2023-08-28 PROCEDURE — 3008F PR BODY MASS INDEX (BMI) DOCUMENTED: ICD-10-PCS | Mod: CPTII,S$GLB,, | Performed by: INTERNAL MEDICINE

## 2023-08-28 PROCEDURE — 1101F PT FALLS ASSESS-DOCD LE1/YR: CPT | Mod: CPTII,S$GLB,, | Performed by: INTERNAL MEDICINE

## 2023-08-28 PROCEDURE — 4010F PR ACE/ARB THEARPY RXD/TAKEN: ICD-10-PCS | Mod: CPTII,S$GLB,, | Performed by: INTERNAL MEDICINE

## 2023-08-28 PROCEDURE — 3288F FALL RISK ASSESSMENT DOCD: CPT | Mod: CPTII,S$GLB,, | Performed by: INTERNAL MEDICINE

## 2023-08-28 PROCEDURE — 99999 PR PBB SHADOW E&M-EST. PATIENT-LVL IV: CPT | Mod: PBBFAC,,, | Performed by: INTERNAL MEDICINE

## 2023-08-28 PROCEDURE — 99999 PR PBB SHADOW E&M-EST. PATIENT-LVL IV: ICD-10-PCS | Mod: PBBFAC,,, | Performed by: INTERNAL MEDICINE

## 2023-08-28 PROCEDURE — 3078F PR MOST RECENT DIASTOLIC BLOOD PRESSURE < 80 MM HG: ICD-10-PCS | Mod: CPTII,S$GLB,, | Performed by: INTERNAL MEDICINE

## 2023-08-28 PROCEDURE — 1125F PR PAIN SEVERITY QUANTIFIED, PAIN PRESENT: ICD-10-PCS | Mod: CPTII,S$GLB,, | Performed by: INTERNAL MEDICINE

## 2023-08-28 PROCEDURE — 1125F AMNT PAIN NOTED PAIN PRSNT: CPT | Mod: CPTII,S$GLB,, | Performed by: INTERNAL MEDICINE

## 2023-08-28 PROCEDURE — 1101F PR PT FALLS ASSESS DOC 0-1 FALLS W/OUT INJ PAST YR: ICD-10-PCS | Mod: CPTII,S$GLB,, | Performed by: INTERNAL MEDICINE

## 2023-08-28 PROCEDURE — 1160F RVW MEDS BY RX/DR IN RCRD: CPT | Mod: CPTII,S$GLB,, | Performed by: INTERNAL MEDICINE

## 2023-08-28 PROCEDURE — 3077F PR MOST RECENT SYSTOLIC BLOOD PRESSURE >= 140 MM HG: ICD-10-PCS | Mod: CPTII,S$GLB,, | Performed by: INTERNAL MEDICINE

## 2023-08-28 PROCEDURE — 3288F PR FALLS RISK ASSESSMENT DOCUMENTED: ICD-10-PCS | Mod: CPTII,S$GLB,, | Performed by: INTERNAL MEDICINE

## 2023-08-28 PROCEDURE — 1160F PR REVIEW ALL MEDS BY PRESCRIBER/CLIN PHARMACIST DOCUMENTED: ICD-10-PCS | Mod: CPTII,S$GLB,, | Performed by: INTERNAL MEDICINE

## 2023-08-28 RX ORDER — LANREOTIDE ACETATE 120 MG/.5ML
120 INJECTION SUBCUTANEOUS ONCE
Status: CANCELLED | OUTPATIENT
Start: 2023-08-28 | End: 2023-08-28

## 2023-08-28 RX ORDER — LANREOTIDE ACETATE 120 MG/.5ML
120 INJECTION SUBCUTANEOUS ONCE
Status: COMPLETED | OUTPATIENT
Start: 2023-08-28 | End: 2023-08-28

## 2023-08-28 RX ORDER — TRAMADOL HYDROCHLORIDE 50 MG/1
50 TABLET ORAL EVERY 8 HOURS PRN
Qty: 90 TABLET | Refills: 0 | Status: SHIPPED | OUTPATIENT
Start: 2023-08-28 | End: 2023-12-12 | Stop reason: SDUPTHER

## 2023-08-28 RX ADMIN — LANREOTIDE ACETATE 120 MG: 120 INJECTION SUBCUTANEOUS at 01:08
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
## 2023-09-08 DIAGNOSIS — R60.0 PERIPHERAL EDEMA: ICD-10-CM

## 2023-09-08 RX ORDER — FUROSEMIDE 20 MG/1
TABLET ORAL
Qty: 90 TABLET | Refills: 1 | Status: SHIPPED | OUTPATIENT
Start: 2023-09-08

## 2023-09-08 NOTE — TELEPHONE ENCOUNTER
No care due was identified.  [facility] Embedded Care Due Messages. Reference number: [id number].   9/08/2023 7:03:31 AM CDT

## 2023-09-08 NOTE — TELEPHONE ENCOUNTER
Refill Routing Note     Refill Routing Note   Medication(s) are not appropriate for processing by [facility] for the following reason(s):      Medication outside of protocol  PRN in sig for furosemide is outside [facility] protocol    [facility] action(s):  Route Care Due:  None identified     Medication Therapy Plan: PRN in sig for furosemide is outside [facility] protocol      Appointments  past 12m or future 3m with PCP    Date Provider   Last Visit   5/16/2023 Cipriano Carrera MD   Next Visit   11/17/2023 Cipriano Carrera MD   ED visits in past 90 days: 0        Note composed:7:54 AM 09/08/2023

## 2023-09-12 RX ORDER — PANTOPRAZOLE SODIUM 40 MG/1
40 TABLET, DELAYED RELEASE ORAL
Qty: 60 TABLET | Refills: 5 | Status: SHIPPED | OUTPATIENT
Start: 2023-09-12 | End: 2024-03-10

## 2023-09-14 ENCOUNTER — DOCUMENTATION ONLY (OUTPATIENT)
Dept: PHARMACY | Facility: CLINIC | Age: 68
End: 2023-09-14
Payer: MEDICARE

## 2023-09-25 ENCOUNTER — OFFICE VISIT (OUTPATIENT)
Dept: HEMATOLOGY/ONCOLOGY | Facility: CLINIC | Age: 68
End: 2023-09-25
Payer: MEDICARE

## 2023-09-25 ENCOUNTER — INFUSION (OUTPATIENT)
Dept: INFUSION THERAPY | Facility: HOSPITAL | Age: 68
End: 2023-09-25
Payer: MEDICARE

## 2023-09-25 VITALS
RESPIRATION RATE: 18 BRPM | WEIGHT: 203.06 LBS | OXYGEN SATURATION: 97 % | HEIGHT: 64 IN | BODY MASS INDEX: 34.67 KG/M2 | DIASTOLIC BLOOD PRESSURE: 75 MMHG | SYSTOLIC BLOOD PRESSURE: 158 MMHG | HEART RATE: 67 BPM | TEMPERATURE: 98 F

## 2023-09-25 DIAGNOSIS — D49.9 IMMUNODEFICIENCY SECONDARY TO NEOPLASM: ICD-10-CM

## 2023-09-25 DIAGNOSIS — C78.7 SECONDARY MALIGNANT NEOPLASM OF LIVER: ICD-10-CM

## 2023-09-25 DIAGNOSIS — C7A.8 PRIMARY MALIGNANT NEUROENDOCRINE TUMOR OF SMALL INTESTINE: Primary | ICD-10-CM

## 2023-09-25 DIAGNOSIS — C77.8 SECONDARY MALIGNANT NEOPLASM OF LYMPH NODES OF MULTIPLE SITES: ICD-10-CM

## 2023-09-25 DIAGNOSIS — Z79.899 IMMUNODEFICIENCY DUE TO DRUGS: ICD-10-CM

## 2023-09-25 DIAGNOSIS — K59.00 CONSTIPATION, UNSPECIFIED CONSTIPATION TYPE: ICD-10-CM

## 2023-09-25 DIAGNOSIS — D84.821 IMMUNODEFICIENCY DUE TO DRUGS: ICD-10-CM

## 2023-09-25 DIAGNOSIS — D84.81 IMMUNODEFICIENCY SECONDARY TO NEOPLASM: ICD-10-CM

## 2023-09-25 DIAGNOSIS — I10 ESSENTIAL HYPERTENSION: ICD-10-CM

## 2023-09-25 PROCEDURE — 1160F PR REVIEW ALL MEDS BY PRESCRIBER/CLIN PHARMACIST DOCUMENTED: ICD-10-PCS | Mod: HCNC,CPTII,S$GLB, | Performed by: INTERNAL MEDICINE

## 2023-09-25 PROCEDURE — 3288F FALL RISK ASSESSMENT DOCD: CPT | Mod: HCNC,CPTII,S$GLB, | Performed by: INTERNAL MEDICINE

## 2023-09-25 PROCEDURE — 1101F PT FALLS ASSESS-DOCD LE1/YR: CPT | Mod: HCNC,CPTII,S$GLB, | Performed by: INTERNAL MEDICINE

## 2023-09-25 PROCEDURE — 1160F RVW MEDS BY RX/DR IN RCRD: CPT | Mod: HCNC,CPTII,S$GLB, | Performed by: INTERNAL MEDICINE

## 2023-09-25 PROCEDURE — 4010F PR ACE/ARB THEARPY RXD/TAKEN: ICD-10-PCS | Mod: HCNC,CPTII,S$GLB, | Performed by: INTERNAL MEDICINE

## 2023-09-25 PROCEDURE — 1126F PR PAIN SEVERITY QUANTIFIED, NO PAIN PRESENT: ICD-10-PCS | Mod: HCNC,CPTII,S$GLB, | Performed by: INTERNAL MEDICINE

## 2023-09-25 PROCEDURE — 3044F HG A1C LEVEL LT 7.0%: CPT | Mod: HCNC,CPTII,S$GLB, | Performed by: INTERNAL MEDICINE

## 2023-09-25 PROCEDURE — 63600175 PHARM REV CODE 636 W HCPCS: Mod: JZ,JG,HCNC | Performed by: INTERNAL MEDICINE

## 2023-09-25 PROCEDURE — 99999 PR PBB SHADOW E&M-EST. PATIENT-LVL V: CPT | Mod: PBBFAC,HCNC,, | Performed by: INTERNAL MEDICINE

## 2023-09-25 PROCEDURE — 1126F AMNT PAIN NOTED NONE PRSNT: CPT | Mod: HCNC,CPTII,S$GLB, | Performed by: INTERNAL MEDICINE

## 2023-09-25 PROCEDURE — 1101F PR PT FALLS ASSESS DOC 0-1 FALLS W/OUT INJ PAST YR: ICD-10-PCS | Mod: HCNC,CPTII,S$GLB, | Performed by: INTERNAL MEDICINE

## 2023-09-25 PROCEDURE — 99999 PR PBB SHADOW E&M-EST. PATIENT-LVL V: ICD-10-PCS | Mod: PBBFAC,HCNC,, | Performed by: INTERNAL MEDICINE

## 2023-09-25 PROCEDURE — 3008F BODY MASS INDEX DOCD: CPT | Mod: HCNC,CPTII,S$GLB, | Performed by: INTERNAL MEDICINE

## 2023-09-25 PROCEDURE — 96372 THER/PROPH/DIAG INJ SC/IM: CPT | Mod: HCNC

## 2023-09-25 PROCEDURE — 1159F PR MEDICATION LIST DOCUMENTED IN MEDICAL RECORD: ICD-10-PCS | Mod: HCNC,CPTII,S$GLB, | Performed by: INTERNAL MEDICINE

## 2023-09-25 PROCEDURE — 3077F SYST BP >= 140 MM HG: CPT | Mod: HCNC,CPTII,S$GLB, | Performed by: INTERNAL MEDICINE

## 2023-09-25 PROCEDURE — 3008F PR BODY MASS INDEX (BMI) DOCUMENTED: ICD-10-PCS | Mod: HCNC,CPTII,S$GLB, | Performed by: INTERNAL MEDICINE

## 2023-09-25 PROCEDURE — 3044F PR MOST RECENT HEMOGLOBIN A1C LEVEL <7.0%: ICD-10-PCS | Mod: HCNC,CPTII,S$GLB, | Performed by: INTERNAL MEDICINE

## 2023-09-25 PROCEDURE — 3078F DIAST BP <80 MM HG: CPT | Mod: HCNC,CPTII,S$GLB, | Performed by: INTERNAL MEDICINE

## 2023-09-25 PROCEDURE — 1159F MED LIST DOCD IN RCRD: CPT | Mod: HCNC,CPTII,S$GLB, | Performed by: INTERNAL MEDICINE

## 2023-09-25 PROCEDURE — 99215 OFFICE O/P EST HI 40 MIN: CPT | Mod: HCNC,S$GLB,, | Performed by: INTERNAL MEDICINE

## 2023-09-25 PROCEDURE — 3077F PR MOST RECENT SYSTOLIC BLOOD PRESSURE >= 140 MM HG: ICD-10-PCS | Mod: HCNC,CPTII,S$GLB, | Performed by: INTERNAL MEDICINE

## 2023-09-25 PROCEDURE — 99215 PR OFFICE/OUTPT VISIT, EST, LEVL V, 40-54 MIN: ICD-10-PCS | Mod: HCNC,S$GLB,, | Performed by: INTERNAL MEDICINE

## 2023-09-25 PROCEDURE — 3078F PR MOST RECENT DIASTOLIC BLOOD PRESSURE < 80 MM HG: ICD-10-PCS | Mod: HCNC,CPTII,S$GLB, | Performed by: INTERNAL MEDICINE

## 2023-09-25 PROCEDURE — 4010F ACE/ARB THERAPY RXD/TAKEN: CPT | Mod: HCNC,CPTII,S$GLB, | Performed by: INTERNAL MEDICINE

## 2023-09-25 PROCEDURE — 3288F PR FALLS RISK ASSESSMENT DOCUMENTED: ICD-10-PCS | Mod: HCNC,CPTII,S$GLB, | Performed by: INTERNAL MEDICINE

## 2023-09-25 RX ORDER — LANREOTIDE ACETATE 120 MG/.5ML
120 INJECTION SUBCUTANEOUS ONCE
Status: CANCELLED | OUTPATIENT
Start: 2023-09-25 | End: 2023-09-25

## 2023-09-25 RX ORDER — LANREOTIDE ACETATE 120 MG/.5ML
120 INJECTION SUBCUTANEOUS ONCE
Status: COMPLETED | OUTPATIENT
Start: 2023-09-25 | End: 2023-09-25

## 2023-09-25 RX ADMIN — LANREOTIDE ACETATE 120 MG: 120 INJECTION SUBCUTANEOUS at 02:09

## 2023-09-25 NOTE — NURSING
Pt here for Lanreotide injection. Administered injection in right gluteal area. No questions or concerns. Pt ambulated out of unit unassisted.
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
## 2023-10-23 ENCOUNTER — OFFICE VISIT (OUTPATIENT)
Dept: HEMATOLOGY/ONCOLOGY | Facility: CLINIC | Age: 68
End: 2023-10-23
Payer: MEDICARE

## 2023-10-23 ENCOUNTER — INFUSION (OUTPATIENT)
Dept: INFUSION THERAPY | Facility: HOSPITAL | Age: 68
End: 2023-10-23
Payer: MEDICARE

## 2023-10-23 VITALS
TEMPERATURE: 98 F | RESPIRATION RATE: 18 BRPM | SYSTOLIC BLOOD PRESSURE: 136 MMHG | HEIGHT: 64 IN | OXYGEN SATURATION: 98 % | DIASTOLIC BLOOD PRESSURE: 68 MMHG | HEART RATE: 68 BPM | WEIGHT: 206.81 LBS | BODY MASS INDEX: 35.31 KG/M2

## 2023-10-23 DIAGNOSIS — C7A.8 PRIMARY MALIGNANT NEUROENDOCRINE TUMOR OF SMALL INTESTINE: Primary | ICD-10-CM

## 2023-10-23 DIAGNOSIS — C77.8 SECONDARY MALIGNANT NEOPLASM OF LYMPH NODES OF MULTIPLE SITES: ICD-10-CM

## 2023-10-23 DIAGNOSIS — D49.9 IMMUNODEFICIENCY SECONDARY TO NEOPLASM: ICD-10-CM

## 2023-10-23 DIAGNOSIS — D84.81 IMMUNODEFICIENCY SECONDARY TO NEOPLASM: ICD-10-CM

## 2023-10-23 DIAGNOSIS — M54.50 ACUTE RIGHT-SIDED LOW BACK PAIN WITHOUT SCIATICA: ICD-10-CM

## 2023-10-23 DIAGNOSIS — C78.7 SECONDARY MALIGNANT NEOPLASM OF LIVER: ICD-10-CM

## 2023-10-23 DIAGNOSIS — Z79.899 IMMUNODEFICIENCY DUE TO DRUGS: ICD-10-CM

## 2023-10-23 DIAGNOSIS — D84.821 IMMUNODEFICIENCY DUE TO DRUGS: ICD-10-CM

## 2023-10-23 DIAGNOSIS — I10 ESSENTIAL HYPERTENSION: ICD-10-CM

## 2023-10-23 PROCEDURE — 4010F PR ACE/ARB THEARPY RXD/TAKEN: ICD-10-PCS | Mod: HCNC,CPTII,S$GLB, | Performed by: INTERNAL MEDICINE

## 2023-10-23 PROCEDURE — 1125F AMNT PAIN NOTED PAIN PRSNT: CPT | Mod: HCNC,CPTII,S$GLB, | Performed by: INTERNAL MEDICINE

## 2023-10-23 PROCEDURE — 3008F PR BODY MASS INDEX (BMI) DOCUMENTED: ICD-10-PCS | Mod: HCNC,CPTII,S$GLB, | Performed by: INTERNAL MEDICINE

## 2023-10-23 PROCEDURE — 99215 PR OFFICE/OUTPT VISIT, EST, LEVL V, 40-54 MIN: ICD-10-PCS | Mod: HCNC,S$GLB,, | Performed by: INTERNAL MEDICINE

## 2023-10-23 PROCEDURE — 3044F PR MOST RECENT HEMOGLOBIN A1C LEVEL <7.0%: ICD-10-PCS | Mod: HCNC,CPTII,S$GLB, | Performed by: INTERNAL MEDICINE

## 2023-10-23 PROCEDURE — 3078F DIAST BP <80 MM HG: CPT | Mod: HCNC,CPTII,S$GLB, | Performed by: INTERNAL MEDICINE

## 2023-10-23 PROCEDURE — 1159F PR MEDICATION LIST DOCUMENTED IN MEDICAL RECORD: ICD-10-PCS | Mod: HCNC,CPTII,S$GLB, | Performed by: INTERNAL MEDICINE

## 2023-10-23 PROCEDURE — 3008F BODY MASS INDEX DOCD: CPT | Mod: HCNC,CPTII,S$GLB, | Performed by: INTERNAL MEDICINE

## 2023-10-23 PROCEDURE — 3288F FALL RISK ASSESSMENT DOCD: CPT | Mod: HCNC,CPTII,S$GLB, | Performed by: INTERNAL MEDICINE

## 2023-10-23 PROCEDURE — 99215 OFFICE O/P EST HI 40 MIN: CPT | Mod: HCNC,S$GLB,, | Performed by: INTERNAL MEDICINE

## 2023-10-23 PROCEDURE — 3078F PR MOST RECENT DIASTOLIC BLOOD PRESSURE < 80 MM HG: ICD-10-PCS | Mod: HCNC,CPTII,S$GLB, | Performed by: INTERNAL MEDICINE

## 2023-10-23 PROCEDURE — 4010F ACE/ARB THERAPY RXD/TAKEN: CPT | Mod: HCNC,CPTII,S$GLB, | Performed by: INTERNAL MEDICINE

## 2023-10-23 PROCEDURE — 3288F PR FALLS RISK ASSESSMENT DOCUMENTED: ICD-10-PCS | Mod: HCNC,CPTII,S$GLB, | Performed by: INTERNAL MEDICINE

## 2023-10-23 PROCEDURE — 1101F PT FALLS ASSESS-DOCD LE1/YR: CPT | Mod: HCNC,CPTII,S$GLB, | Performed by: INTERNAL MEDICINE

## 2023-10-23 PROCEDURE — 96402 CHEMO HORMON ANTINEOPL SQ/IM: CPT | Mod: HCNC

## 2023-10-23 PROCEDURE — 1159F MED LIST DOCD IN RCRD: CPT | Mod: HCNC,CPTII,S$GLB, | Performed by: INTERNAL MEDICINE

## 2023-10-23 PROCEDURE — 96372 THER/PROPH/DIAG INJ SC/IM: CPT

## 2023-10-23 PROCEDURE — 1101F PR PT FALLS ASSESS DOC 0-1 FALLS W/OUT INJ PAST YR: ICD-10-PCS | Mod: HCNC,CPTII,S$GLB, | Performed by: INTERNAL MEDICINE

## 2023-10-23 PROCEDURE — 1125F PR PAIN SEVERITY QUANTIFIED, PAIN PRESENT: ICD-10-PCS | Mod: HCNC,CPTII,S$GLB, | Performed by: INTERNAL MEDICINE

## 2023-10-23 PROCEDURE — 3075F SYST BP GE 130 - 139MM HG: CPT | Mod: HCNC,CPTII,S$GLB, | Performed by: INTERNAL MEDICINE

## 2023-10-23 PROCEDURE — 99999 PR PBB SHADOW E&M-EST. PATIENT-LVL IV: ICD-10-PCS | Mod: PBBFAC,HCNC,, | Performed by: INTERNAL MEDICINE

## 2023-10-23 PROCEDURE — 3075F PR MOST RECENT SYSTOLIC BLOOD PRESS GE 130-139MM HG: ICD-10-PCS | Mod: HCNC,CPTII,S$GLB, | Performed by: INTERNAL MEDICINE

## 2023-10-23 PROCEDURE — 63600175 PHARM REV CODE 636 W HCPCS: Mod: JZ,JG,HCNC | Performed by: INTERNAL MEDICINE

## 2023-10-23 PROCEDURE — 3044F HG A1C LEVEL LT 7.0%: CPT | Mod: HCNC,CPTII,S$GLB, | Performed by: INTERNAL MEDICINE

## 2023-10-23 PROCEDURE — 99999 PR PBB SHADOW E&M-EST. PATIENT-LVL IV: CPT | Mod: PBBFAC,HCNC,, | Performed by: INTERNAL MEDICINE

## 2023-10-23 RX ORDER — LANREOTIDE ACETATE 120 MG/.5ML
120 INJECTION SUBCUTANEOUS ONCE
Status: CANCELLED | OUTPATIENT
Start: 2023-10-23 | End: 2023-10-23

## 2023-10-23 RX ORDER — LANREOTIDE ACETATE 120 MG/.5ML
120 INJECTION SUBCUTANEOUS ONCE
Status: COMPLETED | OUTPATIENT
Start: 2023-10-23 | End: 2023-10-23

## 2023-10-23 RX ADMIN — LANREOTIDE ACETATE 120 MG: 120 INJECTION SUBCUTANEOUS at 03:10

## 2023-10-23 NOTE — NURSING
Patient tolerated Lanreotide injection to right hip well today. NAD noted upon discharge. Discharged home, ambulated independently.
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
## 2023-10-25 ENCOUNTER — TELEPHONE (OUTPATIENT)
Dept: FAMILY MEDICINE | Facility: CLINIC | Age: 68
End: 2023-10-25
Payer: MEDICARE

## 2023-10-25 NOTE — TELEPHONE ENCOUNTER
----- Message from Jesisca Ferguson sent at 10/25/2023  9:34 AM CDT -----  Contact: self/[phone]  Patient is calling to consult with nurse regarding her flu and pneumonia vaccines. She would also like to consult with nurse about a wellness visit. Please call her back at . Thanks/ar

## 2023-11-01 ENCOUNTER — HOSPITAL ENCOUNTER (OUTPATIENT)
Dept: RADIOLOGY | Facility: HOSPITAL | Age: 68
Discharge: HOME | End: 2023-11-01
Attending: INTERNAL MEDICINE
Payer: MEDICARE

## 2023-11-01 DIAGNOSIS — M54.50 ACUTE RIGHT-SIDED LOW BACK PAIN WITHOUT SCIATICA: ICD-10-CM

## 2023-11-01 PROCEDURE — 72100 XR LUMBAR SPINE 2 OR 3 VIEWS: ICD-10-PCS | Mod: 26,HCNC,, | Performed by: RADIOLOGY

## 2023-11-01 PROCEDURE — 72070 XR THORACIC SPINE AP LATERAL  UPRIGHT: ICD-10-PCS | Mod: 26,HCNC,, | Performed by: RADIOLOGY

## 2023-11-01 PROCEDURE — 72070 X-RAY EXAM THORAC SPINE 2VWS: CPT | Mod: 26,HCNC,, | Performed by: RADIOLOGY

## 2023-11-01 PROCEDURE — 72100 X-RAY EXAM L-S SPINE 2/3 VWS: CPT | Mod: TC,HCNC,FY

## 2023-11-01 PROCEDURE — 72100 X-RAY EXAM L-S SPINE 2/3 VWS: CPT | Mod: 26,HCNC,, | Performed by: RADIOLOGY

## 2023-11-01 PROCEDURE — 72070 X-RAY EXAM THORAC SPINE 2VWS: CPT | Mod: TC,HCNC,FY

## 2023-11-16 PROBLEM — R19.00 ABDOMINAL MASS: Status: RESOLVED | Noted: 2023-05-16 | Resolved: 2023-11-16

## 2023-11-16 NOTE — PROGRESS NOTES
This note was created by combination of typed  and M-Modal dictation.  Transcription errors may be present.  If there are any questions, please contact me.    Assessment and Plan:   Assessment and Plan    Essential hypertension 1/2021 TTE normal, stress test negative  -blood pressure stable.  Takes Lasix p.r.n., several days a week.  No longer taking potassium.  Labs done in October, potassium was good off of potassium supplement.  No changes in regimen.    Fibromyalgia; diffuse arm, back pain, thigh pain; 2017 B12, TSH WNL; reynaldo without efficacy; Cymbalta.  Mild recurrent major depression  -on Cymbalta.  Tramadol p.r.n. for pain, Lidoderm patch, Flexeril for nighttime use.  Flexeril she uses maybe 4 days out of the week.  Does have some over sedation into the following day.    History of benign carcinoid tumor rectum s/p resection per GI note  Primary malignant neuroendocrine tumor of small intestine  Secondary malignant neoplasm of liver  -followed by Heme-Onc.  On lanreotide.  Her last colonoscopy was 2018, at the time showed neuroendocrine tumor with complete excision.  No TA/HP.    Needs flu shot  -     Influenza (FLUAD) - Quadrivalent (Adjuvanted) *Preferred* (65+) (PF)      Medications Discontinued During This Encounter   Medication Reason    potassium chloride SA (K-DUR,KLOR-CON) 20 MEQ tablet Therapy completed       meds sent this encounter:         Follow Up:   Future Appointments   Date Time Provider Department Center   11/17/2023  1:00 PM Social Recruiting, TECH SUPPORT [facility] Virtual   11/20/2023  2:00 PM LAB, [facility] CANCER BLDG [facility]   11/20/2023  3:00 PM Aaron Keita MD [facility] HEMONC2 [facility]   11/20/2023  3:30 PM INJECTION, [facility]   [facility]18[facility]2023 12:40 [facility]   12/18/2023  1:40 PM Aaron Keita MD [facility] HEMONC2 [facility]   12/18/2023  2:15 PM INJECTION, [facility]    1/12/2024  8:30 AM LAB, [facility] CANCER BLDG [facility]   1/12/2024  9:30 AM [facility] PET CT LIMIT 500 LBS [facility] PET CT [facility]   1/16/2024  1:45 PM INJECTION, [facility] INF[facility]ION [facility]         Subjective:   Subjective   Chief Complaint   Patient presents with    Follow-up       HPI  Bell is a 68 y.o. female.    Social History     Tobacco Use    Smoking status: Never     Passive exposure: Never    Smokeless tobacco: Never   Substance Use Topics    Alcohol use: Yes     Alcohol/week: 1.0 standard drink of alcohol     Types: 1 Cans of beer per week     Comment: on ocassion      Social History     Occupational History     Employer: [employer]      Social History     Social History Narrative    Not on file       No LMP recorded. Patient has had a hysterectomy.    Last appointment with this clinic was 6/5/2023. Last visit with me 5/16/2023   To summarize last visit and events leading up to today:  Hypertension last visit me in May increased spironolactone  Diuretic induced hypokalemia on potassium.  Lasix every other day.  Depression, fibromyalgia, Cymbalta and gabapentin  Chronic constipation.  Chronic anemia, iron deficiency.  Colonoscopy 2018 with polyp though no path report given.  Last EGD on record was in 2021 showing gastric ulcer with no stigmata bleeding.  Biopsy was negative.  Linzess.  history of transsphenoidal pituitary resection stable  06/21/2022 DEXA L-spine -1.9, total hip-2.8, femoral neck-3.5.  FRAX score 2.7/8.3%.  Osteoporosis.  Started on alendronate  Vitamin-D deficient    Saw cardiology in follow-up 06/07/2023.  Sensation of chest discomfort and palpitations improved after stress levels decreased.  Monitor.      Last visit me in May, incidental finding of abdominal mass undergoing workup.  6/27/23 abd mass, EUS bx Well-differentiated neuroendocrine tumor, Grade1  Followed by Heme-Onc.  Lanreotide.  Had fallen, 11/01/2023 x-ray T-spine and L-spine no  fracture    Today's visit:  Linzess regularly.   No change in BMs since dx  No issues with treatment.    Feels like she's handling things OK.     Off of K Dur.   BP meds unchanged otherwise  Lasix takes PRN.  Estimates 3 times a week.     Most recent K 10/23/23, 4.1.     Takes pain medication - daily use 1-2 daily.   Flexeril for nightly use. Estimates 4 times a week. Oversedation into the following day.   Tramadol  Lidoderm patch.   Cymbalta.      Taking PPI.      Patient Care Team:  Cipriano Carrera MD as PCP - General (Internal Medicine)  Gonsalo Ivy MD as Consulting Physician (Gastroenterology)  Alma Delia Morales as Digital Medicine Health   Yvonne Mota PharmD as Hypertension Digital Medicine Clinician  Cipriano Carrera MD as Hypertension Digital Medicine Responsible Provider (Internal Medicine)  Vilma James MA as Care Coordinator  Jaida Raygoza RN as Oncology Navigator  Jud Elder RN as Oncology Navigator  [facility] as Hypertension Digital Medicine Contract      Patient Active Problem List    Diagnosis Date Noted    Primary malignant neuroendocrine tumor of small intestine 07/18/2023    Secondary malignant neoplasm of liver 07/18/2023    Chronic right-sided low back pain without sciatica 05/16/2023 04/27/2023 MRI L-spine impression:  T11-T12 posterior central 2.1 mm subligamentous disc herniation with caudal migration.  No canal stenosis.  T12-L1 posterior midline 1.2 mm disc herniation or central canal stenosis.    L2-L3 small facet effusions are present  L3-L4 mild facet hypertrophy with small effusions, there is minimal left foramen  L4-L5 diffuse disc bulge with facet hypertrophy, there is mild bilateral foraminal narrowing.    L5-S1 facet hypertrophy, there is mild moderate left greater than right foraminal narrowing      Vitamin D deficiency 10/26/2022    Age-related osteoporosis without current pathological fracture; 6/2022 alendronate 06/22/2022 06/21/2022 DEXA L-spine -1.9,  total hip-2.8, femoral neck-3.5.  FRAX score 2.7/8.3%.  Osteoporosis.      Gait abnormality 02/03/2022    Decreased strength of lower extremity 02/03/2022    Decreased range of motion of left knee 02/03/2022    History of left knee replacement 01/10/2022    Chest pain, atypical 01/14/2021    Palpitations 01/14/2021    LANGE (dyspnea on exertion) 01/14/2021    GERD (gastroesophageal reflux disease) 09/08/2020    Peripheral edema 08/28/2020    Chronic constipation 08/28/2020    History of pituitary adenoma 9/2019 MRI no recurrence 09/24/2019     s/p transphenoidal resection ~1989 9/23/2019 MRI brain:   1. Fluid level in the sphenoid sinus, likely from sphenoid sinusitis.  2. No significant abnormalities of the sella turcica to suggest any recurrent neoplasms.  3. Periventricular white matter changes likely from chronic microvascular ischemic disease.  4. No acute intracranial processes.  9/23/2019 carotid US normal      Status post transsphenoidal pituitary resection for tumor, Dr Cardenas, about 1989 09/09/2019    History of benign carcinoid tumor rectum s/p resection per GI note 01/30/2019    Colon polyp 8/2018 no path report included repeat 5 years 10/07/2018    Fibromyalgia; diffuse arm, back pain, thigh pain; 2017 B12, TSH WNL; reynaldo without efficacy; Cymbalta. 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 - Small hiatal hernia. - Non-bleeding gastric ulcers with no stigmata of bleeding. Biopsied. This has been present for more than 2 years according to previous EGD report 7/2018. - Normal examined duodenum. bx negative;  neg for H pylori. Felt to be NSAID induced  06/30/2021 EGD normal esophagus.  Gastric ulcer with no stigmata of bleeding.  Improved from previous but not gone.  bx reactive/chemical gastropathy; H pylori neg.  Normal duodenum.      Menopausal hot flushes 02/14/2013    Mild recurrent major depression 02/14/2013    Primary osteoarthritis of left knee 07/30/2012 8/24/2017 MRI L knee: Tricompartmental degenerative changes. Lateral and medial meniscal degenerative tearing/changes. Tricompartmental cartilage loss including areas of full-thickness full-thickness loss. Abnormal appearance of the ACL and PCL suggestive of chronic injury.      Essential hypertension 1/2021 TTE normal, stress test negative 07/30/2012 6/2017 nuclear stress test negative. No change 7/2015 6/2017 TTE normal LVEF 55-60  01/26/2021 nuclear medicine stress test normal perfusion scan no evidence of ischemia.  01/26/2021 TTE LV normal size with concentric LVH and normal systolic function LVEF 60%.  Normal diastolic function.  Normal RV size and systolic function.  CVP 3.  PA pressure 5.         PAST MEDICAL PROBLEMS, PAST SURGICAL HISTORY: please see relevant portions of the electronic medical record    ALLERGIES AND MEDICATIONS: updated and reviewed.  Medication List with Changes/Refills   Current Medications    ALBUTEROL (PROVENTIL/VENTOLIN HFA) 90 MCG/ACTUATION INHALER    Inhale 2 puffs into the lungs every 6 (six) hours as needed for Wheezing. Rescue    ALENDRONATE (FOSAMAX) 70 MG TABLET    TAKE 1 TABLET EVERY 7 DAYS    AMLODIPINE (NORVASC) 10 MG TABLET    TAKE 1 TABLET ONE TIME DAILY (DOSE INCREASE)    CETIRIZINE (ZYRTEC) 10 MG TABLET    Take 1 tablet (10 mg total) by mouth once daily.    CYCLOBENZAPRINE (FLEXERIL) 10 MG TABLET    TAKE 1 TABLET EVERY EVENING    DICYCLOMINE (BENTYL) 20 MG TABLET    Take 1 tablet (20 mg total) by mouth 3 (three) times daily as needed (abdominal pain).    DULOXETINE (CYMBALTA) 60 MG CAPSULE    TAKE 1  "CAPSULE ONE TIME DAILY (INCREASED DOSE)    FUROSEMIDE (LASIX) 20 MG TABLET    TAKE 1 TABLET ONE TIME DAILY FOR LEG SWELLING AS NEEDED    GABAPENTIN (NEURONTIN) 100 MG CAPSULE    3 tablets nighttime 1 tablet in AM; increased dose    LATANOPROST 0.005 % OPHTHALMIC SOLUTION    Place 1 drop into the right eye every evening.    LIDOCAINE (LIDODERM) 5 %    APPLY 1 PATCH ON THE SKIN ONE TIME DAILY. REMOVE AND DISCARD PATCH WITHIN 12 HOURS OR AS DIRECTED BY MD    LINACLOTIDE (LINZESS) 145 MCG CAP CAPSULE    Take 1 capsule (145 mcg total) by mouth once daily.    METOPROLOL SUCCINATE (TOPROL-XL) 100 MG 24 HR TABLET    Take 1 tablet (100 mg total) by mouth once daily.    NITROGLYCERIN (NITROSTAT) 0.4 MG SL TABLET    Place 1 tablet (0.4 mg total) under the tongue every 5 (five) minutes as needed for Chest pain.    PANTOPRAZOLE (PROTONIX) 40 MG TABLET    Take 1 tablet (40 mg total) by mouth 2 (two) times daily before meals.    POTASSIUM CHLORIDE SA (K-DUR,KLOR-CON) 20 MEQ TABLET    Take 1 tablet (20 mEq total) by mouth 2 (two) times daily.    SPIRONOLACTONE (ALDACTONE) 50 MG TABLET    Take 1 tablet (50 mg total) by mouth once daily.    TRAMADOL (ULTRAM) 50 MG TABLET    Take 1 tablet (50 mg total) by mouth every 8 (eight) hours as needed for Pain.    VALSARTAN (DIOVAN) 320 MG TABLET    Take 1 tablet (320 mg total) by mouth once daily.    VITAMIN D (VITAMIN D3) 1000 UNITS TAB    Take 1,000 Units by mouth once daily.         Objective:   Objective   Physical Exam   Vitals:    11/17/23 1029   BP: 132/84   Pulse: 70   Temp: 97.9 °F (36.6 °C)   SpO2: 95%   Weight: 94.3 kg (207 lb 14.3 oz)   Height: 5' 4" (1.626 m)    Body mass index is 35.68 kg/m².  Weight: 94.3 kg (207 lb 14.3 oz)   Height: 5' 4" (162.6 cm)     Physical Exam  Constitutional:       General: She is not in acute distress.     Appearance: She is well-developed.   Eyes:      Extraocular Movements: Extraocular movements intact.   Cardiovascular:      Rate and Rhythm: " Normal rate and regular rhythm.      Heart sounds: Normal heart sounds. No murmur heard.  Pulmonary:      Effort: Pulmonary effort is normal.      Breath sounds: Normal breath sounds.   Musculoskeletal:         General: Normal range of motion.      Right lower leg: No edema.      Left lower leg: No edema.   Lymphadenopathy:      Cervical: No cervical adenopathy.   Skin:     General: Skin is warm and dry.   Neurological:      Mental Status: She is alert and oriented to person, place, and time.      Coordination: Coordination normal.   Psychiatric:         Behavior: Behavior normal.         Thought Content: Thought content normal.

## 2023-11-17 ENCOUNTER — OFFICE VISIT (OUTPATIENT)
Dept: FAMILY MEDICINE | Facility: CLINIC | Age: 68
End: 2023-11-17
Payer: MEDICARE

## 2023-11-17 VITALS
HEIGHT: 64 IN | BODY MASS INDEX: 35.49 KG/M2 | HEART RATE: 70 BPM | TEMPERATURE: 98 F | OXYGEN SATURATION: 95 % | DIASTOLIC BLOOD PRESSURE: 84 MMHG | WEIGHT: 207.88 LBS | SYSTOLIC BLOOD PRESSURE: 132 MMHG

## 2023-11-17 DIAGNOSIS — C7A.8 PRIMARY MALIGNANT NEUROENDOCRINE TUMOR OF SMALL INTESTINE: ICD-10-CM

## 2023-11-17 DIAGNOSIS — M79.7 FIBROMYALGIA: ICD-10-CM

## 2023-11-17 DIAGNOSIS — I10 ESSENTIAL HYPERTENSION: Primary | Chronic | ICD-10-CM

## 2023-11-17 DIAGNOSIS — C78.7 SECONDARY MALIGNANT NEOPLASM OF LIVER: ICD-10-CM

## 2023-11-17 DIAGNOSIS — F33.0 MILD RECURRENT MAJOR DEPRESSION: Chronic | ICD-10-CM

## 2023-11-17 DIAGNOSIS — Z86.012 HISTORY OF BENIGN CARCINOID TUMOR: ICD-10-CM

## 2023-11-17 DIAGNOSIS — Z23 NEEDS FLU SHOT: ICD-10-CM

## 2023-11-17 PROCEDURE — 1160F RVW MEDS BY RX/DR IN RCRD: CPT | Mod: HCNC,CPTII,S$GLB, | Performed by: INTERNAL MEDICINE

## 2023-11-17 PROCEDURE — 3079F PR MOST RECENT DIASTOLIC BLOOD PRESSURE 80-89 MM HG: ICD-10-PCS | Mod: HCNC,CPTII,S$GLB, | Performed by: INTERNAL MEDICINE

## 2023-11-17 PROCEDURE — 99214 OFFICE O/P EST MOD 30 MIN: CPT | Mod: HCNC,S$GLB,, | Performed by: INTERNAL MEDICINE

## 2023-11-17 PROCEDURE — 3288F FALL RISK ASSESSMENT DOCD: CPT | Mod: HCNC,CPTII,S$GLB, | Performed by: INTERNAL MEDICINE

## 2023-11-17 PROCEDURE — 3008F BODY MASS INDEX DOCD: CPT | Mod: HCNC,CPTII,S$GLB, | Performed by: INTERNAL MEDICINE

## 2023-11-17 PROCEDURE — 99214 PR OFFICE/OUTPT VISIT, EST, LEVL IV, 30-39 MIN: ICD-10-PCS | Mod: HCNC,S$GLB,, | Performed by: INTERNAL MEDICINE

## 2023-11-17 PROCEDURE — 3044F HG A1C LEVEL LT 7.0%: CPT | Mod: HCNC,CPTII,S$GLB, | Performed by: INTERNAL MEDICINE

## 2023-11-17 PROCEDURE — 4010F ACE/ARB THERAPY RXD/TAKEN: CPT | Mod: HCNC,CPTII,S$GLB, | Performed by: INTERNAL MEDICINE

## 2023-11-17 PROCEDURE — 3075F SYST BP GE 130 - 139MM HG: CPT | Mod: HCNC,CPTII,S$GLB, | Performed by: INTERNAL MEDICINE

## 2023-11-17 PROCEDURE — 1160F PR REVIEW ALL MEDS BY PRESCRIBER/CLIN PHARMACIST DOCUMENTED: ICD-10-PCS | Mod: HCNC,CPTII,S$GLB, | Performed by: INTERNAL MEDICINE

## 2023-11-17 PROCEDURE — 99999 PR PBB SHADOW E&M-EST. PATIENT-LVL IV: ICD-10-PCS | Mod: PBBFAC,HCNC,, | Performed by: INTERNAL MEDICINE

## 2023-11-17 PROCEDURE — 3288F PR FALLS RISK ASSESSMENT DOCUMENTED: ICD-10-PCS | Mod: HCNC,CPTII,S$GLB, | Performed by: INTERNAL MEDICINE

## 2023-11-17 PROCEDURE — 3044F PR MOST RECENT HEMOGLOBIN A1C LEVEL <7.0%: ICD-10-PCS | Mod: HCNC,CPTII,S$GLB, | Performed by: INTERNAL MEDICINE

## 2023-11-17 PROCEDURE — 90694 VACC AIIV4 NO PRSRV 0.5ML IM: CPT | Mod: HCNC,S$GLB,, | Performed by: INTERNAL MEDICINE

## 2023-11-17 PROCEDURE — 1125F PR PAIN SEVERITY QUANTIFIED, PAIN PRESENT: ICD-10-PCS | Mod: HCNC,CPTII,S$GLB, | Performed by: INTERNAL MEDICINE

## 2023-11-17 PROCEDURE — 1100F PR PT FALLS ASSESS DOC 2+ FALLS/FALL W/INJURY/YR: ICD-10-PCS | Mod: HCNC,CPTII,S$GLB, | Performed by: INTERNAL MEDICINE

## 2023-11-17 PROCEDURE — 3075F PR MOST RECENT SYSTOLIC BLOOD PRESS GE 130-139MM HG: ICD-10-PCS | Mod: HCNC,CPTII,S$GLB, | Performed by: INTERNAL MEDICINE

## 2023-11-17 PROCEDURE — 3008F PR BODY MASS INDEX (BMI) DOCUMENTED: ICD-10-PCS | Mod: HCNC,CPTII,S$GLB, | Performed by: INTERNAL MEDICINE

## 2023-11-17 PROCEDURE — 1100F PTFALLS ASSESS-DOCD GE2>/YR: CPT | Mod: HCNC,CPTII,S$GLB, | Performed by: INTERNAL MEDICINE

## 2023-11-17 PROCEDURE — 4010F PR ACE/ARB THEARPY RXD/TAKEN: ICD-10-PCS | Mod: HCNC,CPTII,S$GLB, | Performed by: INTERNAL MEDICINE

## 2023-11-17 PROCEDURE — 90694 FLU VACCINE - QUADRIVALENT - ADJUVANTED: ICD-10-PCS | Mod: HCNC,S$GLB,, | Performed by: INTERNAL MEDICINE

## 2023-11-17 PROCEDURE — 1125F AMNT PAIN NOTED PAIN PRSNT: CPT | Mod: HCNC,CPTII,S$GLB, | Performed by: INTERNAL MEDICINE

## 2023-11-17 PROCEDURE — 1159F MED LIST DOCD IN RCRD: CPT | Mod: HCNC,CPTII,S$GLB, | Performed by: INTERNAL MEDICINE

## 2023-11-17 PROCEDURE — 1159F PR MEDICATION LIST DOCUMENTED IN MEDICAL RECORD: ICD-10-PCS | Mod: HCNC,CPTII,S$GLB, | Performed by: INTERNAL MEDICINE

## 2023-11-17 PROCEDURE — 99999 PR PBB SHADOW E&M-EST. PATIENT-LVL IV: CPT | Mod: PBBFAC,HCNC,, | Performed by: INTERNAL MEDICINE

## 2023-11-17 PROCEDURE — G0008 ADMIN INFLUENZA VIRUS VAC: HCPCS | Mod: HCNC,S$GLB,, | Performed by: INTERNAL MEDICINE

## 2023-11-17 PROCEDURE — 3079F DIAST BP 80-89 MM HG: CPT | Mod: HCNC,CPTII,S$GLB, | Performed by: INTERNAL MEDICINE

## 2023-11-17 PROCEDURE — G0008 FLU VACCINE - QUADRIVALENT - ADJUVANTED: ICD-10-PCS | Mod: HCNC,S$GLB,, | Performed by: INTERNAL MEDICINE

## 2023-11-17 NOTE — Clinical Note
Hello, saw our mutual pt today. Had colonoscopy 2018 which showed the neurendocrine tumor completely excised, otherwise no tubular adenomas etc.  (Path report in scanned media). Should she have a colonoscopy sooner than 10 years? Thanks, Cipriano

## 2023-11-20 ENCOUNTER — OFFICE VISIT (OUTPATIENT)
Dept: HEMATOLOGY/ONCOLOGY | Facility: CLINIC | Age: 68
End: 2023-11-20
Payer: MEDICARE

## 2023-11-20 ENCOUNTER — LAB VISIT (OUTPATIENT)
Dept: LAB | Facility: HOSPITAL | Age: 68
End: 2023-11-20
Attending: INTERNAL MEDICINE
Payer: MEDICARE

## 2023-11-20 VITALS
DIASTOLIC BLOOD PRESSURE: 70 MMHG | HEIGHT: 64 IN | HEART RATE: 63 BPM | OXYGEN SATURATION: 96 % | BODY MASS INDEX: 35.53 KG/M2 | SYSTOLIC BLOOD PRESSURE: 140 MMHG | RESPIRATION RATE: 18 BRPM | WEIGHT: 208.13 LBS

## 2023-11-20 DIAGNOSIS — I10 ESSENTIAL HYPERTENSION: ICD-10-CM

## 2023-11-20 DIAGNOSIS — C7A.8 PRIMARY MALIGNANT NEUROENDOCRINE TUMOR OF SMALL INTESTINE: ICD-10-CM

## 2023-11-20 DIAGNOSIS — Z79.899 IMMUNODEFICIENCY DUE TO DRUGS: ICD-10-CM

## 2023-11-20 DIAGNOSIS — D84.81 IMMUNODEFICIENCY SECONDARY TO NEOPLASM: ICD-10-CM

## 2023-11-20 DIAGNOSIS — D84.821 IMMUNODEFICIENCY DUE TO DRUGS: ICD-10-CM

## 2023-11-20 DIAGNOSIS — C7A.8 PRIMARY MALIGNANT NEUROENDOCRINE TUMOR OF SMALL INTESTINE: Primary | ICD-10-CM

## 2023-11-20 DIAGNOSIS — C78.7 SECONDARY MALIGNANT NEOPLASM OF LIVER: ICD-10-CM

## 2023-11-20 DIAGNOSIS — C77.8 SECONDARY MALIGNANT NEOPLASM OF LYMPH NODES OF MULTIPLE SITES: ICD-10-CM

## 2023-11-20 DIAGNOSIS — D49.9 IMMUNODEFICIENCY SECONDARY TO NEOPLASM: ICD-10-CM

## 2023-11-20 LAB
ALBUMIN SERPL BCP-MCNC: 3.7 G/DL (ref 3.5–5.2)
ALP SERPL-CCNC: 96 U/L (ref 55–135)
ALT SERPL W/O P-5'-P-CCNC: 27 U/L (ref 10–44)
ANION GAP SERPL CALC-SCNC: 11 MMOL/L (ref 8–16)
AST SERPL-CCNC: 24 U/L (ref 10–40)
BASOPHILS # BLD AUTO: 0.06 K/UL (ref 0–0.2)
BASOPHILS NFR BLD: 0.6 % (ref 0–1.9)
BILIRUB SERPL-MCNC: 0.3 MG/DL (ref 0.1–1)
BUN SERPL-MCNC: 12 MG/DL (ref 8–23)
CALCIUM SERPL-MCNC: 9.9 MG/DL (ref 8.7–10.5)
CHLORIDE SERPL-SCNC: 103 MMOL/L (ref 95–110)
CO2 SERPL-SCNC: 23 MMOL/L (ref 23–29)
CREAT SERPL-MCNC: 0.9 MG/DL (ref 0.5–1.4)
DIFFERENTIAL METHOD: ABNORMAL
EOSINOPHIL # BLD AUTO: 0.4 K/UL (ref 0–0.5)
EOSINOPHIL NFR BLD: 4.2 % (ref 0–8)
ERYTHROCYTE [DISTWIDTH] IN BLOOD BY AUTOMATED COUNT: 15 % (ref 11.5–14.5)
EST. GFR  (NO RACE VARIABLE): >60 ML/MIN/1.73 M^2
GLUCOSE SERPL-MCNC: 110 MG/DL (ref 70–110)
HCT VFR BLD AUTO: 41.6 % (ref 37–48.5)
HGB BLD-MCNC: 13.9 G/DL (ref 12–16)
IMM GRANULOCYTES # BLD AUTO: 0.02 K/UL (ref 0–0.04)
IMM GRANULOCYTES NFR BLD AUTO: 0.2 % (ref 0–0.5)
LYMPHOCYTES # BLD AUTO: 4.1 K/UL (ref 1–4.8)
LYMPHOCYTES NFR BLD: 38.7 % (ref 18–48)
MCH RBC QN AUTO: 29.5 PG (ref 27–31)
MCHC RBC AUTO-ENTMCNC: 33.4 G/DL (ref 32–36)
MCV RBC AUTO: 88 FL (ref 82–98)
MONOCYTES # BLD AUTO: 0.7 K/UL (ref 0.3–1)
MONOCYTES NFR BLD: 6.6 % (ref 4–15)
NEUTROPHILS # BLD AUTO: 5.2 K/UL (ref 1.8–7.7)
NEUTROPHILS NFR BLD: 49.7 % (ref 38–73)
NRBC BLD-RTO: 0 /100 WBC
PLATELET # BLD AUTO: 288 K/UL (ref 150–450)
PMV BLD AUTO: 12.1 FL (ref 9.2–12.9)
POTASSIUM SERPL-SCNC: 3.8 MMOL/L (ref 3.5–5.1)
PROT SERPL-MCNC: 8.2 G/DL (ref 6–8.4)
RBC # BLD AUTO: 4.71 M/UL (ref 4–5.4)
SODIUM SERPL-SCNC: 137 MMOL/L (ref 136–145)
WBC # BLD AUTO: 10.51 K/UL (ref 3.9–12.7)

## 2023-11-20 PROCEDURE — 3077F SYST BP >= 140 MM HG: CPT | Mod: HCNC,CPTII,S$GLB, | Performed by: INTERNAL MEDICINE

## 2023-11-20 PROCEDURE — 99999 PR PBB SHADOW E&M-EST. PATIENT-LVL IV: ICD-10-PCS | Mod: PBBFAC,HCNC,, | Performed by: INTERNAL MEDICINE

## 2023-11-20 PROCEDURE — 3288F FALL RISK ASSESSMENT DOCD: CPT | Mod: HCNC,CPTII,S$GLB, | Performed by: INTERNAL MEDICINE

## 2023-11-20 PROCEDURE — 3078F DIAST BP <80 MM HG: CPT | Mod: HCNC,CPTII,S$GLB, | Performed by: INTERNAL MEDICINE

## 2023-11-20 PROCEDURE — 3044F PR MOST RECENT HEMOGLOBIN A1C LEVEL <7.0%: ICD-10-PCS | Mod: HCNC,CPTII,S$GLB, | Performed by: INTERNAL MEDICINE

## 2023-11-20 PROCEDURE — 83519 RIA NONANTIBODY: CPT | Mod: HCNC | Performed by: INTERNAL MEDICINE

## 2023-11-20 PROCEDURE — 4010F PR ACE/ARB THEARPY RXD/TAKEN: ICD-10-PCS | Mod: HCNC,CPTII,S$GLB, | Performed by: INTERNAL MEDICINE

## 2023-11-20 PROCEDURE — 85025 COMPLETE CBC W/AUTO DIFF WBC: CPT | Mod: HCNC | Performed by: INTERNAL MEDICINE

## 2023-11-20 PROCEDURE — 3044F HG A1C LEVEL LT 7.0%: CPT | Mod: HCNC,CPTII,S$GLB, | Performed by: INTERNAL MEDICINE

## 2023-11-20 PROCEDURE — 1159F PR MEDICATION LIST DOCUMENTED IN MEDICAL RECORD: ICD-10-PCS | Mod: HCNC,CPTII,S$GLB, | Performed by: INTERNAL MEDICINE

## 2023-11-20 PROCEDURE — 99214 PR OFFICE/OUTPT VISIT, EST, LEVL IV, 30-39 MIN: ICD-10-PCS | Mod: HCNC,S$GLB,, | Performed by: INTERNAL MEDICINE

## 2023-11-20 PROCEDURE — 1160F RVW MEDS BY RX/DR IN RCRD: CPT | Mod: HCNC,CPTII,S$GLB, | Performed by: INTERNAL MEDICINE

## 2023-11-20 PROCEDURE — 1126F AMNT PAIN NOTED NONE PRSNT: CPT | Mod: HCNC,CPTII,S$GLB, | Performed by: INTERNAL MEDICINE

## 2023-11-20 PROCEDURE — 3288F PR FALLS RISK ASSESSMENT DOCUMENTED: ICD-10-PCS | Mod: HCNC,CPTII,S$GLB, | Performed by: INTERNAL MEDICINE

## 2023-11-20 PROCEDURE — 1100F PR PT FALLS ASSESS DOC 2+ FALLS/FALL W/INJURY/YR: ICD-10-PCS | Mod: HCNC,CPTII,S$GLB, | Performed by: INTERNAL MEDICINE

## 2023-11-20 PROCEDURE — 99214 OFFICE O/P EST MOD 30 MIN: CPT | Mod: HCNC,S$GLB,, | Performed by: INTERNAL MEDICINE

## 2023-11-20 PROCEDURE — 99999 PR PBB SHADOW E&M-EST. PATIENT-LVL IV: CPT | Mod: PBBFAC,HCNC,, | Performed by: INTERNAL MEDICINE

## 2023-11-20 PROCEDURE — 1159F MED LIST DOCD IN RCRD: CPT | Mod: HCNC,CPTII,S$GLB, | Performed by: INTERNAL MEDICINE

## 2023-11-20 PROCEDURE — 1160F PR REVIEW ALL MEDS BY PRESCRIBER/CLIN PHARMACIST DOCUMENTED: ICD-10-PCS | Mod: HCNC,CPTII,S$GLB, | Performed by: INTERNAL MEDICINE

## 2023-11-20 PROCEDURE — 3078F PR MOST RECENT DIASTOLIC BLOOD PRESSURE < 80 MM HG: ICD-10-PCS | Mod: HCNC,CPTII,S$GLB, | Performed by: INTERNAL MEDICINE

## 2023-11-20 PROCEDURE — 80053 COMPREHEN METABOLIC PANEL: CPT | Mod: HCNC | Performed by: INTERNAL MEDICINE

## 2023-11-20 PROCEDURE — 83497 ASSAY OF 5-HIAA: CPT | Mod: HCNC | Performed by: INTERNAL MEDICINE

## 2023-11-20 PROCEDURE — 3077F PR MOST RECENT SYSTOLIC BLOOD PRESSURE >= 140 MM HG: ICD-10-PCS | Mod: HCNC,CPTII,S$GLB, | Performed by: INTERNAL MEDICINE

## 2023-11-20 PROCEDURE — 1126F PR PAIN SEVERITY QUANTIFIED, NO PAIN PRESENT: ICD-10-PCS | Mod: HCNC,CPTII,S$GLB, | Performed by: INTERNAL MEDICINE

## 2023-11-20 PROCEDURE — 1100F PTFALLS ASSESS-DOCD GE2>/YR: CPT | Mod: HCNC,CPTII,S$GLB, | Performed by: INTERNAL MEDICINE

## 2023-11-20 PROCEDURE — 4010F ACE/ARB THERAPY RXD/TAKEN: CPT | Mod: HCNC,CPTII,S$GLB, | Performed by: INTERNAL MEDICINE

## 2023-11-20 PROCEDURE — 3008F BODY MASS INDEX DOCD: CPT | Mod: HCNC,CPTII,S$GLB, | Performed by: INTERNAL MEDICINE

## 2023-11-20 PROCEDURE — 3008F PR BODY MASS INDEX (BMI) DOCUMENTED: ICD-10-PCS | Mod: HCNC,CPTII,S$GLB, | Performed by: INTERNAL MEDICINE

## 2023-11-20 PROCEDURE — 84260 ASSAY OF SEROTONIN: CPT | Mod: HCNC | Performed by: INTERNAL MEDICINE
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
## 2023-11-22 LAB — 5OH-INDOLEACETATE SERPL-MCNC: 33 NG/ML

## 2023-11-24 ENCOUNTER — INFUSION (OUTPATIENT)
Dept: INFUSION THERAPY | Facility: HOSPITAL | Age: 68
End: 2023-11-24
Payer: MEDICARE

## 2023-11-24 DIAGNOSIS — C7A.8 PRIMARY MALIGNANT NEUROENDOCRINE TUMOR OF SMALL INTESTINE: Primary | ICD-10-CM

## 2023-11-24 DIAGNOSIS — C78.7 SECONDARY MALIGNANT NEOPLASM OF LIVER: ICD-10-CM

## 2023-11-24 LAB — SEROTONIN: 44 NG/ML

## 2023-11-24 PROCEDURE — 63600175 PHARM REV CODE 636 W HCPCS: Mod: JZ,JG,HCNC | Performed by: INTERNAL MEDICINE

## 2023-11-24 PROCEDURE — 96372 THER/PROPH/DIAG INJ SC/IM: CPT | Mod: HCNC

## 2023-11-24 RX ORDER — LANREOTIDE ACETATE 120 MG/.5ML
120 INJECTION SUBCUTANEOUS ONCE
Status: CANCELLED | OUTPATIENT
Start: 2023-11-24 | End: 2023-11-24

## 2023-11-24 RX ORDER — LANREOTIDE ACETATE 120 MG/.5ML
120 INJECTION SUBCUTANEOUS ONCE
Status: COMPLETED | OUTPATIENT
Start: 2023-11-24 | End: 2023-11-24

## 2023-11-24 RX ADMIN — LANREOTIDE ACETATE 120 MG: 120 INJECTION SUBCUTANEOUS at 02:11

## 2023-11-24 NOTE — NURSING
Pt here for lanreotide injection.  No complaints from pt.  Lanreotide administered to right upper outter buttocks.  Pt tolerated.  Ambulated out unassisted by self.

## 2023-12-01 LAB — PANCREASTATIN SERPL-MCNC: 172 PG/ML (ref 10–135)

## 2023-12-12 DIAGNOSIS — G89.3 CANCER-RELATED PAIN: ICD-10-CM

## 2023-12-12 DIAGNOSIS — C7A.8 PRIMARY MALIGNANT NEUROENDOCRINE TUMOR OF SMALL INTESTINE: ICD-10-CM

## 2023-12-13 RX ORDER — TRAMADOL HYDROCHLORIDE 50 MG/1
50 TABLET ORAL EVERY 8 HOURS PRN
Qty: 90 TABLET | Refills: 0 | Status: SHIPPED | OUTPATIENT
Start: 2023-12-13 | End: 2024-02-27 | Stop reason: SDUPTHER

## 2023-12-18 ENCOUNTER — INFUSION (OUTPATIENT)
Dept: INFUSION THERAPY | Facility: HOSPITAL | Age: 68
End: 2023-12-18
Payer: MEDICARE

## 2023-12-18 ENCOUNTER — OFFICE VISIT (OUTPATIENT)
Dept: HEMATOLOGY/ONCOLOGY | Facility: CLINIC | Age: 68
End: 2023-12-18
Payer: MEDICARE

## 2023-12-18 VITALS
HEIGHT: 64 IN | DIASTOLIC BLOOD PRESSURE: 82 MMHG | HEART RATE: 69 BPM | HEART RATE: 69 BPM | SYSTOLIC BLOOD PRESSURE: 138 MMHG | HEIGHT: 64 IN | OXYGEN SATURATION: 94 % | BODY MASS INDEX: 35.34 KG/M2 | RESPIRATION RATE: 18 BRPM | SYSTOLIC BLOOD PRESSURE: 136 MMHG | BODY MASS INDEX: 35.34 KG/M2 | RESPIRATION RATE: 17 BRPM | WEIGHT: 207 LBS | DIASTOLIC BLOOD PRESSURE: 79 MMHG | WEIGHT: 207 LBS

## 2023-12-18 DIAGNOSIS — D84.81 IMMUNODEFICIENCY SECONDARY TO NEOPLASM: ICD-10-CM

## 2023-12-18 DIAGNOSIS — C77.8 SECONDARY MALIGNANT NEOPLASM OF LYMPH NODES OF MULTIPLE SITES: ICD-10-CM

## 2023-12-18 DIAGNOSIS — C78.7 SECONDARY MALIGNANT NEOPLASM OF LIVER: ICD-10-CM

## 2023-12-18 DIAGNOSIS — D84.821 IMMUNODEFICIENCY DUE TO DRUGS: ICD-10-CM

## 2023-12-18 DIAGNOSIS — C7A.8 PRIMARY MALIGNANT NEUROENDOCRINE TUMOR OF SMALL INTESTINE: Primary | ICD-10-CM

## 2023-12-18 DIAGNOSIS — I10 ESSENTIAL HYPERTENSION: ICD-10-CM

## 2023-12-18 DIAGNOSIS — Z79.899 IMMUNODEFICIENCY DUE TO DRUGS: ICD-10-CM

## 2023-12-18 DIAGNOSIS — D49.9 IMMUNODEFICIENCY SECONDARY TO NEOPLASM: ICD-10-CM

## 2023-12-18 PROCEDURE — 4010F ACE/ARB THERAPY RXD/TAKEN: CPT | Mod: HCNC,CPTII,S$GLB, | Performed by: INTERNAL MEDICINE

## 2023-12-18 PROCEDURE — 99999 PR PBB SHADOW E&M-EST. PATIENT-LVL IV: CPT | Mod: PBBFAC,HCNC,, | Performed by: INTERNAL MEDICINE

## 2023-12-18 PROCEDURE — 3044F HG A1C LEVEL LT 7.0%: CPT | Mod: HCNC,CPTII,S$GLB, | Performed by: INTERNAL MEDICINE

## 2023-12-18 PROCEDURE — 3044F PR MOST RECENT HEMOGLOBIN A1C LEVEL <7.0%: ICD-10-PCS | Mod: HCNC,CPTII,S$GLB, | Performed by: INTERNAL MEDICINE

## 2023-12-18 PROCEDURE — 1125F PR PAIN SEVERITY QUANTIFIED, PAIN PRESENT: ICD-10-PCS | Mod: HCNC,CPTII,S$GLB, | Performed by: INTERNAL MEDICINE

## 2023-12-18 PROCEDURE — 3075F SYST BP GE 130 - 139MM HG: CPT | Mod: HCNC,CPTII,S$GLB, | Performed by: INTERNAL MEDICINE

## 2023-12-18 PROCEDURE — 3008F BODY MASS INDEX DOCD: CPT | Mod: HCNC,CPTII,S$GLB, | Performed by: INTERNAL MEDICINE

## 2023-12-18 PROCEDURE — 4010F PR ACE/ARB THEARPY RXD/TAKEN: ICD-10-PCS | Mod: HCNC,CPTII,S$GLB, | Performed by: INTERNAL MEDICINE

## 2023-12-18 PROCEDURE — 1160F RVW MEDS BY RX/DR IN RCRD: CPT | Mod: HCNC,CPTII,S$GLB, | Performed by: INTERNAL MEDICINE

## 2023-12-18 PROCEDURE — 3288F FALL RISK ASSESSMENT DOCD: CPT | Mod: HCNC,CPTII,S$GLB, | Performed by: INTERNAL MEDICINE

## 2023-12-18 PROCEDURE — 99214 OFFICE O/P EST MOD 30 MIN: CPT | Mod: HCNC,S$GLB,, | Performed by: INTERNAL MEDICINE

## 2023-12-18 PROCEDURE — 1160F PR REVIEW ALL MEDS BY PRESCRIBER/CLIN PHARMACIST DOCUMENTED: ICD-10-PCS | Mod: HCNC,CPTII,S$GLB, | Performed by: INTERNAL MEDICINE

## 2023-12-18 PROCEDURE — 63600175 PHARM REV CODE 636 W HCPCS: Mod: JZ,JG,HCNC | Performed by: INTERNAL MEDICINE

## 2023-12-18 PROCEDURE — 3288F PR FALLS RISK ASSESSMENT DOCUMENTED: ICD-10-PCS | Mod: HCNC,CPTII,S$GLB, | Performed by: INTERNAL MEDICINE

## 2023-12-18 PROCEDURE — 3078F DIAST BP <80 MM HG: CPT | Mod: HCNC,CPTII,S$GLB, | Performed by: INTERNAL MEDICINE

## 2023-12-18 PROCEDURE — 3008F PR BODY MASS INDEX (BMI) DOCUMENTED: ICD-10-PCS | Mod: HCNC,CPTII,S$GLB, | Performed by: INTERNAL MEDICINE

## 2023-12-18 PROCEDURE — 3075F PR MOST RECENT SYSTOLIC BLOOD PRESS GE 130-139MM HG: ICD-10-PCS | Mod: HCNC,CPTII,S$GLB, | Performed by: INTERNAL MEDICINE

## 2023-12-18 PROCEDURE — 1101F PR PT FALLS ASSESS DOC 0-1 FALLS W/OUT INJ PAST YR: ICD-10-PCS | Mod: HCNC,CPTII,S$GLB, | Performed by: INTERNAL MEDICINE

## 2023-12-18 PROCEDURE — 1101F PT FALLS ASSESS-DOCD LE1/YR: CPT | Mod: HCNC,CPTII,S$GLB, | Performed by: INTERNAL MEDICINE

## 2023-12-18 PROCEDURE — 96372 THER/PROPH/DIAG INJ SC/IM: CPT | Mod: HCNC

## 2023-12-18 PROCEDURE — 1125F AMNT PAIN NOTED PAIN PRSNT: CPT | Mod: HCNC,CPTII,S$GLB, | Performed by: INTERNAL MEDICINE

## 2023-12-18 PROCEDURE — 99999 PR PBB SHADOW E&M-EST. PATIENT-LVL IV: ICD-10-PCS | Mod: PBBFAC,HCNC,, | Performed by: INTERNAL MEDICINE

## 2023-12-18 PROCEDURE — 1159F MED LIST DOCD IN RCRD: CPT | Mod: HCNC,CPTII,S$GLB, | Performed by: INTERNAL MEDICINE

## 2023-12-18 PROCEDURE — 3078F PR MOST RECENT DIASTOLIC BLOOD PRESSURE < 80 MM HG: ICD-10-PCS | Mod: HCNC,CPTII,S$GLB, | Performed by: INTERNAL MEDICINE

## 2023-12-18 PROCEDURE — 1159F PR MEDICATION LIST DOCUMENTED IN MEDICAL RECORD: ICD-10-PCS | Mod: HCNC,CPTII,S$GLB, | Performed by: INTERNAL MEDICINE

## 2023-12-18 PROCEDURE — 99214 PR OFFICE/OUTPT VISIT, EST, LEVL IV, 30-39 MIN: ICD-10-PCS | Mod: HCNC,S$GLB,, | Performed by: INTERNAL MEDICINE

## 2023-12-18 RX ORDER — LANREOTIDE ACETATE 120 MG/.5ML
120 INJECTION SUBCUTANEOUS ONCE
Status: CANCELLED | OUTPATIENT
Start: 2023-12-18 | End: 2023-12-18

## 2023-12-18 RX ORDER — LANREOTIDE ACETATE 120 MG/.5ML
120 INJECTION SUBCUTANEOUS ONCE
Status: COMPLETED | OUTPATIENT
Start: 2023-12-18 | End: 2023-12-18

## 2023-12-18 RX ADMIN — LANREOTIDE ACETATE 120 MG: 120 INJECTION SUBCUTANEOUS at 01:12
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
## 2023-12-18 NOTE — PLAN OF CARE
1345 pt here for lanreotide injection, tolerated well to left hip area, pt to rtc 1/16/23, no distress noted upon d/c to home

## 2023-12-26 ENCOUNTER — PATIENT OUTREACH (OUTPATIENT)
Dept: ADMINISTRATIVE | Facility: HOSPITAL | Age: 68
End: 2023-12-26
Payer: MEDICARE

## 2024-01-12 ENCOUNTER — HOSPITAL ENCOUNTER (OUTPATIENT)
Dept: RADIOLOGY | Facility: HOSPITAL | Age: 69
Discharge: HOME | End: 2024-01-12
Attending: INTERNAL MEDICINE
Payer: MEDICARE

## 2024-01-12 DIAGNOSIS — C78.7 SECONDARY MALIGNANT NEOPLASM OF LIVER: ICD-10-CM

## 2024-01-12 DIAGNOSIS — C77.8 SECONDARY MALIGNANT NEOPLASM OF LYMPH NODES OF MULTIPLE SITES: ICD-10-CM

## 2024-01-12 DIAGNOSIS — C7A.8 PRIMARY MALIGNANT NEUROENDOCRINE TUMOR OF SMALL INTESTINE: ICD-10-CM

## 2024-01-12 PROCEDURE — 78815 PET IMAGE W/CT SKULL-THIGH: CPT | Mod: 26,PS,HCNC, | Performed by: STUDENT IN AN ORGANIZED HEALTH CARE EDUCATION/TRAINING PROGRAM

## 2024-01-12 PROCEDURE — 78815 PET IMAGE W/CT SKULL-THIGH: CPT | Mod: TC,HCNC

## 2024-01-16 ENCOUNTER — INFUSION (OUTPATIENT)
Dept: INFUSION THERAPY | Facility: HOSPITAL | Age: 69
End: 2024-01-16
Payer: MEDICARE

## 2024-01-16 ENCOUNTER — OFFICE VISIT (OUTPATIENT)
Dept: HEMATOLOGY/ONCOLOGY | Facility: CLINIC | Age: 69
End: 2024-01-16
Payer: MEDICARE

## 2024-01-16 VITALS
RESPIRATION RATE: 18 BRPM | DIASTOLIC BLOOD PRESSURE: 79 MMHG | HEART RATE: 70 BPM | OXYGEN SATURATION: 97 % | TEMPERATURE: 98 F | SYSTOLIC BLOOD PRESSURE: 143 MMHG | HEIGHT: 64 IN | BODY MASS INDEX: 36.03 KG/M2 | WEIGHT: 211.06 LBS

## 2024-01-16 DIAGNOSIS — Z79.899 IMMUNODEFICIENCY DUE TO DRUGS: ICD-10-CM

## 2024-01-16 DIAGNOSIS — I10 ESSENTIAL HYPERTENSION: ICD-10-CM

## 2024-01-16 DIAGNOSIS — D84.81 IMMUNODEFICIENCY SECONDARY TO NEOPLASM: ICD-10-CM

## 2024-01-16 DIAGNOSIS — D84.821 IMMUNODEFICIENCY DUE TO DRUGS: ICD-10-CM

## 2024-01-16 DIAGNOSIS — C78.7 SECONDARY MALIGNANT NEOPLASM OF LIVER: ICD-10-CM

## 2024-01-16 DIAGNOSIS — C7A.8 PRIMARY MALIGNANT NEUROENDOCRINE TUMOR OF SMALL INTESTINE: Primary | ICD-10-CM

## 2024-01-16 DIAGNOSIS — C77.8 SECONDARY MALIGNANT NEOPLASM OF LYMPH NODES OF MULTIPLE SITES: ICD-10-CM

## 2024-01-16 DIAGNOSIS — D49.9 IMMUNODEFICIENCY SECONDARY TO NEOPLASM: ICD-10-CM

## 2024-01-16 PROCEDURE — 99215 OFFICE O/P EST HI 40 MIN: CPT | Mod: HCNC,S$GLB,, | Performed by: INTERNAL MEDICINE

## 2024-01-16 PROCEDURE — 1126F AMNT PAIN NOTED NONE PRSNT: CPT | Mod: HCNC,CPTII,S$GLB, | Performed by: INTERNAL MEDICINE

## 2024-01-16 PROCEDURE — 1159F MED LIST DOCD IN RCRD: CPT | Mod: HCNC,CPTII,S$GLB, | Performed by: INTERNAL MEDICINE

## 2024-01-16 PROCEDURE — 3008F BODY MASS INDEX DOCD: CPT | Mod: HCNC,CPTII,S$GLB, | Performed by: INTERNAL MEDICINE

## 2024-01-16 PROCEDURE — G2211 COMPLEX E/M VISIT ADD ON: HCPCS | Mod: HCNC,S$GLB,, | Performed by: INTERNAL MEDICINE

## 2024-01-16 PROCEDURE — 3078F DIAST BP <80 MM HG: CPT | Mod: HCNC,CPTII,S$GLB, | Performed by: INTERNAL MEDICINE

## 2024-01-16 PROCEDURE — 3077F SYST BP >= 140 MM HG: CPT | Mod: HCNC,CPTII,S$GLB, | Performed by: INTERNAL MEDICINE

## 2024-01-16 PROCEDURE — 96372 THER/PROPH/DIAG INJ SC/IM: CPT | Mod: HCNC

## 2024-01-16 PROCEDURE — 99999 PR PBB SHADOW E&M-EST. PATIENT-LVL V: CPT | Mod: PBBFAC,HCNC,, | Performed by: INTERNAL MEDICINE

## 2024-01-16 PROCEDURE — 3288F FALL RISK ASSESSMENT DOCD: CPT | Mod: HCNC,CPTII,S$GLB, | Performed by: INTERNAL MEDICINE

## 2024-01-16 PROCEDURE — 1160F RVW MEDS BY RX/DR IN RCRD: CPT | Mod: HCNC,CPTII,S$GLB, | Performed by: INTERNAL MEDICINE

## 2024-01-16 PROCEDURE — 1101F PT FALLS ASSESS-DOCD LE1/YR: CPT | Mod: HCNC,CPTII,S$GLB, | Performed by: INTERNAL MEDICINE

## 2024-01-16 PROCEDURE — 63600175 PHARM REV CODE 636 W HCPCS: Mod: JZ,JG,HCNC | Performed by: INTERNAL MEDICINE

## 2024-01-16 RX ORDER — LANREOTIDE ACETATE 120 MG/.5ML
120 INJECTION SUBCUTANEOUS ONCE
Status: CANCELLED | OUTPATIENT
Start: 2024-01-16 | End: 2024-01-16

## 2024-01-16 RX ORDER — LANREOTIDE ACETATE 120 MG/.5ML
120 INJECTION SUBCUTANEOUS ONCE
Status: COMPLETED | OUTPATIENT
Start: 2024-01-16 | End: 2024-01-16

## 2024-01-16 RX ADMIN — LANREOTIDE ACETATE 120 MG: 120 INJECTION SUBCUTANEOUS at 01:01
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
## 2024-01-16 NOTE — NURSING
Lanreotide administered to LUQ outter buttocks.  Pt tolerated.  Ambulated out unassisted with spouse.

## 2024-02-02 ENCOUNTER — TELEPHONE (OUTPATIENT)
Dept: HEMATOLOGY/ONCOLOGY | Facility: CLINIC | Age: 69
End: 2024-02-02
Payer: MEDICARE

## 2024-02-02 DIAGNOSIS — M25.562 CHRONIC PAIN OF BOTH KNEES: ICD-10-CM

## 2024-02-02 DIAGNOSIS — M25.561 CHRONIC PAIN OF BOTH KNEES: ICD-10-CM

## 2024-02-02 DIAGNOSIS — G89.29 CHRONIC PAIN OF BOTH KNEES: ICD-10-CM
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
## 2024-02-02 NOTE — TELEPHONE ENCOUNTER
Returned patient phone call.  She will not be in town until 2/20 and can't make 2/20 scheduled appointments.  Can do any other day after that week.  Will loop in schedulers and return call when things are rescheduled.

## 2024-02-02 NOTE — TELEPHONE ENCOUNTER
----- Message from Nancy Jordan sent at 2/2/2024  2:15 PM CST -----  Regarding: Patient advice  Contact: Pt  Pt called in regards to rescheduling appointment       Pt can be reached at [phone]

## 2024-02-06 ENCOUNTER — OFFICE VISIT (OUTPATIENT)
Dept: ORTHOPEDICS | Facility: CLINIC | Age: 69
End: 2024-02-06
Payer: MEDICARE

## 2024-02-06 VITALS — WEIGHT: 211 LBS | HEIGHT: 64 IN | BODY MASS INDEX: 36.02 KG/M2

## 2024-02-06 DIAGNOSIS — M17.0 PRIMARY OSTEOARTHRITIS OF BOTH KNEES: Primary | ICD-10-CM
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
## 2024-02-06 DIAGNOSIS — Z96.652 S/P TOTAL KNEE ARTHROPLASTY, LEFT: ICD-10-CM

## 2024-02-06 PROCEDURE — 1100F PTFALLS ASSESS-DOCD GE2>/YR: CPT | Mod: HCNC,CPTII,S$GLB, | Performed by: ORTHOPAEDIC SURGERY

## 2024-02-06 PROCEDURE — 99999 PR PBB SHADOW E&M-EST. PATIENT-LVL IV: CPT | Mod: PBBFAC,HCNC,, | Performed by: ORTHOPAEDIC SURGERY

## 2024-02-06 PROCEDURE — 1160F RVW MEDS BY RX/DR IN RCRD: CPT | Mod: HCNC,CPTII,S$GLB, | Performed by: ORTHOPAEDIC SURGERY

## 2024-02-06 PROCEDURE — 3288F FALL RISK ASSESSMENT DOCD: CPT | Mod: HCNC,CPTII,S$GLB, | Performed by: ORTHOPAEDIC SURGERY

## 2024-02-06 PROCEDURE — 1159F MED LIST DOCD IN RCRD: CPT | Mod: HCNC,CPTII,S$GLB, | Performed by: ORTHOPAEDIC SURGERY

## 2024-02-06 PROCEDURE — 99213 OFFICE O/P EST LOW 20 MIN: CPT | Mod: HCNC,S$GLB,, | Performed by: ORTHOPAEDIC SURGERY

## 2024-02-06 PROCEDURE — 3008F BODY MASS INDEX DOCD: CPT | Mod: HCNC,CPTII,S$GLB, | Performed by: ORTHOPAEDIC SURGERY

## 2024-02-06 PROCEDURE — 1125F AMNT PAIN NOTED PAIN PRSNT: CPT | Mod: HCNC,CPTII,S$GLB, | Performed by: ORTHOPAEDIC SURGERY

## 2024-02-06 NOTE — PROGRESS NOTES
Subjective:      Patient ID: Bell Lynn is a 68 y.o. female.    Chief Complaint: Injury and Pain of the Right Knee and Injury and Pain of the Left Knee (Pt states she has a nerve feeling in leg sometimes)      HPI:  Two years postop  The patient is seen for postop follow-up of left  TKA.  Pain control has been satisfactory  They feel that they are ambulating easily  Postoperative complaints include:  The patient reportedly fell on her left knee 3 months ago and had some increased pain.  She still has tingling lateral to the incision.    The patient reports that her right knee is not painful at all today      Current Outpatient Medications:     albuterol (PROVENTIL/VENTOLIN HFA) 90 mcg/actuation inhaler, Inhale 2 puffs into the lungs every 6 (six) hours as needed for Wheezing. Rescue, Disp: 18 g, Rfl: 5    alendronate (FOSAMAX) 70 MG tablet, TAKE 1 TABLET EVERY 7 DAYS, Disp: 12 tablet, Rfl: 3    amLODIPine (NORVASC) 10 MG tablet, TAKE 1 TABLET ONE TIME DAILY (DOSE INCREASE), Disp: 90 tablet, Rfl: 3    cetirizine (ZYRTEC) 10 MG tablet, Take 1 tablet (10 mg total) by mouth once daily., Disp: 90 tablet, Rfl: 3    cyclobenzaprine (FLEXERIL) 10 MG tablet, TAKE 1 TABLET EVERY EVENING, Disp: 90 tablet, Rfl: 0    dicyclomine (BENTYL) 20 mg tablet, Take 1 tablet (20 mg total) by mouth 3 (three) times daily as needed (abdominal pain)., Disp: 60 tablet, Rfl: 2    DULoxetine (CYMBALTA) 60 MG capsule, TAKE 1 CAPSULE ONE TIME DAILY (INCREASED DOSE), Disp: 90 capsule, Rfl: 3    furosemide (LASIX) 20 MG tablet, TAKE 1 TABLET ONE TIME DAILY FOR LEG SWELLING AS NEEDED, Disp: 90 tablet, Rfl: 1    gabapentin (NEURONTIN) 100 MG capsule, 3 tablets nighttime 1 tablet in AM; increased dose, Disp: 270 capsule, Rfl: 3    LIDOcaine (LIDODERM) 5 %, APPLY 1 PATCH ON THE SKIN ONE TIME DAILY. REMOVE AND DISCARD PATCH WITHIN 12 HOURS OR AS DIRECTED BY MD, Disp: 90 patch, Rfl: 2    linaCLOtide (LINZESS) 145 mcg Cap capsule, Take 1  "capsule (145 mcg total) by mouth once daily., Disp: 90 capsule, Rfl: 3    metoprolol succinate (TOPROL-XL) 100 MG 24 hr tablet, Take 1 tablet (100 mg total) by mouth once daily., Disp: 90 tablet, Rfl: 3    nitroGLYCERIN (NITROSTAT) 0.4 MG SL tablet, Place 1 tablet (0.4 mg total) under the tongue every 5 (five) minutes as needed for Chest pain., Disp: 60 tablet, Rfl: 12    pantoprazole (PROTONIX) 40 MG tablet, Take 1 tablet (40 mg total) by mouth 2 (two) times daily before meals., Disp: 60 tablet, Rfl: 5    spironolactone (ALDACTONE) 50 MG tablet, Take 1 tablet (50 mg total) by mouth once daily., Disp: 90 tablet, Rfl: 3    traMADoL (ULTRAM) 50 mg tablet, Take 1 tablet (50 mg total) by mouth every 8 (eight) hours as needed for Pain., Disp: 90 tablet, Rfl: 0    valsartan (DIOVAN) 320 MG tablet, Take 1 tablet (320 mg total) by mouth once daily., Disp: 90 tablet, Rfl: 3    vitamin D (VITAMIN D3) 1000 units Tab, Take 1,000 Units by mouth once daily., Disp: , Rfl:     latanoprost 0.005 % ophthalmic solution, Place 1 drop into the right eye every evening., Disp: 7.5 mL, Rfl: 4  No current facility-administered medications for this visit.    Facility-Administered Medications Ordered in Other Visits:     lanreotide injection 120 mg, 120 mg, Subcutaneous, Once, Aaron Keita MD  Review of patient's allergies indicates:   Allergen Reactions    Nsaids (non-steroidal anti-inflammatory drug)      Gi bleed    Penicillins Itching and Swelling    Penicillin     Symbicort [budesonide-formoterol]      Recurrent oral ulcers       Ht 5' 4" (1.626 m)   Wt 95.7 kg (211 lb)   BMI 36.22 kg/m²     Review of Systems   Constitutional: Negative for fever and weight loss.   HENT:  Negative for congestion.    Eyes:  Negative for visual disturbance.   Cardiovascular:  Negative for chest pain.   Respiratory:  Negative for shortness of breath.    Hematologic/Lymphatic: Negative for bleeding problem. Does not bruise/bleed easily.   Skin:  Negative " for poor wound healing.   Gastrointestinal:  Negative for abdominal pain.   Genitourinary:  Negative for dysuria.   Neurological:  Negative for seizures.   Psychiatric/Behavioral:  Negative for altered mental status.    Allergic/Immunologic: Negative for persistent infections.           Objective:    Ortho Exam          Alert, oriented, no acute distress  Sclera-Normal  Respiratory distress-none  Gait no limp    Left knee  Incision:  Cleanly healed  Nontender  Range of motion:  0-115  Valgus/varus stability- stable  Swelling-minimal  Distal perfusion-intact  Distal neurologic-decreased light touch lateral to distal incision    Imaging:  Left knee radiographs show a well-positioned and well-fixed total knee implant without periprosthetic fracture or complicating process.  Advanced DJD of the right knee is incidentally noted    Assessment:             1. Primary osteoarthritis of both knees    2. S/P total knee arthroplasty, left    3. Fall, subsequent encounter        The left total knee is functioning normally.  There is no objective evidence of any functional or structural damage to the left knee.  The subjective symptoms are likely due to cutaneous nerve sensitivity in the scar.    The right knee has radiographically significant arthritis without meaningful symptoms.  If progression occurs intervention can be considered.        Plan:          No follow-ups on file.    I explained my assessment    Desensitization was explained in detail    Annual x-ray

## 2024-02-20 ENCOUNTER — TELEPHONE (OUTPATIENT)
Dept: HEMATOLOGY/ONCOLOGY | Facility: CLINIC | Age: 69
End: 2024-02-20
Payer: MEDICARE

## 2024-02-20 NOTE — TELEPHONE ENCOUNTER
----- Message from Monika Jensen sent at 2/20/2024  1:23 PM CST -----  Regarding: Patient advice  Contact: Nell [phone]              Name of Caller: Nell with [employer]     Contact Preference: [phone]     Nature of Call:  Called to confirm home infusion form was received

## 2024-02-20 NOTE — TELEPHONE ENCOUNTER
Received home infusion information.  Called patient to see if she would be interested in home infusion - left vm.  Returned call to Nell that yes we received the info and will make a decision once we hear back from the patient.  Patient to return to clinic on 2/27.

## 2024-02-24 DIAGNOSIS — M81.0 AGE-RELATED OSTEOPOROSIS WITHOUT CURRENT PATHOLOGICAL FRACTURE: ICD-10-CM

## 2024-02-24 NOTE — TELEPHONE ENCOUNTER
Care Due:                  Date            Visit Type   Department     Provider  --------------------------------------------------------------------------------                                [facility]                              PRIMARY      MED/ INTERNAL  Last Visit: 11-      CARE ([facility])   MED/ PEDS      Cipriano Carrera                              [facility]                              PRIMARY      MED/ INTERNAL  Next Visit: 05-      CARE ([facility])   MED/ PEDS      Cipriano Carrera                                                            Last  Test          Frequency    Reason                     Performed    Due Date  --------------------------------------------------------------------------------    Vitamin D...  12 months..  alendronate..............  05-   05-    [facility] Embedded Care Due Messages. Reference number: [id number].   2/24/2024 1:59:12 AM CST

## 2024-02-26 RX ORDER — ALENDRONATE SODIUM 70 MG/1
TABLET ORAL
Qty: 12 TABLET | Refills: 3 | Status: SHIPPED | OUTPATIENT
Start: 2024-02-26

## 2024-02-27 ENCOUNTER — INFUSION (OUTPATIENT)
Dept: INFUSION THERAPY | Facility: HOSPITAL | Age: 69
End: 2024-02-27
Payer: MEDICARE

## 2024-02-27 ENCOUNTER — OFFICE VISIT (OUTPATIENT)
Dept: HEMATOLOGY/ONCOLOGY | Facility: CLINIC | Age: 69
End: 2024-02-27
Payer: MEDICARE

## 2024-02-27 VITALS
OXYGEN SATURATION: 96 % | RESPIRATION RATE: 18 BRPM | HEIGHT: 64 IN | DIASTOLIC BLOOD PRESSURE: 83 MMHG | HEART RATE: 70 BPM | SYSTOLIC BLOOD PRESSURE: 162 MMHG | BODY MASS INDEX: 36.28 KG/M2 | WEIGHT: 212.5 LBS

## 2024-02-27 DIAGNOSIS — D84.821 IMMUNODEFICIENCY DUE TO DRUGS: ICD-10-CM

## 2024-02-27 DIAGNOSIS — C78.7 SECONDARY MALIGNANT NEOPLASM OF LIVER: ICD-10-CM

## 2024-02-27 DIAGNOSIS — Z79.899 IMMUNODEFICIENCY DUE TO DRUGS: ICD-10-CM

## 2024-02-27 DIAGNOSIS — G47.01 INSOMNIA DUE TO MEDICAL CONDITION: ICD-10-CM

## 2024-02-27 DIAGNOSIS — C77.8 SECONDARY MALIGNANT NEOPLASM OF LYMPH NODES OF MULTIPLE SITES: ICD-10-CM

## 2024-02-27 DIAGNOSIS — G89.3 CANCER-RELATED PAIN: ICD-10-CM

## 2024-02-27 DIAGNOSIS — C7A.8 PRIMARY MALIGNANT NEUROENDOCRINE TUMOR OF SMALL INTESTINE: ICD-10-CM

## 2024-02-27 DIAGNOSIS — C7A.8 PRIMARY MALIGNANT NEUROENDOCRINE TUMOR OF SMALL INTESTINE: Primary | ICD-10-CM

## 2024-02-27 DIAGNOSIS — I10 ESSENTIAL HYPERTENSION: ICD-10-CM

## 2024-02-27 DIAGNOSIS — D84.81 IMMUNODEFICIENCY SECONDARY TO NEOPLASM: ICD-10-CM

## 2024-02-27 DIAGNOSIS — D49.9 IMMUNODEFICIENCY SECONDARY TO NEOPLASM: ICD-10-CM

## 2024-02-27 PROCEDURE — 1160F RVW MEDS BY RX/DR IN RCRD: CPT | Mod: HCNC,CPTII,S$GLB, | Performed by: PHYSICIAN ASSISTANT

## 2024-02-27 PROCEDURE — 3079F DIAST BP 80-89 MM HG: CPT | Mod: HCNC,CPTII,S$GLB, | Performed by: PHYSICIAN ASSISTANT

## 2024-02-27 PROCEDURE — 99999 PR PBB SHADOW E&M-EST. PATIENT-LVL IV: CPT | Mod: PBBFAC,HCNC,, | Performed by: PHYSICIAN ASSISTANT

## 2024-02-27 PROCEDURE — 3077F SYST BP >= 140 MM HG: CPT | Mod: HCNC,CPTII,S$GLB, | Performed by: PHYSICIAN ASSISTANT

## 2024-02-27 PROCEDURE — 96372 THER/PROPH/DIAG INJ SC/IM: CPT | Mod: HCNC

## 2024-02-27 PROCEDURE — 3008F BODY MASS INDEX DOCD: CPT | Mod: HCNC,CPTII,S$GLB, | Performed by: PHYSICIAN ASSISTANT

## 2024-02-27 PROCEDURE — 1101F PT FALLS ASSESS-DOCD LE1/YR: CPT | Mod: HCNC,CPTII,S$GLB, | Performed by: PHYSICIAN ASSISTANT

## 2024-02-27 PROCEDURE — 1159F MED LIST DOCD IN RCRD: CPT | Mod: HCNC,CPTII,S$GLB, | Performed by: PHYSICIAN ASSISTANT

## 2024-02-27 PROCEDURE — 63600175 PHARM REV CODE 636 W HCPCS: Mod: JZ,JG,HCNC | Performed by: INTERNAL MEDICINE

## 2024-02-27 PROCEDURE — 3288F FALL RISK ASSESSMENT DOCD: CPT | Mod: HCNC,CPTII,S$GLB, | Performed by: PHYSICIAN ASSISTANT

## 2024-02-27 PROCEDURE — 99215 OFFICE O/P EST HI 40 MIN: CPT | Mod: HCNC,S$GLB,, | Performed by: PHYSICIAN ASSISTANT

## 2024-02-27 RX ORDER — TRAMADOL HYDROCHLORIDE 50 MG/1
50 TABLET ORAL EVERY 8 HOURS PRN
Qty: 90 TABLET | Refills: 0 | Status: SHIPPED | OUTPATIENT
Start: 2024-02-27 | End: 2024-04-08

## 2024-02-27 RX ORDER — TRAZODONE HYDROCHLORIDE 50 MG/1
50 TABLET ORAL NIGHTLY
Qty: 30 TABLET | Refills: 11 | Status: SHIPPED | OUTPATIENT
Start: 2024-02-27 | End: 2024-05-22 | Stop reason: ALTCHOICE

## 2024-02-27 RX ORDER — LANREOTIDE ACETATE 120 MG/.5ML
120 INJECTION SUBCUTANEOUS ONCE
Status: CANCELLED | OUTPATIENT
Start: 2024-02-27 | End: 2024-02-27

## 2024-02-27 RX ORDER — LANREOTIDE ACETATE 120 MG/.5ML
120 INJECTION SUBCUTANEOUS ONCE
Status: COMPLETED | OUTPATIENT
Start: 2024-02-27 | End: 2024-02-27

## 2024-02-27 RX ADMIN — LANREOTIDE ACETATE 120 MG: 120 INJECTION SUBCUTANEOUS at 02:02
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
## 2024-02-28 DIAGNOSIS — I10 ESSENTIAL HYPERTENSION: Chronic | ICD-10-CM

## 2024-02-28 RX ORDER — SPIRONOLACTONE 50 MG/1
50 TABLET, FILM COATED ORAL
Qty: 90 TABLET | Refills: 3 | Status: SHIPPED | OUTPATIENT
Start: 2024-02-28 | End: 2024-05-22 | Stop reason: SDUPTHER

## 2024-02-28 NOTE — TELEPHONE ENCOUNTER
No care due was identified.  [facility] Embedded Care Due Messages. Reference number: [id number].   2/28/2024 2:02:22 AM CST

## 2024-02-28 NOTE — TELEPHONE ENCOUNTER
Refill Routing Note   Medication(s) are not appropriate for processing by [facility] for the following reason(s):        Required vitals abnormal    [facility] action(s):  Defer               Appointments  past 12m or future 3m with PCP    Date Provider   Last Visit   11/17/2023 Cipriano Carrera MD   Next Visit   5/22/2024 Cipriano Carrera MD   ED visits in past 90 days: 0        Note composed:5:21 AM 02/28/2024

## 2024-03-26 ENCOUNTER — INFUSION (OUTPATIENT)
Dept: INFUSION THERAPY | Facility: HOSPITAL | Age: 69
End: 2024-03-26
Payer: MEDICARE

## 2024-03-26 ENCOUNTER — OFFICE VISIT (OUTPATIENT)
Dept: HEMATOLOGY/ONCOLOGY | Facility: CLINIC | Age: 69
End: 2024-03-26
Payer: MEDICARE

## 2024-03-26 ENCOUNTER — TELEPHONE (OUTPATIENT)
Dept: PSYCHIATRY | Facility: CLINIC | Age: 69
End: 2024-03-26
Payer: MEDICARE

## 2024-03-26 VITALS
SYSTOLIC BLOOD PRESSURE: 194 MMHG | BODY MASS INDEX: 36.64 KG/M2 | WEIGHT: 214.63 LBS | DIASTOLIC BLOOD PRESSURE: 96 MMHG | HEIGHT: 64 IN | RESPIRATION RATE: 18 BRPM | TEMPERATURE: 98 F | OXYGEN SATURATION: 97 % | HEART RATE: 63 BPM

## 2024-03-26 DIAGNOSIS — C78.7 SECONDARY MALIGNANT NEOPLASM OF LIVER: ICD-10-CM

## 2024-03-26 DIAGNOSIS — I10 ESSENTIAL HYPERTENSION: ICD-10-CM

## 2024-03-26 DIAGNOSIS — C7A.8 PRIMARY MALIGNANT NEUROENDOCRINE TUMOR OF SMALL INTESTINE: Primary | ICD-10-CM

## 2024-03-26 DIAGNOSIS — C77.8 SECONDARY MALIGNANT NEOPLASM OF LYMPH NODES OF MULTIPLE SITES: ICD-10-CM

## 2024-03-26 DIAGNOSIS — E66.01 SEVERE OBESITY WITH BODY MASS INDEX (BMI) OF 35.0 TO 39.9 WITH SERIOUS COMORBIDITY: ICD-10-CM

## 2024-03-26 DIAGNOSIS — F32.A DEPRESSION, UNSPECIFIED DEPRESSION TYPE: ICD-10-CM

## 2024-03-26 DIAGNOSIS — Z79.899 IMMUNODEFICIENCY DUE TO DRUGS: ICD-10-CM

## 2024-03-26 DIAGNOSIS — D84.81 IMMUNODEFICIENCY SECONDARY TO NEOPLASM: ICD-10-CM

## 2024-03-26 DIAGNOSIS — D49.9 IMMUNODEFICIENCY SECONDARY TO NEOPLASM: ICD-10-CM

## 2024-03-26 DIAGNOSIS — D84.821 IMMUNODEFICIENCY DUE TO DRUGS: ICD-10-CM

## 2024-03-26 PROCEDURE — 96372 THER/PROPH/DIAG INJ SC/IM: CPT | Mod: HCNC

## 2024-03-26 PROCEDURE — 1160F RVW MEDS BY RX/DR IN RCRD: CPT | Mod: HCNC,CPTII,S$GLB, | Performed by: INTERNAL MEDICINE

## 2024-03-26 PROCEDURE — 3077F SYST BP >= 140 MM HG: CPT | Mod: HCNC,CPTII,S$GLB, | Performed by: INTERNAL MEDICINE

## 2024-03-26 PROCEDURE — 1126F AMNT PAIN NOTED NONE PRSNT: CPT | Mod: HCNC,CPTII,S$GLB, | Performed by: INTERNAL MEDICINE

## 2024-03-26 PROCEDURE — 3008F BODY MASS INDEX DOCD: CPT | Mod: HCNC,CPTII,S$GLB, | Performed by: INTERNAL MEDICINE

## 2024-03-26 PROCEDURE — 3080F DIAST BP >= 90 MM HG: CPT | Mod: HCNC,CPTII,S$GLB, | Performed by: INTERNAL MEDICINE

## 2024-03-26 PROCEDURE — 63600175 PHARM REV CODE 636 W HCPCS: Mod: JZ,JG,HCNC | Performed by: INTERNAL MEDICINE

## 2024-03-26 PROCEDURE — 3288F FALL RISK ASSESSMENT DOCD: CPT | Mod: HCNC,CPTII,S$GLB, | Performed by: INTERNAL MEDICINE

## 2024-03-26 PROCEDURE — 99999 PR PBB SHADOW E&M-EST. PATIENT-LVL V: CPT | Mod: PBBFAC,HCNC,, | Performed by: INTERNAL MEDICINE

## 2024-03-26 PROCEDURE — 1101F PT FALLS ASSESS-DOCD LE1/YR: CPT | Mod: HCNC,CPTII,S$GLB, | Performed by: INTERNAL MEDICINE

## 2024-03-26 PROCEDURE — 1159F MED LIST DOCD IN RCRD: CPT | Mod: HCNC,CPTII,S$GLB, | Performed by: INTERNAL MEDICINE

## 2024-03-26 PROCEDURE — 99215 OFFICE O/P EST HI 40 MIN: CPT | Mod: HCNC,S$GLB,, | Performed by: INTERNAL MEDICINE

## 2024-03-26 PROCEDURE — G2211 COMPLEX E/M VISIT ADD ON: HCPCS | Mod: HCNC,S$GLB,, | Performed by: INTERNAL MEDICINE

## 2024-03-26 RX ORDER — LANREOTIDE ACETATE 120 MG/.5ML
120 INJECTION SUBCUTANEOUS ONCE
Status: CANCELLED | OUTPATIENT
Start: 2024-03-26 | End: 2024-03-26

## 2024-03-26 RX ORDER — LANREOTIDE ACETATE 120 MG/.5ML
120 INJECTION SUBCUTANEOUS ONCE
Status: COMPLETED | OUTPATIENT
Start: 2024-03-26 | End: 2024-03-26

## 2024-03-26 RX ADMIN — LANREOTIDE ACETATE 120 MG: 120 INJECTION SUBCUTANEOUS at 10:03
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
## 2024-04-03 ENCOUNTER — TELEPHONE (OUTPATIENT)
Dept: FAMILY MEDICINE | Facility: CLINIC | Age: 69
End: 2024-04-03
Payer: MEDICARE

## 2024-04-03 NOTE — TELEPHONE ENCOUNTER
----- Message from Lilian Keller sent at 4/3/2024  1:56 PM CDT -----  Regarding: Self [phone]  Type:  Sooner Appointment Request     Patient is requesting a sooner appointment.  Patient accepted first available appointment listed.  Patient has accept being placed on the waitlist and is requesting a message be sent to doctor. Patient is requesting a call to get a sooner appt due to right shoulder pain.     Name of Caller: Self     When is the first available appointment? 04/17/24 at 7:40 am with Dr Kirkland     Symptoms: Right shoulder pain     Would the patient rather a call back or a response via [facility]? Call back     Best Call Back Number: [phone]        Additional Information:

## 2024-04-03 NOTE — TELEPHONE ENCOUNTER
Spoke with patient to inform her that there are no sooner appointments available at this time. [facility] instructed [facility] for [facility] medical assessment.

## 2024-04-08 ENCOUNTER — OFFICE VISIT (OUTPATIENT)
Dept: PSYCHIATRY | Facility: CLINIC | Age: 69
End: 2024-04-08
Payer: MEDICARE

## 2024-04-08 DIAGNOSIS — G89.3 CANCER-RELATED PAIN: ICD-10-CM

## 2024-04-08 DIAGNOSIS — F32.A DEPRESSION, UNSPECIFIED DEPRESSION TYPE: ICD-10-CM

## 2024-04-08 DIAGNOSIS — C7A.8 PRIMARY MALIGNANT NEUROENDOCRINE TUMOR OF SMALL INTESTINE: Primary | ICD-10-CM

## 2024-04-08 DIAGNOSIS — C7A.8 PRIMARY MALIGNANT NEUROENDOCRINE TUMOR OF SMALL INTESTINE: ICD-10-CM

## 2024-04-08 PROCEDURE — 1159F MED LIST DOCD IN RCRD: CPT | Mod: HCNC,CPTII,S$GLB, | Performed by: PSYCHOLOGIST

## 2024-04-08 PROCEDURE — 99999 PR PBB SHADOW E&M-EST. PATIENT-LVL II: CPT | Mod: PBBFAC,HCNC,, | Performed by: PSYCHOLOGIST

## 2024-04-08 PROCEDURE — 90791 PSYCH DIAGNOSTIC EVALUATION: CPT | Mod: HCNC,S$GLB,, | Performed by: PSYCHOLOGIST

## 2024-04-08 RX ORDER — TRAMADOL HYDROCHLORIDE 50 MG/1
50 TABLET ORAL EVERY 8 HOURS PRN
Qty: 90 TABLET | Refills: 3 | Status: SHIPPED | OUTPATIENT
Start: 2024-04-08 | End: 2024-04-23 | Stop reason: SDUPTHER
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
## 2024-04-15 ENCOUNTER — PATIENT MESSAGE (OUTPATIENT)
Dept: GASTROENTEROLOGY | Facility: CLINIC | Age: 69
End: 2024-04-15
Payer: MEDICARE

## 2024-04-17 ENCOUNTER — HOSPITAL ENCOUNTER (OUTPATIENT)
Dept: RADIOLOGY | Facility: HOSPITAL | Age: 69
Discharge: HOME | End: 2024-04-17
Attending: FAMILY MEDICINE
Payer: MEDICARE

## 2024-04-17 ENCOUNTER — OFFICE VISIT (OUTPATIENT)
Dept: FAMILY MEDICINE | Facility: CLINIC | Age: 69
End: 2024-04-17
Payer: MEDICARE

## 2024-04-17 VITALS
HEART RATE: 67 BPM | HEIGHT: 64 IN | TEMPERATURE: 98 F | RESPIRATION RATE: 18 BRPM | WEIGHT: 216.06 LBS | DIASTOLIC BLOOD PRESSURE: 94 MMHG | BODY MASS INDEX: 36.89 KG/M2 | SYSTOLIC BLOOD PRESSURE: 152 MMHG | OXYGEN SATURATION: 95 %

## 2024-04-17 DIAGNOSIS — G89.29 CHRONIC RIGHT SHOULDER PAIN: ICD-10-CM

## 2024-04-17 DIAGNOSIS — M25.511 CHRONIC RIGHT SHOULDER PAIN: Primary | ICD-10-CM

## 2024-04-17 DIAGNOSIS — G89.29 CHRONIC RIGHT SHOULDER PAIN: Primary | ICD-10-CM

## 2024-04-17 DIAGNOSIS — M25.511 CHRONIC RIGHT SHOULDER PAIN: ICD-10-CM

## 2024-04-17 PROCEDURE — 96372 THER/PROPH/DIAG INJ SC/IM: CPT | Mod: HCNC,S$GLB,, | Performed by: FAMILY MEDICINE

## 2024-04-17 PROCEDURE — 1159F MED LIST DOCD IN RCRD: CPT | Mod: HCNC,CPTII,S$GLB, | Performed by: FAMILY MEDICINE

## 2024-04-17 PROCEDURE — 3077F SYST BP >= 140 MM HG: CPT | Mod: HCNC,CPTII,S$GLB, | Performed by: FAMILY MEDICINE

## 2024-04-17 PROCEDURE — 3080F DIAST BP >= 90 MM HG: CPT | Mod: HCNC,CPTII,S$GLB, | Performed by: FAMILY MEDICINE

## 2024-04-17 PROCEDURE — 73030 X-RAY EXAM OF SHOULDER: CPT | Mod: 26,HCNC,RT, | Performed by: RADIOLOGY

## 2024-04-17 PROCEDURE — 3288F FALL RISK ASSESSMENT DOCD: CPT | Mod: HCNC,CPTII,S$GLB, | Performed by: FAMILY MEDICINE

## 2024-04-17 PROCEDURE — 1101F PT FALLS ASSESS-DOCD LE1/YR: CPT | Mod: HCNC,CPTII,S$GLB, | Performed by: FAMILY MEDICINE

## 2024-04-17 PROCEDURE — 1126F AMNT PAIN NOTED NONE PRSNT: CPT | Mod: HCNC,CPTII,S$GLB, | Performed by: FAMILY MEDICINE

## 2024-04-17 PROCEDURE — 73030 X-RAY EXAM OF SHOULDER: CPT | Mod: TC,HCNC,FY,PO,RT

## 2024-04-17 PROCEDURE — 99999 PR PBB SHADOW E&M-EST. PATIENT-LVL V: CPT | Mod: PBBFAC,HCNC,, | Performed by: FAMILY MEDICINE

## 2024-04-17 PROCEDURE — 99214 OFFICE O/P EST MOD 30 MIN: CPT | Mod: HCNC,25,S$GLB, | Performed by: FAMILY MEDICINE

## 2024-04-17 PROCEDURE — 1160F RVW MEDS BY RX/DR IN RCRD: CPT | Mod: HCNC,CPTII,S$GLB, | Performed by: FAMILY MEDICINE

## 2024-04-17 PROCEDURE — 3008F BODY MASS INDEX DOCD: CPT | Mod: HCNC,CPTII,S$GLB, | Performed by: FAMILY MEDICINE

## 2024-04-17 RX ORDER — INFLUENZA A VIRUS A/VICTORIA/4897/2022 IVR-238 (H1N1) ANTIGEN (FORMALDEHYDE INACTIVATED), INFLUENZA A VIRUS A/DARWIN/6/2021 IVR-227 (H3N2) ANTIGEN (FORMALDEHYDE INACTIVATED), INFLUENZA B VIRUS B/AUSTRIA/1359417/2021 BVR-26 ANTIGEN (FORMALDEHYDE INACTIVATED), INFLUENZA B VIRUS B/PHUKET/3073/2013 BVR-1B ANTIGEN (FORMALDEHYDE INACTIVATED) 15; 15; 15; 15 UG/.5ML; UG/.5ML; UG/.5ML; UG/.5ML
INJECTION, SUSPENSION INTRAMUSCULAR
COMMUNITY
Start: 2023-11-17 | End: 2024-05-22

## 2024-04-17 RX ORDER — COVID-19 VACCINE, MRNA 50 UG/.5ML
INJECTION, SUSPENSION INTRAMUSCULAR
COMMUNITY
Start: 2023-10-25 | End: 2024-05-22

## 2024-04-17 RX ORDER — DEXAMETHASONE SODIUM PHOSPHATE 4 MG/ML
8 INJECTION, SOLUTION INTRA-ARTICULAR; INTRALESIONAL; INTRAMUSCULAR; INTRAVENOUS; SOFT TISSUE
Status: COMPLETED | OUTPATIENT
Start: 2024-04-17 | End: 2024-04-17

## 2024-04-17 RX ADMIN — DEXAMETHASONE SODIUM PHOSPHATE 8 MG: 4 INJECTION, SOLUTION INTRA-ARTICULAR; INTRALESIONAL; INTRAMUSCULAR; INTRAVENOUS; SOFT TISSUE at 08:04

## 2024-04-17 NOTE — PROGRESS NOTES
Routine Office Visit    Patient Name: Bell Lynn    : 1955  MRN: [id number]    Subjective:  Bell is a 68 y.o. female who presents today for:    1. Right shoulder pain  Patient presenting with recurring right shoulder pain that has been fluctuating from an 8-10/10.  She states that it does sometimes get swollen.  No trauma to the shoulder.  She cannot take NSAIDs due to history of GI bleed.  She states that it hurts to lift her shoulder out and when really sore she cannot reach behind her back.  She does feel a lot of clicking and popping when she moves her right shoulder.      Past Medical History  Past Medical History:   Diagnosis Date    Asthma     Depression     Gastric ulcer with hemorrhage 2012    GERD (gastroesophageal reflux disease)     History of blood transfusion     Hypertension     Iron deficiency anemia 2013    Osteoarthritis of both knees     Pituitary adenoma     s/p transphenoidal resection    Primary malignant neuroendocrine tumor of small intestine 2023       Past Surgical History  Past Surgical History:   Procedure Laterality Date    BREAST BIOPSY      BREAST SURGERY      Benign breast cyst    COLONOSCOPY      ENDOSCOPIC ULTRASOUND OF UPPER GASTROINTESTINAL TRACT N/A 2023    Procedure: ULTRASOUND, UPPER GI TRACT, ENDOSCOPIC;  Surgeon: Johanthan Cantu MD;  Location: [facility];  Service: Endoscopy;  Laterality: N/A;    ESOPHAGOGASTRODUODENOSCOPY N/A 10/07/2020    Procedure: EGD (ESOPHAGOGASTRODUODENOSCOPY);  Surgeon: Nell Parish MD;  Location: [facility];  Service: Endoscopy;  Laterality: N/A;    ESOPHAGOGASTRODUODENOSCOPY N/A 2021    Procedure: EGD (ESOPHAGOGASTRODUODENOSCOPY);  Surgeon: Nell Parish MD;  Location: [facility];  Service: Endoscopy;  Laterality: N/A;    HYSTERECTOMY      due to uterine fibroids    KNEE ARTHROPLASTY Left 01/10/2022    Procedure: ARTHROPLASTY, KNEE;  Surgeon: Jonnathan Tavera MD;  Location: [facility] OR;   Service: Orthopedics;  Laterality: Left;    TONSILLECTOMY      TRANSPHENOIDAL PITUITARY RESECTION  1989    Dr Cardenas       Family History  Family History   Problem Relation Name Age of Onset    Cancer Father          unknown type, between his kidneys and liver, cause of death    Heart disease Father      Hypertension Father      Breast cancer Other Harriett 48    Breast cancer Other Orsion 45    Uterine cancer Other Letrika     Uterine cancer Other Eliza 45    Rectal cancer Maternal Aunt         Social History  Social History     Socioeconomic History    Marital status:      Spouse name: Brandin    Number of children: 3   Occupational History     Employer: [employer]   Tobacco Use    Smoking status: Never     Passive exposure: Never    Smokeless tobacco: Never   Substance and Sexual Activity    Alcohol use: Yes     Alcohol/week: 1.0 standard drink of alcohol     Types: 1 Cans of beer per week     Comment: on ocassion    Drug use: No    Sexual activity: Not Currently     Partners: Male     Birth control/protection: Surgical     Social Determinants of Health     Financial Resource Strain: Low Risk  (11/15/2023)    Overall Financial Resource Strain (CARDIA)     Difficulty of Paying Living Expenses: Not hard at all   Food Insecurity: No Food Insecurity (11/15/2023)    Hunger Vital Sign     Worried About Running Out of Food in the Last Year: Never true     Ran Out of Food in the Last Year: Never true   Transportation Needs: No Transportation Needs (11/15/2023)    PRAPARE - Transportation     Lack of Transportation (Medical): No     Lack of Transportation (Non-Medical): No   Physical Activity: Inactive (11/15/2023)    Exercise Vital Sign     Days of Exercise per Week: 0 days     Minutes of Exercise per Session: 0 min   Stress: Stress Concern Present (11/15/2023)    [employer] [place] [employer] - Occupational Stress Questionnaire     Feeling of Stress : To some extent   Social  Connections: Unknown (11/15/2023)    Social Connection and Isolation Panel [NHANES]     Frequency of Communication with Friends and Family: More than three times a week     Frequency of Social Gatherings with Friends and Family: Three times a week     Active Member of Clubs or Organizations: No     Attends Club or Organization Meetings: Never     Marital Status:    Housing Stability: Low Risk  (11/15/2023)    Housing Stability Vital Sign     Unable to Pay for Housing in the Last Year: No     Number of Places Lived in the Last Year: 1     Unstable Housing in the Last Year: No       Current Medications  Current Outpatient Medications on File Prior to Visit   Medication Sig Dispense Refill    albuterol (PROVENTIL/VENTOLIN HFA) 90 mcg/actuation inhaler Inhale 2 puffs into the lungs every 6 (six) hours as needed for Wheezing. Rescue 18 g 5    alendronate (FOSAMAX) 70 MG tablet TAKE 1 TABLET EVERY 7 DAYS 12 tablet 3    amLODIPine (NORVASC) 10 MG tablet TAKE 1 TABLET ONE TIME DAILY (DOSE INCREASE) 90 tablet 3    cetirizine (ZYRTEC) 10 MG tablet Take 1 tablet (10 mg total) by mouth once daily. 90 tablet 3    cyclobenzaprine (FLEXERIL) 10 MG tablet TAKE 1 TABLET EVERY EVENING 90 tablet 0    dicyclomine (BENTYL) 20 mg tablet Take 1 tablet (20 mg total) by mouth 3 (three) times daily as needed (abdominal pain). 60 tablet 2    DULoxetine (CYMBALTA) 60 MG capsule TAKE 1 CAPSULE ONE TIME DAILY (INCREASED DOSE) 90 capsule 3    FLUAD QUAD 2023-24,65Y UP,,PF, 60 mcg (15 mcg x 4)/0.5 mL Syrg       furosemide (LASIX) 20 MG tablet TAKE 1 TABLET ONE TIME DAILY FOR LEG SWELLING AS NEEDED 90 tablet 1    gabapentin (NEURONTIN) 100 MG capsule 3 tablets nighttime 1 tablet in AM; increased dose 270 capsule 3    LIDOcaine (LIDODERM) 5 % APPLY 1 PATCH ON THE SKIN ONE TIME DAILY. REMOVE AND DISCARD PATCH WITHIN 12 HOURS OR AS DIRECTED BY MD Johnson patch 2    linaCLOtide (LINZESS) 145 mcg Cap capsule Take 1 capsule (145 mcg total) by mouth  "once daily. 90 capsule 3    metoprolol succinate (TOPROL-XL) 100 MG 24 hr tablet Take 1 tablet (100 mg total) by mouth once daily. 90 tablet 3    nitroGLYCERIN (NITROSTAT) 0.4 MG SL tablet Place 1 tablet (0.4 mg total) under the tongue every 5 (five) minutes as needed for Chest pain. 60 tablet 12    SPIKEVAX 5234-4721,12Y UP,,PF, 50 mcg/0.5 mL injection       spironolactone (ALDACTONE) 50 MG tablet TAKE 1 TABLET ONE TIME DAILY 90 tablet 3    traMADoL (ULTRAM) 50 mg tablet TAKE 1 TABLET EVERY 8 HOURS AS NEEDED FOR PAIN 90 tablet 3    traZODone (DESYREL) 50 MG tablet Take 1 tablet (50 mg total) by mouth every evening. 30 tablet 11    valsartan (DIOVAN) 320 MG tablet Take 1 tablet (320 mg total) by mouth once daily. 90 tablet 3    vitamin D (VITAMIN D3) 1000 units Tab Take 1,000 Units by mouth once daily.      latanoprost 0.005 % ophthalmic solution Place 1 drop into the right eye every evening. 7.5 mL 4    pantoprazole (PROTONIX) 40 MG tablet Take 1 tablet (40 mg total) by mouth 2 (two) times daily before meals. 60 tablet 5     Current Facility-Administered Medications on File Prior to Visit   Medication Dose Route Frequency Provider Last Rate Last Admin    lanreotide injection 120 mg  120 mg Subcutaneous Once Aaron Keita MD           Allergies   Review of patient's allergies indicates:   Allergen Reactions    Nsaids (non-steroidal anti-inflammatory drug)      Gi bleed    Penicillins Itching and Swelling    Penicillin     Symbicort [budesonide-formoterol]      Recurrent oral ulcers       Review of Systems (Pertinent positives)  Review of Systems   Constitutional: Negative.    HENT: Negative.     Eyes: Negative.    Respiratory: Negative.     Cardiovascular: Negative.    Gastrointestinal: Negative.    Musculoskeletal:  Positive for joint pain.   Skin: Negative.          BP (!) 152/94   Pulse 67   Temp 98.1 °F (36.7 °C) (Oral)   Resp 18   Ht 5' 4" (1.626 m)   Wt 98 kg (216 lb 0.8 oz)   SpO2 95%   BMI 37.09 kg/m² "     GENERAL APPEARANCE: in no apparent distress and well developed and well nourished  HEENT: PERRL, EOMI, Sclera clear, anicteric, Oropharynx clear, no lesions, Neck supple with midline trachea  NECK: normal, supple, no adenopathy, thyroid normal in size  RESPIRATORY: appears well, vitals normal, no respiratory distress, acyanotic, normal RR  Extremities: warm/well perfused.  No abnormal hair patterns.  No clubbing, cyanosis or edema.  Pain on palpation of lateral shoulder, but not on palpation of anterior or posterior shoulder  SKIN: no rashes, no wounds, no other lesions  PSYCH: Alert, oriented x 3, thought content appropriate, speech normal, pleasant and cooperative, good eye contact, well groomed,    Assessment/Plan:  Bell Lynn is a 68 y.o. female who presents today for :    Bell was seen today for shoulder pain.    Diagnoses and all orders for this visit:    Chronic right shoulder pain  -     X-Ray Shoulder Trauma 3 view Right; Future  -     Cancel: Ambulatory referral/consult to Orthopedics; Future  -     Ambulatory referral/consult to Orthopedics; Future  - Decadron 8mg as she cannot take oral NSAID's due to history of GI bleed and neurodendocrine tumor  - Verified with Dr. Keita that it is ok to give her a steroid injection as she is currently on chemotherapy  - Follow up as needed  - Xrays today and appointment with ortho scheduled      Dakota Kirkland MD

## 2024-04-23 ENCOUNTER — OFFICE VISIT (OUTPATIENT)
Dept: HEMATOLOGY/ONCOLOGY | Facility: CLINIC | Age: 69
End: 2024-04-23
Payer: MEDICARE

## 2024-04-23 ENCOUNTER — INFUSION (OUTPATIENT)
Dept: INFUSION THERAPY | Facility: HOSPITAL | Age: 69
End: 2024-04-23
Payer: MEDICARE

## 2024-04-23 VITALS
TEMPERATURE: 98 F | WEIGHT: 213.06 LBS | DIASTOLIC BLOOD PRESSURE: 83 MMHG | BODY MASS INDEX: 36.37 KG/M2 | OXYGEN SATURATION: 97 % | HEIGHT: 64 IN | SYSTOLIC BLOOD PRESSURE: 143 MMHG | HEART RATE: 61 BPM

## 2024-04-23 DIAGNOSIS — F32.A DEPRESSION, UNSPECIFIED DEPRESSION TYPE: ICD-10-CM

## 2024-04-23 DIAGNOSIS — G89.3 CANCER-RELATED PAIN: ICD-10-CM

## 2024-04-23 DIAGNOSIS — C7A.8 PRIMARY MALIGNANT NEUROENDOCRINE TUMOR OF SMALL INTESTINE: ICD-10-CM

## 2024-04-23 DIAGNOSIS — D49.9 IMMUNODEFICIENCY SECONDARY TO NEOPLASM: ICD-10-CM

## 2024-04-23 DIAGNOSIS — C78.7 SECONDARY MALIGNANT NEOPLASM OF LIVER: ICD-10-CM

## 2024-04-23 DIAGNOSIS — D84.81 IMMUNODEFICIENCY SECONDARY TO NEOPLASM: ICD-10-CM

## 2024-04-23 DIAGNOSIS — C7A.8 PRIMARY MALIGNANT NEUROENDOCRINE TUMOR OF SMALL INTESTINE: Primary | ICD-10-CM

## 2024-04-23 DIAGNOSIS — G47.01 INSOMNIA DUE TO MEDICAL CONDITION: ICD-10-CM

## 2024-04-23 DIAGNOSIS — E66.01 SEVERE OBESITY WITH BODY MASS INDEX (BMI) OF 35.0 TO 39.9 WITH SERIOUS COMORBIDITY: ICD-10-CM

## 2024-04-23 DIAGNOSIS — C77.8 SECONDARY MALIGNANT NEOPLASM OF LYMPH NODES OF MULTIPLE SITES: ICD-10-CM

## 2024-04-23 DIAGNOSIS — D84.821 IMMUNODEFICIENCY DUE TO DRUGS: ICD-10-CM

## 2024-04-23 DIAGNOSIS — I10 ESSENTIAL HYPERTENSION: ICD-10-CM

## 2024-04-23 DIAGNOSIS — Z79.899 IMMUNODEFICIENCY DUE TO DRUGS: ICD-10-CM

## 2024-04-23 PROCEDURE — 1160F RVW MEDS BY RX/DR IN RCRD: CPT | Mod: HCNC,CPTII,S$GLB, | Performed by: PHYSICIAN ASSISTANT

## 2024-04-23 PROCEDURE — G2211 COMPLEX E/M VISIT ADD ON: HCPCS | Mod: HCNC,S$GLB,, | Performed by: PHYSICIAN ASSISTANT

## 2024-04-23 PROCEDURE — 3079F DIAST BP 80-89 MM HG: CPT | Mod: HCNC,CPTII,S$GLB, | Performed by: PHYSICIAN ASSISTANT

## 2024-04-23 PROCEDURE — 96372 THER/PROPH/DIAG INJ SC/IM: CPT | Mod: HCNC

## 2024-04-23 PROCEDURE — 1159F MED LIST DOCD IN RCRD: CPT | Mod: HCNC,CPTII,S$GLB, | Performed by: PHYSICIAN ASSISTANT

## 2024-04-23 PROCEDURE — 3288F FALL RISK ASSESSMENT DOCD: CPT | Mod: HCNC,CPTII,S$GLB, | Performed by: PHYSICIAN ASSISTANT

## 2024-04-23 PROCEDURE — 99999 PR PBB SHADOW E&M-EST. PATIENT-LVL V: CPT | Mod: PBBFAC,HCNC,, | Performed by: PHYSICIAN ASSISTANT

## 2024-04-23 PROCEDURE — 1101F PT FALLS ASSESS-DOCD LE1/YR: CPT | Mod: HCNC,CPTII,S$GLB, | Performed by: PHYSICIAN ASSISTANT

## 2024-04-23 PROCEDURE — 1126F AMNT PAIN NOTED NONE PRSNT: CPT | Mod: HCNC,CPTII,S$GLB, | Performed by: PHYSICIAN ASSISTANT

## 2024-04-23 PROCEDURE — 3077F SYST BP >= 140 MM HG: CPT | Mod: HCNC,CPTII,S$GLB, | Performed by: PHYSICIAN ASSISTANT

## 2024-04-23 PROCEDURE — 99215 OFFICE O/P EST HI 40 MIN: CPT | Mod: HCNC,S$GLB,, | Performed by: PHYSICIAN ASSISTANT

## 2024-04-23 PROCEDURE — 3008F BODY MASS INDEX DOCD: CPT | Mod: HCNC,CPTII,S$GLB, | Performed by: PHYSICIAN ASSISTANT

## 2024-04-23 PROCEDURE — 63600175 PHARM REV CODE 636 W HCPCS: Mod: JZ,JG,HCNC | Performed by: INTERNAL MEDICINE

## 2024-04-23 RX ORDER — TRAMADOL HYDROCHLORIDE 50 MG/1
50 TABLET ORAL EVERY 8 HOURS PRN
Qty: 90 TABLET | Refills: 3 | Status: SHIPPED | OUTPATIENT
Start: 2024-04-23

## 2024-04-23 RX ORDER — LANREOTIDE ACETATE 120 MG/.5ML
120 INJECTION SUBCUTANEOUS ONCE
Status: CANCELLED | OUTPATIENT
Start: 2024-04-23 | End: 2024-04-23

## 2024-04-23 RX ORDER — LANREOTIDE ACETATE 120 MG/.5ML
120 INJECTION SUBCUTANEOUS ONCE
Status: COMPLETED | OUTPATIENT
Start: 2024-04-23 | End: 2024-04-23

## 2024-04-23 RX ADMIN — LANREOTIDE ACETATE 120 MG: 120 INJECTION SUBCUTANEOUS at 01:04
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
## 2024-04-26 ENCOUNTER — TELEPHONE (OUTPATIENT)
Dept: ORTHOPEDICS | Facility: CLINIC | Age: 69
End: 2024-04-26
Payer: MEDICARE

## 2024-04-26 ENCOUNTER — PATIENT MESSAGE (OUTPATIENT)
Dept: ORTHOPEDICS | Facility: CLINIC | Age: 69
End: 2024-04-26
Payer: MEDICARE

## 2024-04-26 NOTE — TELEPHONE ENCOUNTER
Left message for patient to call the clinic about her appt today with  has been moved to Tuesday if the patient is unable to come please call the clinic. And her appt for today has been cancel

## 2024-04-30 ENCOUNTER — OFFICE VISIT (OUTPATIENT)
Dept: ORTHOPEDICS | Facility: CLINIC | Age: 69
End: 2024-04-30
Payer: MEDICARE

## 2024-04-30 DIAGNOSIS — M25.511 CHRONIC RIGHT SHOULDER PAIN: Primary | ICD-10-CM

## 2024-04-30 DIAGNOSIS — G89.29 CHRONIC RIGHT SHOULDER PAIN: Primary | ICD-10-CM

## 2024-04-30 PROCEDURE — 1159F MED LIST DOCD IN RCRD: CPT | Mod: HCNC,CPTII,S$GLB, | Performed by: ORTHOPAEDIC SURGERY

## 2024-04-30 PROCEDURE — 99999 PR PBB SHADOW E&M-EST. PATIENT-LVL V: CPT | Mod: PBBFAC,HCNC,, | Performed by: ORTHOPAEDIC SURGERY

## 2024-04-30 PROCEDURE — 1101F PT FALLS ASSESS-DOCD LE1/YR: CPT | Mod: HCNC,CPTII,S$GLB, | Performed by: ORTHOPAEDIC SURGERY

## 2024-04-30 PROCEDURE — 3288F FALL RISK ASSESSMENT DOCD: CPT | Mod: HCNC,CPTII,S$GLB, | Performed by: ORTHOPAEDIC SURGERY

## 2024-04-30 PROCEDURE — 1125F AMNT PAIN NOTED PAIN PRSNT: CPT | Mod: HCNC,CPTII,S$GLB, | Performed by: ORTHOPAEDIC SURGERY

## 2024-04-30 PROCEDURE — 99214 OFFICE O/P EST MOD 30 MIN: CPT | Mod: HCNC,S$GLB,, | Performed by: ORTHOPAEDIC SURGERY

## 2024-04-30 PROCEDURE — 1160F RVW MEDS BY RX/DR IN RCRD: CPT | Mod: HCNC,CPTII,S$GLB, | Performed by: ORTHOPAEDIC SURGERY

## 2024-04-30 RX ORDER — MELOXICAM 7.5 MG/1
7.5 TABLET ORAL DAILY
Qty: 30 TABLET | Refills: 0 | Status: SHIPPED | OUTPATIENT
Start: 2024-04-30 | End: 2024-05-30

## 2024-04-30 NOTE — PROGRESS NOTES
Assessment: 68 y.o. female with right rotator cuff tendinitis    I explained my diagnostic impression and the reasoning behind it in detail, using layman's terms.     Plan:   - PT referral placed  - Mobic 7.5 mg PO QD x 2 weeks then PRN. The patient was advised that NSAID-type medications have important potential side effects: gastrointestinal irritation, GI bleeding, cardiac effects and renal injuries. Take the medication with food and to stop and call the office for any GI upset, vomiting, abdominal pain or black/bloody stools. The patient expresses understanding of these issues and questions were answered.    - Return to clinic in 12 weeks. Return sooner if symptoms worsen or fail to improve.    All questions were answered in detail. The patient is in full agreement with the treatment plan and will proceed accordingly.    Chief Complaint   Patient presents with    Right Shoulder - Pain     Initial visit (4/30/24): eBll Lynn is a 68 y.o. female who presents today complaining of right shoulder pain  Duration of symptoms:  2 months   Trauma or new activity: no  Pain is constant  Aggravating factors: reaching overhead, abduction   Relieving factors: rest, pressure on lateral shoulder   Night pain is present and is disruptive to sleep  Radicular symptoms: no    Prior treatment:  muscle relaxer and tramadol without improvement in pain.   2 -3 days of relief with IM steroid injection. Has not tried PT or SKYLER  Pain does interfere with sleep and activities of daily living .    This patient was seen in consultation at the request of Dr. Dakota Kirkland    This is the extent of the patient's complaints at this time.     Hand dominance: Right     Occupation: Retired        Review of patient's allergies indicates:   Allergen Reactions    Nsaids (non-steroidal anti-inflammatory drug)      Gi bleed    Penicillins Itching and Swelling    Penicillin     Symbicort [budesonide-formoterol]      Recurrent oral ulcers          Physical Exam:   Vitals:    04/30/24 0945   PainSc:   4   PainLoc: Shoulder       General: Patient is alert, awake and oriented to time, place and person. Mood and affect are appropriate.  Patient does not appear to be in any distress, denies any constitutional symptoms and appears stated age.   HEENT: Pupils are equal and round, sclera are not injected. External examination of ears and nose reveals no abnormalities. Cranial nerves II-X are grossly intact  Neck: examination demonstrates painless  active range of motion. Spurling's sign is negative  Skin: no rashes, abrasions or open wounds on the affected extremity   Resp: No respiratory distress or audible wheezing   CV: 2+  pulses, all extremities warm and well perfused   Right Shoulder    Shoulder Range of Motion    Right     Left   (Active/Passive)       Forward Elevation     165/165            150/150  External rotation (arm at side)  30/30             30/30   Internal rotation behind the back  L5             L5     Range of motion is painful     Scapular winging no  Scapular dyskinesia no    Examination of the back shows no atrophy     Acromioclavicular joint is not tender  Crossbody test: negative    Neer's positive  Hawkin's positive    Attila's positive  Drop arm negative  Belly press negative      Cuff Strength     Right     Left   Supraspinatus        5/5    5/5  Infraspinatus     5/5    5/5  Subscapularis     5/5    5/5    Deltoid testing            5/5    5/5    Speeds negative  Yergasons negative    Elbow examination demonstrates no tenderness to palpation and has normal range of motion.     ltsi C5-T1  + epl, io, fds, fdp   2+ RP      Imaging:  3 views of the right shoulder:  negative for degenerative changes of the AC joint. The humeral head is well centered on the AP and axillary views.  + cortical changes of the greater tuberosity. There is not significant degenerative change of the glenohumeral joint or posterior subluxation of the humeral  head. No acute changes or fracture.      I personally reviewed and interpreted the patient's imaging obtained today in clinic     A note notifying Dr. Dakota Kirkland of my findings was sent via the electronic medical record     This note was created by combination of typed  and M-Modal dictation. Transcription and phonetic errors may be present.  If there are any questions, please contact me.      Current Outpatient Medications:     albuterol (PROVENTIL/VENTOLIN HFA) 90 mcg/actuation inhaler, Inhale 2 puffs into the lungs every 6 (six) hours as needed for Wheezing. Rescue, Disp: 18 g, Rfl: 5    alendronate (FOSAMAX) 70 MG tablet, TAKE 1 TABLET EVERY 7 DAYS, Disp: 12 tablet, Rfl: 3    amLODIPine (NORVASC) 10 MG tablet, TAKE 1 TABLET ONE TIME DAILY (DOSE INCREASE), Disp: 90 tablet, Rfl: 3    cetirizine (ZYRTEC) 10 MG tablet, Take 1 tablet (10 mg total) by mouth once daily., Disp: 90 tablet, Rfl: 3    cyclobenzaprine (FLEXERIL) 10 MG tablet, TAKE 1 TABLET EVERY EVENING, Disp: 90 tablet, Rfl: 0    dicyclomine (BENTYL) 20 mg tablet, Take 1 tablet (20 mg total) by mouth 3 (three) times daily as needed (abdominal pain)., Disp: 60 tablet, Rfl: 2    DULoxetine (CYMBALTA) 60 MG capsule, TAKE 1 CAPSULE ONE TIME DAILY (INCREASED DOSE), Disp: 90 capsule, Rfl: 3    FLUAD QUAD 2023-24,65Y UP,,PF, 60 mcg (15 mcg x 4)/0.5 mL Syrg, , Disp: , Rfl:     furosemide (LASIX) 20 MG tablet, TAKE 1 TABLET ONE TIME DAILY FOR LEG SWELLING AS NEEDED, Disp: 90 tablet, Rfl: 1    gabapentin (NEURONTIN) 100 MG capsule, 3 tablets nighttime 1 tablet in AM; increased dose, Disp: 270 capsule, Rfl: 3    LIDOcaine (LIDODERM) 5 %, APPLY 1 PATCH ON THE SKIN ONE TIME DAILY. REMOVE AND DISCARD PATCH WITHIN 12 HOURS OR AS DIRECTED BY MD, Disp: 90 patch, Rfl: 2    linaCLOtide (LINZESS) 145 mcg Cap capsule, Take 1 capsule (145 mcg total) by mouth once daily., Disp: 90 capsule, Rfl: 3    metoprolol succinate (TOPROL-XL) 100 MG 24 hr tablet, Take 1  tablet (100 mg total) by mouth once daily., Disp: 90 tablet, Rfl: 3    nitroGLYCERIN (NITROSTAT) 0.4 MG SL tablet, Place 1 tablet (0.4 mg total) under the tongue every 5 (five) minutes as needed for Chest pain., Disp: 60 tablet, Rfl: 12    SPIKEVAX 4299-8407,12Y UP,,PF, 50 mcg/0.5 mL injection, , Disp: , Rfl:     spironolactone (ALDACTONE) 50 MG tablet, TAKE 1 TABLET ONE TIME DAILY, Disp: 90 tablet, Rfl: 3    traMADoL (ULTRAM) 50 mg tablet, Take 1 tablet (50 mg total) by mouth every 8 (eight) hours as needed., Disp: 90 tablet, Rfl: 3    traZODone (DESYREL) 50 MG tablet, Take 1 tablet (50 mg total) by mouth every evening., Disp: 30 tablet, Rfl: 11    valsartan (DIOVAN) 320 MG tablet, Take 1 tablet (320 mg total) by mouth once daily., Disp: 90 tablet, Rfl: 3    vitamin D (VITAMIN D3) 1000 units Tab, Take 1,000 Units by mouth once daily., Disp: , Rfl:     latanoprost 0.005 % ophthalmic solution, Place 1 drop into the right eye every evening., Disp: 7.5 mL, Rfl: 4    pantoprazole (PROTONIX) 40 MG tablet, Take 1 tablet (40 mg total) by mouth 2 (two) times daily before meals., Disp: 60 tablet, Rfl: 5  No current facility-administered medications for this visit.    Facility-Administered Medications Ordered in Other Visits:     lanreotide injection 120 mg, 120 mg, Subcutaneous, Once, Aaron Keita MD    Past Medical History:   Diagnosis Date    Asthma     Depression     Gastric ulcer with hemorrhage 07/01/2012    GERD (gastroesophageal reflux disease)     History of blood transfusion     Hypertension     Iron deficiency anemia 03/14/2013    Osteoarthritis of both knees     Pituitary adenoma 1989    s/p transphenoidal resection    Primary malignant neuroendocrine tumor of small intestine 06/2023       Active Problem List with Overview Notes    Diagnosis Date Noted    Primary malignant neuroendocrine tumor of small intestine 07/18/2023    Secondary malignant neoplasm of liver 07/18/2023    Chronic right-sided low back pain  without sciatica 05/16/2023 04/27/2023 MRI L-spine impression:  T11-T12 posterior central 2.1 mm subligamentous disc herniation with caudal migration.  No canal stenosis.  T12-L1 posterior midline 1.2 mm disc herniation or central canal stenosis.    L2-L3 small facet effusions are present  L3-L4 mild facet hypertrophy with small effusions, there is minimal left foramen  L4-L5 diffuse disc bulge with facet hypertrophy, there is mild bilateral foraminal narrowing.    L5-S1 facet hypertrophy, there is mild moderate left greater than right foraminal narrowing      Vitamin D deficiency 10/26/2022    Age-related osteoporosis without current pathological fracture; 6/2022 alendronate 06/22/2022 06/21/2022 DEXA L-spine -1.9, total hip-2.8, femoral neck-3.5.  FRAX score 2.7/8.3%.  Osteoporosis.      Gait abnormality 02/03/2022    Decreased strength of lower extremity 02/03/2022    Decreased range of motion of left knee 02/03/2022    History of left knee replacement 01/10/2022    Chest pain, atypical 01/14/2021    Palpitations 01/14/2021    LANGE (dyspnea on exertion) 01/14/2021    GERD (gastroesophageal reflux disease) 09/08/2020    Peripheral edema 08/28/2020    Chronic constipation 08/28/2020    History of pituitary adenoma 9/2019 MRI no recurrence 09/24/2019     s/p transphenoidal resection ~1989 9/23/2019 MRI brain:   1. Fluid level in the sphenoid sinus, likely from sphenoid sinusitis.  2. No significant abnormalities of the sella turcica to suggest any recurrent neoplasms.  3. Periventricular white matter changes likely from chronic microvascular ischemic disease.  4. No acute intracranial processes.  9/23/2019 carotid US normal      Status post transsphenoidal pituitary resection for tumor, Dr Cardenas, about 1989 09/09/2019    History of benign carcinoid tumor rectum s/p resection per GI note 01/30/2019    Colon polyp 8/2018 no path report included repeat 5 years 10/07/2018    Fibromyalgia; diffuse arm, back  pain, thigh pain; 2017 B12, TSH WNL; reynaldo without efficacy; Cymbalta. 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 - Small hiatal hernia. - Non-bleeding gastric ulcers with no stigmata of bleeding. Biopsied. This has been present for more than 2 years according to previous EGD report 7/2018. - Normal examined duodenum. bx negative; neg for H pylori. Felt to be NSAID induced  06/30/2021 EGD normal esophagus.  Gastric ulcer with no stigmata of bleeding.  Improved from previous but not gone.  bx reactive/chemical gastropathy; H pylori neg.  Normal duodenum.      Menopausal hot flushes 02/14/2013    Mild recurrent major depression 02/14/2013    Primary osteoarthritis of left knee 07/30/2012 8/24/2017 MRI L knee: Tricompartmental degenerative changes. Lateral and medial meniscal degenerative tearing/changes. Tricompartmental cartilage loss including areas of full-thickness full-thickness loss. Abnormal appearance of the ACL and PCL suggestive of chronic injury.      Essential hypertension 1/2021 TTE normal, stress test negative 07/30/2012 6/2017 nuclear stress test negative. No change 7/2015 6/2017 TTE normal LVEF 55-60  01/26/2021 nuclear medicine stress test normal perfusion scan no evidence of ischemia.  01/26/2021 TTE LV normal size with concentric LVH and normal systolic function LVEF 60%.  Normal diastolic function.  Normal RV size and systolic function.  CVP 3.  PA pressure 5.         Past Surgical History:   Procedure Laterality Date    BREAST BIOPSY      BREAST SURGERY      Benign breast cyst    COLONOSCOPY       ENDOSCOPIC ULTRASOUND OF UPPER GASTROINTESTINAL TRACT N/A 06/27/2023    Procedure: ULTRASOUND, UPPER GI TRACT, ENDOSCOPIC;  Surgeon: Johnathan Cantu MD;  Location: [facility] ENDO;  Service: Endoscopy;  Laterality: N/A;    ESOPHAGOGASTRODUODENOSCOPY N/A 10/07/2020    Procedure: EGD (ESOPHAGOGASTRODUODENOSCOPY);  Surgeon: Nell Parish MD;  Location: [facility] ENDO;  Service: Endoscopy;  Laterality: N/A;    ESOPHAGOGASTRODUODENOSCOPY N/A 06/30/2021    Procedure: EGD (ESOPHAGOGASTRODUODENOSCOPY);  Surgeon: Nell Parish MD;  Location: [facility] ENDO;  Service: Endoscopy;  Laterality: N/A;    HYSTERECTOMY  1981    due to uterine fibroids    KNEE ARTHROPLASTY Left 01/10/2022    Procedure: ARTHROPLASTY, KNEE;  Surgeon: Jonnathan Tavera MD;  Location: [facility] OR;  Service: Orthopedics;  Laterality: Left;    TONSILLECTOMY      TRANSPHENOIDAL PITUITARY RESECTION  1989    Dr Cardenas       Social History     Socioeconomic History    Marital status:      Spouse name: Brandin    Number of children: 3   Occupational History     Employer: [email or web address]   Tobacco Use    Smoking status: Never     Passive exposure: Never    Smokeless tobacco: Never   Substance and Sexual Activity    Alcohol use: Yes     Alcohol/week: 1.0 standard drink of alcohol     Types: 1 Cans of beer per week     Comment: on ocassion    Drug use: No    Sexual activity: Not Currently     Partners: Male     Birth control/protection: Surgical     Social Determinants of Health     Financial Resource Strain: Low Risk  (11/15/2023)    Overall Financial Resource Strain (CARDIA)     Difficulty of Paying Living Expenses: Not hard at all   Food Insecurity: No Food Insecurity (11/15/2023)    Hunger Vital Sign     Worried About Running Out of Food in the Last Year: Never true     Ran Out of Food in the Last Year: Never true   Transportation Needs: No Transportation Needs (11/15/2023)    PRAPARE - Transportation     Lack of Transportation (Medical): No      Lack of Transportation (Non-Medical): No   Physical Activity: Inactive (11/15/2023)    Exercise Vital Sign     Days of Exercise per Week: 0 days     Minutes of Exercise per Session: 0 min   Stress: Stress Concern Present (11/15/2023)    [place] [employer] - Occupational Stress Questionnaire     Feeling of Stress : To some extent   Social Connections: Unknown (11/15/2023)    Social Connection and Isolation Panel [NHANES]     Frequency of Communication with Friends and Family: More than three times a week     Frequency of Social Gatherings with Friends and Family: Three times a week     Active Member of Clubs or Organizations: No     Attends Club or Organization Meetings: Never     Marital Status:    Housing Stability: Low Risk  (11/15/2023)    Housing Stability Vital Sign     Unable to Pay for Housing in the Last Year: No     Number of Places Lived in the Last Year: 1     Unstable Housing in the Last Year: No

## 2024-05-14 ENCOUNTER — PATIENT OUTREACH (OUTPATIENT)
Dept: ADMINISTRATIVE | Facility: HOSPITAL | Age: 69
End: 2024-05-14
Payer: MEDICARE

## 2024-05-14 DIAGNOSIS — R73.03 PREDIABETES: Primary | ICD-10-CM

## 2024-05-20 ENCOUNTER — TELEPHONE (OUTPATIENT)
Dept: REHABILITATION | Facility: HOSPITAL | Age: 69
End: 2024-05-20
Payer: MEDICARE

## 2024-05-21 ENCOUNTER — INFUSION (OUTPATIENT)
Dept: INFUSION THERAPY | Facility: HOSPITAL | Age: 69
End: 2024-05-21
Payer: MEDICARE

## 2024-05-21 ENCOUNTER — OFFICE VISIT (OUTPATIENT)
Dept: HEMATOLOGY/ONCOLOGY | Facility: CLINIC | Age: 69
End: 2024-05-21
Payer: MEDICARE

## 2024-05-21 VITALS
OXYGEN SATURATION: 96 % | SYSTOLIC BLOOD PRESSURE: 136 MMHG | HEART RATE: 89 BPM | BODY MASS INDEX: 36.98 KG/M2 | DIASTOLIC BLOOD PRESSURE: 79 MMHG | HEIGHT: 64 IN | RESPIRATION RATE: 18 BRPM | WEIGHT: 216.63 LBS

## 2024-05-21 DIAGNOSIS — C78.7 SECONDARY MALIGNANT NEOPLASM OF LIVER: ICD-10-CM

## 2024-05-21 DIAGNOSIS — C7A.8 PRIMARY MALIGNANT NEUROENDOCRINE TUMOR OF SMALL INTESTINE: Primary | ICD-10-CM

## 2024-05-21 DIAGNOSIS — E66.01 SEVERE OBESITY WITH BODY MASS INDEX (BMI) OF 35.0 TO 39.9 WITH SERIOUS COMORBIDITY: ICD-10-CM

## 2024-05-21 DIAGNOSIS — C77.8 SECONDARY MALIGNANT NEOPLASM OF LYMPH NODES OF MULTIPLE SITES: ICD-10-CM

## 2024-05-21 DIAGNOSIS — F32.A DEPRESSION, UNSPECIFIED DEPRESSION TYPE: ICD-10-CM

## 2024-05-21 DIAGNOSIS — Z79.899 IMMUNODEFICIENCY DUE TO DRUGS: ICD-10-CM

## 2024-05-21 DIAGNOSIS — D84.821 IMMUNODEFICIENCY DUE TO DRUGS: ICD-10-CM

## 2024-05-21 DIAGNOSIS — I10 ESSENTIAL HYPERTENSION: ICD-10-CM

## 2024-05-21 DIAGNOSIS — D84.81 IMMUNODEFICIENCY SECONDARY TO NEOPLASM: ICD-10-CM

## 2024-05-21 DIAGNOSIS — D49.9 IMMUNODEFICIENCY SECONDARY TO NEOPLASM: ICD-10-CM

## 2024-05-21 PROCEDURE — G2211 COMPLEX E/M VISIT ADD ON: HCPCS | Mod: HCNC,S$GLB,, | Performed by: PHYSICIAN ASSISTANT

## 2024-05-21 PROCEDURE — 3288F FALL RISK ASSESSMENT DOCD: CPT | Mod: HCNC,CPTII,S$GLB, | Performed by: PHYSICIAN ASSISTANT

## 2024-05-21 PROCEDURE — 1160F RVW MEDS BY RX/DR IN RCRD: CPT | Mod: HCNC,CPTII,S$GLB, | Performed by: PHYSICIAN ASSISTANT

## 2024-05-21 PROCEDURE — 99215 OFFICE O/P EST HI 40 MIN: CPT | Mod: HCNC,S$GLB,, | Performed by: PHYSICIAN ASSISTANT

## 2024-05-21 PROCEDURE — 1101F PT FALLS ASSESS-DOCD LE1/YR: CPT | Mod: HCNC,CPTII,S$GLB, | Performed by: PHYSICIAN ASSISTANT

## 2024-05-21 PROCEDURE — 3008F BODY MASS INDEX DOCD: CPT | Mod: HCNC,CPTII,S$GLB, | Performed by: PHYSICIAN ASSISTANT

## 2024-05-21 PROCEDURE — 63600175 PHARM REV CODE 636 W HCPCS: Mod: JZ,JG,HCNC | Performed by: INTERNAL MEDICINE

## 2024-05-21 PROCEDURE — 1125F AMNT PAIN NOTED PAIN PRSNT: CPT | Mod: HCNC,CPTII,S$GLB, | Performed by: PHYSICIAN ASSISTANT

## 2024-05-21 PROCEDURE — 99999 PR PBB SHADOW E&M-EST. PATIENT-LVL V: CPT | Mod: PBBFAC,HCNC,, | Performed by: PHYSICIAN ASSISTANT

## 2024-05-21 PROCEDURE — 96372 THER/PROPH/DIAG INJ SC/IM: CPT | Mod: HCNC

## 2024-05-21 PROCEDURE — 3078F DIAST BP <80 MM HG: CPT | Mod: HCNC,CPTII,S$GLB, | Performed by: PHYSICIAN ASSISTANT

## 2024-05-21 PROCEDURE — 3075F SYST BP GE 130 - 139MM HG: CPT | Mod: HCNC,CPTII,S$GLB, | Performed by: PHYSICIAN ASSISTANT

## 2024-05-21 PROCEDURE — 1159F MED LIST DOCD IN RCRD: CPT | Mod: HCNC,CPTII,S$GLB, | Performed by: PHYSICIAN ASSISTANT

## 2024-05-21 RX ORDER — LANREOTIDE ACETATE 120 MG/.5ML
120 INJECTION SUBCUTANEOUS ONCE
Status: COMPLETED | OUTPATIENT
Start: 2024-05-21 | End: 2024-05-21

## 2024-05-21 RX ORDER — LANREOTIDE ACETATE 120 MG/.5ML
120 INJECTION SUBCUTANEOUS ONCE
Status: CANCELLED | OUTPATIENT
Start: 2024-05-21 | End: 2024-05-21

## 2024-05-21 RX ADMIN — LANREOTIDE ACETATE 120 MG: 120 INJECTION SUBCUTANEOUS at 12:05
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
## 2024-05-22 ENCOUNTER — OFFICE VISIT (OUTPATIENT)
Dept: FAMILY MEDICINE | Facility: CLINIC | Age: 69
End: 2024-05-22
Payer: MEDICARE

## 2024-05-22 VITALS
DIASTOLIC BLOOD PRESSURE: 82 MMHG | WEIGHT: 219.25 LBS | SYSTOLIC BLOOD PRESSURE: 126 MMHG | OXYGEN SATURATION: 95 % | HEIGHT: 64 IN | HEART RATE: 77 BPM | TEMPERATURE: 98 F | BODY MASS INDEX: 37.43 KG/M2

## 2024-05-22 DIAGNOSIS — K21.9 GASTROESOPHAGEAL REFLUX DISEASE, UNSPECIFIED WHETHER ESOPHAGITIS PRESENT: ICD-10-CM

## 2024-05-22 DIAGNOSIS — K63.5 POLYP OF COLON, UNSPECIFIED PART OF COLON, UNSPECIFIED TYPE: ICD-10-CM

## 2024-05-22 DIAGNOSIS — M81.0 AGE-RELATED OSTEOPOROSIS WITHOUT CURRENT PATHOLOGICAL FRACTURE: ICD-10-CM

## 2024-05-22 DIAGNOSIS — I10 ESSENTIAL HYPERTENSION: Primary | Chronic | ICD-10-CM

## 2024-05-22 DIAGNOSIS — M79.7 FIBROMYALGIA: ICD-10-CM

## 2024-05-22 DIAGNOSIS — Z12.12 SCREENING FOR COLORECTAL CANCER: ICD-10-CM

## 2024-05-22 DIAGNOSIS — M17.12 PRIMARY OSTEOARTHRITIS OF LEFT KNEE: ICD-10-CM

## 2024-05-22 DIAGNOSIS — E89.3 STATUS POST TRANSSPHENOIDAL PITUITARY RESECTION: ICD-10-CM

## 2024-05-22 DIAGNOSIS — Z86.018 HISTORY OF PITUITARY ADENOMA: ICD-10-CM

## 2024-05-22 DIAGNOSIS — C7A.8 PRIMARY MALIGNANT NEUROENDOCRINE TUMOR OF SMALL INTESTINE: ICD-10-CM

## 2024-05-22 DIAGNOSIS — C78.7 SECONDARY MALIGNANT NEOPLASM OF LIVER: ICD-10-CM

## 2024-05-22 DIAGNOSIS — E66.09 CLASS 1 OBESITY DUE TO EXCESS CALORIES WITH SERIOUS COMORBIDITY AND BODY MASS INDEX (BMI) OF 33.0 TO 33.9 IN ADULT: ICD-10-CM

## 2024-05-22 DIAGNOSIS — K59.09 CHRONIC CONSTIPATION: ICD-10-CM

## 2024-05-22 DIAGNOSIS — E55.9 VITAMIN D DEFICIENCY: ICD-10-CM

## 2024-05-22 DIAGNOSIS — J45.909 CHRONIC ASTHMA WITHOUT COMPLICATION, UNSPECIFIED ASTHMA SEVERITY, UNSPECIFIED WHETHER PERSISTENT: ICD-10-CM

## 2024-05-22 DIAGNOSIS — Z12.11 SCREENING FOR COLORECTAL CANCER: ICD-10-CM

## 2024-05-22 DIAGNOSIS — F33.0 MILD RECURRENT MAJOR DEPRESSION: ICD-10-CM

## 2024-05-22 PROCEDURE — 99214 OFFICE O/P EST MOD 30 MIN: CPT | Mod: HCNC,S$GLB,, | Performed by: INTERNAL MEDICINE

## 2024-05-22 PROCEDURE — 4010F ACE/ARB THERAPY RXD/TAKEN: CPT | Mod: HCNC,CPTII,S$GLB, | Performed by: INTERNAL MEDICINE

## 2024-05-22 PROCEDURE — 99999 PR PBB SHADOW E&M-EST. PATIENT-LVL V: CPT | Mod: PBBFAC,HCNC,, | Performed by: INTERNAL MEDICINE

## 2024-05-22 PROCEDURE — 3079F DIAST BP 80-89 MM HG: CPT | Mod: HCNC,CPTII,S$GLB, | Performed by: INTERNAL MEDICINE

## 2024-05-22 PROCEDURE — 1125F AMNT PAIN NOTED PAIN PRSNT: CPT | Mod: HCNC,CPTII,S$GLB, | Performed by: INTERNAL MEDICINE

## 2024-05-22 PROCEDURE — 3074F SYST BP LT 130 MM HG: CPT | Mod: HCNC,CPTII,S$GLB, | Performed by: INTERNAL MEDICINE

## 2024-05-22 PROCEDURE — 3008F BODY MASS INDEX DOCD: CPT | Mod: HCNC,CPTII,S$GLB, | Performed by: INTERNAL MEDICINE

## 2024-05-22 PROCEDURE — 1160F RVW MEDS BY RX/DR IN RCRD: CPT | Mod: HCNC,CPTII,S$GLB, | Performed by: INTERNAL MEDICINE

## 2024-05-22 PROCEDURE — 1101F PT FALLS ASSESS-DOCD LE1/YR: CPT | Mod: HCNC,CPTII,S$GLB, | Performed by: INTERNAL MEDICINE

## 2024-05-22 PROCEDURE — 3288F FALL RISK ASSESSMENT DOCD: CPT | Mod: HCNC,CPTII,S$GLB, | Performed by: INTERNAL MEDICINE

## 2024-05-22 PROCEDURE — 1159F MED LIST DOCD IN RCRD: CPT | Mod: HCNC,CPTII,S$GLB, | Performed by: INTERNAL MEDICINE

## 2024-05-22 RX ORDER — AMLODIPINE BESYLATE 10 MG/1
TABLET ORAL
Qty: 90 TABLET | Refills: 3 | Status: SHIPPED | OUTPATIENT
Start: 2024-05-22

## 2024-05-22 RX ORDER — VALSARTAN 320 MG/1
320 TABLET ORAL DAILY
Qty: 90 TABLET | Refills: 3 | Status: SHIPPED | OUTPATIENT
Start: 2024-05-22

## 2024-05-22 RX ORDER — SPIRONOLACTONE 50 MG/1
50 TABLET, FILM COATED ORAL DAILY
Qty: 90 TABLET | Refills: 3 | Status: SHIPPED | OUTPATIENT
Start: 2024-05-22

## 2024-05-22 RX ORDER — DULOXETIN HYDROCHLORIDE 60 MG/1
CAPSULE, DELAYED RELEASE ORAL
Qty: 90 CAPSULE | Refills: 3 | Status: SHIPPED | OUTPATIENT
Start: 2024-05-22 | End: 2024-06-19

## 2024-05-22 RX ORDER — METOPROLOL SUCCINATE 100 MG/1
100 TABLET, EXTENDED RELEASE ORAL DAILY
Qty: 90 TABLET | Refills: 3 | Status: SHIPPED | OUTPATIENT
Start: 2024-05-22

## 2024-05-22 NOTE — PROGRESS NOTES
This note was created by combination of typed  and M-Modal dictation.  Transcription errors may be present.  If there are any questions, please contact me.    Assessment and Plan:   Assessment and Plan    Essential hypertension 1/2021 TTE normal, stress test negative  -blood pressure stable.  Refilled amlodipine, metoprolol, valsartan, spironolactone.  Followed by digital monitoring but home readings are high.  Never verified BP cuff.  She has a large circumference bicep and I wonder if her cuff is undersized.  Return for nurse visit for BP check.  If BP cuff accurate may need to increase the spironolactone  -     amLODIPine (NORVASC) 10 MG tablet; TAKE 1 TABLET ONE TIME DAILY (DOSE INCREASE)  Dispense: 90 tablet; Refill: 3  -     metoprolol succinate (TOPROL-XL) 100 MG 24 hr tablet; Take 1 tablet (100 mg total) by mouth once daily.  Dispense: 90 tablet; Refill: 3  -     spironolactone (ALDACTONE) 50 MG tablet; Take 1 tablet (50 mg total) by mouth once daily.  Dispense: 90 tablet; Refill: 3  -     valsartan (DIOVAN) 320 MG tablet; Take 1 tablet (320 mg total) by mouth once daily.  Dispense: 90 tablet; Refill: 3    Mild recurrent major depression  -feels like Cymbalta effective, no changes  -     DULoxetine (CYMBALTA) 60 MG capsule; TAKE 1 CAPSULE ONE TIME DAILY (INCREASED DOSE)  Dispense: 90 capsule; Refill: 3    Fibromyalgia; diffuse arm, back pain, thigh pain; 2017 B12, TSH WNL; reynaldo without efficacy; Cymbalta.  -does find Cymbalta helpful in addition to gabapentin.  No changes  -     DULoxetine (CYMBALTA) 60 MG capsule; TAKE 1 CAPSULE ONE TIME DAILY (INCREASED DOSE)  Dispense: 90 capsule; Refill: 3    Polyp of colon, unspecified part of colon, unspecified type  Screening for colorectal cancer  -due for repeat colonoscopy.  Ordered  -     Ambulatory referral/consult to Endo Procedure ; Future; Expected date: 05/23/2024    Age-related osteoporosis without current pathological fracture; 6/2022  alendronate  -on Fosamax.  Has not been taking on an empty stomach, encouraged to take on empty stomach    Class 1 obesity due to excess calories with serious comorbidity and body mass index (BMI) of 33.0 to 33.9 in adult  -check A1c with upcoming labs.  Weakness for sweets.  Drinking more water less soda.  Aware of the need to monitor dietary choices.    Gastroesophageal reflux disease, unspecified whether esophagitis present  -stable on PPI.    History of pituitary adenoma 9/2019 MRI no recurrence  Status post transsphenoidal pituitary resection  -stable not on medications.    Primary osteoarthritis of left knee  -stable.    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  Monthly infusions of lanreotide    Vitamin D deficiency  -last level May 2023, mildly low, monitor    Chronic asthma without complication, unspecified asthma severity, unspecified whether persistent  -stable, quiescent    Chronic constipation  -stable on Linzess, prescription refilled  -     linaCLOtide (LINZESS) 145 mcg Cap capsule; Take 1 capsule (145 mcg total) by mouth once daily.  Dispense: 90 capsule; Refill: 3    Medications Discontinued During This Encounter   Medication Reason    FLUAD QUAD 2023-24,65Y UP,,PF, 60 mcg (15 mcg x 4)/0.5 mL Syrg     traZODone (DESYREL) 50 MG tablet Alternate therapy    SPIKEVAX 3932-9497,12Y UP,,PF, 50 mcg/0.5 mL injection     metoprolol succinate (TOPROL-XL) 100 MG 24 hr tablet Reorder    amLODIPine (NORVASC) 10 MG tablet Reorder    DULoxetine (CYMBALTA) 60 MG capsule Reorder    linaCLOtide (LINZESS) 145 mcg Cap capsule Reorder    valsartan (DIOVAN) 320 MG tablet Reorder    spironolactone (ALDACTONE) 50 MG tablet Reorder       meds sent this encounter:  Medications Ordered This Encounter   Medications    amLODIPine (NORVASC) 10 MG tablet     Sig: TAKE 1 TABLET ONE TIME DAILY (DOSE INCREASE)     Dispense:  90 tablet     Refill:  3      Pharmacy update refills, keep on file, not requesting Rx to be filled today.    DULoxetine (CYMBALTA) 60 MG capsule     Sig: TAKE 1 CAPSULE ONE TIME DAILY (INCREASED DOSE)     Dispense:  90 capsule     Refill:  3     Pharmacy update refills, keep on file, not requesting Rx to be filled today.    linaCLOtide (LINZESS) 145 mcg Cap capsule     Sig: Take 1 capsule (145 mcg total) by mouth once daily.     Dispense:  90 capsule     Refill:  3     Pharmacy update refills, keep on file, not requesting Rx to be filled today.    metoprolol succinate (TOPROL-XL) 100 MG 24 hr tablet     Sig: Take 1 tablet (100 mg total) by mouth once daily.     Dispense:  90 tablet     Refill:  3     Pharmacy update refills, keep on file, not requesting Rx to be filled today.    spironolactone (ALDACTONE) 50 MG tablet     Sig: Take 1 tablet (50 mg total) by mouth once daily.     Dispense:  90 tablet     Refill:  3     Pharmacy update refills, keep on file, not requesting Rx to be filled today.    valsartan (DIOVAN) 320 MG tablet     Sig: Take 1 tablet (320 mg total) by mouth once daily.     Dispense:  90 tablet     Refill:  3     Pharmacy update refills, keep on file, not requesting Rx to be filled today.         Follow Up:  Plan for follow-up 6 months  Future Appointments   Date Time Provider Department Center   6/5/2024  8:20 AM NURSE, [facility] FAM MED/INT MED [facility] FAM MED [place]   6/18/2024  7:40 AM LAB, [facility] CANCER BLDG [facility]   6/18/2024  8:40 AM Aaron Keita MD [facility] HEMONC2 [place] [facility]   6/18/2024  9:00 AM INJECTION, [facility]   7/12/2024  9:10 [facility]   7/12/2024 10:00 AM [facility] PET CT LIMIT 500 LBS [facility] PET CT [facility]   7/16/2024 10:00 AM Aaron Keita MD [facility] HEMONC2 [place] [facility]   7/16/2024 10:30 AM INJECTION, [facility] INF[facility]   7/22/2024  8:40 AM PRE-ADMIT NURSE 2, [facility] ENDO4 [facility]    7/24/2024 10:30 AM Flores Ba MD [facility] [place] [facility]   8/13/2024  8:50 AM LAB, [facility] CANCER BLDG [facility]   8/13/2024 10:00 AM Chloé Quevedo PA-C [facility] HEMONC2 [place] [facility]   8/13/2024 10:45 AM INJECTION, [facility]ION [facility]   8/19/2024  2:40 PM Kam Dawson MD [facility] MED [place]   11/26/2024  8:00 AM Cipriano Carrera MD [facility] MED [place]         Subjective:   Subjective   Chief Complaint   Patient presents with    Hypertension    Follow-up       HPI  Bell is a 68 y.o. female.    Social History     Tobacco Use    Smoking status: Never     Passive exposure: Never    Smokeless tobacco: Never   Substance Use Topics    Alcohol use: Yes     Alcohol/week: 1.0 standard drink of alcohol     Types: 1 Cans of beer per week     Comment: on ocassion      Social History     Occupational History     Employer: [employer]      Social History     Social History Narrative    Not on file       No LMP recorded. Patient has had a hysterectomy.    Last appointment with this clinic was 4/17/2024. Last visit with me 11/17/2023   To summarize last visit and events leading up to today:  Hypertension increased spironolactone  Diuretic induced hypokalemia on potassium.  Lasix every other day.  Depression, fibromyalgia, Cymbalta and gabapentin  Chronic constipation.  Chronic anemia, iron deficiency.  Colonoscopy 2018 with polyp though no path report given.  Last EGD on record was in 2021 showing gastric ulcer with no stigmata bleeding.  Biopsy was negative.  Linzess.  history of transsphenoidal pituitary resection stable  06/21/2022 DEXA L-spine -1.9, total hip-2.8, femoral neck-3.5.  FRAX score 2.7/8.3%.  Osteoporosis.  Started on alendronate  Vitamin-D deficient     Saw cardiology in follow-up 06/07/2023.  Sensation of chest discomfort and palpitations improved after stress levels decreased.  Monitor.     OV with me in May, incidental finding of abdominal  mass undergoing workup.  6/27/23 abd mass, EUS bx Well-differentiated neuroendocrine tumor, Grade1  Followed by Heme-Onc.  Lanreotide.  Had fallen, 11/01/2023 x-ray T-spine and L-spine no fracture    Last visit with me 11/2023  HTN stable  Fibromyalgia, MDD, cymbalta, tramadol prn. Lidoderm, flexeril.    Saw ortho 02/06/2024  In follow-up after knee replacement.    Left knee pain likely due to cutaneous nerve sensitivity in the scar.    Saw my colleague 04/17/2024 right shoulder pain.  X-ray showing mild DJD of the AC.  Referred to orthopedics  Subsequently seen by ortho 04/30/2024.  Rotator tendinitis.  PT.  Mobic.    Most recently seen by Heme-Onc 04/23/2024.  Dominant mesenteric mass not amenable to surgical resection due to proximity on SMA.  Stable continue lanreotide and repeat PET scan 07/2024    Has room to increase spironolactone    Today's visit:  Blood pressure high on intake.  Better on repeat.    She is followed by digital monitoring.  Last readings for by mouth 3 weeks ago, blood pressure was high.    She has never verified the accuracy of her cuff.    Reports she is taking her blood pressure medications as prescribed.  Denies obvious side effects of the medication.    Review of her medications, not taking trazodone.  She takes tramadol at night and that makes her drowsy so she takes that instead of trazodone for sleep.  Flexeril she takes as needed.    She was taking meloxicam for her shoulder per orthopedics.  She takes gabapentin but not as prescribed.  She takes 2 pills at night.  She is written for as high as 4 pills a day.  She reports she would tried to stop it before but does find it helpful so she takes 2 pills at night    She takes PPI daily.    Last A1c was normal, due for repeat.  She notes that she has a sweet tooth.  Drinks more water and less sweet drinks but does have weakness for pastries.  She is does feel like she needs to work on that.    She does not add salt to her food.  Cooks  regular meals.    P.r.n. use of Lasix.    Patient Care Team:  Cipriano Carrera MD as PCP - General (Internal Medicine)  Gonsalo Ivy MD as Consulting Physician (Gastroenterology)  Alma Delia Morales as Digital Medicine Health   Yvonne Mota PharmD as Hypertension Digital Medicine Clinician  Cipriano Carrera MD as Hypertension Digital Medicine Responsible Provider (Internal Medicine)  Vilma James MA as Care Coordinator  Jaida Raygoza RN as Oncology Navigator  Jud Elder RN as Oncology Navigator  Medicare, [employer] Managed as Hypertension Digital Medicine Contract      Patient Active Problem List    Diagnosis Date Noted    Primary malignant neuroendocrine tumor of small intestine 07/18/2023    Secondary malignant neoplasm of liver 07/18/2023    Chronic right-sided low back pain without sciatica 05/16/2023 04/27/2023 MRI L-spine impression:  T11-T12 posterior central 2.1 mm subligamentous disc herniation with caudal migration.  No canal stenosis.  T12-L1 posterior midline 1.2 mm disc herniation or central canal stenosis.    L2-L3 small facet effusions are present  L3-L4 mild facet hypertrophy with small effusions, there is minimal left foramen  L4-L5 diffuse disc bulge with facet hypertrophy, there is mild bilateral foraminal narrowing.    L5-S1 facet hypertrophy, there is mild moderate left greater than right foraminal narrowing      Vitamin D deficiency 10/26/2022    Age-related osteoporosis without current pathological fracture; 6/2022 alendronate 06/22/2022 06/21/2022 DEXA L-spine -1.9, total hip-2.8, femoral neck-3.5.  FRAX score 2.7/8.3%.  Osteoporosis.      Gait abnormality 02/03/2022    Decreased strength of lower extremity 02/03/2022    Decreased range of motion of left knee 02/03/2022    History of left knee replacement 01/10/2022    Chest pain, atypical 01/14/2021    Palpitations 01/14/2021    LANGE (dyspnea on exertion) 01/14/2021    GERD (gastroesophageal reflux disease)  09/08/2020    Peripheral edema 08/28/2020    Chronic constipation 08/28/2020    History of pituitary adenoma 9/2019 MRI no recurrence 09/24/2019     s/p transphenoidal resection ~1989 9/23/2019 MRI brain:   1. Fluid level in the sphenoid sinus, likely from sphenoid sinusitis.  2. No significant abnormalities of the sella turcica to suggest any recurrent neoplasms.  3. Periventricular white matter changes likely from chronic microvascular ischemic disease.  4. No acute intracranial processes.  9/23/2019 carotid US normal      Status post transsphenoidal pituitary resection for tumor, Dr Cardenas, about 1989 09/09/2019    History of benign carcinoid tumor rectum s/p resection per GI note 01/30/2019    Colon polyp 8/2018 no path report included repeat 5 years 10/07/2018    Fibromyalgia; diffuse arm, back pain, thigh pain; 2017 B12, TSH WNL; reynaldo without efficacy; Cymbalta. 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 - Small hiatal hernia. - Non-bleeding gastric ulcers with no stigmata of bleeding. Biopsied. This has been present for more than 2 years according to previous EGD report 7/2018. - Normal examined duodenum. bx negative; neg for H pylori. Felt to be NSAID induced  06/30/2021 EGD normal esophagus.  Gastric ulcer with no stigmata of bleeding.  Improved from previous but not gone.  bx reactive/chemical gastropathy; H pylori neg.  Normal duodenum.      Menopausal hot flushes 02/14/2013    Mild recurrent major depression 02/14/2013    Primary osteoarthritis of left knee 07/30/2012 8/24/2017 MRI L knee:  Tricompartmental degenerative changes. Lateral and medial meniscal degenerative tearing/changes. Tricompartmental cartilage loss including areas of full-thickness full-thickness loss. Abnormal appearance of the ACL and PCL suggestive of chronic injury.      Essential hypertension 1/2021 TTE normal, stress test negative 07/30/2012 6/2017 nuclear stress test negative. No change 7/2015 6/2017 TTE normal LVEF 55-60  01/26/2021 nuclear medicine stress test normal perfusion scan no evidence of ischemia.  01/26/2021 TTE LV normal size with concentric LVH and normal systolic function LVEF 60%.  Normal diastolic function.  Normal RV size and systolic function.  CVP 3.  PA pressure 5.         PAST MEDICAL PROBLEMS, PAST SURGICAL HISTORY: please see relevant portions of the electronic medical record    ALLERGIES AND MEDICATIONS: updated and reviewed.  Medication List with Changes/Refills   Current Medications    ALBUTEROL (PROVENTIL/VENTOLIN HFA) 90 MCG/ACTUATION INHALER    Inhale 2 puffs into the lungs every 6 (six) hours as needed for Wheezing. Rescue    ALENDRONATE (FOSAMAX) 70 MG TABLET    TAKE 1 TABLET EVERY 7 DAYS    AMLODIPINE (NORVASC) 10 MG TABLET    TAKE 1 TABLET ONE TIME DAILY (DOSE INCREASE)    CETIRIZINE (ZYRTEC) 10 MG TABLET    Take 1 tablet (10 mg total) by mouth once daily.    CYCLOBENZAPRINE (FLEXERIL) 10 MG TABLET    TAKE 1 TABLET EVERY EVENING    DICYCLOMINE (BENTYL) 20 MG TABLET    Take 1 tablet (20 mg total) by mouth 3 (three) times daily as needed (abdominal pain).    DULOXETINE (CYMBALTA) 60 MG CAPSULE    TAKE 1 CAPSULE ONE TIME DAILY (INCREASED DOSE)    FLUAD QUAD 2023-24,65Y UP,,PF, 60 MCG (15 MCG X 4)/0.5 ML SYRG        FUROSEMIDE (LASIX) 20 MG TABLET    TAKE 1 TABLET ONE TIME DAILY FOR LEG SWELLING AS NEEDED    GABAPENTIN (NEURONTIN) 100 MG CAPSULE    3 tablets nighttime 1 tablet in AM; increased dose    LATANOPROST 0.005 % OPHTHALMIC SOLUTION    Place 1 drop into the right eye every evening.     "LIDOCAINE (LIDODERM) 5 %    APPLY 1 PATCH ON THE SKIN ONE TIME DAILY. REMOVE AND DISCARD PATCH WITHIN 12 HOURS OR AS DIRECTED BY MD    LINACLOTIDE (LINZESS) 145 MCG CAP CAPSULE    Take 1 capsule (145 mcg total) by mouth once daily.    MELOXICAM (MOBIC) 7.5 MG TABLET    Take 1 tablet (7.5 mg total) by mouth once daily. Take once a day for two weeks then as needed. Take with food    METOPROLOL SUCCINATE (TOPROL-XL) 100 MG 24 HR TABLET    Take 1 tablet (100 mg total) by mouth once daily.    NITROGLYCERIN (NITROSTAT) 0.4 MG SL TABLET    Place 1 tablet (0.4 mg total) under the tongue every 5 (five) minutes as needed for Chest pain.    PANTOPRAZOLE (PROTONIX) 40 MG TABLET    Take 1 tablet (40 mg total) by mouth 2 (two) times daily before meals.    SPIKEVAX 4361-0952,12Y UP,,PF, 50 MCG/0.5 ML INJECTION        SPIRONOLACTONE (ALDACTONE) 50 MG TABLET    TAKE 1 TABLET ONE TIME DAILY    TRAMADOL (ULTRAM) 50 MG TABLET    Take 1 tablet (50 mg total) by mouth every 8 (eight) hours as needed.    TRAZODONE (DESYREL) 50 MG TABLET    Take 1 tablet (50 mg total) by mouth every evening.    VALSARTAN (DIOVAN) 320 MG TABLET    Take 1 tablet (320 mg total) by mouth once daily.    VITAMIN D (VITAMIN D3) 1000 UNITS TAB    Take 1,000 Units by mouth once daily.         Objective:   Objective   Physical Exam   Vitals:    05/22/24 1421 05/22/24 1452   BP: (!) 136/100 126/82   BP Location: Right arm Left arm   Patient Position: Sitting Sitting   BP Method: Large (Manual) Large (Manual)   Pulse: 77    Temp: 98.3 °F (36.8 °C)    TempSrc: Oral    SpO2: 95%    Weight: 99.5 kg (219 lb 4 oz)    Height: 5' 4" (1.626 m)     Body mass index is 37.63 kg/m².            Physical Exam  Constitutional:       General: She is not in acute distress.     Appearance: She is well-developed.   Eyes:      Extraocular Movements: Extraocular movements intact.   Cardiovascular:      Rate and Rhythm: Normal rate and regular rhythm.      Heart sounds: Normal heart sounds. " No murmur heard.  Pulmonary:      Effort: Pulmonary effort is normal.      Breath sounds: Normal breath sounds.   Musculoskeletal:         General: Normal range of motion.      Right lower leg: No edema.      Left lower leg: No edema.   Skin:     General: Skin is warm and dry.   Neurological:      Mental Status: She is alert and oriented to person, place, and time.      Coordination: Coordination normal.   Psychiatric:         Behavior: Behavior normal.         Thought Content: Thought content normal.

## 2024-05-27 DIAGNOSIS — K21.9 GASTROESOPHAGEAL REFLUX DISEASE, UNSPECIFIED WHETHER ESOPHAGITIS PRESENT: Primary | ICD-10-CM

## 2024-05-28 RX ORDER — PANTOPRAZOLE SODIUM 40 MG/1
40 TABLET, DELAYED RELEASE ORAL
Qty: 60 TABLET | Refills: 5 | Status: SHIPPED | OUTPATIENT
Start: 2024-05-28 | End: 2024-11-24

## 2024-05-30 RX ORDER — MELOXICAM 7.5 MG/1
TABLET ORAL
Qty: 30 TABLET | Refills: 0 | Status: SHIPPED | OUTPATIENT
Start: 2024-05-30

## 2024-06-18 ENCOUNTER — OFFICE VISIT (OUTPATIENT)
Dept: HEMATOLOGY/ONCOLOGY | Facility: CLINIC | Age: 69
End: 2024-06-18
Payer: MEDICARE

## 2024-06-18 ENCOUNTER — TELEPHONE (OUTPATIENT)
Dept: HEMATOLOGY/ONCOLOGY | Facility: CLINIC | Age: 69
End: 2024-06-18

## 2024-06-18 ENCOUNTER — INFUSION (OUTPATIENT)
Dept: INFUSION THERAPY | Facility: HOSPITAL | Age: 69
End: 2024-06-18
Payer: MEDICARE

## 2024-06-18 VITALS
HEART RATE: 86 BPM | WEIGHT: 216.63 LBS | HEIGHT: 64 IN | SYSTOLIC BLOOD PRESSURE: 131 MMHG | RESPIRATION RATE: 16 BRPM | OXYGEN SATURATION: 95 % | TEMPERATURE: 98 F | DIASTOLIC BLOOD PRESSURE: 72 MMHG | BODY MASS INDEX: 36.98 KG/M2

## 2024-06-18 DIAGNOSIS — C78.7 SECONDARY MALIGNANT NEOPLASM OF LIVER: ICD-10-CM

## 2024-06-18 DIAGNOSIS — I10 ESSENTIAL HYPERTENSION: ICD-10-CM

## 2024-06-18 DIAGNOSIS — C77.8 SECONDARY MALIGNANT NEOPLASM OF LYMPH NODES OF MULTIPLE SITES: ICD-10-CM

## 2024-06-18 DIAGNOSIS — R30.0 DYSURIA: ICD-10-CM

## 2024-06-18 DIAGNOSIS — C7A.8 PRIMARY MALIGNANT NEUROENDOCRINE TUMOR OF SMALL INTESTINE: Primary | ICD-10-CM

## 2024-06-18 DIAGNOSIS — Z79.899 IMMUNODEFICIENCY DUE TO DRUGS: ICD-10-CM

## 2024-06-18 DIAGNOSIS — E66.01 SEVERE OBESITY WITH BODY MASS INDEX (BMI) OF 35.0 TO 39.9 WITH SERIOUS COMORBIDITY: ICD-10-CM

## 2024-06-18 DIAGNOSIS — D84.821 IMMUNODEFICIENCY DUE TO DRUGS: ICD-10-CM

## 2024-06-18 DIAGNOSIS — D84.81 IMMUNODEFICIENCY SECONDARY TO NEOPLASM: ICD-10-CM

## 2024-06-18 DIAGNOSIS — D49.9 IMMUNODEFICIENCY SECONDARY TO NEOPLASM: ICD-10-CM

## 2024-06-18 PROCEDURE — 1126F AMNT PAIN NOTED NONE PRSNT: CPT | Mod: HCNC,CPTII,S$GLB, | Performed by: INTERNAL MEDICINE

## 2024-06-18 PROCEDURE — 99214 OFFICE O/P EST MOD 30 MIN: CPT | Mod: HCNC,S$GLB,, | Performed by: INTERNAL MEDICINE

## 2024-06-18 PROCEDURE — 1101F PT FALLS ASSESS-DOCD LE1/YR: CPT | Mod: HCNC,CPTII,S$GLB, | Performed by: INTERNAL MEDICINE

## 2024-06-18 PROCEDURE — 96372 THER/PROPH/DIAG INJ SC/IM: CPT | Mod: HCNC

## 2024-06-18 PROCEDURE — 99999 PR PBB SHADOW E&M-EST. PATIENT-LVL V: CPT | Mod: PBBFAC,HCNC,, | Performed by: INTERNAL MEDICINE

## 2024-06-18 PROCEDURE — 3044F HG A1C LEVEL LT 7.0%: CPT | Mod: HCNC,CPTII,S$GLB, | Performed by: INTERNAL MEDICINE

## 2024-06-18 PROCEDURE — 4010F ACE/ARB THERAPY RXD/TAKEN: CPT | Mod: HCNC,CPTII,S$GLB, | Performed by: INTERNAL MEDICINE

## 2024-06-18 PROCEDURE — 63600175 PHARM REV CODE 636 W HCPCS: Mod: JZ,JG,HCNC | Performed by: INTERNAL MEDICINE

## 2024-06-18 PROCEDURE — 1159F MED LIST DOCD IN RCRD: CPT | Mod: HCNC,CPTII,S$GLB, | Performed by: INTERNAL MEDICINE

## 2024-06-18 PROCEDURE — 1160F RVW MEDS BY RX/DR IN RCRD: CPT | Mod: HCNC,CPTII,S$GLB, | Performed by: INTERNAL MEDICINE

## 2024-06-18 PROCEDURE — G2211 COMPLEX E/M VISIT ADD ON: HCPCS | Mod: HCNC,S$GLB,, | Performed by: INTERNAL MEDICINE

## 2024-06-18 PROCEDURE — 3008F BODY MASS INDEX DOCD: CPT | Mod: HCNC,CPTII,S$GLB, | Performed by: INTERNAL MEDICINE

## 2024-06-18 PROCEDURE — 3075F SYST BP GE 130 - 139MM HG: CPT | Mod: HCNC,CPTII,S$GLB, | Performed by: INTERNAL MEDICINE

## 2024-06-18 PROCEDURE — 3288F FALL RISK ASSESSMENT DOCD: CPT | Mod: HCNC,CPTII,S$GLB, | Performed by: INTERNAL MEDICINE

## 2024-06-18 PROCEDURE — 3078F DIAST BP <80 MM HG: CPT | Mod: HCNC,CPTII,S$GLB, | Performed by: INTERNAL MEDICINE

## 2024-06-18 RX ORDER — LANREOTIDE ACETATE 120 MG/.5ML
120 INJECTION SUBCUTANEOUS ONCE
OUTPATIENT
Start: 2024-06-18 | End: 2024-06-18

## 2024-06-18 RX ORDER — LANREOTIDE ACETATE 120 MG/.5ML
120 INJECTION SUBCUTANEOUS ONCE
Status: COMPLETED | OUTPATIENT
Start: 2024-06-18 | End: 2024-06-18

## 2024-06-18 RX ADMIN — LANREOTIDE ACETATE 120 MG: 120 INJECTION SUBCUTANEOUS at 09:06
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
## 2024-06-18 NOTE — TELEPHONE ENCOUNTER
Asked by MD to call patient and inform her she does not have a urinary infection.  Left voicemail.

## 2024-06-19 DIAGNOSIS — F33.0 MILD RECURRENT MAJOR DEPRESSION: ICD-10-CM

## 2024-06-19 DIAGNOSIS — M79.7 FIBROMYALGIA: ICD-10-CM

## 2024-06-19 DIAGNOSIS — K59.09 CHRONIC CONSTIPATION: ICD-10-CM

## 2024-06-19 RX ORDER — DULOXETIN HYDROCHLORIDE 60 MG/1
CAPSULE, DELAYED RELEASE ORAL
Qty: 90 CAPSULE | Refills: 3 | Status: SHIPPED | OUTPATIENT
Start: 2024-06-19

## 2024-06-19 RX ORDER — LINACLOTIDE 145 UG/1
145 CAPSULE, GELATIN COATED ORAL
Qty: 90 CAPSULE | Refills: 3 | Status: SHIPPED | OUTPATIENT
Start: 2024-06-19

## 2024-06-19 NOTE — TELEPHONE ENCOUNTER
Care Due:                  Date            Visit Type   Department     Provider  --------------------------------------------------------------------------------                                [facility]                              PRIMARY      MED/ INTERNAL  Last Visit: 05-      CARE ([facility])   MED/ PEDS      Cipriano Carrera  Next Visit: None Scheduled  None         None Found                                                            Last  Test          Frequency    Reason                     Performed    Due Date  --------------------------------------------------------------------------------    Mg Level....  12 months..  alendronate..............  10-   10-    Phosphate...  12 months..  alendronate..............  Not Found    Overdue    Vitamin D...  12 months..  alendronate..............  05-   05-    [facility] Embedded Care Due Messages. Reference number: [id number].   6/19/2024 2:18:22 AM CDT

## 2024-06-19 NOTE — TELEPHONE ENCOUNTER
Refill Routing Note   Medication(s) are not appropriate for processing by [facility] for the following reason(s):        Outside of protocol  Drug-disease interaction    [facility] action(s):  Defer  Route     Requires labs : Yes      Medication Therapy Plan: DULoxetine and Secondary malignant neoplasm of liver    Pharmacist review requested: Yes     Appointments  past 12m or future 3m with PCP    Date Provider   Last Visit   5/22/2024 Cipriano Carrera MD   Next Visit   11/26/2024 Cipriano Carrera MD   ED visits in past 90 days: 0        Note composed:6:23 AM 06/19/2024

## 2024-06-19 NOTE — TELEPHONE ENCOUNTER
Refill Routing Note   Medication(s) are not appropriate for processing by [facility] for the following reason(s):        Outside of protocol    [facility] action(s):  Route  Approve     Requires labs : Yes           Pharmacist review requested: Yes   Extended chart review required: Yes     Appointments  past 12m or future 3m with PCP    Date Provider   Last Visit   5/22/2024 Cipriano Carrera MD   Next Visit   11/26/2024 Cipriano Carrera MD   ED visits in past 90 days: 0        Note composed:9:40 AM 06/19/2024

## 2024-06-21 DIAGNOSIS — R73.09 ABNORMAL GLUCOSE: Primary | ICD-10-CM

## 2024-07-03 DIAGNOSIS — Z78.0 MENOPAUSE: ICD-10-CM

## 2024-07-13 DIAGNOSIS — M54.50 CHRONIC MIDLINE LOW BACK PAIN WITHOUT SCIATICA: ICD-10-CM

## 2024-07-13 DIAGNOSIS — G89.29 CHRONIC MIDLINE LOW BACK PAIN WITHOUT SCIATICA: ICD-10-CM

## 2024-07-13 NOTE — TELEPHONE ENCOUNTER
No care due was identified.  [facility] Embedded Care Due Messages. Reference number: [id number].   7/13/2024 2:58:13 AM CDT

## 2024-07-14 RX ORDER — LIDOCAINE 50 MG/G
PATCH TOPICAL
Qty: 90 PATCH | Refills: 3 | Status: SHIPPED | OUTPATIENT
Start: 2024-07-14

## 2024-07-16 ENCOUNTER — INFUSION (OUTPATIENT)
Dept: INFUSION THERAPY | Facility: HOSPITAL | Age: 69
End: 2024-07-16
Payer: MEDICARE

## 2024-07-16 ENCOUNTER — OFFICE VISIT (OUTPATIENT)
Dept: HEMATOLOGY/ONCOLOGY | Facility: CLINIC | Age: 69
End: 2024-07-16
Payer: MEDICARE

## 2024-07-16 ENCOUNTER — TELEPHONE (OUTPATIENT)
Dept: HEMATOLOGY/ONCOLOGY | Facility: CLINIC | Age: 69
End: 2024-07-16

## 2024-07-16 VITALS
SYSTOLIC BLOOD PRESSURE: 146 MMHG | RESPIRATION RATE: 18 BRPM | DIASTOLIC BLOOD PRESSURE: 85 MMHG | HEIGHT: 64 IN | BODY MASS INDEX: 36.98 KG/M2 | HEART RATE: 74 BPM | OXYGEN SATURATION: 96 % | WEIGHT: 216.63 LBS

## 2024-07-16 DIAGNOSIS — C78.7 SECONDARY MALIGNANT NEOPLASM OF LIVER: ICD-10-CM

## 2024-07-16 DIAGNOSIS — D49.9 IMMUNODEFICIENCY SECONDARY TO NEOPLASM: ICD-10-CM

## 2024-07-16 DIAGNOSIS — D84.81 IMMUNODEFICIENCY SECONDARY TO NEOPLASM: ICD-10-CM

## 2024-07-16 DIAGNOSIS — D84.821 IMMUNODEFICIENCY DUE TO DRUGS: ICD-10-CM

## 2024-07-16 DIAGNOSIS — Z79.899 IMMUNODEFICIENCY DUE TO DRUGS: ICD-10-CM

## 2024-07-16 DIAGNOSIS — C7A.8 PRIMARY MALIGNANT NEUROENDOCRINE TUMOR OF SMALL INTESTINE: Primary | ICD-10-CM

## 2024-07-16 DIAGNOSIS — I10 ESSENTIAL HYPERTENSION: ICD-10-CM

## 2024-07-16 DIAGNOSIS — C77.8 SECONDARY MALIGNANT NEOPLASM OF LYMPH NODES OF MULTIPLE SITES: ICD-10-CM

## 2024-07-16 PROCEDURE — 99999 PR PBB SHADOW E&M-EST. PATIENT-LVL IV: CPT | Mod: PBBFAC,HCNC,, | Performed by: INTERNAL MEDICINE

## 2024-07-16 PROCEDURE — 1159F MED LIST DOCD IN RCRD: CPT | Mod: HCNC,CPTII,S$GLB, | Performed by: INTERNAL MEDICINE

## 2024-07-16 PROCEDURE — G2211 COMPLEX E/M VISIT ADD ON: HCPCS | Mod: HCNC,S$GLB,, | Performed by: INTERNAL MEDICINE

## 2024-07-16 PROCEDURE — 99214 OFFICE O/P EST MOD 30 MIN: CPT | Mod: HCNC,S$GLB,, | Performed by: INTERNAL MEDICINE

## 2024-07-16 PROCEDURE — 1160F RVW MEDS BY RX/DR IN RCRD: CPT | Mod: HCNC,CPTII,S$GLB, | Performed by: INTERNAL MEDICINE

## 2024-07-16 PROCEDURE — 3079F DIAST BP 80-89 MM HG: CPT | Mod: HCNC,CPTII,S$GLB, | Performed by: INTERNAL MEDICINE

## 2024-07-16 PROCEDURE — 63600175 PHARM REV CODE 636 W HCPCS: Mod: JZ,JG,HCNC | Performed by: INTERNAL MEDICINE

## 2024-07-16 PROCEDURE — 4010F ACE/ARB THERAPY RXD/TAKEN: CPT | Mod: HCNC,CPTII,S$GLB, | Performed by: INTERNAL MEDICINE

## 2024-07-16 PROCEDURE — 3008F BODY MASS INDEX DOCD: CPT | Mod: HCNC,CPTII,S$GLB, | Performed by: INTERNAL MEDICINE

## 2024-07-16 PROCEDURE — 3288F FALL RISK ASSESSMENT DOCD: CPT | Mod: HCNC,CPTII,S$GLB, | Performed by: INTERNAL MEDICINE

## 2024-07-16 PROCEDURE — 1125F AMNT PAIN NOTED PAIN PRSNT: CPT | Mod: HCNC,CPTII,S$GLB, | Performed by: INTERNAL MEDICINE

## 2024-07-16 PROCEDURE — 1101F PT FALLS ASSESS-DOCD LE1/YR: CPT | Mod: HCNC,CPTII,S$GLB, | Performed by: INTERNAL MEDICINE

## 2024-07-16 PROCEDURE — 96372 THER/PROPH/DIAG INJ SC/IM: CPT | Mod: HCNC

## 2024-07-16 PROCEDURE — 3077F SYST BP >= 140 MM HG: CPT | Mod: HCNC,CPTII,S$GLB, | Performed by: INTERNAL MEDICINE

## 2024-07-16 PROCEDURE — 3044F HG A1C LEVEL LT 7.0%: CPT | Mod: HCNC,CPTII,S$GLB, | Performed by: INTERNAL MEDICINE

## 2024-07-16 RX ORDER — LANREOTIDE ACETATE 120 MG/.5ML
120 INJECTION SUBCUTANEOUS ONCE
Status: COMPLETED | OUTPATIENT
Start: 2024-07-16 | End: 2024-07-16

## 2024-07-16 RX ORDER — LANREOTIDE ACETATE 120 MG/.5ML
120 INJECTION SUBCUTANEOUS ONCE
OUTPATIENT
Start: 2024-07-16 | End: 2024-07-16

## 2024-07-16 RX ADMIN — LANREOTIDE ACETATE 120 MG: 120 INJECTION SUBCUTANEOUS at 11:07

## 2024-07-16 NOTE — NURSING
Administered Lanreotide in right outer gluteal area. No questions or concerns. Pt ambulated out of unit unassisted.
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
## 2024-07-22 ENCOUNTER — CLINICAL SUPPORT (OUTPATIENT)
Dept: ENDOSCOPY | Facility: HOSPITAL | Age: 69
End: 2024-07-22
Attending: INTERNAL MEDICINE
Payer: MEDICARE

## 2024-07-22 ENCOUNTER — TELEPHONE (OUTPATIENT)
Dept: ENDOSCOPY | Facility: HOSPITAL | Age: 69
End: 2024-07-22

## 2024-07-22 DIAGNOSIS — Z12.11 SCREENING FOR COLORECTAL CANCER: ICD-10-CM

## 2024-07-22 DIAGNOSIS — K63.5 POLYP OF COLON, UNSPECIFIED PART OF COLON, UNSPECIFIED TYPE: ICD-10-CM

## 2024-07-22 DIAGNOSIS — Z12.12 SCREENING FOR COLORECTAL CANCER: ICD-10-CM

## 2024-07-22 RX ORDER — SODIUM, POTASSIUM,MAG SULFATES 17.5-3.13G
1 SOLUTION, RECONSTITUTED, ORAL ORAL DAILY
Qty: 1 KIT | Refills: 0 | Status: SHIPPED | OUTPATIENT
Start: 2024-07-22 | End: 2024-07-24

## 2024-07-22 NOTE — TELEPHONE ENCOUNTER
Spoke to patient to schedule procedure(s) Colonoscopy       Physician to perform procedure(s) Dr. ESEQUIEL Denise  Date of Procedure (s) 9/6/24  Arrival Time 8:30 AM  Time of Procedure(s) 9:30 AM   Location of Procedure(s) [address]   Type of Rx Prep sent to patient: Suprep  Instructions provided to patient via [facility]    Patient was informed on the following information and verbalized understanding. Screening questionnaire reviewed with patient and complete. If procedure requires anesthesia, a responsible adult needs to be present to accompany the patient home, patient cannot drive after receiving anesthesia. Appointment details are tentative, especially check-in time. Patient will receive a prep-op call 7 days prior to confirm check-in time for procedure. If applicable the patient should contact their pharmacy to verify Rx for procedure prep is ready for pick-up. Patient was advised to call the scheduling department at [phone] if pharmacy states no Rx is available. Patient was advised to call the endoscopy scheduling department if any questions or concerns arise.      [facility] Scheduling Department

## 2024-07-24 ENCOUNTER — OFFICE VISIT (OUTPATIENT)
Dept: ORTHOPEDICS | Facility: CLINIC | Age: 69
End: 2024-07-24
Payer: MEDICARE

## 2024-07-24 DIAGNOSIS — M25.511 CHRONIC RIGHT SHOULDER PAIN: Primary | ICD-10-CM

## 2024-07-24 DIAGNOSIS — G89.29 CHRONIC RIGHT SHOULDER PAIN: Primary | ICD-10-CM

## 2024-07-24 PROCEDURE — 1160F RVW MEDS BY RX/DR IN RCRD: CPT | Mod: HCNC,CPTII,S$GLB, | Performed by: ORTHOPAEDIC SURGERY

## 2024-07-24 PROCEDURE — 3044F HG A1C LEVEL LT 7.0%: CPT | Mod: HCNC,CPTII,S$GLB, | Performed by: ORTHOPAEDIC SURGERY

## 2024-07-24 PROCEDURE — 20610 DRAIN/INJ JOINT/BURSA W/O US: CPT | Mod: HCNC,RT,S$GLB, | Performed by: ORTHOPAEDIC SURGERY

## 2024-07-24 PROCEDURE — 4010F ACE/ARB THERAPY RXD/TAKEN: CPT | Mod: HCNC,CPTII,S$GLB, | Performed by: ORTHOPAEDIC SURGERY

## 2024-07-24 PROCEDURE — 99213 OFFICE O/P EST LOW 20 MIN: CPT | Mod: 25,HCNC,S$GLB, | Performed by: ORTHOPAEDIC SURGERY

## 2024-07-24 PROCEDURE — 1125F AMNT PAIN NOTED PAIN PRSNT: CPT | Mod: HCNC,CPTII,S$GLB, | Performed by: ORTHOPAEDIC SURGERY

## 2024-07-24 PROCEDURE — 1159F MED LIST DOCD IN RCRD: CPT | Mod: HCNC,CPTII,S$GLB, | Performed by: ORTHOPAEDIC SURGERY

## 2024-07-24 PROCEDURE — 99999 PR PBB SHADOW E&M-EST. PATIENT-LVL III: CPT | Mod: PBBFAC,HCNC,, | Performed by: ORTHOPAEDIC SURGERY

## 2024-07-24 RX ORDER — TRIAMCINOLONE ACETONIDE 40 MG/ML
40 INJECTION, SUSPENSION INTRA-ARTICULAR; INTRAMUSCULAR
Status: DISCONTINUED | OUTPATIENT
Start: 2024-07-24 | End: 2024-07-24 | Stop reason: HOSPADM

## 2024-07-24 RX ORDER — MELOXICAM 7.5 MG/1
7.5 TABLET ORAL DAILY PRN
Qty: 30 TABLET | Refills: 0 | Status: SHIPPED | OUTPATIENT
Start: 2024-07-24

## 2024-07-24 RX ADMIN — TRIAMCINOLONE ACETONIDE 40 MG: 40 INJECTION, SUSPENSION INTRA-ARTICULAR; INTRAMUSCULAR at 10:07

## 2024-07-24 NOTE — PROGRESS NOTES
Assessment: 69 y.o. female with right rotator cuff tendinitis    I explained my diagnostic impression and the reasoning behind it in detail, using layman's terms.     Plan:   - Injection of the right subacromial space  performed, please see procedure note for more details.  Prior to the injection risks and benefits of corticosteroid injection were discussed with the patient including pain, infection, bleeding, skin color changes, swelling, steroid flare. We discussed that over time injections can result in chondral damage, acceleration of arthritis formation, damage to tendons and damage to joints.  The patient consented for the procedure.  Post-injection instructions were given to the patient in writing.  - Needs to call and schedule PT- given number to call   - Return to clinic in 12 weeks. Return sooner if symptoms worsen or fail to improve.    All questions were answered in detail. The patient is in full agreement with the treatment plan and will proceed accordingly.    Chief Complaint   Patient presents with    Right Shoulder - Pain     Initial visit (4/30/24): Bell Lynn is a 69 y.o. female who presents today complaining of right shoulder pain  Duration of symptoms:  2 months   Trauma or new activity: no  Pain is constant  Aggravating factors: reaching overhead, abduction   Relieving factors: rest, pressure on lateral shoulder   Night pain is present and is disruptive to sleep  Radicular symptoms: no    Prior treatment:  muscle relaxer and tramadol without improvement in pain.   2 -3 days of relief with IM steroid injection. Has not tried PT or SKYLER  Pain does interfere with sleep and activities of daily living .    7/24/24  Here for follow up of R shoulder pain  Did not do PT   Reports 10/10 pain    This is the extent of the patient's complaints at this time.     Hand dominance: Right     Occupation: Retired        Review of patient's allergies indicates:   Allergen Reactions    Nsaids  (non-steroidal anti-inflammatory drug)      Gi bleed    Penicillins Itching and Swelling    Penicillin     Symbicort [budesonide-formoterol]      Recurrent oral ulcers         Physical Exam:   Vitals:    07/24/24 1027   PainSc: 10-Worst pain ever   PainLoc: Shoulder       General: Patient is alert, awake and oriented to time, place and person. Mood and affect are appropriate.  Patient does not appear to be in any distress, denies any constitutional symptoms and appears stated age.   HEENT: Pupils are equal and round, sclera are not injected. External examination of ears and nose reveals no abnormalities. Cranial nerves II-X are grossly intact  Neck: examination demonstrates painless  active range of motion. Spurling's sign is negative  Skin: no rashes, abrasions or open wounds on the affected extremity   Resp: No respiratory distress or audible wheezing   CV: 2+  pulses, all extremities warm and well perfused   Right Shoulder    Shoulder Range of Motion    Right     Left   (Active/Passive)       Forward Elevation     165/165            150/150  External rotation (arm at side)  30/30             30/30   Internal rotation behind the back  L5             L5     Range of motion is painful     Scapular winging no  Scapular dyskinesia no    Examination of the back shows no atrophy     Acromioclavicular joint is not tender  Crossbody test: negative    Neer's positive  Hawkin's positive    Attila's positive  Drop arm negative  Belly press negative      Cuff Strength     Right     Left   Supraspinatus        5/5    5/5  Infraspinatus     5/5    5/5  Subscapularis     5/5    5/5    Deltoid testing            5/5    5/5    Speeds negative  Yergasons negative    Elbow examination demonstrates no tenderness to palpation and has normal range of motion.     ltsi C5-T1  + epl, io, fds, fdp   2+ RP      Imaging:  no new     This note was created by combination of typed  and M-Modal dictation. Transcription and phonetic  errors may be present.  If there are any questions, please contact me.      Current Outpatient Medications:     albuterol (PROVENTIL/VENTOLIN HFA) 90 mcg/actuation inhaler, Inhale 2 puffs into the lungs every 6 (six) hours as needed for Wheezing. Rescue, Disp: 18 g, Rfl: 5    alendronate (FOSAMAX) 70 MG tablet, TAKE 1 TABLET EVERY 7 DAYS, Disp: 12 tablet, Rfl: 3    amLODIPine (NORVASC) 10 MG tablet, TAKE 1 TABLET ONE TIME DAILY (DOSE INCREASE), Disp: 90 tablet, Rfl: 3    cetirizine (ZYRTEC) 10 MG tablet, Take 1 tablet (10 mg total) by mouth once daily., Disp: 90 tablet, Rfl: 3    cyclobenzaprine (FLEXERIL) 10 MG tablet, TAKE 1 TABLET EVERY EVENING, Disp: 90 tablet, Rfl: 0    dicyclomine (BENTYL) 20 mg tablet, Take 1 tablet (20 mg total) by mouth 3 (three) times daily as needed (abdominal pain)., Disp: 60 tablet, Rfl: 2    DULoxetine (CYMBALTA) 60 MG capsule, TAKE 1 CAPSULE ONE TIME DAILY, Disp: 90 capsule, Rfl: 3    furosemide (LASIX) 20 MG tablet, TAKE 1 TABLET ONE TIME DAILY FOR LEG SWELLING AS NEEDED, Disp: 90 tablet, Rfl: 1    gabapentin (NEURONTIN) 100 MG capsule, 3 tablets nighttime 1 tablet in AM; increased dose, Disp: 270 capsule, Rfl: 3    LIDOcaine (LIDODERM) 5 %, APPLY 1 PATCH ON THE SKIN ONE TIME DAILY. REMOVE AND DISCARD PATCH WITHIN 12 HOURS OR AS DIRECTED BY MD, Disp: 90 patch, Rfl: 3    LINZESS 145 mcg Cap capsule, TAKE 1 CAPSULE ONE TIME DAILY, Disp: 90 capsule, Rfl: 3    meloxicam (MOBIC) 7.5 MG tablet, TAKE 1 TABLET BY MOUTH ONCE DAILY WITH FOOD FOR 2 WEEKS, THEN AS NEEDED, Disp: 30 tablet, Rfl: 0    metoprolol succinate (TOPROL-XL) 100 MG 24 hr tablet, Take 1 tablet (100 mg total) by mouth once daily., Disp: 90 tablet, Rfl: 3    nitroGLYCERIN (NITROSTAT) 0.4 MG SL tablet, Place 1 tablet (0.4 mg total) under the tongue every 5 (five) minutes as needed for Chest pain., Disp: 60 tablet, Rfl: 12    pantoprazole (PROTONIX) 40 MG tablet, Take 1 tablet (40 mg total) by mouth 2 (two) times daily before  meals., Disp: 60 tablet, Rfl: 5    sodium,potassium,mag sulfates (SUPREP BOWEL PREP KIT) 17.5-3.13-1.6 gram SolR, Take 177 mLs by mouth once daily. for 2 days, Disp: 1 kit, Rfl: 0    spironolactone (ALDACTONE) 50 MG tablet, Take 1 tablet (50 mg total) by mouth once daily., Disp: 90 tablet, Rfl: 3    traMADoL (ULTRAM) 50 mg tablet, Take 1 tablet (50 mg total) by mouth every 8 (eight) hours as needed., Disp: 90 tablet, Rfl: 3    valsartan (DIOVAN) 320 MG tablet, Take 1 tablet (320 mg total) by mouth once daily., Disp: 90 tablet, Rfl: 3    vitamin D (VITAMIN D3) 1000 units Tab, Take 1,000 Units by mouth once daily., Disp: , Rfl:     latanoprost 0.005 % ophthalmic solution, Place 1 drop into the right eye every evening., Disp: 7.5 mL, Rfl: 4  No current facility-administered medications for this visit.    Facility-Administered Medications Ordered in Other Visits:     lanreotide injection 120 mg, 120 mg, Subcutaneous, Once, Aaron Keita MD    Past Medical History:   Diagnosis Date    Asthma     Depression     Gastric ulcer with hemorrhage 07/01/2012    GERD (gastroesophageal reflux disease)     History of blood transfusion     Hypertension     Iron deficiency anemia 03/14/2013    Osteoarthritis of both knees     Pituitary adenoma 1989    s/p transphenoidal resection    Primary malignant neuroendocrine tumor of small intestine 06/2023       Active Problem List with Overview Notes    Diagnosis Date Noted    Abnormal glucose 06/21/2024    Primary malignant neuroendocrine tumor of small intestine 07/18/2023    Secondary malignant neoplasm of liver 07/18/2023    Chronic right-sided low back pain without sciatica 05/16/2023 04/27/2023 MRI L-spine impression:  T11-T12 posterior central 2.1 mm subligamentous disc herniation with caudal migration.  No canal stenosis.  T12-L1 posterior midline 1.2 mm disc herniation or central canal stenosis.    L2-L3 small facet effusions are present  L3-L4 mild facet hypertrophy with small  effusions, there is minimal left foramen  L4-L5 diffuse disc bulge with facet hypertrophy, there is mild bilateral foraminal narrowing.    L5-S1 facet hypertrophy, there is mild moderate left greater than right foraminal narrowing      Vitamin D deficiency 10/26/2022    Age-related osteoporosis without current pathological fracture; 6/2022 alendronate 06/22/2022 06/21/2022 DEXA L-spine -1.9, total hip-2.8, femoral neck-3.5.  FRAX score 2.7/8.3%.  Osteoporosis.      Gait abnormality 02/03/2022    Decreased strength of lower extremity 02/03/2022    Decreased range of motion of left knee 02/03/2022    History of left knee replacement 01/10/2022    Chest pain, atypical 01/14/2021    Palpitations 01/14/2021    LANGE (dyspnea on exertion) 01/14/2021    GERD (gastroesophageal reflux disease) 09/08/2020    Peripheral edema 08/28/2020    Chronic constipation 08/28/2020    History of pituitary adenoma 9/2019 MRI no recurrence 09/24/2019     s/p transphenoidal resection ~1989 9/23/2019 MRI brain:   1. Fluid level in the sphenoid sinus, likely from sphenoid sinusitis.  2. No significant abnormalities of the sella turcica to suggest any recurrent neoplasms.  3. Periventricular white matter changes likely from chronic microvascular ischemic disease.  4. No acute intracranial processes.  9/23/2019 carotid US normal      Status post transsphenoidal pituitary resection for tumor, Dr Cardenas, about 1989 09/09/2019    History of benign carcinoid tumor rectum s/p resection per GI note 01/30/2019    Colon polyp 8/2018 no path report included repeat 5 years 10/07/2018    Fibromyalgia; diffuse arm, back pain, thigh pain; 2017 B12, TSH WNL; reynaldo without efficacy; Cymbalta. 07/13/2017    Class 1 obesity due to excess calories with serious comorbidity in adult 06/15/2015     Dx updated per 2019 IMO Load      AR (allergic rhinitis) 02/19/2014    Iron deficiency anemia; iron with GI SE 03/14/2013    Chronic asthma without complication  intolerant of symbicort - recurrent mouth sores 02/14/2013    NSAID-induced gastric ulcer chronic on EGD 7/2018 and 10/2020 and 6/2021 02/14/2013     10/7/20 EGD - Normal esophagus. - Small hiatal hernia. - Non-bleeding gastric ulcers with no stigmata of bleeding. Biopsied. This has been present for more than 2 years according to previous EGD report 7/2018. - Normal examined duodenum. bx negative; neg for H pylori. Felt to be NSAID induced  06/30/2021 EGD normal esophagus.  Gastric ulcer with no stigmata of bleeding.  Improved from previous but not gone.  bx reactive/chemical gastropathy; H pylori neg.  Normal duodenum.      Menopausal hot flushes 02/14/2013    Mild recurrent major depression 02/14/2013    Primary osteoarthritis of left knee 07/30/2012 8/24/2017 MRI L knee: Tricompartmental degenerative changes. Lateral and medial meniscal degenerative tearing/changes. Tricompartmental cartilage loss including areas of full-thickness full-thickness loss. Abnormal appearance of the ACL and PCL suggestive of chronic injury.      Essential hypertension 1/2021 TTE normal, stress test negative 07/30/2012 6/2017 nuclear stress test negative. No change 7/2015 6/2017 TTE normal LVEF 55-60  01/26/2021 nuclear medicine stress test normal perfusion scan no evidence of ischemia.  01/26/2021 TTE LV normal size with concentric LVH and normal systolic function LVEF 60%.  Normal diastolic function.  Normal RV size and systolic function.  CVP 3.  PA pressure 5.         Past Surgical History:   Procedure Laterality Date    BREAST BIOPSY      BREAST SURGERY      Benign breast cyst    COLONOSCOPY      ENDOSCOPIC ULTRASOUND OF UPPER GASTROINTESTINAL TRACT N/A 06/27/2023    Procedure: ULTRASOUND, UPPER GI TRACT, ENDOSCOPIC;  Surgeon: Johnathan Cantu MD;  Location: [facility];  Service: Endoscopy;  Laterality: N/A;    ESOPHAGOGASTRODUODENOSCOPY N/A 10/07/2020    Procedure: EGD (ESOPHAGOGASTRODUODENOSCOPY);  Surgeon: Nell LO  MD Jem;  Location: [facility] ENDO;  Service: Endoscopy;  Laterality: N/A;    ESOPHAGOGASTRODUODENOSCOPY N/A 06/30/2021    Procedure: EGD (ESOPHAGOGASTRODUODENOSCOPY);  Surgeon: Nell Parish MD;  Location: [facility] ENDO;  Service: Endoscopy;  Laterality: N/A;    HYSTERECTOMY  1981    due to uterine fibroids    KNEE ARTHROPLASTY Left 01/10/2022    Procedure: ARTHROPLASTY, KNEE;  Surgeon: Jonnathan Tavera MD;  Location: [facility] OR;  Service: Orthopedics;  Laterality: Left;    TONSILLECTOMY      TRANSPHENOIDAL PITUITARY RESECTION  1989    Dr Cardenas       Social History     Socioeconomic History    Marital status:      Spouse name: Brandin    Number of children: 3   Occupational History     Employer: [employer]   Tobacco Use    Smoking status: Never     Passive exposure: Never    Smokeless tobacco: Never   Substance and Sexual Activity    Alcohol use: Yes     Alcohol/week: 1.0 standard drink of alcohol     Types: 1 Cans of beer per week     Comment: on ocassion    Drug use: No    Sexual activity: Not Currently     Partners: Male     Birth control/protection: Surgical     Social Determinants of Health     Financial Resource Strain: Low Risk  (11/15/2023)    Overall Financial Resource Strain (CARDIA)     Difficulty of Paying Living Expenses: Not hard at all   Food Insecurity: No Food Insecurity (11/15/2023)    Hunger Vital Sign     Worried About Running Out of Food in the Last Year: Never true     Ran Out of Food in the Last Year: Never true   Transportation Needs: No Transportation Needs (11/15/2023)    PRAPARE - Transportation     Lack of Transportation (Medical): No     Lack of Transportation (Non-Medical): No   Physical Activity: Inactive (11/15/2023)    Exercise Vital Sign     Days of Exercise per Week: 0 days     Minutes of Exercise per Session: 0 min   Stress: Stress Concern Present (11/15/2023)    [employer] [employer] - Occupational Stress Questionnaire     Feeling of  Stress : To some extent   Housing Stability: Low Risk  (11/15/2023)    Housing Stability Vital Sign     Unable to Pay for Housing in the Last Year: No     Number of Places Lived in the Last Year: 1     Unstable Housing in the Last Year: No

## 2024-07-24 NOTE — PROCEDURES
Large Joint Aspiration/Injection: R subacromial bursa    Date/Time: 7/24/2024 10:30 AM    Performed by: Flores Ba MD  Authorized by: Flores Ba MD    Consent Done?:  Yes (Verbal)  Indications:  Pain  Timeout: prior to procedure the correct patient, procedure, and site was verified    Prep: patient was prepped and draped in usual sterile fashion      Local anesthesia used?: Yes    Local anesthetic:  Topical anesthetic    Details:  Needle Size:  22 G  Approach:  Posterior  Location:  Shoulder  Site:  R subacromial bursa  Medications:  40 mg triamcinolone acetonide 40 mg/mL  Patient tolerance:  Patient tolerated the procedure well with no immediate complications

## 2024-08-02 DIAGNOSIS — M79.7 FIBROMYALGIA: ICD-10-CM

## 2024-08-02 DIAGNOSIS — M17.12 OSTEOARTHRITIS OF LEFT KNEE, UNSPECIFIED OSTEOARTHRITIS TYPE: ICD-10-CM

## 2024-08-02 DIAGNOSIS — R60.0 PERIPHERAL EDEMA: ICD-10-CM

## 2024-08-02 DIAGNOSIS — J30.9 ALLERGIC RHINITIS, UNSPECIFIED SEASONALITY, UNSPECIFIED TRIGGER: ICD-10-CM

## 2024-08-02 DIAGNOSIS — J45.40 MODERATE PERSISTENT CHRONIC ASTHMA WITHOUT COMPLICATION: ICD-10-CM

## 2024-08-03 RX ORDER — CETIRIZINE HYDROCHLORIDE 10 MG/1
10 TABLET ORAL
Qty: 90 TABLET | Refills: 3 | Status: SHIPPED | OUTPATIENT
Start: 2024-08-03

## 2024-08-04 RX ORDER — ALBUTEROL SULFATE 90 UG/1
INHALANT RESPIRATORY (INHALATION)
Qty: 54 G | Refills: 0 | Status: SHIPPED | OUTPATIENT
Start: 2024-08-04

## 2024-08-04 RX ORDER — FUROSEMIDE 20 MG/1
TABLET ORAL
Qty: 90 TABLET | Refills: 3 | Status: SHIPPED | OUTPATIENT
Start: 2024-08-04

## 2024-08-04 RX ORDER — GABAPENTIN 100 MG/1
CAPSULE ORAL
Qty: 270 CAPSULE | Refills: 3 | Status: SHIPPED | OUTPATIENT
Start: 2024-08-04

## 2024-08-07 ENCOUNTER — HOSPITAL ENCOUNTER (OUTPATIENT)
Dept: RADIOLOGY | Facility: HOSPITAL | Age: 69
Discharge: HOME | End: 2024-08-07
Attending: PHYSICIAN ASSISTANT
Payer: MEDICARE

## 2024-08-07 DIAGNOSIS — C7A.8 PRIMARY MALIGNANT NEUROENDOCRINE TUMOR OF SMALL INTESTINE: ICD-10-CM

## 2024-08-07 PROCEDURE — A9592 HC COPPER CU-64, DOTATE, DX, PER 1 MCI: HCPCS | Mod: HCNC | Performed by: PHYSICIAN ASSISTANT

## 2024-08-07 PROCEDURE — 78815 PET IMAGE W/CT SKULL-THIGH: CPT | Mod: 26,HCNC,PS, | Performed by: NUCLEAR MEDICINE

## 2024-08-07 PROCEDURE — 78815 PET IMAGE W/CT SKULL-THIGH: CPT | Mod: TC,HCNC

## 2024-08-07 RX ORDER — FLUDEOXYGLUCOSE F18 500 MCI/ML
12 INJECTION INTRAVENOUS ONCE
Status: DISCONTINUED | OUTPATIENT
Start: 2024-08-07 | End: 2024-08-07

## 2024-08-07 RX ADMIN — COPPER CU 64 DOTATATE 4.3 MILLICURIE: 1 INJECTION, SOLUTION INTRAVENOUS at 09:08

## 2024-08-09 ENCOUNTER — OFFICE VISIT (OUTPATIENT)
Dept: HEMATOLOGY/ONCOLOGY | Facility: CLINIC | Age: 69
End: 2024-08-09
Payer: MEDICARE

## 2024-08-09 DIAGNOSIS — D49.9 IMMUNODEFICIENCY SECONDARY TO NEOPLASM: ICD-10-CM

## 2024-08-09 DIAGNOSIS — C7A.8 PRIMARY MALIGNANT NEUROENDOCRINE TUMOR OF SMALL INTESTINE: Primary | ICD-10-CM

## 2024-08-09 DIAGNOSIS — C77.8 SECONDARY MALIGNANT NEOPLASM OF LYMPH NODES OF MULTIPLE SITES: ICD-10-CM

## 2024-08-09 DIAGNOSIS — D84.81 IMMUNODEFICIENCY SECONDARY TO NEOPLASM: ICD-10-CM

## 2024-08-09 DIAGNOSIS — D84.821 IMMUNODEFICIENCY DUE TO DRUGS: ICD-10-CM

## 2024-08-09 DIAGNOSIS — I10 ESSENTIAL HYPERTENSION: ICD-10-CM

## 2024-08-09 DIAGNOSIS — C78.7 SECONDARY MALIGNANT NEOPLASM OF LIVER: ICD-10-CM

## 2024-08-09 DIAGNOSIS — J06.9 UPPER RESPIRATORY TRACT INFECTION, UNSPECIFIED TYPE: ICD-10-CM

## 2024-08-09 DIAGNOSIS — Z79.899 IMMUNODEFICIENCY DUE TO DRUGS: ICD-10-CM

## 2024-08-13 ENCOUNTER — LAB VISIT (OUTPATIENT)
Dept: LAB | Facility: HOSPITAL | Age: 69
End: 2024-08-13
Payer: MEDICARE

## 2024-08-13 ENCOUNTER — INFUSION (OUTPATIENT)
Dept: INFUSION THERAPY | Facility: HOSPITAL | Age: 69
End: 2024-08-13
Payer: MEDICARE

## 2024-08-13 DIAGNOSIS — C7A.8 PRIMARY MALIGNANT NEUROENDOCRINE TUMOR OF SMALL INTESTINE: ICD-10-CM

## 2024-08-13 DIAGNOSIS — C7A.8 PRIMARY MALIGNANT NEUROENDOCRINE TUMOR OF SMALL INTESTINE: Primary | ICD-10-CM

## 2024-08-13 DIAGNOSIS — C78.7 SECONDARY MALIGNANT NEOPLASM OF LIVER: ICD-10-CM

## 2024-08-13 LAB
ALBUMIN SERPL BCP-MCNC: 3.4 G/DL (ref 3.5–5.2)
ALP SERPL-CCNC: 101 U/L (ref 55–135)
ALT SERPL W/O P-5'-P-CCNC: 86 U/L (ref 10–44)
ANION GAP SERPL CALC-SCNC: 12 MMOL/L (ref 8–16)
AST SERPL-CCNC: 71 U/L (ref 10–40)
BASOPHILS # BLD AUTO: 0.07 K/UL (ref 0–0.2)
BASOPHILS NFR BLD: 0.6 % (ref 0–1.9)
BILIRUB SERPL-MCNC: 0.4 MG/DL (ref 0.1–1)
BUN SERPL-MCNC: 14 MG/DL (ref 8–23)
CALCIUM SERPL-MCNC: 9.2 MG/DL (ref 8.7–10.5)
CHLORIDE SERPL-SCNC: 104 MMOL/L (ref 95–110)
CO2 SERPL-SCNC: 21 MMOL/L (ref 23–29)
CREAT SERPL-MCNC: 0.8 MG/DL (ref 0.5–1.4)
DIFFERENTIAL METHOD BLD: ABNORMAL
EOSINOPHIL # BLD AUTO: 0.2 K/UL (ref 0–0.5)
EOSINOPHIL NFR BLD: 1.9 % (ref 0–8)
ERYTHROCYTE [DISTWIDTH] IN BLOOD BY AUTOMATED COUNT: 15.1 % (ref 11.5–14.5)
EST. GFR  (NO RACE VARIABLE): >60 ML/MIN/1.73 M^2
GLUCOSE SERPL-MCNC: 87 MG/DL (ref 70–110)
HCT VFR BLD AUTO: 44.4 % (ref 37–48.5)
HGB BLD-MCNC: 13.8 G/DL (ref 12–16)
IMM GRANULOCYTES # BLD AUTO: 0.03 K/UL (ref 0–0.04)
IMM GRANULOCYTES NFR BLD AUTO: 0.3 % (ref 0–0.5)
LYMPHOCYTES # BLD AUTO: 4.2 K/UL (ref 1–4.8)
LYMPHOCYTES NFR BLD: 36.3 % (ref 18–48)
MCH RBC QN AUTO: 28.5 PG (ref 27–31)
MCHC RBC AUTO-ENTMCNC: 31.1 G/DL (ref 32–36)
MCV RBC AUTO: 92 FL (ref 82–98)
MONOCYTES # BLD AUTO: 0.7 K/UL (ref 0.3–1)
MONOCYTES NFR BLD: 6.2 % (ref 4–15)
NEUTROPHILS # BLD AUTO: 6.3 K/UL (ref 1.8–7.7)
NEUTROPHILS NFR BLD: 54.7 % (ref 38–73)
NRBC BLD-RTO: 0 /100 WBC
PLATELET # BLD AUTO: 281 K/UL (ref 150–450)
PMV BLD AUTO: 11.7 FL (ref 9.2–12.9)
POTASSIUM SERPL-SCNC: 3.7 MMOL/L (ref 3.5–5.1)
PROT SERPL-MCNC: 7.8 G/DL (ref 6–8.4)
RBC # BLD AUTO: 4.85 M/UL (ref 4–5.4)
SODIUM SERPL-SCNC: 137 MMOL/L (ref 136–145)
WBC # BLD AUTO: 11.54 K/UL (ref 3.9–12.7)

## 2024-08-13 PROCEDURE — 84260 ASSAY OF SEROTONIN: CPT | Mod: HCNC | Performed by: INTERNAL MEDICINE

## 2024-08-13 PROCEDURE — 83519 RIA NONANTIBODY: CPT | Mod: HCNC | Performed by: INTERNAL MEDICINE

## 2024-08-13 PROCEDURE — 85025 COMPLETE CBC W/AUTO DIFF WBC: CPT | Mod: HCNC | Performed by: INTERNAL MEDICINE

## 2024-08-13 PROCEDURE — 83497 ASSAY OF 5-HIAA: CPT | Mod: HCNC | Performed by: INTERNAL MEDICINE

## 2024-08-13 PROCEDURE — 96372 THER/PROPH/DIAG INJ SC/IM: CPT | Mod: HCNC

## 2024-08-13 PROCEDURE — 63600175 PHARM REV CODE 636 W HCPCS: Mod: JZ,JG,HCNC | Performed by: INTERNAL MEDICINE

## 2024-08-13 PROCEDURE — 80053 COMPREHEN METABOLIC PANEL: CPT | Mod: HCNC | Performed by: INTERNAL MEDICINE

## 2024-08-13 RX ORDER — LANREOTIDE ACETATE 120 MG/.5ML
120 INJECTION SUBCUTANEOUS ONCE
Status: CANCELLED | OUTPATIENT
Start: 2024-08-13 | End: 2024-08-13

## 2024-08-13 RX ORDER — LANREOTIDE ACETATE 120 MG/.5ML
120 INJECTION SUBCUTANEOUS ONCE
Status: COMPLETED | OUTPATIENT
Start: 2024-08-13 | End: 2024-08-13

## 2024-08-13 RX ORDER — LANREOTIDE ACETATE 120 MG/.5ML
120 INJECTION SUBCUTANEOUS ONCE
OUTPATIENT
Start: 2024-08-13 | End: 2024-08-13

## 2024-08-13 RX ADMIN — LANREOTIDE ACETATE 120 MG: 120 INJECTION SUBCUTANEOUS at 09:08

## 2024-08-13 NOTE — NURSING
Patient tolerated Lanreotide injection well, administered to the Left buttocks. D/C with next appt scheduled.

## 2024-08-16 LAB — 5OH-INDOLEACETATE SERPL-MCNC: 34 NG/ML

## 2024-08-29 ENCOUNTER — TELEPHONE (OUTPATIENT)
Dept: ENDOSCOPY | Facility: HOSPITAL | Age: 69
End: 2024-08-29
Payer: MEDICARE

## 2024-08-30 ENCOUNTER — TELEPHONE (OUTPATIENT)
Dept: ENDOSCOPY | Facility: HOSPITAL | Age: 69
End: 2024-08-30
Payer: MEDICARE

## 2024-08-30 NOTE — TELEPHONE ENCOUNTER
Spoke to pt for pre-call to confirm scheduled Colonoscopy and patient verbalized understanding of the following:       Date of Procedure (s)  verified 9/6/24  Arrival Time 8:30 AM verified.  Location of Procedure(s) [address]  verified.  NPO status reinforced. Ok to continue clear liquids up until 4 hours prior to the Endoscopy procedure.   Denies blood thinners and GLP-1s.  Pt confirmed receipt of prep instructions and Rx prep (if applicable).  Instructions provided to patient via [facility] (previous message read)  Pt confirmed ride home after procedure if procedure requires anesthesia.   Pre-call screening questionnaire reviewed and completed with patient.   Appointment details are tentative, including check-in time.  If the patient begins taking any blood thinning medications, injectable weight loss/diabetes medications (other than insulin), or Adipex (phentermine) patient was instructed to contact the endoscopy scheduling department as soon as possible.  Patient was advised to call the endoscopy scheduling department if any questions or concerns arise.       [facility] Scheduling Department

## 2024-09-05 ENCOUNTER — TELEPHONE (OUTPATIENT)
Dept: ENDOSCOPY | Facility: HOSPITAL | Age: 69
End: 2024-09-05
Payer: MEDICARE

## 2024-09-05 DIAGNOSIS — Z12.12 SCREENING FOR COLORECTAL CANCER: Primary | ICD-10-CM

## 2024-09-05 DIAGNOSIS — Z12.11 SCREENING FOR COLORECTAL CANCER: Primary | ICD-10-CM

## 2024-09-05 RX ORDER — SODIUM, POTASSIUM,MAG SULFATES 17.5-3.13G
1 SOLUTION, RECONSTITUTED, ORAL ORAL DAILY
Qty: 1 KIT | Refills: 0 | Status: SHIPPED | OUTPATIENT
Start: 2024-09-05 | End: 2024-09-07

## 2024-09-05 NOTE — TELEPHONE ENCOUNTER
Spoke to patient to reschedule procedure(s) Colonoscopy       Physician to perform procedure(s) Dr. Juan Antonio Vazquez  Date of Procedure (s) 9/27/24  Arrival Time 9:00 AM  Time of Procedure(s) 10:00 AM   Location of Procedure(s) [facility] 2nd Floor   Type of Rx Prep sent to patient: Suprep  Instructions provided to patient via [facility]    Patient was informed on the following information and verbalized understanding. Screening questionnaire reviewed with patient and complete. If procedure requires anesthesia, a responsible adult needs to be present to accompany the patient home, patient cannot drive after receiving anesthesia. Appointment details are tentative, especially check-in time. Patient will receive a prep-op call 7 days prior to confirm check-in time for procedure. If applicable the patient should contact their pharmacy to verify Rx for procedure prep is ready for pick-up. Patient was advised to call the scheduling department at [phone] if pharmacy states no Rx is available. Patient was advised to call the endoscopy scheduling department if any questions or concerns arise.      [facility] Scheduling Department

## 2024-09-10 ENCOUNTER — INFUSION (OUTPATIENT)
Dept: INFUSION THERAPY | Facility: HOSPITAL | Age: 69
End: 2024-09-10
Payer: MEDICARE

## 2024-09-10 ENCOUNTER — LAB VISIT (OUTPATIENT)
Dept: LAB | Facility: HOSPITAL | Age: 69
End: 2024-09-10
Payer: MEDICARE

## 2024-09-10 ENCOUNTER — OFFICE VISIT (OUTPATIENT)
Dept: HEMATOLOGY/ONCOLOGY | Facility: CLINIC | Age: 69
End: 2024-09-10
Payer: MEDICARE

## 2024-09-10 VITALS
SYSTOLIC BLOOD PRESSURE: 165 MMHG | TEMPERATURE: 98 F | HEART RATE: 70 BPM | WEIGHT: 214.19 LBS | HEIGHT: 64 IN | BODY MASS INDEX: 36.57 KG/M2 | RESPIRATION RATE: 16 BRPM | DIASTOLIC BLOOD PRESSURE: 92 MMHG | OXYGEN SATURATION: 96 %

## 2024-09-10 DIAGNOSIS — D49.9 IMMUNODEFICIENCY SECONDARY TO NEOPLASM: ICD-10-CM

## 2024-09-10 DIAGNOSIS — C7A.8 PRIMARY MALIGNANT NEUROENDOCRINE TUMOR OF SMALL INTESTINE: Primary | ICD-10-CM

## 2024-09-10 DIAGNOSIS — D84.81 IMMUNODEFICIENCY SECONDARY TO NEOPLASM: ICD-10-CM

## 2024-09-10 DIAGNOSIS — I10 ESSENTIAL HYPERTENSION: ICD-10-CM

## 2024-09-10 DIAGNOSIS — C7A.8 PRIMARY MALIGNANT NEUROENDOCRINE TUMOR OF SMALL INTESTINE: ICD-10-CM

## 2024-09-10 DIAGNOSIS — D84.821 IMMUNODEFICIENCY DUE TO DRUGS: ICD-10-CM

## 2024-09-10 DIAGNOSIS — C78.7 SECONDARY MALIGNANT NEOPLASM OF LIVER: ICD-10-CM

## 2024-09-10 DIAGNOSIS — J06.9 UPPER RESPIRATORY TRACT INFECTION, UNSPECIFIED TYPE: ICD-10-CM

## 2024-09-10 DIAGNOSIS — Z79.899 IMMUNODEFICIENCY DUE TO DRUGS: ICD-10-CM

## 2024-09-10 DIAGNOSIS — C77.8 SECONDARY MALIGNANT NEOPLASM OF LYMPH NODES OF MULTIPLE SITES: ICD-10-CM

## 2024-09-10 LAB
ALBUMIN SERPL BCP-MCNC: 3.7 G/DL (ref 3.5–5.2)
ALP SERPL-CCNC: 100 U/L (ref 55–135)
ALT SERPL W/O P-5'-P-CCNC: 30 U/L (ref 10–44)
ANION GAP SERPL CALC-SCNC: 9 MMOL/L (ref 8–16)
AST SERPL-CCNC: 31 U/L (ref 10–40)
BASOPHILS # BLD AUTO: 0.06 K/UL (ref 0–0.2)
BASOPHILS NFR BLD: 0.5 % (ref 0–1.9)
BILIRUB SERPL-MCNC: 0.3 MG/DL (ref 0.1–1)
BUN SERPL-MCNC: 9 MG/DL (ref 8–23)
CALCIUM SERPL-MCNC: 9.5 MG/DL (ref 8.7–10.5)
CHLORIDE SERPL-SCNC: 103 MMOL/L (ref 95–110)
CO2 SERPL-SCNC: 25 MMOL/L (ref 23–29)
CREAT SERPL-MCNC: 0.8 MG/DL (ref 0.5–1.4)
DIFFERENTIAL METHOD BLD: ABNORMAL
EOSINOPHIL # BLD AUTO: 0.3 K/UL (ref 0–0.5)
EOSINOPHIL NFR BLD: 2.5 % (ref 0–8)
ERYTHROCYTE [DISTWIDTH] IN BLOOD BY AUTOMATED COUNT: 15.4 % (ref 11.5–14.5)
EST. GFR  (NO RACE VARIABLE): >60 ML/MIN/1.73 M^2
GLUCOSE SERPL-MCNC: 105 MG/DL (ref 70–110)
HCT VFR BLD AUTO: 48.3 % (ref 37–48.5)
HGB BLD-MCNC: 14.6 G/DL (ref 12–16)
IMM GRANULOCYTES # BLD AUTO: 0.03 K/UL (ref 0–0.04)
IMM GRANULOCYTES NFR BLD AUTO: 0.3 % (ref 0–0.5)
LYMPHOCYTES # BLD AUTO: 3.9 K/UL (ref 1–4.8)
LYMPHOCYTES NFR BLD: 33.4 % (ref 18–48)
MCH RBC QN AUTO: 28.9 PG (ref 27–31)
MCHC RBC AUTO-ENTMCNC: 30.2 G/DL (ref 32–36)
MCV RBC AUTO: 96 FL (ref 82–98)
MONOCYTES # BLD AUTO: 1 K/UL (ref 0.3–1)
MONOCYTES NFR BLD: 8.4 % (ref 4–15)
NEUTROPHILS # BLD AUTO: 6.5 K/UL (ref 1.8–7.7)
NEUTROPHILS NFR BLD: 54.9 % (ref 38–73)
NRBC BLD-RTO: 0 /100 WBC
PLATELET # BLD AUTO: 244 K/UL (ref 150–450)
PMV BLD AUTO: 11.7 FL (ref 9.2–12.9)
POTASSIUM SERPL-SCNC: 4.4 MMOL/L (ref 3.5–5.1)
PROT SERPL-MCNC: 8.1 G/DL (ref 6–8.4)
RBC # BLD AUTO: 5.05 M/UL (ref 4–5.4)
SODIUM SERPL-SCNC: 137 MMOL/L (ref 136–145)
WBC # BLD AUTO: 11.76 K/UL (ref 3.9–12.7)

## 2024-09-10 PROCEDURE — 96372 THER/PROPH/DIAG INJ SC/IM: CPT | Mod: HCNC

## 2024-09-10 PROCEDURE — 3077F SYST BP >= 140 MM HG: CPT | Mod: HCNC,CPTII,S$GLB, | Performed by: PHYSICIAN ASSISTANT

## 2024-09-10 PROCEDURE — 3080F DIAST BP >= 90 MM HG: CPT | Mod: HCNC,CPTII,S$GLB, | Performed by: PHYSICIAN ASSISTANT

## 2024-09-10 PROCEDURE — 1101F PT FALLS ASSESS-DOCD LE1/YR: CPT | Mod: HCNC,CPTII,S$GLB, | Performed by: PHYSICIAN ASSISTANT

## 2024-09-10 PROCEDURE — 85025 COMPLETE CBC W/AUTO DIFF WBC: CPT | Mod: HCNC | Performed by: PHYSICIAN ASSISTANT

## 2024-09-10 PROCEDURE — 80053 COMPREHEN METABOLIC PANEL: CPT | Mod: HCNC | Performed by: PHYSICIAN ASSISTANT

## 2024-09-10 PROCEDURE — 84260 ASSAY OF SEROTONIN: CPT | Mod: HCNC | Performed by: PHYSICIAN ASSISTANT

## 2024-09-10 PROCEDURE — 83497 ASSAY OF 5-HIAA: CPT | Mod: HCNC | Performed by: PHYSICIAN ASSISTANT

## 2024-09-10 PROCEDURE — 83519 RIA NONANTIBODY: CPT | Mod: HCNC | Performed by: PHYSICIAN ASSISTANT

## 2024-09-10 PROCEDURE — 3008F BODY MASS INDEX DOCD: CPT | Mod: HCNC,CPTII,S$GLB, | Performed by: PHYSICIAN ASSISTANT

## 2024-09-10 PROCEDURE — 63600175 PHARM REV CODE 636 W HCPCS: Mod: JZ,JG,HCNC | Performed by: INTERNAL MEDICINE

## 2024-09-10 PROCEDURE — 99214 OFFICE O/P EST MOD 30 MIN: CPT | Mod: HCNC,S$GLB,, | Performed by: PHYSICIAN ASSISTANT

## 2024-09-10 PROCEDURE — G2211 COMPLEX E/M VISIT ADD ON: HCPCS | Mod: HCNC,S$GLB,, | Performed by: PHYSICIAN ASSISTANT

## 2024-09-10 PROCEDURE — 99999 PR PBB SHADOW E&M-EST. PATIENT-LVL V: CPT | Mod: PBBFAC,HCNC,, | Performed by: PHYSICIAN ASSISTANT

## 2024-09-10 PROCEDURE — 3288F FALL RISK ASSESSMENT DOCD: CPT | Mod: HCNC,CPTII,S$GLB, | Performed by: PHYSICIAN ASSISTANT

## 2024-09-10 PROCEDURE — 3044F HG A1C LEVEL LT 7.0%: CPT | Mod: HCNC,CPTII,S$GLB, | Performed by: PHYSICIAN ASSISTANT

## 2024-09-10 PROCEDURE — 1159F MED LIST DOCD IN RCRD: CPT | Mod: HCNC,CPTII,S$GLB, | Performed by: PHYSICIAN ASSISTANT

## 2024-09-10 PROCEDURE — 4010F ACE/ARB THERAPY RXD/TAKEN: CPT | Mod: HCNC,CPTII,S$GLB, | Performed by: PHYSICIAN ASSISTANT

## 2024-09-10 PROCEDURE — 1126F AMNT PAIN NOTED NONE PRSNT: CPT | Mod: HCNC,CPTII,S$GLB, | Performed by: PHYSICIAN ASSISTANT

## 2024-09-10 RX ORDER — LANREOTIDE ACETATE 120 MG/.5ML
120 INJECTION SUBCUTANEOUS ONCE
OUTPATIENT
Start: 2024-09-10 | End: 2024-09-10

## 2024-09-10 RX ORDER — LANREOTIDE ACETATE 120 MG/.5ML
120 INJECTION SUBCUTANEOUS ONCE
Status: CANCELLED | OUTPATIENT
Start: 2024-09-10 | End: 2024-09-10

## 2024-09-10 RX ORDER — LANREOTIDE ACETATE 120 MG/.5ML
120 INJECTION SUBCUTANEOUS ONCE
Status: COMPLETED | OUTPATIENT
Start: 2024-09-10 | End: 2024-09-10

## 2024-09-10 RX ADMIN — LANREOTIDE ACETATE 120 MG: 120 INJECTION SUBCUTANEOUS at 11:09
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
## 2024-09-13 LAB — 5OH-INDOLEACETATE SERPL-MCNC: 33 NG/ML

## 2024-09-18 DIAGNOSIS — C7A.8 PRIMARY MALIGNANT NEUROENDOCRINE TUMOR OF SMALL INTESTINE: Primary | ICD-10-CM

## 2024-09-23 ENCOUNTER — TELEPHONE (OUTPATIENT)
Dept: ENDOSCOPY | Facility: HOSPITAL | Age: 69
End: 2024-09-23
Payer: MEDICARE

## 2024-09-23 NOTE — TELEPHONE ENCOUNTER
Contacted Pt for Endoscopy precall to confirm scheduled procedure(s) Colonoscopy on 9/27/24. Pt did not answer. Left voicemail for pt to call Endoscopy Scheduling to confirm.

## 2024-09-26 ENCOUNTER — TELEPHONE (OUTPATIENT)
Dept: ENDOSCOPY | Facility: HOSPITAL | Age: 69
End: 2024-09-26
Payer: MEDICARE

## 2024-09-27 ENCOUNTER — TELEPHONE (OUTPATIENT)
Dept: ENDOSCOPY | Facility: HOSPITAL | Age: 69
End: 2024-09-27
Payer: MEDICARE

## 2024-09-27 NOTE — TELEPHONE ENCOUNTER
Spoke to pt for pre-call to confirm scheduled Colonoscopy and patient verbalized understanding of the following:       Date of Procedure (s)  verified 10/4/24  Arrival Time 8:00 AM verified. Pt notified of the following:  Scoping physician will be determined day of Endoscopy procedure.   Arrival time may change (morning or afternoon adjustment) because Pt will be worked into the physician's scoping schedule. As a result, this may cause a delay in the procedure start time. Pt verbalized understanding.   Location of Procedure(s) [address]  verified.  NPO status reinforced. Ok to continue clear liquids up until 4 hours prior to the Endoscopy procedure.   Denies blood thinners and GLP-1s.  Pt confirmed receipt of prep instructions and Rx prep (if applicable).  Instructions provided to patient via [facility]  Pt confirmed ride home after procedure if procedure requires anesthesia.   Pre-call screening questionnaire reviewed and completed with patient.   Appointment details are tentative, including check-in time.  If the patient begins taking any blood thinning medications, injectable weight loss/diabetes medications (other than insulin), or Adipex (phentermine) patient was instructed to contact the endoscopy scheduling department as soon as possible.  Patient was advised to call the endoscopy scheduling department if any questions or concerns arise.       [facility] Scheduling Department

## 2024-10-04 ENCOUNTER — ANESTHESIA EVENT (OUTPATIENT)
Dept: ENDOSCOPY | Facility: HOSPITAL | Age: 69
End: 2024-10-04
Payer: MEDICARE

## 2024-10-04 ENCOUNTER — ANESTHESIA (OUTPATIENT)
Dept: ENDOSCOPY | Facility: HOSPITAL | Age: 69
End: 2024-10-04
Payer: MEDICARE

## 2024-10-04 ENCOUNTER — HOSPITAL ENCOUNTER (OUTPATIENT)
Facility: HOSPITAL | Age: 69
Discharge: HOME | End: 2024-10-04
Attending: STUDENT IN AN ORGANIZED HEALTH CARE EDUCATION/TRAINING PROGRAM | Admitting: STUDENT IN AN ORGANIZED HEALTH CARE EDUCATION/TRAINING PROGRAM
Payer: MEDICARE

## 2024-10-04 VITALS
RESPIRATION RATE: 18 BRPM | DIASTOLIC BLOOD PRESSURE: 86 MMHG | TEMPERATURE: 98 F | SYSTOLIC BLOOD PRESSURE: 151 MMHG | OXYGEN SATURATION: 97 % | HEART RATE: 64 BPM

## 2024-10-04 DIAGNOSIS — K63.5 POLYP OF COLON, UNSPECIFIED PART OF COLON, UNSPECIFIED TYPE: Primary | ICD-10-CM

## 2024-10-04 PROCEDURE — 45380 COLONOSCOPY AND BIOPSY: CPT | Mod: PT,HCNC | Performed by: STUDENT IN AN ORGANIZED HEALTH CARE EDUCATION/TRAINING PROGRAM

## 2024-10-04 PROCEDURE — 88305 TISSUE EXAM BY PATHOLOGIST: CPT | Mod: HCNC | Performed by: PATHOLOGY

## 2024-10-04 PROCEDURE — 63600175 PHARM REV CODE 636 W HCPCS: Mod: HCNC

## 2024-10-04 PROCEDURE — 25000003 PHARM REV CODE 250: Mod: HCNC

## 2024-10-04 PROCEDURE — 37000008 HC ANESTHESIA 1ST 15 MINUTES: Mod: HCNC | Performed by: STUDENT IN AN ORGANIZED HEALTH CARE EDUCATION/TRAINING PROGRAM

## 2024-10-04 PROCEDURE — 27201012 HC FORCEPS, HOT/COLD, DISP: Mod: HCNC | Performed by: STUDENT IN AN ORGANIZED HEALTH CARE EDUCATION/TRAINING PROGRAM

## 2024-10-04 PROCEDURE — 37000009 HC ANESTHESIA EA ADD 15 MINS: Mod: HCNC | Performed by: STUDENT IN AN ORGANIZED HEALTH CARE EDUCATION/TRAINING PROGRAM

## 2024-10-04 PROCEDURE — 45380 COLONOSCOPY AND BIOPSY: CPT | Mod: PT,HCNC,, | Performed by: STUDENT IN AN ORGANIZED HEALTH CARE EDUCATION/TRAINING PROGRAM

## 2024-10-04 PROCEDURE — 88305 TISSUE EXAM BY PATHOLOGIST: CPT | Mod: 26,HCNC,, | Performed by: PATHOLOGY

## 2024-10-04 RX ORDER — LIDOCAINE HYDROCHLORIDE 20 MG/ML
INJECTION, SOLUTION EPIDURAL; INFILTRATION; INTRACAUDAL; PERINEURAL
Status: DISCONTINUED
Start: 2024-10-04 | End: 2024-10-04 | Stop reason: HOSPADM

## 2024-10-04 RX ORDER — LIDOCAINE HYDROCHLORIDE 20 MG/ML
INJECTION INTRAVENOUS
Status: DISCONTINUED | OUTPATIENT
Start: 2024-10-04 | End: 2024-10-04

## 2024-10-04 RX ORDER — PROPOFOL 10 MG/ML
VIAL (ML) INTRAVENOUS
Status: DISCONTINUED
Start: 2024-10-04 | End: 2024-10-04 | Stop reason: HOSPADM

## 2024-10-04 RX ORDER — PROPOFOL 10 MG/ML
VIAL (ML) INTRAVENOUS
Status: DISCONTINUED | OUTPATIENT
Start: 2024-10-04 | End: 2024-10-04

## 2024-10-04 RX ADMIN — SODIUM CHLORIDE: 0.9 INJECTION, SOLUTION INTRAVENOUS at 08:10

## 2024-10-04 RX ADMIN — PROPOFOL 20 MG: 10 INJECTION, EMULSION INTRAVENOUS at 09:10

## 2024-10-04 RX ADMIN — LIDOCAINE HYDROCHLORIDE 60 MG: 20 INJECTION, SOLUTION INTRAVENOUS at 08:10

## 2024-10-04 RX ADMIN — PROPOFOL 80 MG: 10 INJECTION, EMULSION INTRAVENOUS at 08:10

## 2024-10-04 RX ADMIN — PROPOFOL 20 MG: 10 INJECTION, EMULSION INTRAVENOUS at 08:10

## 2024-10-04 NOTE — TRANSFER OF CARE
Anesthesia Transfer of Care Note    Patient: Bell Lynn    Procedure(s) Performed: Procedure(s) (LRB):  COLONOSCOPY (N/A)    Patient location: GI    Anesthesia Type: general    Transport from OR: Transported from OR on room air with adequate spontaneous ventilation    Post pain: adequate analgesia    Post assessment: no apparent anesthetic complications and tolerated procedure well    Post vital signs: stable    Level of consciousness: awake    Nausea/Vomiting: no nausea/vomiting    Complications: none    Transfer of care protocol was followed      Last vitals: Visit Vitals  /62 (BP Location: Left arm, Patient Position: Lying)   Pulse 80   Temp 36.6 °C (97.9 °F) (Oral)   Resp 12   SpO2 96%   Breastfeeding No

## 2024-10-04 NOTE — PROVATION PATIENT INSTRUCTIONS
Discharge Summary/Instructions after an Endoscopic Procedure  Patient Name: Bell Lynn  Patient MRN: [id number]  Patient YOB: 1955 Friday, October 4, 2024  Carloz Alston MD  Dear patient,  As a result of recent federal legislation (The Federal Cures Act), you may   receive lab or pathology results from your procedure in your [facility]   account before your physician is able to contact you. Your physician or   their representative will relay the results to you with their   recommendations at their soonest availability.  Thank you,  RESTRICTIONS:  During your procedure today, you received medications for sedation.  These   medications may affect your judgment, balance and coordination.  Therefore,   for 24 hours, you have the following restrictions:   - DO NOT drive a car, operate machinery, make legal/financial decisions,   sign important papers or drink alcohol.    ACTIVITY:  Today: no heavy lifting, straining or running due to procedural   sedation/anesthesia.  The following day: return to full activity including work.  DIET:  Eat and drink normally unless instructed otherwise.     TREATMENT FOR COMMON SIDE EFFECTS:  - Mild abdominal pain, nausea, belching, bloating or excessive gas:  rest,   eat lightly and use a heating pad.  - Sore Throat: treat with throat lozenges and/or gargle with warm salt   water.  - Because air was used during the procedure, expelling large amounts of air   from your rectum or belching is normal.  - If a bowel prep was taken, you may not have a bowel movement for 1-3 days.    This is normal.  SYMPTOMS TO WATCH FOR AND REPORT TO YOUR PHYSICIAN:  1. Abdominal pain or bloating, other than gas cramps.  2. Chest pain.  3. Back pain.  4. Signs of infection such as: chills or fever occurring within 24 hours   after the procedure.  5. Rectal bleeding, which would show as bright red, maroon, or black stools.   (A tablespoon of blood from the rectum is not serious, especially  if   hemorrhoids are present.)  6. Vomiting.  7. Weakness or dizziness.  GO DIRECTLY TO THE NEAREST EMERGENCY ROOM IF YOU HAVE ANY OF THE FOLLOWING:      Difficulty breathing              Chills and/or fever over 101 F   Persistent vomiting and/or vomiting blood   Severe abdominal pain   Severe chest pain   Black, tarry stools   Bleeding- more than one tablespoon   Any other symptom or condition that you feel may need urgent attention  Your doctor recommends these additional instructions:  If any biopsies were taken, your doctors clinic will contact you in 1 to 2   weeks with any results.  - Discharge patient to home (ambulatory).   - Patient has a contact number available for emergencies.  The signs and   symptoms of potential delayed complications were discussed with the   patient.  Return to normal activities tomorrow.  Written discharge   instructions were provided to the patient.   - Resume previous diet.   - Continue present medications.   - Return to primary care physician as previously scheduled.   - Repeat colonoscopy in 7 years for surveillance.  For questions, problems or results please call your physician - Carloz Alston MD at Work:  [phone].  [facility] can be reached at [phone]     IF A COMPLICATION OR EMERGENCY SITUATION ARISES AND YOU ARE UNABLE TO REACH   YOUR PHYSICIAN - GO DIRECTLY TO THE EMERGENCY ROOM.  Carloz Alston MD  10/4/2024 9:13:54 AM  This report has been verified and signed electronically.  Dear patient,  As a result of recent federal legislation (The Federal Cures Act), you may   receive lab or pathology results from your procedure in your [facility]   account before your physician is able to contact you. Your physician or   their representative will relay the results to you with their   recommendations at their soonest availability.  Thank you,  PROVATION

## 2024-10-04 NOTE — ANESTHESIA PREPROCEDURE EVALUATION
10/04/2024  Bell Lynn is a 69 y.o., female.      Pre-op Assessment    I have reviewed the Patient Summary Reports.     I have reviewed the Nursing Notes.       Review of Systems  Anesthesia Hx:  No problems with previous Anesthesia             Denies Family Hx of Anesthesia complications.    Denies Personal Hx of Anesthesia complications.                    Social:  Non-Smoker       Hematology/Oncology:       -- Anemia:                  Current/Recent Cancer.            Oncology Comments: NET colon with mets-not amenable to surgical resection     Cardiovascular:     Hypertension   Denies MI.      Denies Dysrhythmias.           2021 stress: negative for ischemia; Ef 60%                         Pulmonary:    Asthma moderate Shortness of breath   Currently feels at respiratory baseline               Renal/:  Renal/ Normal                 Hepatic/GI:   PUD,  GERD Liver Disease,            Musculoskeletal:  Arthritis               Neurological:    Neuromuscular Disease,       H/o pituitary adenoma s/p resection                             Endocrine:  Endocrine Normal            Psych:  Psychiatric History                  Physical Exam  General: Well nourished, Cooperative, Alert and Oriented    Airway:  Mallampati: III   Mouth Opening: Small, but > 3cm  TM Distance: 4 - 6 cm  Tongue: Normal  Neck ROM: Normal ROM    Dental:    Chest/Lungs:  Normal Respiratory Rate, Clear to auscultation    Heart:  Rate: Normal  Rhythm: Regular Rhythm      Wt Readings from Last 3 Encounters:   09/10/24 97.1 kg (214 lb 2.8 oz)   07/16/24 98.2 kg (216 lb 9.6 oz)   06/18/24 98.2 kg (216 lb 9.6 oz)     Temp Readings from Last 3 Encounters:   09/10/24 36.6 °C (97.9 °F) (Oral)   06/18/24 36.8 °C (98.2 °F) (Oral)   05/22/24 36.8 °C (98.3 °F) (Oral)     BP Readings from Last 3 Encounters:   09/10/24 (!) 165/92   07/16/24  (!) 146/85   06/18/24 131/72     Pulse Readings from Last 3 Encounters:   09/10/24 70   07/16/24 74   06/18/24 86         Anesthesia Plan  Type of Anesthesia, risks & benefits discussed:    Anesthesia Type: Gen Natural Airway, MAC, Gen ETT  Intra-op Monitoring Plan: Standard ASA Monitors  Post Op Pain Control Plan: multimodal analgesia  Induction:  IV  Informed Consent: Informed consent signed with the Patient and all parties understand the risks and agree with anesthesia plan.  All questions answered.   ASA Score: 3  Day of Surgery Review of History & Physical: H&P Update referred to the surgeon/provider.    Ready For Surgery From Anesthesia Perspective.     .

## 2024-10-04 NOTE — OR NURSING
PROCEDURE AND RECOVERY COMPLETED. DR. KOHLI/ JANELLE DISCUSSED FINDINGS WITH PATIENT AND SPOUSE; DISCHARGE INSTRUCTIONS GIVEN AND UNDERSTANDING VERBALIZED; ASSISTED UP WITHOUT UNTOWARD EFFECTS; PATIENT READY DISCHARGE

## 2024-10-04 NOTE — H&P
Short Stay Endoscopy History and Physical    PCP - Cipriano Carrera MD    Procedure - Colonoscopy  ASA - per anesthesia  Mallampati - per anesthesia  Plan of anesthesia - MAC    HPI:  This is a 69 y.o. female here for evaluation of : personal history of colon polyps    ROS:  Constitutional: No fevers, chills  CV: No chest pain  Pulm: No cough  Ophtho: No vision changes  GI: see HPI  Derm: No rash    Medical History:  has a past medical history of Asthma, Depression, Gastric ulcer with hemorrhage (07/01/2012), GERD (gastroesophageal reflux disease), History of blood transfusion, Hypertension, Iron deficiency anemia (03/14/2013), Osteoarthritis of both knees, Pituitary adenoma (1989), and Primary malignant neuroendocrine tumor of small intestine (06/2023).    Surgical History:  has a past surgical history that includes Tonsillectomy; Breast surgery; Breast biopsy; Esophagogastroduodenoscopy (N/A, 10/07/2020); Colonoscopy; Esophagogastroduodenoscopy (N/A, 06/30/2021); Knee Arthroplasty (Left, 01/10/2022); Endoscopic ultrasound of upper gastrointestinal tract (N/A, 06/27/2023); Hysterectomy (1981); and Transphenoidal pituitary resection (1989).    Family History: family history includes Breast cancer (age of onset: 45) in an other family member; Breast cancer (age of onset: 48) in an other family member; Cancer in her father; Heart disease in her father; Hypertension in her father; Rectal cancer in her maternal aunt; Uterine cancer in an other family member; Uterine cancer (age of onset: 45) in an other family member.. Otherwise no colon cancer, inflammatory bowel disease, or GI malignancies.    Social History:  reports that she has never smoked. She has never been exposed to tobacco smoke. She has never used smokeless tobacco. She reports current alcohol use of about 1.0 standard drink of alcohol per week. She reports that she does not use drugs.    Review of patient's allergies indicates:   Allergen Reactions     Nsaids (non-steroidal anti-inflammatory drug)      Gi bleed    Penicillins Itching and Swelling    Penicillin     Symbicort [budesonide-formoterol]      Recurrent oral ulcers       Medications:   Medications Prior to Admission   Medication Sig Dispense Refill Last Dose/Taking    albuterol (PROVENTIL/VENTOLIN HFA) 90 mcg/actuation inhaler INHALE 2 PUFFS EVERY 6 HOURS AS NEEDED FOR WHEEZING (RESCUE) 54 g 0 10/3/2024    alendronate (FOSAMAX) 70 MG tablet TAKE 1 TABLET EVERY 7 DAYS 12 tablet 3 10/3/2024    amLODIPine (NORVASC) 10 MG tablet TAKE 1 TABLET ONE TIME DAILY (DOSE INCREASE) 90 tablet 3 10/3/2024    cetirizine (ZYRTEC) 10 MG tablet TAKE 1 TABLET ONE TIME DAILY 90 tablet 3 10/3/2024    dicyclomine (BENTYL) 20 mg tablet Take 1 tablet (20 mg total) by mouth 3 (three) times daily as needed (abdominal pain). 60 tablet 2 10/3/2024    DULoxetine (CYMBALTA) 60 MG capsule TAKE 1 CAPSULE ONE TIME DAILY 90 capsule 3 10/3/2024    furosemide (LASIX) 20 MG tablet TAKE 1 TABLET ONE TIME DAILY FOR LEG SWELLING AS NEEDED 90 tablet 3 10/3/2024    gabapentin (NEURONTIN) 100 MG capsule TAKE 1 CAPSULE IN THE MORNING AND TAKE 3 CAPSULES AT NIGHT 270 capsule 3 10/3/2024    LINZESS 145 mcg Cap capsule TAKE 1 CAPSULE ONE TIME DAILY 90 capsule 3 10/3/2024    metoprolol succinate (TOPROL-XL) 100 MG 24 hr tablet Take 1 tablet (100 mg total) by mouth once daily. 90 tablet 3 10/3/2024    vitamin D (VITAMIN D3) 1000 units Tab Take 1,000 Units by mouth once daily.   10/3/2024    cyclobenzaprine (FLEXERIL) 10 MG tablet TAKE 1 TABLET EVERY EVENING 90 tablet 0     latanoprost 0.005 % ophthalmic solution Place 1 drop into the right eye every evening. 7.5 mL 4     LIDOcaine (LIDODERM) 5 % APPLY 1 PATCH ON THE SKIN ONE TIME DAILY. REMOVE AND DISCARD PATCH WITHIN 12 HOURS OR AS DIRECTED BY MD 90 patch 3     meloxicam (MOBIC) 7.5 MG tablet Take 1 tablet (7.5 mg total) by mouth daily as needed for Pain. (Patient not taking: Reported on 9/10/2024) 30  tablet 0     nitroGLYCERIN (NITROSTAT) 0.4 MG SL tablet Place 1 tablet (0.4 mg total) under the tongue every 5 (five) minutes as needed for Chest pain. (Patient not taking: Reported on 9/10/2024) 60 tablet 12     pantoprazole (PROTONIX) 40 MG tablet Take 1 tablet (40 mg total) by mouth 2 (two) times daily before meals. 60 tablet 5     spironolactone (ALDACTONE) 50 MG tablet Take 1 tablet (50 mg total) by mouth once daily. 90 tablet 3     traMADoL (ULTRAM) 50 mg tablet Take 1 tablet (50 mg total) by mouth every 8 (eight) hours as needed. 90 tablet 3     valsartan (DIOVAN) 320 MG tablet Take 1 tablet (320 mg total) by mouth once daily. (Patient not taking: Reported on 9/10/2024) 90 tablet 3          Vital Signs:   Vitals:    10/04/24 0836   BP: (!) 141/85   Pulse: 86   Resp: 18   Temp: 97.7 °F (36.5 °C)       General Appearance: Well appearing in no acute distress  Eyes:    No scleral icterus  ENT: atraumatic  Abdomen: Soft, nondistended  Extremities: no tenderness  Skin: normal color    Labs:  Lab Results   Component Value Date    WBC 11.76 09/10/2024    HGB 14.6 09/10/2024    HCT 48.3 09/10/2024     09/10/2024    CHOL 134 05/10/2023    TRIG 95 05/10/2023    HDL 38 (L) 05/10/2023    ALT 30 09/10/2024    AST 31 09/10/2024     09/10/2024    K 4.4 09/10/2024     09/10/2024    CREATININE 0.8 09/10/2024    BUN 9 09/10/2024    CO2 25 09/10/2024    TSH 0.750 05/01/2017    INR 1.0 12/05/2013    HGBA1C 5.8 (H) 06/18/2024       I have explained the risks and benefits of endoscopy procedures to the patient/their POA including but not limited to bleeding, perforation, infection, and death.  The patient/their POA was asked if they understand and allowed to ask any further questions to their satisfaction.    Proceed with colonoscopy    Jaret Plascencia MD

## 2024-10-07 ENCOUNTER — HOSPITAL ENCOUNTER (INPATIENT)
Facility: HOSPITAL | Age: 69
LOS: 4 days | Discharge: HOME | DRG: 392 | End: 2024-10-12
Attending: EMERGENCY MEDICINE | Admitting: STUDENT IN AN ORGANIZED HEALTH CARE EDUCATION/TRAINING PROGRAM
Payer: MEDICARE

## 2024-10-07 DIAGNOSIS — R07.89 CHEST DISCOMFORT: ICD-10-CM

## 2024-10-07 DIAGNOSIS — R11.2 NAUSEA & VOMITING: ICD-10-CM

## 2024-10-07 PROBLEM — R11.10 VOMITING: Status: ACTIVE | Noted: 2024-10-07

## 2024-10-07 PROBLEM — E55.9 VITAMIN D DEFICIENCY: Status: RESOLVED | Noted: 2022-10-26 | Resolved: 2024-10-07

## 2024-10-07 PROBLEM — R29.898 DECREASED STRENGTH OF LOWER EXTREMITY: Status: RESOLVED | Noted: 2022-02-03 | Resolved: 2024-10-07

## 2024-10-07 LAB
ALBUMIN SERPL BCP-MCNC: 4.1 G/DL (ref 3.5–5.2)
ALP SERPL-CCNC: 118 U/L (ref 55–135)
ALT SERPL W/O P-5'-P-CCNC: 34 U/L (ref 10–44)
ANION GAP SERPL CALC-SCNC: 12 MMOL/L (ref 8–16)
AST SERPL-CCNC: 35 U/L (ref 10–40)
BASOPHILS # BLD AUTO: 0.07 K/UL (ref 0–0.2)
BASOPHILS NFR BLD: 0.3 % (ref 0–1.9)
BILIRUB SERPL-MCNC: 0.4 MG/DL (ref 0.1–1)
BILIRUB UR QL STRIP: NEGATIVE
BNP SERPL-MCNC: 12 PG/ML (ref 0–99)
BUN SERPL-MCNC: 8 MG/DL (ref 8–23)
CALCIUM SERPL-MCNC: 10.1 MG/DL (ref 8.7–10.5)
CHLORIDE SERPL-SCNC: 102 MMOL/L (ref 95–110)
CLARITY UR REFRACT.AUTO: CLEAR
CO2 SERPL-SCNC: 23 MMOL/L (ref 23–29)
COLOR UR AUTO: COLORLESS
CREAT SERPL-MCNC: 0.8 MG/DL (ref 0.5–1.4)
DIFFERENTIAL METHOD BLD: ABNORMAL
EOSINOPHIL # BLD AUTO: 0 K/UL (ref 0–0.5)
EOSINOPHIL NFR BLD: 0.1 % (ref 0–8)
ERYTHROCYTE [DISTWIDTH] IN BLOOD BY AUTOMATED COUNT: 15 % (ref 11.5–14.5)
EST. GFR  (NO RACE VARIABLE): >60 ML/MIN/1.73 M^2
GLUCOSE SERPL-MCNC: 130 MG/DL (ref 70–110)
GLUCOSE UR QL STRIP: NEGATIVE
HCT VFR BLD AUTO: 44.8 % (ref 37–48.5)
HCV AB SERPL QL IA: NORMAL
HGB BLD-MCNC: 14.8 G/DL (ref 12–16)
HGB UR QL STRIP: NEGATIVE
HIV 1+2 AB+HIV1 P24 AG SERPL QL IA: NORMAL
IMM GRANULOCYTES # BLD AUTO: 0.13 K/UL (ref 0–0.04)
IMM GRANULOCYTES NFR BLD AUTO: 0.6 % (ref 0–0.5)
KETONES UR QL STRIP: NEGATIVE
LEUKOCYTE ESTERASE UR QL STRIP: NEGATIVE
LIPASE SERPL-CCNC: 14 U/L (ref 4–60)
LYMPHOCYTES # BLD AUTO: 1.9 K/UL (ref 1–4.8)
LYMPHOCYTES NFR BLD: 9.3 % (ref 18–48)
MCH RBC QN AUTO: 29.3 PG (ref 27–31)
MCHC RBC AUTO-ENTMCNC: 33 G/DL (ref 32–36)
MCV RBC AUTO: 89 FL (ref 82–98)
MONOCYTES # BLD AUTO: 1.2 K/UL (ref 0.3–1)
MONOCYTES NFR BLD: 5.9 % (ref 4–15)
NEUTROPHILS # BLD AUTO: 17.3 K/UL (ref 1.8–7.7)
NEUTROPHILS NFR BLD: 83.8 % (ref 38–73)
NITRITE UR QL STRIP: NEGATIVE
NRBC BLD-RTO: 0 /100 WBC
OHS QRS DURATION: 88 MS
OHS QTC CALCULATION: 431 MS
PH UR STRIP: 8 [PH] (ref 5–8)
PLATELET # BLD AUTO: 309 K/UL (ref 150–450)
PMV BLD AUTO: 12.2 FL (ref 9.2–12.9)
POTASSIUM SERPL-SCNC: 4.3 MMOL/L (ref 3.5–5.1)
PROT SERPL-MCNC: 9.4 G/DL (ref 6–8.4)
PROT UR QL STRIP: NEGATIVE
RBC # BLD AUTO: 5.05 M/UL (ref 4–5.4)
SODIUM SERPL-SCNC: 137 MMOL/L (ref 136–145)
SP GR UR STRIP: >1.03 (ref 1–1.03)
TROPONIN I SERPL DL<=0.01 NG/ML-MCNC: <0.006 NG/ML (ref 0–0.03)
TROPONIN I SERPL DL<=0.01 NG/ML-MCNC: <0.006 NG/ML (ref 0–0.03)
URN SPEC COLLECT METH UR: ABNORMAL
WBC # BLD AUTO: 20.67 K/UL (ref 3.9–12.7)

## 2024-10-07 PROCEDURE — 83880 ASSAY OF NATRIURETIC PEPTIDE: CPT | Mod: HCNC

## 2024-10-07 PROCEDURE — 96376 TX/PRO/DX INJ SAME DRUG ADON: CPT | Mod: HCNC

## 2024-10-07 PROCEDURE — 86803 HEPATITIS C AB TEST: CPT | Mod: HCNC | Performed by: PHYSICIAN ASSISTANT

## 2024-10-07 PROCEDURE — 63600175 PHARM REV CODE 636 W HCPCS: Mod: HCNC | Performed by: EMERGENCY MEDICINE

## 2024-10-07 PROCEDURE — 25000003 PHARM REV CODE 250: Mod: HCNC | Performed by: STUDENT IN AN ORGANIZED HEALTH CARE EDUCATION/TRAINING PROGRAM

## 2024-10-07 PROCEDURE — 84484 ASSAY OF TROPONIN QUANT: CPT | Mod: HCNC

## 2024-10-07 PROCEDURE — 80053 COMPREHEN METABOLIC PANEL: CPT | Mod: HCNC

## 2024-10-07 PROCEDURE — 25500020 PHARM REV CODE 255: Mod: HCNC | Performed by: EMERGENCY MEDICINE

## 2024-10-07 PROCEDURE — 93005 ELECTROCARDIOGRAM TRACING: CPT | Mod: HCNC

## 2024-10-07 PROCEDURE — G0378 HOSPITAL OBSERVATION PER HR: HCPCS | Mod: HCNC

## 2024-10-07 PROCEDURE — 99285 EMERGENCY DEPT VISIT HI MDM: CPT | Mod: 25,HCNC

## 2024-10-07 PROCEDURE — 63600175 PHARM REV CODE 636 W HCPCS: Mod: HCNC

## 2024-10-07 PROCEDURE — 63600175 PHARM REV CODE 636 W HCPCS: Mod: HCNC | Performed by: STUDENT IN AN ORGANIZED HEALTH CARE EDUCATION/TRAINING PROGRAM

## 2024-10-07 PROCEDURE — 87389 HIV-1 AG W/HIV-1&-2 AB AG IA: CPT | Mod: HCNC | Performed by: PHYSICIAN ASSISTANT

## 2024-10-07 PROCEDURE — 85025 COMPLETE CBC W/AUTO DIFF WBC: CPT | Mod: HCNC

## 2024-10-07 PROCEDURE — 96374 THER/PROPH/DIAG INJ IV PUSH: CPT | Mod: HCNC

## 2024-10-07 PROCEDURE — 25000003 PHARM REV CODE 250: Mod: HCNC

## 2024-10-07 PROCEDURE — 96375 TX/PRO/DX INJ NEW DRUG ADDON: CPT | Mod: HCNC

## 2024-10-07 PROCEDURE — 81003 URINALYSIS AUTO W/O SCOPE: CPT | Mod: HCNC

## 2024-10-07 PROCEDURE — 83690 ASSAY OF LIPASE: CPT | Mod: HCNC

## 2024-10-07 PROCEDURE — 96372 THER/PROPH/DIAG INJ SC/IM: CPT | Mod: 59 | Performed by: STUDENT IN AN ORGANIZED HEALTH CARE EDUCATION/TRAINING PROGRAM

## 2024-10-07 PROCEDURE — 93010 ELECTROCARDIOGRAM REPORT: CPT | Mod: HCNC,,, | Performed by: INTERNAL MEDICINE

## 2024-10-07 RX ORDER — PANTOPRAZOLE SODIUM 40 MG/1
40 TABLET, DELAYED RELEASE ORAL DAILY
Status: DISCONTINUED | OUTPATIENT
Start: 2024-10-08 | End: 2024-10-07

## 2024-10-07 RX ORDER — IBUPROFEN 200 MG
24 TABLET ORAL
Status: DISCONTINUED | OUTPATIENT
Start: 2024-10-07 | End: 2024-10-12 | Stop reason: HOSPADM

## 2024-10-07 RX ORDER — GABAPENTIN 300 MG/1
300 CAPSULE ORAL NIGHTLY
Status: DISCONTINUED | OUTPATIENT
Start: 2024-10-07 | End: 2024-10-12 | Stop reason: HOSPADM

## 2024-10-07 RX ORDER — PANTOPRAZOLE SODIUM 40 MG/1
40 TABLET, DELAYED RELEASE ORAL
Status: DISCONTINUED | OUTPATIENT
Start: 2024-10-07 | End: 2024-10-08

## 2024-10-07 RX ORDER — SUCRALFATE 1 G/10ML
1 SUSPENSION ORAL EVERY 6 HOURS
Status: DISCONTINUED | OUTPATIENT
Start: 2024-10-07 | End: 2024-10-12 | Stop reason: HOSPADM

## 2024-10-07 RX ORDER — ONDANSETRON HYDROCHLORIDE 2 MG/ML
8 INJECTION, SOLUTION INTRAVENOUS EVERY 8 HOURS
Status: DISCONTINUED | OUTPATIENT
Start: 2024-10-07 | End: 2024-10-12 | Stop reason: HOSPADM

## 2024-10-07 RX ORDER — DULOXETIN HYDROCHLORIDE 60 MG/1
60 CAPSULE, DELAYED RELEASE ORAL 2 TIMES DAILY
Status: DISCONTINUED | OUTPATIENT
Start: 2024-10-07 | End: 2024-10-12 | Stop reason: HOSPADM

## 2024-10-07 RX ORDER — FAMOTIDINE 10 MG/ML
20 INJECTION INTRAVENOUS
Status: COMPLETED | OUTPATIENT
Start: 2024-10-07 | End: 2024-10-07

## 2024-10-07 RX ORDER — IBUPROFEN 200 MG
16 TABLET ORAL
Status: DISCONTINUED | OUTPATIENT
Start: 2024-10-07 | End: 2024-10-12 | Stop reason: HOSPADM

## 2024-10-07 RX ORDER — ALUMINUM HYDROXIDE, MAGNESIUM HYDROXIDE, AND SIMETHICONE 1200; 120; 1200 MG/30ML; MG/30ML; MG/30ML
30 SUSPENSION ORAL
Status: DISCONTINUED | OUTPATIENT
Start: 2024-10-07 | End: 2024-10-12 | Stop reason: HOSPADM

## 2024-10-07 RX ORDER — SODIUM CHLORIDE, SODIUM LACTATE, POTASSIUM CHLORIDE, CALCIUM CHLORIDE 600; 310; 30; 20 MG/100ML; MG/100ML; MG/100ML; MG/100ML
INJECTION, SOLUTION INTRAVENOUS CONTINUOUS
Status: DISCONTINUED | OUTPATIENT
Start: 2024-10-07 | End: 2024-10-10

## 2024-10-07 RX ORDER — SODIUM CHLORIDE 0.9 % (FLUSH) 0.9 %
10 SYRINGE (ML) INJECTION EVERY 12 HOURS PRN
Status: DISCONTINUED | OUTPATIENT
Start: 2024-10-07 | End: 2024-10-12 | Stop reason: HOSPADM

## 2024-10-07 RX ORDER — GABAPENTIN 300 MG/1
300 CAPSULE ORAL DAILY
Status: DISCONTINUED | OUTPATIENT
Start: 2024-10-08 | End: 2024-10-07

## 2024-10-07 RX ORDER — ONDANSETRON HYDROCHLORIDE 2 MG/ML
4 INJECTION, SOLUTION INTRAVENOUS
Status: COMPLETED | OUTPATIENT
Start: 2024-10-07 | End: 2024-10-07

## 2024-10-07 RX ORDER — GLUCAGON 1 MG
1 KIT INJECTION
Status: DISCONTINUED | OUTPATIENT
Start: 2024-10-07 | End: 2024-10-12 | Stop reason: HOSPADM

## 2024-10-07 RX ORDER — PROMETHAZINE HYDROCHLORIDE 12.5 MG/1
25 TABLET ORAL EVERY 6 HOURS PRN
Status: DISCONTINUED | OUTPATIENT
Start: 2024-10-07 | End: 2024-10-12 | Stop reason: HOSPADM

## 2024-10-07 RX ORDER — LIDOCAINE 50 MG/G
1 PATCH TOPICAL DAILY PRN
Status: DISCONTINUED | OUTPATIENT
Start: 2024-10-07 | End: 2024-10-12 | Stop reason: HOSPADM

## 2024-10-07 RX ORDER — NALOXONE HCL 0.4 MG/ML
0.02 VIAL (ML) INJECTION
Status: DISCONTINUED | OUTPATIENT
Start: 2024-10-07 | End: 2024-10-12 | Stop reason: HOSPADM

## 2024-10-07 RX ORDER — ENOXAPARIN SODIUM 100 MG/ML
40 INJECTION SUBCUTANEOUS EVERY 24 HOURS
Status: DISCONTINUED | OUTPATIENT
Start: 2024-10-07 | End: 2024-10-12 | Stop reason: HOSPADM

## 2024-10-07 RX ORDER — ALBUTEROL SULFATE 90 UG/1
2 INHALANT RESPIRATORY (INHALATION) EVERY 6 HOURS PRN
Status: DISCONTINUED | OUTPATIENT
Start: 2024-10-07 | End: 2024-10-12 | Stop reason: HOSPADM

## 2024-10-07 RX ORDER — GABAPENTIN 100 MG/1
100 CAPSULE ORAL DAILY
Status: DISCONTINUED | OUTPATIENT
Start: 2024-10-08 | End: 2024-10-12 | Stop reason: HOSPADM

## 2024-10-07 RX ORDER — METOPROLOL SUCCINATE 50 MG/1
100 TABLET, EXTENDED RELEASE ORAL DAILY
Status: DISCONTINUED | OUTPATIENT
Start: 2024-10-08 | End: 2024-10-11

## 2024-10-07 RX ORDER — SPIRONOLACTONE 25 MG/1
50 TABLET ORAL DAILY
Status: DISCONTINUED | OUTPATIENT
Start: 2024-10-08 | End: 2024-10-11

## 2024-10-07 RX ORDER — OXYCODONE HYDROCHLORIDE 5 MG/1
5 TABLET ORAL EVERY 6 HOURS PRN
Status: DISCONTINUED | OUTPATIENT
Start: 2024-10-07 | End: 2024-10-12 | Stop reason: HOSPADM

## 2024-10-07 RX ORDER — MORPHINE SULFATE 4 MG/ML
4 INJECTION, SOLUTION INTRAMUSCULAR; INTRAVENOUS
Status: COMPLETED | OUTPATIENT
Start: 2024-10-07 | End: 2024-10-07

## 2024-10-07 RX ORDER — SUCRALFATE 1 G/10ML
1 SUSPENSION ORAL 2 TIMES DAILY
Status: DISCONTINUED | OUTPATIENT
Start: 2024-10-07 | End: 2024-10-07

## 2024-10-07 RX ORDER — TALC
6 POWDER (GRAM) TOPICAL NIGHTLY PRN
Status: DISCONTINUED | OUTPATIENT
Start: 2024-10-07 | End: 2024-10-12 | Stop reason: HOSPADM

## 2024-10-07 RX ORDER — AMLODIPINE BESYLATE 10 MG/1
10 TABLET ORAL DAILY
Status: DISCONTINUED | OUTPATIENT
Start: 2024-10-08 | End: 2024-10-11

## 2024-10-07 RX ORDER — ACETAMINOPHEN 325 MG/1
650 TABLET ORAL EVERY 6 HOURS PRN
Status: DISCONTINUED | OUTPATIENT
Start: 2024-10-07 | End: 2024-10-12 | Stop reason: HOSPADM

## 2024-10-07 RX ADMIN — MORPHINE SULFATE 4 MG: 4 INJECTION INTRAVENOUS at 11:10

## 2024-10-07 RX ADMIN — OXYCODONE 5 MG: 5 TABLET ORAL at 03:10

## 2024-10-07 RX ADMIN — ONDANSETRON 8 MG: 2 INJECTION INTRAMUSCULAR; INTRAVENOUS at 09:10

## 2024-10-07 RX ADMIN — FAMOTIDINE 20 MG: 10 INJECTION INTRAVENOUS at 09:10

## 2024-10-07 RX ADMIN — ACETAMINOPHEN 650 MG: 325 TABLET ORAL at 03:10

## 2024-10-07 RX ADMIN — SODIUM CHLORIDE, POTASSIUM CHLORIDE, SODIUM LACTATE AND CALCIUM CHLORIDE: 600; 310; 30; 20 INJECTION, SOLUTION INTRAVENOUS at 03:10

## 2024-10-07 RX ADMIN — SUCRALFATE 1 G: 1 SUSPENSION ORAL at 05:10

## 2024-10-07 RX ADMIN — IOHEXOL 100 ML: 350 INJECTION, SOLUTION INTRAVENOUS at 11:10

## 2024-10-07 RX ADMIN — ENOXAPARIN SODIUM 40 MG: 40 INJECTION SUBCUTANEOUS at 05:10

## 2024-10-07 RX ADMIN — ALUMINUM HYDROXIDE, MAGNESIUM HYDROXIDE, AND SIMETHICONE 30 ML: 1200; 120; 1200 SUSPENSION ORAL at 05:10

## 2024-10-07 RX ADMIN — ONDANSETRON 4 MG: 2 INJECTION INTRAMUSCULAR; INTRAVENOUS at 09:10

## 2024-10-07 RX ADMIN — ONDANSETRON 4 MG: 2 INJECTION INTRAMUSCULAR; INTRAVENOUS at 12:10

## 2024-10-07 RX ADMIN — ALUMINUM HYDROXIDE, MAGNESIUM HYDROXIDE, AND SIMETHICONE 30 ML: 1200; 120; 1200 SUSPENSION ORAL at 09:10

## 2024-10-07 RX ADMIN — PANTOPRAZOLE SODIUM 40 MG: 40 TABLET, DELAYED RELEASE ORAL at 05:10

## 2024-10-07 RX ADMIN — ACETAMINOPHEN 650 MG: 325 TABLET ORAL at 11:10

## 2024-10-07 NOTE — PROVIDER PROGRESS NOTES - EMERGENCY DEPT.
Encounter Date: 10/7/2024    ED Physician Progress Notes         EKG - STEMI Decision  Initial Reading: No STEMI present.  Response: No Action Needed.

## 2024-10-07 NOTE — HPI
Bell Lynn is a 69 year old F with a history of small intestine neuroendocrine tumor, peptic ulcer disease, GERD, HTN, anxiety, and Iron deficiency anemia who presents to the ED For epigastric abdominal pain and vomiting.  Patient was in her usual state of health until about 2:30 am this morning when she woke up with midepigastric pain that radiate near her chest. She had multiple episodes of nonbloody nonbillous emesis at home. She was unable to take her PO medications due to vomiting. She has been unable to eat anything at home and due to worsening pain came to the ED for further evaluation. Talia fever, chills, chest pain, or shortness of breath. She has not had any problems with urination, hematuria, diarrhea, or constipation.     In the ED patient was afebrile and VSS. Labs were notable for leukocytosis to 20.67, otherwise wnl. CMP was reassuring and lipase was negative. EKG showed no signs of acute ischemia and troponin was negative. UA negative for infection. CXR with no abnormal findings. CT abdomen with no acute findings but showed heterogenous mass of the small bowel stable in size and hepatomegaly and steatosis with hepatic lesions seen on PET-CT prior. She was give Morphine and Zofran with minimal improvement and admitted to medicine for further management.

## 2024-10-07 NOTE — ASSESSMENT & PLAN NOTE
- Working to optimize BP control  - Continue home regimen of spironolactone, Metorprolol succinate, and amlodipien 10 mg   - Will continue to monitor and further titrate antihypertensives as clinically indicated

## 2024-10-07 NOTE — ASSESSMENT & PLAN NOTE
-follows closely with Oncology. Last seen 9/10 with repeat follow up scheduled tomorrow  -will discuss rescheduling appointment as patient is in the hospital    -receives Lanreotide injections   -mets seen on PET CT prior

## 2024-10-07 NOTE — ASSESSMENT & PLAN NOTE
-no acute abnormalties seen on imaging, UA reassuring, lipase negative   -will start MIVF with LR at 100 ccs/hr   -NPO as patient does not feel she can tolerate food. Will start diet as tolerated.   -will start PPI for gastritis and sulcarafate   -IV Zofran scheduled and PO promethazine for break through

## 2024-10-07 NOTE — SUBJECTIVE & OBJECTIVE
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 Suprep, portal.lopez  9/23-LVM for precall-Kpvt  9/26-pt r/s, updated instructions sent to portal, pt knows procedure will be done with first available and ti    ENDOSCOPIC ULTRASOUND OF UPPER GASTROINTESTINAL TRACT N/A 06/27/2023    Procedure: ULTRASOUND, UPPER GI TRACT, ENDOSCOPIC;  Surgeon: Johnathan Cantu MD;  Location: [facility];  Service: Endoscopy;  Laterality: N/A;    ESOPHAGOGASTRODUODENOSCOPY N/A 10/07/2020    Procedure: EGD (ESOPHAGOGASTRODUODENOSCOPY);  Surgeon: Nell Parish MD;  Location: [facility];  Service: Endoscopy;  Laterality: N/A;    ESOPHAGOGASTRODUODENOSCOPY N/A 06/30/2021    Procedure: EGD (ESOPHAGOGASTRODUODENOSCOPY);  Surgeon: Nell Parish MD;  Location: [facility];  Service: Endoscopy;  Laterality: N/A;    HYSTERECTOMY  1981    due to uterine fibroids    KNEE ARTHROPLASTY Left 01/10/2022    Procedure: ARTHROPLASTY, KNEE;  Surgeon: Jonnathan Tavera MD;  [facility]: [facility];  Service: Orthopedics;  Laterality: Left;    TONSILLECTOMY      TRANSPHENOIDAL PITUITARY RESECTION  1989    Dr Cardenas       Review of patient's allergies indicates:   Allergen Reactions    Nsaids (non-steroidal  anti-inflammatory drug)      Gi bleed    Penicillins Itching and Swelling    Penicillin     Symbicort [budesonide-formoterol]      Recurrent oral ulcers       Current Facility-Administered Medications on File Prior to Encounter   Medication    lanreotide injection 120 mg     Current Outpatient Medications on File Prior to Encounter   Medication Sig    albuterol (PROVENTIL/VENTOLIN HFA) 90 mcg/actuation inhaler INHALE 2 PUFFS EVERY 6 HOURS AS NEEDED FOR WHEEZING (RESCUE)    alendronate (FOSAMAX) 70 MG tablet TAKE 1 TABLET EVERY 7 DAYS    amLODIPine (NORVASC) 10 MG tablet TAKE 1 TABLET ONE TIME DAILY (DOSE INCREASE)    cetirizine (ZYRTEC) 10 MG tablet TAKE 1 TABLET ONE TIME DAILY    cyclobenzaprine (FLEXERIL) 10 MG tablet TAKE 1 TABLET EVERY EVENING    dicyclomine (BENTYL) 20 mg tablet Take 1 tablet (20 mg total) by mouth 3 (three) times daily as needed (abdominal pain).    DULoxetine (CYMBALTA) 60 MG capsule TAKE 1 CAPSULE ONE TIME DAILY    furosemide (LASIX) 20 MG tablet TAKE 1 TABLET ONE TIME DAILY FOR LEG SWELLING AS NEEDED    gabapentin (NEURONTIN) 100 MG capsule TAKE 1 CAPSULE IN THE MORNING AND TAKE 3 CAPSULES AT NIGHT    latanoprost 0.005 % ophthalmic solution Place 1 drop into the right eye every evening.    LIDOcaine (LIDODERM) 5 % APPLY 1 PATCH ON THE SKIN ONE TIME DAILY. REMOVE AND DISCARD PATCH WITHIN 12 HOURS OR AS DIRECTED BY MD    LINZESS 145 mcg Cap capsule TAKE 1 CAPSULE ONE TIME DAILY    meloxicam (MOBIC) 7.5 MG tablet Take 1 tablet (7.5 mg total) by mouth daily as needed for Pain. (Patient not taking: Reported on 9/10/2024)    metoprolol succinate (TOPROL-XL) 100 MG 24 hr tablet Take 1 tablet (100 mg total) by mouth once daily.    nitroGLYCERIN (NITROSTAT) 0.4 MG SL tablet Place 1 tablet (0.4 mg total) under the tongue every 5 (five) minutes as needed for Chest pain. (Patient not taking: Reported on 9/10/2024)    pantoprazole (PROTONIX) 40 MG tablet Take 1 tablet (40 mg total) by mouth 2 (two)  times daily before meals.    spironolactone (ALDACTONE) 50 MG tablet Take 1 tablet (50 mg total) by mouth once daily.    traMADoL (ULTRAM) 50 mg tablet Take 1 tablet (50 mg total) by mouth every 8 (eight) hours as needed.    valsartan (DIOVAN) 320 MG tablet Take 1 tablet (320 mg total) by mouth once daily. (Patient not taking: Reported on 9/10/2024)    vitamin D (VITAMIN D3) 1000 units Tab Take 1,000 Units by mouth once daily.     Family History       Problem Relation (Age of Onset)    Breast cancer Other (48), Other (45)    Cancer Father    Heart disease Father    Hypertension Father    Rectal cancer Maternal Aunt    Uterine cancer Other, Other (45)          Tobacco Use    Smoking status: Never     Passive exposure: Never    Smokeless tobacco: Never   Substance and Sexual Activity    Alcohol use: Yes     Alcohol/week: 1.0 standard drink of alcohol     Types: 1 Cans of beer per week     Comment: on ocassion    Drug use: No    Sexual activity: Not Currently     Partners: Male     Birth control/protection: Surgical     Review of Systems   Constitutional:  Positive for activity change and appetite change. Negative for chills and fever.   HENT:  Negative for congestion and ear pain.    Eyes:  Negative for pain and discharge.   Respiratory:  Negative for cough and shortness of breath.    Cardiovascular:  Positive for chest pain.   Gastrointestinal:  Positive for abdominal pain, nausea and vomiting. Negative for abdominal distention, blood in stool, constipation and diarrhea.   Genitourinary:  Negative for difficulty urinating, dyspareunia and dysuria.   Skin: Negative.    Neurological:  Positive for headaches. Negative for dizziness and numbness.   Hematological:  Does not bruise/bleed easily.   Psychiatric/Behavioral:  Negative for agitation and confusion.      Objective:     Vital Signs (Most Recent):  Temp: 97.6 °F (36.4 °C) (10/07/24 1120)  Pulse: 65 (10/07/24 1120)  Resp: 16 (10/07/24 1147)  BP: (!) 177/91 (10/07/24  1120)  SpO2: 95 % (10/07/24 1120) Vital Signs (24h Range):  Temp:  [97.5 °F (36.4 °C)-97.6 °F (36.4 °C)] 97.6 °F (36.4 °C)  Pulse:  [60-65] 65  Resp:  [16-20] 16  SpO2:  [95 %-96 %] 95 %  BP: (136-177)/(84-91) 177/91     Weight: 98 kg (216 lb)  Body mass index is 37.08 kg/m².     Physical Exam           Significant Labs: All pertinent labs within the past 24 hours have been reviewed.    Significant Imaging: I have reviewed all pertinent imaging results/findings within the past 24 hours.

## 2024-10-07 NOTE — ED PROVIDER NOTES
Encounter Date: 10/7/2024       History     Chief Complaint   Patient presents with    Chest Pain     Denies cardiac hx      69-year-old female with a history of asthma, peptic ulcer disease with hemorrhage, GERD, hypertension, and iron-deficiency anemia presents to the emergency department with epigastric discomfort radiating proximally to the midsternal region, which began at 2:00 a.m. The patient describes this discomfort as similar to her previous episodes of GERD. She reports that before going to sleep last night, she ate spicy chicken pot pie. She takes Protonix daily. The patient states she attempted to take her oral medication, Maalox, at home but was unable to tolerate it due to multiple episodes of vomiting. She denies having fevers and reports no changes in bowel movements, such as constipation or diarrhea. She also denies any urinary changes, including dysuria, hematuria, or increased frequency.      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 Suprep, portal.lopez  9/23-LVM for precall-Kpvt  9/26-pt r/s, updated instructions sent to herbie pt knows procedure will be done with first available and ti    ENDOSCOPIC ULTRASOUND OF UPPER GASTROINTESTINAL TRACT N/A 06/27/2023    Procedure: ULTRASOUND, UPPER GI TRACT, ENDOSCOPIC;  Surgeon: Johnathan Cantu MD;  Location: [facility];  Service: Endoscopy;  Laterality: N/A;    ESOPHAGOGASTRODUODENOSCOPY N/A 10/07/2020    Procedure: EGD (ESOPHAGOGASTRODUODENOSCOPY);  Surgeon: Nell Parish MD;  Location: [facility] END[facility];  Service: Endoscopy;  Laterality: N/A;    ESOPHAGOGASTRODUODENOSCOPY N/A 06/30/2021    Procedure: EGD (ESOPHAGOGASTRODUODENOSCOPY);  Surgeon: Nell Parish MD;  Location: [facility] END[facility];  Service: Endoscopy;  Laterality: N/A;    HYSTERECTOMY  1981    due to uterine fibroids    KNEE ARTHROPLASTY Left 01/10/2022    Procedure: ARTHROPLASTY, KNEE;  Surgeon: Jonnathan Tavera MD;  Location: [facility] OR;  Service: Orthopedics;  Laterality: Left;    TONSILLECTOMY      TRANSPHENOIDAL PITUITARY RESECTION  1989    Dr Cardenas     Family History   Problem Relation Name Age of Onset    Cancer Father          unknown type, between his kidneys and liver, cause of death    Heart disease Father      Hypertension Father      Breast cancer Other Harriett 48    Breast cancer Other Orsion 45    Uterine cancer Other Letrika     Uterine cancer Other Eliza 45    Rectal cancer Maternal Aunt       Social History     Tobacco Use    Smoking status: Never     Passive exposure: Never    Smokeless tobacco: Never   Substance Use Topics    Alcohol use: Yes     Alcohol/week: 1.0 standard drink of alcohol     Types: 1 Cans of beer per week     Comment: on ocassion    Drug use: No     Review of Systems  See HPI  Physical Exam     Initial Vitals [10/07/24 0753]   BP Pulse Resp Temp SpO2   136/84 60 18 97.5 °F (36.4 °C) 96 %      MAP       --         Physical Exam    Vitals reviewed.  Constitutional: She appears  well-developed.   Appears uncomfortable secondary to symptoms   HENT:   Head: Normocephalic and atraumatic.   Eyes: Conjunctivae and EOM are normal.   Neck:   Normal range of motion.  Cardiovascular:  Normal rate and normal heart sounds.           Pulmonary/Chest: Breath sounds normal. No respiratory distress. She has no wheezes. She has no rales.   Abdominal: Abdomen is soft. She exhibits no distension. There is abdominal tenderness (Epigastric). There is no rebound.   Musculoskeletal:         General: Normal range of motion.      Cervical back: Normal range of motion.     Neurological: She is alert and oriented to person, place, and time.   Skin: Skin is warm and dry.   Psychiatric: She has a normal mood and affect. Thought content normal.         ED Course   Procedures  Labs Reviewed   CBC W/ AUTO DIFFERENTIAL - Abnormal       Result Value    WBC 20.67 (*)     RBC 5.05      Hemoglobin 14.8      Hematocrit 44.8      MCV 89      MCH 29.3      MCHC 33.0      RDW 15.0 (*)     Platelets 309      MPV 12.2      Immature Granulocytes 0.6 (*)     Gran # (ANC) 17.3 (*)     Immature Grans (Abs) 0.13 (*)     Lymph # 1.9      Mono # 1.2 (*)     Eos # 0.0      Baso # 0.07      nRBC 0      Gran % 83.8 (*)     Lymph % 9.3 (*)     Mono % 5.9      Eosinophil % 0.1      Basophil % 0.3      Differential Method Automated     COMPREHENSIVE METABOLIC PANEL - Abnormal    Sodium 137      Potassium 4.3      Chloride 102      CO2 23      Glucose 130 (*)     BUN 8      Creatinine 0.8      Calcium 10.1      Total Protein 9.4 (*)     Albumin 4.1      Total Bilirubin 0.4      Alkaline Phosphatase 118      AST 35      ALT 34      eGFR >60.0      Anion Gap 12     URINALYSIS, REFLEX TO URINE CULTURE - Abnormal    Specimen UA Urine, Clean Catch      Color, UA Colorless (*)     Appearance, UA Clear      pH, UA 8.0      Specific Gravity, UA >1.030 (*)     Protein, UA Negative      Glucose, UA Negative      Ketones, UA Negative      Bilirubin (UA)  Negative      Occult Blood UA Negative      Nitrite, UA Negative      Leukocytes, UA Negative      Narrative:     Specimen Source->Urine   HIV 1 / 2 ANTIBODY    HIV 1/2 Ag/Ab Non-reactive      Narrative:     Release to patient->Immediate   HEPATITIS C ANTIBODY    Hepatitis C Ab Non-reactive      Narrative:     Release to patient->Immediate   TROPONIN I    Troponin I <0.006     B-TYPE NATRIURETIC PEPTIDE    BNP 12     LIPASE    Lipase 14     TROPONIN I    Troponin I <0.006          ECG Results              EKG 12-lead (Final result)        Collection Time Result Time QRS Duration OHS QTC Calculation    10/07/24 07:55:53 10/07/24 08:25:35 88 431                     Final result by Interface, Lab In [facility] (10/07/24 08:25:54)                   Narrative:    Test Reason : R07.89,    Vent. Rate : 059 BPM     Atrial Rate : 059 BPM     P-R Int : 170 ms          QRS Dur : 088 ms      QT Int : 436 ms       P-R-T Axes : 003 045 077 degrees     QTc Int : 431 ms    Sinus bradycardia  Nonspecific T wave abnormality  Abnormal ECG  When compared with ECG of 07-JUN-2023 10:41,  No significant change was found  Confirmed by Odell KIMBALL MD ([id number]) on 10/7/2024 8:25:34 AM    Referred By:             Confirmed By:Odell KIMBALL MD                                  Imaging Results              CT Abdomen Pelvis With IV Contrast NO Oral Contrast (Final result)  Result time 10/07/24 12:30:51      Final result by Juan Antonio Reed MD (10/07/24 12:30:51)                   Impression:      No acute abnormalities in the abdomen or pelvis.    Heterogeneous mass of the small bowel, grossly stable in size likely relating to patient's known neuroendocrine tumor.  No bowel obstruction.    Hepatomegaly and hepatic steatosis with multiple hepatic lesions better demonstrated on prior PET-CT likely metastatic.    Additional findings as above.    Electronically signed by resident: Faisal Gonsalez  Date:    10/07/2024  Time:    11:20    Electronically signed  by: Juan Antonio Reed MD  Date:    10/07/2024  Time:    12:30               Narrative:    EXAMINATION:  CT ABDOMEN PELVIS WITH IV CONTRAST    CLINICAL HISTORY:  Nausea/vomiting;    TECHNIQUE:  Axial images of the abdomen and pelvis were acquired after the use of 100 cc [id number] IV contrast. No oral contrast was administered.  Coronal and sagittal reconstructions were also obtained    COMPARISON:  PET-CT 08/07/2024, CT abdomen pelvis 06/12/2024    FINDINGS:  Heart: Normal in size. No pericardial effusion. No significant calcific coronary atherosclerosis.    Lung Bases: Lung bases are well aerated, without consolidation, pneumothorax, or pleural fluid. Mild bibasilar subsegmental atelectasis.    Liver: Liver is enlarged measuring 20 cm in craniocaudal mention.  There is diffuse low-attenuation of the liver likely relating to hepatic steatosis with sparing near the gallbladder.  There are multiple liver lesions which are not completely evaluated on single-phase imaging.  These were better demonstrated on recent PET-CT.  There is a lesion in segment 4 B measuring 3 cm and segment 3 measuring approximately 2.8 cm.    Gallbladder: The gallbladder is distended.  No calcified gallstones.  No pericholecystic fluid or gallbladder wall thickening.    Bile Ducts: No intrahepatic or extrahepatic biliary ductal dilatation.    Pancreas: No mass, ductal dilation, or peripancreatic fat stranding.    Spleen: Normal size.  Indeterminate appearing hypodensity of the spleen measuring 1.5 x 1.0 cm.    Adrenals: Unremarkable.    Kidneys/Ureters: Normal in size and location. Normal enhancement.  1.6 cm right renal simple cyst.  No hydronephrosis or nephrolithiasis. No ureteral dilatation.    Bladder: No evidence of wall thickening.    Reproductive organs: The uterus is surgically absent.    Peritoneum: No free air or free fluid.    GI tract/Mesentery: No abnormality of the visualized esophagus.  The stomach is distended.  Heterogeneous mass in  the small bowel measuring approximately 6.4 x 4.9 x 6.1 cm.  Mass is grossly stable allowing for differences in measuring technique when compared to PET-CT 08/07/2024.  No evidence of obstruction.  Colon demonstrates no focal wall thickening or obstruction. Appendix is not definitively visualized, no pericecal soft tissue stranding.  There is colonic diverticulosis    Retroperitoneum: Subcentimeter mesenteric lymph nodes surrounding mass are visualized.  Mildly enlarged inguinal lymph nodes similar to prior.    Abdominal wall/Soft Tissues: Right breast calcification, unchanged.  Several soft tissue densities within the gluteal regions stable from prior may relate to injection sites.    Vasculature: No aneurysm. No significant calcific atherosclerosis.    Bones: No acute fracture. No suspicious osseous lesions.                                       X-Ray Chest PA And Lateral (Final result)  Result time 10/07/24 10:24:43      Final result by Juan Francisco Rodriguez MD (10/07/24 10:24:43)                   Impression:      See above      Electronically signed by: Juan Francisco Rodriguez MD  Date:    10/07/2024  Time:    10:24               Narrative:    EXAMINATION:  XR CHEST PA AND LATERAL    CLINICAL HISTORY:  Nausea with vomiting, unspecified    TECHNIQUE:  PA and lateral views of the chest were performed.    COMPARISON:  No 10/04/2022 ne    FINDINGS:  Heart size normal.  The lungs are clear.  No pleural effusion                                       Medications   albuterol inhaler 2 puff (has no administration in time range)   amLODIPine tablet 10 mg (has no administration in time range)   DULoxetine DR capsule 60 mg (has no administration in time range)   LIDOcaine 5 % patch 1 patch (has no administration in time range)   metoprolol succinate (TOPROL-XL) 24 hr tablet 100 mg (has no administration in time range)   sucralfate 100 mg/mL suspension 1 g (has no administration in time range)   aluminum-magnesium hydroxide-simethicone  200-200-20 mg/5 mL suspension 30 mL (has no administration in time range)   spironolactone tablet 50 mg (has no administration in time range)   sodium chloride 0.9% flush 10 mL (has no administration in time range)   naloxone 0.4 mg/mL injection 0.02 mg (has no administration in time range)   glucose chewable tablet 16 g (has no administration in time range)   glucose chewable tablet 24 g (has no administration in time range)   glucagon (human recombinant) injection 1 mg (has no administration in time range)   enoxaparin injection 40 mg (has no administration in time range)   ondansetron injection 8 mg (has no administration in time range)   promethazine tablet 25 mg (has no administration in time range)   melatonin tablet 6 mg (has no administration in time range)   acetaminophen tablet 650 mg (650 mg Oral Given 10/7/24 1541)   oxyCODONE immediate release tablet 5 mg (5 mg Oral Given 10/7/24 1541)   dextrose 10% bolus 125 mL 125 mL (has no administration in time range)   dextrose 10% bolus 250 mL 250 mL (has no administration in time range)   lactated ringers infusion ( Intravenous New Bag 10/7/24 1542)   pantoprazole EC tablet 40 mg (has no administration in time range)   gabapentin capsule 300 mg (has no administration in time range)   gabapentin capsule 100 mg (has no administration in time range)   ondansetron injection 4 mg (4 mg Intravenous Given 10/7/24 0930)   famotidine (PF) injection 20 mg (20 mg Intravenous Given 10/7/24 0930)   iohexoL (OMNIPAQUE 350) injection 100 mL (100 mLs Intravenous Given 10/7/24 1111)   morphine injection 4 mg (4 mg Intravenous Given 10/7/24 1147)   ondansetron injection 4 mg (4 mg Intravenous Given 10/7/24 1233)     Medical Decision Making  69-year-old female with a history of neuroendocrine tumors who receives chemotherapy injections presents to the emergency department with intractable nausea, vomiting, and epigastric discomfort.  We will order labs and imaging to rule out  infection, electrolyte imbalances, and acute coronary syndrome (ACS). The patient appears uncomfortable but is afebrile. She exhibits leukocytosis, which may be reactive to her episodes of emesis. A chest X-ray and CT scan were negative for any infectious processes.  Despite multiple rounds of symptomatic management, the patient remains uncomfortable. Her symptoms may also indicate a worsening disease process related to her neuroendocrine tumor. Therefore, I will admit her to the hospital for continued management.      Amount and/or Complexity of Data Reviewed  Labs: ordered.  Radiology: ordered.    Risk  Prescription drug management.              Attending Attestation:     Physician Attestation Statement for NP/PA:   I personally made/approved the management plan and take responsibility for the patient management.              ED Course as of 10/07/24 1643   Mon Oct 07, 2024   1356 Platelet Count: 309 [CR]      ED Course User Index  [CR] Vanesa Severino PA-C                           Clinical Impression:  Final diagnoses:  [R07.89] Chest discomfort  [R11.2] Nausea & vomiting          ED Disposition Condition    Observation Stable                Vanesa Severino PA-C  10/07/24 1344       Jose Rodriguez MD  10/07/24 1643

## 2024-10-07 NOTE — ED TRIAGE NOTES
Patient presents to the ED via private transport with complaints of midsternal chest pain that started around 0500 this morning, states her abdomen started hurting around 0200. Endorses nausea and 5 episodes of vomiting. Denies shortness of breath

## 2024-10-07 NOTE — H&P
[facility] - Emergency Dept  Hospital Medicine  History & Physical    Patient Name: Bell Lynn  MRN: [id number]  Patient Class: IP- Inpatient  Admission Date: 10/7/2024  Attending Physician: Dorys Galan MD   Primary Care Provider: Cipriano Carrera MD         Patient information was obtained from patient and ER records.     Subjective:     Principal Problem:Vomiting    Chief Complaint:   Chief Complaint   Patient presents with    Chest Pain     Denies cardiac hx        HPI: Bell yLnn is a 69 year old F with a history of small intestine neuroendocrine tumor, peptic ulcer disease, GERD, HTN, anxiety, and Iron deficiency anemia who presents to the ED For epigastric abdominal pain and vomiting.  Patient was in her usual state of health until about 2:30 am this morning when she woke up with midepigastric pain that radiate near her chest. She had multiple episodes of nonbloody nonbillous emesis at home. She was unable to take her PO medications due to vomiting. She has been unable to eat anything at home and due to worsening pain came to the ED for further evaluation. Talia fever, chills, chest pain, or shortness of breath. She has not had any problems with urination, hematuria, diarrhea, or constipation.     In the ED patient was afebrile and VSS. Labs were notable for leukocytosis to 20.67, otherwise wnl. CMP was reassuring and lipase was negative. EKG showed no signs of acute ischemia and troponin was negative. UA negative for infection. CXR with no abnormal findings. CT abdomen with no acute findings but showed heterogenous mass of the small bowel stable in size and hepatomegaly and steatosis with hepatic lesions seen on PET-CT prior. She was give Morphine and Zofran with minimal improvement and admitted to medicine for further management.     No new subjective & objective note has been filed under this hospital service since the last note was generated.    Assessment/Plan:     * Vomiting  -no  acute abnormalties seen on imaging, UA reassuring, lipase negative   -continue MIVF with LR at 100 ccs/hr   -NPO as patient does not feel she can tolerate food. Will start diet as tolerated.   -will start PPI for gastritis and sulcarafate   -IV Zofran scheduled and PO promethazine for break through    Epigastric pain  -pain appears worse today, RUQ midepigastric region   -US ordered for repeat evaluation   -continue PPI and Sulcarafate   -will start PPI and Sulcarafate   -continue IV fluids         Primary malignant neuroendocrine tumor of small intestine  -follows closely with Oncology. Last seen 9/10 with repeat follow up scheduled tomorrow  -will discuss rescheduling appointment as patient is in the hospital    -receives Lanreotide injections   -mets seen on PET CT prior       Mild recurrent major depression  -stable   -restart home duloxetine     Chronic asthma without complication intolerant of symbicort - recurrent mouth sores  -albuterol as needed for sob or wheezing   -no acute concerns       Leukocytosis  -WBC trending upwards to 30k   -medications reviewed   -repeat UA and CBC ordered  -procal, CRP and blood cultures ordered   -unclear infectious source at this time, possible watery bowel movements. Will consider C. Diff workup       Essential hypertension 1/2021 TTE normal, stress test negative  - Working to optimize BP control  - Continue home regimen of spironolactone, Metorprolol succinate, and amlodipien 10 mg   - Will continue to monitor and further titrate antihypertensives as clinically indicated       Primary osteoarthritis of left knee  -continue home Duloxetine for pain management  -PRN tylenol ordered       VTE Risk Mitigation (From admission, onward)           Ordered     enoxaparin injection 40 mg  Daily         10/07/24 1433     IP VTE HIGH RISK PATIENT  Once         10/07/24 1433     Place sequential compression device  Until discontinued         10/07/24 1433                       On  10/08/2024, patient should be placed in hospital observation for risk of dehydration, inability to tolerate PO, and significant vomiting under my care.        [facility] - Internal Medicine [facility] Medicine  Progress Note    Patient Name: Bell Lynn  MRN: [id number]  Patient Class: OP- Observation   Admission Date: 10/7/2024  Length of Stay: 0 days  Attending Physician: Dorys Galan MD  Primary Care Provider: Cipriano Carrera MD        Subjective:     Principal Problem:Vomiting        HPI:  Bell Lynn is a 69 year old F with a history of small intestine neuroendocrine tumor, peptic ulcer disease, GERD, HTN, anxiety, and Iron deficiency anemia who presents to the ED For epigastric abdominal pain and vomiting.  Patient was in her usual state of health until about 2:30 am this morning when she woke up with midepigastric pain that radiate near her chest. She had multiple episodes of nonbloody nonbillous emesis at home. She was unable to take her PO medications due to vomiting. She has been unable to eat anything at home and due to worsening pain came to the ED for further evaluation. Talia fever, chills, chest pain, or shortness of breath. She has not had any problems with urination, hematuria, diarrhea, or constipation.     In the ED patient was afebrile and VSS. Labs were notable for leukocytosis to 20.67, otherwise wnl. CMP was reassuring and lipase was negative. EKG showed no signs of acute ischemia and troponin was negative. UA negative for infection. CXR with no abnormal findings. CT abdomen with no acute findings but showed heterogenous mass of the small bowel stable in size and hepatomegaly and steatosis with hepatic lesions seen on PET-CT prior. She was give Morphine and Zofran with minimal improvement and admitted to medicine for further management.     Overview/Hospital Course:  No notes on file    Interval History: Patient seen and examined. She continues to have significant  mid epigastric and RUQ abdominal pain. Unable to eat or drink anything.       Objective:     Vital Signs (Most Recent):  Temp: 98.4 °F (36.9 °C) (10/08/24 0733)  Pulse: 89 (10/08/24 0733)  Resp: 18 (10/08/24 0906)  BP: 136/80 (10/08/24 0733)  SpO2: (!) 92 % (10/08/24 0733) Vital Signs (24h Range):  Temp:  [97.6 °F (36.4 °C)-99 °F (37.2 °C)] 98.4 °F (36.9 °C)  Pulse:  [] 89  Resp:  [16-20] 18  SpO2:  [91 %-97 %] 92 %  BP: (112-177)/(66-94) 136/80     Weight: 98 kg (216 lb)  Body mass index is 37.08 kg/m².    Intake/Output Summary (Last 24 hours) at 10/8/2024 1002  Last data filed at 10/8/2024 0910  Gross per 24 hour   Intake 20 ml   Output --   Net 20 ml         Physical Exam  Constitutional:       Appearance: Normal appearance. She is ill-appearing.   HENT:      Head: Normocephalic and atraumatic.      Mouth/Throat:      Mouth: Mucous membranes are dry.   Cardiovascular:      Rate and Rhythm: Normal rate and regular rhythm.   Pulmonary:      Effort: Pulmonary effort is normal.      Breath sounds: Normal breath sounds.   Abdominal:      General: There is no distension.      Palpations: Abdomen is soft.      Tenderness: There is abdominal tenderness.   Musculoskeletal:         General: Normal range of motion.   Skin:     General: Skin is warm and dry.   Neurological:      General: No focal deficit present.      Mental Status: She is alert.   Psychiatric:         Mood and Affect: Mood normal.             Significant Labs: All pertinent labs within the past 24 hours have been reviewed.    Significant Imaging: I have reviewed all pertinent imaging results/findings within the past 24 hours.    Assessment/Plan:      * Vomiting  -no acute abnormalties seen on imaging, UA reassuring, lipase negative   -continue MIVF with LR at 100 ccs/hr   -NPO as patient does not feel she can tolerate food. Will start diet as tolerated.   -will start PPI for gastritis and sulcarafate   -IV Zofran scheduled and PO promethazine for  break through    Epigastric pain  -pain appears worse today, RUQ midepigastric region   -US ordered for repeat evaluation   -continue PPI and Sulcarafate   -will start PPI and Sulcarafate   -continue IV fluids         Primary malignant neuroendocrine tumor of small intestine  -follows closely with Oncology. Last seen 9/10 with repeat follow up scheduled tomorrow  -will discuss rescheduling appointment as patient is in the hospital    -receives Lanreotide injections   -mets seen on PET CT prior       Mild recurrent major depression  -stable   -restart home duloxetine     Chronic asthma without complication intolerant of symbicort - recurrent mouth sores  -albuterol as needed for sob or wheezing   -no acute concerns       Leukocytosis        Essential hypertension 1/2021 TTE normal, stress test negative  - Working to optimize BP control  - Continue home regimen of spironolactone, Metorprolol succinate, and amlodipien 10 mg   - Will continue to monitor and further titrate antihypertensives as clinically indicated       Primary osteoarthritis of left knee  -continue home Duloxetine for pain management  -PRN tylenol ordered       VTE Risk Mitigation (From admission, onward)           Ordered     enoxaparin injection 40 mg  Daily         10/07/24 1433     IP VTE HIGH RISK PATIENT  Once         10/07/24 1433     Place sequential compression device  Until discontinued         10/07/24 1433                    Discharge Planning   ATILIO: 10/9/2024     Code Status: Full Code   Is the patient medically ready for discharge?:     Reason for patient still in hospital (select all that apply): Treatment and Imaging  Discharge Plan A: Home with family                  Dorys Galan MD  Department of Hospital Medicine   [facility] - Internal Medicine Telemetry      Dorys Galan MD  Department of Hospital Medicine  [facility] - Emergency Dept

## 2024-10-08 ENCOUNTER — TELEPHONE (OUTPATIENT)
Dept: HEMATOLOGY/ONCOLOGY | Facility: CLINIC | Age: 69
End: 2024-10-08
Payer: MEDICARE

## 2024-10-08 PROBLEM — D72.829 LEUKOCYTOSIS: Status: ACTIVE | Noted: 2024-10-08

## 2024-10-08 LAB
ANION GAP SERPL CALC-SCNC: 12 MMOL/L (ref 8–16)
ANISOCYTOSIS BLD QL SMEAR: SLIGHT
BASOPHILS # BLD AUTO: 0.11 K/UL (ref 0–0.2)
BASOPHILS NFR BLD: 0 % (ref 0–1.9)
BASOPHILS NFR BLD: 0.4 % (ref 0–1.9)
BUN SERPL-MCNC: 13 MG/DL (ref 8–23)
CALCIUM SERPL-MCNC: 9.8 MG/DL (ref 8.7–10.5)
CHLORIDE SERPL-SCNC: 105 MMOL/L (ref 95–110)
CO2 SERPL-SCNC: 18 MMOL/L (ref 23–29)
CREAT SERPL-MCNC: 1 MG/DL (ref 0.5–1.4)
CRP SERPL-MCNC: 208.7 MG/L (ref 0–8.2)
DACRYOCYTES BLD QL SMEAR: ABNORMAL
DIFFERENTIAL METHOD BLD: ABNORMAL
DIFFERENTIAL METHOD BLD: ABNORMAL
EOSINOPHIL # BLD AUTO: 0.1 K/UL (ref 0–0.5)
EOSINOPHIL NFR BLD: 0 % (ref 0–8)
EOSINOPHIL NFR BLD: 0.2 % (ref 0–8)
ERYTHROCYTE [DISTWIDTH] IN BLOOD BY AUTOMATED COUNT: 15.2 % (ref 11.5–14.5)
ERYTHROCYTE [DISTWIDTH] IN BLOOD BY AUTOMATED COUNT: 15.6 % (ref 11.5–14.5)
EST. GFR  (NO RACE VARIABLE): >60 ML/MIN/1.73 M^2
GLUCOSE SERPL-MCNC: 95 MG/DL (ref 70–110)
HCT VFR BLD AUTO: 40.6 % (ref 37–48.5)
HCT VFR BLD AUTO: 43.3 % (ref 37–48.5)
HGB BLD-MCNC: 13.9 G/DL (ref 12–16)
HGB BLD-MCNC: 14.2 G/DL (ref 12–16)
HYPOCHROMIA BLD QL SMEAR: ABNORMAL
IMM GRANULOCYTES # BLD AUTO: 0.64 K/UL (ref 0–0.04)
IMM GRANULOCYTES # BLD AUTO: ABNORMAL K/UL (ref 0–0.04)
IMM GRANULOCYTES NFR BLD AUTO: 2.3 % (ref 0–0.5)
IMM GRANULOCYTES NFR BLD AUTO: ABNORMAL % (ref 0–0.5)
LACTATE SERPL-SCNC: 1.9 MMOL/L (ref 0.5–2.2)
LYMPHOCYTES # BLD AUTO: 1.5 K/UL (ref 1–4.8)
LYMPHOCYTES NFR BLD: 5.4 % (ref 18–48)
LYMPHOCYTES NFR BLD: 7 % (ref 18–48)
MAGNESIUM SERPL-MCNC: 1.7 MG/DL (ref 1.6–2.6)
MCH RBC QN AUTO: 29.2 PG (ref 27–31)
MCH RBC QN AUTO: 29.6 PG (ref 27–31)
MCHC RBC AUTO-ENTMCNC: 32.8 G/DL (ref 32–36)
MCHC RBC AUTO-ENTMCNC: 34.2 G/DL (ref 32–36)
MCV RBC AUTO: 86 FL (ref 82–98)
MCV RBC AUTO: 89 FL (ref 82–98)
MONOCYTES # BLD AUTO: 1.8 K/UL (ref 0.3–1)
MONOCYTES NFR BLD: 1 % (ref 4–15)
MONOCYTES NFR BLD: 6.3 % (ref 4–15)
NEUTROPHILS # BLD AUTO: 24.1 K/UL (ref 1.8–7.7)
NEUTROPHILS NFR BLD: 79 % (ref 38–73)
NEUTROPHILS NFR BLD: 85.4 % (ref 38–73)
NEUTS BAND NFR BLD MANUAL: 13 %
NRBC BLD-RTO: 0 /100 WBC
NRBC BLD-RTO: 0 /100 WBC
OVALOCYTES BLD QL SMEAR: ABNORMAL
PHOSPHATE SERPL-MCNC: 2.2 MG/DL (ref 2.7–4.5)
PLATELET # BLD AUTO: 179 K/UL (ref 150–450)
PLATELET # BLD AUTO: 201 K/UL (ref 150–450)
PMV BLD AUTO: 12.5 FL (ref 9.2–12.9)
PMV BLD AUTO: 12.8 FL (ref 9.2–12.9)
POIKILOCYTOSIS BLD QL SMEAR: SLIGHT
POLYCHROMASIA BLD QL SMEAR: ABNORMAL
POTASSIUM SERPL-SCNC: 4.1 MMOL/L (ref 3.5–5.1)
PROCALCITONIN SERPL IA-MCNC: 36.95 NG/ML
RBC # BLD AUTO: 4.7 M/UL (ref 4–5.4)
RBC # BLD AUTO: 4.86 M/UL (ref 4–5.4)
SODIUM SERPL-SCNC: 135 MMOL/L (ref 136–145)
SPHEROCYTES BLD QL SMEAR: ABNORMAL
WBC # BLD AUTO: 28.17 K/UL (ref 3.9–12.7)
WBC # BLD AUTO: 32.54 K/UL (ref 3.9–12.7)

## 2024-10-08 PROCEDURE — 85027 COMPLETE CBC AUTOMATED: CPT | Mod: HCNC | Performed by: STUDENT IN AN ORGANIZED HEALTH CARE EDUCATION/TRAINING PROGRAM

## 2024-10-08 PROCEDURE — 84100 ASSAY OF PHOSPHORUS: CPT | Mod: HCNC | Performed by: STUDENT IN AN ORGANIZED HEALTH CARE EDUCATION/TRAINING PROGRAM

## 2024-10-08 PROCEDURE — 86140 C-REACTIVE PROTEIN: CPT | Mod: HCNC | Performed by: STUDENT IN AN ORGANIZED HEALTH CARE EDUCATION/TRAINING PROGRAM

## 2024-10-08 PROCEDURE — 83605 ASSAY OF LACTIC ACID: CPT | Mod: HCNC | Performed by: STUDENT IN AN ORGANIZED HEALTH CARE EDUCATION/TRAINING PROGRAM

## 2024-10-08 PROCEDURE — 87040 BLOOD CULTURE FOR BACTERIA: CPT | Mod: 59,HCNC | Performed by: STUDENT IN AN ORGANIZED HEALTH CARE EDUCATION/TRAINING PROGRAM

## 2024-10-08 PROCEDURE — 87186 SC STD MICRODIL/AGAR DIL: CPT | Mod: HCNC | Performed by: STUDENT IN AN ORGANIZED HEALTH CARE EDUCATION/TRAINING PROGRAM

## 2024-10-08 PROCEDURE — 85025 COMPLETE CBC W/AUTO DIFF WBC: CPT | Mod: HCNC | Performed by: STUDENT IN AN ORGANIZED HEALTH CARE EDUCATION/TRAINING PROGRAM

## 2024-10-08 PROCEDURE — 87077 CULTURE AEROBIC IDENTIFY: CPT | Mod: 59,HCNC | Performed by: STUDENT IN AN ORGANIZED HEALTH CARE EDUCATION/TRAINING PROGRAM

## 2024-10-08 PROCEDURE — 80048 BASIC METABOLIC PNL TOTAL CA: CPT | Mod: HCNC | Performed by: STUDENT IN AN ORGANIZED HEALTH CARE EDUCATION/TRAINING PROGRAM

## 2024-10-08 PROCEDURE — 63600175 PHARM REV CODE 636 W HCPCS: Mod: HCNC | Performed by: STUDENT IN AN ORGANIZED HEALTH CARE EDUCATION/TRAINING PROGRAM

## 2024-10-08 PROCEDURE — 87154 CUL TYP ID BLD PTHGN 6+ TRGT: CPT | Mod: HCNC | Performed by: STUDENT IN AN ORGANIZED HEALTH CARE EDUCATION/TRAINING PROGRAM

## 2024-10-08 PROCEDURE — 36415 COLL VENOUS BLD VENIPUNCTURE: CPT | Mod: HCNC,XB | Performed by: STUDENT IN AN ORGANIZED HEALTH CARE EDUCATION/TRAINING PROGRAM

## 2024-10-08 PROCEDURE — 84145 PROCALCITONIN (PCT): CPT | Mod: HCNC | Performed by: STUDENT IN AN ORGANIZED HEALTH CARE EDUCATION/TRAINING PROGRAM

## 2024-10-08 PROCEDURE — 85007 BL SMEAR W/DIFF WBC COUNT: CPT | Mod: HCNC | Performed by: STUDENT IN AN ORGANIZED HEALTH CARE EDUCATION/TRAINING PROGRAM

## 2024-10-08 PROCEDURE — 83735 ASSAY OF MAGNESIUM: CPT | Mod: HCNC | Performed by: STUDENT IN AN ORGANIZED HEALTH CARE EDUCATION/TRAINING PROGRAM

## 2024-10-08 PROCEDURE — 25000003 PHARM REV CODE 250: Mod: HCNC | Performed by: STUDENT IN AN ORGANIZED HEALTH CARE EDUCATION/TRAINING PROGRAM

## 2024-10-08 PROCEDURE — 11000001 HC ACUTE MED/SURG PRIVATE ROOM: Mod: HCNC

## 2024-10-08 RX ORDER — PANTOPRAZOLE SODIUM 40 MG/10ML
40 INJECTION, POWDER, LYOPHILIZED, FOR SOLUTION INTRAVENOUS DAILY
Status: DISCONTINUED | OUTPATIENT
Start: 2024-10-08 | End: 2024-10-09

## 2024-10-08 RX ORDER — MORPHINE SULFATE 2 MG/ML
2 INJECTION, SOLUTION INTRAMUSCULAR; INTRAVENOUS EVERY 6 HOURS PRN
Status: DISCONTINUED | OUTPATIENT
Start: 2024-10-08 | End: 2024-10-12 | Stop reason: HOSPADM

## 2024-10-08 RX ORDER — CAPSAICIN 0.03 G/100G
CREAM TOPICAL 3 TIMES DAILY PRN
Status: DISCONTINUED | OUTPATIENT
Start: 2024-10-08 | End: 2024-10-12 | Stop reason: HOSPADM

## 2024-10-08 RX ADMIN — PANTOPRAZOLE SODIUM 40 MG: 40 TABLET, DELAYED RELEASE ORAL at 05:10

## 2024-10-08 RX ADMIN — GABAPENTIN 100 MG: 100 CAPSULE ORAL at 09:10

## 2024-10-08 RX ADMIN — ONDANSETRON 8 MG: 2 INJECTION INTRAMUSCULAR; INTRAVENOUS at 09:10

## 2024-10-08 RX ADMIN — ALUMINUM HYDROXIDE, MAGNESIUM HYDROXIDE, AND SIMETHICONE 30 ML: 1200; 120; 1200 SUSPENSION ORAL at 09:10

## 2024-10-08 RX ADMIN — SPIRONOLACTONE 50 MG: 25 TABLET, FILM COATED ORAL at 09:10

## 2024-10-08 RX ADMIN — ALUMINUM HYDROXIDE, MAGNESIUM HYDROXIDE, AND SIMETHICONE 30 ML: 1200; 120; 1200 SUSPENSION ORAL at 11:10

## 2024-10-08 RX ADMIN — AMLODIPINE BESYLATE 10 MG: 10 TABLET ORAL at 09:10

## 2024-10-08 RX ADMIN — SODIUM PHOSPHATE, MONOBASIC, MONOHYDRATE AND SODIUM PHOSPHATE, DIBASIC, ANHYDROUS 20.01 MMOL: 142; 276 INJECTION, SOLUTION INTRAVENOUS at 11:10

## 2024-10-08 RX ADMIN — SODIUM CHLORIDE, POTASSIUM CHLORIDE, SODIUM LACTATE AND CALCIUM CHLORIDE: 600; 310; 30; 20 INJECTION, SOLUTION INTRAVENOUS at 02:10

## 2024-10-08 RX ADMIN — ENOXAPARIN SODIUM 40 MG: 40 INJECTION SUBCUTANEOUS at 05:10

## 2024-10-08 RX ADMIN — OXYCODONE 5 MG: 5 TABLET ORAL at 08:10

## 2024-10-08 RX ADMIN — MORPHINE SULFATE 2 MG: 2 INJECTION, SOLUTION INTRAMUSCULAR; INTRAVENOUS at 02:10

## 2024-10-08 RX ADMIN — DULOXETINE HYDROCHLORIDE 60 MG: 60 CAPSULE, DELAYED RELEASE ORAL at 08:10

## 2024-10-08 RX ADMIN — SUCRALFATE 1 G: 1 SUSPENSION ORAL at 11:10

## 2024-10-08 RX ADMIN — ACETAMINOPHEN 650 MG: 325 TABLET ORAL at 05:10

## 2024-10-08 RX ADMIN — METOPROLOL SUCCINATE 100 MG: 50 TABLET, EXTENDED RELEASE ORAL at 09:10

## 2024-10-08 RX ADMIN — SUCRALFATE 1 G: 1 SUSPENSION ORAL at 05:10

## 2024-10-08 RX ADMIN — GABAPENTIN 300 MG: 300 CAPSULE ORAL at 08:10

## 2024-10-08 RX ADMIN — OXYCODONE 5 MG: 5 TABLET ORAL at 09:10

## 2024-10-08 RX ADMIN — PANTOPRAZOLE SODIUM 40 MG: 40 INJECTION, POWDER, LYOPHILIZED, FOR SOLUTION INTRAVENOUS at 02:10

## 2024-10-08 RX ADMIN — ALUMINUM HYDROXIDE, MAGNESIUM HYDROXIDE, AND SIMETHICONE 30 ML: 1200; 120; 1200 SUSPENSION ORAL at 05:10

## 2024-10-08 RX ADMIN — DULOXETINE HYDROCHLORIDE 60 MG: 60 CAPSULE, DELAYED RELEASE ORAL at 09:10

## 2024-10-08 RX ADMIN — ONDANSETRON 8 MG: 2 INJECTION INTRAMUSCULAR; INTRAVENOUS at 05:10

## 2024-10-08 RX ADMIN — ONDANSETRON 8 MG: 2 INJECTION INTRAMUSCULAR; INTRAVENOUS at 02:10

## 2024-10-08 RX ADMIN — Medication 6 MG: at 08:10

## 2024-10-08 NOTE — PROGRESS NOTES
[facility] - Internal Medicine [facility] Medicine  Progress Note    Patient Name: Bell Lynn  MRN: [id number]  Patient Class: IP- Inpatient   Admission Date: 10/7/2024  Length of Stay: 0 days  Attending Physician: Dorys Galan MD  Primary Care Provider: Cipriano Carrera MD        Subjective:     Principal Problem:Vomiting        HPI:  Bell Lynn is a 69 year old F with a history of small intestine neuroendocrine tumor, peptic ulcer disease, GERD, HTN, anxiety, and Iron deficiency anemia who presents to the ED For epigastric abdominal pain and vomiting.  Patient was in her usual state of health until about 2:30 am this morning when she woke up with midepigastric pain that radiate near her chest. She had multiple episodes of nonbloody nonbillous emesis at home. She was unable to take her PO medications due to vomiting. She has been unable to eat anything at home and due to worsening pain came to the ED for further evaluation. Talia fever, chills, chest pain, or shortness of breath. She has not had any problems with urination, hematuria, diarrhea, or constipation.     In the ED patient was afebrile and VSS. Labs were notable for leukocytosis to 20.67, otherwise wnl. CMP was reassuring and lipase was negative. EKG showed no signs of acute ischemia and troponin was negative. UA negative for infection. CXR with no abnormal findings. CT abdomen with no acute findings but showed heterogenous mass of the small bowel stable in size and hepatomegaly and steatosis with hepatic lesions seen on PET-CT prior. She was give Morphine and Zofran with minimal improvement and admitted to medicine for further management.     Overview/Hospital Course:  No notes on file    No new subjective & objective note has been filed under this hospital service since the last note was generated.      Assessment/Plan:      * Vomiting  -no acute abnormalties seen on imaging, UA reassuring, lipase negative   -continue MIVF  with LR at 100 ccs/hr   -NPO as patient does not feel she can tolerate food. Will start diet as tolerated.   -will start PPI for gastritis and sulcarafate   -IV Zofran scheduled and PO promethazine for break through    Epigastric pain  -pain appears worse today, RUQ midepigastric region   -US ordered for repeat evaluation   -continue PPI and Sulcarafate   -will start PPI and Sulcarafate   -continue IV fluids         Primary malignant neuroendocrine tumor of small intestine  -follows closely with Oncology. Last seen 9/10 with repeat follow up scheduled tomorrow  -will discuss rescheduling appointment as patient is in the hospital    -receives Lanreotide injections   -mets seen on PET CT prior       Mild recurrent major depression  -stable   -restart home duloxetine     Chronic asthma without complication intolerant of symbicort - recurrent mouth sores  -albuterol as needed for sob or wheezing   -no acute concerns       Leukocytosis  -WBC trending upwards to 30k   -medications reviewed   -repeat UA and CBC ordered  -procal, CRP and blood cultures ordered   -unclear infectious source at this time, possible watery bowel movements. Will consider C. Diff workup       Essential hypertension 1/2021 TTE normal, stress test negative  - Working to optimize BP control  - Continue home regimen of spironolactone, Metorprolol succinate, and amlodipien 10 mg   - Will continue to monitor and further titrate antihypertensives as clinically indicated       Primary osteoarthritis of left knee  -continue home Duloxetine for pain management  -PRN tylenol ordered       VTE Risk Mitigation (From admission, onward)           Ordered     enoxaparin injection 40 mg  Daily         10/07/24 1433     IP VTE HIGH RISK PATIENT  Once         10/07/24 1433     Place sequential compression device  Until discontinued         10/07/24 1433                    Discharge Planning   ATILIO: 10/11/2024     Code Status: Full Code   Is the patient medically  ready for discharge?:     Reason for patient still in hospital (select all that apply): Treatment and Imaging  Discharge Plan A: Home with family                  Dorys Galan MD  Department of Hospital Medicine   [facility] - Internal Medicine Telemetry

## 2024-10-08 NOTE — ASSESSMENT & PLAN NOTE
-pain appears worse today, RUQ midepigastric region   -US ordered for repeat evaluation   -continue PPI and Sulcarafate   -will start PPI and Sulcarafate   -continue IV fluids

## 2024-10-08 NOTE — TELEPHONE ENCOUNTER
Spoke to patient's  to check on her and he explain she was sleeping and still having pain and will keep us updated

## 2024-10-08 NOTE — ED NOTES
LOC: The patient is awake, alert, aware of environment with an appropriate affect. Oriented x4, speaking appropriately  APPEARANCE: Pt resting comfortably, in no acute distress, pt is clean and well groomed, clothing properly fastened  SKIN:The skin is warm and dry, color consistent with ethnicity, patient has normal skin turgor and moist mucus membranes, no bruising noted   RESPIRATORY:Airway is open and patent, respirations are spontaneous, patient has a normal effort and rate, no accessory muscle use noted.  CARDIAC: Normal rate and rhythm, no peripheral edema noted, capillary refill < 3 seconds, bilateral radial pulses 2+.  ABDOMEN: Soft, non tender, non distended. Bowel sounds present x 4 quadrants.   NEUROLOGIC: PERRLA, facial expression is symmetrical, patient moving all extremities spontaneously, normal sensation in all extremities when touched with a finger.  Follows all commands appropriately  MUSCULOSKELETAL: Patient moving all extremities spontaneously, no obvious swelling or deformities noted.

## 2024-10-08 NOTE — ASSESSMENT & PLAN NOTE
-no acute abnormalties seen on imaging, UA reassuring, lipase negative   -continue MIVF with LR at 100 ccs/hr   -NPO as patient does not feel she can tolerate food. Will start diet as tolerated.   -will start PPI for gastritis and sulcarafate   -IV Zofran scheduled and PO promethazine for break through

## 2024-10-08 NOTE — SUBJECTIVE & OBJECTIVE
Interval History: Patient seen and examined. She continues to have significant mid epigastric and RUQ abdominal pain. Unable to eat or drink anything.       Objective:     Vital Signs (Most Recent):  Temp: 98.4 °F (36.9 °C) (10/08/24 0733)  Pulse: 89 (10/08/24 0733)  Resp: 18 (10/08/24 0906)  BP: 136/80 (10/08/24 0733)  SpO2: (!) 92 % (10/08/24 0733) Vital Signs (24h Range):  Temp:  [97.6 °F (36.4 °C)-99 °F (37.2 °C)] 98.4 °F (36.9 °C)  Pulse:  [] 89  Resp:  [16-20] 18  SpO2:  [91 %-97 %] 92 %  BP: (112-177)/(66-94) 136/80     Weight: 98 kg (216 lb)  Body mass index is 37.08 kg/m².    Intake/Output Summary (Last 24 hours) at 10/8/2024 1002  Last data filed at 10/8/2024 0910  Gross per 24 hour   Intake 20 ml   Output --   Net 20 ml         Physical Exam  Constitutional:       Appearance: Normal appearance. She is ill-appearing.   HENT:      Head: Normocephalic and atraumatic.      Mouth/Throat:      Mouth: Mucous membranes are dry.   Cardiovascular:      Rate and Rhythm: Normal rate and regular rhythm.   Pulmonary:      Effort: Pulmonary effort is normal.      Breath sounds: Normal breath sounds.   Abdominal:      General: There is no distension.      Palpations: Abdomen is soft.      Tenderness: There is abdominal tenderness.   Musculoskeletal:         General: Normal range of motion.   Skin:     General: Skin is warm and dry.   Neurological:      General: No focal deficit present.      Mental Status: She is alert.   Psychiatric:         Mood and Affect: Mood normal.             Significant Labs: All pertinent labs within the past 24 hours have been reviewed.    Significant Imaging: I have reviewed all pertinent imaging results/findings within the past 24 hours.

## 2024-10-08 NOTE — PLAN OF CARE
[facility] - Internal Medicine Telemetry  Initial Discharge Assessment       Primary Care Provider: Cipriano Carrera MD    Admission Diagnosis: Chest discomfort [R07.89]  Nausea & vomiting [R11.2]    Admission Date: 10/7/2024  Expected Discharge Date: 10/9/2024    Transition of Care Barriers: (P) None    Payor: HU[employer] MANAGED MEDICARE / Plan: HU[employer]NA SNP HMO PPO SPECIAL NEEDS / Product Type: Medicare Advantage /     Extended Emergency Contact Information  Primary Emergency Contact: Brandin Lynn  Address: [address] [place] [id number] [address]  Home Phone: [phone]  Mobile Phone: [phone]  Relation: Spouse    Discharge Plan A: (P) Home with family  Discharge Plan B: (P) Home      [facility] - [place], [place] - [address]  [address] [address]  Phone: [phone] Fax: [phone]    [facility] [place] - [address]  [address]  [place] [address]  Phone: [phone] Fax: [phone]      Initial Assessment (most recent)       Adult Discharge Assessment - 10/08/24 0954          Discharge Assessment    Assessment Type Discharge Planning Assessment (P)      Confirmed/corrected address, phone number and insurance Yes (P)      Confirmed Demographics Correct on Facesheet (P)      Source of Information patient (P)      Does patient/caregiver understand observation status Yes (P)      Communicated ATILIO with patient/caregiver Yes (P)      People in Home spouse (P)      Do you expect to return to your current living situation? Yes (P)      Do you have help at home or someone to help you manage your care at home? Yes (P)      Who are your caregiver(s) and their phone number(s)? Brandin Lynn (Spouse)  [phone] (P)      Prior to hospitilization cognitive status: Alert/Oriented (P)      Current cognitive status: Alert/Oriented (P)      Walking or Climbing Stairs Difficulty no (P)      Dressing/Bathing Difficulty no  (P)      Home Accessibility wheelchair accessible (P)      Home Layout Able to live on 1st floor (P)      Equipment Currently Used at Home none (P)      Readmission within 30 days? No (P)      Patient currently being followed by outpatient case management? No (P)      Do you currently have service(s) that help you manage your care at home? No (P)      Do you take prescription medications? Yes (P)      Do you have prescription coverage? Yes (P)      Coverage HU[employer] MANAGED MEDICARE - [employer]NA [facility] HMO PPO SPECIAL NEEDS - CAPITATED (P)      Do you have any problems affording any of your prescribed medications? No (P)      Is the patient taking medications as prescribed? yes (P)      Who is going to help you get home at discharge? Brandin Lynn (Spouse)  [phone] (P)      How do you get to doctors appointments? car, drives self;family or friend will provide (P)      Are you on dialysis? No (P)      Do you take coumadin? No (P)      Discharge Plan A Home with family (P)      Discharge Plan B Home (P)      Discharge Plan discussed with: Patient (P)      Transition of Care Barriers None (P)         Physical Activity    On average, how many days per week do you engage in moderate to strenuous exercise (like a brisk walk)? 0 days (P)      On average, how many minutes do you engage in exercise at this level? 0 min (P)         Financial Resource Strain    How hard is it for you to pay for the very basics like food, housing, medical care, and heating? Not hard at all (P)         Housing Stability    In the last 12 months, was there a time when you were not able to pay the mortgage or rent on time? No (P)      At any time in the past 12 months, were you homeless or living in a shelter (including now)? No (P)         Transportation Needs    Has the lack of transportation kept you from medical appointments, meetings, work or from getting things needed for daily living? No (P)         Food Insecurity    Within the past 12 months,  you worried that your food would run out before you got the money to buy more. Never true (P)      Within the past 12 months, the food you bought just didn't last and you didn't have money to get more. Never true (P)         Stress    Do you feel stress - tense, restless, nervous, or anxious, or unable to sleep at night because your mind is troubled all the time - these days? Only a little (P)         Social Isolation    How often do you feel lonely or isolated from those around you?  Never (P)         Alcohol Use    Q1: How often do you have a drink containing alcohol? Monthly or less (P)      Q2: How many drinks containing alcohol do you have on a typical day when you are drinking? 1 or 2 (P)      Q3: How often do you have six or more drinks on one occasion? Never (P)         Utilities    In the past 12 months has the electric, gas, oil, or water company threatened to shut off services in your home? No (P)         Health Literacy    How often do you need to have someone help you when you read instructions, pamphlets, or other written material from your doctor or pharmacy? Never (P)         OTHER    Name(s) of People in Home Brandin Lynn (Spouse)  [phone] (P)                  Discharge Plan A and Plan B have been determined by review of patient's clinical status, future medical and therapeutic needs, and coverage/benefits for post-acute care in coordination with multidisciplinary team members.                     UMBERTO Inman, SW  [facility]  Case Management  Ext. [phone]

## 2024-10-08 NOTE — ED NOTES
Telemetry Verification   Patient placed on Telemetry Box  Verified with War Room  Box # [id number]   Monitor Tech War Room    Rate    Rhythm

## 2024-10-09 PROBLEM — R78.81 BACTEREMIA: Status: ACTIVE | Noted: 2024-10-09

## 2024-10-09 LAB
ACINETOBACTER CALCOACETICUS/BAUMANNII COMPLEX: NOT DETECTED
ANION GAP SERPL CALC-SCNC: 12 MMOL/L (ref 8–16)
ANISOCYTOSIS BLD QL SMEAR: SLIGHT
BACTEROIDES FRAGILIS: NOT DETECTED
BASOPHILS # BLD AUTO: 0.05 K/UL (ref 0–0.2)
BASOPHILS NFR BLD: 0.3 % (ref 0–1.9)
BUN SERPL-MCNC: 15 MG/DL (ref 8–23)
CALCIUM SERPL-MCNC: 9.9 MG/DL (ref 8.7–10.5)
CANDIDA ALBICANS: NOT DETECTED
CANDIDA AURIS: NOT DETECTED
CANDIDA GLABRATA: NOT DETECTED
CANDIDA KRUSEI: NOT DETECTED
CANDIDA PARAPSILOSIS: NOT DETECTED
CANDIDA TROPICALIS: NOT DETECTED
CHLORIDE SERPL-SCNC: 102 MMOL/L (ref 95–110)
CO2 SERPL-SCNC: 20 MMOL/L (ref 23–29)
CREAT SERPL-MCNC: 0.9 MG/DL (ref 0.5–1.4)
CRYPTOCOCCUS NEOFORMANS/GATTII: NOT DETECTED
CTX-M GENE (ESBL PRODUCER): NOT DETECTED
DIFFERENTIAL METHOD BLD: ABNORMAL
ENTEROBACTER CLOACAE COMPLEX: DETECTED
ENTEROBACTERALES: ABNORMAL
ENTEROCOCCUS FAECALIS: NOT DETECTED
ENTEROCOCCUS FAECIUM: NOT DETECTED
EOSINOPHIL # BLD AUTO: 0.1 K/UL (ref 0–0.5)
EOSINOPHIL NFR BLD: 0.4 % (ref 0–8)
ERYTHROCYTE [DISTWIDTH] IN BLOOD BY AUTOMATED COUNT: 15.6 % (ref 11.5–14.5)
ESCHERICHIA COLI: NOT DETECTED
EST. GFR  (NO RACE VARIABLE): >60 ML/MIN/1.73 M^2
FINAL PATHOLOGIC DIAGNOSIS: NORMAL
GLUCOSE SERPL-MCNC: 87 MG/DL (ref 70–110)
GROSS: NORMAL
HAEMOPHILUS INFLUENZAE: NOT DETECTED
HCT VFR BLD AUTO: 42.6 % (ref 37–48.5)
HGB BLD-MCNC: 14.5 G/DL (ref 12–16)
IMM GRANULOCYTES # BLD AUTO: 0.5 K/UL (ref 0–0.04)
IMM GRANULOCYTES NFR BLD AUTO: 3 % (ref 0–0.5)
IMP GENE (CARBAPENEM RESISTANT): NOT DETECTED
KLEBSIELLA AEROGENES: NOT DETECTED
KLEBSIELLA OXYTOCA: NOT DETECTED
KLEBSIELLA PNEUMONIAE GROUP: NOT DETECTED
KPC RESISTANCE GENE (CARBAPENEM): NOT DETECTED
LISTERIA MONOCYTOGENES: NOT DETECTED
LYMPHOCYTES # BLD AUTO: 1.2 K/UL (ref 1–4.8)
LYMPHOCYTES NFR BLD: 7.1 % (ref 18–48)
Lab: NORMAL
MAGNESIUM SERPL-MCNC: 2.3 MG/DL (ref 1.6–2.6)
MCH RBC QN AUTO: 29.5 PG (ref 27–31)
MCHC RBC AUTO-ENTMCNC: 34 G/DL (ref 32–36)
MCR-1: NOT DETECTED
MCV RBC AUTO: 87 FL (ref 82–98)
MEC A/C AND MREJ (MRSA): ABNORMAL
MEC A/C: ABNORMAL
MONOCYTES # BLD AUTO: 0.8 K/UL (ref 0.3–1)
MONOCYTES NFR BLD: 4.6 % (ref 4–15)
NDM GENE (CARBAPENEM RESISTANT): NOT DETECTED
NEISSERIA MENINGITIDIS: NOT DETECTED
NEUTROPHILS # BLD AUTO: 14.1 K/UL (ref 1.8–7.7)
NEUTROPHILS NFR BLD: 84.6 % (ref 38–73)
NRBC BLD-RTO: 0 /100 WBC
OXA-48-LIKE (CARBAPENEM RESISTANT): NOT DETECTED
PHOSPHATE SERPL-MCNC: 2 MG/DL (ref 2.7–4.5)
PLATELET # BLD AUTO: 131 K/UL (ref 150–450)
PMV BLD AUTO: 13.8 FL (ref 9.2–12.9)
POIKILOCYTOSIS BLD QL SMEAR: SLIGHT
POTASSIUM SERPL-SCNC: 5 MMOL/L (ref 3.5–5.1)
PROTEUS SPECIES: NOT DETECTED
PSEUDOMONAS AERUGINOSA: NOT DETECTED
RBC # BLD AUTO: 4.92 M/UL (ref 4–5.4)
SALMONELLA SP: NOT DETECTED
SERRATIA MARCESCENS: NOT DETECTED
SODIUM SERPL-SCNC: 134 MMOL/L (ref 136–145)
SPHEROCYTES BLD QL SMEAR: ABNORMAL
STAPHYLOCOCCUS AUREUS: NOT DETECTED
STAPHYLOCOCCUS EPIDERMIDIS: NOT DETECTED
STAPHYLOCOCCUS LUGDUNESIS: NOT DETECTED
STAPHYLOCOCCUS SPECIES: NOT DETECTED
STENOTROPHOMONAS MALTOPHILIA: NOT DETECTED
STREPTOCOCCUS AGALACTIAE: NOT DETECTED
STREPTOCOCCUS PNEUMONIAE: NOT DETECTED
STREPTOCOCCUS PYOGENES: NOT DETECTED
STREPTOCOCCUS SPECIES: NOT DETECTED
VAN A/B (VRE GENE): ABNORMAL
VIM GENE (CARBAPENEM RESISTANT): NOT DETECTED
WBC # BLD AUTO: 16.71 K/UL (ref 3.9–12.7)
WBC TOXIC VACUOLES BLD QL SMEAR: PRESENT

## 2024-10-09 PROCEDURE — 84100 ASSAY OF PHOSPHORUS: CPT | Mod: HCNC | Performed by: STUDENT IN AN ORGANIZED HEALTH CARE EDUCATION/TRAINING PROGRAM

## 2024-10-09 PROCEDURE — 63600175 PHARM REV CODE 636 W HCPCS: Mod: HCNC | Performed by: STUDENT IN AN ORGANIZED HEALTH CARE EDUCATION/TRAINING PROGRAM

## 2024-10-09 PROCEDURE — 63600175 PHARM REV CODE 636 W HCPCS: Mod: HCNC | Performed by: INTERNAL MEDICINE

## 2024-10-09 PROCEDURE — 36415 COLL VENOUS BLD VENIPUNCTURE: CPT | Mod: HCNC | Performed by: STUDENT IN AN ORGANIZED HEALTH CARE EDUCATION/TRAINING PROGRAM

## 2024-10-09 PROCEDURE — 83735 ASSAY OF MAGNESIUM: CPT | Mod: HCNC | Performed by: STUDENT IN AN ORGANIZED HEALTH CARE EDUCATION/TRAINING PROGRAM

## 2024-10-09 PROCEDURE — 25000003 PHARM REV CODE 250: Mod: HCNC | Performed by: STUDENT IN AN ORGANIZED HEALTH CARE EDUCATION/TRAINING PROGRAM

## 2024-10-09 PROCEDURE — 11000001 HC ACUTE MED/SURG PRIVATE ROOM: Mod: HCNC

## 2024-10-09 PROCEDURE — 80048 BASIC METABOLIC PNL TOTAL CA: CPT | Mod: HCNC | Performed by: STUDENT IN AN ORGANIZED HEALTH CARE EDUCATION/TRAINING PROGRAM

## 2024-10-09 PROCEDURE — 25000003 PHARM REV CODE 250: Mod: HCNC | Performed by: INTERNAL MEDICINE

## 2024-10-09 PROCEDURE — 85025 COMPLETE CBC W/AUTO DIFF WBC: CPT | Mod: HCNC | Performed by: STUDENT IN AN ORGANIZED HEALTH CARE EDUCATION/TRAINING PROGRAM

## 2024-10-09 RX ORDER — PANTOPRAZOLE SODIUM 40 MG/1
40 TABLET, DELAYED RELEASE ORAL DAILY
Status: DISCONTINUED | OUTPATIENT
Start: 2024-10-09 | End: 2024-10-12 | Stop reason: HOSPADM

## 2024-10-09 RX ADMIN — CEFEPIME 2 G: 2 INJECTION, POWDER, FOR SOLUTION INTRAVENOUS at 10:10

## 2024-10-09 RX ADMIN — CEFEPIME 2 G: 2 INJECTION, POWDER, FOR SOLUTION INTRAVENOUS at 05:10

## 2024-10-09 RX ADMIN — DULOXETINE HYDROCHLORIDE 60 MG: 60 CAPSULE, DELAYED RELEASE ORAL at 09:10

## 2024-10-09 RX ADMIN — ONDANSETRON 8 MG: 2 INJECTION INTRAMUSCULAR; INTRAVENOUS at 02:10

## 2024-10-09 RX ADMIN — OXYCODONE 5 MG: 5 TABLET ORAL at 02:10

## 2024-10-09 RX ADMIN — CEFEPIME 2 G: 2 INJECTION, POWDER, FOR SOLUTION INTRAVENOUS at 12:10

## 2024-10-09 RX ADMIN — ONDANSETRON 8 MG: 2 INJECTION INTRAMUSCULAR; INTRAVENOUS at 05:10

## 2024-10-09 RX ADMIN — AMLODIPINE BESYLATE 10 MG: 10 TABLET ORAL at 09:10

## 2024-10-09 RX ADMIN — SODIUM PHOSPHATE, MONOBASIC, MONOHYDRATE AND SODIUM PHOSPHATE, DIBASIC, ANHYDROUS 30 MMOL: 142; 276 INJECTION, SOLUTION INTRAVENOUS at 08:10

## 2024-10-09 RX ADMIN — ACETAMINOPHEN 650 MG: 325 TABLET ORAL at 03:10

## 2024-10-09 RX ADMIN — SODIUM CHLORIDE, POTASSIUM CHLORIDE, SODIUM LACTATE AND CALCIUM CHLORIDE: 600; 310; 30; 20 INJECTION, SOLUTION INTRAVENOUS at 10:10

## 2024-10-09 RX ADMIN — ALUMINUM HYDROXIDE, MAGNESIUM HYDROXIDE, AND SIMETHICONE 30 ML: 1200; 120; 1200 SUSPENSION ORAL at 05:10

## 2024-10-09 RX ADMIN — METOPROLOL SUCCINATE 100 MG: 50 TABLET, EXTENDED RELEASE ORAL at 09:10

## 2024-10-09 RX ADMIN — PANTOPRAZOLE SODIUM 40 MG: 40 INJECTION, POWDER, LYOPHILIZED, FOR SOLUTION INTRAVENOUS at 09:10

## 2024-10-09 RX ADMIN — GABAPENTIN 100 MG: 100 CAPSULE ORAL at 09:10

## 2024-10-09 RX ADMIN — ONDANSETRON 8 MG: 2 INJECTION INTRAMUSCULAR; INTRAVENOUS at 09:10

## 2024-10-09 RX ADMIN — GABAPENTIN 300 MG: 300 CAPSULE ORAL at 09:10

## 2024-10-09 RX ADMIN — SPIRONOLACTONE 50 MG: 25 TABLET, FILM COATED ORAL at 09:10

## 2024-10-09 RX ADMIN — SUCRALFATE 1 G: 1 SUSPENSION ORAL at 05:10

## 2024-10-09 RX ADMIN — PANTOPRAZOLE SODIUM 40 MG: 40 TABLET, DELAYED RELEASE ORAL at 02:10

## 2024-10-09 RX ADMIN — ENOXAPARIN SODIUM 40 MG: 40 INJECTION SUBCUTANEOUS at 05:10

## 2024-10-09 RX ADMIN — CAPSAICIN: 0.25 CREAM TOPICAL at 03:10

## 2024-10-09 RX ADMIN — ALUMINUM HYDROXIDE, MAGNESIUM HYDROXIDE, AND SIMETHICONE 30 ML: 1200; 120; 1200 SUSPENSION ORAL at 09:10

## 2024-10-09 NOTE — ASSESSMENT & PLAN NOTE
-likely source of elevated WBC and CRP   -blood cultures positive for Enterobacter Cloacae   -will continue Cefepime q8h pending susceptibilities

## 2024-10-09 NOTE — HOSPITAL COURSE
While on the floor patient was started on IV fluids and symptom management for nausea and gastritis. RUQ was ordered due to significant tenderness which showed cholelithiasis but no cholecystitis or choledocholithiasis. WBC continued to trend upward and further evaluation was performed including blood cultures. Blood cultures notable for Enterobacter Cloacae and patient was subsequently started on Cefepime while susceptibilities were pending, ID was consulted, to complete therapy with levaquin. Abdominal pain and appetite improved and patient feeling better.    Patient feeling better overall. In agreement with discharge home and finishing oral antibiotics at home.     - will hold some BP medications at discharge as BP has been on the lower end during hospitalization. Will need PCP follow up to titrate BP meds    General: no acute distress noted  Lungs CTA  Heart RRR  Abdomen is nontender and soft

## 2024-10-09 NOTE — PLAN OF CARE
"Notified patient having a positive blood culture "Gram stain aer bottle: Gram negative rods"; with ongoing WBC and primary appears to be looking for source, will start cefepime   "

## 2024-10-09 NOTE — ASSESSMENT & PLAN NOTE
-pain appears worse today, RUQ midepigastric region   -US ordered for repeat evaluation and reassuring   -continue PPI and sulcarafate   -continue IV fluids   -PO diet as tolerated

## 2024-10-09 NOTE — SUBJECTIVE & OBJECTIVE
Interval History: Patient seen and examined this AM. Abdominal pain is improving       Objective:     Vital Signs (Most Recent):  Temp: 98.9 °F (37.2 °C) (10/09/24 0745)  Pulse: 103 (10/09/24 0745)  Resp: 18 (10/09/24 0745)  BP: 121/86 (10/09/24 0745)  SpO2: (!) 18 % (10/09/24 0745) Vital Signs (24h Range):  Temp:  [98.4 °F (36.9 °C)-100.5 °F (38.1 °C)] 98.9 °F (37.2 °C)  Pulse:  [] 103  Resp:  [16-18] 18  SpO2:  [18 %-94 %] 18 %  BP: ()/(59-86) 121/86     Weight: 98 kg (216 lb)  Body mass index is 37.08 kg/m².    Intake/Output Summary (Last 24 hours) at 10/9/2024 1326  Last data filed at 10/8/2024 1330  Gross per 24 hour   Intake 0 ml   Output --   Net 0 ml         Physical Exam  Constitutional:       Appearance: Normal appearance. She is not ill-appearing.   HENT:      Head: Normocephalic and atraumatic.      Mouth/Throat:      Mouth: Mucous membranes are dry.   Cardiovascular:      Rate and Rhythm: Normal rate and regular rhythm.   Pulmonary:      Effort: Pulmonary effort is normal.      Breath sounds: Normal breath sounds.   Abdominal:      General: There is no distension.      Palpations: Abdomen is soft.      Tenderness: There is no abdominal tenderness.   Musculoskeletal:         General: Normal range of motion.   Skin:     General: Skin is warm and dry.   Neurological:      General: No focal deficit present.      Mental Status: She is alert.   Psychiatric:         Mood and Affect: Mood normal.             Significant Labs: All pertinent labs within the past 24 hours have been reviewed.    Significant Imaging: I have reviewed all pertinent imaging results/findings within the past 24 hours.

## 2024-10-09 NOTE — ASSESSMENT & PLAN NOTE
-WBC initially trending upwards, now improving   -medications reviewed   -blood cultures positive for enterobacter  -will continue Cefepime q8h as above pending susceptibilities

## 2024-10-09 NOTE — PROGRESS NOTES
[facility] - Internal Medicine [facility] Medicine  Progress Note    Patient Name: Bell Lynn  MRN: [id number]  Patient Class: IP- Inpatient   Admission Date: 10/7/2024  Length of Stay: 1 days  Attending Physician: Dorys Galan MD  Primary Care Provider: Cipriano Carrera MD        Subjective:     Principal Problem:Bacteremia        HPI:  Bell Lynn is a 69 year old F with a history of small intestine neuroendocrine tumor, peptic ulcer disease, GERD, HTN, anxiety, and Iron deficiency anemia who presents to the ED For epigastric abdominal pain and vomiting.  Patient was in her usual state of health until about 2:30 am this morning when she woke up with midepigastric pain that radiate near her chest. She had multiple episodes of nonbloody nonbillous emesis at home. She was unable to take her PO medications due to vomiting. She has been unable to eat anything at home and due to worsening pain came to the ED for further evaluation. Talia fever, chills, chest pain, or shortness of breath. She has not had any problems with urination, hematuria, diarrhea, or constipation.     In the ED patient was afebrile and VSS. Labs were notable for leukocytosis to 20.67, otherwise wnl. CMP was reassuring and lipase was negative. EKG showed no signs of acute ischemia and troponin was negative. UA negative for infection. CXR with no abnormal findings. CT abdomen with no acute findings but showed heterogenous mass of the small bowel stable in size and hepatomegaly and steatosis with hepatic lesions seen on PET-CT prior. She was give Morphine and Zofran with minimal improvement and admitted to medicine for further management.     Overview/Hospital Course:  No notes on file    Interval History: Patient seen and examined this AM. Abdominal pain is improving       Objective:     Vital Signs (Most Recent):  Temp: 98.9 °F (37.2 °C) (10/09/24 0745)  Pulse: 103 (10/09/24 0745)  Resp: 18 (10/09/24 0745)  BP: 121/86  (10/09/24 0745)  SpO2: (!) 18 % (10/09/24 0745) Vital Signs (24h Range):  Temp:  [98.4 °F (36.9 °C)-100.5 °F (38.1 °C)] 98.9 °F (37.2 °C)  Pulse:  [] 103  Resp:  [16-18] 18  SpO2:  [18 %-94 %] 18 %  BP: ()/(59-86) 121/86     Weight: 98 kg (216 lb)  Body mass index is 37.08 kg/m².    Intake/Output Summary (Last 24 hours) at 10/9/2024 1326  Last data filed at 10/8/2024 1330  Gross per 24 hour   Intake 0 ml   Output --   Net 0 ml         Physical Exam  Constitutional:       Appearance: Normal appearance. She is not ill-appearing.   HENT:      Head: Normocephalic and atraumatic.      Mouth/Throat:      Mouth: Mucous membranes are dry.   Cardiovascular:      Rate and Rhythm: Normal rate and regular rhythm.   Pulmonary:      Effort: Pulmonary effort is normal.      Breath sounds: Normal breath sounds.   Abdominal:      General: There is no distension.      Palpations: Abdomen is soft.      Tenderness: There is no abdominal tenderness.   Musculoskeletal:         General: Normal range of motion.   Skin:     General: Skin is warm and dry.   Neurological:      General: No focal deficit present.      Mental Status: She is alert.   Psychiatric:         Mood and Affect: Mood normal.             Significant Labs: All pertinent labs within the past 24 hours have been reviewed.    Significant Imaging: I have reviewed all pertinent imaging results/findings within the past 24 hours.    Assessment/Plan:      * Bacteremia  -likely source of elevated WBC and CRP   -blood cultures positive for Enterobacter Cloacae   -will continue Cefepime q8h pending susceptibilities          Leukocytosis  -WBC initially trending upwards, now improving   -medications reviewed   -blood cultures positive for enterobacter  -will continue Cefepime q8h as above pending susceptibilities         Primary malignant neuroendocrine tumor of small intestine  -follows closely with Oncology. Last seen 9/10 with repeat follow up scheduled tomorrow  -will  discuss rescheduling appointment as patient is in the hospital    -receives Lanreotide injections   -mets seen on PET CT prior       Epigastric pain  -pain appears worse today, RUQ midepigastric region   -US ordered for repeat evaluation and reassuring   -continue PPI and sulcarafate   -continue IV fluids   -PO diet as tolerated         Vomiting  -improving  -zofran PRN for nausea/vomiting   -continue PPI and Carafate   -diet as tolerated     Mild recurrent major depression  -stable   -restart home duloxetine     Chronic asthma without complication intolerant of symbicort - recurrent mouth sores  -albuterol as needed for sob or wheezing   -no acute concerns       Essential hypertension 1/2021 TTE normal, stress test negative  - Working to optimize BP control  - Continue home regimen of spironolactone, Metorprolol succinate, and amlodipien 10 mg   - Will continue to monitor and further titrate antihypertensives as clinically indicated         Primary osteoarthritis of left knee  -continue home Duloxetine for pain management  -PRN tylenol ordered       VTE Risk Mitigation (From admission, onward)           Ordered     enoxaparin injection 40 mg  Daily         10/07/24 1433     IP VTE HIGH RISK PATIENT  Once         10/07/24 1433     Place sequential compression device  Until discontinued         10/07/24 1433                    Discharge Planning   ATILIO: 10/11/2024     Code Status: Full Code   Is the patient medically ready for discharge?:     Reason for patient still in hospital (select all that apply): Treatment  Discharge Plan A: Home with family                  Dorys Galan MD  Department of Hospital Medicine   [facility] - Internal Medicine Telemetry

## 2024-10-10 ENCOUNTER — TELEPHONE (OUTPATIENT)
Dept: HEMATOLOGY/ONCOLOGY | Facility: CLINIC | Age: 69
End: 2024-10-10
Payer: MEDICARE

## 2024-10-10 PROBLEM — E87.1 HYPONATREMIA: Status: ACTIVE | Noted: 2024-10-10

## 2024-10-10 LAB
ANION GAP SERPL CALC-SCNC: 8 MMOL/L (ref 8–16)
BUN SERPL-MCNC: 22 MG/DL (ref 8–23)
CALCIUM SERPL-MCNC: 9 MG/DL (ref 8.7–10.5)
CHLORIDE SERPL-SCNC: 101 MMOL/L (ref 95–110)
CO2 SERPL-SCNC: 23 MMOL/L (ref 23–29)
CREAT SERPL-MCNC: 1.1 MG/DL (ref 0.5–1.4)
ERYTHROCYTE [DISTWIDTH] IN BLOOD BY AUTOMATED COUNT: 16.1 % (ref 11.5–14.5)
EST. GFR  (NO RACE VARIABLE): 54.7 ML/MIN/1.73 M^2
GLUCOSE SERPL-MCNC: 103 MG/DL (ref 70–110)
HCT VFR BLD AUTO: 34.4 % (ref 37–48.5)
HGB BLD-MCNC: 12.1 G/DL (ref 12–16)
MAGNESIUM SERPL-MCNC: 2.6 MG/DL (ref 1.6–2.6)
MCH RBC QN AUTO: 29.9 PG (ref 27–31)
MCHC RBC AUTO-ENTMCNC: 35.2 G/DL (ref 32–36)
MCV RBC AUTO: 85 FL (ref 82–98)
PHOSPHATE SERPL-MCNC: 2.8 MG/DL (ref 2.7–4.5)
PLATELET # BLD AUTO: 84 K/UL (ref 150–450)
PMV BLD AUTO: ABNORMAL FL (ref 9.2–12.9)
POTASSIUM SERPL-SCNC: 4 MMOL/L (ref 3.5–5.1)
RBC # BLD AUTO: 4.05 M/UL (ref 4–5.4)
SODIUM SERPL-SCNC: 132 MMOL/L (ref 136–145)
WBC # BLD AUTO: 12.38 K/UL (ref 3.9–12.7)

## 2024-10-10 PROCEDURE — 63600175 PHARM REV CODE 636 W HCPCS: Mod: HCNC | Performed by: INTERNAL MEDICINE

## 2024-10-10 PROCEDURE — 80048 BASIC METABOLIC PNL TOTAL CA: CPT | Mod: HCNC | Performed by: STUDENT IN AN ORGANIZED HEALTH CARE EDUCATION/TRAINING PROGRAM

## 2024-10-10 PROCEDURE — 11000001 HC ACUTE MED/SURG PRIVATE ROOM: Mod: HCNC

## 2024-10-10 PROCEDURE — 36415 COLL VENOUS BLD VENIPUNCTURE: CPT | Mod: HCNC | Performed by: STUDENT IN AN ORGANIZED HEALTH CARE EDUCATION/TRAINING PROGRAM

## 2024-10-10 PROCEDURE — 25000003 PHARM REV CODE 250: Mod: HCNC | Performed by: INTERNAL MEDICINE

## 2024-10-10 PROCEDURE — 85027 COMPLETE CBC AUTOMATED: CPT | Mod: HCNC | Performed by: STUDENT IN AN ORGANIZED HEALTH CARE EDUCATION/TRAINING PROGRAM

## 2024-10-10 PROCEDURE — 25000003 PHARM REV CODE 250: Mod: HCNC | Performed by: STUDENT IN AN ORGANIZED HEALTH CARE EDUCATION/TRAINING PROGRAM

## 2024-10-10 PROCEDURE — 63600175 PHARM REV CODE 636 W HCPCS: Mod: HCNC | Performed by: STUDENT IN AN ORGANIZED HEALTH CARE EDUCATION/TRAINING PROGRAM

## 2024-10-10 PROCEDURE — 83735 ASSAY OF MAGNESIUM: CPT | Mod: HCNC | Performed by: STUDENT IN AN ORGANIZED HEALTH CARE EDUCATION/TRAINING PROGRAM

## 2024-10-10 PROCEDURE — 84100 ASSAY OF PHOSPHORUS: CPT | Mod: HCNC | Performed by: STUDENT IN AN ORGANIZED HEALTH CARE EDUCATION/TRAINING PROGRAM

## 2024-10-10 RX ADMIN — ONDANSETRON 8 MG: 2 INJECTION INTRAMUSCULAR; INTRAVENOUS at 06:10

## 2024-10-10 RX ADMIN — ALUMINUM HYDROXIDE, MAGNESIUM HYDROXIDE, AND SIMETHICONE 30 ML: 1200; 120; 1200 SUSPENSION ORAL at 08:10

## 2024-10-10 RX ADMIN — ENOXAPARIN SODIUM 40 MG: 40 INJECTION SUBCUTANEOUS at 05:10

## 2024-10-10 RX ADMIN — SUCRALFATE 1 G: 1 SUSPENSION ORAL at 06:10

## 2024-10-10 RX ADMIN — GABAPENTIN 300 MG: 300 CAPSULE ORAL at 08:10

## 2024-10-10 RX ADMIN — GABAPENTIN 100 MG: 100 CAPSULE ORAL at 09:10

## 2024-10-10 RX ADMIN — ALUMINUM HYDROXIDE, MAGNESIUM HYDROXIDE, AND SIMETHICONE 30 ML: 1200; 120; 1200 SUSPENSION ORAL at 12:10

## 2024-10-10 RX ADMIN — DULOXETINE HYDROCHLORIDE 60 MG: 60 CAPSULE, DELAYED RELEASE ORAL at 09:10

## 2024-10-10 RX ADMIN — AMLODIPINE BESYLATE 10 MG: 10 TABLET ORAL at 09:10

## 2024-10-10 RX ADMIN — DULOXETINE HYDROCHLORIDE 60 MG: 60 CAPSULE, DELAYED RELEASE ORAL at 08:10

## 2024-10-10 RX ADMIN — SPIRONOLACTONE 50 MG: 25 TABLET, FILM COATED ORAL at 09:10

## 2024-10-10 RX ADMIN — ALUMINUM HYDROXIDE, MAGNESIUM HYDROXIDE, AND SIMETHICONE 30 ML: 1200; 120; 1200 SUSPENSION ORAL at 06:10

## 2024-10-10 RX ADMIN — ACETAMINOPHEN 650 MG: 325 TABLET ORAL at 10:10

## 2024-10-10 RX ADMIN — CEFEPIME 2 G: 2 INJECTION, POWDER, FOR SOLUTION INTRAVENOUS at 09:10

## 2024-10-10 RX ADMIN — PANTOPRAZOLE SODIUM 40 MG: 40 TABLET, DELAYED RELEASE ORAL at 09:10

## 2024-10-10 RX ADMIN — CEFEPIME 2 G: 2 INJECTION, POWDER, FOR SOLUTION INTRAVENOUS at 06:10

## 2024-10-10 RX ADMIN — SUCRALFATE 1 G: 1 SUSPENSION ORAL at 12:10

## 2024-10-10 RX ADMIN — CEFEPIME 2 G: 2 INJECTION, POWDER, FOR SOLUTION INTRAVENOUS at 01:10

## 2024-10-10 RX ADMIN — SUCRALFATE 1 G: 1 SUSPENSION ORAL at 11:10

## 2024-10-10 RX ADMIN — SUCRALFATE 1 G: 1 SUSPENSION ORAL at 05:10

## 2024-10-10 RX ADMIN — METOPROLOL SUCCINATE 100 MG: 50 TABLET, EXTENDED RELEASE ORAL at 09:10

## 2024-10-10 NOTE — ASSESSMENT & PLAN NOTE
Hyponatremia is likely due to excess free water. The patient's most recent sodium results are listed below.  Recent Labs     10/08/24  0305 10/09/24  0458 10/10/24  0500   * 134* 132*     Plan  - Correct the sodium by 4-6mEq in 24 hours.   - Will treat the hyponatremia with Removal of offending medications  - Monitor sodium Daily.   - Patient hyponatremia is stable  - likely 2/2 IV fluids, will hold and repeat labs in the AM

## 2024-10-10 NOTE — ASSESSMENT & PLAN NOTE
-pain appears worse today, RUQ midepigastric region   -US ordered for repeat evaluation and reassuring   -continue PPI and sulcarafate   - stopping IV fluids, able to tolerate a diet now.

## 2024-10-10 NOTE — PROGRESS NOTES
[facility] - Internal Medicine [facility] Medicine  Progress Note    Patient Name: Bell Lynn  MRN: [id number]  Patient Class: IP- Inpatient   Admission Date: 10/7/2024  Length of Stay: 2 days  Attending Physician: Yeison Hill DO  Primary Care Provider: Cipriano Carrera MD        Subjective:     Principal Problem:Bacteremia due to Gram-negative bacteria        HPI:  Bell Lynn is a 69 year old F with a history of small intestine neuroendocrine tumor, peptic ulcer disease, GERD, HTN, anxiety, and Iron deficiency anemia who presents to the ED For epigastric abdominal pain and vomiting.  Patient was in her usual state of health until about 2:30 am this morning when she woke up with midepigastric pain that radiate near her chest. She had multiple episodes of nonbloody nonbillous emesis at home. She was unable to take her PO medications due to vomiting. She has been unable to eat anything at home and due to worsening pain came to the ED for further evaluation. Talia fever, chills, chest pain, or shortness of breath. She has not had any problems with urination, hematuria, diarrhea, or constipation.     In the ED patient was afebrile and VSS. Labs were notable for leukocytosis to 20.67, otherwise wnl. CMP was reassuring and lipase was negative. EKG showed no signs of acute ischemia and troponin was negative. UA negative for infection. CXR with no abnormal findings. CT abdomen with no acute findings but showed heterogenous mass of the small bowel stable in size and hepatomegaly and steatosis with hepatic lesions seen on PET-CT prior. She was give Morphine and Zofran with minimal improvement and admitted to medicine for further management.     Overview/Hospital Course:  While on the floor patient was started on IV fluids and symptom management for nausea and gastritis. RUQ was ordered due to significant tenderness which showed cholelithiasis but no cholecystitis or choledocholithiasis. WBC  continued to trend upward and further evaluation was performed including blood cultures. Blood cultures notable for Enterobacter Cloacae and patient was subsequently started on Cefepime while susceptibilities were pending. Abdominal pain and appetite improved and patient feeling better      Interval History: overall feeling better. Is able to eat and drink some more. No more fever or chills.    Review of Systems   Constitutional:  Negative for fatigue and fever.   Respiratory:  Negative for shortness of breath.    Cardiovascular:  Negative for chest pain.   Gastrointestinal:  Negative for abdominal pain.     Objective:     Vital Signs (Most Recent):  Temp: 97.7 °F (36.5 °C) (10/10/24 0445)  Pulse: 72 (10/10/24 0445)  Resp: 18 (10/09/24 1532)  BP: 112/74 (10/10/24 0445)  SpO2: (!) 92 % (10/10/24 0445) Vital Signs (24h Range):  Temp:  [97.5 °F (36.4 °C)-100.1 °F (37.8 °C)] 97.7 °F (36.5 °C)  Pulse:  [72-98] 72  Resp:  [16-18] 18  SpO2:  [87 %-96 %] 92 %  BP: ()/(64-74) 112/74     Weight: 98 kg (216 lb)  Body mass index is 37.08 kg/m².    Intake/Output Summary (Last 24 hours) at 10/10/2024 1038  Last data filed at 10/10/2024 0957  Gross per 24 hour   Intake 3433.68 ml   Output --   Net 3433.68 ml         Physical Exam  Vitals reviewed.   Constitutional:       Appearance: Normal appearance.   HENT:      Head: Normocephalic and atraumatic.   Cardiovascular:      Rate and Rhythm: Normal rate and regular rhythm.      Pulses: Normal pulses.   Pulmonary:      Effort: Pulmonary effort is normal.   Abdominal:      General: Abdomen is flat. Bowel sounds are normal. There is no distension.      Palpations: Abdomen is soft.      Tenderness: There is no abdominal tenderness. There is no guarding.   Skin:     General: Skin is warm and dry.   Neurological:      Mental Status: She is alert.             Significant Labs: All pertinent labs within the past 24 hours have been reviewed.    Significant Imaging: I have reviewed all  pertinent imaging results/findings within the past 24 hours.    Assessment/Plan:      * Bacteremia due to Gram-negative bacteria  -likely source of elevated WBC and CRP   -blood cultures positive for Enterobacter Cloacae   -will continue Cefepime q8h pending susceptibilities, likely need ID consult once final cultures are available         Epigastric pain  -pain appears worse today, RUQ midepigastric region   -US ordered for repeat evaluation and reassuring   -continue PPI and sulcarafate   - stopping IV fluids, able to tolerate a diet now.         Hyponatremia  Hyponatremia is likely due to excess free water. The patient's most recent sodium results are listed below.  Recent Labs     10/08/24  0305 10/09/24  0458 10/10/24  0500   * 134* 132*     Plan  - Correct the sodium by 4-6mEq in 24 hours.   - Will treat the hyponatremia with Removal of offending medications  - Monitor sodium Daily.   - Patient hyponatremia is stable  - likely 2/2 IV fluids, will hold and repeat labs in the AM    Leukocytosis  -WBC initially trending upwards, now improving   -medications reviewed   -blood cultures positive for enterobacter  -will continue Cefepime q8h as above pending susceptibilities         Vomiting  -improving  -zofran PRN for nausea/vomiting   -continue PPI and Carafate   -diet as tolerated     Primary malignant neuroendocrine tumor of small intestine  -follows closely with Oncology. Last seen 9/10 with repeat follow up scheduled tomorrow  -will discuss rescheduling appointment as patient is in the hospital    -receives Lanreotide injections   -mets seen on PET CT prior       Mild recurrent major depression  -stable   -restart home duloxetine     Chronic asthma without complication intolerant of symbicort - recurrent mouth sores  -albuterol as needed for sob or wheezing   -no acute concerns       Essential hypertension 1/2021 TTE normal, stress test negative  - Working to optimize BP control  - Continue home regimen  of spironolactone, Metorprolol succinate, and amlodipien 10 mg   - Will continue to monitor and further titrate antihypertensives as clinically indicated         Primary osteoarthritis of left knee  -continue home Duloxetine for pain management  -PRN tylenol ordered       VTE Risk Mitigation (From admission, onward)           Ordered     enoxaparin injection 40 mg  Daily         10/07/24 1433     IP VTE HIGH RISK PATIENT  Once         10/07/24 1433     Place sequential compression device  Until discontinued         10/07/24 1433                    Discharge Planning   ATILIO: 10/11/2024     Code Status: Full Code   Is the patient medically ready for discharge?:     Reason for patient still in hospital (select all that apply): Patient trending condition and Consult recommendations  Discharge Plan A: Home with family                  Yeison Hill DO  Department of Hospital Medicine   [facility] - Internal Medicine Telemetry

## 2024-10-10 NOTE — ASSESSMENT & PLAN NOTE
-likely source of elevated WBC and CRP   -blood cultures positive for Enterobacter Cloacae   -will continue Cefepime q8h pending susceptibilities, likely need ID consult once final cultures are available

## 2024-10-10 NOTE — SUBJECTIVE & OBJECTIVE
Interval History: overall feeling better. Is able to eat and drink some more. No more fever or chills.    Review of Systems   Constitutional:  Negative for fatigue and fever.   Respiratory:  Negative for shortness of breath.    Cardiovascular:  Negative for chest pain.   Gastrointestinal:  Negative for abdominal pain.     Objective:     Vital Signs (Most Recent):  Temp: 97.7 °F (36.5 °C) (10/10/24 0445)  Pulse: 72 (10/10/24 0445)  Resp: 18 (10/09/24 1532)  BP: 112/74 (10/10/24 0445)  SpO2: (!) 92 % (10/10/24 0445) Vital Signs (24h Range):  Temp:  [97.5 °F (36.4 °C)-100.1 °F (37.8 °C)] 97.7 °F (36.5 °C)  Pulse:  [72-98] 72  Resp:  [16-18] 18  SpO2:  [87 %-96 %] 92 %  BP: ()/(64-74) 112/74     Weight: 98 kg (216 lb)  Body mass index is 37.08 kg/m².    Intake/Output Summary (Last 24 hours) at 10/10/2024 1038  Last data filed at 10/10/2024 0957  Gross per 24 hour   Intake 3433.68 ml   Output --   Net 3433.68 ml         Physical Exam  Vitals reviewed.   Constitutional:       Appearance: Normal appearance.   HENT:      Head: Normocephalic and atraumatic.   Cardiovascular:      Rate and Rhythm: Normal rate and regular rhythm.      Pulses: Normal pulses.   Pulmonary:      Effort: Pulmonary effort is normal.   Abdominal:      General: Abdomen is flat. Bowel sounds are normal. There is no distension.      Palpations: Abdomen is soft.      Tenderness: There is no abdominal tenderness. There is no guarding.   Skin:     General: Skin is warm and dry.   Neurological:      Mental Status: She is alert.             Significant Labs: All pertinent labs within the past 24 hours have been reviewed.    Significant Imaging: I have reviewed all pertinent imaging results/findings within the past 24 hours.

## 2024-10-11 PROBLEM — A49.8 INFECTION CAUSED BY ENTEROBACTER CLOACAE: Status: ACTIVE | Noted: 2024-10-09

## 2024-10-11 PROBLEM — J96.01 ACUTE HYPOXIC RESPIRATORY FAILURE: Status: ACTIVE | Noted: 2024-10-11

## 2024-10-11 LAB
ANION GAP SERPL CALC-SCNC: 7 MMOL/L (ref 8–16)
BACTERIA BLD CULT: ABNORMAL
BUN SERPL-MCNC: 12 MG/DL (ref 8–23)
CALCIUM SERPL-MCNC: 9.1 MG/DL (ref 8.7–10.5)
CHLORIDE SERPL-SCNC: 99 MMOL/L (ref 95–110)
CO2 SERPL-SCNC: 27 MMOL/L (ref 23–29)
CREAT SERPL-MCNC: 0.7 MG/DL (ref 0.5–1.4)
ERYTHROCYTE [DISTWIDTH] IN BLOOD BY AUTOMATED COUNT: 16.1 % (ref 11.5–14.5)
EST. GFR  (NO RACE VARIABLE): >60 ML/MIN/1.73 M^2
GLUCOSE SERPL-MCNC: 91 MG/DL (ref 70–110)
HCT VFR BLD AUTO: 36.6 % (ref 37–48.5)
HGB BLD-MCNC: 11.9 G/DL (ref 12–16)
MAGNESIUM SERPL-MCNC: 2.3 MG/DL (ref 1.6–2.6)
MCH RBC QN AUTO: 28.5 PG (ref 27–31)
MCHC RBC AUTO-ENTMCNC: 32.5 G/DL (ref 32–36)
MCV RBC AUTO: 88 FL (ref 82–98)
PHOSPHATE SERPL-MCNC: 1.9 MG/DL (ref 2.7–4.5)
PLATELET # BLD AUTO: 102 K/UL (ref 150–450)
PMV BLD AUTO: 14 FL (ref 9.2–12.9)
POTASSIUM SERPL-SCNC: 4.1 MMOL/L (ref 3.5–5.1)
RBC # BLD AUTO: 4.17 M/UL (ref 4–5.4)
SODIUM SERPL-SCNC: 133 MMOL/L (ref 136–145)
WBC # BLD AUTO: 13.04 K/UL (ref 3.9–12.7)

## 2024-10-11 PROCEDURE — 80048 BASIC METABOLIC PNL TOTAL CA: CPT | Mod: HCNC | Performed by: STUDENT IN AN ORGANIZED HEALTH CARE EDUCATION/TRAINING PROGRAM

## 2024-10-11 PROCEDURE — 83735 ASSAY OF MAGNESIUM: CPT | Mod: HCNC | Performed by: STUDENT IN AN ORGANIZED HEALTH CARE EDUCATION/TRAINING PROGRAM

## 2024-10-11 PROCEDURE — 25000003 PHARM REV CODE 250: Mod: HCNC | Performed by: INTERNAL MEDICINE

## 2024-10-11 PROCEDURE — 87040 BLOOD CULTURE FOR BACTERIA: CPT | Mod: HCNC | Performed by: STUDENT IN AN ORGANIZED HEALTH CARE EDUCATION/TRAINING PROGRAM

## 2024-10-11 PROCEDURE — 63600175 PHARM REV CODE 636 W HCPCS: Mod: HCNC | Performed by: STUDENT IN AN ORGANIZED HEALTH CARE EDUCATION/TRAINING PROGRAM

## 2024-10-11 PROCEDURE — 11000001 HC ACUTE MED/SURG PRIVATE ROOM: Mod: HCNC

## 2024-10-11 PROCEDURE — 99223 1ST HOSP IP/OBS HIGH 75: CPT | Mod: HCNC,GC,, | Performed by: STUDENT IN AN ORGANIZED HEALTH CARE EDUCATION/TRAINING PROGRAM

## 2024-10-11 PROCEDURE — 36415 COLL VENOUS BLD VENIPUNCTURE: CPT | Mod: HCNC | Performed by: STUDENT IN AN ORGANIZED HEALTH CARE EDUCATION/TRAINING PROGRAM

## 2024-10-11 PROCEDURE — 84100 ASSAY OF PHOSPHORUS: CPT | Mod: HCNC | Performed by: STUDENT IN AN ORGANIZED HEALTH CARE EDUCATION/TRAINING PROGRAM

## 2024-10-11 PROCEDURE — 63600175 PHARM REV CODE 636 W HCPCS: Mod: HCNC | Performed by: INTERNAL MEDICINE

## 2024-10-11 PROCEDURE — 85027 COMPLETE CBC AUTOMATED: CPT | Mod: HCNC | Performed by: STUDENT IN AN ORGANIZED HEALTH CARE EDUCATION/TRAINING PROGRAM

## 2024-10-11 PROCEDURE — 25000003 PHARM REV CODE 250: Mod: HCNC | Performed by: STUDENT IN AN ORGANIZED HEALTH CARE EDUCATION/TRAINING PROGRAM

## 2024-10-11 RX ADMIN — ALUMINUM HYDROXIDE, MAGNESIUM HYDROXIDE, AND SIMETHICONE 30 ML: 1200; 120; 1200 SUSPENSION ORAL at 01:10

## 2024-10-11 RX ADMIN — OXYCODONE 5 MG: 5 TABLET ORAL at 06:10

## 2024-10-11 RX ADMIN — ALUMINUM HYDROXIDE, MAGNESIUM HYDROXIDE, AND SIMETHICONE 30 ML: 1200; 120; 1200 SUSPENSION ORAL at 05:10

## 2024-10-11 RX ADMIN — DULOXETINE HYDROCHLORIDE 60 MG: 60 CAPSULE, DELAYED RELEASE ORAL at 10:10

## 2024-10-11 RX ADMIN — ENOXAPARIN SODIUM 40 MG: 40 INJECTION SUBCUTANEOUS at 04:10

## 2024-10-11 RX ADMIN — GABAPENTIN 100 MG: 100 CAPSULE ORAL at 08:10

## 2024-10-11 RX ADMIN — SUCRALFATE 1 G: 1 SUSPENSION ORAL at 06:10

## 2024-10-11 RX ADMIN — CEFEPIME 2 G: 2 INJECTION, POWDER, FOR SOLUTION INTRAVENOUS at 01:10

## 2024-10-11 RX ADMIN — ALUMINUM HYDROXIDE, MAGNESIUM HYDROXIDE, AND SIMETHICONE 30 ML: 1200; 120; 1200 SUSPENSION ORAL at 10:10

## 2024-10-11 RX ADMIN — MORPHINE SULFATE 2 MG: 2 INJECTION, SOLUTION INTRAMUSCULAR; INTRAVENOUS at 02:10

## 2024-10-11 RX ADMIN — GABAPENTIN 300 MG: 300 CAPSULE ORAL at 10:10

## 2024-10-11 RX ADMIN — CEFEPIME 2 G: 2 INJECTION, POWDER, FOR SOLUTION INTRAVENOUS at 10:10

## 2024-10-11 RX ADMIN — DULOXETINE HYDROCHLORIDE 60 MG: 60 CAPSULE, DELAYED RELEASE ORAL at 08:10

## 2024-10-11 RX ADMIN — ALUMINUM HYDROXIDE, MAGNESIUM HYDROXIDE, AND SIMETHICONE 30 ML: 1200; 120; 1200 SUSPENSION ORAL at 06:10

## 2024-10-11 RX ADMIN — PANTOPRAZOLE SODIUM 40 MG: 40 TABLET, DELAYED RELEASE ORAL at 08:10

## 2024-10-11 RX ADMIN — SUCRALFATE 1 G: 1 SUSPENSION ORAL at 01:10

## 2024-10-11 RX ADMIN — SUCRALFATE 1 G: 1 SUSPENSION ORAL at 04:10

## 2024-10-11 NOTE — PROGRESS NOTES
[facility] - Internal Medicine [facility] Medicine  Progress Note    Patient Name: Bell Lynn  MRN: [id number]  Patient Class: IP- Inpatient   Admission Date: 10/7/2024  Length of Stay: 3 days  Attending Physician: Yeison Hill DO  Primary Care Provider: Cipriano Carrera MD        Subjective:     Principal Problem:Bacteremia due to Gram-negative bacteria        HPI:  Bell Lynn is a 69 year old F with a history of small intestine neuroendocrine tumor, peptic ulcer disease, GERD, HTN, anxiety, and Iron deficiency anemia who presents to the ED For epigastric abdominal pain and vomiting.  Patient was in her usual state of health until about 2:30 am this morning when she woke up with midepigastric pain that radiate near her chest. She had multiple episodes of nonbloody nonbillous emesis at home. She was unable to take her PO medications due to vomiting. She has been unable to eat anything at home and due to worsening pain came to the ED for further evaluation. Talia fever, chills, chest pain, or shortness of breath. She has not had any problems with urination, hematuria, diarrhea, or constipation.     In the ED patient was afebrile and VSS. Labs were notable for leukocytosis to 20.67, otherwise wnl. CMP was reassuring and lipase was negative. EKG showed no signs of acute ischemia and troponin was negative. UA negative for infection. CXR with no abnormal findings. CT abdomen with no acute findings but showed heterogenous mass of the small bowel stable in size and hepatomegaly and steatosis with hepatic lesions seen on PET-CT prior. She was give Morphine and Zofran with minimal improvement and admitted to medicine for further management.     Overview/Hospital Course:  While on the floor patient was started on IV fluids and symptom management for nausea and gastritis. RUQ was ordered due to significant tenderness which showed cholelithiasis but no cholecystitis or choledocholithiasis. WBC  continued to trend upward and further evaluation was performed including blood cultures. Blood cultures notable for Enterobacter Cloacae and patient was subsequently started on Cefepime while susceptibilities were pending, ID to evaluate for antibiotic needs. Abdominal pain and appetite improved and patient feeling better.      Interval History: still feeling well this morning. Discussed weaning off oxygen so that we can get her home. Also discussed having ID evaluate for home antibiotic regiment    Review of Systems   Constitutional:  Negative for fatigue and fever.   Respiratory:  Negative for cough, shortness of breath and wheezing.    Gastrointestinal:  Negative for abdominal pain, constipation, diarrhea, nausea and vomiting.     Objective:     Vital Signs (Most Recent):  Temp: 98.4 °F (36.9 °C) (10/11/24 0841)  Pulse: 76 (10/11/24 0841)  Resp: 18 (10/11/24 0841)  BP: 99/64 (10/11/24 0841)  SpO2: 97 % (10/11/24 0841) Vital Signs (24h Range):  Temp:  [97.6 °F (36.4 °C)-98.4 °F (36.9 °C)] 98.4 °F (36.9 °C)  Pulse:  [66-78] 76  Resp:  [16-19] 18  SpO2:  [93 %-97 %] 97 %  BP: ()/(60-76) 99/64     Weight: 98 kg (216 lb 0.8 oz)  Body mass index is 37.09 kg/m².    Intake/Output Summary (Last 24 hours) at 10/11/2024 1045  Last data filed at 10/10/2024 1746  Gross per 24 hour   Intake 144 ml   Output 1 ml   Net 143 ml         Physical Exam  Constitutional:       Appearance: Normal appearance.   Cardiovascular:      Rate and Rhythm: Normal rate and regular rhythm.      Pulses: Normal pulses.   Pulmonary:      Effort: Pulmonary effort is normal. No respiratory distress.      Breath sounds: No stridor. No wheezing or rales.   Abdominal:      General: Abdomen is flat. Bowel sounds are normal. There is no distension.      Palpations: Abdomen is soft.   Skin:     General: Skin is warm and dry.   Neurological:      Mental Status: She is alert.             Significant Labs: All pertinent labs within the past 24 hours have  been reviewed.    Significant Imaging: I have reviewed all pertinent imaging results/findings within the past 24 hours.    Assessment/Plan:      * Bacteremia due to Gram-negative bacteria  -likely source of elevated WBC and CRP   -blood cultures positive for Enterobacter Cloacae   -will continue Cefepime q8h, will have ID to see for final recommendations.   - hope to be able to send home on oral abx      Epigastric pain  -pain appears worse today, RUQ midepigastric region   -US ordered for repeat evaluation and reassuring   -continue PPI and sulcarafate   - stopping IV fluids, able to tolerate a diet now.   - resolved        Hyponatremia  Hyponatremia is likely due to excess free water. The patient's most recent sodium results are listed below.  Recent Labs     10/09/24  0458 10/10/24  0500 10/11/24  0509   * 132* 133*     Plan  - Correct the sodium by 4-6mEq in 24 hours.   - Will treat the hyponatremia with Removal of offending medications  - Monitor sodium Daily.   - Patient hyponatremia is stable  - likely 2/2 IV fluids, will hold and repeat labs in the AM  - stable    Acute hypoxic respiratory failure  Patient with Hypoxic Respiratory failure which is Acute.  she is not on home oxygen. Supplemental oxygen was provided and noted-      .   Signs/symptoms of respiratory failure include- increased work of breathing. Contributing diagnoses includes - Obesity Hypoventilation and asthma  Labs and images were reviewed. Patient Has not had a recent ABG. Will treat underlying causes and adjust management of respiratory failure as follows-   - suspect an element of OHS/RAJ with asthma and underlying health conditions.   - wean oxygen today, O2 sat goal >90%    Leukocytosis  -WBC initially trending upwards, now improving   -medications reviewed   -blood cultures positive for enterobacter  -will continue Cefepime q8h as above pending susceptibilities         Vomiting  -improving  -zofran PRN for nausea/vomiting    -continue PPI and Carafate   -diet as tolerated     Primary malignant neuroendocrine tumor of small intestine  -follows closely with Oncology. Last seen 9/10 with repeat follow up scheduled tomorrow  -will discuss rescheduling appointment as patient is in the hospital    -receives Lanreotide injections   -mets seen on PET CT prior       Mild recurrent major depression  -stable   -restart home duloxetine     Chronic asthma without complication intolerant of symbicort - recurrent mouth sores  -albuterol as needed for sob or wheezing   -no acute concerns       Essential hypertension 1/2021 TTE normal, stress test negative  - Working to optimize BP control  - Continue home regimen of spironolactone, Metorprolol succinate, and amlodipien 10 mg   - Will continue to monitor and further titrate antihypertensives as clinically indicated   - BP has been better in the hospital. Will hold oral meds now as BP has been on the lower side. Can slowly titrate them back as needed.        Primary osteoarthritis of left knee  -continue home Duloxetine for pain management  -PRN tylenol ordered       VTE Risk Mitigation (From admission, onward)           Ordered     enoxaparin injection 40 mg  Daily         10/07/24 1433     IP VTE HIGH RISK PATIENT  Once         10/07/24 1433     Place sequential compression device  Until discontinued         10/07/24 1433                    Discharge Planning   ATILIO: 10/14/2024     Code Status: Full Code   Is the patient medically ready for discharge?:     Reason for patient still in hospital (select all that apply): Patient trending condition and Consult recommendations  Discharge Plan A: Home with family                  Yeison Hill DO  Department of Hospital Medicine   [facility] - Internal Medicine Telemetry

## 2024-10-11 NOTE — CONSULTS
[facility] - Internal Medicine Telemetry  Infectious Disease  Consult Note    Patient Name: Bell Lynn  MRN: [id number]  Admission Date: 10/7/2024  Hospital Length of Stay: 3 days  Attending Physician: Yeison Hill DO  Primary Care Provider: Cipriano Carrera MD     Isolation Status: No active isolations    Patient information was obtained from patient and ER records.      Inpatient consult to Infectious Diseases  Consult performed by: Kaitlin Wu DO  Consult ordered by: Yeison Hill DO        Assessment/Plan:     ID  * Infection caused by Enterobacter cloacae  69F with PMH of small bowel neuroendocrine tumor (not amenable to resection, on lanreotide since July 2023), presented for epigastric pain and vomiting, was admitted and treated symptomatically for gastritis and nausea, also found to have E cloacae (pan-susceptible) bacteremia. CTAP with IV contrast shows known heterogenous mass of the small bowel, and hepatic steatosis. Abdominal US shows multiple liver lesions consistent with mets, and cholelithiasis without cholecystitis. Currently on cefepime.     Etiology of bacteremia likely GI translocation in the setting of known malignancy with mets and possible gastritis.     Recommendations  Continue cefepime for now  Can switch to PO levofloxacin 750mg daily any time before discharge  Duration 10 days of abx total: end date 10/17  Follow up on repeat BCX ordered today - please re-consult ID if these are positive        The above plan was discussed with the primary team.         Thank you for your consult. I will sign off. Please contact us if you have any additional questions.    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 Lynn is a 69F with PMH of small bowel neuroendocrine tumor (not amenable to resection, on lanreotide since July 2023), pituitary adenoma, HTN, PUD,  "GERD, SARABJIT, presented for epigastric pain and vomiting, was admitted and treated symptomatically for gastritis and nausea, also found to have E cloacae bacteremia. Infectious disease consulted for "gram negative bacteremia, no clear source however has a neuroendocrine tumor of small bowl. currently on cefepime".     Patient was febrile to 100.5 yesterday AM. Is on room air. WBC 13 today, overall downtrended from admission. 10/8 BCX with E cloacae complex, susceptibilities pending, though no resistance genes detected on BCID. CTAP with IV contrast shows known heterogenous mass of the small bowel, and hepatic steatosis. Abdominal US shows multiple liver lesions consistent with mets, and cholelithiasis without cholecystitis. Currently on cefepime.         Past Medical History:   Diagnosis Date    Asthma     Depression     Gastric ulcer with hemorrhage 07/01/2012    GERD (gastroesophageal reflux disease)     History of blood transfusion     Hypertension     Iron deficiency anemia 03/14/2013    Osteoarthritis of both knees     Pituitary adenoma 1989    s/p transphenoidal resection    Primary malignant neuroendocrine tumor of small intestine 06/2023       Past Surgical History:   Procedure Laterality Date    BREAST BIOPSY      BREAST SURGERY      Benign breast cyst    COLONOSCOPY      COLONOSCOPY N/A 10/4/2024    Procedure: COLONOSCOPY;  Surgeon: Carloz Alston MD;  Location: [facility];  Service: Endoscopy;  Laterality: N/A;  Ref by Dr ERIK Carrera, herbie [facility] - PC  8/28/24-LVM for precall, portal-DS  8/30/24-Precall complete-DS  9/5 Pt rescheduled. Suprep, portal.[email or web address]  9/23-LVM for precall-Kpvt  9/26-pt r/s, updated instructions sent to portal, pt knows procedure will be done with first available and ti    ENDOSCOPIC ULTRASOUND OF UPPER GASTROINTESTINAL TRACT N/A 06/27/2023    Procedure: ULTRASOUND, UPPER GI TRACT, ENDOSCOPIC;  Surgeon: Johnathan Cantu MD;  Location: [facility];  Service: Endoscopy;  Laterality: N/A;    " ESOPHAGOGASTRODUODENOSCOPY N/A 10/07/2020    Procedure: EGD (ESOPHAGOGASTRODUODENOSCOPY);  Surgeon: Nell Parish MD;  Location: [facility] ENDO;  Service: Endoscopy;  Laterality: N/A;    ESOPHAGOGASTRODUODENOSCOPY N/A 06/30/2021    Procedure: EGD (ESOPHAGOGASTRODUODENOSCOPY);  Surgeon: Nell Parish MD;  Location: [facility] ENDO;  Service: Endoscopy;  Laterality: N/A;    HYSTERECTOMY  1981    due to uterine fibroids    KNEE ARTHROPLASTY Left 01/10/2022    Procedure: ARTHROPLASTY, KNEE;  Surgeon: Jonnathan Tavera MD;  Location: [facility] OR;  Service: Orthopedics;  Laterality: Left;    TONSILLECTOMY      TRANSPHENOIDAL PITUITARY RESECTION  1989    Dr Cardenas       Review of patient's allergies indicates:   Allergen Reactions    Nsaids (non-steroidal anti-inflammatory drug)      Gi bleed    Penicillins Itching and Swelling    Penicillin     Symbicort [budesonide-formoterol]      Recurrent oral ulcers       Medications:  Medications Prior to Admission   Medication Sig    albuterol (PROVENTIL/VENTOLIN HFA) 90 mcg/actuation inhaler INHALE 2 PUFFS EVERY 6 HOURS AS NEEDED FOR WHEEZING (RESCUE)    alendronate (FOSAMAX) 70 MG tablet TAKE 1 TABLET EVERY 7 DAYS    amLODIPine (NORVASC) 10 MG tablet TAKE 1 TABLET ONE TIME DAILY (DOSE INCREASE)    cetirizine (ZYRTEC) 10 MG tablet TAKE 1 TABLET ONE TIME DAILY    cyclobenzaprine (FLEXERIL) 10 MG tablet TAKE 1 TABLET EVERY EVENING    dicyclomine (BENTYL) 20 mg tablet Take 1 tablet (20 mg total) by mouth 3 (three) times daily as needed (abdominal pain).    DULoxetine (CYMBALTA) 60 MG capsule TAKE 1 CAPSULE ONE TIME DAILY    furosemide (LASIX) 20 MG tablet TAKE 1 TABLET ONE TIME DAILY FOR LEG SWELLING AS NEEDED    gabapentin (NEURONTIN) 100 MG capsule TAKE 1 CAPSULE IN THE MORNING AND TAKE 3 CAPSULES AT NIGHT    latanoprost 0.005 % ophthalmic solution Place 1 drop into the right eye every evening.    LIDOcaine (LIDODERM) 5 % APPLY 1 PATCH ON THE SKIN ONE TIME DAILY. REMOVE AND DISCARD  PATCH WITHIN 12 HOURS OR AS DIRECTED BY MD    LINZESS 145 mcg Cap capsule TAKE 1 CAPSULE ONE TIME DAILY    meloxicam (MOBIC) 7.5 MG tablet Take 1 tablet (7.5 mg total) by mouth daily as needed for Pain. (Patient not taking: Reported on 9/10/2024)    metoprolol succinate (TOPROL-XL) 100 MG 24 hr tablet Take 1 tablet (100 mg total) by mouth once daily.    nitroGLYCERIN (NITROSTAT) 0.4 MG SL tablet Place 1 tablet (0.4 mg total) under the tongue every 5 (five) minutes as needed for Chest pain. (Patient not taking: Reported on 9/10/2024)    pantoprazole (PROTONIX) 40 MG tablet Take 1 tablet (40 mg total) by mouth 2 (two) times daily before meals.    spironolactone (ALDACTONE) 50 MG tablet Take 1 tablet (50 mg total) by mouth once daily.    traMADoL (ULTRAM) 50 mg tablet Take 1 tablet (50 mg total) by mouth every 8 (eight) hours as needed.    valsartan (DIOVAN) 320 MG tablet Take 1 tablet (320 mg total) by mouth once daily. (Patient not taking: Reported on 9/10/2024)    vitamin D (VITAMIN D3) 1000 units Tab Take 1,000 Units by mouth once daily.     Antibiotics (From admission, onward)      Start     Stop Route Frequency Ordered    10/09/24 0530  ceFEPIme (MAXIPIME) 2 g in D5W 100 mL IVPB (MB+)         -- IV Every 8 hours (non-standard times) 10/09/24 0430          Antifungals (From admission, onward)      None          Antivirals (From admission, onward)      None             Immunization History   Administered Date(s) Administered    COVID-19, MRNA, LN-S, PF (MODERNA FULL 0.5 ML DOSE) 03/26/2021, 04/22/2021, 11/30/2021    COVID-19, mRNA, LNP-S, PF (Moderna) Ages 12+ 10/25/2023    Influenza (FLUAD) - Quadrivalent - Adjuvanted - PF *Preferred* (65+) 10/15/2021, 10/21/2022, 11/17/2023    Influenza - Quadrivalent - PF *Preferred* (6 months and older) 09/09/2019, 10/22/2020    Pneumococcal Conjugate - 20 Valent 10/21/2022    Pneumococcal Polysaccharide - 23 Valent 08/28/2020    Tdap 06/16/2016       Family History        Problem Relation (Age of Onset)    Breast cancer Other (48), Other (45)    Cancer Father    Heart disease Father    Hypertension Father    Rectal cancer Maternal Aunt    Uterine cancer Other, Other (45)          Social History     Socioeconomic History    Marital status:      Spouse name: Brandin    Number of children: 3   Occupational History     Employer: [employer]   Tobacco Use    Smoking status: Never     Passive exposure: Never    Smokeless tobacco: Never   Substance and Sexual Activity    Alcohol use: Yes     Alcohol/week: 1.0 standard drink of alcohol     Types: 1 Cans of beer per week     Comment: on ocassion    Drug use: No    Sexual activity: Not Currently     Partners: Male     Birth control/protection: Surgical     Social Drivers of Health     Financial Resource Strain: Low Risk  (10/10/2024)    Overall Financial Resource Strain (CARDIA)     Difficulty of Paying Living Expenses: Not hard at all   Food Insecurity: No Food Insecurity (10/10/2024)    Hunger Vital Sign     Worried About Running Out of Food in the Last Year: Never true     Ran Out of Food in the Last Year: Never true   Transportation Needs: No Transportation Needs (10/10/2024)    TRANSPORTATION NEEDS     Transportation : No   Physical Activity: Inactive (10/8/2024)    Exercise Vital Sign     Days of Exercise per Week: 0 days     Minutes of Exercise per Session: 0 min   Stress: No Stress Concern Present (10/10/2024)    [employer] [place] [employer] - Occupational Stress Questionnaire     Feeling of Stress : Not at all   Housing Stability: Low Risk  (10/10/2024)    Housing Stability Vital Sign     Unable to Pay for Housing in the Last Year: No     Homeless in the Last Year: No     Review of Systems   Constitutional:  Negative for chills and fever.   Respiratory:  Negative for cough and shortness of breath.    Cardiovascular:  Negative for chest pain.   Gastrointestinal:  Positive for abdominal pain. Negative  for constipation, diarrhea, nausea and vomiting.   Musculoskeletal:  Negative for arthralgias and myalgias.   Skin:  Negative for rash.   Neurological:  Negative for headaches.     Objective:     Vital Signs (Most Recent):  Temp: 98.3 °F (36.8 °C) (10/11/24 1226)  Pulse: 73 (10/11/24 1226)  Resp: 18 (10/11/24 1226)  BP: 112/71 (10/11/24 1226)  SpO2: (!) 94 % (10/11/24 1226) Vital Signs (24h Range):  Temp:  [97.6 °F (36.4 °C)-98.4 °F (36.9 °C)] 98.3 °F (36.8 °C)  Pulse:  [70-78] 73  Resp:  [16-18] 18  SpO2:  [93 %-97 %] 94 %  BP: ()/(60-76) 112/71     Weight: 98 kg (216 lb 0.8 oz)  Body mass index is 37.09 kg/m².    Estimated Creatinine Clearance: 87.4 mL/min (based on SCr of 0.7 mg/dL).     Physical Exam  Vitals reviewed.   Constitutional:       General: She is not in acute distress.     Appearance: Normal appearance. She is not ill-appearing.   HENT:      Head: Normocephalic and atraumatic.   Eyes:      Extraocular Movements: Extraocular movements intact.      Conjunctiva/sclera: Conjunctivae normal.   Pulmonary:      Effort: Pulmonary effort is normal. No respiratory distress.   Abdominal:      General: Abdomen is flat.      Palpations: Abdomen is soft.      Tenderness: There is abdominal tenderness.   Musculoskeletal:      Cervical back: Normal range of motion.   Skin:     General: Skin is warm and dry.   Neurological:      General: No focal deficit present.      Mental Status: She is alert and oriented to person, place, and time.   Psychiatric:         Mood and Affect: Mood normal.         Behavior: Behavior normal.         Thought Content: Thought content normal.          Significant Labs: All pertinent labs within the past 24 hours have been reviewed.  Recent Lab Results         10/11/24  0509        Anion Gap 7       BUN 12       Calcium 9.1       Chloride 99       CO2 27       Creatinine 0.7       eGFR >60.0       Glucose 91       Hematocrit 36.6       Hemoglobin 11.9       Magnesium  2.3       MCH  28.5       MCHC 32.5       MCV 88       MPV 14.0       Phosphorus Level 1.9       Platelet Count 102       Potassium 4.1       RBC 4.17       RDW 16.1       Sodium 133       WBC 13.04               Significant Imaging: I have reviewed all pertinent imaging results/findings within the past 24 hours.

## 2024-10-11 NOTE — ASSESSMENT & PLAN NOTE
Patient with Hypoxic Respiratory failure which is Acute.  she is not on home oxygen. Supplemental oxygen was provided and noted-      .   Signs/symptoms of respiratory failure include- increased work of breathing. Contributing diagnoses includes - Obesity Hypoventilation and asthma  Labs and images were reviewed. Patient Has not had a recent ABG. Will treat underlying causes and adjust management of respiratory failure as follows-   - suspect an element of OHS/RAJ with asthma and underlying health conditions.   - wean oxygen today, O2 sat goal >90%

## 2024-10-11 NOTE — PLAN OF CARE
[facility] - Internal Medicine Telemetry  Discharge Reassessment    Primary Care Provider: Cipriano Carrera MD    Expected Discharge Date: 10/14/2024    Reassessment (most recent)       Discharge Reassessment - 10/11/24 1616          Discharge Reassessment    Assessment Type Discharge Planning Reassessment     Did the patient's condition or plan change since previous assessment? No     Discharge Plan discussed with: Patient     Communicated ATILIO with patient/caregiver Yes     Discharge Plan A Home with family (P)      Discharge Plan B Home (P)      DME Needed Upon Discharge  none (P)      Transition of Care Barriers None (P)      Why the patient remains in the hospital Requires continued medical care (P)         Post-Acute Status    Coverage [employer] MANAGED MEDICARE - [employer] [facility] HMO PPO SPECIAL NEEDS - CAPITATED (P)                  Discharge Plan A and Plan B have been determined by review of patient's clinical status, future medical and therapeutic needs, and coverage/benefits for post-acute care in coordination with multidisciplinary team members.                     UMBERTO Inman, LMSW  [facility]  Case Management  Ext. [phone]

## 2024-10-11 NOTE — SUBJECTIVE & OBJECTIVE
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 Suprep, portal.lopez  9/23-LVM for precall-Kpvt  9/26-pt r/s, updated instructions sent to portal, pt knows procedure will be done with first available and ti    ENDOSCOPIC ULTRASOUND OF UPPER GASTROINTESTINAL TRACT N/A 06/27/2023    Procedure: ULTRASOUND, UPPER GI TRACT, ENDOSCOPIC;  Surgeon: Johnathan Cantu MD;  Location: [facility];  Service: Endoscopy;  Laterality: N/A;    ESOPHAGOGASTRODUODENOSCOPY N/A 10/07/2020    Procedure: EGD (ESOPHAGOGASTRODUODENOSCOPY);  Surgeon: Nell Parish MD;  Location: [facility];  Service: Endoscopy;  Laterality: N/A;    ESOPHAGOGASTRODUODENOSCOPY N/A 06/30/2021    Procedure: EGD (ESOPHAGOGASTRODUODENOSCOPY);  Surgeon: Nell Parish MD;  Location: [facility];  Service: Endoscopy;  Laterality: N/A;    HYSTERECTOMY  1981    due to uterine fibroids    KNEE ARTHROPLASTY Left 01/10/2022    Procedure: ARTHROPLASTY, KNEE;  Surgeon: Jonnathan Tavera MD;  Location: [facility] OR;  Service: Orthopedics;  Laterality: Left;    TONSILLECTOMY      TRANSPHENOIDAL PITUITARY RESECTION  1989    Dr Cardenas       Review of patient's allergies indicates:   Allergen Reactions    Nsaids (non-steroidal  anti-inflammatory drug)      Gi bleed    Penicillins Itching and Swelling    Penicillin     Symbicort [budesonide-formoterol]      Recurrent oral ulcers       Medications:  Medications Prior to Admission   Medication Sig    albuterol (PROVENTIL/VENTOLIN HFA) 90 mcg/actuation inhaler INHALE 2 PUFFS EVERY 6 HOURS AS NEEDED FOR WHEEZING (RESCUE)    alendronate (FOSAMAX) 70 MG tablet TAKE 1 TABLET EVERY 7 DAYS    amLODIPine (NORVASC) 10 MG tablet TAKE 1 TABLET ONE TIME DAILY (DOSE INCREASE)    cetirizine (ZYRTEC) 10 MG tablet TAKE 1 TABLET ONE TIME DAILY    cyclobenzaprine (FLEXERIL) 10 MG tablet TAKE 1 TABLET EVERY EVENING    dicyclomine (BENTYL) 20 mg tablet Take 1 tablet (20 mg total) by mouth 3 (three) times daily as needed (abdominal pain).    DULoxetine (CYMBALTA) 60 MG capsule TAKE 1 CAPSULE ONE TIME DAILY    furosemide (LASIX) 20 MG tablet TAKE 1 TABLET ONE TIME DAILY FOR LEG SWELLING AS NEEDED    gabapentin (NEURONTIN) 100 MG capsule TAKE 1 CAPSULE IN THE MORNING AND TAKE 3 CAPSULES AT NIGHT    latanoprost 0.005 % ophthalmic solution Place 1 drop into the right eye every evening.    LIDOcaine (LIDODERM) 5 % APPLY 1 PATCH ON THE SKIN ONE TIME DAILY. REMOVE AND DISCARD PATCH WITHIN 12 HOURS OR AS DIRECTED BY MD    LINZESS 145 mcg Cap capsule TAKE 1 CAPSULE ONE TIME DAILY    meloxicam (MOBIC) 7.5 MG tablet Take 1 tablet (7.5 mg total) by mouth daily as needed for Pain. (Patient not taking: Reported on 9/10/2024)    metoprolol succinate (TOPROL-XL) 100 MG 24 hr tablet Take 1 tablet (100 mg total) by mouth once daily.    nitroGLYCERIN (NITROSTAT) 0.4 MG SL tablet Place 1 tablet (0.4 mg total) under the tongue every 5 (five) minutes as needed for Chest pain. (Patient not taking: Reported on 9/10/2024)    pantoprazole (PROTONIX) 40 MG tablet Take 1 tablet (40 mg total) by mouth 2 (two) times daily before meals.    spironolactone (ALDACTONE) 50 MG tablet Take 1 tablet (50 mg total) by mouth once daily.    traMADoL  (ULTRAM) 50 mg tablet Take 1 tablet (50 mg total) by mouth every 8 (eight) hours as needed.    valsartan (DIOVAN) 320 MG tablet Take 1 tablet (320 mg total) by mouth once daily. (Patient not taking: Reported on 9/10/2024)    vitamin D (VITAMIN D3) 1000 units Tab Take 1,000 Units by mouth once daily.     Antibiotics (From admission, onward)      Start     Stop Route Frequency Ordered    10/09/24 0530  ceFEPIme (MAXIPIME) 2 g in D5W 100 mL IVPB (MB+)         -- IV Every 8 hours (non-standard times) 10/09/24 0430          Antifungals (From admission, onward)      None          Antivirals (From admission, onward)      None             Immunization History   Administered Date(s) Administered    COVID-19, MRNA, LN-S, PF (MODERNA FULL 0.5 ML DOSE) 03/26/2021, 04/22/2021, 11/30/2021    COVID-19, mRNA, LNP-S, PF (Moderna) Ages 12+ 10/25/2023    Influenza (FLUAD) - Quadrivalent - Adjuvanted - PF *Preferred* (65+) 10/15/2021, 10/21/2022, 11/17/2023    Influenza - Quadrivalent - PF *Preferred* (6 months and older) 09/09/2019, 10/22/2020    Pneumococcal Conjugate - 20 Valent 10/21/2022    Pneumococcal Polysaccharide - 23 Valent 08/28/2020    Tdap 06/16/2016       Family History       Problem Relation (Age of Onset)    Breast cancer Other (48), Other (45)    Cancer Father    Heart disease Father    Hypertension Father    Rectal cancer Maternal Aunt    Uterine cancer Other, Other (45)          Social History     Socioeconomic History    Marital status:      Spouse name: Brandin    Number of children: 3   Occupational History     Employer: [employer]   Tobacco Use    Smoking status: Never     Passive exposure: Never    Smokeless tobacco: Never   Substance and Sexual Activity    Alcohol use: Yes     Alcohol/week: 1.0 standard drink of alcohol     Types: 1 Cans of beer per week     Comment: on ocassion    Drug use: No    Sexual activity: Not Currently     Partners: Male     Birth control/protection: Surgical      Social Drivers of Health     Financial Resource Strain: Low Risk  (10/10/2024)    Overall Financial Resource Strain (CARDIA)     Difficulty of Paying Living Expenses: Not hard at all   Food Insecurity: No Food Insecurity (10/10/2024)    Hunger Vital Sign     Worried About Running Out of Food in the Last Year: Never true     Ran Out of Food in the Last Year: Never true   Transportation Needs: No Transportation Needs (10/10/2024)    TRANSPORTATION NEEDS     Transportation : No   Physical Activity: Inactive (10/8/2024)    Exercise Vital Sign     Days of Exercise per Week: 0 days     Minutes of Exercise per Session: 0 min   Stress: No Stress Concern Present (10/10/2024)    Citizen of [place] of [employer] - Occupational Stress Questionnaire     Feeling of Stress : Not at all   Housing Stability: Low Risk  (10/10/2024)    Housing Stability Vital Sign     Unable to Pay for Housing in the Last Year: No     Homeless in the Last Year: No     Review of Systems   Constitutional:  Negative for chills and fever.   Respiratory:  Negative for cough and shortness of breath.    Cardiovascular:  Negative for chest pain.   Gastrointestinal:  Positive for abdominal pain. Negative for constipation, diarrhea, nausea and vomiting.   Musculoskeletal:  Negative for arthralgias and myalgias.   Skin:  Negative for rash.   Neurological:  Negative for headaches.     Objective:     Vital Signs (Most Recent):  Temp: 98.3 °F (36.8 °C) (10/11/24 1226)  Pulse: 73 (10/11/24 1226)  Resp: 18 (10/11/24 1226)  BP: 112/71 (10/11/24 1226)  SpO2: (!) 94 % (10/11/24 1226) Vital Signs (24h Range):  Temp:  [97.6 °F (36.4 °C)-98.4 °F (36.9 °C)] 98.3 °F (36.8 °C)  Pulse:  [70-78] 73  Resp:  [16-18] 18  SpO2:  [93 %-97 %] 94 %  BP: ()/(60-76) 112/71     Weight: 98 kg (216 lb 0.8 oz)  Body mass index is 37.09 kg/m².    Estimated Creatinine Clearance: 87.4 mL/min (based on SCr of 0.7 mg/dL).     Physical Exam  Vitals reviewed.   Constitutional:        General: She is not in acute distress.     Appearance: Normal appearance. She is not ill-appearing.   HENT:      Head: Normocephalic and atraumatic.   Eyes:      Extraocular Movements: Extraocular movements intact.      Conjunctiva/sclera: Conjunctivae normal.   Pulmonary:      Effort: Pulmonary effort is normal. No respiratory distress.   Abdominal:      General: Abdomen is flat.      Palpations: Abdomen is soft.      Tenderness: There is abdominal tenderness.   Musculoskeletal:      Cervical back: Normal range of motion.   Skin:     General: Skin is warm and dry.   Neurological:      General: No focal deficit present.      Mental Status: She is alert and oriented to person, place, and time.   Psychiatric:         Mood and Affect: Mood normal.         Behavior: Behavior normal.         Thought Content: Thought content normal.          Significant Labs: All pertinent labs within the past 24 hours have been reviewed.  Recent Lab Results         10/11/24  0509        Anion Gap 7       BUN 12       Calcium 9.1       Chloride 99       CO2 27       Creatinine 0.7       eGFR >60.0       Glucose 91       Hematocrit 36.6       Hemoglobin 11.9       Magnesium  2.3       MCH 28.5       MCHC 32.5       MCV 88       MPV 14.0       Phosphorus Level 1.9       Platelet Count 102       Potassium 4.1       RBC 4.17       RDW 16.1       Sodium 133       WBC 13.04               Significant Imaging: I have reviewed all pertinent imaging results/findings within the past 24 hours.

## 2024-10-11 NOTE — HPI
"Ms. 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 Infectious disease consulted for "gram negative bacteremia, no clear source however has a neuroendocrine tumor of small bowl. currently on cefepime".     Patient was febrile to 100.5 yesterday AM. Is on room air. WBC 13 today, overall downtrended from admission. 10/8 BCX with E cloacae complex, susceptibilities pending, though no resistance genes detected on BCID. CTAP with IV contrast shows known heterogenous mass of the small bowel, and hepatic steatosis. Abdominal US shows multiple liver lesions consistent with mets, and cholelithiasis without cholecystitis. Currently on cefepime.       "

## 2024-10-11 NOTE — ASSESSMENT & PLAN NOTE
- Working to optimize BP control  - Continue home regimen of spironolactone, Metorprolol succinate, and amlodipien 10 mg   - Will continue to monitor and further titrate antihypertensives as clinically indicated   - BP has been better in the hospital. Will hold oral meds now as BP has been on the lower side. Can slowly titrate them back as needed.

## 2024-10-11 NOTE — ASSESSMENT & PLAN NOTE
-likely source of elevated WBC and CRP   -blood cultures positive for Enterobacter Cloacae   -will continue Cefepime q8h, will have ID to see for final recommendations.   - hope to be able to send home on oral abx

## 2024-10-11 NOTE — ASSESSMENT & PLAN NOTE
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 CTAP with IV contrast shows known heterogenous mass of the small bowel, and hepatic steatosis. Abdominal US shows multiple liver lesions consistent with mets, and cholelithiasis without cholecystitis. Currently on cefepime.     Etiology of bacteremia likely GI translocation in the setting of known malignancy with mets and possible gastritis.     Recommendations  Continue cefepime for now  Can switch to PO levofloxacin 750mg daily any time before discharge  Duration 10 days of abx total: end date 10/17  Follow up on repeat BCX ordered today - please re-consult ID if these are positive        The above plan was discussed with the primary team.

## 2024-10-11 NOTE — SUBJECTIVE & OBJECTIVE
Interval History: still feeling well this morning. Discussed weaning off oxygen so that we can get her home. Also discussed having ID evaluate for home antibiotic regiment    Review of Systems   Constitutional:  Negative for fatigue and fever.   Respiratory:  Negative for cough, shortness of breath and wheezing.    Gastrointestinal:  Negative for abdominal pain, constipation, diarrhea, nausea and vomiting.     Objective:     Vital Signs (Most Recent):  Temp: 98.4 °F (36.9 °C) (10/11/24 0841)  Pulse: 76 (10/11/24 0841)  Resp: 18 (10/11/24 0841)  BP: 99/64 (10/11/24 0841)  SpO2: 97 % (10/11/24 0841) Vital Signs (24h Range):  Temp:  [97.6 °F (36.4 °C)-98.4 °F (36.9 °C)] 98.4 °F (36.9 °C)  Pulse:  [66-78] 76  Resp:  [16-19] 18  SpO2:  [93 %-97 %] 97 %  BP: ()/(60-76) 99/64     Weight: 98 kg (216 lb 0.8 oz)  Body mass index is 37.09 kg/m².    Intake/Output Summary (Last 24 hours) at 10/11/2024 1045  Last data filed at 10/10/2024 1746  Gross per 24 hour   Intake 144 ml   Output 1 ml   Net 143 ml         Physical Exam  Constitutional:       Appearance: Normal appearance.   Cardiovascular:      Rate and Rhythm: Normal rate and regular rhythm.      Pulses: Normal pulses.   Pulmonary:      Effort: Pulmonary effort is normal. No respiratory distress.      Breath sounds: No stridor. No wheezing or rales.   Abdominal:      General: Abdomen is flat. Bowel sounds are normal. There is no distension.      Palpations: Abdomen is soft.   Skin:     General: Skin is warm and dry.   Neurological:      Mental Status: She is alert.             Significant Labs: All pertinent labs within the past 24 hours have been reviewed.    Significant Imaging: I have reviewed all pertinent imaging results/findings within the past 24 hours.

## 2024-10-11 NOTE — ASSESSMENT & PLAN NOTE
Hyponatremia is likely due to excess free water. The patient's most recent sodium results are listed below.  Recent Labs     10/09/24  0458 10/10/24  0500 10/11/24  0509   * 132* 133*     Plan  - Correct the sodium by 4-6mEq in 24 hours.   - Will treat the hyponatremia with Removal of offending medications  - Monitor sodium Daily.   - Patient hyponatremia is stable  - likely 2/2 IV fluids, will hold and repeat labs in the AM  - stable

## 2024-10-11 NOTE — PLAN OF CARE
Problem: Adult Inpatient Plan of Care  Goal: Plan of Care Review  10/11/2024 0559 by Altagracia Hood LPN  Outcome: Progressing  10/11/2024 0559 by Altagracia Hood LPN  Outcome: Progressing  Goal: Patient-Specific Goal (Individualized)  10/11/2024 0559 by Altagracia Hood LPN  Outcome: Progressing  10/11/2024 0559 by Altagracia Hood LPN  Outcome: Progressing  Goal: Absence of Hospital-Acquired Illness or Injury  10/11/2024 0559 by Altagracia Hood LPN  Outcome: Progressing  10/11/2024 0559 by Altagracia Hood LPN  Outcome: Progressing  Goal: Optimal Comfort and Wellbeing  10/11/2024 0559 by Altagracia Hood LPN  Outcome: Progressing  10/11/2024 0559 by Altagracia Hood LPN  Outcome: Progressing  Goal: Readiness for Transition of Care  10/11/2024 0559 by Altagracia Hood LPN  Outcome: Progressing  10/11/2024 0559 by Altagracia Hood LPN  Outcome: Progressing     Problem: Skin Injury Risk Increased  Goal: Skin Health and Integrity  10/11/2024 0559 by Altagracia Hood LPN  Outcome: Progressing  10/11/2024 0559 by Altagracia Hood LPN  Outcome: Progressing

## 2024-10-11 NOTE — ASSESSMENT & PLAN NOTE
-pain appears worse today, RUQ midepigastric region   -US ordered for repeat evaluation and reassuring   -continue PPI and sulcarafate   - stopping IV fluids, able to tolerate a diet now.   - resolved

## 2024-10-12 VITALS
TEMPERATURE: 98 F | RESPIRATION RATE: 18 BRPM | HEIGHT: 64 IN | OXYGEN SATURATION: 93 % | SYSTOLIC BLOOD PRESSURE: 117 MMHG | DIASTOLIC BLOOD PRESSURE: 70 MMHG | BODY MASS INDEX: 36.89 KG/M2 | WEIGHT: 216.06 LBS | HEART RATE: 80 BPM

## 2024-10-12 PROBLEM — J96.01 ACUTE HYPOXIC RESPIRATORY FAILURE: Status: RESOLVED | Noted: 2024-10-11 | Resolved: 2024-10-12

## 2024-10-12 PROBLEM — R11.10 VOMITING: Status: RESOLVED | Noted: 2024-10-07 | Resolved: 2024-10-12

## 2024-10-12 LAB
ANION GAP SERPL CALC-SCNC: 8 MMOL/L (ref 8–16)
BUN SERPL-MCNC: 8 MG/DL (ref 8–23)
CALCIUM SERPL-MCNC: 9.2 MG/DL (ref 8.7–10.5)
CHLORIDE SERPL-SCNC: 97 MMOL/L (ref 95–110)
CO2 SERPL-SCNC: 26 MMOL/L (ref 23–29)
CREAT SERPL-MCNC: 0.7 MG/DL (ref 0.5–1.4)
ERYTHROCYTE [DISTWIDTH] IN BLOOD BY AUTOMATED COUNT: 16.2 % (ref 11.5–14.5)
EST. GFR  (NO RACE VARIABLE): >60 ML/MIN/1.73 M^2
GLUCOSE SERPL-MCNC: 130 MG/DL (ref 70–110)
HCT VFR BLD AUTO: 34.8 % (ref 37–48.5)
HGB BLD-MCNC: 11.8 G/DL (ref 12–16)
MAGNESIUM SERPL-MCNC: 2.1 MG/DL (ref 1.6–2.6)
MCH RBC QN AUTO: 29.2 PG (ref 27–31)
MCHC RBC AUTO-ENTMCNC: 33.9 G/DL (ref 32–36)
MCV RBC AUTO: 86 FL (ref 82–98)
PHOSPHATE SERPL-MCNC: 1.7 MG/DL (ref 2.7–4.5)
PLATELET # BLD AUTO: 148 K/UL (ref 150–450)
PMV BLD AUTO: 13.5 FL (ref 9.2–12.9)
POTASSIUM SERPL-SCNC: 3.7 MMOL/L (ref 3.5–5.1)
RBC # BLD AUTO: 4.04 M/UL (ref 4–5.4)
SODIUM SERPL-SCNC: 131 MMOL/L (ref 136–145)
WBC # BLD AUTO: 14.15 K/UL (ref 3.9–12.7)

## 2024-10-12 PROCEDURE — 36415 COLL VENOUS BLD VENIPUNCTURE: CPT | Mod: HCNC | Performed by: STUDENT IN AN ORGANIZED HEALTH CARE EDUCATION/TRAINING PROGRAM

## 2024-10-12 PROCEDURE — 84100 ASSAY OF PHOSPHORUS: CPT | Mod: HCNC | Performed by: STUDENT IN AN ORGANIZED HEALTH CARE EDUCATION/TRAINING PROGRAM

## 2024-10-12 PROCEDURE — 83735 ASSAY OF MAGNESIUM: CPT | Mod: HCNC | Performed by: STUDENT IN AN ORGANIZED HEALTH CARE EDUCATION/TRAINING PROGRAM

## 2024-10-12 PROCEDURE — 63600175 PHARM REV CODE 636 W HCPCS: Mod: HCNC | Performed by: INTERNAL MEDICINE

## 2024-10-12 PROCEDURE — 25000003 PHARM REV CODE 250: Mod: HCNC | Performed by: INTERNAL MEDICINE

## 2024-10-12 PROCEDURE — 25000003 PHARM REV CODE 250: Mod: HCNC

## 2024-10-12 PROCEDURE — 25000003 PHARM REV CODE 250: Mod: HCNC | Performed by: STUDENT IN AN ORGANIZED HEALTH CARE EDUCATION/TRAINING PROGRAM

## 2024-10-12 PROCEDURE — 80048 BASIC METABOLIC PNL TOTAL CA: CPT | Mod: HCNC | Performed by: STUDENT IN AN ORGANIZED HEALTH CARE EDUCATION/TRAINING PROGRAM

## 2024-10-12 PROCEDURE — 85027 COMPLETE CBC AUTOMATED: CPT | Mod: HCNC | Performed by: STUDENT IN AN ORGANIZED HEALTH CARE EDUCATION/TRAINING PROGRAM

## 2024-10-12 RX ORDER — LEVOFLOXACIN 750 MG/1
750 TABLET ORAL DAILY
Status: DISCONTINUED | OUTPATIENT
Start: 2024-10-12 | End: 2024-10-12 | Stop reason: HOSPADM

## 2024-10-12 RX ORDER — LEVOFLOXACIN 750 MG/1
750 TABLET ORAL DAILY
Qty: 5 TABLET | Refills: 0 | Status: SHIPPED | OUTPATIENT
Start: 2024-10-12 | End: 2024-10-25

## 2024-10-12 RX ADMIN — SUCRALFATE 1 G: 1 SUSPENSION ORAL at 01:10

## 2024-10-12 RX ADMIN — ALUMINUM HYDROXIDE, MAGNESIUM HYDROXIDE, AND SIMETHICONE 30 ML: 1200; 120; 1200 SUSPENSION ORAL at 11:10

## 2024-10-12 RX ADMIN — GABAPENTIN 100 MG: 100 CAPSULE ORAL at 08:10

## 2024-10-12 RX ADMIN — PANTOPRAZOLE SODIUM 40 MG: 40 TABLET, DELAYED RELEASE ORAL at 08:10

## 2024-10-12 RX ADMIN — SUCRALFATE 1 G: 1 SUSPENSION ORAL at 11:10

## 2024-10-12 RX ADMIN — ALUMINUM HYDROXIDE, MAGNESIUM HYDROXIDE, AND SIMETHICONE 30 ML: 1200; 120; 1200 SUSPENSION ORAL at 06:10

## 2024-10-12 RX ADMIN — SUCRALFATE 1 G: 1 SUSPENSION ORAL at 06:10

## 2024-10-12 RX ADMIN — CEFEPIME 2 G: 2 INJECTION, POWDER, FOR SOLUTION INTRAVENOUS at 04:10

## 2024-10-12 RX ADMIN — OXYCODONE 5 MG: 5 TABLET ORAL at 04:10

## 2024-10-12 RX ADMIN — ACETAMINOPHEN 650 MG: 325 TABLET ORAL at 11:10

## 2024-10-12 RX ADMIN — DULOXETINE HYDROCHLORIDE 60 MG: 60 CAPSULE, DELAYED RELEASE ORAL at 08:10

## 2024-10-12 RX ADMIN — LEVOFLOXACIN 750 MG: 750 TABLET, FILM COATED ORAL at 03:10

## 2024-10-12 NOTE — DISCHARGE SUMMARY
[facility] - Internal Medicine [facility] Medicine  Discharge Summary      Patient Name: Bell Lynn  MRN: [id number]  MAX: [id number]  Patient Class: IP- Inpatient  Admission Date: 10/7/2024  Hospital Length of Stay: 4 days  Discharge Date and Time:  10/12/2024 3:20 PM  Attending Physician: Yeison Hill DO   Discharging Provider: Yeison Hill DO  Primary Care Provider: Cipriano Carrera MD  [facility] Medicine Team: [facility]OSP MED Q Yeison Hill DO  Primary Care Team: [facility] MED Q    HPI:   Bell Lynn is a 69 year old F with a history of small intestine neuroendocrine tumor, peptic ulcer disease, GERD, HTN, anxiety, and Iron deficiency anemia who presents to the ED For epigastric abdominal pain and vomiting.  Patient was in her usual state of health until about 2:30 am this morning when she woke up with midepigastric pain that radiate near her chest. She had multiple episodes of nonbloody nonbillous emesis at home. She was unable to take her PO medications due to vomiting. She has been unable to eat anything at home and due to worsening pain came to the ED for further evaluation. Talia fever, chills, chest pain, or shortness of breath. She has not had any problems with urination, hematuria, diarrhea, or constipation.     In the ED patient was afebrile and VSS. Labs were notable for leukocytosis to 20.67, otherwise wnl. CMP was reassuring and lipase was negative. EKG showed no signs of acute ischemia and troponin was negative. UA negative for infection. CXR with no abnormal findings. CT abdomen with no acute findings but showed heterogenous mass of the small bowel stable in size and hepatomegaly and steatosis with hepatic lesions seen on PET-CT prior. She was give Morphine and Zofran with minimal improvement and admitted to medicine for further management.     * No surgery found *      Hospital Course:   While on the floor patient was started on IV fluids and symptom management for  nausea and gastritis. RUQ was ordered due to significant tenderness which showed cholelithiasis but no cholecystitis or choledocholithiasis. WBC continued to trend upward and further evaluation was performed including blood cultures. Blood cultures notable for Enterobacter Cloacae and patient was subsequently started on Cefepime while susceptibilities were pending, ID was consulted, to complete therapy with levaquin. Abdominal pain and appetite improved and patient feeling better.    Patient feeling better overall. In agreement with discharge home and finishing oral antibiotics at home.     - will hold some BP medications at discharge as BP has been on the lower end during hospitalization. Will need PCP follow up to titrate BP meds    General: no acute distress noted  Lungs CTA  Heart RRR  Abdomen is nontender and soft     Goals of Care Treatment Preferences:  Code Status: Full Code      SDOH Screening:  The patient was screened for utility difficulties, food insecurity, transport difficulties, housing insecurity, and interpersonal safety and there were no concerns identified this admission.     Consults:   Consults (From admission, onward)          Status Ordering Provider     [facility]  [facility]        Provider:  (Not yet assigned)    Completed NGUYEN LONG     Inpatient consult to Infectious Diseases  Once        Provider:  (Not yet assigned)    Completed NGUYEN LONG            No new Assessment & Plan notes have been filed under this hospital service since the last note was generated.  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] 02/14/2013 Yes     Chronic    Chronic asthma without complication intolerant of symbicort - recurrent mouth sores [J45.909] 02/14/2013 Yes    Essential hypertension 1/2021 TTE normal, stress test negative [I10] 07/30/2012 Yes     Chronic    Primary osteoarthritis of left knee [M17.12] 07/30/2012 Yes      Problems Resolved During this Admission:    Diagnosis Date Noted Date Resolved POA    Acute hypoxic respiratory failure [J96.01] 10/11/2024 10/12/2024 No    Vomiting [R11.10] 10/07/2024 10/12/2024 Yes    Vitamin D deficiency [E55.9] 10/26/2022 10/07/2024 Yes       Discharged Condition: good    Disposition: Home or Self Care    Follow Up:   Follow-up Information       Cipriano Carrera MD Follow up in 1 week(s).    Specialty: Internal Medicine  Contact information:  [address]  [place] [address] [phone]                           Patient Instructions:   No discharge procedures on file.    Significant Diagnostic Studies: Microbiology: Blood Culture   Lab Results   Component Value Date    LABBLOO No Growth to date 10/11/2024    LABBLOO No Growth to date 10/11/2024    LABBLOO No Growth to date 10/11/2024    LABBLOO No Growth to date 10/11/2024       Pending Diagnostic Studies:       None           Medications:  Reconciled Home Medications:      Medication List        START taking these medications      levoFLOXacin 750 MG tablet  Commonly known as: LEVAQUIN  Take 1 tablet (750 mg total) by mouth once daily.            CONTINUE taking these medications      albuterol 90 mcg/actuation inhaler  Commonly known as: PROVENTIL/VENTOLIN HFA  INHALE 2 PUFFS EVERY 6 HOURS AS NEEDED FOR WHEEZING (RESCUE)     alendronate 70 MG tablet  Commonly known as: FOSAMAX  TAKE 1 TABLET EVERY 7 DAYS     cetirizine 10 MG tablet  Commonly known as: ZYRTEC  TAKE 1 TABLET ONE TIME DAILY     cyclobenzaprine 10 MG tablet  Commonly known as: FLEXERIL  TAKE 1 TABLET EVERY EVENING     dicyclomine 20 mg tablet  Commonly  known as: BENTYL  Take 1 tablet (20 mg total) by mouth 3 (three) times daily as needed (abdominal pain).     DULoxetine 60 MG capsule  Commonly known as: CYMBALTA  TAKE 1 CAPSULE ONE TIME DAILY     furosemide 20 MG tablet  Commonly known as: LASIX  TAKE 1 TABLET ONE TIME DAILY FOR LEG SWELLING AS NEEDED     gabapentin 100 MG capsule  Commonly known as: NEURONTIN  TAKE 1 CAPSULE IN THE MORNING AND TAKE 3 CAPSULES AT NIGHT     latanoprost 0.005 % ophthalmic solution  Place 1 drop into the right eye every evening.     LIDOcaine 5 %  Commonly known as: LIDODERM  APPLY 1 PATCH ON THE SKIN ONE TIME DAILY. REMOVE AND DISCARD PATCH WITHIN 12 HOURS OR AS DIRECTED BY MD TALAVERA 145 mcg Cap capsule  Generic drug: linaCLOtide  TAKE 1 CAPSULE ONE TIME DAILY     metoprolol succinate 100 MG 24 hr tablet  Commonly known as: TOPROL-XL  Take 1 tablet (100 mg total) by mouth once daily.     pantoprazole 40 MG tablet  Commonly known as: PROTONIX  Take 1 tablet (40 mg total) by mouth 2 (two) times daily before meals.     traMADoL 50 mg tablet  Commonly known as: ULTRAM  Take 1 tablet (50 mg total) by mouth every 8 (eight) hours as needed.     vitamin D 1000 units Tab  Commonly known as: VITAMIN D3  Take 1,000 Units by mouth once daily.            STOP taking these medications      amLODIPine 10 MG tablet  Commonly known as: NORVASC     meloxicam 7.5 MG tablet  Commonly known as: MOBIC     nitroGLYCERIN 0.4 MG SL tablet  Commonly known as: NITROSTAT     spironolactone 50 MG tablet  Commonly known as: ALDACTONE     valsartan 320 MG tablet  Commonly known as: DIOVAN              Indwelling Lines/Drains at time of discharge:   Lines/Drains/Airways       None                   Time spent on the discharge of patient: 45 minutes         Yeison Hill DO  Department of Hospital Medicine  [facility] - Internal Medicine Telemetry

## 2024-10-12 NOTE — PLAN OF CARE
Patient is doing well. She was discharged today. Her  was with her. No complaints and in no distress.

## 2024-10-13 NOTE — PLAN OF CARE
[facility] - Internal Medicine Telemetry  Discharge Final Note    Primary Care Provider: Cipriano Carrera MD    Expected Discharge Date: 10/12/2024    Final Discharge Note (most recent)       Final Note - 10/13/24 0912          Final Note    Assessment Type Final Discharge Note (P)      Anticipated Discharge Disposition Home or Self Care (P)      What phone number can be called within the next 1-3 days to see how you are doing after discharge? [phone] (P)         Post-Acute Status    Post-Acute Authorization Other (P)      Coverage [employer]NAGED [employer] (P)      Other Status Awaiting f/u Appts (P)                      Important Message from Medicare             Contact Info       Cipriano Carrera MD   Specialty: Internal Medicine   Relationship: PCP - General  Hypertension Digital Medicine Responsible Provider    [address]  [place] [address]   Phone: [phone]       Next Steps: Follow up in 1 week(s)          OCT    22 Non-Fasting Lab  Tuesday Oct 22, 2024 1:00 PM [facility] - Lab 3rd Fl  [address] [facility] [address]  [phone]          Established Patient Visit with Aaron Keita MD  Tuesday Oct 22, 2024 2:00 PM [facility] - Hem Onc 2nd Fl  [address]  [place] [address]  [phone]          Injection  Tuesday Oct 22, 2024 2:45 PM [facility] - Infusion  [address] [facility] [address]  [phone]   NOV 11 Established Patient Visit with Flores Ba MD  Monday Nov 11, 2024 10:00 AM [facility]  [address]  [address]  [place] [address]  [phone]   NOV 19 Non-Fasting Lab  Tuesday Nov 19, 2024 10:30 AM [facility] - Lab 3rd Fl  [address] [facility] [address]  [phone]     Patient discharged home w/ family via personal vehicle.              UMBERTO Inman, SW  [facility]  Case Management  Ext. [phone]

## 2024-10-15 ENCOUNTER — PATIENT OUTREACH (OUTPATIENT)
Dept: ADMINISTRATIVE | Facility: CLINIC | Age: 69
End: 2024-10-15
Payer: MEDICARE

## 2024-10-16 LAB
BACTERIA BLD CULT: NORMAL
BACTERIA BLD CULT: NORMAL

## 2024-10-17 ENCOUNTER — OFFICE VISIT (OUTPATIENT)
Dept: CARDIOLOGY | Facility: CLINIC | Age: 69
End: 2024-10-17
Payer: MEDICARE

## 2024-10-17 VITALS
SYSTOLIC BLOOD PRESSURE: 116 MMHG | HEART RATE: 72 BPM | OXYGEN SATURATION: 98 % | DIASTOLIC BLOOD PRESSURE: 82 MMHG | WEIGHT: 208.56 LBS | HEIGHT: 64 IN | BODY MASS INDEX: 35.61 KG/M2

## 2024-10-17 DIAGNOSIS — R07.89 CHEST PAIN, ATYPICAL: ICD-10-CM

## 2024-10-17 DIAGNOSIS — R00.2 PALPITATIONS: ICD-10-CM

## 2024-10-17 DIAGNOSIS — I10 ESSENTIAL HYPERTENSION: Chronic | ICD-10-CM

## 2024-10-17 DIAGNOSIS — R06.09 DOE (DYSPNEA ON EXERTION): Primary | ICD-10-CM

## 2024-10-17 PROCEDURE — 1159F MED LIST DOCD IN RCRD: CPT | Mod: HCNC,CPTII,S$GLB, | Performed by: INTERNAL MEDICINE

## 2024-10-17 PROCEDURE — 3074F SYST BP LT 130 MM HG: CPT | Mod: HCNC,CPTII,S$GLB, | Performed by: INTERNAL MEDICINE

## 2024-10-17 PROCEDURE — 99999 PR PBB SHADOW E&M-EST. PATIENT-LVL IV: CPT | Mod: PBBFAC,HCNC,, | Performed by: INTERNAL MEDICINE

## 2024-10-17 PROCEDURE — 3044F HG A1C LEVEL LT 7.0%: CPT | Mod: HCNC,CPTII,S$GLB, | Performed by: INTERNAL MEDICINE

## 2024-10-17 PROCEDURE — 1126F AMNT PAIN NOTED NONE PRSNT: CPT | Mod: HCNC,CPTII,S$GLB, | Performed by: INTERNAL MEDICINE

## 2024-10-17 PROCEDURE — 99214 OFFICE O/P EST MOD 30 MIN: CPT | Mod: HCNC,S$GLB,, | Performed by: INTERNAL MEDICINE

## 2024-10-17 PROCEDURE — 1124F ACP DISCUSS-NO DSCNMKR DOCD: CPT | Mod: HCNC,CPTII,S$GLB, | Performed by: INTERNAL MEDICINE

## 2024-10-17 PROCEDURE — 3079F DIAST BP 80-89 MM HG: CPT | Mod: HCNC,CPTII,S$GLB, | Performed by: INTERNAL MEDICINE

## 2024-10-17 PROCEDURE — 1111F DSCHRG MED/CURRENT MED MERGE: CPT | Mod: HCNC,CPTII,S$GLB, | Performed by: INTERNAL MEDICINE

## 2024-10-17 PROCEDURE — 1101F PT FALLS ASSESS-DOCD LE1/YR: CPT | Mod: HCNC,CPTII,S$GLB, | Performed by: INTERNAL MEDICINE

## 2024-10-17 PROCEDURE — 3288F FALL RISK ASSESSMENT DOCD: CPT | Mod: HCNC,CPTII,S$GLB, | Performed by: INTERNAL MEDICINE

## 2024-10-17 PROCEDURE — 3008F BODY MASS INDEX DOCD: CPT | Mod: HCNC,CPTII,S$GLB, | Performed by: INTERNAL MEDICINE

## 2024-10-17 NOTE — PROGRESS NOTES
Subjective   Patient ID:  Bell Lynn is a 69 y.o. female who presents for follow-up of No chief complaint on file.      HPI      HTN, LANGE, obesity     Previously saw Dr Willson  Her follow-up chest pain shortness of breath.  Again she has had progressive symptoms where she can't even walk down the hallway without exertional problems better relieved with rest.  She denies any sustained tachycardia or palpitations.  She has experienced no PND, orthopnea or lower extremity edema.  She has skin significant secondhand smoke exposure with her  smoking in the house.  She smells of tobacco on her clothing today.  She denies any dizziness, presyncope or syncope.  She has had multiple family members with deaths from MI in the past year and mainly wants a checkup.     Echo 1/26/21  The left ventricle is normal in size with concentric hypertrophy and normal systolic function. The estimated ejection fraction is 60%  Normal left ventricular diastolic function.  Normal right ventricular size with normal right ventricular systolic function.  Normal central venous pressure (3 mmHg).  The estimated PA systolic pressure is 5 mmHg.     Stress test 1/26/21    Normal myocardial perfusion scan. There is no evidence of myocardial ischemia or infarction.    The gated perfusion images showed an ejection fraction of 54% post stress. .    The EKG portion of this study is negative for ischemia.    The patient reported no chest pain during the stress test.    There were no arrhythmias during stress     Holter 1/26/21  Sinus rhythm with heart rates varying between 58 and 118 bpm with an average of 81 bpm.  There were very rare PVCs totalling 19 and averaging 0.79 per hour.  There were very rare PACs totalling 2 and averaging 0.08 per hour.     1/14/21 for the last 2 weeks reports episodic CP - tightness with radiation to left arm both with exertion and at rest - can last several hours. Occasional palpitations with dizziness.  Worsening LANGE  EKG NSR - ok   Echo and lexiscan myoview for CP and SOB - says she cannot walk treadmill  Holter for palpitations     6/7/23 Recently having issues with palpitations and sticking left sided CP. Since daughter graduated High School all of the symptoms and BP have improved  EKG NSR NSSTT changes  Given improvement in symptoms after stress level improved we are both in agreement to observe CP and palpitations  Continue Rx for HTN  Needs more diet and exercise  OV 6 months    Admitted 10/7/24  While on the floor patient was started on IV fluids and symptom management for nausea and gastritis. RUQ was ordered due to significant tenderness which showed cholelithiasis but no cholecystitis or choledocholithiasis. WBC continued to trend upward and further evaluation was performed including blood cultures. Blood cultures notable for Enterobacter Cloacae and patient was subsequently started on Cefepime while susceptibilities were pending, ID was consulted, to complete therapy with levaquin. Abdominal pain and appetite improved and patient feeling better.     Patient feeling better overall. In agreement with discharge home and finishing oral antibiotics at home.      - will hold some BP medications at discharge as BP has been on the lower end during hospitalization. Will need PCP follow up to titrate BP meds   Troponin negative, BNP 12  EKG 10/7/24 sinus bradycardia 59 NSSTT changes    10/17/24 Reports LANGE and occasional chest tightness since discharge. Reports recent irregular heart beat  BP controlled       Review of Systems   Constitutional: Negative for decreased appetite.   HENT:  Negative for ear discharge.    Eyes:  Negative for blurred vision.   Respiratory:  Negative for hemoptysis.    Endocrine: Negative for polyphagia.   Hematologic/Lymphatic: Negative for adenopathy.   Skin:  Negative for color change.   Musculoskeletal:  Negative for joint swelling.   Genitourinary:  Negative for bladder  incontinence.   Neurological:  Negative for brief paralysis.   Psychiatric/Behavioral:  Negative for hallucinations.    Allergic/Immunologic: Negative for hives.          Objective     Physical Exam  Constitutional:       Appearance: She is well-developed.   HENT:      Head: Normocephalic and atraumatic.   Eyes:      Conjunctiva/sclera: Conjunctivae normal.      Pupils: Pupils are equal, round, and reactive to light.   Cardiovascular:      Rate and Rhythm: Normal rate.      Pulses: Intact distal pulses.      Heart sounds: Normal heart sounds.   Pulmonary:      Effort: Pulmonary effort is normal.      Breath sounds: Normal breath sounds.   Abdominal:      General: Bowel sounds are normal.      Palpations: Abdomen is soft.   Musculoskeletal:         General: Normal range of motion.      Cervical back: Normal range of motion and neck supple.   Skin:     General: Skin is warm and dry.   Neurological:      Mental Status: She is alert and oriented to person, place, and time.            Assessment and Plan     1. LANGE (dyspnea on exertion)    2. Palpitations    3. Essential hypertension 1/2021 TTE normal, stress test negative    4. Chest pain, atypical        Plan:    Echo and lexiscan myoview for CP and LANGE  Holter for palpitations   Continue Rx for HTN    Advance Care Planning     Date: 10/17/2024  Patient did not wish or was not able to name a surrogate decision maker or provide an Advance Care Plan.

## 2024-10-22 ENCOUNTER — INFUSION (OUTPATIENT)
Dept: INFUSION THERAPY | Facility: HOSPITAL | Age: 69
End: 2024-10-22
Payer: MEDICARE

## 2024-10-22 ENCOUNTER — LAB VISIT (OUTPATIENT)
Dept: LAB | Facility: HOSPITAL | Age: 69
End: 2024-10-22
Payer: MEDICARE

## 2024-10-22 ENCOUNTER — OFFICE VISIT (OUTPATIENT)
Dept: HEMATOLOGY/ONCOLOGY | Facility: CLINIC | Age: 69
End: 2024-10-22
Payer: MEDICARE

## 2024-10-22 VITALS
OXYGEN SATURATION: 97 % | SYSTOLIC BLOOD PRESSURE: 133 MMHG | WEIGHT: 208.56 LBS | TEMPERATURE: 98 F | HEIGHT: 64 IN | BODY MASS INDEX: 35.61 KG/M2 | HEART RATE: 69 BPM | DIASTOLIC BLOOD PRESSURE: 82 MMHG

## 2024-10-22 DIAGNOSIS — D49.9 IMMUNODEFICIENCY SECONDARY TO NEOPLASM: ICD-10-CM

## 2024-10-22 DIAGNOSIS — C78.7 SECONDARY MALIGNANT NEOPLASM OF LIVER: ICD-10-CM

## 2024-10-22 DIAGNOSIS — D84.821 IMMUNODEFICIENCY DUE TO DRUGS: ICD-10-CM

## 2024-10-22 DIAGNOSIS — C7A.8 PRIMARY MALIGNANT NEUROENDOCRINE TUMOR OF SMALL INTESTINE: Primary | ICD-10-CM

## 2024-10-22 DIAGNOSIS — Z79.899 IMMUNODEFICIENCY DUE TO DRUGS: ICD-10-CM

## 2024-10-22 DIAGNOSIS — I10 ESSENTIAL HYPERTENSION: ICD-10-CM

## 2024-10-22 DIAGNOSIS — D84.81 IMMUNODEFICIENCY SECONDARY TO NEOPLASM: ICD-10-CM

## 2024-10-22 DIAGNOSIS — C7A.8 PRIMARY MALIGNANT NEUROENDOCRINE TUMOR OF SMALL INTESTINE: ICD-10-CM

## 2024-10-22 DIAGNOSIS — C77.8 SECONDARY MALIGNANT NEOPLASM OF LYMPH NODES OF MULTIPLE SITES: ICD-10-CM

## 2024-10-22 PROBLEM — D69.6 THROMBOCYTOPENIA, UNSPECIFIED: Status: ACTIVE | Noted: 2024-10-22

## 2024-10-22 LAB
ALBUMIN SERPL BCP-MCNC: 2.9 G/DL (ref 3.5–5.2)
ALP SERPL-CCNC: 91 U/L (ref 40–150)
ALT SERPL W/O P-5'-P-CCNC: 10 U/L (ref 10–44)
ANION GAP SERPL CALC-SCNC: 9 MMOL/L (ref 8–16)
AST SERPL-CCNC: 21 U/L (ref 10–40)
BASOPHILS # BLD AUTO: 0.08 K/UL (ref 0–0.2)
BASOPHILS NFR BLD: 0.8 % (ref 0–1.9)
BILIRUB SERPL-MCNC: 0.2 MG/DL (ref 0.1–1)
BUN SERPL-MCNC: 9 MG/DL (ref 8–23)
CALCIUM SERPL-MCNC: 9.3 MG/DL (ref 8.7–10.5)
CHLORIDE SERPL-SCNC: 106 MMOL/L (ref 95–110)
CO2 SERPL-SCNC: 22 MMOL/L (ref 23–29)
CREAT SERPL-MCNC: 0.7 MG/DL (ref 0.5–1.4)
DIFFERENTIAL METHOD BLD: ABNORMAL
EOSINOPHIL # BLD AUTO: 0.1 K/UL (ref 0–0.5)
EOSINOPHIL NFR BLD: 1.3 % (ref 0–8)
ERYTHROCYTE [DISTWIDTH] IN BLOOD BY AUTOMATED COUNT: 16.7 % (ref 11.5–14.5)
EST. GFR  (NO RACE VARIABLE): >60 ML/MIN/1.73 M^2
GLUCOSE SERPL-MCNC: 124 MG/DL (ref 70–110)
HCT VFR BLD AUTO: 36.9 % (ref 37–48.5)
HGB BLD-MCNC: 11.8 G/DL (ref 12–16)
IMM GRANULOCYTES # BLD AUTO: 0.04 K/UL (ref 0–0.04)
IMM GRANULOCYTES NFR BLD AUTO: 0.4 % (ref 0–0.5)
LYMPHOCYTES # BLD AUTO: 4.3 K/UL (ref 1–4.8)
LYMPHOCYTES NFR BLD: 42 % (ref 18–48)
MCH RBC QN AUTO: 28.8 PG (ref 27–31)
MCHC RBC AUTO-ENTMCNC: 32 G/DL (ref 32–36)
MCV RBC AUTO: 90 FL (ref 82–98)
MONOCYTES # BLD AUTO: 0.7 K/UL (ref 0.3–1)
MONOCYTES NFR BLD: 6.4 % (ref 4–15)
NEUTROPHILS # BLD AUTO: 5.1 K/UL (ref 1.8–7.7)
NEUTROPHILS NFR BLD: 49.1 % (ref 38–73)
NRBC BLD-RTO: 0 /100 WBC
PLATELET # BLD AUTO: 515 K/UL (ref 150–450)
PMV BLD AUTO: 11.5 FL (ref 9.2–12.9)
POTASSIUM SERPL-SCNC: 4.2 MMOL/L (ref 3.5–5.1)
PROT SERPL-MCNC: 8.3 G/DL (ref 6–8.4)
RBC # BLD AUTO: 4.1 M/UL (ref 4–5.4)
SODIUM SERPL-SCNC: 137 MMOL/L (ref 136–145)
WBC # BLD AUTO: 10.33 K/UL (ref 3.9–12.7)

## 2024-10-22 PROCEDURE — 85025 COMPLETE CBC W/AUTO DIFF WBC: CPT | Mod: HCNC | Performed by: PHYSICIAN ASSISTANT

## 2024-10-22 PROCEDURE — 83519 RIA NONANTIBODY: CPT | Mod: HCNC | Performed by: PHYSICIAN ASSISTANT

## 2024-10-22 PROCEDURE — 3044F HG A1C LEVEL LT 7.0%: CPT | Mod: HCNC,CPTII,S$GLB, | Performed by: INTERNAL MEDICINE

## 2024-10-22 PROCEDURE — 1101F PT FALLS ASSESS-DOCD LE1/YR: CPT | Mod: HCNC,CPTII,S$GLB, | Performed by: INTERNAL MEDICINE

## 2024-10-22 PROCEDURE — 1159F MED LIST DOCD IN RCRD: CPT | Mod: HCNC,CPTII,S$GLB, | Performed by: INTERNAL MEDICINE

## 2024-10-22 PROCEDURE — 63600175 PHARM REV CODE 636 W HCPCS: Mod: JZ,JG,HCNC | Performed by: INTERNAL MEDICINE

## 2024-10-22 PROCEDURE — 36415 COLL VENOUS BLD VENIPUNCTURE: CPT | Mod: HCNC | Performed by: PHYSICIAN ASSISTANT

## 2024-10-22 PROCEDURE — 3288F FALL RISK ASSESSMENT DOCD: CPT | Mod: HCNC,CPTII,S$GLB, | Performed by: INTERNAL MEDICINE

## 2024-10-22 PROCEDURE — 1111F DSCHRG MED/CURRENT MED MERGE: CPT | Mod: HCNC,CPTII,S$GLB, | Performed by: INTERNAL MEDICINE

## 2024-10-22 PROCEDURE — 80053 COMPREHEN METABOLIC PANEL: CPT | Mod: HCNC | Performed by: PHYSICIAN ASSISTANT

## 2024-10-22 PROCEDURE — 3008F BODY MASS INDEX DOCD: CPT | Mod: HCNC,CPTII,S$GLB, | Performed by: INTERNAL MEDICINE

## 2024-10-22 PROCEDURE — 1160F RVW MEDS BY RX/DR IN RCRD: CPT | Mod: HCNC,CPTII,S$GLB, | Performed by: INTERNAL MEDICINE

## 2024-10-22 PROCEDURE — 96372 THER/PROPH/DIAG INJ SC/IM: CPT | Mod: HCNC

## 2024-10-22 PROCEDURE — 83497 ASSAY OF 5-HIAA: CPT | Mod: HCNC | Performed by: PHYSICIAN ASSISTANT

## 2024-10-22 PROCEDURE — 3075F SYST BP GE 130 - 139MM HG: CPT | Mod: HCNC,CPTII,S$GLB, | Performed by: INTERNAL MEDICINE

## 2024-10-22 PROCEDURE — G2211 COMPLEX E/M VISIT ADD ON: HCPCS | Mod: HCNC,S$GLB,, | Performed by: INTERNAL MEDICINE

## 2024-10-22 PROCEDURE — 99214 OFFICE O/P EST MOD 30 MIN: CPT | Mod: HCNC,S$GLB,, | Performed by: INTERNAL MEDICINE

## 2024-10-22 PROCEDURE — 3079F DIAST BP 80-89 MM HG: CPT | Mod: HCNC,CPTII,S$GLB, | Performed by: INTERNAL MEDICINE

## 2024-10-22 PROCEDURE — 1126F AMNT PAIN NOTED NONE PRSNT: CPT | Mod: HCNC,CPTII,S$GLB, | Performed by: INTERNAL MEDICINE

## 2024-10-22 PROCEDURE — 99999 PR PBB SHADOW E&M-EST. PATIENT-LVL IV: CPT | Mod: PBBFAC,HCNC,, | Performed by: INTERNAL MEDICINE

## 2024-10-22 PROCEDURE — 84260 ASSAY OF SEROTONIN: CPT | Mod: HCNC | Performed by: PHYSICIAN ASSISTANT

## 2024-10-22 RX ORDER — LANREOTIDE ACETATE 120 MG/.5ML
120 INJECTION SUBCUTANEOUS ONCE
OUTPATIENT
Start: 2024-10-22 | End: 2024-10-22

## 2024-10-22 RX ORDER — LANREOTIDE ACETATE 120 MG/.5ML
120 INJECTION SUBCUTANEOUS ONCE
Status: COMPLETED | OUTPATIENT
Start: 2024-10-22 | End: 2024-10-22

## 2024-10-22 RX ADMIN — LANREOTIDE ACETATE 120 MG: 120 INJECTION SUBCUTANEOUS at 03:10
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
## 2024-10-24 ENCOUNTER — PATIENT MESSAGE (OUTPATIENT)
Dept: ADMINISTRATIVE | Facility: OTHER | Age: 69
End: 2024-10-24
Payer: MEDICARE

## 2024-10-24 PROBLEM — Z86.012 HISTORY OF BENIGN CARCINOID TUMOR: Status: RESOLVED | Noted: 2019-01-30 | Resolved: 2024-10-24

## 2024-10-24 NOTE — PROGRESS NOTES
This note was created by combination of typed  and M-Modal dictation.  Transcription errors may be present.   This note was also generated with the assistance of ambient listening technology. Verbal consent was obtained by the patient and accompanying visitor(s) for the recording of patient appointment to facilitate this note. I attest to having reviewed and edited the generated note for accuracy, though some syntax or spelling errors may persist. Please contact the author of this note for any clarification.    Assessment and Plan:   Assessment and Plan    Infection caused by Enterobacter cloacae  Primary malignant neuroendocrine tumor of small intestine  Polyp of colon, unspecified part of colon, unspecified type  -recent hospitalization for sepsis with cloaca.  Sensitive to Levaquin finish out course.  No recurrence of fevers chills abdominal pain diarrhea blood in stool etc..    Felt to be GI in source   Active treatment for small bowel neuroendocrine tumor.    Had colonoscopy done 10/04/2024 normal/benign, plan is to repeat 2031  No further testing    Essential hypertension 1/2021 TTE normal, stress test negative  Peripheral edema  -BP stable.  Was discharged off of amlodipine, valsartan, spironolactone.  Home cuff I think over reads.  She is still on metoprolol low-dose.  Blood pressure today is in range.  Okay to stay off those medications.  If her blood pressure start going up would restart the valsartan and then spironolactone and then amlodipine  Takes Lasix as needed for pedal edema.  Infrequent use.  -     Lipid Panel; Future; Expected date: 10/26/2024    Palpitations  -upcoming cardiology testing with echo, stress test, Holter.  On beta-blocker    Abnormal A1c   -check A1c with future scheduled labs in November.  Diet-controlled    Thrombocytopenia, unspecified  -stable.  Serial CBC    Chronic asthma without complication, unspecified asthma severity, unspecified whether persistent  -stable.   Albuterol rescue    Mild recurrent major depression  -MDD, fibromyalgia.  Stable on gabapentin, Cymbalta    Needs flu shot  -     influenza (adjuvanted) (Fluad) 45 mcg/0.5 mL IM vaccine (> or = 66 yo) 0.5 mL    Need for COVID-19 vaccine  -     (VFC) COVID-19 (Pfizer) 30 mcg/0.3 mL IM vaccine (>/= 13 yo) 0.3 mL      Medications Discontinued During This Encounter   Medication Reason    dicyclomine (BENTYL) 20 mg tablet     levoFLOXacin (LEVAQUIN) 750 MG tablet        meds sent this encounter:  Medications Ordered This Encounter   Medications    (VFC) COVID-19 (Pfizer) 30 mcg/0.3 mL IM vaccine (>/= 13 yo) 0.3 mL    influenza (adjuvanted) (Fluad) 45 mcg/0.5 mL IM vaccine (> or = 66 yo) 0.5 mL         Follow Up:  Follow up with me in a month's.  Labs to be done in November    Future Appointments   Date Time Provider Department Center   11/7/2024  7:30 AM [facility] DEXA1 [facility] BMD [place]   11/11/2024 10:00 AM Flores Ba MD [facility] ORTHO [place] - B   11/14/2024  1:45 PM Danny Anthony MD [facility] CARDIO [place]   11/19/2024 10:30 AM LAB, HEMONC CANCER [facility] HO [facility]   11/19/2024 11:30 AM Selene Castro, CNS [facility] HEMONC2 [place] [facility]   11/19/2024 12:30 PM INJECTION, [facility] INFUSION [facility] CHEMO [place] [facility]   11/21/2024  7:00 AM [facility] NM1 WB NUCLEAR [facility] Hos   11/21/2024  8:00 AM NUCLEAR TREADMILL, [facility] EKG [facility] Hos   11/21/2024  9:00 AM [facility] NM1 [facility] NUCLEAR [facility] Hos   11/21/2024 10:30 AM ECHO, [facility] EKG [facility] Hos   11/21/2024 11:00 AM HOLTER, [facility]   11/26/2024  8:00 AM Cipriano Carrera MD [facility] FAM MED [place]   12/16/2024  8:10 AM LAB, HEMONC CANCER [facility] HO [place]   12/16/2024  9:20 AM Aaron Keita MD [facility] HEMONC2 [place]   12/16/2024 10:45 AM INJECTION, [facility] INFUSION [facility] CHEMO [place]   1/13/2025  9:30 [facility] [place]   1/13/2025 10:30 AM Chloé Queveod, PA-C [facility] HEMONC2 Carlos Rosen    1/13/2025 11:15 AM INJECTION, [facility] INFUSION [facility] CHEMO [place] [facility]         Subjective:   Subjective   Chief Complaint   Patient presents with    Follow-up     Hospital        HPI  Bell is a 69 y.o. female.    Social History     Tobacco Use    Smoking status: Never     Passive exposure: Never    Smokeless tobacco: Never   Substance Use Topics    Alcohol use: Yes     Alcohol/week: 1.0 standard drink of alcohol     Types: 1 Cans of beer per week     Comment: on ocassion      Social History     Occupational History     Employer: [employer]      Social History     Social History Narrative    Not on file       Patient Care Team:  Cipriano Carrera MD as PCP - General (Internal Medicine)  Gonsalo Ivy MD as Consulting Physician (Gastroenterology)  Alma Delia Morales as Digital Medicine Health   Yvonne Mota PharmD as Hypertension Digital Medicine Clinician  Cipriano Carrera MD as Hypertension Digital Medicine Responsible Provider (Internal Medicine)  Vilma James MA as Care Coordinator  Jaida Raygoza RN as Oncology Navigator  Jud Elder RN as Oncology Navigator  Medicare, [employer] Gold Managed as Hypertension Digital Medicine Contract    No LMP recorded. Patient has had a hysterectomy.    Contact made with pt within 2 days of discharge? Yes  Face to face visit scheduled within 7 days of discharge (high complexity)? No  Face to face visit scheduled within 14 days of discharge (moderate complexity)? Yes    Family and/or Caretaker present at visit?  No.  Diagnostic tests reviewed/disposition: No diagnosic tests pending after this hospitalization.  Disease/illness education: none  Home health/community services discussion/referrals: Patient does not have home health established from hospital visit.  They do not need home health.  If needed, we will set up home health for the patient.   Establishment or re-establishment of referral orders for community resources: No other necessary  community resources.   Discussion with other health care providers: No discussion with other health care providers necessary.   Last appointment with this clinic was 5/22/2024. Last visit with me 5/22/2024   To summarize last visit and events leading up to today:    Hypertension increased spironolactone  Diuretic induced hypokalemia on potassium.  Lasix every other day.  Depression, fibromyalgia, Cymbalta and gabapentin  Chronic constipation.  Linzess.  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  Lanreotide.  6/27/23 abd mass, EUS bx Well-differentiated neuroendocrine tumor, Grade1  Chronic anemia, iron deficiency.  Colonoscopy 2018 with polyp though no path report given.  Last EGD on record was in 2021 showing gastric ulcer with no stigmata bleeding.  Biopsy was negative.    History of oral iron intolerant with GI side effects  history of transsphenoidal pituitary resection stable  Osteoporosis.    06/21/2022 DEXA L-spine -1.9, total hip-2.8, femoral neck-3.5.  FRAX score 2.7/8.3%.  Osteoporosis.  Started on alendronate  Vitamin-D deficient  Fibromyalgia, MDD, cymbalta, tramadol prn. Lidoderm, flexeril.  Left knee replacement  seen by ortho 04/30/2024.  Rotator tendinitis.  PT.  Mobic.     Most recently seen by Heme-Onc 04/23/2024.  Dominant mesenteric mass not amenable to surgical resection due to proximity on SMA.  Stable continue lanreotide and repeat PET scan 07/2024     Has room to increase spironolactone    Last visit with me 05/22/2024   Hypertension stable followed by digital monitoring home readings are high never verified cuff.  Possibly cuff undersized?  If high increase spironolactone   MDD, fibromyalgia Cymbalta and gabapentin  Ordered colonoscopy  Neuroendocrine tumor followed by Heme-Onc monthly infusions of lanreotide    Saw orthopedics 07/24/2024 right subacromial bursa injection    10/4/24 colonoscopy 3 mm cecal. Diverticulosis sigmoid, descending. Path polypoid colonic mucosa  no histopath abn, neg for dysplasia.  repeat 7 years    Hospitalization 10/7 through 10/12/2024 for epigastric abdominal pain and vomiting.  CT abdomen with no acute findings but showed heterogenous mass of the small bowel stable in size and hepatomegaly and steatosis with hepatic lesions seen on PET-CT prior   Hospital Course:   While on the floor patient was started on IV fluids and symptom management for nausea and gastritis. RUQ was ordered due to significant tenderness which showed cholelithiasis but no cholecystitis or choledocholithiasis. WBC continued to trend upward and further evaluation was performed including blood cultures. Blood cultures notable for Enterobacter Cloacae and patient was subsequently started on Cefepime while susceptibilities were pending, ID was consulted, to complete therapy with levaquin. Abdominal pain and appetite improved and patient feeling better.  will hold some BP medications at discharge as BP has been on the lower end during hospitalization. Will need PCP follow up to titrate BP meds     Discharged on Levaquin  Discontinued amlodipine, Mobic, spironolactone, valsartan    Saw cardiology in follow-up 10/17/2024 complaining of chest tightness and irregular heartbeat since discharge.  Plan was echo, Lexiscan Myoview, Holter    Saw Heme-Onc 10/22/2024.  Stable.    Cardiac testing scheduled 11/21/2024    Today's visit:    History of Present Illness    Bell is .  Lives with her , granddaughter and 2 grandson's    Bell recently had a bacterial infection leading to sepsis, originating from her intestines. She was hospitalized for this severe condition, which caused significant discomfort. Discharged nearly 2 weeks ago, she reports substantial improvement with gradually returning strength.    Bell did not require home health services post-discharge and feels capable of managing at home with support from family members, including her , granddaughter, and  grandsons. She had a follow-up visit with cardiology post-hospitalization, where stress testing for her heart was recommended.    During hospitalization, many of her blood pressure medications were discontinued due to hypotension. Bell has been monitoring her blood pressure at home using a digital monitoring system. Last week, her home readings were borderline, but today, her blood pressure was noted to be within acceptable range.  We had previously discussed possible undersized home cuff.    Bell reports normal bowel movements without constipation or diarrhea. She denies fevers, chills, hematochezia, pain, or problems with urination since discharge.    Bell saw her oncologist post-discharge, who indicated that her condition was stable and made no changes to her treatment plan.    Medications  Bell is on Fosamax weekly for bones, Linzess as needed for constipation (though she reports not needing it recently), and Metoprolol for blood pressure and heart. She is also taking Cymbalta and Gabapentin for nerve pain, Protonix for stomach acid, Lasix (furosemide) as needed for swelling (with infrequent use reported), and Flexeril as needed as a muscle relaxer (also with infrequent use reported).  Not taking dicyclomine.  Does not recognize his medication.  Will take it off her list      ROS:  General: no fever, no chills, no weight loss  Gastrointestinal: no diarrhea, no constipation  Musculoskeletal: no joint pain             ALLERGIES AND MEDICATIONS: updated and reviewed.  Medication List with Changes/Refills   Current Medications    ALBUTEROL (PROVENTIL/VENTOLIN HFA) 90 MCG/ACTUATION INHALER    INHALE 2 PUFFS EVERY 6 HOURS AS NEEDED FOR WHEEZING (RESCUE)    ALENDRONATE (FOSAMAX) 70 MG TABLET    TAKE 1 TABLET EVERY 7 DAYS    CETIRIZINE (ZYRTEC) 10 MG TABLET    TAKE 1 TABLET ONE TIME DAILY    CYCLOBENZAPRINE (FLEXERIL) 10 MG TABLET    TAKE 1 TABLET EVERY EVENING    DICYCLOMINE (BENTYL) 20 MG TABLET    Take 1  "tablet (20 mg total) by mouth 3 (three) times daily as needed (abdominal pain).    DULOXETINE (CYMBALTA) 60 MG CAPSULE    TAKE 1 CAPSULE ONE TIME DAILY    FUROSEMIDE (LASIX) 20 MG TABLET    TAKE 1 TABLET ONE TIME DAILY FOR LEG SWELLING AS NEEDED    GABAPENTIN (NEURONTIN) 100 MG CAPSULE    TAKE 1 CAPSULE IN THE MORNING AND TAKE 3 CAPSULES AT NIGHT    LATANOPROST 0.005 % OPHTHALMIC SOLUTION    Place 1 drop into the right eye every evening.    LEVOFLOXACIN (LEVAQUIN) 750 MG TABLET    Take 1 tablet (750 mg total) by mouth once daily for 5 days    LIDOCAINE (LIDODERM) 5 %    APPLY 1 PATCH ON THE SKIN ONE TIME DAILY. REMOVE AND DISCARD PATCH WITHIN 12 HOURS OR AS DIRECTED BY MD    LINZESS 145 MCG CAP CAPSULE    TAKE 1 CAPSULE ONE TIME DAILY    METOPROLOL SUCCINATE (TOPROL-XL) 100 MG 24 HR TABLET    Take 1 tablet (100 mg total) by mouth once daily.    PANTOPRAZOLE (PROTONIX) 40 MG TABLET    Take 1 tablet (40 mg total) by mouth 2 (two) times daily before meals.    TRAMADOL (ULTRAM) 50 MG TABLET    Take 1 tablet (50 mg total) by mouth every 8 (eight) hours as needed.    VITAMIN D (VITAMIN D3) 1000 UNITS TAB    Take 1,000 Units by mouth once daily.         Objective:   Objective   Physical Exam   Vitals:    10/25/24 0904   BP: 128/70   Pulse: 78   Temp: 97.8 °F (36.6 °C)   TempSrc: Oral   SpO2: 98%   Weight: 94.4 kg (208 lb 3.6 oz)   Height: 5' 4" (1.626 m)    Body mass index is 35.74 kg/m².  Weight: 94.4 kg (208 lb 3.6 oz)   Height: 5' 4" (162.6 cm)     Physical Exam  Constitutional:       General: She is not in acute distress.     Appearance: She is well-developed.   Eyes:      Extraocular Movements: Extraocular movements intact.   Cardiovascular:      Rate and Rhythm: Normal rate and regular rhythm.      Heart sounds: Normal heart sounds. No murmur heard.  Pulmonary:      Effort: Pulmonary effort is normal.      Breath sounds: Normal breath sounds.   Abdominal:      General: There is no distension.      Tenderness: There " is abdominal tenderness. There is no guarding.      Comments: Some mild epigastric tenderness.  No palpable organs   Musculoskeletal:         General: Normal range of motion.      Right lower leg: No edema.      Left lower leg: No edema.   Lymphadenopathy:      Cervical: No cervical adenopathy.   Skin:     General: Skin is warm and dry.   Neurological:      Mental Status: She is alert and oriented to person, place, and time.      Coordination: Coordination normal.   Psychiatric:         Behavior: Behavior normal.         Thought Content: Thought content normal.

## 2024-10-25 ENCOUNTER — OFFICE VISIT (OUTPATIENT)
Dept: FAMILY MEDICINE | Facility: CLINIC | Age: 69
End: 2024-10-25
Payer: MEDICARE

## 2024-10-25 VITALS
WEIGHT: 208.25 LBS | OXYGEN SATURATION: 98 % | SYSTOLIC BLOOD PRESSURE: 128 MMHG | TEMPERATURE: 98 F | HEART RATE: 78 BPM | HEIGHT: 64 IN | BODY MASS INDEX: 35.55 KG/M2 | DIASTOLIC BLOOD PRESSURE: 70 MMHG

## 2024-10-25 DIAGNOSIS — Z23 NEED FOR COVID-19 VACCINE: ICD-10-CM

## 2024-10-25 DIAGNOSIS — J45.909 CHRONIC ASTHMA WITHOUT COMPLICATION, UNSPECIFIED ASTHMA SEVERITY, UNSPECIFIED WHETHER PERSISTENT: ICD-10-CM

## 2024-10-25 DIAGNOSIS — D69.6 THROMBOCYTOPENIA, UNSPECIFIED: ICD-10-CM

## 2024-10-25 DIAGNOSIS — C7A.8 PRIMARY MALIGNANT NEUROENDOCRINE TUMOR OF SMALL INTESTINE: ICD-10-CM

## 2024-10-25 DIAGNOSIS — R73.09 ABNORMAL GLUCOSE: ICD-10-CM

## 2024-10-25 DIAGNOSIS — F33.0 MILD RECURRENT MAJOR DEPRESSION: Chronic | ICD-10-CM

## 2024-10-25 DIAGNOSIS — I10 ESSENTIAL HYPERTENSION: Chronic | ICD-10-CM

## 2024-10-25 DIAGNOSIS — A49.8 INFECTION CAUSED BY ENTEROBACTER CLOACAE: Primary | ICD-10-CM

## 2024-10-25 DIAGNOSIS — Z23 NEEDS FLU SHOT: ICD-10-CM

## 2024-10-25 DIAGNOSIS — R00.2 PALPITATIONS: ICD-10-CM

## 2024-10-25 DIAGNOSIS — K63.5 POLYP OF COLON, UNSPECIFIED PART OF COLON, UNSPECIFIED TYPE: ICD-10-CM

## 2024-10-25 LAB — 5OH-INDOLEACETATE SERPL-MCNC: 56 NG/ML

## 2024-10-25 PROCEDURE — 99999 PR PBB SHADOW E&M-EST. PATIENT-LVL IV: CPT | Mod: PBBFAC,HCNC,, | Performed by: INTERNAL MEDICINE

## 2024-10-25 NOTE — PHYSICIAN QUERY
Due to the conflicting clinical picture, please clinically validate the diagnosis of acute hypoxic respiratory failure. If validated, please provide additional clinical support for the diagnosis.  The respiratory condition has been ruled out

## 2024-10-25 NOTE — PROGRESS NOTES
Patient tolerated influenza and covid injection. Advised to wait 15mins. VIS information received.

## 2024-11-07 ENCOUNTER — HOSPITAL ENCOUNTER (OUTPATIENT)
Facility: HOSPITAL | Age: 69
Discharge: HOME | DRG: 948 | End: 2024-11-08
Attending: EMERGENCY MEDICINE | Admitting: INTERNAL MEDICINE
Payer: MEDICARE

## 2024-11-07 DIAGNOSIS — K80.20 CALCULUS OF GALLBLADDER WITHOUT CHOLECYSTITIS WITHOUT OBSTRUCTION: Primary | ICD-10-CM

## 2024-11-07 DIAGNOSIS — R07.9 CHEST PAIN: ICD-10-CM

## 2024-11-07 DIAGNOSIS — D3A.8 NEUROENDOCRINE TUMOR: ICD-10-CM

## 2024-11-07 DIAGNOSIS — R10.9 ABDOMINAL PAIN: ICD-10-CM

## 2024-11-07 LAB
ALBUMIN SERPL BCP-MCNC: 3.8 G/DL (ref 3.5–5.2)
ALP SERPL-CCNC: 97 U/L (ref 40–150)
ALT SERPL W/O P-5'-P-CCNC: 26 U/L (ref 10–44)
ANION GAP SERPL CALC-SCNC: 12 MMOL/L (ref 8–16)
AST SERPL-CCNC: 37 U/L (ref 10–40)
BACTERIA #/AREA URNS AUTO: ABNORMAL /HPF
BASOPHILS # BLD AUTO: 0.06 K/UL (ref 0–0.2)
BASOPHILS NFR BLD: 0.5 % (ref 0–1.9)
BILIRUB SERPL-MCNC: 0.4 MG/DL (ref 0.1–1)
BILIRUB UR QL STRIP: NEGATIVE
BUN SERPL-MCNC: 7 MG/DL (ref 8–23)
CALCIUM SERPL-MCNC: 9.7 MG/DL (ref 8.7–10.5)
CHLORIDE SERPL-SCNC: 103 MMOL/L (ref 95–110)
CLARITY UR REFRACT.AUTO: CLEAR
CO2 SERPL-SCNC: 24 MMOL/L (ref 23–29)
COLOR UR AUTO: YELLOW
CREAT SERPL-MCNC: 0.7 MG/DL (ref 0.5–1.4)
CRP SERPL-MCNC: 4.1 MG/L (ref 0–8.2)
DIFFERENTIAL METHOD BLD: ABNORMAL
EOSINOPHIL # BLD AUTO: 0.1 K/UL (ref 0–0.5)
EOSINOPHIL NFR BLD: 0.7 % (ref 0–8)
ERYTHROCYTE [DISTWIDTH] IN BLOOD BY AUTOMATED COUNT: 15.7 % (ref 11.5–14.5)
EST. GFR  (NO RACE VARIABLE): >60 ML/MIN/1.73 M^2
ESTIMATED AVG GLUCOSE: 108 MG/DL (ref 68–131)
GLUCOSE SERPL-MCNC: 121 MG/DL (ref 70–110)
GLUCOSE UR QL STRIP: NEGATIVE
HBA1C MFR BLD: 5.4 % (ref 4–5.6)
HCT VFR BLD AUTO: 42.5 % (ref 37–48.5)
HGB BLD-MCNC: 13.9 G/DL (ref 12–16)
HGB UR QL STRIP: ABNORMAL
HYALINE CASTS UR QL AUTO: 1 /LPF
IMM GRANULOCYTES # BLD AUTO: 0.03 K/UL (ref 0–0.04)
IMM GRANULOCYTES NFR BLD AUTO: 0.2 % (ref 0–0.5)
KETONES UR QL STRIP: ABNORMAL
LACTATE SERPL-SCNC: 1.5 MMOL/L (ref 0.5–2.2)
LEUKOCYTE ESTERASE UR QL STRIP: NEGATIVE
LIPASE SERPL-CCNC: 20 U/L (ref 4–60)
LYMPHOCYTES # BLD AUTO: 2.4 K/UL (ref 1–4.8)
LYMPHOCYTES NFR BLD: 18.5 % (ref 18–48)
MCH RBC QN AUTO: 29.3 PG (ref 27–31)
MCHC RBC AUTO-ENTMCNC: 32.7 G/DL (ref 32–36)
MCV RBC AUTO: 90 FL (ref 82–98)
MICROSCOPIC COMMENT: ABNORMAL
MONOCYTES # BLD AUTO: 0.8 K/UL (ref 0.3–1)
MONOCYTES NFR BLD: 5.8 % (ref 4–15)
NEUTROPHILS # BLD AUTO: 9.8 K/UL (ref 1.8–7.7)
NEUTROPHILS NFR BLD: 74.3 % (ref 38–73)
NITRITE UR QL STRIP: NEGATIVE
NRBC BLD-RTO: 0 /100 WBC
PH UR STRIP: 6 [PH] (ref 5–8)
PLATELET # BLD AUTO: 280 K/UL (ref 150–450)
PMV BLD AUTO: 12 FL (ref 9.2–12.9)
POTASSIUM SERPL-SCNC: 3.5 MMOL/L (ref 3.5–5.1)
PROT SERPL-MCNC: 8.9 G/DL (ref 6–8.4)
PROT UR QL STRIP: ABNORMAL
RBC # BLD AUTO: 4.75 M/UL (ref 4–5.4)
RBC #/AREA URNS AUTO: 7 /HPF (ref 0–4)
SARS-COV-2 RDRP RESP QL NAA+PROBE: NEGATIVE
SODIUM SERPL-SCNC: 139 MMOL/L (ref 136–145)
SP GR UR STRIP: 1.03 (ref 1–1.03)
SQUAMOUS #/AREA URNS AUTO: 1 /HPF
TROPONIN I SERPL DL<=0.01 NG/ML-MCNC: <0.006 NG/ML (ref 0–0.03)
URN SPEC COLLECT METH UR: ABNORMAL
WBC # BLD AUTO: 13.13 K/UL (ref 3.9–12.7)
WBC #/AREA URNS AUTO: 1 /HPF (ref 0–5)

## 2024-11-07 PROCEDURE — 96374 THER/PROPH/DIAG INJ IV PUSH: CPT | Mod: HCNC

## 2024-11-07 PROCEDURE — 96361 HYDRATE IV INFUSION ADD-ON: CPT

## 2024-11-07 PROCEDURE — 83690 ASSAY OF LIPASE: CPT | Mod: HCNC | Performed by: NURSE PRACTITIONER

## 2024-11-07 PROCEDURE — 25000003 PHARM REV CODE 250: Mod: HCNC | Performed by: EMERGENCY MEDICINE

## 2024-11-07 PROCEDURE — 96376 TX/PRO/DX INJ SAME DRUG ADON: CPT | Mod: HCNC

## 2024-11-07 PROCEDURE — 87040 BLOOD CULTURE FOR BACTERIA: CPT | Mod: 59,HCNC | Performed by: NURSE PRACTITIONER

## 2024-11-07 PROCEDURE — G0378 HOSPITAL OBSERVATION PER HR: HCPCS | Mod: HCNC

## 2024-11-07 PROCEDURE — 85025 COMPLETE CBC W/AUTO DIFF WBC: CPT | Mod: HCNC | Performed by: NURSE PRACTITIONER

## 2024-11-07 PROCEDURE — 93010 ELECTROCARDIOGRAM REPORT: CPT | Mod: HCNC,,, | Performed by: INTERNAL MEDICINE

## 2024-11-07 PROCEDURE — 20600001 HC STEP DOWN PRIVATE ROOM: Mod: HCNC

## 2024-11-07 PROCEDURE — 25500020 PHARM REV CODE 255: Mod: HCNC | Performed by: EMERGENCY MEDICINE

## 2024-11-07 PROCEDURE — 86140 C-REACTIVE PROTEIN: CPT | Mod: HCNC | Performed by: NURSE PRACTITIONER

## 2024-11-07 PROCEDURE — 83036 HEMOGLOBIN GLYCOSYLATED A1C: CPT | Mod: HCNC | Performed by: FAMILY MEDICINE

## 2024-11-07 PROCEDURE — 25000003 PHARM REV CODE 250: Mod: HCNC | Performed by: FAMILY MEDICINE

## 2024-11-07 PROCEDURE — 99285 EMERGENCY DEPT VISIT HI MDM: CPT | Mod: 25,HCNC

## 2024-11-07 PROCEDURE — 81003 URINALYSIS AUTO W/O SCOPE: CPT | Mod: HCNC | Performed by: NURSE PRACTITIONER

## 2024-11-07 PROCEDURE — 36415 COLL VENOUS BLD VENIPUNCTURE: CPT | Mod: HCNC | Performed by: FAMILY MEDICINE

## 2024-11-07 PROCEDURE — 81001 URINALYSIS AUTO W/SCOPE: CPT | Mod: HCNC | Performed by: NURSE PRACTITIONER

## 2024-11-07 PROCEDURE — 80053 COMPREHEN METABOLIC PANEL: CPT | Mod: HCNC | Performed by: NURSE PRACTITIONER

## 2024-11-07 PROCEDURE — 63600175 PHARM REV CODE 636 W HCPCS: Mod: HCNC | Performed by: EMERGENCY MEDICINE

## 2024-11-07 PROCEDURE — 87635 SARS-COV-2 COVID-19 AMP PRB: CPT | Mod: HCNC | Performed by: STUDENT IN AN ORGANIZED HEALTH CARE EDUCATION/TRAINING PROGRAM

## 2024-11-07 PROCEDURE — 83605 ASSAY OF LACTIC ACID: CPT | Mod: HCNC | Performed by: NURSE PRACTITIONER

## 2024-11-07 PROCEDURE — 93005 ELECTROCARDIOGRAM TRACING: CPT | Mod: HCNC

## 2024-11-07 PROCEDURE — 84484 ASSAY OF TROPONIN QUANT: CPT | Mod: HCNC | Performed by: NURSE PRACTITIONER

## 2024-11-07 PROCEDURE — 96375 TX/PRO/DX INJ NEW DRUG ADDON: CPT | Mod: HCNC

## 2024-11-07 RX ORDER — ACETAMINOPHEN 325 MG/1
650 TABLET ORAL EVERY 4 HOURS PRN
Status: DISCONTINUED | OUTPATIENT
Start: 2024-11-07 | End: 2024-11-08 | Stop reason: HOSPADM

## 2024-11-07 RX ORDER — CYCLOBENZAPRINE HCL 5 MG
10 TABLET ORAL NIGHTLY
Status: DISCONTINUED | OUTPATIENT
Start: 2024-11-07 | End: 2024-11-08 | Stop reason: HOSPADM

## 2024-11-07 RX ORDER — IBUPROFEN 200 MG
16 TABLET ORAL
Status: DISCONTINUED | OUTPATIENT
Start: 2024-11-07 | End: 2024-11-08 | Stop reason: HOSPADM

## 2024-11-07 RX ORDER — ONDANSETRON HYDROCHLORIDE 2 MG/ML
4 INJECTION, SOLUTION INTRAVENOUS
Status: COMPLETED | OUTPATIENT
Start: 2024-11-07 | End: 2024-11-07

## 2024-11-07 RX ORDER — ALBUTEROL SULFATE 90 UG/1
2 INHALANT RESPIRATORY (INHALATION) EVERY 4 HOURS PRN
Status: DISCONTINUED | OUTPATIENT
Start: 2024-11-07 | End: 2024-11-08 | Stop reason: HOSPADM

## 2024-11-07 RX ORDER — GABAPENTIN 100 MG/1
100 CAPSULE ORAL 3 TIMES DAILY
Status: DISCONTINUED | OUTPATIENT
Start: 2024-11-08 | End: 2024-11-08 | Stop reason: HOSPADM

## 2024-11-07 RX ORDER — CETIRIZINE HYDROCHLORIDE 10 MG/1
10 TABLET ORAL DAILY
Status: DISCONTINUED | OUTPATIENT
Start: 2024-11-08 | End: 2024-11-08 | Stop reason: HOSPADM

## 2024-11-07 RX ORDER — PANTOPRAZOLE SODIUM 40 MG/10ML
40 INJECTION, POWDER, LYOPHILIZED, FOR SOLUTION INTRAVENOUS DAILY
Status: DISCONTINUED | OUTPATIENT
Start: 2024-11-08 | End: 2024-11-08 | Stop reason: HOSPADM

## 2024-11-07 RX ORDER — GLUCAGON 1 MG
1 KIT INJECTION
Status: DISCONTINUED | OUTPATIENT
Start: 2024-11-07 | End: 2024-11-08 | Stop reason: HOSPADM

## 2024-11-07 RX ORDER — ONDANSETRON HYDROCHLORIDE 2 MG/ML
4 INJECTION, SOLUTION INTRAVENOUS EVERY 8 HOURS PRN
Status: DISCONTINUED | OUTPATIENT
Start: 2024-11-07 | End: 2024-11-08 | Stop reason: HOSPADM

## 2024-11-07 RX ORDER — MORPHINE SULFATE 4 MG/ML
4 INJECTION, SOLUTION INTRAMUSCULAR; INTRAVENOUS
Status: COMPLETED | OUTPATIENT
Start: 2024-11-07 | End: 2024-11-07

## 2024-11-07 RX ORDER — DULOXETIN HYDROCHLORIDE 60 MG/1
60 CAPSULE, DELAYED RELEASE ORAL DAILY
Status: DISCONTINUED | OUTPATIENT
Start: 2024-11-08 | End: 2024-11-08 | Stop reason: HOSPADM

## 2024-11-07 RX ORDER — FUROSEMIDE 20 MG/1
20 TABLET ORAL DAILY
Status: DISCONTINUED | OUTPATIENT
Start: 2024-11-08 | End: 2024-11-08 | Stop reason: HOSPADM

## 2024-11-07 RX ORDER — NALOXONE HCL 0.4 MG/ML
0.02 VIAL (ML) INJECTION
Status: DISCONTINUED | OUTPATIENT
Start: 2024-11-07 | End: 2024-11-08 | Stop reason: HOSPADM

## 2024-11-07 RX ORDER — FAMOTIDINE 10 MG/ML
20 INJECTION INTRAVENOUS
Status: COMPLETED | OUTPATIENT
Start: 2024-11-07 | End: 2024-11-07

## 2024-11-07 RX ORDER — METOPROLOL SUCCINATE 50 MG/1
100 TABLET, EXTENDED RELEASE ORAL DAILY
Status: DISCONTINUED | OUTPATIENT
Start: 2024-11-08 | End: 2024-11-08 | Stop reason: HOSPADM

## 2024-11-07 RX ORDER — SODIUM CHLORIDE 0.9 % (FLUSH) 0.9 %
10 SYRINGE (ML) INJECTION EVERY 12 HOURS PRN
Status: DISCONTINUED | OUTPATIENT
Start: 2024-11-07 | End: 2024-11-08 | Stop reason: HOSPADM

## 2024-11-07 RX ORDER — IBUPROFEN 200 MG
24 TABLET ORAL
Status: DISCONTINUED | OUTPATIENT
Start: 2024-11-07 | End: 2024-11-08 | Stop reason: HOSPADM

## 2024-11-07 RX ADMIN — ONDANSETRON 4 MG: 2 INJECTION INTRAMUSCULAR; INTRAVENOUS at 04:11

## 2024-11-07 RX ADMIN — MORPHINE SULFATE 4 MG: 4 INJECTION INTRAVENOUS at 04:11

## 2024-11-07 RX ADMIN — ONDANSETRON 4 MG: 2 INJECTION INTRAMUSCULAR; INTRAVENOUS at 08:11

## 2024-11-07 RX ADMIN — CYCLOBENZAPRINE HYDROCHLORIDE 10 MG: 5 TABLET, FILM COATED ORAL at 10:11

## 2024-11-07 RX ADMIN — IOHEXOL 100 ML: 350 INJECTION, SOLUTION INTRAVENOUS at 04:11

## 2024-11-07 RX ADMIN — FAMOTIDINE 20 MG: 10 INJECTION, SOLUTION INTRAVENOUS at 08:11

## 2024-11-07 RX ADMIN — SODIUM CHLORIDE 500 ML: 9 INJECTION, SOLUTION INTRAVENOUS at 09:11

## 2024-11-07 RX ADMIN — MORPHINE SULFATE 4 MG: 4 INJECTION INTRAVENOUS at 08:11

## 2024-11-07 NOTE — ED NOTES
Pt identifiers Bell Lynn were checked and are correct  LOC: The patient is awake, alert, aware of environment with an appropriate affect. Oriented x4 , speaking appropriately  APPEARANCE: Pt rates RUQ abd pain a and epigastric area a 10/10 , in no acute distress, pt is clean and well groomed, clothing properly fastened  SKIN: Skin warm, dry and intact, normal skin turgor, moist mucus membranes  RESPIRATORY: Airway is open and patent, respirations are spontaneous, even and unlabored, normal effort and rate  CARDIAC: Normal rate and rhythm, no peripheral edema noted, capillary refill < 3 seconds, bilateral radial pulses 2+  ABDOMEN: Soft, nontender, nondistended. Bowel sounds present   NEUROLOGIC: PERRL, facial expression is symmetrical, patient moving all extremities spontaneously, normal sensation in all extremities when touched with a finger.  Follows all commands appropriately  MUSCULOSKELETAL: No obvious deformities.

## 2024-11-07 NOTE — ED PROVIDER NOTES
Encounter Date: 11/7/2024       History     Chief Complaint   Patient presents with    Abdominal Pain     On chemo for small intestine cancer     69-year-old female presenting with abdominal pain.    PMH:  Hypertension, iron deficiency anemia, GERD, gastric ulcer, depression, asthma, neuroendocrine tumor of small intestine, peptic ulcer disease, iron deficiency anemia, pituitary adenoma     The patient states her symptoms began late last night.  The pain is located in her upper abdomen, worse in the epigastrium.  Reports multiple episodes of nonbloody vomiting, emesis appeared yellow.  Last bowel movement last night.  Denies chest pain or shortness of breath.  Denies fevers.  Denies dysuria or hematuria. Denies diarrhea. Denies post-prandial pain.      Status post hysterectomy.    The history is provided by the patient and medical records. No  was used.     Review of patient's allergies indicates:   Allergen Reactions    Nsaids (non-steroidal anti-inflammatory drug)      Gi bleed    Penicillins Itching and Swelling    Penicillin     Symbicort [budesonide-formoterol]      Recurrent oral ulcers     Past Medical History:   Diagnosis Date    Asthma     Depression     Gastric ulcer with hemorrhage 07/01/2012    GERD (gastroesophageal reflux disease)     History of blood transfusion     Hypertension     Iron deficiency anemia 03/14/2013    Osteoarthritis of both knees     Pituitary adenoma 1989    s/p transphenoidal resection    Primary malignant neuroendocrine tumor of small intestine 06/2023     Past Surgical History:   Procedure Laterality Date    BREAST BIOPSY      BREAST SURGERY      Benign breast cyst    COLONOSCOPY      COLONOSCOPY N/A 10/4/2024    Procedure: COLONOSCOPY;  Surgeon: Carloz Alston MD;  Location: [facility];  Service: Endoscopy;  Laterality: N/A;  Ref by Dr ERIK Carrera, Shainaep, portal - PC  8/28/24-LVM for precall, portal-DS  8/30/24-Precall complete-DS  9/5 Pt rescheduled.  Suprep, portal.lopez  9/23-LVM for precall-Kpvt  9/26-pt r/s, updated instructions sent to herbie pt knows procedure will be done with first available and ti    ENDOSCOPIC ULTRASOUND OF UPPER GASTROINTESTINAL TRACT N/A 06/27/2023    Procedure: ULTRASOUND, UPPER GI TRACT, ENDOSCOPIC;  Surgeon: Johnathan Cantu MD;  Location: [facility] ENDO;  Service: Endoscopy;  Laterality: N/A;    ESOPHAGOGASTRODUODENOSCOPY N/A 10/07/2020    Procedure: EGD (ESOPHAGOGASTRODUODENOSCOPY);  Surgeon: Nell Parish MD;  Location: [facility] ENDO;  Service: Endoscopy;  Laterality: N/A;    ESOPHAGOGASTRODUODENOSCOPY N/A 06/30/2021    Procedure: EGD (ESOPHAGOGASTRODUODENOSCOPY);  Surgeon: Nell Parish MD;  Location: [facility];  Service: Endoscopy;  Laterality: N/A;    HYSTERECTOMY  1981    due to uterine fibroids    KNEE ARTHROPLASTY Left 01/10/2022    Procedure: ARTHROPLASTY, KNEE;  Surgeon: Jonnathan Tavera MD;  Location: [facility] OR;  Service: Orthopedics;  Laterality: Left;    TONSILLECTOMY      TRANSPHENOIDAL PITUITARY RESECTION  1989    Dr Cardenas     Family History   Problem Relation Name Age of Onset    Cancer Father          unknown type, between his kidneys and liver, cause of death    Heart disease Father      Hypertension Father      Breast cancer Other Harriett 48    Breast cancer Other Orsion 45    Uterine cancer Other Letrika     Uterine cancer Other Eliza 45    Rectal cancer Maternal Aunt       Social History     Tobacco Use    Smoking status: Never     Passive exposure: Never    Smokeless tobacco: Never   Substance Use Topics    Alcohol use: Yes     Alcohol/week: 1.0 standard drink of alcohol     Types: 1 Cans of beer per week     Comment: on ocassion    Drug use: No         Physical Exam     Initial Vitals   BP Pulse Resp Temp SpO2   11/07/24 1349 11/07/24 1346 11/07/24 1346 11/07/24 1346 11/07/24 1346   (!) 188/86 (!) 59 20 97.5 °F (36.4 °C) 97 %      MAP       --                Physical Exam    Nursing note and vitals  reviewed.  Constitutional: She is not diaphoretic. No distress.   Appears uncomfortable secondary to pain   HENT:   Head: Normocephalic and atraumatic.   Eyes: Right eye exhibits no discharge. Left eye exhibits no discharge.   Neck: Neck supple. No tracheal deviation present.   Cardiovascular:  Normal rate and regular rhythm.           Pulmonary/Chest: Breath sounds normal. No respiratory distress.   Abdominal: Abdomen is soft. There is abdominal tenderness.   Tenderness in left upper and right upper quadrant, most tenderness in epigastrium  There is no guarding.   Musculoskeletal:      Cervical back: Neck supple.     Neurological: She is alert and oriented to person, place, and time.   Skin: Skin is warm. No rash noted.   Psychiatric: She has a normal mood and affect. Her behavior is normal.         ED Course   Procedures  Labs Reviewed   CBC W/ AUTO DIFFERENTIAL - Abnormal       Result Value    WBC 13.13 (*)     RBC 4.75      Hemoglobin 13.9      Hematocrit 42.5      MCV 90      MCH 29.3      MCHC 32.7      RDW 15.7 (*)     Platelets 280      MPV 12.0      Immature Granulocytes 0.2      Gran # (ANC) 9.8 (*)     Immature Grans (Abs) 0.03      Lymph # 2.4      Mono # 0.8      Eos # 0.1      Baso # 0.06      nRBC 0      Gran % 74.3 (*)     Lymph % 18.5      Mono % 5.8      Eosinophil % 0.7      Basophil % 0.5      Differential Method Automated     COMPREHENSIVE METABOLIC PANEL - Abnormal    Sodium 139      Potassium 3.5      Chloride 103      CO2 24      Glucose 121 (*)     BUN 7 (*)     Creatinine 0.7      Calcium 9.7      Total Protein 8.9 (*)     Albumin 3.8      Total Bilirubin 0.4      Alkaline Phosphatase 97      AST 37      ALT 26      eGFR >60.0      Anion Gap 12     URINALYSIS, REFLEX TO URINE CULTURE - Abnormal    Specimen UA Urine, Clean Catch      Color, UA Yellow      Appearance, UA Clear      pH, UA 6.0      Specific Gravity, UA 1.030      Protein, UA 1+ (*)     Glucose, UA Negative      Ketones, UA 1+  (*)     Bilirubin (UA) Negative      Occult Blood UA 1+ (*)     Nitrite, UA Negative      Leukocytes, UA Negative      Narrative:     Specimen Source->Urine   URINALYSIS MICROSCOPIC - Abnormal    RBC, UA 7 (*)     WBC, UA 1      Bacteria Few (*)     Squam Epithel, UA 1      Hyaline Casts, UA 1      Microscopic Comment SEE COMMENT      Narrative:     Specimen Source->Urine   LIPASE    Lipase 20     LACTIC ACID, PLASMA    Lactate (Lactic Acid) 1.5     C-REACTIVE PROTEIN    CRP 4.1     TROPONIN I   TROPONIN I    Troponin I <0.006      Narrative:     Add on per Dr Espana order# [id number] Troponin 15:50   SARS-COV-2 RNA AMPLIFICATION, QUAL    SARS-CoV-2 RNA, Amplification, Qual Negative       EKG Readings: (Independently Interpreted)   Initial Reading: No STEMI. Previous EKG: Compared with most recent EKG Previous EKG Date: 10/7/2024. Rhythm: Sinus Bradycardia. Heart Rate: 53. Clinical Impression: Sinus Arrhythmia, Sinus Bradycardia and Movement Artifact     ECG Results              EKG 12-lead (Final result)        Collection Time Result Time QRS Duration OHS QTC Calculation    11/07/24 16:40:08 11/08/24 08:00:34 88 416                     Final result by Interface, Lab In [facility] (11/08/24 08:00:42)                   Narrative:    Test Reason : R10.9,    Vent. Rate : 053 BPM     Atrial Rate : 053 BPM     P-R Int : 170 ms          QRS Dur : 088 ms      QT Int : 444 ms       P-R-T Axes : 059 053 078 degrees     QTc Int : 416 ms    Sinus bradycardia with sinus arrhythmia  Prominent U waves  Normal ECG  When compared with ECG of 07-OCT-2024 07:55,  Nonspecific T wave abnormality is improved  Confirmed by SARA KAN MD ([id number]) on 11/8/2024 8:00:30 AM    Referred By: [email or web address]   SELF           Confirmed By:SARA KAN MD                                  Imaging Results              US Abdomen Limited (Final result)  Result time 11/07/24 19:28:50      Final result by Chris Trevino MD (11/07/24 19:28:50)                    Impression:      Cholelithiasis without sonographic findings suggestive of acute cholecystitis.    Peripancreatic mass consistent with known small bowel neuroendocrine tumor, as visualized on CT.    Stable hyperechoic hepatic lesions ultrasound with metastatic disease.    Electronically signed by resident: Agustina Hall  Date:    11/07/2024  Time:    18:51    Electronically signed by: Chris Trevino MD  Date:    11/07/2024  Time:    19:28               Narrative:    EXAMINATION:  US ABDOMEN LIMITED    CLINICAL HISTORY:  h/o cholelithiasis, r/o cholecystitis;    TECHNIQUE:  Limited ultrasound of the right upper quadrant of the abdomen focused on the biliary system was performed.    COMPARISON:  CT 11/07/2024 and ultrasound 10/08/2024.    FINDINGS:  Gallbladder: 2.6 cm non-mobile gallstone.  No gallbladder wall thickening or hypervascularity.  No pericholecystic fluid.  No sonographic Harris's sign.    Biliary system: The common duct is mildly dilated, measuring 8 mm (previously 4 mm).  No intrahepatic ductal dilatation.    Pancreas: 6.5 x 5.0 x 4.5 cm heterogeneous and hypoechoic lesion with internal vascularity, correlating peripancreatic mass on same day CT.    Miscellaneous: Multiple hypoechoic liver lesions, unchanged when compared to prior exam.  Largest measures 5.6 x 4.2 x 4.2 cm.  No upper abdominal ascites.                                       CT Abdomen Pelvis With IV Contrast NO Oral Contrast (Final result)  Result time 11/07/24 16:51:39      Final result by Avelino Moore MD (11/07/24 16:51:39)                   Impression:      1. Patient has known hepatic masses, appears similar to the previous examination allowing for differences in contrast phase.  There is underlying steatosis, correlation LFTs recommended.  2. Although somewhat differing in configuration, overall size of known pelvic small bowel mass appears stable from the previous exam.  Scattered lymph nodes in the region and along the  left iliac chain may reflect that of metastatic lymphadenopathy.  Please see PET-CT 08/07/2024.  3. Please see above for several additional findings.      Electronically signed by: Avelino Moore MD  Date:    11/07/2024  Time:    16:51               Narrative:    EXAMINATION:  CT ABDOMEN PELVIS WITH IV CONTRAST    CLINICAL HISTORY:  Nausea/vomiting;Epigastric pain;    TECHNIQUE:  Low dose axial images, sagittal and coronal reformations were obtained from the lung bases to the pubic symphysis following the IV administration of 100 mL of Omnipaque 350 .  Oral contrast was not given.    COMPARISON:  CT 10/07/2024    FINDINGS:  Images of the lower thorax are remarkable for bilateral dependent atelectasis.    The liver is hypoattenuating suggesting steatosis, correlation with LFTs recommended.  There is a lobular lesion arising from the anterior aspect of the left hepatic lobe measuring 2.6 cm, similar in configuration.  An additional vague lesion is noted within the medial aspect of the right hepatic lobe measuring approximately 2.5 cm.  An additional lesion is noted within the right hepatic lobe posterior to the gallbladder measuring approximately 3.2 cm although not confirmed.  The spleen, adrenal glands and gallbladder are grossly unremarkable.  The stomach is nondistended, no gastric wall thickening.  There is a small hiatal hernia.  There is fatty atrophy of the pancreas.  The portal vein, splenic vein, SMV, celiac axis and SMA all are patent.  No significant abdominal lymphadenopathy.    The kidneys enhance symmetrically without hydronephrosis or nephrolithiasis.  A low attenuating lesion arises from the interpolar region of the right kidney measuring 1.3 cm, attenuation of which suggests cyst.  The bilateral ureters are unable to be followed in their entirety to the urinary bladder, no definite calculi seen or secondary findings to suggest obstructive uropathy.  The urinary bladder is unremarkable.  The uterus  is absent the adnexa is unremarkable.    The distal large bowel is decompressed.  There are a few scattered colonic diverticula without inflammation to suggest diverticulitis.  There is liquid content within the cecum.  The terminal ileum is unremarkable.  The appendix or appendiceal stump is unremarkable.  The small bowel is grossly unremarkable.  There are a few scattered shotty periaortic and paracaval lymph nodes.  There is a prominent left iliac chain lymph node, similar to the previous exam.    There is a irregularly enhance sing mass within the mid abdomen measuring 6.1 x 4.8 x 7.3 cm, not significantly changed since the previous examination.  There is a smaller lesion adjacent to this focus, suggesting lymph node.  No focal organized pelvic fluid collection.    There is osteopenia.  There are degenerative changes of the spine.  There are a few scattered prominent inguinal lymph nodes.  There are several nodular foci within the gluteal soft tissues bilaterally, may reflect injection sites.                                       Medications   pantoprazole injection 40 mg (40 mg Intravenous Given 11/8/24 0834)   metoprolol succinate (TOPROL-XL) 24 hr tablet 100 mg (100 mg Oral Given 11/8/24 0834)   gabapentin capsule 100 mg (100 mg Oral Given 11/8/24 0834)   furosemide tablet 20 mg (20 mg Oral Given 11/8/24 0834)   DULoxetine DR capsule 60 mg (60 mg Oral Given 11/8/24 0834)   cyclobenzaprine tablet 10 mg (10 mg Oral Given 11/7/24 2213)   cetirizine tablet 10 mg (10 mg Oral Given 11/8/24 0834)   albuterol inhaler 2 puff (has no administration in time range)   sodium chloride 0.9% flush 10 mL (has no administration in time range)   naloxone 0.4 mg/mL injection 0.02 mg (has no administration in time range)   glucose chewable tablet 16 g (has no administration in time range)   glucose chewable tablet 24 g (has no administration in time range)   glucagon (human recombinant) injection 1 mg (has no administration in  time range)   acetaminophen tablet 650 mg (650 mg Oral Given 11/8/24 0909)   ondansetron injection 4 mg (4 mg Intravenous Given 11/8/24 0909)   dextrose 10% bolus 125 mL 125 mL (has no administration in time range)   dextrose 10% bolus 250 mL 250 mL (has no administration in time range)   HYDROmorphone injection 0.5 mg (0.5 mg Intravenous Given 11/8/24 0451)   ondansetron injection 4 mg (4 mg Intravenous Given 11/7/24 1637)   morphine injection 4 mg (4 mg Intravenous Given 11/7/24 1639)   iohexoL (OMNIPAQUE 350) injection 100 mL (100 mLs Intravenous Given 11/7/24 1631)   ondansetron injection 4 mg (4 mg Intravenous Given 11/7/24 2003)   morphine injection 4 mg (4 mg Intravenous Given 11/7/24 2003)   famotidine (PF) injection 20 mg (20 mg Intravenous Given 11/7/24 2023)   sodium chloride 0.9% bolus 500 mL 500 mL (0 mLs Intravenous Stopped 11/7/24 2205)     Medical Decision Making  61-year-old female presenting with abdominal pain.  On arrival, she appears uncomfortable, non peritonitic abdomen.  Plan for symptom control, lab evaluation and advanced imaging to assess for emergent intra-abdominal pathology, and given her known history of neuroendocrine tumor.      Differential including, but not limited to:  Cholelithiasis, cholecystitis, small-bowel obstruction, mass-related pain, abscess    RUQ US:  Cholelithiasis without sonographic findings suggestive of acute cholecystitis.  Peripancreatic mass consistent with known small bowel neuroendocrine tumor, as visualized on CT.  Stable hyperechoic hepatic lesions ultrasound with metastatic disease.     CT A/P:  Hepatic masses, although somewhat differing in configuration, overall size of known pelvic small bowel mass appears stable, lymph nodes.     Labs reviewed - leukocytosis, negative CTNI (doubt ACS), normal lipase and LFTs. Lactate normal, lower suspicion for ischemia.     Favor pain related to known mass/metastatic disease versus PUD/gastritis.   Case discussed with  Oncology for admission, as patient requiring multiple doses of IV pain and nausea medication.   Oncology planning to admit for further sx tx. Consider Gen/Surg evaluation for sx cholelithiasis versus outpatient referral.   Received fluids for ketonuria, likely from GI loss/poor PO intake in the setting of vomiting.     Amount and/or Complexity of Data Reviewed  External Data Reviewed: radiology.     Details: 10/7 CT:  Heterogeneous mass of the small bowel, grossly stable in size likely relating to patient's known neuroendocrine tumor.  No bowel obstruction.  Hepatomegaly and hepatic steatosis with multiple hepatic lesions better demonstrated on prior PET-CT likely metastatic.    RUQ US:  Cholelithiasis  Labs:  Decision-making details documented in ED Course.  Radiology: ordered and independent interpretation performed.  ECG/medicine tests: ordered and independent interpretation performed.    Risk  Prescription drug management.  Decision regarding hospitalization.               ED Course as of 11/08/24 0932   u Nov 07, 2024   1531 Went to evaluate patient, not in room yet [AB]   1615 WBC(!): 13.13  Abnormal, elevated [AB]   1619 Creatinine: 0.7  Normal renal function [AB]   1620 Hemoglobin: 13.9  No anemia [AB]   1620 Troponin I: <0.006  Normal [AB]   1620 Lipase: 20  Normal [AB]   1953 Reassess, patient states her pain improved after medication, but she is requesting additional medication for pain and nausea. [AB]   1954 Ketones, UA(!): 1+ [AB]   1954 Cholelithiasis without sonographic findings suggestive of acute cholecystitis.     Peripancreatic mass consistent with known small bowel neuroendocrine tumor, as visualized on CT.     Stable hyperechoic hepatic lesions ultrasound with metastatic disease.      [AB]   2036 Ketones, UA(!): 1+  Ordering IV fluids [AB]      ED Course User Index  [AB] Cain Espana MD                             Clinical Impression:  Final diagnoses:  [R10.9] Abdominal pain  [K80.20]  Calculus of gallbladder without cholecystitis without obstruction (Primary)  [D3A.8] Neuroendocrine tumor          ED Disposition Condition    Admit                 Cain Espana MD  11/08/24 1019

## 2024-11-07 NOTE — FIRST PROVIDER EVALUATION
Emergency Department TeleTriage Encounter Note      CHIEF COMPLAINT    Chief Complaint   Patient presents with    Abdominal Pain     On chemo for small intestine cancer       VITAL SIGNS   Initial Vitals   BP Pulse Resp Temp SpO2   11/07/24 1349 11/07/24 1346 11/07/24 1346 11/07/24 1346 11/07/24 1346   (!) 188/86 (!) 59 20 97.5 °F (36.4 °C) 97 %      MAP       --                   ALLERGIES    Review of patient's allergies indicates:   Allergen Reactions    Nsaids (non-steroidal anti-inflammatory drug)      Gi bleed    Penicillins Itching and Swelling    Penicillin     Symbicort [budesonide-formoterol]      Recurrent oral ulcers       PROVIDER TRIAGE NOTE  Verbal consent for the teletriage evaluation was given by the patient at the start of the evaluation.  All efforts will be made to maintain patient's privacy during the evaluation.      This is a teletriage evaluation of a 69 y.o. female presenting to the ED with c/o abdominal pain that started last night with vomiting.  On Chemo for small intestine cancer. Limited physical exam via telehealth: The patient is awake, alert, answering questions appropriately and is not in respiratory distress.  As the Teletriage provider, I performed an initial assessment and ordered appropriate labs and imaging studies, if any, to facilitate the patient's care once placed in the ED. Once a room is available, care and a full evaluation will be completed by an alternate ED provider.  Any additional orders and the final disposition will be determined by that provider.  All imaging and labs will not be followed-up by the Teletriage Team, including myself.          ORDERS  Labs Reviewed - No data to display    ED Orders (720h ago, onward)      Start Ordered     Status Ordering Provider    11/07/24 1422 11/07/24 1421  Vital signs  Every 2 hours         Ordered BRANDON JOINER    11/07/24 1422 11/07/24 1421  C-reactive protein  STAT         Ordered BRANDON JOINER    11/07/24 1421 11/07/24  1421  Diet NPO  Diet effective now         Ordered BRANDON JOINER    11/07/24 1421 11/07/24 1421  Insert peripheral IV  Once         Ordered BRANDON JOINER    11/07/24 1421 11/07/24 1421  CBC W/ AUTO DIFFERENTIAL  STAT         Ordered BRANDON JOINER    11/07/24 1421 11/07/24 1421  Lipase  STAT         Ordered BRANDON JOINER    11/07/24 1421 11/07/24 1421  Urinalysis, Reflex to Urine Culture Urine, Clean Catch  STAT         Ordered BRANDON JOINER    11/07/24 1421 11/07/24 1421  Lactic acid, plasma  STAT         Ordered BRANDON JOINER    11/07/24 1421 11/07/24 1421  Blood culture #2  STAT        Comments: Blood Culture #2      Ordered BRANDON JOINER    11/07/24 1421 11/07/24 1421  Blood culture #1  STAT        Comments: Blood Culture #1      Ordered BRANDON JOINER              Virtual Visit Note: The provider triage portion of this emergency department evaluation and documentation was performed via [employer], a HIPAA-compliant telemedicine application, in concert with a tele-presenter in the room. A face to face patient evaluation with one of my colleagues will occur once the patient is placed in an emergency department room.      DISCLAIMER: This note was prepared with [employer] voice recognition transcription software. Garbled syntax, mangled pronouns, and other bizarre constructions may be attributed to that software system.

## 2024-11-08 VITALS
HEIGHT: 64 IN | SYSTOLIC BLOOD PRESSURE: 146 MMHG | WEIGHT: 204.38 LBS | RESPIRATION RATE: 18 BRPM | DIASTOLIC BLOOD PRESSURE: 70 MMHG | TEMPERATURE: 99 F | BODY MASS INDEX: 34.89 KG/M2 | HEART RATE: 63 BPM | OXYGEN SATURATION: 93 %

## 2024-11-08 LAB
ANION GAP SERPL CALC-SCNC: 11 MMOL/L (ref 8–16)
ANISOCYTOSIS BLD QL SMEAR: SLIGHT
BASOPHILS # BLD AUTO: 0.05 K/UL (ref 0–0.2)
BASOPHILS NFR BLD: 0.3 % (ref 0–1.9)
BUN SERPL-MCNC: 6 MG/DL (ref 8–23)
CALCIUM SERPL-MCNC: 9.4 MG/DL (ref 8.7–10.5)
CHLORIDE SERPL-SCNC: 100 MMOL/L (ref 95–110)
CO2 SERPL-SCNC: 24 MMOL/L (ref 23–29)
CREAT SERPL-MCNC: 0.7 MG/DL (ref 0.5–1.4)
DIFFERENTIAL METHOD BLD: ABNORMAL
EOSINOPHIL # BLD AUTO: 0 K/UL (ref 0–0.5)
EOSINOPHIL NFR BLD: 0.1 % (ref 0–8)
ERYTHROCYTE [DISTWIDTH] IN BLOOD BY AUTOMATED COUNT: 15.9 % (ref 11.5–14.5)
EST. GFR  (NO RACE VARIABLE): >60 ML/MIN/1.73 M^2
GLUCOSE SERPL-MCNC: 126 MG/DL (ref 70–110)
HCT VFR BLD AUTO: 44.5 % (ref 37–48.5)
HGB BLD-MCNC: 13.7 G/DL (ref 12–16)
IMM GRANULOCYTES # BLD AUTO: 0.14 K/UL (ref 0–0.04)
IMM GRANULOCYTES NFR BLD AUTO: 0.8 % (ref 0–0.5)
LYMPHOCYTES # BLD AUTO: 1.2 K/UL (ref 1–4.8)
LYMPHOCYTES NFR BLD: 6.2 % (ref 18–48)
MCH RBC QN AUTO: 28.5 PG (ref 27–31)
MCHC RBC AUTO-ENTMCNC: 30.8 G/DL (ref 32–36)
MCV RBC AUTO: 93 FL (ref 82–98)
MONOCYTES # BLD AUTO: 2.5 K/UL (ref 0.3–1)
MONOCYTES NFR BLD: 13.7 % (ref 4–15)
NEUTROPHILS # BLD AUTO: 14.6 K/UL (ref 1.8–7.7)
NEUTROPHILS NFR BLD: 78.9 % (ref 38–73)
NRBC BLD-RTO: 0 /100 WBC
OHS QRS DURATION: 88 MS
OHS QTC CALCULATION: 416 MS
PLATELET # BLD AUTO: 228 K/UL (ref 150–450)
PLATELET BLD QL SMEAR: ABNORMAL
PMV BLD AUTO: 12.8 FL (ref 9.2–12.9)
POIKILOCYTOSIS BLD QL SMEAR: SLIGHT
POTASSIUM SERPL-SCNC: 3.2 MMOL/L (ref 3.5–5.1)
RBC # BLD AUTO: 4.81 M/UL (ref 4–5.4)
SODIUM SERPL-SCNC: 135 MMOL/L (ref 136–145)
WBC # BLD AUTO: 18.46 K/UL (ref 3.9–12.7)

## 2024-11-08 PROCEDURE — 80048 BASIC METABOLIC PNL TOTAL CA: CPT | Mod: HCNC | Performed by: FAMILY MEDICINE

## 2024-11-08 PROCEDURE — 63600175 PHARM REV CODE 636 W HCPCS: Mod: HCNC | Performed by: FAMILY MEDICINE

## 2024-11-08 PROCEDURE — 25000003 PHARM REV CODE 250: Mod: HCNC | Performed by: FAMILY MEDICINE

## 2024-11-08 PROCEDURE — G0378 HOSPITAL OBSERVATION PER HR: HCPCS | Mod: HCNC

## 2024-11-08 PROCEDURE — 96376 TX/PRO/DX INJ SAME DRUG ADON: CPT

## 2024-11-08 PROCEDURE — 36415 COLL VENOUS BLD VENIPUNCTURE: CPT | Mod: HCNC | Performed by: FAMILY MEDICINE

## 2024-11-08 PROCEDURE — 85025 COMPLETE CBC W/AUTO DIFF WBC: CPT | Mod: HCNC | Performed by: FAMILY MEDICINE

## 2024-11-08 PROCEDURE — 96375 TX/PRO/DX INJ NEW DRUG ADDON: CPT

## 2024-11-08 RX ORDER — HYDROMORPHONE HYDROCHLORIDE 1 MG/ML
0.5 INJECTION, SOLUTION INTRAMUSCULAR; INTRAVENOUS; SUBCUTANEOUS EVERY 6 HOURS PRN
Status: DISCONTINUED | OUTPATIENT
Start: 2024-11-08 | End: 2024-11-08

## 2024-11-08 RX ADMIN — ACETAMINOPHEN 650 MG: 325 TABLET ORAL at 01:11

## 2024-11-08 RX ADMIN — GABAPENTIN 100 MG: 100 CAPSULE ORAL at 08:11

## 2024-11-08 RX ADMIN — CETIRIZINE HYDROCHLORIDE 10 MG: 10 TABLET, FILM COATED ORAL at 08:11

## 2024-11-08 RX ADMIN — PANTOPRAZOLE SODIUM 40 MG: 40 INJECTION, POWDER, LYOPHILIZED, FOR SOLUTION INTRAVENOUS at 08:11

## 2024-11-08 RX ADMIN — DULOXETINE HYDROCHLORIDE 60 MG: 60 CAPSULE, DELAYED RELEASE ORAL at 08:11

## 2024-11-08 RX ADMIN — ONDANSETRON 4 MG: 2 INJECTION INTRAMUSCULAR; INTRAVENOUS at 01:11

## 2024-11-08 RX ADMIN — ACETAMINOPHEN 650 MG: 325 TABLET ORAL at 09:11

## 2024-11-08 RX ADMIN — METOPROLOL SUCCINATE 100 MG: 50 TABLET, EXTENDED RELEASE ORAL at 08:11

## 2024-11-08 RX ADMIN — HYDROMORPHONE HYDROCHLORIDE 0.5 MG: 1 INJECTION, SOLUTION INTRAMUSCULAR; INTRAVENOUS; SUBCUTANEOUS at 04:11

## 2024-11-08 RX ADMIN — GABAPENTIN 100 MG: 100 CAPSULE ORAL at 03:11

## 2024-11-08 RX ADMIN — ONDANSETRON 4 MG: 2 INJECTION INTRAMUSCULAR; INTRAVENOUS at 09:11

## 2024-11-08 RX ADMIN — FUROSEMIDE 20 MG: 20 TABLET ORAL at 08:11

## 2024-11-08 NOTE — PROGRESS NOTES
SW consulted with Med/Onc team to find out what this patient may need while here. The team stated that pt will be discharged this afternoon with no needs.    SW will be available to support and assist pt as needed moving forward.     Mickie Cruz, [facility]  Oncology Social Worker  [facility]  [phone]

## 2024-11-08 NOTE — PLAN OF CARE
AAOx4. Afebrile. On RA. HTN- systolic 180s. Other VSS. Pt admitted to [facility] from ED for pain associated with small intestine cancer. One episode of NBNB emesis O/N, and nausea. PRN IV zofran given. Tylenol PRN given for pain. 500cc NS bolus completed. Scheduled cyclobenzaprine given. On regular diet. Pt informed family of admit to hospital. No accuchecks. No telemetry. People oriented to room and call bell. Bed in low position. Side rails raisedx2, wheels locked. Nonskid shoes when OOB. Call bell and personal items within reach.

## 2024-11-08 NOTE — DISCHARGE SUMMARY
[facility] - Transplant Stepdown  Hematology/Oncology  Discharge Summary      Patient Name: Bell Lynn  MRN: [id number]  Admission Date: 11/7/2024  Hospital Length of Stay: 1 days  Discharge Date and Time:  11/08/2024 5:35 PM  Attending Physician: No att. 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 Keita, Oncology), sepsis bacteremia that presents to the emergency department because of generalized abdominal pain.  Current status symptoms started 2 days ago.  No known cause.  No recent trauma.  Gradually getting worse.  No palliation despite pain medication at home.  Pain is constant sharp radiating throughout the abdomen.  There is associated nausea vomiting.  No diarrhea constipation dysuria fever.     * No surgery found *     Hospital Course: BC with NGTD,afebrile, remained hemodynamically stable, no oxygen requirements. CT abdomen and pelvis with known hepatic masses, appears similar to the previous examination with stable pelvic small bowel mass. Abdominal pain likely a result of malignancy. Received morphine and dilaudid overnight with relief. CRP, lactate, lipase wnl.     Goals of Care Treatment Preferences:  Code Status: Full Code      Consults:     Significant Diagnostic Studies: Labs: BMP:   Recent Labs   Lab 11/07/24  1447 11/08/24  0807   * 126*    135*   K 3.5 3.2*    100   CO2 24 24   BUN 7* 6*   CREATININE 0.7 0.7   CALCIUM 9.7 9.4   , CMP   Recent Labs   Lab 11/07/24  1447 11/08/24  0807    135*   K 3.5 3.2*    100   CO2 24 24   * 126*   BUN 7* 6*   CREATININE 0.7 0.7   CALCIUM 9.7 9.4    PROT 8.9*  --    ALBUMIN 3.8  --    BILITOT 0.4  --    ALKPHOS 97  --    AST 37  --    ALT 26  --    ANIONGAP 12 11   , CBC   Recent Labs   Lab 11/07/24  1447 11/08/24  0807   WBC 13.13* 18.46*   HGB 13.9 13.7   HCT 42.5 44.5    228   , and A1C:   Recent Labs   Lab 06/18/24  0759 11/07/24  2249   HGBA1C 5.8* 5.4     Microbiology: Blood Culture   Lab Results   Component Value Date    LABBLOO No Growth to date 11/07/2024    LABBLOO No Growth to date 11/07/2024    and Urine Culture    Lab Results   Component Value Date    LABURIN  01/24/2020     Multiple organisms isolated. None in predominance.  Repeat if    LABURIN clinically necessary. 01/24/2020     Radiology: CT scan: CT ABDOMEN PELVIS WITH CONTRAST:   Results for orders placed or performed during the hospital encounter of 11/07/24   CT Abdomen Pelvis With IV Contrast NO Oral Contrast    Narrative    EXAMINATION:  CT ABDOMEN PELVIS WITH IV CONTRAST    CLINICAL HISTORY:  Nausea/vomiting;Epigastric pain;    TECHNIQUE:  Low dose axial images, sagittal and coronal reformations were obtained from the lung bases to the pubic symphysis following the IV administration of 100 mL of Omnipaque 350 .  Oral contrast was not given.    COMPARISON:  CT 10/07/2024    FINDINGS:  Images of the lower thorax are remarkable for bilateral dependent atelectasis.    The liver is hypoattenuating suggesting steatosis, correlation with LFTs recommended.  There is a lobular lesion arising from the anterior aspect of the left hepatic lobe measuring 2.6 cm, similar in configuration.  An additional vague lesion is noted within the medial aspect of the right hepatic lobe measuring approximately 2.5 cm.  An additional lesion is noted within the right hepatic lobe posterior to the gallbladder measuring approximately 3.2 cm although not confirmed.  The spleen, adrenal glands and gallbladder are grossly unremarkable.  The stomach is nondistended, no gastric wall thickening.  There is a small  hiatal hernia.  There is fatty atrophy of the pancreas.  The portal vein, splenic vein, SMV, celiac axis and SMA all are patent.  No significant abdominal lymphadenopathy.    The kidneys enhance symmetrically without hydronephrosis or nephrolithiasis.  A low attenuating lesion arises from the interpolar region of the right kidney measuring 1.3 cm, attenuation of which suggests cyst.  The bilateral ureters are unable to be followed in their entirety to the urinary bladder, no definite calculi seen or secondary findings to suggest obstructive uropathy.  The urinary bladder is unremarkable.  The uterus is absent the adnexa is unremarkable.    The distal large bowel is decompressed.  There are a few scattered colonic diverticula without inflammation to suggest diverticulitis.  There is liquid content within the cecum.  The terminal ileum is unremarkable.  The appendix or appendiceal stump is unremarkable.  The small bowel is grossly unremarkable.  There are a few scattered shotty periaortic and paracaval lymph nodes.  There is a prominent left iliac chain lymph node, similar to the previous exam.    There is a irregularly enhance sing mass within the mid abdomen measuring 6.1 x 4.8 x 7.3 cm, not significantly changed since the previous examination.  There is a smaller lesion adjacent to this focus, suggesting lymph node.  No focal organized pelvic fluid collection.    There is osteopenia.  There are degenerative changes of the spine.  There are a few scattered prominent inguinal lymph nodes.  There are several nodular foci within the gluteal soft tissues bilaterally, may reflect injection sites.      Impression    1. Patient has known hepatic masses, appears similar to the previous examination allowing for differences in contrast phase.  There is underlying steatosis, correlation LFTs recommended.  2. Although somewhat differing in configuration, overall size of known pelvic small bowel mass appears stable from the  previous exam.  Scattered lymph nodes in the region and along the left iliac chain may reflect that of metastatic lymphadenopathy.  Please see PET-CT 08/07/2024.  3. Please see above for several additional findings.      Electronically signed by: Avelino Moore MD  Date:    11/07/2024  Time:    16:51    and CT ABDOMEN PELVIS WITHOUT CONTRAST: No results found for this visit on 11/07/24.    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  Medications:  Reconciled Home Medications:      Medication List        CONTINUE taking these medications      albuterol 90 mcg/actuation inhaler  Commonly known as: PROVENTIL/VENTOLIN HFA  INHALE 2 PUFFS EVERY 6 HOURS AS NEEDED FOR WHEEZING (RESCUE)     alendronate 70 MG tablet  Commonly known as: FOSAMAX  TAKE 1 TABLET EVERY 7 DAYS     cetirizine 10 MG tablet  Commonly known as: ZYRTEC  TAKE 1 TABLET ONE TIME DAILY     cyclobenzaprine 10 MG tablet  Commonly known as: FLEXERIL  TAKE 1 TABLET EVERY EVENING     DULoxetine 60 MG capsule  Commonly known as: CYMBALTA  TAKE 1 CAPSULE ONE TIME DAILY     furosemide 20 MG tablet  Commonly known as: LASIX  TAKE 1 TABLET ONE TIME DAILY FOR LEG SWELLING AS NEEDED     gabapentin 100 MG capsule  Commonly known as: NEURONTIN  TAKE 1 CAPSULE IN THE MORNING AND TAKE 3  CAPSULES AT NIGHT     latanoprost 0.005 % ophthalmic solution  Place 1 drop into the right eye every evening.     LIDOcaine 5 %  Commonly known as: LIDODERM  APPLY 1 PATCH ON THE SKIN ONE TIME DAILY. REMOVE AND DISCARD PATCH WITHIN 12 HOURS OR AS DIRECTED BY MD TALAVERA 145 mcg Cap capsule  Generic drug: linaCLOtide  TAKE 1 CAPSULE ONE TIME DAILY     metoprolol succinate 100 MG 24 hr tablet  Commonly known as: TOPROL-XL  Take 1 tablet (100 mg total) by mouth once daily.     pantoprazole 40 MG tablet  Commonly known as: PROTONIX  Take 1 tablet (40 mg total) by mouth 2 (two) times daily before meals.     traMADoL 50 mg tablet  Commonly known as: ULTRAM  Take 1 tablet (50 mg total) by mouth every 8 (eight) hours as needed.     vitamin D 1000 units Tab  Commonly known as: VITAMIN D3  Take 1,000 Units by mouth once daily.              Randolph Goel MD  Hematology/Oncology  [facility] - Transplant Stepdown

## 2024-11-08 NOTE — ASSESSMENT & PLAN NOTE
Patient has persistent depression which is mild and is currently controlled. Will Continue anti-depressant medications. We will not consult psychiatry at this time. Patient does not display psychosis at this time. Continue to monitor closely and adjust plan of care as needed. Start home meds

## 2024-11-08 NOTE — PLAN OF CARE
Through communication with [facility], the Inpatient order is being changed to observation as the payer will not authorize Inpatient and the facility agrees not to appeal or challenge the change in status. The account will be changed to Observation for billing purposes.

## 2024-11-08 NOTE — PROGRESS NOTES
Patient AAOx4. OOB independently. VSS. PIV removed. Discharge papers printed and signed, pt has no further questions. No home medication changes. Nurse escorted pt off unit via wheelchair at this time.

## 2024-11-08 NOTE — ED NOTES
Assumed care of patient at this time. Pt placed in hospital gown and currently lying in stretcher, provided pt with warm blanket. Pt denied restroom use. No other complaints from pt at this time.       LOC: The patient is awake, alert and aware of environment with an appropriate affect, the patient is oriented x 3 and speaking appropriately.   APPEARANCE: Patient appears comfortable and in no acute distress, patient is clean and well groomed.  SKIN: The skin is warm and dry, color consistent with ethnicity, patient has normal skin turgor and moist mucus membranes, skin intact, no breakdown or bruising noted.   MUSCULOSKELETAL: Patient moving all extremities spontaneously, no swelling noted.  RESPIRATORY: Airway is open and patent, respirations are spontaneous, patient has a normal effort and rate, no accessory muscle.  CARDIAC: Pt placed on cardiac monitor. Patient has a normal rate and regular rhythm, no edema noted, capillary refill < 3 seconds.   GASTRO: Soft, tender,c/o upper abdominal pain 10/10.   : Pt denies any pain or frequency with urination.  NEURO: Pt opens eyes spontaneously, behavior appropriate to situation, follows commands, facial expression symmetrical, bilateral hand grasp equal and even, purposeful motor response noted, normal sensation in all extremities when touched with a finger.

## 2024-11-08 NOTE — HPI
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 Keita, Oncology), sepsis bacteremia that presents to the emergency department because of generalized abdominal pain.  Current status symptoms started 2 days ago.  No known cause.  No recent trauma.  Gradually getting worse.  No palliation despite pain medication at home.  Pain is constant sharp radiating throughout the abdomen.  There is associated nausea vomiting.  No diarrhea constipation dysuria fever.

## 2024-11-08 NOTE — HOSPITAL COURSE
BC with NGTD,afebrile, remained hemodynamically stable, no oxygen requirements. CT abdomen and pelvis with known hepatic masses, appears similar to the previous examination with stable pelvic small bowel mass. Abdominal pain likely a result of malignancy. Received morphine and dilaudid overnight with relief. CRP, lactate, lipase wnl.

## 2024-11-08 NOTE — ASSESSMENT & PLAN NOTE
Abdominal pain-hypertensive otherwise stable vitals.  White count 13.  H&H 13 and 42.  MCV 90.  Platelet 280.  CMP shows no significant electrolyte abnormalities.  Negative troponin.  Lactic 1.5.  A1c 5.4.  Negative COVID test.  2 bacteria in the urine.  Blood cultures pending.  CT abdomen redemonstrates hepatic masses and pelvic small bowel masses.  There is evidence of metastatic lymphadenopathy.  Patient given pain medication.  Admit for observation pain control.

## 2024-11-08 NOTE — H&P
[facility] - Transplant [facility]  History & Physical    Patient Name: Bell Lynn  MRN: [id number]  Admission Date: 11/7/2024  Attending Physician: No att. providers found   Primary Care Provider: Cipriano Carrera MD         Patient information was obtained from ER records.       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 Keita, Oncology), sepsis bacteremia that presents to the emergency department because of generalized abdominal pain.  Current status symptoms started 2 days ago.  No known cause.  No recent trauma.  Gradually getting worse.  No palliation despite pain medication at home.  Pain is constant sharp radiating throughout the abdomen.  There is associated nausea vomiting.  No diarrhea constipation dysuria fever.    Past Medical History:   Diagnosis Date    Asthma     Depression     Gastric ulcer with hemorrhage 07/01/2012    GERD (gastroesophageal reflux disease)     History of blood transfusion     Hypertension     Iron deficiency anemia 03/14/2013    Osteoarthritis of both knees     Pituitary adenoma 1989    s/p transphenoidal resection    Primary malignant neuroendocrine tumor of small intestine 06/2023       Past Surgical History:   Procedure Laterality Date    BREAST BIOPSY      BREAST SURGERY      Benign breast cyst    COLONOSCOPY      COLONOSCOPY N/A 10/4/2024    Procedure: COLONOSCOPY;  Surgeon: Carloz Alston MD;  Location: [facility];  Service: Endoscopy;  Laterality: N/A;  Ref by   ERIK Carrera, Suprep, portal - PC  8/28/24-LVM for precall, portal-DS  8/30/24-Precall complete-DS  9/5 Pt rescheduled. Suprep, portal.lopez  9/23-LVM for precall-Kpvt  9/26-pt r/s, updated instructions sent to portal, pt knows procedure will be done with first available and ti    ENDOSCOPIC ULTRASOUND OF UPPER GASTROINTESTINAL TRACT N/A 06/27/2023    Procedure: ULTRASOUND, UPPER GI TRACT, ENDOSCOPIC;  Surgeon: Johnathan Cantu MD;  Location: [facility] ENDO;  Service: Endoscopy;  Laterality: N/A;    ESOPHAGOGASTRODUODENOSCOPY N/A 10/07/2020    Procedure: EGD (ESOPHAGOGASTRODUODENOSCOPY);  Surgeon: Nell Parish MD;  Location: [facility] ENDO;  Service: Endoscopy;  Laterality: N/A;    ESOPHAGOGASTRODUODENOSCOPY N/A 06/30/2021    Procedure: EGD (ESOPHAGOGASTRODUODENOSCOPY);  Surgeon: Nell Parish MD;  Location: [facility] ENDO;  Service: Endoscopy;  Laterality: N/A;    HYSTERECTOMY  1981    due to uterine fibroids    KNEE ARTHROPLASTY Left 01/10/2022    Procedure: ARTHROPLASTY, KNEE;  Surgeon: Jonnathan Tavera MD;  Location: [facility] OR;  Service: Orthopedics;  Laterality: Left;    TONSILLECTOMY      TRANSPHENOIDAL PITUITARY RESECTION  1989    Dr Cardenas       Review of patient's allergies indicates:   Allergen Reactions    Nsaids (non-steroidal anti-inflammatory drug)      Gi bleed    Penicillins Itching and Swelling    Penicillin     Symbicort [budesonide-formoterol]      Recurrent oral ulcers       Current Facility-Administered Medications on File Prior to Encounter   Medication    lanreotide injection 120 mg     Current Outpatient Medications on File Prior to Encounter   Medication Sig    albuterol (PROVENTIL/VENTOLIN HFA) 90 mcg/actuation inhaler INHALE 2 PUFFS EVERY 6 HOURS AS NEEDED FOR WHEEZING (RESCUE)    alendronate (FOSAMAX) 70 MG tablet TAKE 1 TABLET EVERY 7 DAYS    cetirizine (ZYRTEC) 10 MG tablet TAKE 1 TABLET ONE TIME DAILY    cyclobenzaprine (FLEXERIL) 10 MG tablet TAKE 1 TABLET EVERY EVENING    DULoxetine (CYMBALTA)  60 MG capsule TAKE 1 CAPSULE ONE TIME DAILY    furosemide (LASIX) 20 MG tablet TAKE 1 TABLET ONE TIME DAILY FOR LEG SWELLING AS NEEDED    gabapentin (NEURONTIN) 100 MG capsule TAKE 1 CAPSULE IN THE MORNING AND TAKE 3 CAPSULES AT NIGHT    LIDOcaine (LIDODERM) 5 % APPLY 1 PATCH ON THE SKIN ONE TIME DAILY. REMOVE AND DISCARD PATCH WITHIN 12 HOURS OR AS DIRECTED BY MD    LINZESS 145 mcg Cap capsule TAKE 1 CAPSULE ONE TIME DAILY    metoprolol succinate (TOPROL-XL) 100 MG 24 hr tablet Take 1 tablet (100 mg total) by mouth once daily.    pantoprazole (PROTONIX) 40 MG tablet Take 1 tablet (40 mg total) by mouth 2 (two) times daily before meals.    traMADoL (ULTRAM) 50 mg tablet Take 1 tablet (50 mg total) by mouth every 8 (eight) hours as needed.    vitamin D (VITAMIN D3) 1000 units Tab Take 1,000 Units by mouth once daily.    latanoprost 0.005 % ophthalmic solution Place 1 drop into the right eye every evening.     Family History       Problem Relation (Age of Onset)    Breast cancer Other (48), Other (45)    Cancer Father    Heart disease Father    Hypertension Father    Rectal cancer Maternal Aunt    Uterine cancer Other, Other (45)          Tobacco Use    Smoking status: Never     Passive exposure: Never    Smokeless tobacco: Never   Substance and Sexual Activity    Alcohol use: Yes     Alcohol/week: 1.0 standard drink of alcohol     Types: 1 Cans of beer per week     Comment: on ocassion    Drug use: No    Sexual activity: Not Currently     Partners: Male     Birth control/protection: Surgical     Review of Systems   All other systems reviewed and are negative.    Objective:     Vital Signs (Most Recent):  Temp: 99 °F (37.2 °C) (11/08/24 0417)  Pulse: 73 (11/08/24 0417)  Resp: 15 (11/08/24 0451)  BP: (!) 171/84 (11/08/24 0417)  SpO2: (!) 94 % (11/08/24 0417) Vital Signs (24h Range):  Temp:  [97.5 °F (36.4 °C)-99 °F (37.2 °C)] 99 °F (37.2 °C)  Pulse:  [59-95] 73  Resp:  [15-20] 15  SpO2:  [93 %-97 %] 94 %  BP:  (153-189)/() 171/84     Weight: 92.7 kg (204 lb 5.9 oz)  Body mass index is 35.08 kg/m².     Physical Exam  Vitals and nursing note reviewed.   Constitutional:       Appearance: Normal appearance. She is normal weight.   HENT:      Head: Normocephalic and atraumatic.      Nose: Nose normal.   Pulmonary:      Effort: Pulmonary effort is normal. No respiratory distress.   Abdominal:      General: There is no distension.      Tenderness: There is abdominal tenderness.   Musculoskeletal:         General: Normal range of motion.      Cervical back: Normal range of motion.   Skin:     General: Skin is dry.      Coloration: Skin is not pale.      Findings: No erythema.   Neurological:      Mental Status: She is alert and oriented to person, place, and time. Mental status is at baseline.   Psychiatric:         Mood and Affect: Mood normal.         Behavior: Behavior normal.         Thought Content: Thought content normal.         Judgment: Judgment normal.                Significant Labs: All pertinent labs within the past 24 hours have been reviewed.    Significant Imaging: I have reviewed all pertinent imaging results/findings within the past 24 hours.  Assessment/Plan:     * Primary malignant neuroendocrine tumor of small intestine  Abdominal pain-hypertensive otherwise stable vitals.  White count 13.  H&H 13 and 42.  MCV 90.  Platelet 280.  CMP shows no significant electrolyte abnormalities.  Negative troponin.  Lactic 1.5.  A1c 5.4.  Negative COVID test.  2 bacteria in the urine.  Blood cultures pending.  CT abdomen redemonstrates hepatic masses and pelvic small bowel masses.  There is evidence of metastatic lymphadenopathy.  Patient given pain medication.  Admit for observation pain control.      Secondary malignant neoplasm of liver  Consult oncology. Start pain meds.     Mild recurrent major depression  Patient has persistent depression which is mild and is currently controlled. Will Continue anti-depressant  medications. We will not consult psychiatry at this time. Patient does not display psychosis at this time. Continue to monitor closely and adjust plan of care as needed. Start home meds        Chronic asthma without complication intolerant of symbicort - recurrent mouth sores    Start home meds    Essential hypertension 1/2021 TTE normal, stress test negative  Start home meds        VTE Risk Mitigation (From admission, onward)           Ordered     IP VTE HIGH RISK PATIENT  Once         11/07/24 2127     Place sequential compression device  Until discontinued         11/07/24 2127     Reason for No Pharmacological VTE Prophylaxis  Once        Question:  Reasons:  Answer:  Patient is Ambulatory    11/07/24 2127                         The attending portion of this evaluation, treatment, and documentation was performed per Puma Griffin MD via Telemedicine AudioVisual using the secure [employer] software platform with 2 way audio/video. The provider was located off-site and the patient is located in the hospital. The aforementioned video software was utilized to document the relevant history and physical exam            Puma Griffin MD  Department of Hospital Medicine   [facility] - Transplant Stepdown

## 2024-11-08 NOTE — SUBJECTIVE & OBJECTIVE
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;  Service: Endoscopy;  Laterality: N/A;  Ref by Dr ERIK Carrera, Suprep, portal - PC  8/28/24-LVM for precall, portal-DS  8/30/24-Precall complete-DS  9/5 Pt rescheduled. Suprep, portal.lopez  9/23-LVM for precall-Kpvt  9/26-pt r/s, updated instructions sent to portal, pt knows procedure will be done with first available and ti    ENDOSCOPIC ULTRASOUND OF UPPER GASTROINTESTINAL TRACT N/A 06/27/2023    Procedure: ULTRASOUND, UPPER GI TRACT, ENDOSCOPIC;  Surgeon: Johnathan Cantu MD;  Location: [facility];  Service: Endoscopy;  Laterality: N/A;    ESOPHAGOGASTRODUODENOSCOPY N/A 10/07/2020    Procedure: EGD (ESOPHAGOGASTRODUODENOSCOPY);  Surgeon: Nell Parish MD;  Location: [facility];  Service: Endoscopy;  Laterality: N/A;    ESOPHAGOGASTRODUODENOSCOPY N/A 06/30/2021    Procedure: EGD (ESOPHAGOGASTRODUODENOSCOPY);  Surgeon: Nell Parish MD;  Location: [facility];  Service: Endoscopy;  Laterality: N/A;    HYSTERECTOMY  1981    due to uterine fibroids    KNEE ARTHROPLASTY Left 01/10/2022    Procedure: ARTHROPLASTY, KNEE;  Surgeon: Jonnathan Tavera MD;  Location: [facility];  Service: Orthopedics;  Laterality: Left;    TONSILLECTOMY      TRANSPHENOIDAL PITUITARY RESECTION  1989    Dr Cardenas       Review of patient's allergies indicates:   Allergen Reactions    Nsaids (non-steroidal  anti-inflammatory drug)      Gi bleed    Penicillins Itching and Swelling    Penicillin     Symbicort [budesonide-formoterol]      Recurrent oral ulcers       Current Facility-Administered Medications on File Prior to Encounter   Medication    lanreotide injection 120 mg     Current Outpatient Medications on File Prior to Encounter   Medication Sig    albuterol (PROVENTIL/VENTOLIN HFA) 90 mcg/actuation inhaler INHALE 2 PUFFS EVERY 6 HOURS AS NEEDED FOR WHEEZING (RESCUE)    alendronate (FOSAMAX) 70 MG tablet TAKE 1 TABLET EVERY 7 DAYS    cetirizine (ZYRTEC) 10 MG tablet TAKE 1 TABLET ONE TIME DAILY    cyclobenzaprine (FLEXERIL) 10 MG tablet TAKE 1 TABLET EVERY EVENING    DULoxetine (CYMBALTA) 60 MG capsule TAKE 1 CAPSULE ONE TIME DAILY    furosemide (LASIX) 20 MG tablet TAKE 1 TABLET ONE TIME DAILY FOR LEG SWELLING AS NEEDED    gabapentin (NEURONTIN) 100 MG capsule TAKE 1 CAPSULE IN THE MORNING AND TAKE 3 CAPSULES AT NIGHT    LIDOcaine (LIDODERM) 5 % APPLY 1 PATCH ON THE SKIN ONE TIME DAILY. REMOVE AND DISCARD PATCH WITHIN 12 HOURS OR AS DIRECTED BY MD    LINZESS 145 mcg Cap capsule TAKE 1 CAPSULE ONE TIME DAILY    metoprolol succinate (TOPROL-XL) 100 MG 24 hr tablet Take 1 tablet (100 mg total) by mouth once daily.    pantoprazole (PROTONIX) 40 MG tablet Take 1 tablet (40 mg total) by mouth 2 (two) times daily before meals.    traMADoL (ULTRAM) 50 mg tablet Take 1 tablet (50 mg total) by mouth every 8 (eight) hours as needed.    vitamin D (VITAMIN D3) 1000 units Tab Take 1,000 Units by mouth once daily.    latanoprost 0.005 % ophthalmic solution Place 1 drop into the right eye every evening.     Family History       Problem Relation (Age of Onset)    Breast cancer Other (48), Other (45)    Cancer Father    Heart disease Father    Hypertension Father    Rectal cancer Maternal Aunt    Uterine cancer Other, Other (45)          Tobacco Use    Smoking status: Never     Passive exposure: Never    Smokeless tobacco: Never    Substance and Sexual Activity    Alcohol use: Yes     Alcohol/week: 1.0 standard drink of alcohol     Types: 1 Cans of beer per week     Comment: on ocassion    Drug use: No    Sexual activity: Not Currently     Partners: Male     Birth control/protection: Surgical     Review of Systems   All other systems reviewed and are negative.    Objective:     Vital Signs (Most Recent):  Temp: 99 °F (37.2 °C) (11/08/24 0417)  Pulse: 73 (11/08/24 0417)  Resp: 15 (11/08/24 0451)  BP: (!) 171/84 (11/08/24 0417)  SpO2: (!) 94 % (11/08/24 0417) Vital Signs (24h Range):  Temp:  [97.5 °F (36.4 °C)-99 °F (37.2 °C)] 99 °F (37.2 °C)  Pulse:  [59-95] 73  Resp:  [15-20] 15  SpO2:  [93 %-97 %] 94 %  BP: (153-189)/() 171/84     Weight: 92.7 kg (204 lb 5.9 oz)  Body mass index is 35.08 kg/m².     Physical Exam  Vitals and nursing note reviewed.   Constitutional:       Appearance: Normal appearance. She is normal weight.   HENT:      Head: Normocephalic and atraumatic.      Nose: Nose normal.   Pulmonary:      Effort: Pulmonary effort is normal. No respiratory distress.   Abdominal:      General: There is no distension.      Tenderness: There is abdominal tenderness.   Musculoskeletal:         General: Normal range of motion.      Cervical back: Normal range of motion.   Skin:     General: Skin is dry.      Coloration: Skin is not pale.      Findings: No erythema.   Neurological:      Mental Status: She is alert and oriented to person, place, and time. Mental status is at baseline.   Psychiatric:         Mood and Affect: Mood normal.         Behavior: Behavior normal.         Thought Content: Thought content normal.         Judgment: Judgment normal.                Significant Labs: All pertinent labs within the past 24 hours have been reviewed.    Significant Imaging: I have reviewed all pertinent imaging results/findings within the past 24 hours.

## 2024-11-08 NOTE — ASSESSMENT & PLAN NOTE
Abdominal pain-hypertensive otherwise stable vitals.  White count 13.  H&H 13 and 42.  MCV 90.  Platelet 280.  CMP shows no significant electrolyte abnormalities.  Negative troponin.  Lactic 1.5.  A1c 5.4.  Negative COVID test.  2 bacteria in the urine.  Blood cultures pending.  CT abdomen redemonstrates hepatic masses and pelvic small bowel masses.  There is evidence of metastatic lymphadenopathy.  Patient given pain medication.  Admit for observation pain control. Improved with therapy

## 2024-11-11 ENCOUNTER — PATIENT OUTREACH (OUTPATIENT)
Dept: ADMINISTRATIVE | Facility: CLINIC | Age: 69
End: 2024-11-11
Payer: MEDICARE

## 2024-11-11 ENCOUNTER — PATIENT MESSAGE (OUTPATIENT)
Dept: ADMINISTRATIVE | Facility: CLINIC | Age: 69
End: 2024-11-11
Payer: MEDICARE

## 2024-11-11 NOTE — PROGRESS NOTES
C3 nurse attempted to contact Bell Lynn for a TCC post hospital discharge follow up call. No answer. Left voicemail with callback information. The patient has a scheduled HOSFU appointment with Cipriano Carrera MD on 11/26/2024 @ 0800.

## 2024-11-12 LAB
BACTERIA BLD CULT: NORMAL
BACTERIA BLD CULT: NORMAL

## 2024-11-14 ENCOUNTER — TELEPHONE (OUTPATIENT)
Dept: ADMINISTRATIVE | Facility: CLINIC | Age: 69
End: 2024-11-14
Payer: MEDICARE

## 2024-11-14 NOTE — PROGRESS NOTES
"Phoned patient in response to reply of "2" to post-discharge texting tracker. Ms. Lynn states that the doctor was supposed to order stronger pain medication for her. Upon review, there were no prescriptions ordered for Ms. Lynn. I inquired of which doctor and offered to send a message on her behalf for the medication request. Ms. Lynn replied, "That's ok. I have an appointment on the 19th and will bring it up then". She did not have any other concerns.    "

## 2024-11-18 ENCOUNTER — PATIENT MESSAGE (OUTPATIENT)
Dept: ADMINISTRATIVE | Facility: OTHER | Age: 69
End: 2024-11-18
Payer: MEDICARE

## 2024-11-19 ENCOUNTER — OFFICE VISIT (OUTPATIENT)
Dept: HEMATOLOGY/ONCOLOGY | Facility: CLINIC | Age: 69
End: 2024-11-19
Payer: MEDICARE

## 2024-11-19 ENCOUNTER — LAB VISIT (OUTPATIENT)
Dept: LAB | Facility: HOSPITAL | Age: 69
End: 2024-11-19
Payer: MEDICARE

## 2024-11-19 ENCOUNTER — INFUSION (OUTPATIENT)
Dept: INFUSION THERAPY | Facility: HOSPITAL | Age: 69
End: 2024-11-19
Payer: MEDICARE

## 2024-11-19 VITALS
OXYGEN SATURATION: 95 % | HEIGHT: 64 IN | BODY MASS INDEX: 35.38 KG/M2 | WEIGHT: 207.25 LBS | DIASTOLIC BLOOD PRESSURE: 90 MMHG | TEMPERATURE: 98 F | SYSTOLIC BLOOD PRESSURE: 172 MMHG

## 2024-11-19 DIAGNOSIS — C78.7 SECONDARY MALIGNANT NEOPLASM OF LIVER: ICD-10-CM

## 2024-11-19 DIAGNOSIS — D49.9 IMMUNODEFICIENCY SECONDARY TO NEOPLASM: ICD-10-CM

## 2024-11-19 DIAGNOSIS — C77.8 SECONDARY MALIGNANT NEOPLASM OF LYMPH NODES OF MULTIPLE SITES: ICD-10-CM

## 2024-11-19 DIAGNOSIS — C7A.8 PRIMARY MALIGNANT NEUROENDOCRINE TUMOR OF SMALL INTESTINE: Primary | ICD-10-CM

## 2024-11-19 DIAGNOSIS — G89.3 CANCER-RELATED PAIN: ICD-10-CM

## 2024-11-19 DIAGNOSIS — I10 ESSENTIAL HYPERTENSION: Chronic | ICD-10-CM

## 2024-11-19 DIAGNOSIS — Z79.899 IMMUNODEFICIENCY DUE TO DRUGS: ICD-10-CM

## 2024-11-19 DIAGNOSIS — C7A.8 PRIMARY MALIGNANT NEUROENDOCRINE TUMOR OF SMALL INTESTINE: ICD-10-CM

## 2024-11-19 DIAGNOSIS — D84.81 IMMUNODEFICIENCY SECONDARY TO NEOPLASM: ICD-10-CM

## 2024-11-19 DIAGNOSIS — D84.821 IMMUNODEFICIENCY DUE TO DRUGS: ICD-10-CM

## 2024-11-19 DIAGNOSIS — A49.8 INFECTION CAUSED BY ENTEROBACTER CLOACAE: ICD-10-CM

## 2024-11-19 DIAGNOSIS — R73.09 ABNORMAL GLUCOSE: ICD-10-CM

## 2024-11-19 DIAGNOSIS — I10 ESSENTIAL HYPERTENSION: ICD-10-CM

## 2024-11-19 LAB
ALBUMIN SERPL BCP-MCNC: 3.4 G/DL (ref 3.5–5.2)
ALP SERPL-CCNC: 88 U/L (ref 40–150)
ALT SERPL W/O P-5'-P-CCNC: 10 U/L (ref 10–44)
ANION GAP SERPL CALC-SCNC: 8 MMOL/L (ref 8–16)
AST SERPL-CCNC: 22 U/L (ref 10–40)
BASOPHILS # BLD AUTO: 0.06 K/UL (ref 0–0.2)
BASOPHILS NFR BLD: 0.6 % (ref 0–1.9)
BILIRUB SERPL-MCNC: 0.2 MG/DL (ref 0.1–1)
BUN SERPL-MCNC: 7 MG/DL (ref 8–23)
CALCIUM SERPL-MCNC: 8.9 MG/DL (ref 8.7–10.5)
CHLORIDE SERPL-SCNC: 105 MMOL/L (ref 95–110)
CHOLEST SERPL-MCNC: 116 MG/DL (ref 120–199)
CHOLEST/HDLC SERPL: 3.3 {RATIO} (ref 2–5)
CO2 SERPL-SCNC: 24 MMOL/L (ref 23–29)
CREAT SERPL-MCNC: 0.7 MG/DL (ref 0.5–1.4)
DIFFERENTIAL METHOD BLD: ABNORMAL
EOSINOPHIL # BLD AUTO: 0.3 K/UL (ref 0–0.5)
EOSINOPHIL NFR BLD: 3 % (ref 0–8)
ERYTHROCYTE [DISTWIDTH] IN BLOOD BY AUTOMATED COUNT: 16.3 % (ref 11.5–14.5)
EST. GFR  (NO RACE VARIABLE): >60 ML/MIN/1.73 M^2
ESTIMATED AVG GLUCOSE: 111 MG/DL (ref 68–131)
GLUCOSE SERPL-MCNC: 108 MG/DL (ref 70–110)
HBA1C MFR BLD: 5.5 % (ref 4–5.6)
HCT VFR BLD AUTO: 41.3 % (ref 37–48.5)
HDLC SERPL-MCNC: 35 MG/DL (ref 40–75)
HDLC SERPL: 30.2 % (ref 20–50)
HGB BLD-MCNC: 12.7 G/DL (ref 12–16)
IMM GRANULOCYTES # BLD AUTO: 0.04 K/UL (ref 0–0.04)
IMM GRANULOCYTES NFR BLD AUTO: 0.4 % (ref 0–0.5)
LDLC SERPL CALC-MCNC: 61.2 MG/DL (ref 63–159)
LYMPHOCYTES # BLD AUTO: 3.8 K/UL (ref 1–4.8)
LYMPHOCYTES NFR BLD: 38.9 % (ref 18–48)
MCH RBC QN AUTO: 28.5 PG (ref 27–31)
MCHC RBC AUTO-ENTMCNC: 30.8 G/DL (ref 32–36)
MCV RBC AUTO: 93 FL (ref 82–98)
MONOCYTES # BLD AUTO: 0.5 K/UL (ref 0.3–1)
MONOCYTES NFR BLD: 5.4 % (ref 4–15)
NEUTROPHILS # BLD AUTO: 5.1 K/UL (ref 1.8–7.7)
NEUTROPHILS NFR BLD: 51.7 % (ref 38–73)
NONHDLC SERPL-MCNC: 81 MG/DL
NRBC BLD-RTO: 0 /100 WBC
PLATELET # BLD AUTO: 308 K/UL (ref 150–450)
PMV BLD AUTO: 11.7 FL (ref 9.2–12.9)
POTASSIUM SERPL-SCNC: 3.6 MMOL/L (ref 3.5–5.1)
PROT SERPL-MCNC: 8.1 G/DL (ref 6–8.4)
RBC # BLD AUTO: 4.46 M/UL (ref 4–5.4)
SODIUM SERPL-SCNC: 137 MMOL/L (ref 136–145)
TRIGL SERPL-MCNC: 99 MG/DL (ref 30–150)
WBC # BLD AUTO: 9.77 K/UL (ref 3.9–12.7)

## 2024-11-19 PROCEDURE — 85025 COMPLETE CBC W/AUTO DIFF WBC: CPT | Mod: HCNC | Performed by: INTERNAL MEDICINE

## 2024-11-19 PROCEDURE — 80061 LIPID PANEL: CPT | Mod: HCNC | Performed by: INTERNAL MEDICINE

## 2024-11-19 PROCEDURE — 3288F FALL RISK ASSESSMENT DOCD: CPT | Mod: HCNC,CPTII,S$GLB, | Performed by: PHYSICIAN ASSISTANT

## 2024-11-19 PROCEDURE — 80053 COMPREHEN METABOLIC PANEL: CPT | Mod: HCNC | Performed by: INTERNAL MEDICINE

## 2024-11-19 PROCEDURE — 1160F RVW MEDS BY RX/DR IN RCRD: CPT | Mod: HCNC,CPTII,S$GLB, | Performed by: PHYSICIAN ASSISTANT

## 2024-11-19 PROCEDURE — G2211 COMPLEX E/M VISIT ADD ON: HCPCS | Mod: HCNC,S$GLB,, | Performed by: PHYSICIAN ASSISTANT

## 2024-11-19 PROCEDURE — 83497 ASSAY OF 5-HIAA: CPT | Mod: HCNC | Performed by: INTERNAL MEDICINE

## 2024-11-19 PROCEDURE — 63600175 PHARM REV CODE 636 W HCPCS: Mod: JZ,JG,HCNC | Performed by: INTERNAL MEDICINE

## 2024-11-19 PROCEDURE — 3077F SYST BP >= 140 MM HG: CPT | Mod: HCNC,CPTII,S$GLB, | Performed by: PHYSICIAN ASSISTANT

## 2024-11-19 PROCEDURE — 99215 OFFICE O/P EST HI 40 MIN: CPT | Mod: HCNC,S$GLB,, | Performed by: PHYSICIAN ASSISTANT

## 2024-11-19 PROCEDURE — 3080F DIAST BP >= 90 MM HG: CPT | Mod: HCNC,CPTII,S$GLB, | Performed by: PHYSICIAN ASSISTANT

## 2024-11-19 PROCEDURE — 1159F MED LIST DOCD IN RCRD: CPT | Mod: HCNC,CPTII,S$GLB, | Performed by: PHYSICIAN ASSISTANT

## 2024-11-19 PROCEDURE — 3044F HG A1C LEVEL LT 7.0%: CPT | Mod: HCNC,CPTII,S$GLB, | Performed by: PHYSICIAN ASSISTANT

## 2024-11-19 PROCEDURE — 1101F PT FALLS ASSESS-DOCD LE1/YR: CPT | Mod: HCNC,CPTII,S$GLB, | Performed by: PHYSICIAN ASSISTANT

## 2024-11-19 PROCEDURE — 96372 THER/PROPH/DIAG INJ SC/IM: CPT | Mod: HCNC

## 2024-11-19 PROCEDURE — 83036 HEMOGLOBIN GLYCOSYLATED A1C: CPT | Mod: HCNC | Performed by: INTERNAL MEDICINE

## 2024-11-19 PROCEDURE — 84260 ASSAY OF SEROTONIN: CPT | Mod: HCNC | Performed by: INTERNAL MEDICINE

## 2024-11-19 PROCEDURE — 1126F AMNT PAIN NOTED NONE PRSNT: CPT | Mod: HCNC,CPTII,S$GLB, | Performed by: PHYSICIAN ASSISTANT

## 2024-11-19 PROCEDURE — 83519 RIA NONANTIBODY: CPT | Mod: HCNC | Performed by: INTERNAL MEDICINE

## 2024-11-19 PROCEDURE — 99999 PR PBB SHADOW E&M-EST. PATIENT-LVL IV: CPT | Mod: PBBFAC,HCNC,, | Performed by: PHYSICIAN ASSISTANT

## 2024-11-19 PROCEDURE — 3008F BODY MASS INDEX DOCD: CPT | Mod: HCNC,CPTII,S$GLB, | Performed by: PHYSICIAN ASSISTANT

## 2024-11-19 PROCEDURE — 36415 COLL VENOUS BLD VENIPUNCTURE: CPT | Mod: HCNC | Performed by: INTERNAL MEDICINE

## 2024-11-19 RX ORDER — LANREOTIDE ACETATE 120 MG/.5ML
120 INJECTION SUBCUTANEOUS ONCE
OUTPATIENT
Start: 2024-11-19 | End: 2024-11-19

## 2024-11-19 RX ORDER — LANREOTIDE ACETATE 120 MG/.5ML
120 INJECTION SUBCUTANEOUS ONCE
Status: COMPLETED | OUTPATIENT
Start: 2024-11-19 | End: 2024-11-19

## 2024-11-19 RX ORDER — OXYCODONE HYDROCHLORIDE 10 MG/1
10 TABLET ORAL EVERY 6 HOURS PRN
Qty: 90 TABLET | Refills: 0 | Status: SHIPPED | OUTPATIENT
Start: 2024-11-19

## 2024-11-19 RX ORDER — LANREOTIDE ACETATE 120 MG/.5ML
120 INJECTION SUBCUTANEOUS ONCE
Status: CANCELLED | OUTPATIENT
Start: 2024-11-19 | End: 2024-11-19

## 2024-11-19 RX ADMIN — LANREOTIDE ACETATE 120 MG: 120 INJECTION SUBCUTANEOUS at 12:11
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
## 2024-11-21 ENCOUNTER — HOSPITAL ENCOUNTER (OUTPATIENT)
Dept: CARDIOLOGY | Facility: HOSPITAL | Age: 69
Discharge: HOME | End: 2024-11-21
Attending: INTERNAL MEDICINE
Payer: MEDICARE

## 2024-11-21 ENCOUNTER — HOSPITAL ENCOUNTER (OUTPATIENT)
Dept: RADIOLOGY | Facility: HOSPITAL | Age: 69
Discharge: HOME | End: 2024-11-21
Attending: INTERNAL MEDICINE
Payer: MEDICARE

## 2024-11-21 VITALS — BODY MASS INDEX: 35.38 KG/M2 | HEIGHT: 64 IN | WEIGHT: 207.25 LBS

## 2024-11-21 DIAGNOSIS — R06.09 DOE (DYSPNEA ON EXERTION): ICD-10-CM

## 2024-11-21 DIAGNOSIS — R07.89 CHEST PAIN, ATYPICAL: ICD-10-CM

## 2024-11-21 DIAGNOSIS — I10 ESSENTIAL HYPERTENSION: Chronic | ICD-10-CM

## 2024-11-21 DIAGNOSIS — R00.2 PALPITATIONS: ICD-10-CM

## 2024-11-21 LAB
CV STRESS BASE HR: 57 BPM
DIASTOLIC BLOOD PRESSURE: 102 MMHG
NUC STRESS DIASTOLIC VOLUME INDEX: 70
NUC STRESS EJECTION FRACTION: 66 %
NUC STRESS SYSTOLIC VOLUME INDEX: 24
OHS CV CPX 85 PERCENT MAX PREDICTED HEART RATE MALE: 129
OHS CV CPX MAX PREDICTED HEART RATE: 152
OHS CV CPX PATIENT IS FEMALE: 1
OHS CV CPX PATIENT IS MALE: 0
OHS CV CPX PEAK DIASTOLIC BLOOD PRESSURE: 82 MMHG
OHS CV CPX PEAK HEAR RATE: 78 BPM
OHS CV CPX PEAK RATE PRESSURE PRODUCT: NORMAL
OHS CV CPX PEAK SYSTOLIC BLOOD PRESSURE: 136 MMHG
OHS CV CPX PERCENT MAX PREDICTED HEART RATE ACHIEVED: 53
OHS CV CPX RATE PRESSURE PRODUCT PRESENTING: NORMAL
OHS CV INITIAL DOSE: 10.6 MCG/KG/MIN
OHS CV PEAK DOSE: 30.7 MCG/KG/MIN
SYSTOLIC BLOOD PRESSURE: 195 MMHG

## 2024-11-21 PROCEDURE — 78452 HT MUSCLE IMAGE SPECT MULT: CPT | Mod: 26,HCNC,, | Performed by: INTERNAL MEDICINE

## 2024-11-21 PROCEDURE — 93018 CV STRESS TEST I&R ONLY: CPT | Mod: HCNC,,, | Performed by: INTERNAL MEDICINE

## 2024-11-21 PROCEDURE — 93306 TTE W/DOPPLER COMPLETE: CPT | Mod: 26,HCNC,, | Performed by: INTERNAL MEDICINE

## 2024-11-21 PROCEDURE — 63600175 PHARM REV CODE 636 W HCPCS: Mod: HCNC | Performed by: INTERNAL MEDICINE

## 2024-11-21 PROCEDURE — 93225 XTRNL ECG REC<48 HRS REC: CPT | Mod: HCNC

## 2024-11-21 PROCEDURE — A9502 TC99M TETROFOSMIN: HCPCS | Mod: HCNC | Performed by: INTERNAL MEDICINE

## 2024-11-21 PROCEDURE — 93306 TTE W/DOPPLER COMPLETE: CPT | Mod: HCNC

## 2024-11-21 PROCEDURE — 93017 CV STRESS TEST TRACING ONLY: CPT | Mod: HCNC

## 2024-11-21 PROCEDURE — 78452 HT MUSCLE IMAGE SPECT MULT: CPT | Mod: HCNC

## 2024-11-21 PROCEDURE — 93016 CV STRESS TEST SUPVJ ONLY: CPT | Mod: HCNC,,, | Performed by: INTERNAL MEDICINE

## 2024-11-21 RX ORDER — REGADENOSON 0.08 MG/ML
0.4 INJECTION, SOLUTION INTRAVENOUS ONCE
Status: COMPLETED | OUTPATIENT
Start: 2024-11-21 | End: 2024-11-21

## 2024-11-21 RX ADMIN — TETROFOSMIN 10.6 MILLICURIE: 1.38 INJECTION, POWDER, LYOPHILIZED, FOR SOLUTION INTRAVENOUS at 07:11

## 2024-11-21 RX ADMIN — TETROFOSMIN 30.7 MILLICURIE: 1.38 INJECTION, POWDER, LYOPHILIZED, FOR SOLUTION INTRAVENOUS at 09:11

## 2024-11-21 RX ADMIN — REGADENOSON 0.4 MG: 0.08 INJECTION, SOLUTION INTRAVENOUS at 09:11

## 2024-11-22 LAB
5OH-INDOLEACETATE SERPL-MCNC: 36 NG/ML
ASCENDING AORTA: 2.96 CM
AV INDEX (PROSTH): 0.84
AV MEAN GRADIENT: 3.4 MMHG
AV PEAK GRADIENT: 6.8 MMHG
AV VALVE AREA BY VELOCITY RATIO: 2.4 CM²
AV VALVE AREA: 2.9 CM²
AV VELOCITY RATIO: 0.69
BSA FOR ECHO PROCEDURE: 2.06 M2
CV ECHO LV RWT: 0.52 CM
DOP CALC AO PEAK VEL: 1.3 M/S
DOP CALC AO VTI: 28 CM
DOP CALC LVOT AREA: 3.5 CM2
DOP CALC LVOT DIAMETER: 2.1 CM
DOP CALC LVOT PEAK VEL: 0.9 M/S
DOP CALC LVOT STROKE VOLUME: 81 CM3
DOP CALC MV VTI: 31.2 CM
DOP CALCLVOT PEAK VEL VTI: 23.4 CM
E WAVE DECELERATION TIME: 235.45 MSEC
E/A RATIO: 0.75
E/E' RATIO: 11.83 M/S
ECHO LV POSTERIOR WALL: 1.2 CM (ref 0.6–1.1)
FRACTIONAL SHORTENING: 37 % (ref 28–44)
INTERVENTRICULAR SEPTUM: 1.2 CM (ref 0.6–1.1)
LA MAJOR: 4.98 CM
LA MINOR: 5.04 CM
LA WIDTH: 4.5 CM
LEFT ATRIUM SIZE: 4.1 CM
LEFT ATRIUM VOLUME INDEX: 39.5 ML/M2
LEFT ATRIUM VOLUME: 78.57 CM3
LEFT INTERNAL DIMENSION IN SYSTOLE: 2.9 CM (ref 2.1–4)
LEFT VENTRICLE DIASTOLIC VOLUME INDEX: 47.59 ML/M2
LEFT VENTRICLE DIASTOLIC VOLUME: 94.71 ML
LEFT VENTRICLE MASS INDEX: 103 G/M2
LEFT VENTRICLE SYSTOLIC VOLUME INDEX: 15.7 ML/M2
LEFT VENTRICLE SYSTOLIC VOLUME: 31.22 ML
LEFT VENTRICULAR INTERNAL DIMENSION IN DIASTOLE: 4.6 CM (ref 3.5–6)
LEFT VENTRICULAR MASS: 205 G
LV LATERAL E/E' RATIO: 11.83 M/S
LV SEPTAL E/E' RATIO: 11.83 M/S
LVED V (TEICH): 94.71 ML
LVES V (TEICH): 31.22 ML
LVOT MG: 1.66 MMHG
LVOT MV: 0.58 CM/S
MV MEAN GRADIENT: 1 MMHG
MV PEAK A VEL: 0.95 M/S
MV PEAK E VEL: 0.71 M/S
MV PEAK GRADIENT: 4 MMHG
MV STENOSIS PRESSURE HALF TIME: 68.28 MS
MV VALVE AREA BY CONTINUITY EQUATION: 2.6 CM2
MV VALVE AREA P 1/2 METHOD: 3.22 CM2
OHS CV RV/LV RATIO: 0.78 CM
PISA TR MAX VEL: 1.94 M/S
PULM VEIN S/D RATIO: 2.56
PV PEAK D VEL: 0.25 M/S
PV PEAK GRADIENT: 3 MMHG
PV PEAK S VEL: 0.64 M/S
PV PEAK VELOCITY: 0.81 M/S
RA MAJOR: 4.74 CM
RA PRESSURE ESTIMATED: 0 MMHG
RA WIDTH: 3.3 CM
RIGHT VENTRICLE DIASTOLIC BASEL DIMENSION: 3.6 CM
RIGHT VENTRICULAR END-DIASTOLIC DIMENSION: 3.6 CM
RV TB RVSP: 2 MMHG
RV TISSUE DOPPLER FREE WALL SYSTOLIC VELOCITY 1 (APICAL 4 CHAMBER VIEW): 13.22 CM/S
SINUS: 3.14 CM
STJ: 2.5 CM
TDI LATERAL: 0.06 M/S
TDI SEPTAL: 0.06 M/S
TDI: 0.06 M/S
TR MAX PG: 15 MMHG
TRICUSPID ANNULAR PLANE SYSTOLIC EXCURSION: 2.59 CM
TV REST PULMONARY ARTERY PRESSURE: 15 MMHG
Z-SCORE OF LEFT VENTRICULAR DIMENSION IN END DIASTOLE: -2.27
Z-SCORE OF LEFT VENTRICULAR DIMENSION IN END SYSTOLE: -1.59

## 2024-11-25 PROBLEM — D72.829 LEUKOCYTOSIS: Status: RESOLVED | Noted: 2024-10-08 | Resolved: 2024-11-25

## 2024-11-25 PROBLEM — E87.1 HYPONATREMIA: Status: RESOLVED | Noted: 2024-10-10 | Resolved: 2024-11-25

## 2024-11-26 ENCOUNTER — TELEPHONE (OUTPATIENT)
Dept: FAMILY MEDICINE | Facility: CLINIC | Age: 69
End: 2024-11-26
Payer: MEDICARE

## 2024-11-26 NOTE — TELEPHONE ENCOUNTER
----- Message from Hudson Grant sent at 11/26/2024  9:38 AM CST -----  Type:  Sooner Appointment Request    Patient is requesting a sooner appointment.  Patient declined first available appointment listed as well as another facility and provider .  Patient will not accept being placed on the waitlist and is requesting a message be sent to doctor.    Name of Caller:  Pt   When is the first available appointment?  4/9  Symptoms:  6mth F/U  Would the patient rather a call back or a response via [facility]?  Callback   Best Call Back Number:  Telephone Information:  Mobile          [phone]     Additional Information:

## 2024-11-27 ENCOUNTER — OFFICE VISIT (OUTPATIENT)
Dept: CARDIOLOGY | Facility: CLINIC | Age: 69
End: 2024-11-27
Payer: MEDICARE

## 2024-11-27 VITALS
BODY MASS INDEX: 35.38 KG/M2 | WEIGHT: 207.25 LBS | SYSTOLIC BLOOD PRESSURE: 154 MMHG | OXYGEN SATURATION: 95 % | HEIGHT: 64 IN | HEART RATE: 63 BPM | DIASTOLIC BLOOD PRESSURE: 88 MMHG

## 2024-11-27 DIAGNOSIS — I10 ESSENTIAL HYPERTENSION: Primary | Chronic | ICD-10-CM

## 2024-11-27 DIAGNOSIS — R07.89 CHEST PAIN, ATYPICAL: ICD-10-CM

## 2024-11-27 DIAGNOSIS — R06.09 DOE (DYSPNEA ON EXERTION): ICD-10-CM

## 2024-11-27 DIAGNOSIS — R00.2 PALPITATIONS: ICD-10-CM

## 2024-11-27 PROCEDURE — 3008F BODY MASS INDEX DOCD: CPT | Mod: HCNC,CPTII,S$GLB, | Performed by: INTERNAL MEDICINE

## 2024-11-27 PROCEDURE — 99214 OFFICE O/P EST MOD 30 MIN: CPT | Mod: HCNC,S$GLB,, | Performed by: INTERNAL MEDICINE

## 2024-11-27 PROCEDURE — 1101F PT FALLS ASSESS-DOCD LE1/YR: CPT | Mod: HCNC,CPTII,S$GLB, | Performed by: INTERNAL MEDICINE

## 2024-11-27 PROCEDURE — 3077F SYST BP >= 140 MM HG: CPT | Mod: HCNC,CPTII,S$GLB, | Performed by: INTERNAL MEDICINE

## 2024-11-27 PROCEDURE — 3079F DIAST BP 80-89 MM HG: CPT | Mod: HCNC,CPTII,S$GLB, | Performed by: INTERNAL MEDICINE

## 2024-11-27 PROCEDURE — 1159F MED LIST DOCD IN RCRD: CPT | Mod: HCNC,CPTII,S$GLB, | Performed by: INTERNAL MEDICINE

## 2024-11-27 PROCEDURE — 1126F AMNT PAIN NOTED NONE PRSNT: CPT | Mod: HCNC,CPTII,S$GLB, | Performed by: INTERNAL MEDICINE

## 2024-11-27 PROCEDURE — 3288F FALL RISK ASSESSMENT DOCD: CPT | Mod: HCNC,CPTII,S$GLB, | Performed by: INTERNAL MEDICINE

## 2024-11-27 PROCEDURE — 99999 PR PBB SHADOW E&M-EST. PATIENT-LVL IV: CPT | Mod: PBBFAC,HCNC,, | Performed by: INTERNAL MEDICINE

## 2024-11-27 PROCEDURE — 1124F ACP DISCUSS-NO DSCNMKR DOCD: CPT | Mod: HCNC,CPTII,S$GLB, | Performed by: INTERNAL MEDICINE

## 2024-11-27 PROCEDURE — 3044F HG A1C LEVEL LT 7.0%: CPT | Mod: HCNC,CPTII,S$GLB, | Performed by: INTERNAL MEDICINE

## 2024-11-27 NOTE — PROGRESS NOTES
Subjective   Patient ID:  Bell Lynn is a 69 y.o. female who presents for follow-up of No chief complaint on file.      HPI      HTN, LANGE, obesity, non-sustained SVT, GI neuroendocrine tumor with liver mets     Previously saw Dr Willson  Her follow-up chest pain shortness of breath.  Again she has had progressive symptoms where she can't even walk down the hallway without exertional problems better relieved with rest.  She denies any sustained tachycardia or palpitations.  She has experienced no PND, orthopnea or lower extremity edema.  She has skin significant secondhand smoke exposure with her  smoking in the house.  She smells of tobacco on her clothing today.  She denies any dizziness, presyncope or syncope.  She has had multiple family members with deaths from MI in the past year and mainly wants a checkup.     Echo 11/21/24    Left Ventricle: The left ventricle is normal in size. Mildly increased wall thickness. There is mild concentric hypertrophy. There is normal systolic function with a visually estimated ejection fraction of 60 - 65%. Grade I diastolic dysfunction.    Right Ventricle: Normal right ventricular cavity size. Systolic function is normal.        Stress test 11/21/24    Normal myocardial perfusion scan. There is no evidence of myocardial ischemia or infarction.    The gated perfusion images showed an ejection fraction of 66% post stress.    The ECG portion of the study is negative for ischemia.    The patient reported no chest pain during the stress test.    There were no arrhythmias during stress.    Holter 11/21/24    The predominant rhythm is sinus.    Sinus rhythm with heart rates varying between 46 and 125 BPM with an average of 69BPM.    There were very rare PVCs totalling 13 and averaging 0.27 per hour.    There were rare PACs totalling 336 and averaging 7 per hour.    There were 18 runs of non-sustained SVT and lasting up to 15 beats.    The diary was not  returned.    1/14/21 for the last 2 weeks reports episodic CP - tightness with radiation to left arm both with exertion and at rest - can last several hours. Occasional palpitations with dizziness. Worsening LANGE  EKG NSR - ok   Echo and lexiscan myoview for CP and SOB - says she cannot walk treadmill  Holter for palpitations     6/7/23 Recently having issues with palpitations and sticking left sided CP. Since daughter graduated High School all of the symptoms and BP have improved  EKG NSR NSSTT changes  Given improvement in symptoms after stress level improved we are both in agreement to observe CP and palpitations  Continue Rx for HTN  Needs more diet and exercise  OV 6 months     Admitted 10/7/24  While on the floor patient was started on IV fluids and symptom management for nausea and gastritis. RUQ was ordered due to significant tenderness which showed cholelithiasis but no cholecystitis or choledocholithiasis. WBC continued to trend upward and further evaluation was performed including blood cultures. Blood cultures notable for Enterobacter Cloacae and patient was subsequently started on Cefepime while susceptibilities were pending, ID was consulted, to complete therapy with levaquin. Abdominal pain and appetite improved and patient feeling better.     Patient feeling better overall. In agreement with discharge home and finishing oral antibiotics at home.      - will hold some BP medications at discharge as BP has been on the lower end during hospitalization. Will need PCP follow up to titrate BP meds   Troponin negative, BNP 12  EKG 10/7/24 sinus bradycardia 59 NSSTT changes     10/17/24 Reports LANGE and occasional chest tightness since discharge. 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 Keita, Oncology), sepsis bacteremia that presents to the emergency department because of generalized abdominal pain.  Current status symptoms started 2 days ago.  No known cause.  No recent trauma.  Gradually getting worse.  No palliation despite pain medication at home.  Pain is constant sharp radiating throughout the abdomen.  There is associated nausea vomiting.  No diarrhea constipation dysuria fever.       Hospital Course: BC with NGTD,afebrile, remained hemodynamically stable, no oxygen requirements. CT abdomen and pelvis with known hepatic masses, appears similar to the previous examination with stable pelvic small bowel mass. Abdominal pain likely a result of malignancy. Received morphine and dilaudid overnight with relief. CRP, lactate, lipase wnl.     11/27/24 Denies CP or SOB. Palpitations are controlled as long as she takes her metoprolol  BP controlled by home readings       Review of Systems   Constitutional: Negative for decreased appetite.   HENT:  Negative for ear discharge.    Eyes:  Negative for blurred vision.   Respiratory:  Negative for hemoptysis.    Endocrine: Negative for polyphagia.   Hematologic/Lymphatic: Negative for adenopathy.   Skin:  Negative for color change.   Musculoskeletal:  Negative for joint swelling.   Genitourinary:  Negative for bladder incontinence.   Neurological:  Negative for brief paralysis.   Psychiatric/Behavioral:  Negative for hallucinations.    Allergic/Immunologic: Negative for hives.          Objective     Physical Exam  Constitutional:       Appearance: She is well-developed.   HENT:      Head: Normocephalic and atraumatic.   Eyes:      Conjunctiva/sclera: Conjunctivae normal.      Pupils: Pupils are equal, round, and reactive to light.    Cardiovascular:      Rate and Rhythm: Normal rate.      Pulses: Intact distal pulses.      Heart sounds: Normal heart sounds.   Pulmonary:      Effort: Pulmonary effort is normal.      Breath sounds: Normal breath sounds.   Abdominal:      General: Bowel sounds are normal.      Palpations: Abdomen is soft.   Musculoskeletal:         General: Normal range of motion.      Cervical back: Normal range of motion and neck supple.   Skin:     General: Skin is warm and dry.   Neurological:      Mental Status: She is alert and oriented to person, place, and time.            Assessment and Plan     1. Essential hypertension 1/2021 TTE normal, stress test negative    2. Palpitations    3. LANGE (dyspnea on exertion)    4. Chest pain, atypical        Plan:    Echo and stress test ok, holter with brief runs of SVT - symptoms controlled with metoprolol   Continue Rx for HTN, palpitations  OV 6 months    Advance Care Planning     Date: 11/27/2024  Patient did not wish or was not able to name a surrogate decision maker or provide an Advance Care Plan.

## 2024-11-29 ENCOUNTER — OFFICE VISIT (OUTPATIENT)
Dept: FAMILY MEDICINE | Facility: CLINIC | Age: 69
End: 2024-11-29
Payer: MEDICARE

## 2024-11-29 VITALS
WEIGHT: 207.13 LBS | HEART RATE: 66 BPM | OXYGEN SATURATION: 96 % | DIASTOLIC BLOOD PRESSURE: 88 MMHG | SYSTOLIC BLOOD PRESSURE: 138 MMHG | HEIGHT: 64 IN | BODY MASS INDEX: 35.36 KG/M2 | TEMPERATURE: 98 F

## 2024-11-29 DIAGNOSIS — M81.0 AGE-RELATED OSTEOPOROSIS WITHOUT CURRENT PATHOLOGICAL FRACTURE: ICD-10-CM

## 2024-11-29 DIAGNOSIS — G89.3 CANCER-RELATED PAIN: ICD-10-CM

## 2024-11-29 DIAGNOSIS — C7A.8 PRIMARY MALIGNANT NEUROENDOCRINE TUMOR OF SMALL INTESTINE: ICD-10-CM

## 2024-11-29 DIAGNOSIS — I10 ESSENTIAL HYPERTENSION: Primary | Chronic | ICD-10-CM

## 2024-11-29 DIAGNOSIS — R73.09 ABNORMAL GLUCOSE: ICD-10-CM

## 2024-11-29 DIAGNOSIS — K21.9 GASTROESOPHAGEAL REFLUX DISEASE, UNSPECIFIED WHETHER ESOPHAGITIS PRESENT: ICD-10-CM

## 2024-11-29 PROCEDURE — 99999 PR PBB SHADOW E&M-EST. PATIENT-LVL V: CPT | Mod: PBBFAC,HCNC,,

## 2024-11-29 RX ORDER — TRAMADOL HYDROCHLORIDE 50 MG/1
50 TABLET ORAL EVERY 8 HOURS PRN
Qty: 90 TABLET | Refills: 3 | Status: SHIPPED | OUTPATIENT
Start: 2024-11-29

## 2024-11-29 NOTE — PROGRESS NOTES
HPI     Bell Lynn is a 69 y.o. female with multiple medical diagnoses as listed in the medical history and problem list that presents for 6 month follow up. PCP Dr. Carrera with last visit in this clinic on 10/25/24.       HPI    Feeling well today, no concerns. Changed diet, has been cutting out unhealthy foods and increasing fruits/vegetables. Had a nice Thanksgiving with family, grand and great grandchildren.     Recent history below from Dr. Carrera:    To summarize last visit and events leading up to today:  Hypertension increased spironolactone  Diuretic induced hypokalemia on potassium.  Lasix every other day.  Chronic constipation.  Linzess.  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  Lanreotide.  6/27/23 abd mass, EUS bx Well-differentiated neuroendocrine tumor, Grade1  Heme-Onc 04/23/2024.  Dominant mesenteric mass not amenable to surgical resection due to proximity on SMA.  Stable continue lanreotide and repeat PET scan 07/2024  Chronic anemia, iron deficiency.    Colonoscopy 2018 with polyp though no path report given.   10/4/24 colonoscopy 3 mm cecal. Diverticulosis sigmoid, descending. Path polypoid colonic mucosa no histopath abn, neg for dysplasia.  repeat 7 years  Last EGD on record was in 2021 showing gastric ulcer with no stigmata bleeding.  Biopsy was negative.    06/27/2023 EUS erythematous mucosa in the stomach.  Nonbleeding gastric ulcer no stigmata of bleeding.  Mass in peritoneal cavity subsequently diagnosed as well-differentiated neuroendocrine tumor  History of oral iron intolerant with GI side effects  Incidental gallstone seen on EUS 06/27/2023  history of transsphenoidal pituitary resection stable  Osteoporosis.    06/21/2022 DEXA L-spine -1.9, total hip-2.8, femoral neck-3.5.  FRAX score 2.7/8.3%.  Osteoporosis.  Started on alendronate  Vitamin-D deficient  Fibromyalgia, MDD, cymbalta, tramadol prn. Lidoderm, flexeril.  Depression, fibromyalgia, Cymbalta  and gabapentin  Left knee replacement  seen by ortho 04/30/2024.  Rotator tendinitis.  PT.  Mobic.  Saw orthopedics 07/24/2024 right subacromial bursa injection  Hospitalization 10/7 through 10/12/2024 with Enterobacter cloaca sepsis felt to be GI source  Hypotensive, discharged off of amlodipine, spironolactone and valsartan     Saw cardiology in follow-up 10/17/2024 complaining of chest tightness and irregular heartbeat since discharge.  Plan was echo, Lexiscan Myoview, Holter     Saw Heme-Onc 10/22/2024.  Stable.     Cardiac testing scheduled 11/21/2024    Last visit me 10/25/2024, follow-up after hospitalization with E cloaca felt to be GI source.  Underlying small bowel neuroendocrine tumor.  Hypertension with peripheral edema BP stable.  Was discharged off of amlodipine valsartan and spironolactone.  Her home cuff I think over reads.  She is still on metoprolol low-dose.  Stay off of those medications but if going up restart valsartan then spironolactone then amlodipine.  Lasix p.r.n. pedal edema.  Palpitations upcoming cardiology testing  A1c with future labs    Hospitalization 11/7 through 11/08/2024 for abdominal pain.  Felt to be secondary to the small bowel neuroendocrine tumor  No change in medicines    11/19/2024 A1c 5.5  Lipid profile good  CMP albumin mildly low  CBC normal platelet count    Saw Heme-Onc in follow-up 11/19/2024.  Continue lanreotide    11/21/2024 TTE LV normal size mildly increased wall thickness mild concentric hypertrophy normal systolic function LVEF 60-65%.  Grade 1 diastolic dysfunction.  11/21/2024 nuclear stress test negative for ischemia    11/21/2024 24 hour Holter pending  Taking PPI?      Assessment & Plan     1. Essential hypertension 1/2021 TTE normal, stress test negative    Slightly elevated in clinic today, with recheck of 138/88. Will schedule for nurse BP check in 2 weeks. Reports home BP readings have been 's, occasionally higher. Continue metoprolol 100mg  daily, will consider restarting valsartan if BP remains elevated.     BP Readings from Last 3 Encounters:   11/29/24 138/88   11/27/24 (!) 154/88   11/19/24 (!) 172/90     -continue current medication regimen  -DASH diet, regular cardiovascular exercises, portion control  -weight loss  -f/u with BP logs in 2 weeks     2. Primary malignant neuroendocrine tumor of small intestine    Stable on lanreotide. Followed by Hem/Onc Dr. Keita. The current medical regimen is effective;  continue present plan and medications.    3. Age-related osteoporosis without current pathological fracture; 6/2022 alendronate    Stable on Fosamax weekly. Has follow up DEXA scan scheduled next week. The current medical regimen is effective;  continue present plan and medications.    4. Gastroesophageal reflux disease, unspecified whether esophagitis present    Stable on protonix daily. The current medical regimen is effective;  continue present plan and medications.  -stable, discussed with patient about avoiding potential dietary triggers  -avoid spicy/greasy/sour/acidic foods, as well as tea/coffee/chocolate if possible  -Tylenol as needed for pain, avoid NSAIDs  -Keep food diary    5. Palpitations    Had stress test and EKG done October 2024; EF 66%, EKG showed normal sinus rhythm, negative for ischemia. Denies palpitations or chest pain today.     6. Abnormal glucose    HgbA1c in November 2024 was 5.5. Avoid concentrated sweets, such as sugary drinks and foods. Continue exercising/healthy, active lifestyle.       Discussed DDx, condition, and treatment.   Education sent to patient portal/included in after visit summary.  ED precautions given.   Notify provider if symptoms do not resolve or increase in severity.   Patient verbalizes understanding and agrees with plan of care.  --------------------------------------------      Health Maintenance:  Health Maintenance         Date Due Completion Date    Shingles Vaccine (1 of 2) Never done ---     RSV Vaccine (Age 60+ and Pregnant patients) (1 - Risk 60-74 years 1-dose series) Never done ---    DEXA Scan 06/01/2024 6/1/2022    COVID-19 Vaccine (6 - 2024-25 season) 12/20/2024 10/25/2024    Mammogram 05/11/2025 5/11/2023    TETANUS VACCINE 06/16/2026 6/16/2016    Hemoglobin A1c (Diabetic Prevention Screening) 11/19/2027 11/19/2024    Lipid Panel 11/19/2029 11/19/2024    Colorectal Cancer Screening 10/04/2031 10/4/2024            Discussed the importance of overdue vaccines which were offered during this encounter. Patient declined overdue vaccines at this time and Advised patient on the importance of completing overdue health maintenance items    Follow Up:  No follow-ups on file.    Exam     Review of Systems:  (as noted above)  Review of Systems   Constitutional:  Negative for activity change, appetite change, fatigue and fever.   HENT:  Negative for trouble swallowing.    Respiratory:  Negative for shortness of breath.    Cardiovascular:  Negative for chest pain.   Gastrointestinal:  Negative for blood in stool and vomiting.   Genitourinary:  Negative for hematuria.   Skin:  Negative for rash.   Neurological:  Negative for dizziness and weakness.       Physical Exam:   Physical Exam  Constitutional:       General: She is not in acute distress.     Appearance: Normal appearance. She is not ill-appearing.   HENT:      Head: Normocephalic and atraumatic.   Cardiovascular:      Rate and Rhythm: Normal rate and regular rhythm.      Pulses: Normal pulses.      Heart sounds: Normal heart sounds. No murmur heard.  Pulmonary:      Effort: Pulmonary effort is normal. No respiratory distress.      Breath sounds: Normal breath sounds. No wheezing.   Skin:     General: Skin is warm and dry.      Capillary Refill: Capillary refill takes less than 2 seconds.   Neurological:      General: No focal deficit present.      Mental Status: She is alert and oriented to person, place, and time.   Psychiatric:         Mood and  "Affect: Mood normal.         Behavior: Behavior normal.       Vitals:    11/29/24 0948 11/29/24 1009   BP: (!) 130/94 138/88   BP Location:  Right arm   Patient Position:  Sitting   Pulse: 66    Temp: 98.3 °F (36.8 °C)    TempSrc: Oral    SpO2: 96%    Weight: 93.9 kg (207 lb 2 oz)    Height: 5' 4" (1.626 m)       Body mass index is 35.55 kg/m².        History     Past Medical History:  Past Medical History:   Diagnosis Date    Asthma     Depression     Gastric ulcer with hemorrhage 07/01/2012    GERD (gastroesophageal reflux disease)     History of blood transfusion     Hypertension     Iron deficiency anemia 03/14/2013    Osteoarthritis of both knees     Pituitary adenoma 1989    s/p transphenoidal resection    Primary malignant neuroendocrine tumor of small intestine 06/2023       Past Surgical History:  Past Surgical History:   Procedure Laterality Date    BREAST BIOPSY      BREAST SURGERY      Benign breast cyst    COLONOSCOPY      COLONOSCOPY N/A 10/4/2024    Procedure: COLONOSCOPY;  Surgeon: Carloz Alston MD;  Location: [facility];  Service: Endoscopy;  Laterality: N/A;  Ref by Dr ERIK Carrera, Suprep, portal - PC  8/28/24-LVM for precall, portal-DS  8/30/24-Precall complete-DS  9/5 Pt rescheduled. Suprep, [email or web address]  9/23-LVM for precall-Kpvt  9/26-pt r/s, updated instructions sent to portal, pt knows procedure will be done with first available and ti    ENDOSCOPIC ULTRASOUND OF UPPER GASTROINTESTINAL TRACT N/A 06/27/2023    Procedure: ULTRASOUND, UPPER GI TRACT, ENDOSCOPIC;  Surgeon: Johnathan Cantu MD;  Location[facility];  Service: Endoscopy;  Laterality: N/A;    ESOPHAGOGASTRODUODENOSCOPY N/A 10/07/2020    Procedure: EGD (ESOPHAGOGASTRODUODENOSCOPY);  Surgeon: Nell Parish MD;  Location: [facility];  Service: Endoscopy;  Laterality: N/A;    ESOPHAGOGASTRODUODENOSCOPY N/A 06/30/2021    Procedure: EGD (ESOPHAGOGASTRODUODENOSCOPY);  Surgeon: Nell Parish MD;  Location: [facility];  Service: " Endoscopy;  Laterality: N/A;    HYSTERECTOMY  1981    due to uterine fibroids    KNEE ARTHROPLASTY Left 01/10/2022    Procedure: ARTHROPLASTY, KNEE;  Surgeon: Jonnathan Tavera MD;  Location: [facility];  Service: Orthopedics;  Laterality: Left;    TONSILLECTOMY      TRANSPHENOIDAL PITUITARY RESECTION  1989    Dr Cardenas       Social History:  Social History     Socioeconomic History    Marital status:      Spouse name: Brandin    Number of children: 3   Occupational History     Employer: [employer]   Tobacco Use    Smoking status: Never     Passive exposure: Never    Smokeless tobacco: Never   Substance and Sexual Activity    Alcohol use: Yes     Alcohol/week: 1.0 standard drink of alcohol     Types: 1 Cans of beer per week     Comment: on ocassion    Drug use: No    Sexual activity: Not Currently     Partners: Male     Birth control/protection: Surgical     Social Drivers of Health     Financial Resource Strain: Low Risk  (11/29/2024)    Overall Financial Resource Strain (CARDIA)     Difficulty of Paying Living Expenses: Not hard at all   Food Insecurity: No Food Insecurity (11/29/2024)    Hunger Vital Sign     Worried About Running Out of Food in the Last Year: Never true     Ran Out of Food in the Last Year: Never true   Transportation Needs: No Transportation Needs (10/10/2024)    TRANSPORTATION NEEDS     Transportation : No   Physical Activity: Inactive (11/29/2024)    Exercise Vital Sign     Days of Exercise per Week: 0 days     Minutes of Exercise per Session: 0 min   Stress: Stress Concern Present (11/29/2024)    [employer] - Occupational Stress Questionnaire     Feeling of Stress : To some extent   Housing Stability: Low Risk  (11/29/2024)    Housing Stability Vital Sign     Unable to Pay for Housing in the Last Year: No     Homeless in the Last Year: No       Family History:  Family History   Problem Relation Name Age of Onset    Cancer Father           unknown type, between his kidneys and liver, cause of death    Heart disease Father      Hypertension Father      Breast cancer Other Harriett 48    Breast cancer Other Orsion 45    Uterine cancer Other Letrika     Uterine cancer Other Eliza 45    Rectal cancer Maternal Aunt         Allergies and Medications: (updated and reviewed)  Review of patient's allergies indicates:   Allergen Reactions    Nsaids (non-steroidal anti-inflammatory drug)      Gi bleed    Penicillins Itching and Swelling    Penicillin     Symbicort [budesonide-formoterol]      Recurrent oral ulcers     Current Outpatient Medications   Medication Sig Dispense Refill    albuterol (PROVENTIL/VENTOLIN HFA) 90 mcg/actuation inhaler INHALE 2 PUFFS EVERY 6 HOURS AS NEEDED FOR WHEEZING (RESCUE) 54 g 0    alendronate (FOSAMAX) 70 MG tablet TAKE 1 TABLET EVERY 7 DAYS 12 tablet 3    cetirizine (ZYRTEC) 10 MG tablet TAKE 1 TABLET ONE TIME DAILY 90 tablet 3    cyclobenzaprine (FLEXERIL) 10 MG tablet TAKE 1 TABLET EVERY EVENING 90 tablet 0    DULoxetine (CYMBALTA) 60 MG capsule TAKE 1 CAPSULE ONE TIME DAILY 90 capsule 3    furosemide (LASIX) 20 MG tablet TAKE 1 TABLET ONE TIME DAILY FOR LEG SWELLING AS NEEDED 90 tablet 3    gabapentin (NEURONTIN) 100 MG capsule TAKE 1 CAPSULE IN THE MORNING AND TAKE 3 CAPSULES AT NIGHT 270 capsule 3    latanoprost 0.005 % ophthalmic solution Place 1 drop into the right eye every evening. 7.5 mL 4    LINZESS 145 mcg Cap capsule TAKE 1 CAPSULE ONE TIME DAILY 90 capsule 3    metoprolol succinate (TOPROL-XL) 100 MG 24 hr tablet Take 1 tablet (100 mg total) by mouth once daily. 90 tablet 3    oxyCODONE (ROXICODONE) 10 mg Tab immediate release tablet Take 1 tablet (10 mg total) by mouth every 6 (six) hours as needed for Pain. 90 tablet 0    pantoprazole (PROTONIX) 40 MG tablet Take 1 tablet (40 mg total) by mouth 2 (two) times daily before meals. 60 tablet 5    traMADoL (ULTRAM) 50 mg tablet Take 1 tablet (50 mg total) by mouth every 8  (eight) hours as needed. 90 tablet 3    vitamin D (VITAMIN D3) 1000 units Tab Take 1,000 Units by mouth once daily.      LIDOcaine (LIDODERM) 5 % APPLY 1 PATCH ON THE SKIN ONE TIME DAILY. REMOVE AND DISCARD PATCH WITHIN 12 HOURS OR AS DIRECTED BY MD 90 patch 3     No current facility-administered medications for this visit.     Facility-Administered Medications Ordered in Other Visits   Medication Dose Route Frequency Provider Last Rate Last Admin    lanreotide injection 120 mg  120 mg Subcutaneous Once Aaron Keita MD           Patient Care Team:  Cipriano Carrera MD as PCP - General (Internal Medicine)  Gonsalo Ivy MD as Consulting Physician (Gastroenterology)  Yvonne Mota PharmD as Hypertension Digital Medicine Clinician  Cipriano Carrera MD as Hypertension Digital Medicine Responsible Provider (Internal Medicine)  Jaida Raygoza RN as Oncology Navigator  Jud Elder RN as Oncology Navigator  Medicare, [employer] Gold Managed as Hypertension Digital Medicine Contract         - The patient is given an After Visit Summary that lists all medications with directions, allergies, education, orders placed during this encounter and follow-up instructions.      - I have reviewed the patient's medical information including past medical, family, and social history sections including the medications and allergies.      - We discussed the patient's current medications.     This note was created by combination of typed  and MModal dictation.  Transcription errors may be present.  If there are any questions, please contact me.                 EUGENIE Perry

## 2024-11-30 DIAGNOSIS — M81.0 AGE-RELATED OSTEOPOROSIS WITHOUT CURRENT PATHOLOGICAL FRACTURE: ICD-10-CM

## 2024-11-30 NOTE — TELEPHONE ENCOUNTER
No care due was identified.  [facility] Embedded Care Due Messages. Reference number: [id number].   11/30/2024 4:05:56 AM CST

## 2024-12-01 RX ORDER — ALENDRONATE SODIUM 70 MG/1
TABLET ORAL
Qty: 12 TABLET | Refills: 3 | Status: SHIPPED | OUTPATIENT
Start: 2024-12-01

## 2024-12-03 ENCOUNTER — HOSPITAL ENCOUNTER (OUTPATIENT)
Dept: RADIOLOGY | Facility: CLINIC | Age: 69
Discharge: HOME | End: 2024-12-03
Attending: INTERNAL MEDICINE
Payer: MEDICARE

## 2024-12-03 DIAGNOSIS — Z78.0 MENOPAUSE: ICD-10-CM

## 2024-12-03 PROCEDURE — 77080 DXA BONE DENSITY AXIAL: CPT | Mod: TC,HCNC,PO

## 2024-12-03 PROCEDURE — 77080 DXA BONE DENSITY AXIAL: CPT | Mod: 26,HCNC,, | Performed by: INTERNAL MEDICINE

## 2024-12-12 ENCOUNTER — CLINICAL SUPPORT (OUTPATIENT)
Dept: FAMILY MEDICINE | Facility: CLINIC | Age: 69
End: 2024-12-12
Payer: MEDICARE

## 2024-12-12 VITALS — OXYGEN SATURATION: 95 % | DIASTOLIC BLOOD PRESSURE: 76 MMHG | HEART RATE: 65 BPM | SYSTOLIC BLOOD PRESSURE: 134 MMHG

## 2024-12-12 DIAGNOSIS — I10 ESSENTIAL HYPERTENSION: Primary | Chronic | ICD-10-CM

## 2024-12-12 PROCEDURE — 99999 PR PBB SHADOW E&M-EST. PATIENT-LVL II: CPT | Mod: PBBFAC,HCNC,,

## 2024-12-12 NOTE — PROGRESS NOTES
Bell Lynn 69 y.o. female is here today for Blood Pressure check.   History of HTN yes.    Review of patient's allergies indicates:   Allergen Reactions    Nsaids (non-steroidal anti-inflammatory drug)      Gi bleed    Penicillins Itching and Swelling    Penicillin     Symbicort [budesonide-formoterol]      Recurrent oral ulcers     Creatinine   Date Value Ref Range Status   11/19/2024 0.7 0.5 - 1.4 mg/dL Final     Sodium   Date Value Ref Range Status   11/19/2024 137 136 - 145 mmol/L Final     Potassium   Date Value Ref Range Status   11/19/2024 3.6 3.5 - 5.1 mmol/L Final   ]  Patient verifies taking blood pressure medications on a regular basis at the same time of the day.     Current Outpatient Medications:     albuterol (PROVENTIL/VENTOLIN HFA) 90 mcg/actuation inhaler, INHALE 2 PUFFS EVERY 6 HOURS AS NEEDED FOR WHEEZING (RESCUE), Disp: 54 g, Rfl: 0    alendronate (FOSAMAX) 70 MG tablet, TAKE 1 TABLET EVERY 7 DAYS, Disp: 12 tablet, Rfl: 3    cetirizine (ZYRTEC) 10 MG tablet, TAKE 1 TABLET ONE TIME DAILY, Disp: 90 tablet, Rfl: 3    cyclobenzaprine (FLEXERIL) 10 MG tablet, TAKE 1 TABLET EVERY EVENING, Disp: 90 tablet, Rfl: 0    DULoxetine (CYMBALTA) 60 MG capsule, TAKE 1 CAPSULE ONE TIME DAILY, Disp: 90 capsule, Rfl: 3    furosemide (LASIX) 20 MG tablet, TAKE 1 TABLET ONE TIME DAILY FOR LEG SWELLING AS NEEDED, Disp: 90 tablet, Rfl: 3    gabapentin (NEURONTIN) 100 MG capsule, TAKE 1 CAPSULE IN THE MORNING AND TAKE 3 CAPSULES AT NIGHT, Disp: 270 capsule, Rfl: 3    latanoprost 0.005 % ophthalmic solution, Place 1 drop into the right eye every evening., Disp: 7.5 mL, Rfl: 4    LIDOcaine (LIDODERM) 5 %, APPLY 1 PATCH ON THE SKIN ONE TIME DAILY. REMOVE AND DISCARD PATCH WITHIN 12 HOURS OR AS DIRECTED BY MD, Disp: 90 patch, Rfl: 3    LINZESS 145 mcg Cap capsule, TAKE 1 CAPSULE ONE TIME DAILY, Disp: 90 capsule, Rfl: 3    metoprolol succinate (TOPROL-XL) 100 MG 24 hr tablet, Take 1 tablet (100 mg total) by  mouth once daily., Disp: 90 tablet, Rfl: 3    oxyCODONE (ROXICODONE) 10 mg Tab immediate release tablet, Take 1 tablet (10 mg total) by mouth every 6 (six) hours as needed for Pain., Disp: 90 tablet, Rfl: 0    pantoprazole (PROTONIX) 40 MG tablet, Take 1 tablet (40 mg total) by mouth 2 (two) times daily before meals., Disp: 60 tablet, Rfl: 5    traMADoL (ULTRAM) 50 mg tablet, Take 1 tablet (50 mg total) by mouth every 8 (eight) hours as needed., Disp: 90 tablet, Rfl: 3    vitamin D (VITAMIN D3) 1000 units Tab, Take 1,000 Units by mouth once daily., Disp: , Rfl:   No current facility-administered medications for this visit.    Facility-Administered Medications Ordered in Other Visits:     lanreotide injection 120 mg, 120 mg, Subcutaneous, Once, Aaron Keita MD  Does patient have record of home blood pressure readings no.   Last dose of blood pressure medication was taken at 9 am 12/11/24.  Patient is symptomatic.   Complains of headache and dizziness .    BP: 134/76 , Pulse: 65 .

## 2024-12-13 NOTE — PROGRESS NOTES
BP ok  History of amlodipine valsartan and spironolactone which were stopped hospitalization.  Plan is if blood pressure goes up restart valsartan then spironolactone then amlodipine    Hold on changes at this time

## 2024-12-16 ENCOUNTER — OFFICE VISIT (OUTPATIENT)
Dept: HEMATOLOGY/ONCOLOGY | Facility: CLINIC | Age: 69
End: 2024-12-16
Payer: MEDICARE

## 2024-12-16 ENCOUNTER — LAB VISIT (OUTPATIENT)
Dept: LAB | Facility: HOSPITAL | Age: 69
End: 2024-12-16
Attending: INTERNAL MEDICINE
Payer: MEDICARE

## 2024-12-16 ENCOUNTER — HOSPITAL ENCOUNTER (EMERGENCY)
Facility: HOSPITAL | Age: 69
Discharge: HOME | End: 2024-12-16
Attending: EMERGENCY MEDICINE
Payer: MEDICARE

## 2024-12-16 VITALS
TEMPERATURE: 98 F | BODY MASS INDEX: 35.34 KG/M2 | RESPIRATION RATE: 20 BRPM | WEIGHT: 207 LBS | OXYGEN SATURATION: 97 % | DIASTOLIC BLOOD PRESSURE: 75 MMHG | HEIGHT: 64 IN | HEART RATE: 61 BPM | SYSTOLIC BLOOD PRESSURE: 180 MMHG

## 2024-12-16 VITALS
WEIGHT: 207.56 LBS | RESPIRATION RATE: 18 BRPM | SYSTOLIC BLOOD PRESSURE: 139 MMHG | OXYGEN SATURATION: 95 % | BODY MASS INDEX: 35.44 KG/M2 | HEART RATE: 62 BPM | TEMPERATURE: 98 F | HEIGHT: 64 IN | DIASTOLIC BLOOD PRESSURE: 67 MMHG

## 2024-12-16 DIAGNOSIS — C7A.8 PRIMARY MALIGNANT NEUROENDOCRINE TUMOR OF SMALL INTESTINE: Primary | ICD-10-CM

## 2024-12-16 DIAGNOSIS — Z79.899 IMMUNODEFICIENCY DUE TO DRUGS: ICD-10-CM

## 2024-12-16 DIAGNOSIS — C78.7 SECONDARY MALIGNANT NEOPLASM OF LIVER: ICD-10-CM

## 2024-12-16 DIAGNOSIS — C77.8 SECONDARY MALIGNANT NEOPLASM OF LYMPH NODES OF MULTIPLE SITES: ICD-10-CM

## 2024-12-16 DIAGNOSIS — C7A.8 PRIMARY MALIGNANT NEUROENDOCRINE TUMOR OF SMALL INTESTINE: ICD-10-CM

## 2024-12-16 DIAGNOSIS — R42 DIZZINESS: ICD-10-CM

## 2024-12-16 DIAGNOSIS — D84.81 IMMUNODEFICIENCY SECONDARY TO NEOPLASM: ICD-10-CM

## 2024-12-16 DIAGNOSIS — R03.0 ELEVATED BLOOD PRESSURE READING: ICD-10-CM

## 2024-12-16 DIAGNOSIS — D84.821 IMMUNODEFICIENCY DUE TO DRUGS: ICD-10-CM

## 2024-12-16 DIAGNOSIS — I10 ESSENTIAL HYPERTENSION: ICD-10-CM

## 2024-12-16 DIAGNOSIS — D49.9 IMMUNODEFICIENCY SECONDARY TO NEOPLASM: ICD-10-CM

## 2024-12-16 DIAGNOSIS — R42 DIZZINESS: Primary | ICD-10-CM

## 2024-12-16 LAB
ALBUMIN SERPL BCP-MCNC: 3.3 G/DL (ref 3.5–5.2)
ALP SERPL-CCNC: 89 U/L (ref 40–150)
ALT SERPL W/O P-5'-P-CCNC: 23 U/L (ref 10–44)
ANION GAP SERPL CALC-SCNC: 8 MMOL/L (ref 8–16)
AST SERPL-CCNC: 32 U/L (ref 10–40)
BASOPHILS # BLD AUTO: 0.06 K/UL (ref 0–0.2)
BASOPHILS # BLD AUTO: 0.08 K/UL (ref 0–0.2)
BASOPHILS NFR BLD: 0.6 % (ref 0–1.9)
BASOPHILS NFR BLD: 0.8 % (ref 0–1.9)
BILIRUB SERPL-MCNC: 0.2 MG/DL (ref 0.1–1)
BILIRUB UR QL STRIP: NEGATIVE
BNP SERPL-MCNC: 11 PG/ML (ref 0–99)
BUN SERPL-MCNC: 11 MG/DL (ref 8–23)
BUN SERPL-MCNC: 8 MG/DL (ref 6–30)
CALCIUM SERPL-MCNC: 9.2 MG/DL (ref 8.7–10.5)
CHLORIDE SERPL-SCNC: 103 MMOL/L (ref 95–110)
CHLORIDE SERPL-SCNC: 106 MMOL/L (ref 95–110)
CLARITY UR REFRACT.AUTO: CLEAR
CO2 SERPL-SCNC: 22 MMOL/L (ref 23–29)
COLOR UR AUTO: COLORLESS
CREAT SERPL-MCNC: 0.6 MG/DL (ref 0.5–1.4)
CREAT SERPL-MCNC: 0.7 MG/DL (ref 0.5–1.4)
DIFFERENTIAL METHOD BLD: ABNORMAL
DIFFERENTIAL METHOD BLD: ABNORMAL
EOSINOPHIL # BLD AUTO: 0.2 K/UL (ref 0–0.5)
EOSINOPHIL # BLD AUTO: 0.3 K/UL (ref 0–0.5)
EOSINOPHIL NFR BLD: 2.4 % (ref 0–8)
EOSINOPHIL NFR BLD: 2.7 % (ref 0–8)
ERYTHROCYTE [DISTWIDTH] IN BLOOD BY AUTOMATED COUNT: 14.8 % (ref 11.5–14.5)
ERYTHROCYTE [DISTWIDTH] IN BLOOD BY AUTOMATED COUNT: 15.2 % (ref 11.5–14.5)
EST. GFR  (NO RACE VARIABLE): >60 ML/MIN/1.73 M^2
GLUCOSE SERPL-MCNC: 86 MG/DL (ref 70–110)
GLUCOSE SERPL-MCNC: 93 MG/DL (ref 70–110)
GLUCOSE UR QL STRIP: NEGATIVE
HCT VFR BLD AUTO: 43.1 % (ref 37–48.5)
HCT VFR BLD AUTO: 44.7 % (ref 37–48.5)
HCT VFR BLD CALC: 43 %PCV (ref 36–54)
HGB BLD-MCNC: 13.9 G/DL (ref 12–16)
HGB BLD-MCNC: 14.5 G/DL (ref 12–16)
HGB UR QL STRIP: NEGATIVE
IMM GRANULOCYTES # BLD AUTO: 0.03 K/UL (ref 0–0.04)
IMM GRANULOCYTES # BLD AUTO: 0.04 K/UL (ref 0–0.04)
IMM GRANULOCYTES NFR BLD AUTO: 0.3 % (ref 0–0.5)
IMM GRANULOCYTES NFR BLD AUTO: 0.4 % (ref 0–0.5)
KETONES UR QL STRIP: NEGATIVE
LEUKOCYTE ESTERASE UR QL STRIP: NEGATIVE
LYMPHOCYTES # BLD AUTO: 3.5 K/UL (ref 1–4.8)
LYMPHOCYTES # BLD AUTO: 4.4 K/UL (ref 1–4.8)
LYMPHOCYTES NFR BLD: 35.3 % (ref 18–48)
LYMPHOCYTES NFR BLD: 41.7 % (ref 18–48)
MCH RBC QN AUTO: 29.2 PG (ref 27–31)
MCH RBC QN AUTO: 29.5 PG (ref 27–31)
MCHC RBC AUTO-ENTMCNC: 32.3 G/DL (ref 32–36)
MCHC RBC AUTO-ENTMCNC: 32.4 G/DL (ref 32–36)
MCV RBC AUTO: 90 FL (ref 82–98)
MCV RBC AUTO: 92 FL (ref 82–98)
MONOCYTES # BLD AUTO: 0.7 K/UL (ref 0.3–1)
MONOCYTES # BLD AUTO: 0.9 K/UL (ref 0.3–1)
MONOCYTES NFR BLD: 6.5 % (ref 4–15)
MONOCYTES NFR BLD: 8.6 % (ref 4–15)
NEUTROPHILS # BLD AUTO: 5.1 K/UL (ref 1.8–7.7)
NEUTROPHILS # BLD AUTO: 5.2 K/UL (ref 1.8–7.7)
NEUTROPHILS NFR BLD: 48.2 % (ref 38–73)
NEUTROPHILS NFR BLD: 52.5 % (ref 38–73)
NITRITE UR QL STRIP: NEGATIVE
NRBC BLD-RTO: 0 /100 WBC
NRBC BLD-RTO: 0 /100 WBC
OHS QRS DURATION: 82 MS
OHS QRS DURATION: 86 MS
OHS QTC CALCULATION: 402 MS
OHS QTC CALCULATION: 407 MS
PH UR STRIP: 6 [PH] (ref 5–8)
PLATELET # BLD AUTO: 280 K/UL (ref 150–450)
PLATELET # BLD AUTO: 336 K/UL (ref 150–450)
PMV BLD AUTO: 12 FL (ref 9.2–12.9)
PMV BLD AUTO: 12.6 FL (ref 9.2–12.9)
POC IONIZED CALCIUM: 1.11 MMOL/L (ref 1.06–1.42)
POC TCO2 (MEASURED): 24 MMOL/L (ref 23–29)
POTASSIUM BLD-SCNC: 3.7 MMOL/L (ref 3.5–5.1)
POTASSIUM SERPL-SCNC: 4 MMOL/L (ref 3.5–5.1)
PROT SERPL-MCNC: 8 G/DL (ref 6–8.4)
PROT UR QL STRIP: NEGATIVE
RBC # BLD AUTO: 4.71 M/UL (ref 4–5.4)
RBC # BLD AUTO: 4.97 M/UL (ref 4–5.4)
SAMPLE: NORMAL
SODIUM BLD-SCNC: 139 MMOL/L (ref 136–145)
SODIUM SERPL-SCNC: 136 MMOL/L (ref 136–145)
SP GR UR STRIP: 1.01 (ref 1–1.03)
TROPONIN I SERPL DL<=0.01 NG/ML-MCNC: >3 NG/L (ref 0–14)
URN SPEC COLLECT METH UR: ABNORMAL
WBC # BLD AUTO: 10.55 K/UL (ref 3.9–12.7)
WBC # BLD AUTO: 9.96 K/UL (ref 3.9–12.7)

## 2024-12-16 PROCEDURE — 80053 COMPREHEN METABOLIC PANEL: CPT | Mod: HCNC | Performed by: INTERNAL MEDICINE

## 2024-12-16 PROCEDURE — 81003 URINALYSIS AUTO W/O SCOPE: CPT | Mod: HCNC | Performed by: EMERGENCY MEDICINE

## 2024-12-16 PROCEDURE — 93005 ELECTROCARDIOGRAM TRACING: CPT | Mod: HCNC

## 2024-12-16 PROCEDURE — 84484 ASSAY OF TROPONIN QUANT: CPT | Mod: HCNC | Performed by: EMERGENCY MEDICINE

## 2024-12-16 PROCEDURE — 93010 ELECTROCARDIOGRAM REPORT: CPT | Mod: HCNC,,, | Performed by: INTERNAL MEDICINE

## 2024-12-16 PROCEDURE — 84260 ASSAY OF SEROTONIN: CPT | Mod: HCNC | Performed by: INTERNAL MEDICINE

## 2024-12-16 PROCEDURE — 25000003 PHARM REV CODE 250: Mod: HCNC | Performed by: EMERGENCY MEDICINE

## 2024-12-16 PROCEDURE — 80047 BASIC METABLC PNL IONIZED CA: CPT | Mod: HCNC

## 2024-12-16 PROCEDURE — 1125F AMNT PAIN NOTED PAIN PRSNT: CPT | Mod: HCNC,CPTII,S$GLB, | Performed by: INTERNAL MEDICINE

## 2024-12-16 PROCEDURE — 83880 ASSAY OF NATRIURETIC PEPTIDE: CPT | Mod: HCNC | Performed by: EMERGENCY MEDICINE

## 2024-12-16 PROCEDURE — 3078F DIAST BP <80 MM HG: CPT | Mod: HCNC,CPTII,S$GLB, | Performed by: INTERNAL MEDICINE

## 2024-12-16 PROCEDURE — 99215 OFFICE O/P EST HI 40 MIN: CPT | Mod: HCNC,S$GLB,, | Performed by: INTERNAL MEDICINE

## 2024-12-16 PROCEDURE — 1160F RVW MEDS BY RX/DR IN RCRD: CPT | Mod: HCNC,CPTII,S$GLB, | Performed by: INTERNAL MEDICINE

## 2024-12-16 PROCEDURE — 1101F PT FALLS ASSESS-DOCD LE1/YR: CPT | Mod: HCNC,CPTII,S$GLB, | Performed by: INTERNAL MEDICINE

## 2024-12-16 PROCEDURE — G2211 COMPLEX E/M VISIT ADD ON: HCPCS | Mod: HCNC,S$GLB,, | Performed by: INTERNAL MEDICINE

## 2024-12-16 PROCEDURE — 85025 COMPLETE CBC W/AUTO DIFF WBC: CPT | Mod: 91,HCNC | Performed by: INTERNAL MEDICINE

## 2024-12-16 PROCEDURE — 83519 RIA NONANTIBODY: CPT | Mod: HCNC | Performed by: INTERNAL MEDICINE

## 2024-12-16 PROCEDURE — 1159F MED LIST DOCD IN RCRD: CPT | Mod: HCNC,CPTII,S$GLB, | Performed by: INTERNAL MEDICINE

## 2024-12-16 PROCEDURE — 3075F SYST BP GE 130 - 139MM HG: CPT | Mod: HCNC,CPTII,S$GLB, | Performed by: INTERNAL MEDICINE

## 2024-12-16 PROCEDURE — 83497 ASSAY OF 5-HIAA: CPT | Mod: HCNC | Performed by: INTERNAL MEDICINE

## 2024-12-16 PROCEDURE — 3044F HG A1C LEVEL LT 7.0%: CPT | Mod: HCNC,CPTII,S$GLB, | Performed by: INTERNAL MEDICINE

## 2024-12-16 PROCEDURE — 99285 EMERGENCY DEPT VISIT HI MDM: CPT | Mod: 25,HCNC

## 2024-12-16 PROCEDURE — 85025 COMPLETE CBC W/AUTO DIFF WBC: CPT | Mod: HCNC | Performed by: EMERGENCY MEDICINE

## 2024-12-16 PROCEDURE — 3008F BODY MASS INDEX DOCD: CPT | Mod: HCNC,CPTII,S$GLB, | Performed by: INTERNAL MEDICINE

## 2024-12-16 PROCEDURE — 36415 COLL VENOUS BLD VENIPUNCTURE: CPT | Mod: HCNC | Performed by: INTERNAL MEDICINE

## 2024-12-16 PROCEDURE — 3288F FALL RISK ASSESSMENT DOCD: CPT | Mod: HCNC,CPTII,S$GLB, | Performed by: INTERNAL MEDICINE

## 2024-12-16 PROCEDURE — 99999 PR PBB SHADOW E&M-EST. PATIENT-LVL IV: CPT | Mod: PBBFAC,HCNC,, | Performed by: INTERNAL MEDICINE

## 2024-12-16 RX ORDER — MECLIZINE HCL 12.5 MG 12.5 MG/1
25 TABLET ORAL
Status: COMPLETED | OUTPATIENT
Start: 2024-12-16 | End: 2024-12-16

## 2024-12-16 RX ORDER — MECLIZINE HYDROCHLORIDE 25 MG/1
25 TABLET ORAL 3 TIMES DAILY PRN
Qty: 20 TABLET | Refills: 0 | Status: SHIPPED | OUTPATIENT
Start: 2024-12-16

## 2024-12-16 RX ADMIN — MECLIZINE 25 MG: 12.5 TABLET ORAL at 11:12

## 2024-12-16 NOTE — ED PROVIDER NOTES
Emergency Department Provider Note    Bell Lynn   69 y.o. female   [id number]      12/16/2024       History     This history was obtained from the patient without limitations.  She presented to oncology clinic for routine follow-up.  She drove herself to campus for this appointment.  She was transported by wheelchair to the ED from the clinic following her clinic visit.    She is a 69-year-old with the below past medical history.  She complains of constant dizziness for one week which she describes as spinning sensation and feeling off balance.  She is having difficulty walking.  The dizziness is sometimes exacerbated by head movement.  She is having intermittent left-sided posterior headache.  She describes the pain as sharp.  The headache resolves with taking BC and acetaminophen.  She last took BC the for headache this morning.  She has associated blurred vision in both eyes.  She denies otalgia, hearing loss, and tinnitus.  She has associated nausea and vomiting.  She has had episodes of mild left-sided chest discomfort that she describes as a squeezing sensation.  These episodes are brief and a curb without shortness of breath.  She has had episodes of palpitations unrelated to the episodes of chest discomfort. She denies fever and chills.  She denies syncope.  She denies weakness and numbness to her extremities.  She denies peripheral edema.  She denies dysuria and difficulty urinating.         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 Suprep, portal.[email or web address]  9/23-LVM for precall-Kpvt  9/26-pt r/s, updated instructions sent to portal, pt knows procedure will be done with first available and ti    ENDOSCOPIC ULTRASOUND OF UPPER GASTROINTESTINAL TRACT N/A 06/27/2023    Procedure: ULTRASOUND, UPPER GI TRACT, ENDOSCOPIC;  Surgeon: Johnathan Cantu MD;  Location: [facility];  Service: Endoscopy;  Laterality: N/A;    ESOPHAGOGASTRODUODENOSCOPY N/A 10/07/2020    Procedure: EGD (ESOPHAGOGASTRODUODENOSCOPY);  Surgeon: Nell Parish MD;  Location: [facility];  Service: Endoscopy;  Laterality: N/A;    ESOPHAGOGASTRODUODENOSCOPY N/A 06/30/2021    Procedure: EGD (ESOPHAGOGASTRODUODENOSCOPY);  Surgeon: Nell Parish MD;  Location: [facility];  Service: Endoscopy;  Laterality: N/A;    HYSTERECTOMY  1981    due to uterine fibroids    KNEE ARTHROPLASTY Left 01/10/2022    Procedure: ARTHROPLASTY, KNEE;  Surgeon: Jonnathan Tavera MD;  [facility];  Service: Orthopedics;  Laterality: Left;    TONSILLECTOMY      TRANSPHENOIDAL PITUITARY RESECTION  1989    Dr Cardenas      Family History   Problem Relation Name Age of Onset    Cancer Father          unknown type, between his kidneys and liver, cause of death    Heart disease Father      Hypertension Father      Breast cancer Other Harriett 48    Breast cancer Other Orsion 45    Uterine cancer Other Letrika     Uterine cancer Other Eliza 45    Rectal cancer Maternal Aunt        Social History     Socioeconomic History    Marital status:      Spouse name: Brandin    Number of children: 3   Occupational History     Employer: [employer]   Tobacco Use    Smoking status: Never     Passive exposure:  Never    Smokeless tobacco: Never   Substance and Sexual Activity    Alcohol use: Yes     Alcohol/week: 1.0 standard drink of alcohol     Types: 1 Cans of beer per week     Comment: on ocassion    Drug use: No    Sexual activity: Not Currently     Partners: Male     Birth control/protection: Surgical     Social Drivers of Health     Financial Resource Strain: Low Risk  (12/19/2024)    Overall Financial Resource Strain (CARDIA)     Difficulty of Paying Living Expenses: Not hard at all   Food Insecurity: No Food Insecurity (12/19/2024)    Hunger Vital Sign     Worried About Running Out of Food in the Last Year: Never true     Ran Out of Food in the Last Year: Never true   Transportation Needs: No Transportation Needs (12/19/2024)    PRAPARE - Transportation     Lack of Transportation (Medical): No     Lack of Transportation (Non-Medical): No   Physical Activity: Inactive (11/29/2024)    Exercise Vital Sign     Days of Exercise per Week: 0 days     Minutes of Exercise per Session: 0 min   Stress: No Stress Concern Present (12/19/2024)    [employer] - Occupational Stress Questionnaire     Feeling of Stress : Not at all   Recent Concern: Stress - Stress Concern Present (11/29/2024)    [employer] - Occupational Stress Questionnaire     Feeling of Stress : To some extent   Housing Stability: Low Risk  (12/19/2024)    Housing Stability Vital Sign     Unable to Pay for Housing in the Last Year: No     Homeless in the Last Year: No      Review of patient's allergies indicates:   Allergen Reactions    Nsaids (non-steroidal anti-inflammatory drug)      Gi bleed    Penicillins Itching and Swelling    Penicillin     Symbicort [budesonide-formoterol]      Recurrent oral ulcers           Physical Examination     Initial Vitals [12/16/24 1009]   BP Pulse Resp Temp SpO2   (!) 143/92 60 20 98.3 °F (36.8 °C) 96 %      MAP       --           Physical Exam    Nursing note and  vitals reviewed.  Constitutional: She is not diaphoretic. No distress.   HENT:   Head: Normocephalic and atraumatic. Mouth/Throat: Oropharynx is clear and moist.   Eyes: Conjunctivae and EOM are normal. Pupils are equal, round, and reactive to light. No scleral icterus. Right eye exhibits no nystagmus. Left eye exhibits no nystagmus.   Neck: Carotid bruit is not present. No JVD present.   Cardiovascular:  Normal rate, regular rhythm and normal heart sounds.     Exam reveals no gallop and no friction rub.       No murmur heard.  Pulmonary/Chest: Effort normal and breath sounds normal. No stridor. No respiratory distress. She has no decreased breath sounds. She has no wheezes. She has no rhonchi. She has no rales.   Abdominal: Abdomen is soft. She exhibits no distension. There is no abdominal tenderness.     Neurological: She is alert and oriented to person, place, and time. She displays no tremor. Gait (slight shuffling) abnormal. GCS score is 15. GCS eye subscore is 4. GCS verbal subscore is 5. GCS motor subscore is 6.   LOC: Alert  Attention Span: Normal   Language: No aphasia  Articulation: No dysarthria  Orientation: Person, Place, Time   EOM (CN III, IV, VI): Full/intact  Pupils (CN II, III): PERRL  Facial Sensation (CN V): Normal  Facial Movement (CN VII): Symmetric facial expression    Motor: Arm left  5/5  Leg left  5/5  Arm right  5/5  Leg right 5/5  Sensation: Intact to light touch  Normal bilateral finger-to-nose and heel-to-shin tests.     Skin: Skin is warm and dry. No pallor.   Psychiatric: She has a normal mood and affect. Her speech is normal and behavior is normal. She is not actively hallucinating. She is attentive.            Labs     Labs Reviewed   CBC W/ AUTO DIFFERENTIAL - Abnormal       Result Value    WBC 10.55      RBC 4.97      Hemoglobin 14.5      Hematocrit 44.7      MCV 90      MCH 29.2      MCHC 32.4      RDW 15.2 (*)     Platelets 336      MPV 12.6      Immature Granulocytes 0.3       Gran # (ANC) 5.1      Immature Grans (Abs) 0.03      Lymph # 4.4      Mono # 0.7      Eos # 0.3      Baso # 0.06      nRBC 0      Gran % 48.2      Lymph % 41.7      Mono % 6.5      Eosinophil % 2.7      Basophil % 0.6      Differential Method Automated     URINALYSIS, REFLEX TO URINE CULTURE - Abnormal    Specimen UA Urine, Clean Catch      Color, UA Colorless (*)     Appearance, UA Clear      pH, UA 6.0      Specific Gravity, UA 1.010      Protein, UA Negative      Glucose, UA Negative      Ketones, UA Negative      Bilirubin (UA) Negative      Occult Blood UA Negative      Nitrite, UA Negative      Leukocytes, UA Negative      Narrative:     Specimen Source->Urine   TROPONIN I HIGH SENSITIVITY    Troponin I High Sensitivity >3     B-TYPE NATRIURETIC PEPTIDE    BNP 11     ISTAT PROCEDURE    POC Glucose 93      POC BUN 8      POC Creatinine 0.6      POC Sodium 139      POC Potassium 3.7      POC Chloride 103      POC TCO2 (MEASURED) 24      POC Ionized Calcium 1.11      POC Hematocrit 43      Sample OCTAVIO          Imaging     Imaging Results              MRI Brain Without Contrast (Final result)  Result time 12/16/24 15:56:49      Final result by Rodolfo Singh MD (12/16/24 15:56:49)                   Impression:      No evidence for acute intracranial pathology.    Heterogeneous T2 signal about the right transverse sinus extending about the right jugular bulb, without obvious abnormal diffusion signal abnormality or susceptibility artifact.  Findings may relate to diminutive caliber of the transverse sinus for slow flow, noting that evolving thrombosis cannot be completely excluded.  Further evaluation can be provided with a dedicated contrasted MR of the brain as clinically indicated.    Sequela of chronic microvascular ischemic change.      Electronically signed by: Rodolfo Singh  Date:    12/16/2024  Time:    15:56               Narrative:    EXAMINATION:  MRI BRAIN WITHOUT CONTRAST    CLINICAL HISTORY:  Dizziness,  persistent/recurrent, cardiac or vascular cause suspected;.    TECHNIQUE:  Multiplanar multisequence MR imaging of the brain was performed without contrast.    COMPARISON:  MRI pituitary with without contrast 09/23/2019, CT head without contrast 03/28/2011    FINDINGS:  Intracranial compartment:    Ventricles and sulci are normal in size for age without evidence of hydrocephalus. No extra-axial blood or fluid collections.    Scattered foci subcortical and periventricular T2/FLAIR hyperintense signal, overall nonspecific, but may relate to sequela of microvascular ischemic change.  Suspected prominent perivascular spaces inferior bilateral basal ganglia.  No mass lesion, acute hemorrhage, edema or acute infarct.    Normal vascular flow voids are preserved.  Heterogeneous T2 signal about the right transverse sinus extending about the right jugular bulb (series 8, image 8), without obvious abnormal diffusion signal abnormality or susceptibility artifact.    Skull/extracranial contents (limited evaluation): Bone marrow signal intensity is normal.    Paranasal sinuses and mastoid air cells are essentially clear.  Probable prominent lymph node right superficial parotid gland measuring approximately 0.6 cm.                                        ED Course     The patient received the following medications:  Medications   meclizine tablet 25 mg (25 mg Oral Given 12/16/24 1147)           ED Course as of 12/23/24 1509   Mon Dec 16, 2024   1234 EKG 12-lead  Time of study:12/16/2024 10:11  Independently interpreted by me.    Sinus bradycardia.  Ventricular rate 58 beats per minute.  Normal axis.  Normal QRS duration and QTc interval.  No ST segment elevation or depression.  Normal T-wave morphology. [LP]      ED Course User Index  [LP] Maximilian Garcia III, MD        Medical Decision Making                 Medical Decision Making  Differential diagnoses:  Ischemic stroke, intracranial hemorrhage, intracranial mass, peripheral  vertigo, arrhythmia, dehydration, electrolyte anomalies, acute renal failure.    Workup consisted of cardiopulmonary monitoring, serial exams, EKG, labs, and MRI brain.  She was treated with Antivert.    Amount and/or Complexity of Data Reviewed  Labs: ordered.  Radiology: ordered.  ECG/medicine tests:  Decision-making details documented in ED Course.              Diagnoses       ICD-10-CM ICD-9-CM   1. Dizziness  R42 780.4   2. Elevated blood pressure reading  R03.0 796.2         Dispostion      ED Disposition Condition    Discharge Stable            ED Prescriptions       Medication Sig Dispense Start Date End Date Auth. Provider    meclizine (ANTIVERT) 25 mg tablet Take 1 tablet (25 mg total) by mouth 3 (three) times daily as needed for Dizziness. 20 tablet 12/16/2024 -- Maximilian Garcia III, MD            Follow-up Information       Follow up With Specialties Details Why Contact Info    Cipriano Carrera MD Internal Medicine In 1 week  [address] [facility]  [place] [address]  [phone]      ER   Return to the ER if your condition worsens or you have any other concerns that you feel need immediate attention.               Maximilian Garcia III, MD  12/23/24 0745
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
## 2024-12-16 NOTE — ED TRIAGE NOTES
Pt reports to the ED complaining of dizziness persisting since last Tuesday 12/10/24. Pt states she had an oncology appointment earlier this morning and they suggested she come to the ED for further evaluation. Pt states she feels like the room is spinning. Pt also endorses 3/10 stabbing chest pain, SOB, and nausea. Pt denies vomiting or diarrhea.

## 2024-12-16 NOTE — ED NOTES
I-STAT Chem-8+ Results:   Value Reference Range   Sodium 139 136-145 mmol/L   Potassium  3.7 3.5-5.1 mmol/L   Chloride 103  mmol/L   Ionized Calcium 1.11 1.06-1.42 mmol/L   CO2 (measured) 24 23-29 mmol/L   Glucose 93  mg/dL   BUN 8 6-30 mg/dL   Creatinine 0.6 0.5-1.4 mg/dL   Hematocrit 43 36-54%

## 2024-12-17 ENCOUNTER — PATIENT OUTREACH (OUTPATIENT)
Dept: EMERGENCY MEDICINE | Facility: HOSPITAL | Age: 69
End: 2024-12-17
Payer: MEDICARE

## 2024-12-17 NOTE — DISCHARGE INSTRUCTIONS
We know that you have many choices and are honored that you chose us. We hope that we met or exceeded your expectations and goals for this visit and will keep the [facility] family in mind for your future needs and those of your family and friends.     - Dr. Garcia

## 2024-12-18 ENCOUNTER — PATIENT MESSAGE (OUTPATIENT)
Dept: ADMINISTRATIVE | Facility: OTHER | Age: 69
End: 2024-12-18
Payer: MEDICARE

## 2024-12-19 LAB — SEROTONIN: 53 NG/ML

## 2024-12-19 NOTE — PROGRESS NOTES
ED Navigator called to f/u from her recent ED visit. Pt states that she is doing ok but still having some dizziness. Pt denies having any needs at this time. Message sent to PCP staff to assist with scheduling a 7 day ED f/u. Pt encouraged to reach out with any concerns at they arise. ED navigator will follow-up with patient to assist as needed.

## 2024-12-21 LAB — 5OH-INDOLEACETATE SERPL-MCNC: 32 NG/ML

## 2025-01-13 ENCOUNTER — INFUSION (OUTPATIENT)
Dept: INFUSION THERAPY | Facility: HOSPITAL | Age: 70
End: 2025-01-13
Payer: MEDICARE

## 2025-01-13 ENCOUNTER — OFFICE VISIT (OUTPATIENT)
Dept: HEMATOLOGY/ONCOLOGY | Facility: CLINIC | Age: 70
End: 2025-01-13
Payer: MEDICARE

## 2025-01-13 VITALS
WEIGHT: 205.5 LBS | OXYGEN SATURATION: 97 % | RESPIRATION RATE: 18 BRPM | DIASTOLIC BLOOD PRESSURE: 86 MMHG | SYSTOLIC BLOOD PRESSURE: 162 MMHG | HEIGHT: 64 IN | HEART RATE: 75 BPM | TEMPERATURE: 98 F | BODY MASS INDEX: 35.08 KG/M2

## 2025-01-13 DIAGNOSIS — E66.01 SEVERE OBESITY WITH BODY MASS INDEX (BMI) OF 35.0 TO 39.9 WITH SERIOUS COMORBIDITY: ICD-10-CM

## 2025-01-13 DIAGNOSIS — D84.81 IMMUNODEFICIENCY SECONDARY TO NEOPLASM: ICD-10-CM

## 2025-01-13 DIAGNOSIS — C7A.8 PRIMARY MALIGNANT NEUROENDOCRINE TUMOR OF SMALL INTESTINE: Primary | ICD-10-CM

## 2025-01-13 DIAGNOSIS — I10 ESSENTIAL HYPERTENSION: ICD-10-CM

## 2025-01-13 DIAGNOSIS — D84.821 IMMUNODEFICIENCY DUE TO DRUGS: ICD-10-CM

## 2025-01-13 DIAGNOSIS — C78.7 SECONDARY MALIGNANT NEOPLASM OF LIVER: ICD-10-CM

## 2025-01-13 DIAGNOSIS — R42 DIZZINESS: ICD-10-CM

## 2025-01-13 DIAGNOSIS — Z79.899 IMMUNODEFICIENCY DUE TO DRUGS: ICD-10-CM

## 2025-01-13 DIAGNOSIS — D49.9 IMMUNODEFICIENCY SECONDARY TO NEOPLASM: ICD-10-CM

## 2025-01-13 DIAGNOSIS — C77.8 SECONDARY MALIGNANT NEOPLASM OF LYMPH NODES OF MULTIPLE SITES: ICD-10-CM

## 2025-01-13 PROCEDURE — 63600175 PHARM REV CODE 636 W HCPCS: Mod: JZ,TB,HCNC | Performed by: INTERNAL MEDICINE

## 2025-01-13 PROCEDURE — 1159F MED LIST DOCD IN RCRD: CPT | Mod: HCNC,CPTII,S$GLB, | Performed by: PHYSICIAN ASSISTANT

## 2025-01-13 PROCEDURE — 1126F AMNT PAIN NOTED NONE PRSNT: CPT | Mod: HCNC,CPTII,S$GLB, | Performed by: PHYSICIAN ASSISTANT

## 2025-01-13 PROCEDURE — 99214 OFFICE O/P EST MOD 30 MIN: CPT | Mod: HCNC,S$GLB,, | Performed by: PHYSICIAN ASSISTANT

## 2025-01-13 PROCEDURE — 1101F PT FALLS ASSESS-DOCD LE1/YR: CPT | Mod: HCNC,CPTII,S$GLB, | Performed by: PHYSICIAN ASSISTANT

## 2025-01-13 PROCEDURE — 3079F DIAST BP 80-89 MM HG: CPT | Mod: HCNC,CPTII,S$GLB, | Performed by: PHYSICIAN ASSISTANT

## 2025-01-13 PROCEDURE — 1160F RVW MEDS BY RX/DR IN RCRD: CPT | Mod: HCNC,CPTII,S$GLB, | Performed by: PHYSICIAN ASSISTANT

## 2025-01-13 PROCEDURE — 96372 THER/PROPH/DIAG INJ SC/IM: CPT | Mod: HCNC

## 2025-01-13 PROCEDURE — 3288F FALL RISK ASSESSMENT DOCD: CPT | Mod: HCNC,CPTII,S$GLB, | Performed by: PHYSICIAN ASSISTANT

## 2025-01-13 PROCEDURE — 99999 PR PBB SHADOW E&M-EST. PATIENT-LVL IV: CPT | Mod: PBBFAC,HCNC,, | Performed by: PHYSICIAN ASSISTANT

## 2025-01-13 PROCEDURE — G2211 COMPLEX E/M VISIT ADD ON: HCPCS | Mod: HCNC,S$GLB,, | Performed by: PHYSICIAN ASSISTANT

## 2025-01-13 PROCEDURE — 3008F BODY MASS INDEX DOCD: CPT | Mod: HCNC,CPTII,S$GLB, | Performed by: PHYSICIAN ASSISTANT

## 2025-01-13 PROCEDURE — 3077F SYST BP >= 140 MM HG: CPT | Mod: HCNC,CPTII,S$GLB, | Performed by: PHYSICIAN ASSISTANT

## 2025-01-13 RX ORDER — LANREOTIDE ACETATE 120 MG/.5ML
120 INJECTION SUBCUTANEOUS ONCE
OUTPATIENT
Start: 2025-01-13 | End: 2025-01-13

## 2025-01-13 RX ORDER — LANREOTIDE ACETATE 120 MG/.5ML
120 INJECTION SUBCUTANEOUS ONCE
Status: CANCELLED | OUTPATIENT
Start: 2025-01-13 | End: 2025-01-13

## 2025-01-13 RX ORDER — LANREOTIDE ACETATE 120 MG/.5ML
120 INJECTION SUBCUTANEOUS ONCE
Status: COMPLETED | OUTPATIENT
Start: 2025-01-13 | End: 2025-01-13

## 2025-01-13 RX ADMIN — LANREOTIDE ACETATE 120 MG: 120 INJECTION SUBCUTANEOUS at 11:01
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
## 2025-02-10 ENCOUNTER — OFFICE VISIT (OUTPATIENT)
Dept: HEMATOLOGY/ONCOLOGY | Facility: CLINIC | Age: 70
End: 2025-02-10
Payer: MEDICARE

## 2025-02-10 ENCOUNTER — LAB VISIT (OUTPATIENT)
Dept: LAB | Facility: HOSPITAL | Age: 70
End: 2025-02-10
Payer: MEDICARE

## 2025-02-10 ENCOUNTER — INFUSION (OUTPATIENT)
Dept: INFUSION THERAPY | Facility: HOSPITAL | Age: 70
End: 2025-02-10
Payer: MEDICARE

## 2025-02-10 VITALS
RESPIRATION RATE: 18 BRPM | BODY MASS INDEX: 35.08 KG/M2 | DIASTOLIC BLOOD PRESSURE: 83 MMHG | WEIGHT: 205.5 LBS | TEMPERATURE: 98 F | SYSTOLIC BLOOD PRESSURE: 148 MMHG | HEART RATE: 67 BPM | OXYGEN SATURATION: 100 % | HEIGHT: 64 IN

## 2025-02-10 DIAGNOSIS — C7A.8 PRIMARY MALIGNANT NEUROENDOCRINE TUMOR OF SMALL INTESTINE: Primary | ICD-10-CM

## 2025-02-10 DIAGNOSIS — Z79.899 IMMUNODEFICIENCY DUE TO DRUGS: ICD-10-CM

## 2025-02-10 DIAGNOSIS — R42 DIZZINESS: ICD-10-CM

## 2025-02-10 DIAGNOSIS — D84.81 IMMUNODEFICIENCY SECONDARY TO NEOPLASM: ICD-10-CM

## 2025-02-10 DIAGNOSIS — D49.9 IMMUNODEFICIENCY SECONDARY TO NEOPLASM: ICD-10-CM

## 2025-02-10 DIAGNOSIS — D84.821 IMMUNODEFICIENCY DUE TO DRUGS: ICD-10-CM

## 2025-02-10 DIAGNOSIS — I10 ESSENTIAL HYPERTENSION: ICD-10-CM

## 2025-02-10 DIAGNOSIS — G89.3 CANCER-RELATED PAIN: ICD-10-CM

## 2025-02-10 DIAGNOSIS — C77.8 SECONDARY MALIGNANT NEOPLASM OF LYMPH NODES OF MULTIPLE SITES: ICD-10-CM

## 2025-02-10 DIAGNOSIS — C7A.8 PRIMARY MALIGNANT NEUROENDOCRINE TUMOR OF SMALL INTESTINE: ICD-10-CM

## 2025-02-10 DIAGNOSIS — C78.7 SECONDARY MALIGNANT NEOPLASM OF LIVER: ICD-10-CM

## 2025-02-10 LAB
ALBUMIN SERPL BCP-MCNC: 3.4 G/DL (ref 3.5–5.2)
ALP SERPL-CCNC: 97 U/L (ref 40–150)
ALT SERPL W/O P-5'-P-CCNC: 18 U/L (ref 10–44)
ANION GAP SERPL CALC-SCNC: 8 MMOL/L (ref 8–16)
AST SERPL-CCNC: 25 U/L (ref 10–40)
BASOPHILS # BLD AUTO: 0.04 K/UL (ref 0–0.2)
BASOPHILS NFR BLD: 0.5 % (ref 0–1.9)
BILIRUB SERPL-MCNC: 0.3 MG/DL (ref 0.1–1)
BUN SERPL-MCNC: 10 MG/DL (ref 8–23)
CALCIUM SERPL-MCNC: 9.3 MG/DL (ref 8.7–10.5)
CHLORIDE SERPL-SCNC: 105 MMOL/L (ref 95–110)
CO2 SERPL-SCNC: 25 MMOL/L (ref 23–29)
CREAT SERPL-MCNC: 0.7 MG/DL (ref 0.5–1.4)
DIFFERENTIAL METHOD BLD: ABNORMAL
EOSINOPHIL # BLD AUTO: 0.3 K/UL (ref 0–0.5)
EOSINOPHIL NFR BLD: 2.8 % (ref 0–8)
ERYTHROCYTE [DISTWIDTH] IN BLOOD BY AUTOMATED COUNT: 15.3 % (ref 11.5–14.5)
EST. GFR  (NO RACE VARIABLE): >60 ML/MIN/1.73 M^2
GLUCOSE SERPL-MCNC: 95 MG/DL (ref 70–110)
HCT VFR BLD AUTO: 40.9 % (ref 37–48.5)
HGB BLD-MCNC: 12.9 G/DL (ref 12–16)
IMM GRANULOCYTES # BLD AUTO: 0.03 K/UL (ref 0–0.04)
IMM GRANULOCYTES NFR BLD AUTO: 0.3 % (ref 0–0.5)
LYMPHOCYTES # BLD AUTO: 3.1 K/UL (ref 1–4.8)
LYMPHOCYTES NFR BLD: 34.5 % (ref 18–48)
MCH RBC QN AUTO: 28 PG (ref 27–31)
MCHC RBC AUTO-ENTMCNC: 31.5 G/DL (ref 32–36)
MCV RBC AUTO: 89 FL (ref 82–98)
MONOCYTES # BLD AUTO: 0.6 K/UL (ref 0.3–1)
MONOCYTES NFR BLD: 6.3 % (ref 4–15)
NEUTROPHILS # BLD AUTO: 4.9 K/UL (ref 1.8–7.7)
NEUTROPHILS NFR BLD: 55.6 % (ref 38–73)
NRBC BLD-RTO: 0 /100 WBC
PLATELET # BLD AUTO: 297 K/UL (ref 150–450)
PMV BLD AUTO: 12.2 FL (ref 9.2–12.9)
POTASSIUM SERPL-SCNC: 3.7 MMOL/L (ref 3.5–5.1)
PROT SERPL-MCNC: 8 G/DL (ref 6–8.4)
RBC # BLD AUTO: 4.6 M/UL (ref 4–5.4)
SODIUM SERPL-SCNC: 138 MMOL/L (ref 136–145)
WBC # BLD AUTO: 8.84 K/UL (ref 3.9–12.7)

## 2025-02-10 PROCEDURE — 83497 ASSAY OF 5-HIAA: CPT | Mod: HCNC | Performed by: PHYSICIAN ASSISTANT

## 2025-02-10 PROCEDURE — 36415 COLL VENOUS BLD VENIPUNCTURE: CPT | Mod: HCNC | Performed by: PHYSICIAN ASSISTANT

## 2025-02-10 PROCEDURE — 96372 THER/PROPH/DIAG INJ SC/IM: CPT | Mod: HCNC

## 2025-02-10 PROCEDURE — 83519 RIA NONANTIBODY: CPT | Mod: HCNC | Performed by: PHYSICIAN ASSISTANT

## 2025-02-10 PROCEDURE — 84260 ASSAY OF SEROTONIN: CPT | Mod: HCNC | Performed by: PHYSICIAN ASSISTANT

## 2025-02-10 PROCEDURE — 3288F FALL RISK ASSESSMENT DOCD: CPT | Mod: HCNC,CPTII,S$GLB, | Performed by: REGISTERED NURSE

## 2025-02-10 PROCEDURE — G2211 COMPLEX E/M VISIT ADD ON: HCPCS | Mod: HCNC,S$GLB,, | Performed by: REGISTERED NURSE

## 2025-02-10 PROCEDURE — 99214 OFFICE O/P EST MOD 30 MIN: CPT | Mod: HCNC,S$GLB,, | Performed by: REGISTERED NURSE

## 2025-02-10 PROCEDURE — 1160F RVW MEDS BY RX/DR IN RCRD: CPT | Mod: HCNC,CPTII,S$GLB, | Performed by: REGISTERED NURSE

## 2025-02-10 PROCEDURE — 3079F DIAST BP 80-89 MM HG: CPT | Mod: HCNC,CPTII,S$GLB, | Performed by: REGISTERED NURSE

## 2025-02-10 PROCEDURE — 80053 COMPREHEN METABOLIC PANEL: CPT | Mod: HCNC | Performed by: PHYSICIAN ASSISTANT

## 2025-02-10 PROCEDURE — 1101F PT FALLS ASSESS-DOCD LE1/YR: CPT | Mod: HCNC,CPTII,S$GLB, | Performed by: REGISTERED NURSE

## 2025-02-10 PROCEDURE — 99999 PR PBB SHADOW E&M-EST. PATIENT-LVL IV: CPT | Mod: PBBFAC,HCNC,, | Performed by: REGISTERED NURSE

## 2025-02-10 PROCEDURE — 1125F AMNT PAIN NOTED PAIN PRSNT: CPT | Mod: HCNC,CPTII,S$GLB, | Performed by: REGISTERED NURSE

## 2025-02-10 PROCEDURE — 3077F SYST BP >= 140 MM HG: CPT | Mod: HCNC,CPTII,S$GLB, | Performed by: REGISTERED NURSE

## 2025-02-10 PROCEDURE — 63600175 PHARM REV CODE 636 W HCPCS: Mod: JZ,TB,HCNC | Performed by: INTERNAL MEDICINE

## 2025-02-10 PROCEDURE — 3008F BODY MASS INDEX DOCD: CPT | Mod: HCNC,CPTII,S$GLB, | Performed by: REGISTERED NURSE

## 2025-02-10 PROCEDURE — 1159F MED LIST DOCD IN RCRD: CPT | Mod: HCNC,CPTII,S$GLB, | Performed by: REGISTERED NURSE

## 2025-02-10 PROCEDURE — 85025 COMPLETE CBC W/AUTO DIFF WBC: CPT | Mod: HCNC | Performed by: PHYSICIAN ASSISTANT

## 2025-02-10 RX ORDER — LANREOTIDE ACETATE 120 MG/.5ML
120 INJECTION SUBCUTANEOUS ONCE
Status: COMPLETED | OUTPATIENT
Start: 2025-02-10 | End: 2025-02-10

## 2025-02-10 RX ORDER — OXYCODONE HYDROCHLORIDE 10 MG/1
10 TABLET ORAL EVERY 6 HOURS PRN
Qty: 90 TABLET | Refills: 0 | Status: SHIPPED | OUTPATIENT
Start: 2025-02-10

## 2025-02-10 RX ORDER — LANREOTIDE ACETATE 120 MG/.5ML
120 INJECTION SUBCUTANEOUS ONCE
OUTPATIENT
Start: 2025-02-10 | End: 2025-02-10

## 2025-02-10 RX ADMIN — LANREOTIDE ACETATE 120 MG: 120 INJECTION SUBCUTANEOUS at 10:02
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
## 2025-02-13 LAB — 5OH-INDOLEACETATE SERPL-MCNC: 39 NG/ML

## 2025-02-14 LAB — SEROTONIN: 101 NG/ML

## 2025-02-19 ENCOUNTER — TELEPHONE (OUTPATIENT)
Dept: FAMILY MEDICINE | Facility: CLINIC | Age: 70
End: 2025-02-19
Payer: MEDICARE

## 2025-02-19 ENCOUNTER — RESULTS FOLLOW-UP (OUTPATIENT)
Dept: FAMILY MEDICINE | Facility: CLINIC | Age: 70
End: 2025-02-19
Payer: MEDICARE

## 2025-02-19 NOTE — TELEPHONE ENCOUNTER
----- Message from Katy sent at 2/19/2025  9:27 AM CST -----  Regarding: Self  Type: Patient Call Back What is the request in detail: Pt wants to speak to nurse regarding a message that was sent to her , called staff no answer Can the clinic reply by [facility]? No Would the patient rather a call back or a response via [facility]? Call [facility] call back number: .[phone] Information:Thank you.

## 2025-02-19 NOTE — TELEPHONE ENCOUNTER
Spoke with patient and she agree to start the Prolia injection and will do this at the infusion center on [address].

## 2025-03-03 ENCOUNTER — HOSPITAL ENCOUNTER (OUTPATIENT)
Dept: RADIOLOGY | Facility: HOSPITAL | Age: 70
Discharge: HOME | End: 2025-03-03
Attending: INTERNAL MEDICINE
Payer: MEDICARE

## 2025-03-03 DIAGNOSIS — C7A.8 PRIMARY MALIGNANT NEUROENDOCRINE TUMOR OF SMALL INTESTINE: ICD-10-CM

## 2025-03-03 PROCEDURE — 78815 PET IMAGE W/CT SKULL-THIGH: CPT | Mod: TC

## 2025-03-03 PROCEDURE — 78815 PET IMAGE W/CT SKULL-THIGH: CPT | Mod: 26,PS,, | Performed by: NUCLEAR MEDICINE

## 2025-03-03 PROCEDURE — A9592 HC COPPER CU-64, DOTATE, DX, PER 1 MCI: HCPCS | Mod: JZ,TB | Performed by: INTERNAL MEDICINE

## 2025-03-03 RX ADMIN — COPPER CU 64 DOTATATE 4.38 MILLICURIE: 1 INJECTION, SOLUTION INTRAVENOUS at 10:03

## 2025-03-10 ENCOUNTER — INFUSION (OUTPATIENT)
Dept: INFUSION THERAPY | Facility: HOSPITAL | Age: 70
End: 2025-03-10
Payer: MEDICARE

## 2025-03-10 ENCOUNTER — OFFICE VISIT (OUTPATIENT)
Dept: HEMATOLOGY/ONCOLOGY | Facility: CLINIC | Age: 70
End: 2025-03-10
Payer: MEDICARE

## 2025-03-10 VITALS
HEART RATE: 78 BPM | RESPIRATION RATE: 17 BRPM | SYSTOLIC BLOOD PRESSURE: 155 MMHG | OXYGEN SATURATION: 98 % | BODY MASS INDEX: 35.68 KG/M2 | WEIGHT: 209 LBS | HEIGHT: 64 IN | TEMPERATURE: 98 F | DIASTOLIC BLOOD PRESSURE: 84 MMHG

## 2025-03-10 DIAGNOSIS — C7A.8 PRIMARY MALIGNANT NEUROENDOCRINE TUMOR OF SMALL INTESTINE: Primary | ICD-10-CM

## 2025-03-10 DIAGNOSIS — Z79.899 IMMUNODEFICIENCY DUE TO DRUGS: ICD-10-CM

## 2025-03-10 DIAGNOSIS — C78.7 SECONDARY MALIGNANT NEOPLASM OF LIVER: ICD-10-CM

## 2025-03-10 DIAGNOSIS — D49.9 IMMUNODEFICIENCY SECONDARY TO NEOPLASM: ICD-10-CM

## 2025-03-10 DIAGNOSIS — G89.29 CHRONIC PAIN OF BOTH KNEES: ICD-10-CM

## 2025-03-10 DIAGNOSIS — M25.561 CHRONIC PAIN OF BOTH KNEES: ICD-10-CM

## 2025-03-10 DIAGNOSIS — I10 ESSENTIAL HYPERTENSION: ICD-10-CM

## 2025-03-10 DIAGNOSIS — D84.81 IMMUNODEFICIENCY SECONDARY TO NEOPLASM: ICD-10-CM

## 2025-03-10 DIAGNOSIS — M25.562 CHRONIC PAIN OF BOTH KNEES: ICD-10-CM

## 2025-03-10 DIAGNOSIS — C77.8 SECONDARY MALIGNANT NEOPLASM OF LYMPH NODES OF MULTIPLE SITES: ICD-10-CM

## 2025-03-10 DIAGNOSIS — D84.821 IMMUNODEFICIENCY DUE TO DRUGS: ICD-10-CM

## 2025-03-10 PROCEDURE — 63600175 PHARM REV CODE 636 W HCPCS: Mod: JZ,TB | Performed by: INTERNAL MEDICINE

## 2025-03-10 PROCEDURE — 3288F FALL RISK ASSESSMENT DOCD: CPT | Mod: CPTII,S$GLB,, | Performed by: INTERNAL MEDICINE

## 2025-03-10 PROCEDURE — 3008F BODY MASS INDEX DOCD: CPT | Mod: CPTII,S$GLB,, | Performed by: INTERNAL MEDICINE

## 2025-03-10 PROCEDURE — 1101F PT FALLS ASSESS-DOCD LE1/YR: CPT | Mod: CPTII,S$GLB,, | Performed by: INTERNAL MEDICINE

## 2025-03-10 PROCEDURE — 96372 THER/PROPH/DIAG INJ SC/IM: CPT

## 2025-03-10 PROCEDURE — 1125F AMNT PAIN NOTED PAIN PRSNT: CPT | Mod: CPTII,S$GLB,, | Performed by: INTERNAL MEDICINE

## 2025-03-10 PROCEDURE — 1160F RVW MEDS BY RX/DR IN RCRD: CPT | Mod: CPTII,S$GLB,, | Performed by: INTERNAL MEDICINE

## 2025-03-10 PROCEDURE — 1159F MED LIST DOCD IN RCRD: CPT | Mod: CPTII,S$GLB,, | Performed by: INTERNAL MEDICINE

## 2025-03-10 PROCEDURE — G2211 COMPLEX E/M VISIT ADD ON: HCPCS | Mod: S$GLB,,, | Performed by: INTERNAL MEDICINE

## 2025-03-10 PROCEDURE — 99999 PR PBB SHADOW E&M-EST. PATIENT-LVL IV: CPT | Mod: PBBFAC,,, | Performed by: INTERNAL MEDICINE

## 2025-03-10 PROCEDURE — 99215 OFFICE O/P EST HI 40 MIN: CPT | Mod: S$GLB,,, | Performed by: INTERNAL MEDICINE

## 2025-03-10 PROCEDURE — 3079F DIAST BP 80-89 MM HG: CPT | Mod: CPTII,S$GLB,, | Performed by: INTERNAL MEDICINE

## 2025-03-10 PROCEDURE — 3077F SYST BP >= 140 MM HG: CPT | Mod: CPTII,S$GLB,, | Performed by: INTERNAL MEDICINE

## 2025-03-10 RX ORDER — LANREOTIDE ACETATE 120 MG/.5ML
120 INJECTION SUBCUTANEOUS ONCE
Status: COMPLETED | OUTPATIENT
Start: 2025-03-10 | End: 2025-03-10

## 2025-03-10 RX ORDER — LANREOTIDE ACETATE 120 MG/.5ML
120 INJECTION SUBCUTANEOUS ONCE
OUTPATIENT
Start: 2025-03-10 | End: 2025-03-10

## 2025-03-10 RX ADMIN — LANREOTIDE ACETATE 120 MG: 120 INJECTION SUBCUTANEOUS at 11:03
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
## 2025-03-10 NOTE — NURSING
Pt given SubQ Lanreotide to left buttock. Tolerated well. Care explained, all questions answered.

## 2025-03-13 DIAGNOSIS — J45.40 MODERATE PERSISTENT CHRONIC ASTHMA WITHOUT COMPLICATION: ICD-10-CM

## 2025-03-13 DIAGNOSIS — K21.9 GASTROESOPHAGEAL REFLUX DISEASE, UNSPECIFIED WHETHER ESOPHAGITIS PRESENT: ICD-10-CM

## 2025-03-13 RX ORDER — ALBUTEROL SULFATE 90 UG/1
INHALANT RESPIRATORY (INHALATION)
Qty: 54 G | Refills: 0 | Status: SHIPPED | OUTPATIENT
Start: 2025-03-13

## 2025-03-13 NOTE — TELEPHONE ENCOUNTER
Refill Routing Note   Medication(s) are not appropriate for processing by [facility] for the following reason(s):        ED/Hospital Visit since last OV with provider    [facility] action(s):  Defer           Extended chart review required: Yes     Appointments  past 12m or future 3m with PCP    Date Provider   Last Visit   10/25/2024 Cipriano Carrera MD   Next Visit   6/25/2025 Cipriano Carrera MD   ED visits in past 90 days: 1        Note composed:3:46 PM 03/13/2025

## 2025-03-13 NOTE — TELEPHONE ENCOUNTER
No care due was identified.  [facility] Embedded Care Due Messages. Reference number: [id number].   3/13/2025 2:57:28 PM CDT

## 2025-03-14 RX ORDER — PANTOPRAZOLE SODIUM 40 MG/1
TABLET, DELAYED RELEASE ORAL
Qty: 180 TABLET | Refills: 3 | Status: SHIPPED | OUTPATIENT
Start: 2025-03-14

## 2025-03-17 ENCOUNTER — OFFICE VISIT (OUTPATIENT)
Dept: FAMILY MEDICINE | Facility: CLINIC | Age: 70
End: 2025-03-17
Payer: MEDICARE

## 2025-03-17 ENCOUNTER — TUMOR BOARD CONFERENCE (OUTPATIENT)
Dept: HEMATOLOGY/ONCOLOGY | Facility: CLINIC | Age: 70
End: 2025-03-17
Payer: MEDICARE

## 2025-03-17 VITALS
WEIGHT: 208.25 LBS | OXYGEN SATURATION: 97 % | HEIGHT: 64 IN | DIASTOLIC BLOOD PRESSURE: 90 MMHG | BODY MASS INDEX: 35.55 KG/M2 | HEART RATE: 73 BPM | SYSTOLIC BLOOD PRESSURE: 138 MMHG

## 2025-03-17 DIAGNOSIS — Z71.89 ADVANCED DIRECTIVES, COUNSELING/DISCUSSION: ICD-10-CM

## 2025-03-17 DIAGNOSIS — R26.9 ABNORMALITY OF GAIT AND MOBILITY: ICD-10-CM

## 2025-03-17 DIAGNOSIS — C78.7 SECONDARY MALIGNANT NEOPLASM OF LIVER: ICD-10-CM

## 2025-03-17 DIAGNOSIS — I10 ESSENTIAL HYPERTENSION: ICD-10-CM

## 2025-03-17 DIAGNOSIS — Z74.09 OTHER REDUCED MOBILITY: ICD-10-CM

## 2025-03-17 DIAGNOSIS — J45.40 MODERATE PERSISTENT CHRONIC ASTHMA WITHOUT COMPLICATION: ICD-10-CM

## 2025-03-17 DIAGNOSIS — E66.01 SEVERE OBESITY WITH BODY MASS INDEX (BMI) OF 35.0 TO 39.9 WITH SERIOUS COMORBIDITY: ICD-10-CM

## 2025-03-17 DIAGNOSIS — Z00.00 ENCOUNTER FOR PREVENTIVE HEALTH EXAMINATION: Primary | ICD-10-CM

## 2025-03-17 PROBLEM — E89.3 STATUS POST TRANSSPHENOIDAL PITUITARY RESECTION: Status: RESOLVED | Noted: 2019-09-09 | Resolved: 2025-03-17

## 2025-03-17 PROBLEM — R73.09 ABNORMAL GLUCOSE: Status: RESOLVED | Noted: 2024-06-21 | Resolved: 2025-03-17

## 2025-03-17 PROBLEM — R07.89 CHEST PAIN, ATYPICAL: Status: RESOLVED | Noted: 2021-01-14 | Resolved: 2025-03-17

## 2025-03-17 PROBLEM — A49.8 INFECTION CAUSED BY ENTEROBACTER CLOACAE: Status: RESOLVED | Noted: 2024-10-09 | Resolved: 2025-03-17

## 2025-03-17 PROCEDURE — 3075F SYST BP GE 130 - 139MM HG: CPT | Mod: CPTII,S$GLB,,

## 2025-03-17 PROCEDURE — 3080F DIAST BP >= 90 MM HG: CPT | Mod: CPTII,S$GLB,,

## 2025-03-17 PROCEDURE — 1158F ADVNC CARE PLAN TLK DOCD: CPT | Mod: CPTII,S$GLB,,

## 2025-03-17 PROCEDURE — 1160F RVW MEDS BY RX/DR IN RCRD: CPT | Mod: CPTII,S$GLB,,

## 2025-03-17 PROCEDURE — 99999 PR PBB SHADOW E&M-EST. PATIENT-LVL IV: CPT | Mod: PBBFAC,,,

## 2025-03-17 PROCEDURE — 1159F MED LIST DOCD IN RCRD: CPT | Mod: CPTII,S$GLB,,

## 2025-03-17 PROCEDURE — 1101F PT FALLS ASSESS-DOCD LE1/YR: CPT | Mod: CPTII,S$GLB,,

## 2025-03-17 PROCEDURE — 3288F FALL RISK ASSESSMENT DOCD: CPT | Mod: CPTII,S$GLB,,

## 2025-03-17 PROCEDURE — G9919 SCRN ND POS ND PROV OF REC: HCPCS | Mod: CPTII,S$GLB,,

## 2025-03-17 PROCEDURE — G0439 PPPS, SUBSEQ VISIT: HCPCS | Mod: S$GLB,,,

## 2025-03-17 PROCEDURE — 1170F FXNL STATUS ASSESSED: CPT | Mod: CPTII,S$GLB,,

## 2025-03-17 NOTE — PROGRESS NOTES
[facility]      NEUROENDOCRINE MULTIDISCIPLINARY TUMOR BOARD  _____________________________________________________________________    PRESENTER:   Aaron Keita MD    REASON FOR PRESENTATION:  Scan Review    ATTENDEES:   Gastroenterology - Not Present  Interventional Radiology - MELO Nino and Patricia Schmidt MD  Nuclear Medicine - Ifeoma Albarran MD  Nursing - NET TB Nursing: Tamar Carreno, RN, Jud Elder, RN, and Armida Fernandez, LELO  Oncology - Aaron Keita MD and Isma Jama MD  Palliative Medicine - Not Present  Pathology -Js Miller MD and Soumya Blair MD  Research - Not Present  Surgery - MD Westley Chaves MD  Genetics - Andres Escudero    PATIENT STATUS:  Established Patient    PATIENT SUMMARY:  Past Medical History:   Diagnosis Date    Abnormal glucose 06/21/2024    Asthma     Chest pain, atypical 01/14/2021    Depression     Gastric ulcer with hemorrhage 07/01/2012    GERD (gastroesophageal reflux disease)     History of blood transfusion     Hypertension     Infection caused by Enterobacter cloacae 10/09/2024    Iron deficiency anemia 03/14/2013    Osteoarthritis of both knees     Pituitary adenoma 1989    s/p transphenoidal resection    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 Suprep, portal.lopez  9/23-LVM for precall-Kpvt  9/26-pt r/s, updated instructions sent to portal, pt knows procedure will be done with first available and ti     ENDOSCOPIC ULTRASOUND OF UPPER GASTROINTESTINAL TRACT N/A 06/27/2023    Procedure: ULTRASOUND, UPPER GI TRACT, ENDOSCOPIC;  Surgeon: Johnathan Cantu MD;  Location: [facility] ENDO;  Service: Endoscopy;  Laterality: N/A;    ESOPHAGOGASTRODUODENOSCOPY N/A 10/07/2020    Procedure: EGD (ESOPHAGOGASTRODUODENOSCOPY);  Surgeon: Nell Parish MD;  Location: [facility] ENDO;  Service: Endoscopy;  Laterality: N/A;    ESOPHAGOGASTRODUODENOSCOPY N/A 06/30/2021    Procedure: EGD (ESOPHAGOGASTRODUODENOSCOPY);  Surgeon: Nell Parish MD;  Location: [facility];  Service: Endoscopy;  Laterality: N/A;    HYSTERECTOMY  1981    due to uterine fibroids    KNEE ARTHROPLASTY Left 01/10/2022    Procedure: ARTHROPLASTY, KNEE;  Surgeon: Jonnathan Tavera MD;  Location: [facility] OR;  Service: Orthopedics;  Laterality: Left;    TONSILLECTOMY      TRANSPHENOIDAL PITUITARY RESECTION  1989    Dr Cardenas       TUMOR MARKERS:  5-HIAA, Plasma Ref Range: up to 22 ng/mL  Recent Labs   Lab 10/22/24  1240 11/19/24  1050 12/16/24  0827 01/13/25  0944 02/10/25  0850 03/03/25  0902   5-HIAA, Plasma (Neuroend) 56 H 36 H 32 H 33 H 39 H 34 H     Chromogranin A Ref Range: <93 ng/mL      Gastrin Ref Range: <100 pg/mL      Neurokinin A Ref Range: 0 - 40 pg/mL      Pancreastatin Ref Range: 10 - 135 pg/mL  Recent Labs   Lab 10/22/24  1240 11/19/24  1050 12/16/24  0827 01/13/25  0944 02/10/25  0850 03/03/25  0902   Pancreastatin 171 H 192 H 192 H 218 H 217 H 189 H     Pancreatic Polypeptide Ref Range: >314 pg/mL      Serotonin Ref Range: <=230 ng/mL  Recent Labs   Lab 10/22/24  1240 11/19/24  1050 12/16/24  0827 01/13/25  0944 02/10/25  0850 03/03/25  1002   Serotonin 74 45 53 84 101 73           Latest Ref Rng & Units 3/17/2025     9:29 AM 3/10/2025    10:02 AM 3/3/2025    10:02 AM   Neuroendocrine Labs   SEROTONIN <=230 ng/mL   73        -------------------ADDITIONAL INFORMATION-------------------  This test was developed and its performance characteristics    determined by [facility] in a manner consistent with CLIA   requirements. This test has not been cleared or approved by   the U.S. Food and Drug Administration.    Test Performed by:  [facility]  [address] [place] [address]  : James Matson Ph.D.; CLIA# [id number]     WBC 3.90 - 12.70 K/uL   10.33    RBC 4.00 - 5.40 M/uL   4.61    HGB 12.0 - 16.0 g/dL   12.9    HCT 37.0 - 48.5 %   41.1    MCV 82 - 98 fL   89    MCH 27.0 - 31.0 pg   28.0    MCHC 32.0 - 36.0 g/dL   31.4    RDW 11.5 - 14.5 %   15.4    PLATLETS 150 - 450 K/uL   287    MPV 9.2 - 12.9 fL   12.0    GRAN # 1.8 - 7.7 K/uL   5.0    LYMPH # 1.0 - 4.8 K/uL   4.2    MONO # 0.3 - 1.0 K/uL   0.8    EOS # 0.0 - 0.5 K/uL   0.3    BASO # 0.00 - 0.20 K/uL   0.06    GRAN % 38.0 - 73.0 %   47.9    LYMPH % 18.0 - 48.0 %   40.7    MONO % 4.0 - 15.0 %   7.9    EOS % 0.0 - 8.0 %   2.7    BASO % 0.0 - 1.9 %   0.6    DIFF METHOD    Automated    GLUCOSE 70 - 110 mg/dL   101    BUN 8 - 23 mg/dL   8    CREATININE 0.5 - 1.4 mg/dL   0.7     - 145 mmol/L   137    K 3.5 - 5.1 mmol/L   4.0    CHLORIDE 95 - 110 mmol/L   104    CO2 23 - 29 mmol/L   27    CALCIUM 8.7 - 10.5 mg/dL   9.2    PROTEIN, TOTAL 6.0 - 8.4 g/dL   7.8    ALBUMIN 3.5 - 5.2 g/dL   3.3    TOTAL BILIRUBIN 0.1 - 1.0 mg/dL   0.2        For infants and newborns, interpretation of results should be based  on gestational age, weight and in agreement with clinical  observations.    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 Lanreotide started on 7/31/23. Review PET scan from March 2025    INT RAD: Defer to Nuc Med.    NUC MED: 03/03/15 stable small bowel, metastatic abd and liver lesions  Reactive axillary inguinal Lns    BOARD RECOMMENDATIONS: Stable, continue lanreotide.

## 2025-03-17 NOTE — PATIENT INSTRUCTIONS
Counseling and Referral of Other Preventative  (Italic type indicates deductible and co-insurance are waived)    Patient Name: Bell Lynn  Today's Date: 3/17/2025    Health Maintenance       Date Due Completion Date    Shingles Vaccine (1 of 2) Never done ---    RSV Vaccine (Age 60+ and Pregnant patients) (1 - Risk 60-74 years 1-dose series) Never done ---    COVID-19 Vaccine (6 - 2024-25 season) 12/20/2024 10/25/2024    Mammogram 05/11/2025 5/11/2023    TETANUS VACCINE 06/16/2026 6/16/2016    DEXA Scan 12/03/2026 12/3/2024    Hemoglobin A1c (Diabetic Prevention Screening) 11/19/2027 11/19/2024    Lipid Panel 11/19/2029 11/19/2024    Colorectal Cancer Screening 10/04/2031 10/4/2024        No orders of the defined types were placed in this encounter.    The following information is provided to all patients.  This information is to help you find resources for any of the problems found today that may be affecting your health:                  Living healthy guide: www.[facility].[place].gov      Understanding Diabetes: www.diabetes.org      Eating healthy: www.cdc.gov/healthyweight      CDC home safety checklist: www.cdc.gov/steadi/patient.html      Agency on Aging: www.goea.[place].[facility]      Alcoholics anonymous (AA): www.aa.org      Physical Activity: www.ta.nih.gov/hp9kqsp      Tobacco use: www.quitwithusla.org

## 2025-03-17 NOTE — PROGRESS NOTES
"  Bell Lynn presented for a follow-up Medicare AWV today. The following components were reviewed and updated:    Medical history  Family History  Social history  Allergies and Current Medications  Health Risk Assessment  Health Maintenance  Care Team    **See Completed Assessments for Annual Wellness visit with in the encounter summary    The following assessments were completed:  Depression Screening  Cognitive function Screening  Timed Get Up Test  Whisper Test      Opioid documentation:      Patient does have a current opioid prescription.      Patient  declines  further discussion regarding opioid medication use.           Vitals:    03/17/25 0929   BP: (!) 138/90   BP Location: Right arm   Patient Position: Sitting   Pulse: 73   TempSrc: Oral   SpO2: 97%   Weight: 94.4 kg (208 lb 3.6 oz)   Height: 5' 4" (1.626 m)     Body mass index is 35.74 kg/m².       Physical Exam      Diagnoses and health risks identified today and associated recommendations/orders:  1. Abnormality of gait and mobility  Stable gait. The current medical regimen is effective;  continue present plan and medications.      2. Other reduced mobility  Stable gait. The current medical regimen is effective;  continue present plan and medications.      3. Encounter for preventive health examination  Counseled on age appropriate medical preventative services including age appropriate cancer screenings, age appropriate eye and dental exams, over all nutritional health, need for a consistent exercise regimen, and an over all push towards maintaining a vigorous and active lifestyle.  Counseled on age appropriate vaccines and discussed upcoming health care needs based on age/gender. Discussed good sleep hygiene and stress management.      4. Advanced directives, counseling/discussion  I offered to discuss advanced care planning, including how to pick a person who would make decisions for you if you were unable to make them for yourself, called a " health care power of , and what kind of decisions you might make such as use of life sustaining treatments such as ventilators and tube feeding when faced with a life limiting illness recorded on a living will that they will need to know. (How you want to be cared for as you near the end of your natural life)     X Patient is interested in learning more about how to make advanced directives.  I provided them paperwork and offered to discuss this with them.      5. Secondary malignant neoplasm of liver  Stable. Followed by oncology. The current medical regimen is effective;  continue present plan and medications.      6. Severe obesity with body mass index (BMI) of 35.0 to 39.9 with serious comorbidity  Continue healthy diet and exercise for weight loss.      7. Essential hypertension 1/2021 TTE normal, stress test negative  BP in clinic today 138/90.  The current medical regimen is effective;  continue present plan and medications.        8. Moderate persistent chronic asthma without complication  Stable with no acute symptoms. The current medical regimen is effective;  continue present plan and medications.        Provided Bell with a 5-10 year written screening schedule and personal prevention plan. Recommendations were developed using the USPSTF age appropriate recommendations. Education, counseling, and referrals were provided as needed.  After Visit Summary printed and given to patient which includes a list of additional screenings\tests needed.    No follow-ups on file.      Marzena Giles NP

## 2025-03-24 ENCOUNTER — OFFICE VISIT (OUTPATIENT)
Dept: ORTHOPEDICS | Facility: CLINIC | Age: 70
End: 2025-03-24
Payer: MEDICARE

## 2025-03-24 VITALS — BODY MASS INDEX: 35.53 KG/M2 | WEIGHT: 208.13 LBS | HEIGHT: 64 IN

## 2025-03-24 DIAGNOSIS — M17.0 PRIMARY OSTEOARTHRITIS OF BOTH KNEES: Primary | ICD-10-CM

## 2025-03-24 DIAGNOSIS — Z96.652 S/P TOTAL KNEE ARTHROPLASTY, LEFT: ICD-10-CM

## 2025-03-24 PROCEDURE — 1125F AMNT PAIN NOTED PAIN PRSNT: CPT | Mod: CPTII,S$GLB,, | Performed by: ORTHOPAEDIC SURGERY

## 2025-03-24 PROCEDURE — 20610 DRAIN/INJ JOINT/BURSA W/O US: CPT | Mod: RT,S$GLB,, | Performed by: ORTHOPAEDIC SURGERY

## 2025-03-24 PROCEDURE — 1101F PT FALLS ASSESS-DOCD LE1/YR: CPT | Mod: CPTII,S$GLB,, | Performed by: ORTHOPAEDIC SURGERY

## 2025-03-24 PROCEDURE — 1160F RVW MEDS BY RX/DR IN RCRD: CPT | Mod: CPTII,S$GLB,, | Performed by: ORTHOPAEDIC SURGERY

## 2025-03-24 PROCEDURE — 99999 PR PBB SHADOW E&M-EST. PATIENT-LVL III: CPT | Mod: PBBFAC,,, | Performed by: ORTHOPAEDIC SURGERY

## 2025-03-24 PROCEDURE — 99213 OFFICE O/P EST LOW 20 MIN: CPT | Mod: 25,S$GLB,, | Performed by: ORTHOPAEDIC SURGERY

## 2025-03-24 PROCEDURE — 3288F FALL RISK ASSESSMENT DOCD: CPT | Mod: CPTII,S$GLB,, | Performed by: ORTHOPAEDIC SURGERY

## 2025-03-24 PROCEDURE — 1159F MED LIST DOCD IN RCRD: CPT | Mod: CPTII,S$GLB,, | Performed by: ORTHOPAEDIC SURGERY

## 2025-03-24 PROCEDURE — 3008F BODY MASS INDEX DOCD: CPT | Mod: CPTII,S$GLB,, | Performed by: ORTHOPAEDIC SURGERY

## 2025-03-24 RX ORDER — TRIAMCINOLONE ACETONIDE 40 MG/ML
40 INJECTION, SUSPENSION INTRA-ARTICULAR; INTRAMUSCULAR
Status: DISCONTINUED | OUTPATIENT
Start: 2025-03-24 | End: 2025-03-24 | Stop reason: HOSPADM

## 2025-03-24 RX ADMIN — TRIAMCINOLONE ACETONIDE 40 MG: 40 INJECTION, SUSPENSION INTRA-ARTICULAR; INTRAMUSCULAR at 02:03

## 2025-03-24 NOTE — PROCEDURES
Large Joint Aspiration/Injection: R knee    Date/Time: 3/24/2025 2:00 PM    Performed by: Jonnathan Tavera MD  Authorized by: Jonnathan Tavera MD    Location:  Knee  Site:  R knee  Medications:  40 mg triamcinolone acetonide 40 mg/mL     After obtaining verbal informed consent the patient's right knee was prepped aseptically and injected through an inferior lateral approach using 40 mg of triamcinolone and 1 cc of 1% plain Xylocaine.  The patient was warned about postinjection flare and how to manage it with ice, rest and over-the-counter analgesics.  They're advised to contact me for any severe, uncontrolled pain.

## 2025-03-24 NOTE — PROGRESS NOTES
Subjective:      Patient ID: Bell Lynn is a 69 y.o. female.    Chief Complaint:  Right knee pain  HPI    Follow-up for osteoarthritis.  The patient reports increasing joint line pain with all community ambulation.  Giving way and pseudo locking.  The patient reports that her left total knee is comfortable at this time.          Review of Systems   Constitutional: Negative for fever and weight loss.   HENT:  Negative for congestion.    Eyes:  Negative for visual disturbance.   Cardiovascular:  Negative for chest pain.   Respiratory:  Negative for shortness of breath.    Hematologic/Lymphatic: Negative for bleeding problem. Does not bruise/bleed easily.   Skin:  Negative for poor wound healing.   Musculoskeletal:  Positive for joint pain.   Gastrointestinal:  Negative for abdominal pain.   Genitourinary:  Negative for dysuria.   Neurological:  Negative for seizures.   Psychiatric/Behavioral:  Negative for altered mental status.    Allergic/Immunologic: Negative for persistent infections.         Objective:      Ortho/SPM Exam      Right knee    [unfilled]    The patient is not in acute distress.   Sclerae normal  Body habitus is overweight  Respiratory distress:  none   The patient walks with a limp.  Hip irritability  negative.   The skin over the knee is intact.  Knee effusion 1+  Palpation- mild medial tenderness  Range of motion- 5-115  deg,   Ligament laxity exam:  The +valgus laxity  Popliteal cyst positive  Patellar crepitation absent.  Flexion/pinch negative  Pulses DP present, PT present.  Motor normal 5/5 strength in all tested muscle groups.   Sensory normal.              Assessment:       Encounter Diagnoses   Name Primary?    Primary osteoarthritis of both knees Yes    S/P total knee arthroplasty, left         The condition is structurally advanced as noted from previous radiographs.  Objectively, consideration of arthroplasty is reasonable.            Plan:       Bell was seen today for  pain and pain.    Diagnoses and all orders for this visit:    Primary osteoarthritis of both knees  -     Large Joint Aspiration/Injection: R knee  -     X-ray Knee Ortho Bilateral with Flexion; Future    S/P total knee arthroplasty, left  -     X-ray Knee Ortho Bilateral with Flexion; Future          I explained my diagnostic impression and the reasoning behind it in detail, using layman's terms.  Models and/or pictures were used to help in the explanation.     I explained the role of total knee replacement in the treatment of this condition.  I reviewed the nature of the operation, the expected clinical course and recovery period.  I also discussed the typical complications that are associated with the procedure.  The option of continued nonsurgical care was also reviewed.  The patient indicates that they prefer to pursue palliative measures for as long as possible    Injection was recommended and consent was given.    The patient already takes narcotics for other medical problems.    New x-rays ordered for next visit.    The patient we will transfer to my palliative protocol after the next visit unless that are unexpected changes.

## 2025-04-07 ENCOUNTER — INFUSION (OUTPATIENT)
Dept: INFUSION THERAPY | Facility: HOSPITAL | Age: 70
End: 2025-04-07
Payer: MEDICARE

## 2025-04-07 ENCOUNTER — OFFICE VISIT (OUTPATIENT)
Dept: HEMATOLOGY/ONCOLOGY | Facility: CLINIC | Age: 70
End: 2025-04-07
Payer: MEDICARE

## 2025-04-07 ENCOUNTER — LAB VISIT (OUTPATIENT)
Dept: LAB | Facility: HOSPITAL | Age: 70
End: 2025-04-07
Payer: MEDICARE

## 2025-04-07 VITALS
TEMPERATURE: 97 F | WEIGHT: 207.25 LBS | SYSTOLIC BLOOD PRESSURE: 154 MMHG | BODY MASS INDEX: 35.38 KG/M2 | DIASTOLIC BLOOD PRESSURE: 73 MMHG | HEART RATE: 65 BPM | HEIGHT: 64 IN | OXYGEN SATURATION: 95 % | RESPIRATION RATE: 18 BRPM

## 2025-04-07 DIAGNOSIS — M25.562 CHRONIC PAIN OF BOTH KNEES: ICD-10-CM

## 2025-04-07 DIAGNOSIS — C77.8 SECONDARY MALIGNANT NEOPLASM OF LYMPH NODES OF MULTIPLE SITES: ICD-10-CM

## 2025-04-07 DIAGNOSIS — D49.9 IMMUNODEFICIENCY SECONDARY TO NEOPLASM: ICD-10-CM

## 2025-04-07 DIAGNOSIS — C7A.8 PRIMARY MALIGNANT NEUROENDOCRINE TUMOR OF SMALL INTESTINE: Primary | ICD-10-CM

## 2025-04-07 DIAGNOSIS — C78.7 SECONDARY MALIGNANT NEOPLASM OF LIVER: ICD-10-CM

## 2025-04-07 DIAGNOSIS — D84.81 IMMUNODEFICIENCY SECONDARY TO NEOPLASM: ICD-10-CM

## 2025-04-07 DIAGNOSIS — C7A.8 PRIMARY MALIGNANT NEUROENDOCRINE TUMOR OF SMALL INTESTINE: ICD-10-CM

## 2025-04-07 DIAGNOSIS — Z79.899 IMMUNODEFICIENCY DUE TO DRUGS: ICD-10-CM

## 2025-04-07 DIAGNOSIS — I10 ESSENTIAL HYPERTENSION: ICD-10-CM

## 2025-04-07 DIAGNOSIS — M25.561 CHRONIC PAIN OF BOTH KNEES: ICD-10-CM

## 2025-04-07 DIAGNOSIS — D84.821 IMMUNODEFICIENCY DUE TO DRUGS: ICD-10-CM

## 2025-04-07 DIAGNOSIS — G89.29 CHRONIC PAIN OF BOTH KNEES: ICD-10-CM

## 2025-04-07 DIAGNOSIS — G89.3 CANCER-RELATED PAIN: ICD-10-CM

## 2025-04-07 LAB
ABSOLUTE EOSINOPHIL (OHS): 0.2 K/UL
ABSOLUTE MONOCYTE (OHS): 0.73 K/UL (ref 0.3–1)
ABSOLUTE NEUTROPHIL COUNT (OHS): 7.32 K/UL (ref 1.8–7.7)
ALBUMIN SERPL BCP-MCNC: 3.3 G/DL (ref 3.5–5.2)
ALP SERPL-CCNC: 106 UNIT/L (ref 40–150)
ALT SERPL W/O P-5'-P-CCNC: 19 UNIT/L (ref 10–44)
ANION GAP (OHS): 7 MMOL/L (ref 8–16)
AST SERPL-CCNC: 22 UNIT/L (ref 11–45)
BASOPHILS # BLD AUTO: 0.06 K/UL
BASOPHILS NFR BLD AUTO: 0.5 %
BILIRUB SERPL-MCNC: 0.3 MG/DL (ref 0.1–1)
BUN SERPL-MCNC: 10 MG/DL (ref 8–23)
CALCIUM SERPL-MCNC: 9.2 MG/DL (ref 8.7–10.5)
CHLORIDE SERPL-SCNC: 103 MMOL/L (ref 95–110)
CO2 SERPL-SCNC: 26 MMOL/L (ref 23–29)
CREAT SERPL-MCNC: 0.7 MG/DL (ref 0.5–1.4)
ERYTHROCYTE [DISTWIDTH] IN BLOOD BY AUTOMATED COUNT: 15.5 % (ref 11.5–14.5)
GFR SERPLBLD CREATININE-BSD FMLA CKD-EPI: >60 ML/MIN/1.73/M2
GLUCOSE SERPL-MCNC: 116 MG/DL (ref 70–110)
HCT VFR BLD AUTO: 42.3 % (ref 37–48.5)
HGB BLD-MCNC: 13.4 GM/DL (ref 12–16)
IMM GRANULOCYTES # BLD AUTO: 0.05 K/UL (ref 0–0.04)
IMM GRANULOCYTES NFR BLD AUTO: 0.4 % (ref 0–0.5)
LYMPHOCYTES # BLD AUTO: 3.21 K/UL (ref 1–4.8)
MCH RBC QN AUTO: 28.6 PG (ref 27–31)
MCHC RBC AUTO-ENTMCNC: 31.7 G/DL (ref 32–36)
MCV RBC AUTO: 90 FL (ref 82–98)
NUCLEATED RBC (/100WBC) (OHS): 0 /100 WBC
PLATELET # BLD AUTO: 327 K/UL (ref 150–450)
PMV BLD AUTO: 11.2 FL (ref 9.2–12.9)
POTASSIUM SERPL-SCNC: 3.8 MMOL/L (ref 3.5–5.1)
PROT SERPL-MCNC: 7.9 GM/DL (ref 6–8.4)
RBC # BLD AUTO: 4.69 M/UL (ref 4–5.4)
RELATIVE EOSINOPHIL (OHS): 1.7 %
RELATIVE LYMPHOCYTE (OHS): 27.7 % (ref 18–48)
RELATIVE MONOCYTE (OHS): 6.3 % (ref 4–15)
RELATIVE NEUTROPHIL (OHS): 63.4 % (ref 38–73)
SODIUM SERPL-SCNC: 136 MMOL/L (ref 136–145)
WBC # BLD AUTO: 11.57 K/UL (ref 3.9–12.7)

## 2025-04-07 PROCEDURE — 96372 THER/PROPH/DIAG INJ SC/IM: CPT | Mod: HCNC

## 2025-04-07 PROCEDURE — 80053 COMPREHEN METABOLIC PANEL: CPT | Mod: HCNC

## 2025-04-07 PROCEDURE — 3078F DIAST BP <80 MM HG: CPT | Mod: HCNC,CPTII,S$GLB, | Performed by: PHYSICIAN ASSISTANT

## 2025-04-07 PROCEDURE — 85025 COMPLETE CBC W/AUTO DIFF WBC: CPT | Mod: HCNC

## 2025-04-07 PROCEDURE — 83497 ASSAY OF 5-HIAA: CPT | Mod: HCNC

## 2025-04-07 PROCEDURE — 1101F PT FALLS ASSESS-DOCD LE1/YR: CPT | Mod: HCNC,CPTII,S$GLB, | Performed by: PHYSICIAN ASSISTANT

## 2025-04-07 PROCEDURE — 63600175 PHARM REV CODE 636 W HCPCS: Mod: JZ,TB,HCNC | Performed by: INTERNAL MEDICINE

## 2025-04-07 PROCEDURE — 1159F MED LIST DOCD IN RCRD: CPT | Mod: HCNC,CPTII,S$GLB, | Performed by: PHYSICIAN ASSISTANT

## 2025-04-07 PROCEDURE — 1160F RVW MEDS BY RX/DR IN RCRD: CPT | Mod: HCNC,CPTII,S$GLB, | Performed by: PHYSICIAN ASSISTANT

## 2025-04-07 PROCEDURE — 83519 RIA NONANTIBODY: CPT | Mod: HCNC

## 2025-04-07 PROCEDURE — 1126F AMNT PAIN NOTED NONE PRSNT: CPT | Mod: HCNC,CPTII,S$GLB, | Performed by: PHYSICIAN ASSISTANT

## 2025-04-07 PROCEDURE — 36415 COLL VENOUS BLD VENIPUNCTURE: CPT | Mod: HCNC

## 2025-04-07 PROCEDURE — 3008F BODY MASS INDEX DOCD: CPT | Mod: HCNC,CPTII,S$GLB, | Performed by: PHYSICIAN ASSISTANT

## 2025-04-07 PROCEDURE — 3288F FALL RISK ASSESSMENT DOCD: CPT | Mod: HCNC,CPTII,S$GLB, | Performed by: PHYSICIAN ASSISTANT

## 2025-04-07 PROCEDURE — 99214 OFFICE O/P EST MOD 30 MIN: CPT | Mod: HCNC,S$GLB,, | Performed by: PHYSICIAN ASSISTANT

## 2025-04-07 PROCEDURE — 3077F SYST BP >= 140 MM HG: CPT | Mod: HCNC,CPTII,S$GLB, | Performed by: PHYSICIAN ASSISTANT

## 2025-04-07 PROCEDURE — 84260 ASSAY OF SEROTONIN: CPT | Mod: HCNC

## 2025-04-07 PROCEDURE — G2211 COMPLEX E/M VISIT ADD ON: HCPCS | Mod: HCNC,S$GLB,, | Performed by: PHYSICIAN ASSISTANT

## 2025-04-07 PROCEDURE — 99999 PR PBB SHADOW E&M-EST. PATIENT-LVL IV: CPT | Mod: PBBFAC,HCNC,, | Performed by: PHYSICIAN ASSISTANT

## 2025-04-07 RX ORDER — LANREOTIDE ACETATE 120 MG/.5ML
120 INJECTION SUBCUTANEOUS ONCE
Status: COMPLETED | OUTPATIENT
Start: 2025-04-07 | End: 2025-04-07

## 2025-04-07 RX ORDER — OXYCODONE HYDROCHLORIDE 10 MG/1
10 TABLET ORAL EVERY 6 HOURS PRN
Qty: 90 TABLET | Refills: 0 | Status: SHIPPED | OUTPATIENT
Start: 2025-04-07

## 2025-04-07 RX ORDER — LANREOTIDE ACETATE 120 MG/.5ML
120 INJECTION SUBCUTANEOUS ONCE
Status: CANCELLED | OUTPATIENT
Start: 2025-04-07 | End: 2025-04-07

## 2025-04-07 RX ORDER — LANREOTIDE ACETATE 120 MG/.5ML
120 INJECTION SUBCUTANEOUS ONCE
OUTPATIENT
Start: 2025-04-07 | End: 2025-04-07

## 2025-04-07 RX ADMIN — LANREOTIDE ACETATE 120 MG: 120 INJECTION SUBCUTANEOUS at 12:04
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
## 2025-04-07 NOTE — NURSING
Pt given SubQ Lanreotide injection to left buttock. Tolerated well. Care explained, all questions answered.

## 2025-04-09 DIAGNOSIS — M79.7 FIBROMYALGIA: ICD-10-CM

## 2025-04-09 DIAGNOSIS — K59.09 CHRONIC CONSTIPATION: ICD-10-CM

## 2025-04-09 DIAGNOSIS — F33.0 MILD RECURRENT MAJOR DEPRESSION: ICD-10-CM

## 2025-04-09 RX ORDER — DULOXETIN HYDROCHLORIDE 60 MG/1
60 CAPSULE, DELAYED RELEASE ORAL
Qty: 90 CAPSULE | Refills: 3 | Status: SHIPPED | OUTPATIENT
Start: 2025-04-09

## 2025-04-09 RX ORDER — LINACLOTIDE 145 UG/1
145 CAPSULE, GELATIN COATED ORAL
Qty: 90 CAPSULE | Refills: 3 | Status: SHIPPED | OUTPATIENT
Start: 2025-04-09

## 2025-04-09 NOTE — TELEPHONE ENCOUNTER
No care due was identified.  [facility] Embedded Care Due Messages. Reference number: [id number].   4/09/2025 2:54:08 AM CDT

## 2025-04-10 LAB
5OH-INDOLEACETATE SERPL-MCNC: 38 NG/ML
SEROTONIN SER-MCNC: 66 NG/ML

## 2025-04-19 DIAGNOSIS — G89.29 CHRONIC MIDLINE LOW BACK PAIN WITHOUT SCIATICA: ICD-10-CM

## 2025-04-19 DIAGNOSIS — M54.50 CHRONIC MIDLINE LOW BACK PAIN WITHOUT SCIATICA: ICD-10-CM

## 2025-04-19 NOTE — TELEPHONE ENCOUNTER
No care due was identified.  [facility] Embedded Care Due Messages. Reference number: [id number].   4/19/2025 3:53:08 AM CDT

## 2025-04-20 RX ORDER — LIDOCAINE 50 MG/G
PATCH TOPICAL
Qty: 90 PATCH | Refills: 3 | Status: SHIPPED | OUTPATIENT
Start: 2025-04-20

## 2025-04-22 DIAGNOSIS — C7A.8 PRIMARY MALIGNANT NEUROENDOCRINE TUMOR OF SMALL INTESTINE: Primary | ICD-10-CM

## 2025-04-22 DIAGNOSIS — C77.8 SECONDARY MALIGNANT NEOPLASM OF LYMPH NODES OF MULTIPLE SITES: ICD-10-CM

## 2025-04-22 DIAGNOSIS — C78.7 SECONDARY MALIGNANT NEOPLASM OF LIVER: ICD-10-CM

## 2025-05-02 ENCOUNTER — TELEPHONE (OUTPATIENT)
Dept: ORTHOPEDICS | Facility: CLINIC | Age: 70
End: 2025-05-02
Payer: MEDICARE

## 2025-05-05 ENCOUNTER — INFUSION (OUTPATIENT)
Dept: INFUSION THERAPY | Facility: HOSPITAL | Age: 70
End: 2025-05-05
Payer: MEDICARE

## 2025-05-05 ENCOUNTER — LAB VISIT (OUTPATIENT)
Dept: LAB | Facility: HOSPITAL | Age: 70
End: 2025-05-05
Payer: MEDICARE

## 2025-05-05 ENCOUNTER — OFFICE VISIT (OUTPATIENT)
Dept: HEMATOLOGY/ONCOLOGY | Facility: CLINIC | Age: 70
End: 2025-05-05
Payer: MEDICARE

## 2025-05-05 VITALS
DIASTOLIC BLOOD PRESSURE: 73 MMHG | RESPIRATION RATE: 16 BRPM | SYSTOLIC BLOOD PRESSURE: 116 MMHG | HEIGHT: 64 IN | HEART RATE: 70 BPM | WEIGHT: 209.56 LBS | OXYGEN SATURATION: 97 % | BODY MASS INDEX: 35.77 KG/M2 | TEMPERATURE: 97 F

## 2025-05-05 DIAGNOSIS — D49.9 IMMUNODEFICIENCY SECONDARY TO NEOPLASM: ICD-10-CM

## 2025-05-05 DIAGNOSIS — C7A.8 PRIMARY MALIGNANT NEUROENDOCRINE TUMOR OF SMALL INTESTINE: Primary | ICD-10-CM

## 2025-05-05 DIAGNOSIS — D84.821 IMMUNODEFICIENCY DUE TO DRUGS: ICD-10-CM

## 2025-05-05 DIAGNOSIS — C77.8 SECONDARY MALIGNANT NEOPLASM OF LYMPH NODES OF MULTIPLE SITES: ICD-10-CM

## 2025-05-05 DIAGNOSIS — D84.81 IMMUNODEFICIENCY SECONDARY TO NEOPLASM: ICD-10-CM

## 2025-05-05 DIAGNOSIS — Z79.899 IMMUNODEFICIENCY DUE TO DRUGS: ICD-10-CM

## 2025-05-05 DIAGNOSIS — I10 ESSENTIAL HYPERTENSION: ICD-10-CM

## 2025-05-05 DIAGNOSIS — C78.7 SECONDARY MALIGNANT NEOPLASM OF LIVER: ICD-10-CM

## 2025-05-05 DIAGNOSIS — C7A.8 PRIMARY MALIGNANT NEUROENDOCRINE TUMOR OF SMALL INTESTINE: ICD-10-CM

## 2025-05-05 LAB
ABSOLUTE EOSINOPHIL (OHS): 0.21 K/UL
ABSOLUTE MONOCYTE (OHS): 0.66 K/UL (ref 0.3–1)
ABSOLUTE NEUTROPHIL COUNT (OHS): 4.27 K/UL (ref 1.8–7.7)
ALBUMIN SERPL BCP-MCNC: 3.3 G/DL (ref 3.5–5.2)
ALP SERPL-CCNC: 106 UNIT/L (ref 40–150)
ALT SERPL W/O P-5'-P-CCNC: 22 UNIT/L (ref 10–44)
ANION GAP (OHS): 9 MMOL/L (ref 8–16)
AST SERPL-CCNC: 26 UNIT/L (ref 11–45)
BASOPHILS # BLD AUTO: 0.07 K/UL
BASOPHILS NFR BLD AUTO: 0.9 %
BILIRUB SERPL-MCNC: 0.3 MG/DL (ref 0.1–1)
BUN SERPL-MCNC: 10 MG/DL (ref 8–23)
CALCIUM SERPL-MCNC: 9 MG/DL (ref 8.7–10.5)
CHLORIDE SERPL-SCNC: 104 MMOL/L (ref 95–110)
CO2 SERPL-SCNC: 27 MMOL/L (ref 23–29)
CREAT SERPL-MCNC: 0.8 MG/DL (ref 0.5–1.4)
ERYTHROCYTE [DISTWIDTH] IN BLOOD BY AUTOMATED COUNT: 15 % (ref 11.5–14.5)
GFR SERPLBLD CREATININE-BSD FMLA CKD-EPI: >60 ML/MIN/1.73/M2
GLUCOSE SERPL-MCNC: 107 MG/DL (ref 70–110)
HCT VFR BLD AUTO: 42.6 % (ref 37–48.5)
HGB BLD-MCNC: 13.2 GM/DL (ref 12–16)
IMM GRANULOCYTES # BLD AUTO: 0.02 K/UL (ref 0–0.04)
IMM GRANULOCYTES NFR BLD AUTO: 0.3 % (ref 0–0.5)
LYMPHOCYTES # BLD AUTO: 2.48 K/UL (ref 1–4.8)
MCH RBC QN AUTO: 28 PG (ref 27–31)
MCHC RBC AUTO-ENTMCNC: 31 G/DL (ref 32–36)
MCV RBC AUTO: 90 FL (ref 82–98)
NUCLEATED RBC (/100WBC) (OHS): 0 /100 WBC
PLATELET # BLD AUTO: 287 K/UL (ref 150–450)
PMV BLD AUTO: 11.8 FL (ref 9.2–12.9)
POTASSIUM SERPL-SCNC: 4.2 MMOL/L (ref 3.5–5.1)
PROT SERPL-MCNC: 7.9 GM/DL (ref 6–8.4)
RBC # BLD AUTO: 4.71 M/UL (ref 4–5.4)
RELATIVE EOSINOPHIL (OHS): 2.7 %
RELATIVE LYMPHOCYTE (OHS): 32.2 % (ref 18–48)
RELATIVE MONOCYTE (OHS): 8.6 % (ref 4–15)
RELATIVE NEUTROPHIL (OHS): 55.3 % (ref 38–73)
SODIUM SERPL-SCNC: 140 MMOL/L (ref 136–145)
WBC # BLD AUTO: 7.71 K/UL (ref 3.9–12.7)

## 2025-05-05 PROCEDURE — 36415 COLL VENOUS BLD VENIPUNCTURE: CPT | Mod: HCNC

## 2025-05-05 PROCEDURE — 3074F SYST BP LT 130 MM HG: CPT | Mod: CPTII,HCNC,S$GLB, | Performed by: INTERNAL MEDICINE

## 2025-05-05 PROCEDURE — 84460 ALANINE AMINO (ALT) (SGPT): CPT | Mod: HCNC

## 2025-05-05 PROCEDURE — 99999 PR PBB SHADOW E&M-EST. PATIENT-LVL IV: CPT | Mod: PBBFAC,HCNC,, | Performed by: INTERNAL MEDICINE

## 2025-05-05 PROCEDURE — 3008F BODY MASS INDEX DOCD: CPT | Mod: CPTII,HCNC,S$GLB, | Performed by: INTERNAL MEDICINE

## 2025-05-05 PROCEDURE — 3078F DIAST BP <80 MM HG: CPT | Mod: CPTII,HCNC,S$GLB, | Performed by: INTERNAL MEDICINE

## 2025-05-05 PROCEDURE — 1159F MED LIST DOCD IN RCRD: CPT | Mod: CPTII,HCNC,S$GLB, | Performed by: INTERNAL MEDICINE

## 2025-05-05 PROCEDURE — 3288F FALL RISK ASSESSMENT DOCD: CPT | Mod: CPTII,HCNC,S$GLB, | Performed by: INTERNAL MEDICINE

## 2025-05-05 PROCEDURE — G2211 COMPLEX E/M VISIT ADD ON: HCPCS | Mod: HCNC,S$GLB,, | Performed by: INTERNAL MEDICINE

## 2025-05-05 PROCEDURE — 83497 ASSAY OF 5-HIAA: CPT | Mod: HCNC

## 2025-05-05 PROCEDURE — 96372 THER/PROPH/DIAG INJ SC/IM: CPT | Mod: HCNC

## 2025-05-05 PROCEDURE — 63600175 PHARM REV CODE 636 W HCPCS: Mod: JZ,TB,HCNC | Performed by: INTERNAL MEDICINE

## 2025-05-05 PROCEDURE — 1126F AMNT PAIN NOTED NONE PRSNT: CPT | Mod: CPTII,HCNC,S$GLB, | Performed by: INTERNAL MEDICINE

## 2025-05-05 PROCEDURE — 83519 RIA NONANTIBODY: CPT | Mod: HCNC

## 2025-05-05 PROCEDURE — 85025 COMPLETE CBC W/AUTO DIFF WBC: CPT | Mod: HCNC

## 2025-05-05 PROCEDURE — 1160F RVW MEDS BY RX/DR IN RCRD: CPT | Mod: CPTII,HCNC,S$GLB, | Performed by: INTERNAL MEDICINE

## 2025-05-05 PROCEDURE — 1101F PT FALLS ASSESS-DOCD LE1/YR: CPT | Mod: CPTII,HCNC,S$GLB, | Performed by: INTERNAL MEDICINE

## 2025-05-05 PROCEDURE — 99214 OFFICE O/P EST MOD 30 MIN: CPT | Mod: HCNC,S$GLB,, | Performed by: INTERNAL MEDICINE

## 2025-05-05 PROCEDURE — 84260 ASSAY OF SEROTONIN: CPT | Mod: HCNC

## 2025-05-05 RX ORDER — LANREOTIDE ACETATE 120 MG/.5ML
120 INJECTION SUBCUTANEOUS ONCE
Status: COMPLETED | OUTPATIENT
Start: 2025-05-05 | End: 2025-05-05

## 2025-05-05 RX ORDER — LANREOTIDE ACETATE 120 MG/.5ML
120 INJECTION SUBCUTANEOUS ONCE
OUTPATIENT
Start: 2025-05-05 | End: 2025-05-05

## 2025-05-05 RX ADMIN — LANREOTIDE ACETATE 120 MG: 120 INJECTION SUBCUTANEOUS at 09:05
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
## 2025-05-08 LAB
5OH-INDOLEACETATE SERPL-MCNC: 56 NG/ML
SEROTONIN SER-MCNC: 122 NG/ML

## 2025-05-16 DIAGNOSIS — J45.40 MODERATE PERSISTENT CHRONIC ASTHMA WITHOUT COMPLICATION: ICD-10-CM

## 2025-05-16 RX ORDER — ALBUTEROL SULFATE 90 UG/1
INHALANT RESPIRATORY (INHALATION)
Qty: 54 G | Refills: 1 | Status: SHIPPED | OUTPATIENT
Start: 2025-05-16

## 2025-05-16 NOTE — TELEPHONE ENCOUNTER
No care due was identified.  [facility] Embedded Care Due Messages. Reference number: [id number].   5/16/2025 2:47:17 AM CDT

## 2025-05-16 NOTE — TELEPHONE ENCOUNTER
Refill Decision Note   Bell Lynn  is requesting a refill authorization.  Brief Assessment and Rationale for Refill:  Approve     Medication Therapy Plan:        Comments:     Note composed:10:37 AM 05/16/2025

## 2025-05-22 NOTE — ASSESSMENT & PLAN NOTE
Blood pressure today is stable.  Update metoprolol.  Takes Lasix as needed update prescription  Orders:    furosemide (LASIX) 20 MG tablet; TAKE 1 TABLET ONE TIME DAILY FOR LEG SWELLING AS NEEDED    metoprolol succinate (TOPROL-XL) 100 MG 24 hr tablet; Take 1 tablet (100 mg total) by mouth once daily.    Lipid Panel; Future

## 2025-05-22 NOTE — PROGRESS NOTES
This note was created by combination of typed  and M-Modal dictation.  Transcription errors may be present.   This note was also generated with the assistance of ambient listening technology. Verbal consent was obtained by the patient and accompanying visitor(s) for the recording of patient appointment to facilitate this note. I attest to having reviewed and edited the generated note for accuracy, though some syntax or spelling errors may persist. Please contact the author of this note for any clarification.    Assessment and Plan:   Assessment and Plan    Assessment & Plan  Left-sided headache  Ongoing subacute left-sided headache.  History of remote diagnosis of migraines.  Had previously responded to Medrol when she had similar episode.  Medrol Dosepak.  Orders:    methylPREDNISolone (MEDROL DOSEPACK) 4 mg tablet; use as directed    Musculoskeletal chest pain  Negative cardiac workup 11/2024.  Fleeting, episodic, most consistent with musculoskeletal.  Medrol Dosepak as above will hopefully help this out as well.       Essential hypertension 1/2021 TTE normal, stress test negative  Palpitations  Peripheral edema  Blood pressure today is stable.  Update metoprolol.  Takes Lasix as needed update prescription  Orders:    furosemide (LASIX) 20 MG tablet; TAKE 1 TABLET ONE TIME DAILY FOR LEG SWELLING AS NEEDED    metoprolol succinate (TOPROL-XL) 100 MG 24 hr tablet; Take 1 tablet (100 mg total) by mouth once daily.    Lipid Panel; Future    Class 1 obesity due to excess calories with serious comorbidity and body mass index (BMI) of 33.0 to 33.9 in adult  Stable.  Continue working on TLC       Iron deficiency anemia, unspecified iron deficiency anemia type  I suspect from her carcinoid.  Colonoscopy 10/04/2024 negative.  Followed by Hematology       Secondary malignant neoplasm of liver  Primary malignant neuroendocrine tumor of small intestine  Carcinoid tumor managed by Heme-Onc.  Gets routine labs through  Heme-Onc.  Lanreotide.  Plan is to check PET-CT and MRI abdomen in the fall.  Pain from the carcinoid tumor, takes mainly tramadol but takes oxycodone for breakthrough pain.       Fibromyalgia; diffuse arm, back pain, thigh pain; 2017 B12, TSH WNL; reynaldo without efficacy; Cymbalta.  Osteoarthritis of left knee, unspecified osteoarthritis type  Stable on Cymbalta, gabapentin, Flexeril.  Also on tramadol for the carcinoid with oxycodone for breakthrough.  Update the Cymbalta, gabapentin, Flexeril  Orders:    gabapentin (NEURONTIN) 100 MG capsule; TAKE 1 CAPSULE IN THE MORNING AND TAKE 3 CAPSULES AT NIGHT    DULoxetine (CYMBALTA) 60 MG capsule; Take 1 capsule (60 mg total) by mouth once daily.    cyclobenzaprine (FLEXERIL) 10 MG tablet; Take 1 tablet (10 mg total) by mouth every evening.    Mild recurrent major depression  Stable on Cymbalta high dose update prescription  Orders:    DULoxetine (CYMBALTA) 60 MG capsule; Take 1 capsule (60 mg total) by mouth once daily.    Chronic constipation  Stable, takes Linzess regularly, update prescription  Orders:    linaCLOtide (LINZESS) 145 mcg Cap capsule; Take 1 capsule (145 mcg total) by mouth before breakfast.    Age-related osteoporosis without current pathological fracture; 6/2022 alendronate  Had previously prescribed Prolia but never got that set up.  She will reach out to the infusion center to start.  Taking vitamin-D supplement, check future labs  Orders:    Vitamin D; Future    Vitamin D; Future    Encounter for screening mammogram for malignant neoplasm of breast  Orders:    Mammo Digital Screening Bilat; Future        Medications Discontinued During This Encounter   Medication Reason    cyclobenzaprine (FLEXERIL) 10 MG tablet Reorder    metoprolol succinate (TOPROL-XL) 100 MG 24 hr tablet Reorder    furosemide (LASIX) 20 MG tablet Reorder    gabapentin (NEURONTIN) 100 MG capsule Reorder    DULoxetine (CYMBALTA) 60 MG capsule Reorder    LINZESS 145 mcg Cap capsule  Reorder       meds sent this encounter:     Medications Ordered This Encounter   Medications    cyclobenzaprine (FLEXERIL) 10 MG tablet     Sig: Take 1 tablet (10 mg total) by mouth every evening.     Dispense:  90 tablet     Refill:  3     Pharmacy update refills, keep on file, not requesting Rx to be filled today.    DULoxetine (CYMBALTA) 60 MG capsule     Sig: Take 1 capsule (60 mg total) by mouth once daily.     Dispense:  90 capsule     Refill:  3     Pharmacy update refills, keep on file, not requesting Rx to be filled today.    furosemide (LASIX) 20 MG tablet     Sig: TAKE 1 TABLET ONE TIME DAILY FOR LEG SWELLING AS NEEDED     Dispense:  90 tablet     Refill:  3     Pharmacy update refills, keep on file, not requesting Rx to be filled today.    gabapentin (NEURONTIN) 100 MG capsule     Sig: TAKE 1 CAPSULE IN THE MORNING AND TAKE 3 CAPSULES AT NIGHT     Dispense:  270 capsule     Refill:  3     Pharmacy update refills, keep on file, not requesting Rx to be filled today.    linaCLOtide (LINZESS) 145 mcg Cap capsule     Sig: Take 1 capsule (145 mcg total) by mouth before breakfast.     Dispense:  90 capsule     Refill:  3     Pharmacy update refills, keep on file, not requesting Rx to be filled today.    methylPREDNISolone (MEDROL DOSEPACK) 4 mg tablet     Sig: use as directed     Dispense:  1 each     Refill:  0    metoprolol succinate (TOPROL-XL) 100 MG 24 hr tablet     Sig: Take 1 tablet (100 mg total) by mouth once daily.     Dispense:  90 tablet     Refill:  3     Pharmacy update refills, keep on file, not requesting Rx to be filled today.        Follow Up:  Follow-up 6 months, labs in 12 months  Future Appointments   Date Time Provider Department Center   5/23/2025  9:20 AM Cipriano Carrera MD [facility] FAM MED [place]   6/2/2025 10:30 AM [facility]   6/2/2025 11:30 AM Chloé Quevedo PA-C [facility] HEMONC2 [place]   6/2/2025 12:30 PM INJECTION, [facility] CHEMO  [facility]   6/24/2025  8:00 AM [facility] XR2 OPTIMA  HD  [facility] XRAY [facility]   6/30/2025  1:00 PM LAB, HEMON CANCER BLDG [facility] LAB HO [place] [facility]   6/30/2025  2:00 PM Aaron Keita MD [facility] HEMONC2 [place]   6/30/2025  3:00 PM INJECTION[facility] CHEMO [facility]   7/1/2025  3:00 PM Jonnathan Tavera MD [facility] ORTHO [place]   7/28/2025 10:30 AM LAB, HEMONC CANCER BLDG [facility] LAB HO [place] [facility]   7/28/2025 11:30 AM Chloé Quevedo PA-C [facility] HEMONC2 [place]   7/28/2025 12:30 PM INJECTION, [facility] CHEMO [facility]   8/6/2025  9:00 AM Renata Hernandez, [facility] OPTOMTY [place]         Subjective:   Subjective   Chief Complaint   Patient presents with    Follow-up    Headache    Chest Pain       HPI  Bell is a 69 y.o. female.    Social History     Socioeconomic History    Marital status:      Spouse name: Brandin    Number of children: 3   Occupational History     Employer: [employer]   Tobacco Use    Smoking status: Never     Passive exposure: Never    Smokeless tobacco: Never   Substance and Sexual Activity    Alcohol use: Yes     Alcohol/week: 1.0 standard drink of alcohol     Types: 1 Cans of beer per week     Comment: on ocassion    Drug use: No    Sexual activity: Not Currently     Partners: Male     Birth control/protection: Surgical     Social Drivers of Health     Financial Resource Strain: Low Risk  (3/17/2025)    Overall Financial Resource Strain (CARDIA)     Difficulty of Paying Living Expenses: Not hard at all   Food Insecurity: No Food Insecurity (3/17/2025)    Hunger Vital Sign     Worried About Running Out of Food in the Last Year: Never true     Ran Out of Food in the Last Year: Never true   Transportation Needs: No Transportation Needs (3/17/2025)    PRAPARE - Transportation     Lack of Transportation (Medical): No     Lack of Transportation (Non-Medical): No   Physical Activity: Inactive (3/17/2025)    Exercise Vital Sign     Days of  Exercise per Week: 0 days     Minutes of Exercise per Session: 0 min   Stress: No Stress Concern Present (3/17/2025)    [place] [employer] - Occupational Stress Questionnaire     Feeling of Stress : Not at all   Housing Stability: Low Risk  (3/17/2025)    Housing Stability Vital Sign     Unable to Pay for Housing in the Last Year: No     Number of Times Moved in the Last Year: 0     Homeless in the Last Year: No       No LMP recorded. Patient has had a hysterectomy.    Last appointment with this clinic was 3/17/2025. Last visit with me 10/25/2024   To summarize last visit and events leading up to today:  Hypertension increased spironolactone followed by digital monitoring home readings are high never verified cuff.  Possibly cuff undersized?  If high increase spironolactone   Diuretic induced hypokalemia on potassium.  Lasix every other day.  10/2024 majority of BP meds held during hospitalization for sepsis; Discontinued amlodipine, Mobic, spironolactone, valsartan  Depression, fibromyalgia, Cymbalta and gabapentin  Chronic constipation.  Linzess.  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  Lanreotide.  6/27/23 abd mass, EUS bx Well-differentiated neuroendocrine tumor, Grade1  Heme-Onc 04/23/2024.  Dominant mesenteric mass not amenable to surgical resection due to proximity on SMA.  Stable continue lanreotide and repeat PET scan 07/2024  Chronic anemia, iron deficiency.  Colonoscopy 2018 with polyp though no path report given.  Last EGD on record was in 2021 showing gastric ulcer with no stigmata bleeding.  Biopsy was negative.    History of oral iron intolerant with GI side effects  10/4/24 colonoscopy 3 mm cecal. Diverticulosis sigmoid, descending. Path polypoid colonic mucosa no histopath abn, neg for dysplasia.  repeat 7 years  history of transsphenoidal pituitary resection stable  Osteoporosis.    06/21/2022 DEXA L-spine -1.9, total hip-2.8, femoral neck-3.5.  FRAX  score 2.7/8.3%.  Osteoporosis.  Started on alendronate  Vitamin-D deficient  Fibromyalgia, MDD, cymbalta, tramadol prn. Lidoderm, flexeril.  Left knee replacement  seen by ortho 04/30/2024.  Rotator tendinitis.  PT.  Mobic.   orthopedics 07/24/2024 right subacromial bursa injection       Saw cardiology in follow-up 10/17/2024 complaining of chest tightness and irregular heartbeat since discharge.  Plan was echo, Lexiscan Myoview, Holter     Saw Heme-Onc 10/22/2024.  Stable.     Cardiac testing scheduled 11/21/2024    Last visit me 10/25/2024 Enterobacter infection with sepsis felt to be GI in source.  Had colonoscopy done 10/04/2024 normal.  Active treatment for small bowel neuroendocrine tumor.  No further testing.    Hypertension with edema.  BP stable discharged off of amlodipine valsartan and spironolactone.  Home cuff over reads.  Still on low-dose metoprolol.  Okay to stay off the medications and if going up restart valsartan next spironolactone next amlodipine.  Lasix p.r.n. for pedal edema  Palpitations upcoming cardiology testing  Abnormal A1c future labs  MDD and fibromyalgia stable on gabapentin and Cymbalta    11/7 through 11/08/2024 for abdominal pain.  Felt to be due to the neuroendocrine tumor.    11/19/2024 saw Heme-Onc in follow-up.  Stable on lanreotide.  Increased pain control from tramadol to oxycodone    11/21/2024 nuclear stress test negative for ischemia  11/21/2024 TTE LVEF 60-65%.  Grade 1 diastolic dysfunction.  11/21/2024 24 hour Holter predominantly sinus 18 runs of nonsustained SVT up to 15 beats    11/27/2024 saw cardiology.  Stable on metoprolol follow-up 6 months    11/22/2024 saw NP.  Stable.    12/03/2024 DEXA L-spine -1.5, femoral neck -3.2, total hip -2.7.  Compared to previous DEXA BMD L-spine increased by 5%.  BMD total hip stable.  FRAX score 2.5/8%.  Osteoporosis on treatment with alendronate.    2/2025 change to prolia    12/16/4 Saw Heme-Onc.  Dizzy was sent to ER.  Check  PET-CT    12/16/2024 dizziness.  MRI brain  No evidence for acute intracranial pathology.  Heterogeneous T2 signal about the right transverse sinus extending about the right jugular bulb, without obvious abnormal diffusion signal abnormality or susceptibility artifact. Findings may relate to diminutive caliber of the transverse sinus for slow flow, noting that evolving thrombosis cannot be completely excluded. Further evaluation can be provided with a dedicated contrasted MR of the brain as clinically indicated.  Sequela of chronic microvascular ischemic change.     3/3/25 PET CT  Stable size and tracer activity of the small bowel neuroendocrine tumor and metastatic abdominal lymph nodes and liver lesions. No other interval change.  Symmetric axillary and inguinal lymph nodes normal in size and morphology demonstrating mildly increased tracer avidity likely representing reactive process. Attention to follow     03/24/2025 saw orthopedics for bilateral osteoarthritis.  Right knee steroid injection    Most recent Heme-Onc 05/05/2025.  Stable plan for repeat PET-CT 09/2025 and perform MRI abdomen at that time    Did she get Prolia?  Check vitamin-D with next lab    Today's visit:    History of Present Illness    HPI:  Bell reports a left-sided headache for approximately 3 weeks. The headache is intermittent, present upon waking, and triggered by stress. It is occasionally associated with nausea. She manages the pain with a muscle relaxant, which provides relief through sedation. She has a history of migraines, though she is uncertain about a formal diagnosis.    She describes sharp chest pain that preceded the headache in onset. The pain is localized to the left side of the chest, characterized as fleeting and sharp, lasting for a few seconds. It occurs randomly, without specific triggers or associated activities, with increased frequency at night.    She reports chronic shortness of breath, consistent with her  baseline, and palpitations. She previously underwent cardiac testing in November, including a stress test and Holter monitor, which showed benign irregular beats and non-sustained supraventricular tachycardia, but no life-threatening conditions.    She also reports pain in her abdomen and lower abdomen. For pain management, she takes Tramadol as a first-line treatment and Oxycodone for severe pain. She also uses Flexeril (a muscle relaxant) for pain relief.    She denies worsening of shortness of breath, pain with deep breathing, or exacerbation of symptoms with physical activity or exposure to bright lights or loud noises.    MEDICATIONS:  - Pantoprazole (Protonix), taken daily, for stomach issues  - Furosemide (Lasix), as needed  - Lanreotide (Somatuline Dep[place]), injection  - Cyclobenzaprine (Flexeril), as needed, for pain  - Tramadol, as needed, for pain (first-line)  - Oxycodone, as needed, for severe pain  - Linaclotide (Linzess), taken daily, for constipation  - Metoprolol, for blood pressure/heart  - Duloxetine (Cymbalta), 60 mg, taken regularly, for pain and mood  - Gabapentin, 1 in the morning, 3 at night  - Vitamin D supplement, 1 tablet daily      ROS:  ENT: reports nasal congestion  Cardiovascular: reports chest pain, reports feeling of skipped beats  Respiratory: reports shortness of breath  Gastrointestinal: reports abdominal pain, reports nausea, reports constipation  Neurological: reports headache  Head: reports head pain         Patient Care Team:  Cipriano Carrera MD as PCP - General (Internal Medicine)  Gonsalo Ivy MD as Consulting Physician (Gastroenterology)  Yvonne Mota, PharmD as Hypertension Digital Medicine Clinician  Cipriano Carrera MD as Hypertension Digital Medicine Responsible Provider (Internal Medicine)  Jaida Raygoza, RN as Oncology Navigator  Jud Elder, LELO as Oncology Navigator  Medicare, [employer] Gold Managed as Hypertension Digital Medicine Contract  Belen Booker,  LPN as Licensed Practical Nurse      Patient Active Problem List    Diagnosis Date Noted    Severe obesity with body mass index (BMI) of 35.0 to 39.9 with serious comorbidity 01/13/2025    Thrombocytopenia, unspecified 10/22/2024    Primary malignant neuroendocrine tumor of small intestine 07/18/2023    Secondary malignant neoplasm of liver 07/18/2023    Chronic right-sided low back pain without sciatica 05/16/2023 04/27/2023 MRI L-spine impression:  T11-T12 posterior central 2.1 mm subligamentous disc herniation with caudal migration.  No canal stenosis.  T12-L1 posterior midline 1.2 mm disc herniation or central canal stenosis.    L2-L3 small facet effusions are present  L3-L4 mild facet hypertrophy with small effusions, there is minimal left foramen  L4-L5 diffuse disc bulge with facet hypertrophy, there is mild bilateral foraminal narrowing.    L5-S1 facet hypertrophy, there is mild moderate left greater than right foraminal narrowing      Age-related osteoporosis without current pathological fracture; 6/2022 alendronate 06/22/2022 06/21/2022 DEXA L-spine -1.9, total hip-2.8, femoral neck-3.5.  FRAX score 2.7/8.3%.  Osteoporosis.  12/03/2024 DEXA L-spine -1.5, femoral neck -3.2, total hip -2.7.  Compared to previous DEXA BMD L-spine increased by 5%.  BMD total hip stable.  FRAX score 2.5/8%.  Osteoporosis on treatment with alendronate.       Gait abnormality 02/03/2022    Decreased range of motion of left knee 02/03/2022    History of left knee replacement 01/10/2022    Palpitations 01/14/2021    LANGE (dyspnea on exertion) 01/14/2021    GERD (gastroesophageal reflux disease) 09/08/2020    Epigastric pain 09/08/2020    Peripheral edema 08/28/2020    Chronic constipation 08/28/2020    History of pituitary adenoma 9/2019 MRI no recurrence 09/24/2019     s/p transphenoidal resection ~1989  9/23/2019 MRI brain:   1. Fluid level in the sphenoid sinus, likely from sphenoid sinusitis.  2. No significant  abnormalities of the sella turcica to suggest any recurrent neoplasms.  3. Periventricular white matter changes likely from chronic microvascular ischemic disease.  4. No acute intracranial processes.  9/23/2019 carotid US normal      Colon polyp 10/4/24 colonoscopy 3 mm cecal. Diverticulosis sigmoid, descending. Path polypoid colonic mucosa no histopath abn, neg for dysplasia.  repeat 7 years 10/07/2018     8/2018 colonoscopy polyp no path report included  10/4/24 colonoscopy 3 mm cecal. Diverticulosis sigmoid, descending. Path polypoid colonic mucosa no histopath abn, neg for dysplasia.  repeat 7 years      Fibromyalgia; diffuse arm, back pain, thigh pain; 2017 B12, TSH WNL; reynaldo without efficacy; Cymbalta. 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 - Small hiatal hernia. - Non-bleeding gastric ulcers with no stigmata of bleeding. Biopsied. This has been present for more than 2 years according to previous EGD report 7/2018. - Normal examined duodenum. bx negative; neg for H pylori. Felt to be NSAID induced  06/30/2021 EGD normal esophagus.  Gastric ulcer with no stigmata of bleeding.  Improved from previous but not gone.  bx reactive/chemical gastropathy; H pylori neg.  Normal duodenum.      Menopausal hot flushes 02/14/2013    Mild recurrent major depression 02/14/2013    Primary osteoarthritis of left knee 07/30/2012 8/24/2017 MRI L knee: Tricompartmental degenerative changes. Lateral and medial meniscal degenerative tearing/changes. Tricompartmental cartilage loss including areas of full-thickness full-thickness  loss. Abnormal appearance of the ACL and PCL suggestive of chronic injury.      Essential hypertension 1/2021 TTE normal, stress test negative 07/30/2012 6/2017 nuclear stress test negative. No change 7/2015 6/2017 TTE normal LVEF 55-60    01/26/2021 nuclear medicine stress test normal perfusion scan no evidence of ischemia.  01/26/2021 TTE LV normal size with concentric LVH and normal systolic function LVEF 60%.  Normal diastolic function.  Normal RV size and systolic function.  CVP 3.  PA pressure 5.    11/21/2024 TTE LV normal size mildly increased wall thickness mild concentric hypertrophy normal systolic function LVEF 60-65%.  Grade 1 diastolic dysfunction.  11/21/2024 nuclear stress test negative for ischemia         PAST MEDICAL PROBLEMS, PAST SURGICAL HISTORY: please see relevant portions of the electronic medical record    ALLERGIES AND MEDICATIONS: updated and reviewed.  Medication List with Changes/Refills   Current Medications    ALBUTEROL (PROVENTIL/VENTOLIN HFA) 90 MCG/ACTUATION INHALER    INHALE 2 PUFFS EVERY 6 HOURS AS NEEDED FOR WHEEZING (RESCUE)    CETIRIZINE (ZYRTEC) 10 MG TABLET    TAKE 1 TABLET ONE TIME DAILY    CYCLOBENZAPRINE (FLEXERIL) 10 MG TABLET    TAKE 1 TABLET EVERY EVENING    DULOXETINE (CYMBALTA) 60 MG CAPSULE    TAKE 1 CAPSULE EVERY DAY    FUROSEMIDE (LASIX) 20 MG TABLET    TAKE 1 TABLET ONE TIME DAILY FOR LEG SWELLING AS NEEDED    GABAPENTIN (NEURONTIN) 100 MG CAPSULE    TAKE 1 CAPSULE IN THE MORNING AND TAKE 3 CAPSULES AT NIGHT    LATANOPROST 0.005 % OPHTHALMIC SOLUTION    Place 1 drop into the right eye every evening.    LIDOCAINE (LIDODERM) 5 %    APPLY 1 PATCH ON THE SKIN ONE TIME DAILY. REMOVE AND DISCARD PATCH WITHIN 12 HOURS OR AS DIRECTED BY MD    LINZESS 145 MCG CAP CAPSULE    TAKE 1 CAPSULE EVERY DAY    MECLIZINE (ANTIVERT) 25 MG TABLET    Take 1 tablet (25 mg total) by mouth 3 (three) times daily as needed for Dizziness.    METOPROLOL SUCCINATE (TOPROL-XL) 100 MG 24  "HR TABLET    Take 1 tablet (100 mg total) by mouth once daily.    OXYCODONE (ROXICODONE) 10 MG TAB IMMEDIATE RELEASE TABLET    Take 1 tablet (10 mg total) by mouth every 6 (six) hours as needed for Pain.    PANTOPRAZOLE (PROTONIX) 40 MG TABLET    TAKE 1 TABLET TWICE DAILY 45 MINUTES BEFORE BREAKFAST AND DINNER    TRAMADOL (ULTRAM) 50 MG TABLET    Take 1 tablet (50 mg total) by mouth every 8 (eight) hours as needed.    VITAMIN D (VITAMIN D3) 1000 UNITS TAB    Take 1,000 Units by mouth once daily.         Objective:   Objective   Physical Exam   Vitals:    05/23/25 0904   BP: 132/72   BP Location: Left arm   Patient Position: Sitting   Pulse: 65   Temp: 98.6 °F (37 °C)   TempSrc: Oral   SpO2: (!) 93%   Weight: 94.8 kg (209 lb 1.7 oz)   Height: 5' 4" (1.626 m)    Body mass index is 35.89 kg/m².            Physical Exam  Constitutional:       General: She is not in acute distress.     Appearance: She is well-developed.   HENT:      Head:      Comments: Temporal pulses strong, nontender  Eyes:      Extraocular Movements: Extraocular movements intact.   Cardiovascular:      Rate and Rhythm: Normal rate and regular rhythm.      Heart sounds: Normal heart sounds. No murmur heard.  Pulmonary:      Effort: Pulmonary effort is normal.      Breath sounds: Normal breath sounds.   Musculoskeletal:         General: Normal range of motion.      Cervical back: No rigidity or tenderness.      Right lower leg: No edema.      Left lower leg: No edema.   Lymphadenopathy:      Cervical: No cervical adenopathy.   Skin:     General: Skin is warm and dry.   Neurological:      Mental Status: She is alert and oriented to person, place, and time.      Coordination: Coordination normal.   Psychiatric:         Behavior: Behavior normal.         Thought Content: Thought content normal.                "

## 2025-05-22 NOTE — ASSESSMENT & PLAN NOTE
Stable on Cymbalta, gabapentin, Flexeril.  Also on tramadol for the carcinoid with oxycodone for breakthrough.  Update the Cymbalta, gabapentin, Flexeril  Orders:    gabapentin (NEURONTIN) 100 MG capsule; TAKE 1 CAPSULE IN THE MORNING AND TAKE 3 CAPSULES AT NIGHT    DULoxetine (CYMBALTA) 60 MG capsule; Take 1 capsule (60 mg total) by mouth once daily.    cyclobenzaprine (FLEXERIL) 10 MG tablet; Take 1 tablet (10 mg total) by mouth every evening.

## 2025-05-22 NOTE — ASSESSMENT & PLAN NOTE
Carcinoid tumor managed by [facility].  Gets routine labs through [facility].  Lanreotide.  Plan is to check PET-CT and MRI abdomen in the fall.  Pain from the carcinoid tumor, takes mainly tramadol but takes oxycodone for breakthrough pain.

## 2025-05-22 NOTE — ASSESSMENT & PLAN NOTE
Had previously prescribed Prolia but never got that set up.  She will reach out to the infusion center to start.  Taking vitamin-D supplement, check future labs  Orders:    Vitamin D; Future    Vitamin D; Future

## 2025-05-23 ENCOUNTER — OFFICE VISIT (OUTPATIENT)
Dept: FAMILY MEDICINE | Facility: CLINIC | Age: 70
End: 2025-05-23
Payer: MEDICARE

## 2025-05-23 VITALS
BODY MASS INDEX: 35.7 KG/M2 | WEIGHT: 209.13 LBS | OXYGEN SATURATION: 93 % | DIASTOLIC BLOOD PRESSURE: 72 MMHG | SYSTOLIC BLOOD PRESSURE: 132 MMHG | HEART RATE: 65 BPM | HEIGHT: 64 IN | TEMPERATURE: 99 F

## 2025-05-23 DIAGNOSIS — E66.811 CLASS 1 OBESITY DUE TO EXCESS CALORIES WITH SERIOUS COMORBIDITY AND BODY MASS INDEX (BMI) OF 33.0 TO 33.9 IN ADULT: ICD-10-CM

## 2025-05-23 DIAGNOSIS — M17.12 OSTEOARTHRITIS OF LEFT KNEE, UNSPECIFIED OSTEOARTHRITIS TYPE: ICD-10-CM

## 2025-05-23 DIAGNOSIS — R07.89 MUSCULOSKELETAL CHEST PAIN: ICD-10-CM

## 2025-05-23 DIAGNOSIS — C7A.8 PRIMARY MALIGNANT NEUROENDOCRINE TUMOR OF SMALL INTESTINE: ICD-10-CM

## 2025-05-23 DIAGNOSIS — R51.9 LEFT-SIDED HEADACHE: Primary | ICD-10-CM

## 2025-05-23 DIAGNOSIS — M81.0 AGE-RELATED OSTEOPOROSIS WITHOUT CURRENT PATHOLOGICAL FRACTURE: ICD-10-CM

## 2025-05-23 DIAGNOSIS — M79.7 FIBROMYALGIA: ICD-10-CM

## 2025-05-23 DIAGNOSIS — R00.2 PALPITATIONS: ICD-10-CM

## 2025-05-23 DIAGNOSIS — Z12.31 ENCOUNTER FOR SCREENING MAMMOGRAM FOR MALIGNANT NEOPLASM OF BREAST: ICD-10-CM

## 2025-05-23 DIAGNOSIS — K59.09 CHRONIC CONSTIPATION: ICD-10-CM

## 2025-05-23 DIAGNOSIS — I10 ESSENTIAL HYPERTENSION: Chronic | ICD-10-CM

## 2025-05-23 DIAGNOSIS — E66.09 CLASS 1 OBESITY DUE TO EXCESS CALORIES WITH SERIOUS COMORBIDITY AND BODY MASS INDEX (BMI) OF 33.0 TO 33.9 IN ADULT: ICD-10-CM

## 2025-05-23 DIAGNOSIS — C78.7 SECONDARY MALIGNANT NEOPLASM OF LIVER: ICD-10-CM

## 2025-05-23 DIAGNOSIS — R60.0 PERIPHERAL EDEMA: ICD-10-CM

## 2025-05-23 DIAGNOSIS — D50.9 IRON DEFICIENCY ANEMIA, UNSPECIFIED IRON DEFICIENCY ANEMIA TYPE: ICD-10-CM

## 2025-05-23 DIAGNOSIS — F33.0 MILD RECURRENT MAJOR DEPRESSION: ICD-10-CM

## 2025-05-23 PROCEDURE — 99999 PR PBB SHADOW E&M-EST. PATIENT-LVL IV: CPT | Mod: PBBFAC,,, | Performed by: INTERNAL MEDICINE

## 2025-05-23 RX ORDER — METHYLPREDNISOLONE 4 MG/1
TABLET ORAL
Qty: 1 EACH | Refills: 0 | Status: SHIPPED | OUTPATIENT
Start: 2025-05-23

## 2025-05-23 RX ORDER — GABAPENTIN 100 MG/1
CAPSULE ORAL
Qty: 270 CAPSULE | Refills: 3 | Status: SHIPPED | OUTPATIENT
Start: 2025-05-23

## 2025-05-23 RX ORDER — METOPROLOL SUCCINATE 100 MG/1
100 TABLET, EXTENDED RELEASE ORAL DAILY
Qty: 90 TABLET | Refills: 3 | Status: SHIPPED | OUTPATIENT
Start: 2025-05-23

## 2025-05-23 RX ORDER — CYCLOBENZAPRINE HCL 10 MG
10 TABLET ORAL NIGHTLY
Qty: 90 TABLET | Refills: 3 | Status: SHIPPED | OUTPATIENT
Start: 2025-05-23

## 2025-05-23 RX ORDER — DULOXETIN HYDROCHLORIDE 60 MG/1
60 CAPSULE, DELAYED RELEASE ORAL DAILY
Qty: 90 CAPSULE | Refills: 3 | Status: SHIPPED | OUTPATIENT
Start: 2025-05-23

## 2025-05-23 RX ORDER — FUROSEMIDE 20 MG/1
TABLET ORAL
Qty: 90 TABLET | Refills: 3 | Status: SHIPPED | OUTPATIENT
Start: 2025-05-23

## 2025-05-23 NOTE — ASSESSMENT & PLAN NOTE
Stable on Cymbalta high dose update prescription  Orders:    DULoxetine (CYMBALTA) 60 MG capsule; Take 1 capsule (60 mg total) by mouth once daily.

## 2025-05-23 NOTE — ASSESSMENT & PLAN NOTE
Stable, takes Linzess regularly, update prescription  Orders:    linaCLOtide (LINZESS) 145 mcg Cap capsule; Take 1 capsule (145 mcg total) by mouth before breakfast.

## 2025-06-02 ENCOUNTER — LAB VISIT (OUTPATIENT)
Dept: LAB | Facility: HOSPITAL | Age: 70
End: 2025-06-02
Payer: MEDICARE

## 2025-06-02 ENCOUNTER — OFFICE VISIT (OUTPATIENT)
Dept: HEMATOLOGY/ONCOLOGY | Facility: CLINIC | Age: 70
End: 2025-06-02
Payer: MEDICARE

## 2025-06-02 ENCOUNTER — INFUSION (OUTPATIENT)
Dept: INFUSION THERAPY | Facility: HOSPITAL | Age: 70
End: 2025-06-02
Payer: MEDICARE

## 2025-06-02 VITALS
BODY MASS INDEX: 35.42 KG/M2 | OXYGEN SATURATION: 98 % | DIASTOLIC BLOOD PRESSURE: 67 MMHG | TEMPERATURE: 98 F | HEIGHT: 64 IN | WEIGHT: 207.5 LBS | RESPIRATION RATE: 18 BRPM | SYSTOLIC BLOOD PRESSURE: 178 MMHG | HEART RATE: 87 BPM

## 2025-06-02 DIAGNOSIS — C77.8 SECONDARY MALIGNANT NEOPLASM OF LYMPH NODES OF MULTIPLE SITES: ICD-10-CM

## 2025-06-02 DIAGNOSIS — M81.0 AGE-RELATED OSTEOPOROSIS WITHOUT CURRENT PATHOLOGICAL FRACTURE: ICD-10-CM

## 2025-06-02 DIAGNOSIS — I10 ESSENTIAL HYPERTENSION: ICD-10-CM

## 2025-06-02 DIAGNOSIS — D49.9 IMMUNODEFICIENCY SECONDARY TO NEOPLASM: ICD-10-CM

## 2025-06-02 DIAGNOSIS — C7A.8 PRIMARY MALIGNANT NEUROENDOCRINE TUMOR OF SMALL INTESTINE: ICD-10-CM

## 2025-06-02 DIAGNOSIS — Z79.899 IMMUNODEFICIENCY DUE TO DRUGS: ICD-10-CM

## 2025-06-02 DIAGNOSIS — C7A.8 PRIMARY MALIGNANT NEUROENDOCRINE TUMOR OF SMALL INTESTINE: Primary | ICD-10-CM

## 2025-06-02 DIAGNOSIS — D84.821 IMMUNODEFICIENCY DUE TO DRUGS: ICD-10-CM

## 2025-06-02 DIAGNOSIS — C78.7 SECONDARY MALIGNANT NEOPLASM OF LIVER: ICD-10-CM

## 2025-06-02 DIAGNOSIS — G89.3 CANCER-RELATED PAIN: ICD-10-CM

## 2025-06-02 DIAGNOSIS — D84.81 IMMUNODEFICIENCY SECONDARY TO NEOPLASM: ICD-10-CM

## 2025-06-02 LAB
25(OH)D3+25(OH)D2 SERPL-MCNC: 6 NG/ML (ref 30–96)
ABSOLUTE EOSINOPHIL (OHS): 0.19 K/UL
ABSOLUTE MONOCYTE (OHS): 0.77 K/UL (ref 0.3–1)
ABSOLUTE NEUTROPHIL COUNT (OHS): 6.5 K/UL (ref 1.8–7.7)
ALBUMIN SERPL BCP-MCNC: 3.6 G/DL (ref 3.5–5.2)
ALP SERPL-CCNC: 111 UNIT/L (ref 40–150)
ALT SERPL W/O P-5'-P-CCNC: 19 UNIT/L (ref 10–44)
ANION GAP (OHS): 11 MMOL/L (ref 8–16)
AST SERPL-CCNC: 24 UNIT/L (ref 11–45)
BASOPHILS # BLD AUTO: 0.05 K/UL
BASOPHILS NFR BLD AUTO: 0.5 %
BILIRUB SERPL-MCNC: 0.3 MG/DL (ref 0.1–1)
BUN SERPL-MCNC: 9 MG/DL (ref 8–23)
CALCIUM SERPL-MCNC: 9.3 MG/DL (ref 8.7–10.5)
CHLORIDE SERPL-SCNC: 102 MMOL/L (ref 95–110)
CO2 SERPL-SCNC: 26 MMOL/L (ref 23–29)
CREAT SERPL-MCNC: 0.7 MG/DL (ref 0.5–1.4)
ERYTHROCYTE [DISTWIDTH] IN BLOOD BY AUTOMATED COUNT: 14.7 % (ref 11.5–14.5)
GFR SERPLBLD CREATININE-BSD FMLA CKD-EPI: >60 ML/MIN/1.73/M2
GLUCOSE SERPL-MCNC: 135 MG/DL (ref 70–110)
HCT VFR BLD AUTO: 44 % (ref 37–48.5)
HGB BLD-MCNC: 13.7 GM/DL (ref 12–16)
IMM GRANULOCYTES # BLD AUTO: 0.06 K/UL (ref 0–0.04)
IMM GRANULOCYTES NFR BLD AUTO: 0.6 % (ref 0–0.5)
LYMPHOCYTES # BLD AUTO: 2.58 K/UL (ref 1–4.8)
MCH RBC QN AUTO: 27.8 PG (ref 27–31)
MCHC RBC AUTO-ENTMCNC: 31.1 G/DL (ref 32–36)
MCV RBC AUTO: 89 FL (ref 82–98)
NUCLEATED RBC (/100WBC) (OHS): 0 /100 WBC
PLATELET # BLD AUTO: 280 K/UL (ref 150–450)
PMV BLD AUTO: 12.1 FL (ref 9.2–12.9)
POTASSIUM SERPL-SCNC: 4.3 MMOL/L (ref 3.5–5.1)
PROT SERPL-MCNC: 8 GM/DL (ref 6–8.4)
RBC # BLD AUTO: 4.92 M/UL (ref 4–5.4)
RELATIVE EOSINOPHIL (OHS): 1.9 %
RELATIVE LYMPHOCYTE (OHS): 25.4 % (ref 18–48)
RELATIVE MONOCYTE (OHS): 7.6 % (ref 4–15)
RELATIVE NEUTROPHIL (OHS): 64 % (ref 38–73)
SODIUM SERPL-SCNC: 139 MMOL/L (ref 136–145)
WBC # BLD AUTO: 10.15 K/UL (ref 3.9–12.7)

## 2025-06-02 PROCEDURE — 1125F AMNT PAIN NOTED PAIN PRSNT: CPT | Mod: CPTII,S$GLB,, | Performed by: PHYSICIAN ASSISTANT

## 2025-06-02 PROCEDURE — 83519 RIA NONANTIBODY: CPT

## 2025-06-02 PROCEDURE — 1159F MED LIST DOCD IN RCRD: CPT | Mod: CPTII,S$GLB,, | Performed by: PHYSICIAN ASSISTANT

## 2025-06-02 PROCEDURE — 3077F SYST BP >= 140 MM HG: CPT | Mod: CPTII,S$GLB,, | Performed by: PHYSICIAN ASSISTANT

## 2025-06-02 PROCEDURE — 1101F PT FALLS ASSESS-DOCD LE1/YR: CPT | Mod: CPTII,S$GLB,, | Performed by: PHYSICIAN ASSISTANT

## 2025-06-02 PROCEDURE — 85025 COMPLETE CBC W/AUTO DIFF WBC: CPT

## 2025-06-02 PROCEDURE — 99215 OFFICE O/P EST HI 40 MIN: CPT | Mod: S$GLB,,, | Performed by: PHYSICIAN ASSISTANT

## 2025-06-02 PROCEDURE — 1160F RVW MEDS BY RX/DR IN RCRD: CPT | Mod: CPTII,S$GLB,, | Performed by: PHYSICIAN ASSISTANT

## 2025-06-02 PROCEDURE — 63600175 PHARM REV CODE 636 W HCPCS: Mod: JZ,TB | Performed by: INTERNAL MEDICINE

## 2025-06-02 PROCEDURE — 84260 ASSAY OF SEROTONIN: CPT

## 2025-06-02 PROCEDURE — 3008F BODY MASS INDEX DOCD: CPT | Mod: CPTII,S$GLB,, | Performed by: PHYSICIAN ASSISTANT

## 2025-06-02 PROCEDURE — 3078F DIAST BP <80 MM HG: CPT | Mod: CPTII,S$GLB,, | Performed by: PHYSICIAN ASSISTANT

## 2025-06-02 PROCEDURE — 36415 COLL VENOUS BLD VENIPUNCTURE: CPT

## 2025-06-02 PROCEDURE — G2211 COMPLEX E/M VISIT ADD ON: HCPCS | Mod: S$GLB,,, | Performed by: PHYSICIAN ASSISTANT

## 2025-06-02 PROCEDURE — 83497 ASSAY OF 5-HIAA: CPT

## 2025-06-02 PROCEDURE — 96372 THER/PROPH/DIAG INJ SC/IM: CPT

## 2025-06-02 PROCEDURE — 3288F FALL RISK ASSESSMENT DOCD: CPT | Mod: CPTII,S$GLB,, | Performed by: PHYSICIAN ASSISTANT

## 2025-06-02 PROCEDURE — 82040 ASSAY OF SERUM ALBUMIN: CPT

## 2025-06-02 PROCEDURE — 99999 PR PBB SHADOW E&M-EST. PATIENT-LVL IV: CPT | Mod: PBBFAC,,, | Performed by: PHYSICIAN ASSISTANT

## 2025-06-02 PROCEDURE — 82306 VITAMIN D 25 HYDROXY: CPT

## 2025-06-02 RX ORDER — LANREOTIDE ACETATE 120 MG/.5ML
120 INJECTION SUBCUTANEOUS ONCE
OUTPATIENT
Start: 2025-06-02 | End: 2025-06-02

## 2025-06-02 RX ORDER — LANREOTIDE ACETATE 120 MG/.5ML
120 INJECTION SUBCUTANEOUS ONCE
Status: COMPLETED | OUTPATIENT
Start: 2025-06-02 | End: 2025-06-02

## 2025-06-02 RX ORDER — LANREOTIDE ACETATE 120 MG/.5ML
120 INJECTION SUBCUTANEOUS ONCE
Status: CANCELLED | OUTPATIENT
Start: 2025-06-02 | End: 2025-06-02

## 2025-06-02 RX ORDER — OXYCODONE HYDROCHLORIDE 10 MG/1
10 TABLET ORAL EVERY 6 HOURS PRN
Qty: 90 TABLET | Refills: 0 | Status: SHIPPED | OUTPATIENT
Start: 2025-06-02

## 2025-06-02 RX ADMIN — LANREOTIDE ACETATE 120 MG: 120 INJECTION SUBCUTANEOUS at 12:06

## 2025-06-05 ENCOUNTER — RESULTS FOLLOW-UP (OUTPATIENT)
Dept: FAMILY MEDICINE | Facility: CLINIC | Age: 70
End: 2025-06-05
Payer: MEDICARE

## 2025-06-05 DIAGNOSIS — M81.0 AGE-RELATED OSTEOPOROSIS WITHOUT CURRENT PATHOLOGICAL FRACTURE: ICD-10-CM

## 2025-06-05 DIAGNOSIS — E55.9 VITAMIN D DEFICIENCY: Primary | ICD-10-CM

## 2025-06-05 LAB
5OH-INDOLEACETATE SERPL-MCNC: 38 NG/ML
SEROTONIN SER-MCNC: 81 NG/ML

## 2025-06-05 RX ORDER — ERGOCALCIFEROL 1.25 MG/1
50000 CAPSULE ORAL
Qty: 8 CAPSULE | Refills: 0 | Status: SHIPPED | OUTPATIENT
Start: 2025-06-05 | End: 2025-08-04

## 2025-06-20 ENCOUNTER — TELEPHONE (OUTPATIENT)
Dept: OPTOMETRY | Facility: CLINIC | Age: 70
End: 2025-06-20
Payer: MEDICARE

## 2025-06-30 ENCOUNTER — INFUSION (OUTPATIENT)
Dept: INFUSION THERAPY | Facility: HOSPITAL | Age: 70
End: 2025-06-30
Payer: MEDICARE

## 2025-06-30 ENCOUNTER — LAB VISIT (OUTPATIENT)
Dept: LAB | Facility: HOSPITAL | Age: 70
End: 2025-06-30
Payer: MEDICARE

## 2025-06-30 ENCOUNTER — OFFICE VISIT (OUTPATIENT)
Dept: HEMATOLOGY/ONCOLOGY | Facility: CLINIC | Age: 70
End: 2025-06-30
Payer: MEDICARE

## 2025-06-30 VITALS
WEIGHT: 213.94 LBS | OXYGEN SATURATION: 95 % | TEMPERATURE: 97 F | BODY MASS INDEX: 36.52 KG/M2 | HEART RATE: 77 BPM | RESPIRATION RATE: 17 BRPM | DIASTOLIC BLOOD PRESSURE: 77 MMHG | HEIGHT: 64 IN | SYSTOLIC BLOOD PRESSURE: 142 MMHG

## 2025-06-30 DIAGNOSIS — C7A.8 PRIMARY MALIGNANT NEUROENDOCRINE TUMOR OF SMALL INTESTINE: ICD-10-CM

## 2025-06-30 DIAGNOSIS — C77.8 SECONDARY MALIGNANT NEOPLASM OF LYMPH NODES OF MULTIPLE SITES: ICD-10-CM

## 2025-06-30 DIAGNOSIS — C7A.8 PRIMARY MALIGNANT NEUROENDOCRINE TUMOR OF SMALL INTESTINE: Primary | ICD-10-CM

## 2025-06-30 DIAGNOSIS — M81.0 AGE-RELATED OSTEOPOROSIS WITHOUT CURRENT PATHOLOGICAL FRACTURE: ICD-10-CM

## 2025-06-30 DIAGNOSIS — D84.81 IMMUNODEFICIENCY SECONDARY TO NEOPLASM: ICD-10-CM

## 2025-06-30 DIAGNOSIS — C78.7 SECONDARY MALIGNANT NEOPLASM OF LIVER: ICD-10-CM

## 2025-06-30 DIAGNOSIS — D49.9 IMMUNODEFICIENCY SECONDARY TO NEOPLASM: ICD-10-CM

## 2025-06-30 DIAGNOSIS — I10 ESSENTIAL HYPERTENSION: ICD-10-CM

## 2025-06-30 DIAGNOSIS — D84.821 IMMUNODEFICIENCY DUE TO DRUGS: ICD-10-CM

## 2025-06-30 DIAGNOSIS — Z79.899 IMMUNODEFICIENCY DUE TO DRUGS: ICD-10-CM

## 2025-06-30 DIAGNOSIS — E55.9 VITAMIN D DEFICIENCY: ICD-10-CM

## 2025-06-30 LAB
25(OH)D3+25(OH)D2 SERPL-MCNC: 9 NG/ML (ref 30–96)
ABSOLUTE EOSINOPHIL (OHS): 0.26 K/UL
ABSOLUTE MONOCYTE (OHS): 0.62 K/UL (ref 0.3–1)
ABSOLUTE NEUTROPHIL COUNT (OHS): 4.43 K/UL (ref 1.8–7.7)
ALBUMIN SERPL BCP-MCNC: 3.6 G/DL (ref 3.5–5.2)
ALP SERPL-CCNC: 107 UNIT/L (ref 40–150)
ALT SERPL W/O P-5'-P-CCNC: 19 UNIT/L (ref 10–44)
ANION GAP (OHS): 12 MMOL/L (ref 8–16)
AST SERPL-CCNC: 28 UNIT/L (ref 11–45)
BASOPHILS # BLD AUTO: 0.04 K/UL
BASOPHILS NFR BLD AUTO: 0.5 %
BILIRUB SERPL-MCNC: 0.2 MG/DL (ref 0.1–1)
BUN SERPL-MCNC: 8 MG/DL (ref 8–23)
CALCIUM SERPL-MCNC: 8.8 MG/DL (ref 8.7–10.5)
CHLORIDE SERPL-SCNC: 105 MMOL/L (ref 95–110)
CO2 SERPL-SCNC: 23 MMOL/L (ref 23–29)
CREAT SERPL-MCNC: 0.7 MG/DL (ref 0.5–1.4)
ERYTHROCYTE [DISTWIDTH] IN BLOOD BY AUTOMATED COUNT: 14.6 % (ref 11.5–14.5)
GFR SERPLBLD CREATININE-BSD FMLA CKD-EPI: >60 ML/MIN/1.73/M2
GLUCOSE SERPL-MCNC: 127 MG/DL (ref 70–110)
HCT VFR BLD AUTO: 41.8 % (ref 37–48.5)
HGB BLD-MCNC: 13.1 GM/DL (ref 12–16)
IMM GRANULOCYTES # BLD AUTO: 0.03 K/UL (ref 0–0.04)
IMM GRANULOCYTES NFR BLD AUTO: 0.4 % (ref 0–0.5)
LYMPHOCYTES # BLD AUTO: 2.54 K/UL (ref 1–4.8)
MCH RBC QN AUTO: 27.7 PG (ref 27–31)
MCHC RBC AUTO-ENTMCNC: 31.3 G/DL (ref 32–36)
MCV RBC AUTO: 88 FL (ref 82–98)
NUCLEATED RBC (/100WBC) (OHS): 0 /100 WBC
PLATELET # BLD AUTO: 272 K/UL (ref 150–450)
PMV BLD AUTO: 12.3 FL (ref 9.2–12.9)
POTASSIUM SERPL-SCNC: 3.8 MMOL/L (ref 3.5–5.1)
PROT SERPL-MCNC: 7.6 GM/DL (ref 6–8.4)
RBC # BLD AUTO: 4.73 M/UL (ref 4–5.4)
RELATIVE EOSINOPHIL (OHS): 3.3 %
RELATIVE LYMPHOCYTE (OHS): 32.1 % (ref 18–48)
RELATIVE MONOCYTE (OHS): 7.8 % (ref 4–15)
RELATIVE NEUTROPHIL (OHS): 55.9 % (ref 38–73)
SODIUM SERPL-SCNC: 140 MMOL/L (ref 136–145)
WBC # BLD AUTO: 7.92 K/UL (ref 3.9–12.7)

## 2025-06-30 PROCEDURE — 84460 ALANINE AMINO (ALT) (SGPT): CPT | Mod: HCNC

## 2025-06-30 PROCEDURE — 3078F DIAST BP <80 MM HG: CPT | Mod: CPTII,HCNC,S$GLB, | Performed by: INTERNAL MEDICINE

## 2025-06-30 PROCEDURE — 63600175 PHARM REV CODE 636 W HCPCS: Mod: JZ,TB,HCNC | Performed by: INTERNAL MEDICINE

## 2025-06-30 PROCEDURE — 36415 COLL VENOUS BLD VENIPUNCTURE: CPT | Mod: HCNC

## 2025-06-30 PROCEDURE — 84260 ASSAY OF SEROTONIN: CPT | Mod: HCNC

## 2025-06-30 PROCEDURE — 1125F AMNT PAIN NOTED PAIN PRSNT: CPT | Mod: CPTII,HCNC,S$GLB, | Performed by: INTERNAL MEDICINE

## 2025-06-30 PROCEDURE — 3288F FALL RISK ASSESSMENT DOCD: CPT | Mod: CPTII,HCNC,S$GLB, | Performed by: INTERNAL MEDICINE

## 2025-06-30 PROCEDURE — 99999 PR PBB SHADOW E&M-EST. PATIENT-LVL V: CPT | Mod: PBBFAC,HCNC,, | Performed by: INTERNAL MEDICINE

## 2025-06-30 PROCEDURE — 82306 VITAMIN D 25 HYDROXY: CPT | Mod: HCNC

## 2025-06-30 PROCEDURE — 85025 COMPLETE CBC W/AUTO DIFF WBC: CPT | Mod: HCNC

## 2025-06-30 PROCEDURE — 1160F RVW MEDS BY RX/DR IN RCRD: CPT | Mod: CPTII,HCNC,S$GLB, | Performed by: INTERNAL MEDICINE

## 2025-06-30 PROCEDURE — 1159F MED LIST DOCD IN RCRD: CPT | Mod: CPTII,HCNC,S$GLB, | Performed by: INTERNAL MEDICINE

## 2025-06-30 PROCEDURE — 83519 RIA NONANTIBODY: CPT | Mod: HCNC

## 2025-06-30 PROCEDURE — G2211 COMPLEX E/M VISIT ADD ON: HCPCS | Mod: HCNC,S$GLB,, | Performed by: INTERNAL MEDICINE

## 2025-06-30 PROCEDURE — 1101F PT FALLS ASSESS-DOCD LE1/YR: CPT | Mod: CPTII,HCNC,S$GLB, | Performed by: INTERNAL MEDICINE

## 2025-06-30 PROCEDURE — 96372 THER/PROPH/DIAG INJ SC/IM: CPT | Mod: HCNC

## 2025-06-30 PROCEDURE — 3008F BODY MASS INDEX DOCD: CPT | Mod: CPTII,HCNC,S$GLB, | Performed by: INTERNAL MEDICINE

## 2025-06-30 PROCEDURE — 3077F SYST BP >= 140 MM HG: CPT | Mod: CPTII,HCNC,S$GLB, | Performed by: INTERNAL MEDICINE

## 2025-06-30 PROCEDURE — 83497 ASSAY OF 5-HIAA: CPT | Mod: HCNC

## 2025-06-30 PROCEDURE — 99214 OFFICE O/P EST MOD 30 MIN: CPT | Mod: HCNC,S$GLB,, | Performed by: INTERNAL MEDICINE

## 2025-06-30 RX ORDER — LANREOTIDE ACETATE 120 MG/.5ML
120 INJECTION SUBCUTANEOUS ONCE
Status: COMPLETED | OUTPATIENT
Start: 2025-06-30 | End: 2025-06-30

## 2025-06-30 RX ORDER — LANREOTIDE ACETATE 120 MG/.5ML
120 INJECTION SUBCUTANEOUS ONCE
OUTPATIENT
Start: 2025-06-30 | End: 2025-06-30

## 2025-06-30 RX ADMIN — LANREOTIDE ACETATE 120 MG: 120 INJECTION SUBCUTANEOUS at 02:06
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
## 2025-07-02 ENCOUNTER — PATIENT MESSAGE (OUTPATIENT)
Dept: ADMINISTRATIVE | Facility: OTHER | Age: 70
End: 2025-07-02
Payer: MEDICARE

## 2025-07-03 DIAGNOSIS — C7A.8 PRIMARY MALIGNANT NEUROENDOCRINE TUMOR OF SMALL INTESTINE: Primary | ICD-10-CM

## 2025-07-03 DIAGNOSIS — C78.7 SECONDARY MALIGNANT NEOPLASM OF LIVER: ICD-10-CM

## 2025-07-03 DIAGNOSIS — C77.8 SECONDARY MALIGNANT NEOPLASM OF LYMPH NODES OF MULTIPLE SITES: ICD-10-CM

## 2025-07-03 LAB
5OH-INDOLEACETATE SERPL-MCNC: 26 NG/ML
SEROTONIN SER-MCNC: 155 NG/ML

## 2025-07-09 ENCOUNTER — HOSPITAL ENCOUNTER (EMERGENCY)
Facility: HOSPITAL | Age: 70
Discharge: HOME | End: 2025-07-09
Attending: EMERGENCY MEDICINE
Payer: MEDICARE

## 2025-07-09 VITALS
RESPIRATION RATE: 18 BRPM | BODY MASS INDEX: 36.37 KG/M2 | SYSTOLIC BLOOD PRESSURE: 140 MMHG | DIASTOLIC BLOOD PRESSURE: 87 MMHG | TEMPERATURE: 98 F | HEART RATE: 80 BPM | OXYGEN SATURATION: 97 % | WEIGHT: 213 LBS | HEIGHT: 64 IN

## 2025-07-09 DIAGNOSIS — R07.9 CHEST PAIN: ICD-10-CM

## 2025-07-09 LAB
ABSOLUTE EOSINOPHIL (OHS): 0.24 K/UL
ABSOLUTE MONOCYTE (OHS): 0.76 K/UL (ref 0.3–1)
ABSOLUTE NEUTROPHIL COUNT (OHS): 6.15 K/UL (ref 1.8–7.7)
ALBUMIN SERPL BCP-MCNC: 3.7 G/DL (ref 3.5–5.2)
ALP SERPL-CCNC: 113 UNIT/L (ref 40–150)
ALT SERPL W/O P-5'-P-CCNC: 23 UNIT/L (ref 10–44)
ANION GAP (OHS): 9 MMOL/L (ref 8–16)
AST SERPL-CCNC: 28 UNIT/L (ref 11–45)
BASOPHILS # BLD AUTO: 0.05 K/UL
BASOPHILS NFR BLD AUTO: 0.5 %
BILIRUB SERPL-MCNC: 0.4 MG/DL (ref 0.1–1)
BNP SERPL-MCNC: 14 PG/ML (ref 0–99)
BUN SERPL-MCNC: 8 MG/DL (ref 8–23)
CALCIUM SERPL-MCNC: 9.1 MG/DL (ref 8.7–10.5)
CHLORIDE SERPL-SCNC: 105 MMOL/L (ref 95–110)
CO2 SERPL-SCNC: 24 MMOL/L (ref 23–29)
CREAT SERPL-MCNC: 0.7 MG/DL (ref 0.5–1.4)
ERYTHROCYTE [DISTWIDTH] IN BLOOD BY AUTOMATED COUNT: 15 % (ref 11.5–14.5)
GFR SERPLBLD CREATININE-BSD FMLA CKD-EPI: >60 ML/MIN/1.73/M2
GLUCOSE SERPL-MCNC: 110 MG/DL (ref 70–110)
HCT VFR BLD AUTO: 42.9 % (ref 37–48.5)
HCV AB SERPL QL IA: NORMAL
HGB BLD-MCNC: 14 GM/DL (ref 12–16)
HIV 1+2 AB+HIV1 P24 AG SERPL QL IA: NORMAL
HOLD SPECIMEN: NORMAL
IMM GRANULOCYTES # BLD AUTO: 0.03 K/UL (ref 0–0.04)
IMM GRANULOCYTES NFR BLD AUTO: 0.3 % (ref 0–0.5)
LYMPHOCYTES # BLD AUTO: 2.62 K/UL (ref 1–4.8)
MCH RBC QN AUTO: 28.5 PG (ref 27–31)
MCHC RBC AUTO-ENTMCNC: 32.6 G/DL (ref 32–36)
MCV RBC AUTO: 87 FL (ref 82–98)
NUCLEATED RBC (/100WBC) (OHS): 0 /100 WBC
OHS QRS DURATION: 84 MS
OHS QTC CALCULATION: 427 MS
PLATELET # BLD AUTO: 326 K/UL (ref 150–450)
PMV BLD AUTO: 11.8 FL (ref 9.2–12.9)
POTASSIUM SERPL-SCNC: 3.6 MMOL/L (ref 3.5–5.1)
PROT SERPL-MCNC: 7.9 GM/DL (ref 6–8.4)
RBC # BLD AUTO: 4.91 M/UL (ref 4–5.4)
RELATIVE EOSINOPHIL (OHS): 2.4 %
RELATIVE LYMPHOCYTE (OHS): 26.6 % (ref 18–48)
RELATIVE MONOCYTE (OHS): 7.7 % (ref 4–15)
RELATIVE NEUTROPHIL (OHS): 62.5 % (ref 38–73)
SODIUM SERPL-SCNC: 138 MMOL/L (ref 136–145)
TROPONIN I SERPL HS-MCNC: <3 NG/L
TROPONIN I SERPL HS-MCNC: <3 NG/L
WBC # BLD AUTO: 9.85 K/UL (ref 3.9–12.7)

## 2025-07-09 PROCEDURE — 83880 ASSAY OF NATRIURETIC PEPTIDE: CPT | Mod: HCNC | Performed by: EMERGENCY MEDICINE

## 2025-07-09 PROCEDURE — 25500020 PHARM REV CODE 255: Mod: HCNC | Performed by: EMERGENCY MEDICINE

## 2025-07-09 PROCEDURE — 25000003 PHARM REV CODE 250: Mod: HCNC | Performed by: EMERGENCY MEDICINE

## 2025-07-09 PROCEDURE — 84484 ASSAY OF TROPONIN QUANT: CPT | Mod: HCNC | Performed by: EMERGENCY MEDICINE

## 2025-07-09 PROCEDURE — 80053 COMPREHEN METABOLIC PANEL: CPT | Mod: HCNC | Performed by: EMERGENCY MEDICINE

## 2025-07-09 PROCEDURE — 93005 ELECTROCARDIOGRAM TRACING: CPT | Mod: HCNC

## 2025-07-09 PROCEDURE — 85025 COMPLETE CBC W/AUTO DIFF WBC: CPT | Mod: HCNC | Performed by: EMERGENCY MEDICINE

## 2025-07-09 PROCEDURE — 86803 HEPATITIS C AB TEST: CPT | Mod: HCNC | Performed by: PHYSICIAN ASSISTANT

## 2025-07-09 PROCEDURE — 93010 ELECTROCARDIOGRAM REPORT: CPT | Mod: HCNC,,, | Performed by: INTERNAL MEDICINE

## 2025-07-09 PROCEDURE — 99285 EMERGENCY DEPT VISIT HI MDM: CPT | Mod: 25,HCNC

## 2025-07-09 PROCEDURE — 87389 HIV-1 AG W/HIV-1&-2 AB AG IA: CPT | Mod: HCNC | Performed by: PHYSICIAN ASSISTANT

## 2025-07-09 RX ORDER — CYCLOBENZAPRINE HCL 10 MG
10 TABLET ORAL
Status: COMPLETED | OUTPATIENT
Start: 2025-07-09 | End: 2025-07-09

## 2025-07-09 RX ORDER — MORPHINE SULFATE 2 MG/ML
6 INJECTION, SOLUTION INTRAMUSCULAR; INTRAVENOUS
Refills: 0 | Status: CANCELLED | OUTPATIENT
Start: 2025-07-09 | End: 2025-07-09

## 2025-07-09 RX ADMIN — IOHEXOL 100 ML: 350 INJECTION, SOLUTION INTRAVENOUS at 07:07

## 2025-07-09 RX ADMIN — CYCLOBENZAPRINE 10 MG: 10 TABLET, FILM COATED ORAL at 09:07

## 2025-07-09 NOTE — ED NOTES
Received report from LELO Garcia    LOC: The patient is awake, alert and aware of environment with an appropriate affect, the patient is oriented x 3 and speaking appropriately.   APPEARANCE: Patient appears comfortable and in no acute distress, patient is clean and well groomed.  SKIN: The skin is warm and dry, color consistent with ethnicity, patient has normal skin turgor and moist mucus membranes, skin intact, no breakdown or bruising noted.   MUSCULOSKELETAL: Patient moving all extremities spontaneously, no swelling noted. Pt reports neck pain, shoulder pain, provider notified.  RESPIRATORY: Airway is open and patent, respirations are spontaneous, patient has a normal effort and rate, no accessory muscle. Denies SOB, currently on RA, no labored breathing noted.  CARDIAC: Pt placed on cardiac monitor. Patient has a normal rate and regular rhythm, no edema noted, capillary refill < 3 seconds. Denies CP.  GASTRO: Soft and non tender to palpation, no distention noted. Denies N/V/D.  : Pt denies any pain or frequency with urination.  NEURO: Pt opens eyes spontaneously, behavior appropriate to situation, follows commands, facial expression symmetrical, bilateral hand grasp equal and even, purposeful motor response noted, normal sensation in all extremities when touched with a finger.

## 2025-07-09 NOTE — DISCHARGE INSTRUCTIONS
As explained, your labs and imaging did not reveal a emergent cause of your pain.    I recommend you do not  your 2-year-old.  Use warm compresses over the area of pain.  Take your Flexeril as needed for spasms and take your tramadol as needed for pain.  Both of these medications are sedating and can be dangerous when taking simultaneously.  Do not take more than prescribed or necessary.    Return to the ER for any worsening pain, weakness in your arm, shortness of breath, or other concerning symptoms.

## 2025-07-09 NOTE — ED PROVIDER NOTES
Encounter Date: 7/9/2025       History     Chief Complaint   Patient presents with    Chest Pain     Pt states pain to the left side of her chest that goes into her shoulder and down her arm since Monday night. Denies nausea/vomiting, denies shortness of breath     This patient is a 70-year-old female Past Medical History:  06/21/2024: Abnormal glucose  No date: Asthma  01/14/2021: Chest pain, atypical  No date: Depression  07/01/2012: Gastric ulcer with hemorrhage  No date: GERD (gastroesophageal reflux disease)  No date: History of blood transfusion  No date: Hypertension  10/09/2024: Infection caused by Enterobacter cloacae  03/14/2013: Iron deficiency anemia  No date: Osteoarthritis of both knees  1989: Pituitary adenoma      Comment:  s/p transphenoidal resection  06/2023: Primary malignant neuroendocrine tumor of small intestine  09/09/2019: Status post transsphenoidal pituitary resection for tumor,   Dr Cardenas, about 1989    Presenting to the emergency department complaints of aching style central upper and left upper chest pain that has been present for over 1 day and a half.  She denies associated with shortness of breath, swelling of the extremities, past history of DVT or PE, abdominal pain, back pain, nausea, vomiting, cough, fever.  Reports pain is refractory to tramadol that she takes at home.  She has a history of malignant neuroendocrine tumor of the small intestine maintained on:   LANREOTIDE for Primary malignant neuroendocrine tumor of small intestine, noted on 7/18/2023  LANREOTIDE for Secondary malignant neoplasm of liver, noted on 7/18/2023  5. Medications  lanreotide injection 120 mg  120 mg, Subcutaneous, Every visit        Review of patient's allergies indicates:   Allergen Reactions    Nsaids (non-steroidal anti-inflammatory drug)      Gi bleed    Penicillins Itching and Swelling    Penicillin     Symbicort [budesonide-formoterol]      Recurrent oral ulcers     Past Medical History:    Diagnosis Date    Abnormal glucose 06/21/2024    Asthma     Chest pain, atypical 01/14/2021    Depression     Gastric ulcer with hemorrhage 07/01/2012    GERD (gastroesophageal reflux disease)     History of blood transfusion     Hypertension     Infection caused by Enterobacter cloacae 10/09/2024    Iron deficiency anemia 03/14/2013    Osteoarthritis of both knees     Pituitary adenoma 1989    s/p transphenoidal resection    Primary malignant neuroendocrine tumor of small intestine 06/2023    Status post transsphenoidal pituitary resection for tumor, Dr Cardenas, about 1989 09/09/2019     Past Surgical History:   Procedure Laterality Date    BREAST BIOPSY      BREAST SURGERY      Benign breast cyst    COLONOSCOPY      COLONOSCOPY N/A 10/4/2024    Procedure: COLONOSCOPY;  Surgeon: Carloz Alston MD;  Location: [facility];  Service: Endoscopy;  Laterality: N/A;  Ref by Dr ERIK Carrera, Suprep, portal - PC  8/28/24-LVM for precall, portal-DS  8/30/24-Precall complete-DS  9/5 Pt rescheduled. Suprep, portal.lopez  9/23-LVM for precall-Kpvt  9/26-pt r/s, updated instructions sent to portal, pt knows procedure will be done with first available and ti    ENDOSCOPIC ULTRASOUND OF UPPER GASTROINTESTINAL TRACT N/A 06/27/2023    Procedure: ULTRASOUND, UPPER GI TRACT, ENDOSCOPIC;  Surgeon: Johnathan aCntu MD;  Location: [facility];  Service: Endoscopy;  Laterality: N/A;    ESOPHAGOGASTRODUODENOSCOPY N/A 10/07/2020    Procedure: EGD (ESOPHAGOGASTRODUODENOSCOPY);  Surgeon: Nell Parish MD;  [facility]: [facility] ENDO;  Service: Endoscopy;  Laterality: N/A;    ESOPHAGOGASTRODUODENOSCOPY N/A 06/30/2021    Procedure: EGD (ESOPHAGOGASTRODUODENOSCOPY);  Surgeon: Nell Parish MD;  Location: [facility];  Service: Endoscopy;  Laterality: N/A;    HYSTERECTOMY  1981    due to uterine fibroids    KNEE ARTHROPLASTY Left 01/10/2022    Procedure: ARTHROPLASTY, KNEE;  Surgeon: Jonnathan Tavera MD;  Location: [facility];  Service: Orthopedics;   Laterality: Left;    TONSILLECTOMY      TRANSPHENOIDAL PITUITARY RESECTION  1989    Dr Cardenas     Family History   Problem Relation Name Age of Onset    Cancer Father          unknown type, between his kidneys and liver, cause of death    Heart disease Father      Hypertension Father      Breast cancer Other Harriett 48    Breast cancer Other Orsion 45    Uterine cancer Other Letrika     Uterine cancer Other Eliza 45    Rectal cancer Maternal Aunt       Social History[1]  Review of Systems   Cardiovascular:  Positive for chest pain.       Physical Exam     Initial Vitals [07/09/25 0440]   BP Pulse Resp Temp SpO2   (!) 184/97 70 18 97.6 °F (36.4 °C) 96 %      MAP       --         Physical Exam    Constitutional: Vital signs are normal. She appears well-nourished.   HENT:   Head: Normocephalic and atraumatic.   Eyes: Conjunctivae and EOM are normal.   Neck: Neck supple. No thyroid mass present.   Normal range of motion.  Cardiovascular:  Normal rate, regular rhythm and normal heart sounds.     Exam reveals no gallop and no friction rub.       No murmur heard.  Pulmonary/Chest: Breath sounds normal. She has no wheezes. She has no rhonchi. She has no rales. She exhibits tenderness.   Tenderness palpation left upper chest, no overlying skin changes including rash   Abdominal: Abdomen is soft. Bowel sounds are normal. There is no abdominal tenderness.   Musculoskeletal:         General: No edema. Normal range of motion.      Cervical back: Normal range of motion and neck supple.     Neurological: She is alert and oriented to person, place, and time. She has normal strength. No cranial nerve deficit or sensory deficit. GCS score is 15. GCS eye subscore is 4. GCS verbal subscore is 5. GCS motor subscore is 6.   Skin: Skin is warm and dry. Capillary refill takes less than 2 seconds. No rash noted. No erythema.   Psychiatric: She has a normal mood and affect. Her speech is normal. Thought content normal. Cognition and memory  are normal.         ED Course   Procedures  Labs Reviewed   CBC WITH DIFFERENTIAL - Abnormal       Result Value    WBC 9.85      RBC 4.91      HGB 14.0      HCT 42.9      MCV 87      MCH 28.5      MCHC 32.6      RDW 15.0 (*)     Platelet Count 326      MPV 11.8      Nucleated RBC 0      Neut % 62.5      Lymph % 26.6      Mono % 7.7      Eos % 2.4      Basophil % 0.5      Imm Grans % 0.3      Neut # 6.15      Lymph # 2.62      Mono # 0.76      Eos # 0.24      Baso # 0.05      Imm Grans # 0.03     HEPATITIS C ANTIBODY - Normal    Hep C Ab Interp Non-Reactive     HIV 1 / 2 ANTIBODY - Normal    HIV 1/2 Ag/Ab Non-Reactive     COMPREHENSIVE METABOLIC PANEL - Normal    Sodium 138      Potassium 3.6      Chloride 105      CO2 24      Glucose 110      BUN 8      Creatinine 0.7      Calcium 9.1      Protein Total 7.9      Albumin 3.7      Bilirubin Total 0.4            AST 28      ALT 23      Anion Gap 9      eGFR >60     TROPONIN I HIGH SENSITIVITY - Normal    Troponin High Sensitive <3     TROPONIN I HIGH SENSITIVITY - Normal    Troponin High Sensitive <3     B-TYPE NATRIURETIC PEPTIDE - Normal    BNP 14     CBC W/ AUTO DIFFERENTIAL    Narrative:     The following orders were created for panel order CBC auto differential.  Procedure                               Abnormality         Status                     ---------                               -----------         ------                     CBC with Differential[[id number]]       Abnormal            Final result                 Please view results for these tests on the individual orders.   EXTRA TUBES    Narrative:     The following orders were created for panel order EXTRA TUBES.  Procedure                               Abnormality         Status                     ---------                               -----------         ------                     Light Blue Top Hold[[id number]]                             Final result                 Please view results for  these tests on the individual orders.   LIGHT BLUE TOP HOLD    Extra Tube Hold for add-ons.     HEP C VIRUS HOLD SPECIMEN        ECG Results              EKG 12-lead (Final result)        Collection Time Result Time QRS Duration OHS QTC Calculation    07/09/25 04:47:05 07/09/25 08:40:23 84 427                     Final result by Interface, Lab In [facility] (07/09/25 08:40:29)                   Narrative:    Test Reason : R07.9,    Vent. Rate :  73 BPM     Atrial Rate :  73 BPM     P-R Int : 158 ms          QRS Dur :  84 ms      QT Int : 388 ms       P-R-T Axes :  56  57  73 degrees    QTcB Int : 427 ms    Normal sinus rhythm  Nonspecific T wave abnormality  Abnormal ECG  When compared with ECG of 16-Dec-2024 11:46,  No significant change was found  Confirmed by Samir Hernandez ([id number]) on 7/9/2025 8:40:21 AM    Referred By: AAAREFERRAL SELF           Confirmed By: Samir Hernandez                                  Imaging Results              CTA Chest Non-Coronary (PE Studies) (Final result)  Result time 07/09/25 08:15:48      Final result by Juan Carlos James MD (07/09/25 08:15:48)                   Impression:      1. No convincing evidence of acute pulmonary embolism to the lobar pulmonary arterial level.  2. No definite acute intrathoracic findings.  3. Additional details, as provided in the body of the report.      Electronically signed by: Juan Carlos James  Date:    07/09/2025  Time:    08:15               Narrative:    EXAMINATION:  CTA CHEST NON CORONARY (PE STUDIES)    CLINICAL HISTORY:  Pulmonary embolism (PE) suspected, high prob;    TECHNIQUE:  Low dose axial images, sagittal and coronal reformations were obtained from the thoracic inlet to the lung bases following the IV administration of 100 mL of Omnipaque 350.  Contrast timing was optimized to evaluate the pulmonary arteries.  MIP images were performed.    COMPARISON:  None    FINDINGS:  Exam quality: Study compromised by suboptimal contrast bolus timing,  respiratory motion, and dense contrast within the superior vena cava.  Exam is considered diagnostic to the lobar pulmonary arterial level.    Pulmonary embolism: No acute or chronic pulmonary embolism.    Central pulmonary arteries: Normal caliber.    Aorta: Normal caliber.    Heart: No right heart strain. Normal size.    Coronary arteries: No calcifications.    Pericardium: Normal. No effusion, thickening, or calcification.    Support tubes and lines: None.    Base of neck/thyroid: Normal.    Lymph nodes: No supraclavicular, axillary, internal mammary, mediastinal, or hilar adenopathy.    Esophagus: Normal.    Pleura: No effusion, thickening, or calcification.    Upper abdomen: Findings in keeping with hepatic steatosis.  Areas of submucosal fat attenuation involving the visualized stomach may reflect sequela of chronic nonspecific inflammation.    Body wall: Unremarkable    Airways: Normal.    Lungs: Assessment compromised by respiratory motion and nondedicated technique.  Minor dependent atelectasis.    Bones: No acute findings.  Degenerative findings of the spine.                                       X-Ray Chest AP Portable (Final result)  Result time 07/09/25 08:32:38      Final result by Juan Francisco Rodriguez MD (07/09/25 08:32:38)                   Impression:      See above      Electronically signed by: Juan Francisco Rodriguez MD  Date:    07/09/2025  Time:    08:32               Narrative:    EXAMINATION:  XR CHEST AP PORTABLE    CLINICAL HISTORY:  Chest Pain;    TECHNIQUE:  Single frontal view of the chest was performed.    COMPARISON:  07/09/2025 CT chest    FINDINGS:  Heart size normal.  The lungs are clear.  No pleural effusion                                       Medications   iohexoL (OMNIPAQUE 350) injection 100 mL (100 mLs Intravenous Given 7/9/25 0729)     Medical Decision Making  Patient evaluated in the presence of .  Reporting approx 36 hours of atypical left chest pain.  Vital signs stable high.  EKG  negative for acute ischemic waveforms.  Will proceed with ACS rule out, including CT angiogram PE protocol of the chest considering patient's history of active primary and secondary neuroendocrine.  Case signed out to oncoming EM physician regarding ultimate interpretation of results, imaging, disposition.  Patient in agreement with the plan of care.    Amount and/or Complexity of Data Reviewed  Radiology: ordered.    Risk  Prescription drug management.                                      Clinical Impression:  Final diagnoses:  [R07.9] Chest pain                       [1]   Social History  Tobacco Use    Smoking status: Never     Passive exposure: Never    Smokeless tobacco: Never   Substance Use Topics    Alcohol use: Yes     Alcohol/week: 1.0 standard drink of alcohol     Types: 1 Cans of beer per week     Comment: on ocassion    Drug use: No        Gonsalo Roche MD  07/09/25 0800

## 2025-07-09 NOTE — PROVIDER PROGRESS NOTES - EMERGENCY DEPT.
Encounter Date: 7/9/2025    ED Physician Progress Notes        Physician Note:   Received patient at sign-out  CTA negative for PE.  On repeat assessment, patient notes pain persists.  It is reproducible in the left upper chest near the clavicle.  She does admit to lifting her 1-year-old great grandchild more than usual lately.  Given reassuring labs and imaging, I encouraged her to stop doing that.  She has Flexeril and tramadol at home.  She has been using them intermittently.  She was encouraged to use them as needed for pain.  Also advised precautions for multiple sedating drugs.  Also encouraged warm compresses.  She is comfortable with discharge.  Provided with extensive return precautions.

## 2025-07-10 ENCOUNTER — PATIENT OUTREACH (OUTPATIENT)
Facility: OTHER | Age: 70
End: 2025-07-10
Payer: MEDICARE

## 2025-07-10 DIAGNOSIS — C7A.8 PRIMARY MALIGNANT NEUROENDOCRINE TUMOR OF SMALL INTESTINE: ICD-10-CM

## 2025-07-10 DIAGNOSIS — G89.3 CANCER-RELATED PAIN: ICD-10-CM

## 2025-07-12 LAB — HOLD SPECIMEN: NORMAL

## 2025-07-17 RX ORDER — TRAMADOL HYDROCHLORIDE 50 MG/1
50 TABLET, FILM COATED ORAL EVERY 8 HOURS PRN
Qty: 90 TABLET | Refills: 0 | Status: SHIPPED | OUTPATIENT
Start: 2025-07-17

## 2025-07-22 ENCOUNTER — TELEPHONE (OUTPATIENT)
Dept: HEMATOLOGY/ONCOLOGY | Facility: CLINIC | Age: 70
End: 2025-07-22
Payer: MEDICARE

## 2025-07-22 DIAGNOSIS — C7A.8 PRIMARY MALIGNANT NEUROENDOCRINE TUMOR OF SMALL INTESTINE: Primary | ICD-10-CM

## 2025-07-22 DIAGNOSIS — C78.7 SECONDARY MALIGNANT NEOPLASM OF LIVER: ICD-10-CM

## 2025-07-22 DIAGNOSIS — C77.8 SECONDARY MALIGNANT NEOPLASM OF LYMPH NODES OF MULTIPLE SITES: ICD-10-CM

## 2025-07-24 DIAGNOSIS — G89.3 CANCER-RELATED PAIN: ICD-10-CM

## 2025-07-24 DIAGNOSIS — C7A.8 PRIMARY MALIGNANT NEUROENDOCRINE TUMOR OF SMALL INTESTINE: ICD-10-CM

## 2025-07-24 RX ORDER — OXYCODONE HYDROCHLORIDE 10 MG/1
10 TABLET ORAL EVERY 6 HOURS PRN
Qty: 90 TABLET | Refills: 0 | Status: SHIPPED | OUTPATIENT
Start: 2025-07-24

## 2025-07-28 ENCOUNTER — INFUSION (OUTPATIENT)
Dept: INFUSION THERAPY | Facility: HOSPITAL | Age: 70
End: 2025-07-28
Payer: MEDICARE

## 2025-07-28 ENCOUNTER — LAB VISIT (OUTPATIENT)
Dept: LAB | Facility: HOSPITAL | Age: 70
End: 2025-07-28
Attending: INTERNAL MEDICINE
Payer: MEDICARE

## 2025-07-28 DIAGNOSIS — C78.7 SECONDARY MALIGNANT NEOPLASM OF LIVER: ICD-10-CM

## 2025-07-28 DIAGNOSIS — C77.8 SECONDARY MALIGNANT NEOPLASM OF LYMPH NODES OF MULTIPLE SITES: ICD-10-CM

## 2025-07-28 DIAGNOSIS — C7A.8 PRIMARY MALIGNANT NEUROENDOCRINE TUMOR OF SMALL INTESTINE: ICD-10-CM

## 2025-07-28 DIAGNOSIS — C7A.8 PRIMARY MALIGNANT NEUROENDOCRINE TUMOR OF SMALL INTESTINE: Primary | ICD-10-CM

## 2025-07-28 LAB
ABSOLUTE EOSINOPHIL (OHS): 0.2 K/UL
ABSOLUTE MONOCYTE (OHS): 0.94 K/UL (ref 0.3–1)
ABSOLUTE NEUTROPHIL COUNT (OHS): 5.08 K/UL (ref 1.8–7.7)
ALBUMIN SERPL BCP-MCNC: 3.7 G/DL (ref 3.5–5.2)
ALP SERPL-CCNC: 114 UNIT/L (ref 40–150)
ALT SERPL W/O P-5'-P-CCNC: 24 UNIT/L (ref 0–55)
ANION GAP (OHS): 10 MMOL/L (ref 8–16)
AST SERPL-CCNC: 32 UNIT/L (ref 0–50)
BASOPHILS # BLD AUTO: 0.05 K/UL
BASOPHILS NFR BLD AUTO: 0.5 %
BILIRUB SERPL-MCNC: 0.3 MG/DL (ref 0.1–1)
BUN SERPL-MCNC: 9 MG/DL (ref 8–23)
CALCIUM SERPL-MCNC: 9 MG/DL (ref 8.7–10.5)
CHLORIDE SERPL-SCNC: 102 MMOL/L (ref 95–110)
CO2 SERPL-SCNC: 25 MMOL/L (ref 23–29)
CREAT SERPL-MCNC: 0.7 MG/DL (ref 0.5–1.4)
ERYTHROCYTE [DISTWIDTH] IN BLOOD BY AUTOMATED COUNT: 14.8 % (ref 11.5–14.5)
GFR SERPLBLD CREATININE-BSD FMLA CKD-EPI: >60 ML/MIN/1.73/M2
GLUCOSE SERPL-MCNC: 108 MG/DL (ref 70–110)
HCT VFR BLD AUTO: 42.6 % (ref 37–48.5)
HGB BLD-MCNC: 13.2 GM/DL (ref 12–16)
IMM GRANULOCYTES # BLD AUTO: 0.03 K/UL (ref 0–0.04)
IMM GRANULOCYTES NFR BLD AUTO: 0.3 % (ref 0–0.5)
LYMPHOCYTES # BLD AUTO: 3.53 K/UL (ref 1–4.8)
MCH RBC QN AUTO: 27.7 PG (ref 27–31)
MCHC RBC AUTO-ENTMCNC: 31 G/DL (ref 32–36)
MCV RBC AUTO: 90 FL (ref 82–98)
NUCLEATED RBC (/100WBC) (OHS): 0 /100 WBC
PLATELET # BLD AUTO: 293 K/UL (ref 150–450)
PMV BLD AUTO: 11.7 FL (ref 9.2–12.9)
POTASSIUM SERPL-SCNC: 3.7 MMOL/L (ref 3.5–5.1)
PROT SERPL-MCNC: 8.1 GM/DL (ref 6–8.4)
RBC # BLD AUTO: 4.76 M/UL (ref 4–5.4)
RELATIVE EOSINOPHIL (OHS): 2 %
RELATIVE LYMPHOCYTE (OHS): 35.9 % (ref 18–48)
RELATIVE MONOCYTE (OHS): 9.6 % (ref 4–15)
RELATIVE NEUTROPHIL (OHS): 51.7 % (ref 38–73)
SODIUM SERPL-SCNC: 137 MMOL/L (ref 136–145)
WBC # BLD AUTO: 9.83 K/UL (ref 3.9–12.7)

## 2025-07-28 PROCEDURE — 80053 COMPREHEN METABOLIC PANEL: CPT | Mod: HCNC

## 2025-07-28 PROCEDURE — 63600175 PHARM REV CODE 636 W HCPCS: Mod: JZ,TB,HCNC | Performed by: INTERNAL MEDICINE

## 2025-07-28 PROCEDURE — 83519 RIA NONANTIBODY: CPT | Mod: HCNC

## 2025-07-28 PROCEDURE — 84260 ASSAY OF SEROTONIN: CPT | Mod: HCNC

## 2025-07-28 PROCEDURE — 83497 ASSAY OF 5-HIAA: CPT | Mod: HCNC

## 2025-07-28 PROCEDURE — 36415 COLL VENOUS BLD VENIPUNCTURE: CPT | Mod: HCNC

## 2025-07-28 PROCEDURE — 96372 THER/PROPH/DIAG INJ SC/IM: CPT | Mod: HCNC

## 2025-07-28 PROCEDURE — 85025 COMPLETE CBC W/AUTO DIFF WBC: CPT | Mod: HCNC

## 2025-07-28 RX ORDER — LANREOTIDE ACETATE 120 MG/.5ML
120 INJECTION SUBCUTANEOUS ONCE
OUTPATIENT
Start: 2025-07-28 | End: 2025-07-28

## 2025-07-28 RX ORDER — LANREOTIDE ACETATE 120 MG/.5ML
120 INJECTION SUBCUTANEOUS ONCE
Status: COMPLETED | OUTPATIENT
Start: 2025-07-28 | End: 2025-07-28

## 2025-07-28 RX ADMIN — LANREOTIDE ACETATE 120 MG: 120 INJECTION SUBCUTANEOUS at 12:07

## 2025-07-31 LAB
5OH-INDOLEACETATE SERPL-MCNC: 35 NG/ML
SEROTONIN SER-MCNC: 80 NG/ML

## 2025-08-04 ENCOUNTER — CLINICAL SUPPORT (OUTPATIENT)
Dept: OPHTHALMOLOGY | Facility: CLINIC | Age: 70
End: 2025-08-04
Payer: MEDICARE

## 2025-08-06 ENCOUNTER — OFFICE VISIT (OUTPATIENT)
Dept: OPTOMETRY | Facility: CLINIC | Age: 70
End: 2025-08-06
Payer: MEDICARE

## 2025-08-06 DIAGNOSIS — J30.9 ALLERGIC RHINITIS, UNSPECIFIED SEASONALITY, UNSPECIFIED TRIGGER: ICD-10-CM

## 2025-08-06 DIAGNOSIS — H52.4 PRESBYOPIA: ICD-10-CM

## 2025-08-06 DIAGNOSIS — H40.10X1 OPEN-ANGLE GLAUCOMA OF RIGHT EYE, MILD STAGE, UNSPECIFIED OPEN-ANGLE GLAUCOMA TYPE: Primary | ICD-10-CM

## 2025-08-06 DIAGNOSIS — H40.021 OPEN ANGLE WITH BORDERLINE FINDINGS, HIGH RISK, RIGHT EYE: ICD-10-CM

## 2025-08-06 DIAGNOSIS — H04.123 DRY EYE SYNDROME, BILATERAL: ICD-10-CM

## 2025-08-06 DIAGNOSIS — H25.13 NS (NUCLEAR SCLEROSIS), BILATERAL: ICD-10-CM

## 2025-08-06 PROCEDURE — 99214 OFFICE O/P EST MOD 30 MIN: CPT | Mod: HCNC,S$GLB,, | Performed by: OPTOMETRIST

## 2025-08-06 PROCEDURE — 92083 EXTENDED VISUAL FIELD XM: CPT | Mod: HCNC,S$GLB,, | Performed by: OPTOMETRIST

## 2025-08-06 PROCEDURE — 1159F MED LIST DOCD IN RCRD: CPT | Mod: CPTII,HCNC,S$GLB, | Performed by: OPTOMETRIST

## 2025-08-06 PROCEDURE — 1126F AMNT PAIN NOTED NONE PRSNT: CPT | Mod: CPTII,HCNC,S$GLB, | Performed by: OPTOMETRIST

## 2025-08-06 PROCEDURE — 3288F FALL RISK ASSESSMENT DOCD: CPT | Mod: CPTII,HCNC,S$GLB, | Performed by: OPTOMETRIST

## 2025-08-06 PROCEDURE — 92015 DETERMINE REFRACTIVE STATE: CPT | Mod: HCNC,S$GLB,, | Performed by: OPTOMETRIST

## 2025-08-06 PROCEDURE — 92133 CPTRZD OPH DX IMG PST SGM ON: CPT | Mod: HCNC,S$GLB,, | Performed by: OPTOMETRIST

## 2025-08-06 PROCEDURE — 1160F RVW MEDS BY RX/DR IN RCRD: CPT | Mod: CPTII,HCNC,S$GLB, | Performed by: OPTOMETRIST

## 2025-08-06 PROCEDURE — 1101F PT FALLS ASSESS-DOCD LE1/YR: CPT | Mod: CPTII,HCNC,S$GLB, | Performed by: OPTOMETRIST

## 2025-08-06 PROCEDURE — 99999 PR PBB SHADOW E&M-EST. PATIENT-LVL III: CPT | Mod: PBBFAC,HCNC,, | Performed by: OPTOMETRIST

## 2025-08-06 RX ORDER — CYCLOSPORINE 0.5 MG/ML
1 EMULSION OPHTHALMIC 2 TIMES DAILY
Qty: 60 EACH | Refills: 11 | Status: SHIPPED | OUTPATIENT
Start: 2025-08-06 | End: 2026-08-06

## 2025-08-06 RX ORDER — DORZOLAMIDE HCL 20 MG/ML
1 SOLUTION/ DROPS OPHTHALMIC 3 TIMES DAILY
Qty: 10 ML | Refills: 6 | Status: SHIPPED | OUTPATIENT
Start: 2025-08-06 | End: 2026-08-06

## 2025-08-06 RX ORDER — DORZOLAMIDE HCL 20 MG/ML
1 SOLUTION/ DROPS OPHTHALMIC 3 TIMES DAILY
Qty: 10 ML | Refills: 6 | Status: SHIPPED | OUTPATIENT
Start: 2025-08-06 | End: 2025-08-06

## 2025-08-06 NOTE — PROGRESS NOTES
HPI    69 y/o female is here for routine eye exam.  Pt states her vision has been blurry. Pt states she has occ f/f and occ   pain in OS. Pt says she has burning,itching,and tearing.    Latanoprost QHS OD (2-3 times a  week)  Last edited by Carly Montero on 8/6/2025  9:09 AM.            Assessment /Plan     For exam results, see Encounter Report.        Dry eye syndrome, bilateral  -  START   cycloSPORINE (RESTASIS) 0.05 % ophthalmic emulsion; Place 1 drop into the right eye 2 (two) times daily.  Dispense: 60 each; Refill: 11       Presbyopia              Rx specs     Status post transsphenoidal pituitary resection for tumor, Dr Cardenas, about 1989  History of pituitary adenoma 9/2019 MRI no recurrence     NS (nuclear sclerosis), bilateral  Cortical age-related cataract of both eyes              Borderline, monitor     Optic nerve cupping of both eyes              2022, 2025 HVF OD superior and inferior defects, OS WNL, stable 2023 2022 OCT OD thin central N and T,        OS borderline thin N               gonio Open OU               pachy 524/539              IOP 16/13 today   (+) sister with glaucoma: on drops  Family history of glaucoma in sister              2022 Disc photos                 Continue with  -     latanoprost 0.005 % ophthalmic solution; Place 1 drop into the right eye every evening.  Dispense: 7.5 mL; Refill: 4    Start trusopt OD only BID      RTC 2 mo IOP check, repeat testing in 6 mo

## 2025-08-06 NOTE — TELEPHONE ENCOUNTER
Refill Routing Note   Medication(s) are not appropriate for processing by [facility] for the following reason(s):        Due for refill >6 months ago  ED/Hospital Visit since last OV with provider    [facility] action(s):  Defer        Medication Therapy Plan: ED 7/9/25 for chest pain      Appointments  past 12m or future 3m with PCP    Date Provider   Last Visit   5/23/2025 Cipriano Carrera MD   Next Visit   Visit date not found Cipriano Carrera MD   ED visits in past 90 days: 1        Note composed:4:56 PM 08/06/2025

## 2025-08-06 NOTE — TELEPHONE ENCOUNTER
No care due was identified.  [facility] Embedded Care Due Messages. Reference number: [id number].   8/06/2025 3:14:38 PM CDT

## 2025-08-07 ENCOUNTER — HOSPITAL ENCOUNTER (OUTPATIENT)
Dept: RADIOLOGY | Facility: HOSPITAL | Age: 70
Discharge: HOME | End: 2025-08-07
Attending: INTERNAL MEDICINE
Payer: MEDICARE

## 2025-08-07 DIAGNOSIS — Z12.31 ENCOUNTER FOR SCREENING MAMMOGRAM FOR MALIGNANT NEOPLASM OF BREAST: ICD-10-CM

## 2025-08-07 PROCEDURE — 77063 BREAST TOMOSYNTHESIS BI: CPT | Mod: TC,HCNC,PO

## 2025-08-07 RX ORDER — CETIRIZINE HYDROCHLORIDE 10 MG/1
10 TABLET ORAL
Qty: 90 TABLET | Refills: 3 | Status: SHIPPED | OUTPATIENT
Start: 2025-08-07

## 2025-08-25 ENCOUNTER — LAB VISIT (OUTPATIENT)
Dept: LAB | Facility: HOSPITAL | Age: 70
End: 2025-08-25
Payer: MEDICARE

## 2025-08-25 ENCOUNTER — OFFICE VISIT (OUTPATIENT)
Dept: HEMATOLOGY/ONCOLOGY | Facility: CLINIC | Age: 70
End: 2025-08-25
Payer: MEDICARE

## 2025-08-25 ENCOUNTER — INFUSION (OUTPATIENT)
Dept: INFUSION THERAPY | Facility: HOSPITAL | Age: 70
End: 2025-08-25
Payer: MEDICARE

## 2025-08-25 VITALS
OXYGEN SATURATION: 97 % | HEIGHT: 64 IN | BODY MASS INDEX: 36.6 KG/M2 | DIASTOLIC BLOOD PRESSURE: 81 MMHG | RESPIRATION RATE: 18 BRPM | TEMPERATURE: 98 F | SYSTOLIC BLOOD PRESSURE: 174 MMHG | HEART RATE: 68 BPM | WEIGHT: 214.38 LBS

## 2025-08-25 DIAGNOSIS — C7A.8 PRIMARY MALIGNANT NEUROENDOCRINE TUMOR OF SMALL INTESTINE: ICD-10-CM

## 2025-08-25 DIAGNOSIS — C78.7 SECONDARY MALIGNANT NEOPLASM OF LIVER: ICD-10-CM

## 2025-08-25 DIAGNOSIS — C77.8 SECONDARY MALIGNANT NEOPLASM OF LYMPH NODES OF MULTIPLE SITES: ICD-10-CM

## 2025-08-25 DIAGNOSIS — C7A.8 PRIMARY MALIGNANT NEUROENDOCRINE TUMOR OF SMALL INTESTINE: Primary | ICD-10-CM

## 2025-08-25 DIAGNOSIS — I10 ESSENTIAL HYPERTENSION: ICD-10-CM

## 2025-08-25 DIAGNOSIS — D84.821 IMMUNODEFICIENCY DUE TO DRUGS: ICD-10-CM

## 2025-08-25 DIAGNOSIS — D49.9 IMMUNODEFICIENCY SECONDARY TO NEOPLASM: ICD-10-CM

## 2025-08-25 DIAGNOSIS — D84.81 IMMUNODEFICIENCY SECONDARY TO NEOPLASM: ICD-10-CM

## 2025-08-25 DIAGNOSIS — Z79.899 IMMUNODEFICIENCY DUE TO DRUGS: ICD-10-CM

## 2025-08-25 DIAGNOSIS — G89.3 CANCER-RELATED PAIN: ICD-10-CM

## 2025-08-25 LAB
ABSOLUTE EOSINOPHIL (OHS): 0.21 K/UL
ABSOLUTE MONOCYTE (OHS): 0.82 K/UL (ref 0.3–1)
ABSOLUTE NEUTROPHIL COUNT (OHS): 4.89 K/UL (ref 1.8–7.7)
ALBUMIN SERPL BCP-MCNC: 3.6 G/DL (ref 3.5–5.2)
ALP SERPL-CCNC: 109 UNIT/L (ref 40–150)
ALT SERPL W/O P-5'-P-CCNC: 30 UNIT/L (ref 0–55)
ANION GAP (OHS): 9 MMOL/L (ref 8–16)
AST SERPL-CCNC: 40 UNIT/L (ref 0–50)
BASOPHILS # BLD AUTO: 0.05 K/UL
BASOPHILS NFR BLD AUTO: 0.6 %
BILIRUB SERPL-MCNC: 0.3 MG/DL (ref 0.1–1)
BUN SERPL-MCNC: 7 MG/DL (ref 8–23)
CALCIUM SERPL-MCNC: 8.9 MG/DL (ref 8.7–10.5)
CHLORIDE SERPL-SCNC: 105 MMOL/L (ref 95–110)
CO2 SERPL-SCNC: 25 MMOL/L (ref 23–29)
CREAT SERPL-MCNC: 0.7 MG/DL (ref 0.5–1.4)
ERYTHROCYTE [DISTWIDTH] IN BLOOD BY AUTOMATED COUNT: 15 % (ref 11.5–14.5)
GFR SERPLBLD CREATININE-BSD FMLA CKD-EPI: >60 ML/MIN/1.73/M2
GLUCOSE SERPL-MCNC: 100 MG/DL (ref 70–110)
HCT VFR BLD AUTO: 40.2 % (ref 37–48.5)
HGB BLD-MCNC: 12.8 GM/DL (ref 12–16)
IMM GRANULOCYTES # BLD AUTO: 0.02 K/UL (ref 0–0.04)
IMM GRANULOCYTES NFR BLD AUTO: 0.2 % (ref 0–0.5)
LYMPHOCYTES # BLD AUTO: 2.18 K/UL (ref 1–4.8)
MCH RBC QN AUTO: 28.3 PG (ref 27–31)
MCHC RBC AUTO-ENTMCNC: 31.8 G/DL (ref 32–36)
MCV RBC AUTO: 89 FL (ref 82–98)
NUCLEATED RBC (/100WBC) (OHS): 0 /100 WBC
PLATELET # BLD AUTO: 287 K/UL (ref 150–450)
PMV BLD AUTO: 11.7 FL (ref 9.2–12.9)
POTASSIUM SERPL-SCNC: 3.9 MMOL/L (ref 3.5–5.1)
PROT SERPL-MCNC: 7.6 GM/DL (ref 6–8.4)
RBC # BLD AUTO: 4.53 M/UL (ref 4–5.4)
RELATIVE EOSINOPHIL (OHS): 2.6 %
RELATIVE LYMPHOCYTE (OHS): 26.7 % (ref 18–48)
RELATIVE MONOCYTE (OHS): 10 % (ref 4–15)
RELATIVE NEUTROPHIL (OHS): 59.9 % (ref 38–73)
SODIUM SERPL-SCNC: 139 MMOL/L (ref 136–145)
WBC # BLD AUTO: 8.17 K/UL (ref 3.9–12.7)

## 2025-08-25 PROCEDURE — 1159F MED LIST DOCD IN RCRD: CPT | Mod: CPTII,HCNC,S$GLB, | Performed by: PHYSICIAN ASSISTANT

## 2025-08-25 PROCEDURE — 3079F DIAST BP 80-89 MM HG: CPT | Mod: CPTII,HCNC,S$GLB, | Performed by: PHYSICIAN ASSISTANT

## 2025-08-25 PROCEDURE — 80053 COMPREHEN METABOLIC PANEL: CPT | Mod: HCNC

## 2025-08-25 PROCEDURE — 1101F PT FALLS ASSESS-DOCD LE1/YR: CPT | Mod: CPTII,HCNC,S$GLB, | Performed by: PHYSICIAN ASSISTANT

## 2025-08-25 PROCEDURE — 99999 PR PBB SHADOW E&M-EST. PATIENT-LVL IV: CPT | Mod: PBBFAC,HCNC,, | Performed by: PHYSICIAN ASSISTANT

## 2025-08-25 PROCEDURE — 63600175 PHARM REV CODE 636 W HCPCS: Mod: JZ,TB,HCNC | Performed by: PHYSICIAN ASSISTANT

## 2025-08-25 PROCEDURE — 83519 RIA NONANTIBODY: CPT | Mod: HCNC

## 2025-08-25 PROCEDURE — 36415 COLL VENOUS BLD VENIPUNCTURE: CPT | Mod: HCNC

## 2025-08-25 PROCEDURE — 99214 OFFICE O/P EST MOD 30 MIN: CPT | Mod: HCNC,S$GLB,, | Performed by: PHYSICIAN ASSISTANT

## 2025-08-25 PROCEDURE — 1126F AMNT PAIN NOTED NONE PRSNT: CPT | Mod: CPTII,HCNC,S$GLB, | Performed by: PHYSICIAN ASSISTANT

## 2025-08-25 PROCEDURE — 3288F FALL RISK ASSESSMENT DOCD: CPT | Mod: CPTII,HCNC,S$GLB, | Performed by: PHYSICIAN ASSISTANT

## 2025-08-25 PROCEDURE — 85025 COMPLETE CBC W/AUTO DIFF WBC: CPT | Mod: HCNC

## 2025-08-25 PROCEDURE — 3008F BODY MASS INDEX DOCD: CPT | Mod: CPTII,HCNC,S$GLB, | Performed by: PHYSICIAN ASSISTANT

## 2025-08-25 PROCEDURE — 83497 ASSAY OF 5-HIAA: CPT | Mod: HCNC

## 2025-08-25 PROCEDURE — 96372 THER/PROPH/DIAG INJ SC/IM: CPT | Mod: HCNC

## 2025-08-25 PROCEDURE — 3077F SYST BP >= 140 MM HG: CPT | Mod: CPTII,HCNC,S$GLB, | Performed by: PHYSICIAN ASSISTANT

## 2025-08-25 PROCEDURE — G2211 COMPLEX E/M VISIT ADD ON: HCPCS | Mod: HCNC,S$GLB,, | Performed by: PHYSICIAN ASSISTANT

## 2025-08-25 PROCEDURE — 84260 ASSAY OF SEROTONIN: CPT | Mod: HCNC

## 2025-08-25 RX ORDER — LANREOTIDE ACETATE 120 MG/.5ML
120 INJECTION SUBCUTANEOUS ONCE
Status: CANCELLED | OUTPATIENT
Start: 2025-08-25 | End: 2025-08-25

## 2025-08-25 RX ORDER — LANREOTIDE ACETATE 120 MG/.5ML
120 INJECTION SUBCUTANEOUS ONCE
Status: COMPLETED | OUTPATIENT
Start: 2025-08-25 | End: 2025-08-25

## 2025-08-25 RX ORDER — LANREOTIDE ACETATE 120 MG/.5ML
120 INJECTION SUBCUTANEOUS ONCE
OUTPATIENT
Start: 2025-08-25 | End: 2025-08-25

## 2025-08-25 RX ADMIN — LANREOTIDE ACETATE 120 MG: 120 INJECTION SUBCUTANEOUS at 12:08

## 2025-08-28 LAB
5OH-INDOLEACETATE SERPL-MCNC: 34 NG/ML
SEROTONIN SER-MCNC: 81 NG/ML

## 2025-09-03 ENCOUNTER — TELEPHONE (OUTPATIENT)
Dept: FAMILY MEDICINE | Facility: CLINIC | Age: 70
End: 2025-09-03
Payer: MEDICARE

## 2025-09-03 DIAGNOSIS — M81.0 AGE-RELATED OSTEOPOROSIS WITHOUT CURRENT PATHOLOGICAL FRACTURE: ICD-10-CM

## 2025-09-03 RX ORDER — ALENDRONATE SODIUM 70 MG/1
TABLET ORAL
Qty: 12 TABLET | Refills: 3 | OUTPATIENT
Start: 2025-09-03